# Patient Record
Sex: FEMALE | Race: WHITE | Employment: OTHER | URBAN - METROPOLITAN AREA
[De-identification: names, ages, dates, MRNs, and addresses within clinical notes are randomized per-mention and may not be internally consistent; named-entity substitution may affect disease eponyms.]

---

## 2017-01-09 DIAGNOSIS — E78.00 PURE HYPERCHOLESTEROLEMIA: ICD-10-CM

## 2017-01-09 DIAGNOSIS — I10 BENIGN ESSENTIAL HTN: ICD-10-CM

## 2017-01-09 RX ORDER — LISINOPRIL 20 MG/1
20 TABLET ORAL DAILY
Qty: 90 TABLET | Refills: 1 | Status: SHIPPED | OUTPATIENT
Start: 2017-01-09 | End: 2017-07-24 | Stop reason: SDUPTHER

## 2017-01-09 RX ORDER — TRIAMTERENE AND HYDROCHLOROTHIAZIDE 37.5; 25 MG/1; MG/1
1 TABLET ORAL DAILY
Qty: 90 TABLET | Refills: 1 | Status: SHIPPED | OUTPATIENT
Start: 2017-01-09 | End: 2017-07-24 | Stop reason: SDUPTHER

## 2017-01-09 RX ORDER — OMEPRAZOLE 20 MG/1
20 CAPSULE, DELAYED RELEASE ORAL DAILY
Qty: 60 CAPSULE | Refills: 2 | Status: SHIPPED | OUTPATIENT
Start: 2017-01-09 | End: 2017-07-24 | Stop reason: SDUPTHER

## 2017-01-09 RX ORDER — DULOXETIN HYDROCHLORIDE 60 MG/1
CAPSULE, DELAYED RELEASE ORAL
Qty: 90 CAPSULE | Refills: 1 | Status: SHIPPED | OUTPATIENT
Start: 2017-01-09 | End: 2017-07-24 | Stop reason: SDUPTHER

## 2017-01-09 RX ORDER — ATORVASTATIN CALCIUM 40 MG/1
40 TABLET, FILM COATED ORAL DAILY
Qty: 90 TABLET | Refills: 1 | Status: SHIPPED | OUTPATIENT
Start: 2017-01-09 | End: 2017-07-24 | Stop reason: ALTCHOICE

## 2017-01-09 RX ORDER — GABAPENTIN 600 MG/1
600 TABLET ORAL 4 TIMES DAILY
Qty: 360 TABLET | Refills: 1 | Status: SHIPPED | OUTPATIENT
Start: 2017-01-09 | End: 2017-07-24 | Stop reason: SDUPTHER

## 2017-06-08 ENCOUNTER — OFFICE VISIT (OUTPATIENT)
Dept: FAMILY MEDICINE CLINIC | Age: 62
End: 2017-06-08

## 2017-06-08 VITALS
HEIGHT: 59 IN | DIASTOLIC BLOOD PRESSURE: 70 MMHG | SYSTOLIC BLOOD PRESSURE: 138 MMHG | HEART RATE: 80 BPM | BODY MASS INDEX: 35.36 KG/M2 | WEIGHT: 175.38 LBS | RESPIRATION RATE: 16 BRPM | TEMPERATURE: 97.4 F

## 2017-06-08 DIAGNOSIS — I10 BENIGN ESSENTIAL HTN: ICD-10-CM

## 2017-06-08 DIAGNOSIS — E78.00 PURE HYPERCHOLESTEROLEMIA: ICD-10-CM

## 2017-06-08 LAB
ALBUMIN SERPL-MCNC: 4.3 G/DL (ref 3.9–4.9)
ALP BLD-CCNC: 156 U/L (ref 40–130)
ALT SERPL-CCNC: 46 U/L (ref 0–33)
ANION GAP SERPL CALCULATED.3IONS-SCNC: 12 MEQ/L (ref 7–13)
AST SERPL-CCNC: 38 U/L (ref 0–35)
BASOPHILS ABSOLUTE: 0.1 K/UL (ref 0–0.2)
BASOPHILS RELATIVE PERCENT: 0.5 %
BILIRUB SERPL-MCNC: 0.4 MG/DL (ref 0–1.2)
BUN BLDV-MCNC: 9 MG/DL (ref 8–23)
CALCIUM SERPL-MCNC: 9.5 MG/DL (ref 8.6–10.2)
CHLORIDE BLD-SCNC: 101 MEQ/L (ref 98–107)
CHOLESTEROL, TOTAL: 207 MG/DL (ref 0–199)
CO2: 25 MEQ/L (ref 22–29)
CREAT SERPL-MCNC: 0.61 MG/DL (ref 0.5–0.9)
CREATININE URINE: 52.6 MG/DL
EOSINOPHILS ABSOLUTE: 0.5 K/UL (ref 0–0.7)
EOSINOPHILS RELATIVE PERCENT: 4.6 %
GFR AFRICAN AMERICAN: >60
GFR NON-AFRICAN AMERICAN: >60
GLOBULIN: 2.5 G/DL (ref 2.3–3.5)
GLUCOSE BLD-MCNC: 204 MG/DL (ref 74–109)
HBA1C MFR BLD: 8.5 % (ref 4.8–5.9)
HCT VFR BLD CALC: 40.9 % (ref 37–47)
HDLC SERPL-MCNC: 31 MG/DL (ref 40–59)
HEMOGLOBIN: 13.4 G/DL (ref 12–16)
LDL CHOLESTEROL CALCULATED: ABNORMAL MG/DL (ref 0–129)
LYMPHOCYTES ABSOLUTE: 3.5 K/UL (ref 1–4.8)
LYMPHOCYTES RELATIVE PERCENT: 30.1 %
MCH RBC QN AUTO: 25.9 PG (ref 27–31.3)
MCHC RBC AUTO-ENTMCNC: 32.8 % (ref 33–37)
MCV RBC AUTO: 78.8 FL (ref 82–100)
MICROALBUMIN UR-MCNC: <1.2 MG/DL
MICROALBUMIN/CREAT UR-RTO: NORMAL MG/G (ref 0–30)
MONOCYTES ABSOLUTE: 0.9 K/UL (ref 0.2–0.8)
MONOCYTES RELATIVE PERCENT: 8 %
NEUTROPHILS ABSOLUTE: 6.6 K/UL (ref 1.4–6.5)
NEUTROPHILS RELATIVE PERCENT: 56.8 %
PDW BLD-RTO: 15 % (ref 11.5–14.5)
PLATELET # BLD: 248 K/UL (ref 130–400)
POTASSIUM SERPL-SCNC: 4 MEQ/L (ref 3.5–5.1)
RBC # BLD: 5.19 M/UL (ref 4.2–5.4)
SODIUM BLD-SCNC: 138 MEQ/L (ref 132–144)
TOTAL PROTEIN: 6.8 G/DL (ref 6.4–8.1)
TRIGL SERPL-MCNC: 789 MG/DL (ref 0–200)
WBC # BLD: 11.6 K/UL (ref 4.8–10.8)

## 2017-06-08 PROCEDURE — 99213 OFFICE O/P EST LOW 20 MIN: CPT | Performed by: FAMILY MEDICINE

## 2017-06-08 RX ORDER — ALBUTEROL SULFATE 90 UG/1
2 AEROSOL, METERED RESPIRATORY (INHALATION) EVERY 4 HOURS PRN
Qty: 1 INHALER | Refills: 3 | Status: SHIPPED | OUTPATIENT
Start: 2017-06-08 | End: 2019-01-16 | Stop reason: SDUPTHER

## 2017-06-28 ENCOUNTER — TELEPHONE (OUTPATIENT)
Dept: FAMILY MEDICINE CLINIC | Age: 62
End: 2017-06-28

## 2017-06-28 RX ORDER — CIPROFLOXACIN 250 MG/1
250 TABLET, FILM COATED ORAL 2 TIMES DAILY
Qty: 10 TABLET | Refills: 0 | Status: SHIPPED | OUTPATIENT
Start: 2017-06-28 | End: 2017-07-03

## 2017-07-24 ENCOUNTER — OFFICE VISIT (OUTPATIENT)
Dept: FAMILY MEDICINE CLINIC | Age: 62
End: 2017-07-24

## 2017-07-24 VITALS
WEIGHT: 171.56 LBS | OXYGEN SATURATION: 95 % | TEMPERATURE: 95.7 F | HEART RATE: 87 BPM | HEIGHT: 59 IN | BODY MASS INDEX: 34.59 KG/M2 | DIASTOLIC BLOOD PRESSURE: 82 MMHG | SYSTOLIC BLOOD PRESSURE: 128 MMHG

## 2017-07-24 DIAGNOSIS — R79.89 ELEVATED LIVER FUNCTION TESTS: ICD-10-CM

## 2017-07-24 DIAGNOSIS — R79.89 ELEVATED LIVER FUNCTION TESTS: Primary | ICD-10-CM

## 2017-07-24 DIAGNOSIS — I10 BENIGN ESSENTIAL HTN: ICD-10-CM

## 2017-07-24 DIAGNOSIS — E78.00 PURE HYPERCHOLESTEROLEMIA: ICD-10-CM

## 2017-07-24 LAB
ALBUMIN SERPL-MCNC: 4.3 G/DL (ref 3.9–4.9)
ALP BLD-CCNC: 179 U/L (ref 40–130)
ALT SERPL-CCNC: 102 U/L (ref 0–33)
ANION GAP SERPL CALCULATED.3IONS-SCNC: 15 MEQ/L (ref 7–13)
AST SERPL-CCNC: 74 U/L (ref 0–35)
BILIRUB SERPL-MCNC: 0.3 MG/DL (ref 0–1.2)
BUN BLDV-MCNC: 12 MG/DL (ref 8–23)
CALCIUM SERPL-MCNC: 9.4 MG/DL (ref 8.6–10.2)
CHLORIDE BLD-SCNC: 101 MEQ/L (ref 98–107)
CO2: 21 MEQ/L (ref 22–29)
CREAT SERPL-MCNC: 0.67 MG/DL (ref 0.5–0.9)
GFR AFRICAN AMERICAN: >60
GFR NON-AFRICAN AMERICAN: >60
GLOBULIN: 2.8 G/DL (ref 2.3–3.5)
GLUCOSE BLD-MCNC: 126 MG/DL (ref 74–109)
HEPATITIS C ANTIBODY INTERPRETATION: NORMAL
POTASSIUM SERPL-SCNC: 3.9 MEQ/L (ref 3.5–5.1)
SODIUM BLD-SCNC: 137 MEQ/L (ref 132–144)
TOTAL PROTEIN: 7.1 G/DL (ref 6.4–8.1)

## 2017-07-24 PROCEDURE — 99214 OFFICE O/P EST MOD 30 MIN: CPT | Performed by: FAMILY MEDICINE

## 2017-07-24 RX ORDER — OMEPRAZOLE 20 MG/1
20 CAPSULE, DELAYED RELEASE ORAL DAILY
Qty: 180 CAPSULE | Refills: 1 | Status: SHIPPED | OUTPATIENT
Start: 2017-07-24 | End: 2018-08-23 | Stop reason: SDUPTHER

## 2017-07-24 RX ORDER — DULOXETIN HYDROCHLORIDE 60 MG/1
CAPSULE, DELAYED RELEASE ORAL
Qty: 90 CAPSULE | Refills: 1 | Status: SHIPPED | OUTPATIENT
Start: 2017-07-24 | End: 2017-12-01

## 2017-07-24 RX ORDER — TRIAMTERENE AND HYDROCHLOROTHIAZIDE 37.5; 25 MG/1; MG/1
1 TABLET ORAL DAILY
Qty: 90 TABLET | Refills: 1 | Status: SHIPPED | OUTPATIENT
Start: 2017-07-24 | End: 2017-12-01 | Stop reason: ALTCHOICE

## 2017-07-24 RX ORDER — GABAPENTIN 600 MG/1
600 TABLET ORAL 4 TIMES DAILY
Qty: 360 TABLET | Refills: 1 | Status: SHIPPED | OUTPATIENT
Start: 2017-07-24 | End: 2017-12-01 | Stop reason: ALTCHOICE

## 2017-07-24 RX ORDER — LISINOPRIL 20 MG/1
20 TABLET ORAL DAILY
Qty: 90 TABLET | Refills: 1 | Status: SHIPPED | OUTPATIENT
Start: 2017-07-24 | End: 2017-12-01 | Stop reason: DRUGHIGH

## 2017-07-24 ASSESSMENT — PATIENT HEALTH QUESTIONNAIRE - PHQ9
SUM OF ALL RESPONSES TO PHQ QUESTIONS 1-9: 0
SUM OF ALL RESPONSES TO PHQ9 QUESTIONS 1 & 2: 0
2. FEELING DOWN, DEPRESSED OR HOPELESS: 0
1. LITTLE INTEREST OR PLEASURE IN DOING THINGS: 0

## 2017-07-28 ENCOUNTER — TELEPHONE (OUTPATIENT)
Dept: FAMILY MEDICINE CLINIC | Age: 62
End: 2017-07-28

## 2017-08-02 DIAGNOSIS — R10.11 RIGHT UPPER QUADRANT ABDOMINAL PAIN: ICD-10-CM

## 2017-08-02 DIAGNOSIS — R79.89 ELEVATED LIVER FUNCTION TESTS: Primary | ICD-10-CM

## 2017-08-09 DIAGNOSIS — R10.11 RIGHT UPPER QUADRANT ABDOMINAL PAIN: ICD-10-CM

## 2017-08-09 DIAGNOSIS — R79.89 ELEVATED LIVER FUNCTION TESTS: ICD-10-CM

## 2017-08-17 ENCOUNTER — OFFICE VISIT (OUTPATIENT)
Dept: FAMILY MEDICINE CLINIC | Age: 62
End: 2017-08-17

## 2017-08-17 VITALS
TEMPERATURE: 96.5 F | HEIGHT: 59 IN | BODY MASS INDEX: 34.79 KG/M2 | DIASTOLIC BLOOD PRESSURE: 78 MMHG | HEART RATE: 83 BPM | WEIGHT: 172.56 LBS | SYSTOLIC BLOOD PRESSURE: 138 MMHG | RESPIRATION RATE: 12 BRPM

## 2017-08-17 DIAGNOSIS — M79.7 FIBROMYALGIA: ICD-10-CM

## 2017-08-17 DIAGNOSIS — M54.50 CHRONIC BILATERAL LOW BACK PAIN WITHOUT SCIATICA: Primary | ICD-10-CM

## 2017-08-17 DIAGNOSIS — M79.604 BILATERAL LEG PAIN: ICD-10-CM

## 2017-08-17 DIAGNOSIS — G89.29 CHRONIC BILATERAL LOW BACK PAIN WITHOUT SCIATICA: Primary | ICD-10-CM

## 2017-08-17 DIAGNOSIS — M79.605 BILATERAL LEG PAIN: ICD-10-CM

## 2017-08-17 PROCEDURE — 99213 OFFICE O/P EST LOW 20 MIN: CPT | Performed by: FAMILY MEDICINE

## 2017-08-17 RX ORDER — PREGABALIN 75 MG/1
75 CAPSULE ORAL 2 TIMES DAILY
Qty: 60 CAPSULE | Refills: 3 | Status: SHIPPED | OUTPATIENT
Start: 2017-08-17 | End: 2017-12-01 | Stop reason: SDUPTHER

## 2017-08-17 RX ORDER — PREGABALIN 75 MG/1
75 CAPSULE ORAL 2 TIMES DAILY
Qty: 28 CAPSULE | Refills: 0 | Status: SHIPPED | OUTPATIENT
Start: 2017-08-17 | End: 2018-02-07 | Stop reason: SDUPTHER

## 2017-08-17 RX ORDER — TRAZODONE HYDROCHLORIDE 50 MG/1
50 TABLET ORAL NIGHTLY
Qty: 30 TABLET | Refills: 1 | Status: SHIPPED | OUTPATIENT
Start: 2017-08-17 | End: 2017-10-30 | Stop reason: SDUPTHER

## 2017-08-20 PROBLEM — G89.29 CHRONIC BILATERAL LOW BACK PAIN WITHOUT SCIATICA: Status: ACTIVE | Noted: 2017-08-20

## 2017-08-20 PROBLEM — M54.50 CHRONIC BILATERAL LOW BACK PAIN WITHOUT SCIATICA: Status: ACTIVE | Noted: 2017-08-20

## 2017-08-28 ENCOUNTER — OFFICE VISIT (OUTPATIENT)
Dept: FAMILY MEDICINE CLINIC | Age: 62
End: 2017-08-28

## 2017-08-28 VITALS
RESPIRATION RATE: 24 BRPM | HEART RATE: 70 BPM | OXYGEN SATURATION: 93 % | TEMPERATURE: 94.9 F | DIASTOLIC BLOOD PRESSURE: 82 MMHG | SYSTOLIC BLOOD PRESSURE: 134 MMHG | HEIGHT: 59 IN | BODY MASS INDEX: 35 KG/M2 | WEIGHT: 173.6 LBS

## 2017-08-28 DIAGNOSIS — R06.02 SOB (SHORTNESS OF BREATH): ICD-10-CM

## 2017-08-28 DIAGNOSIS — J41.1 MUCOPURULENT CHRONIC BRONCHITIS (HCC): ICD-10-CM

## 2017-08-28 DIAGNOSIS — J18.9 ATYPICAL PNEUMONIA: Primary | ICD-10-CM

## 2017-08-28 DIAGNOSIS — R05.9 COUGH: ICD-10-CM

## 2017-08-28 PROCEDURE — 94640 AIRWAY INHALATION TREATMENT: CPT | Performed by: FAMILY MEDICINE

## 2017-08-28 PROCEDURE — 99214 OFFICE O/P EST MOD 30 MIN: CPT | Performed by: FAMILY MEDICINE

## 2017-08-28 PROCEDURE — 96372 THER/PROPH/DIAG INJ SC/IM: CPT | Performed by: FAMILY MEDICINE

## 2017-08-28 RX ORDER — METHYLPREDNISOLONE 4 MG/1
TABLET ORAL
Qty: 1 KIT | Refills: 0 | Status: SHIPPED | OUTPATIENT
Start: 2017-08-28 | End: 2017-09-03

## 2017-08-28 RX ORDER — LEVOFLOXACIN 500 MG/1
500 TABLET, FILM COATED ORAL DAILY
Qty: 10 TABLET | Refills: 0 | Status: SHIPPED | OUTPATIENT
Start: 2017-08-28 | End: 2017-12-11 | Stop reason: SDUPTHER

## 2017-08-28 RX ORDER — ALBUTEROL SULFATE 2.5 MG/3ML
2.5 SOLUTION RESPIRATORY (INHALATION) ONCE
Status: COMPLETED | OUTPATIENT
Start: 2017-08-28 | End: 2017-08-28

## 2017-08-28 RX ORDER — TRIAMCINOLONE ACETONIDE 40 MG/ML
80 INJECTION, SUSPENSION INTRA-ARTICULAR; INTRAMUSCULAR ONCE
Status: COMPLETED | OUTPATIENT
Start: 2017-08-28 | End: 2017-08-28

## 2017-08-28 RX ORDER — ALBUTEROL SULFATE 2.5 MG/3ML
2.5 SOLUTION RESPIRATORY (INHALATION) 4 TIMES DAILY
Qty: 120 EACH | Refills: 4 | Status: SHIPPED | OUTPATIENT
Start: 2017-08-28 | End: 2021-10-19 | Stop reason: ALTCHOICE

## 2017-08-28 RX ADMIN — ALBUTEROL SULFATE 2.5 MG: 2.5 SOLUTION RESPIRATORY (INHALATION) at 16:34

## 2017-08-28 RX ADMIN — TRIAMCINOLONE ACETONIDE 80 MG: 40 INJECTION, SUSPENSION INTRA-ARTICULAR; INTRAMUSCULAR at 16:33

## 2017-10-30 RX ORDER — TRAZODONE HYDROCHLORIDE 50 MG/1
50 TABLET ORAL NIGHTLY
Qty: 30 TABLET | Refills: 2 | Status: SHIPPED | OUTPATIENT
Start: 2017-10-30 | End: 2017-12-01

## 2017-12-01 ENCOUNTER — TELEPHONE (OUTPATIENT)
Dept: FAMILY MEDICINE CLINIC | Age: 62
End: 2017-12-01

## 2017-12-01 ENCOUNTER — OFFICE VISIT (OUTPATIENT)
Dept: FAMILY MEDICINE CLINIC | Age: 62
End: 2017-12-01

## 2017-12-01 DIAGNOSIS — R79.89 ABNORMAL LFTS: ICD-10-CM

## 2017-12-01 DIAGNOSIS — R07.9 CHEST PAIN, UNSPECIFIED TYPE: ICD-10-CM

## 2017-12-01 DIAGNOSIS — R41.3 MEMORY LOSS: ICD-10-CM

## 2017-12-01 DIAGNOSIS — E55.9 VITAMIN D DEFICIENCY: ICD-10-CM

## 2017-12-01 DIAGNOSIS — I10 BENIGN ESSENTIAL HTN: ICD-10-CM

## 2017-12-01 DIAGNOSIS — E78.00 PURE HYPERCHOLESTEROLEMIA: ICD-10-CM

## 2017-12-01 DIAGNOSIS — E11.43 DIABETIC AUTONOMIC NEUROPATHY ASSOCIATED WITH TYPE 2 DIABETES MELLITUS (HCC): ICD-10-CM

## 2017-12-01 DIAGNOSIS — R06.02 SOB (SHORTNESS OF BREATH): Primary | ICD-10-CM

## 2017-12-01 LAB
ALBUMIN SERPL-MCNC: 4.2 G/DL (ref 3.9–4.9)
ALP BLD-CCNC: 159 U/L (ref 40–130)
ALT SERPL-CCNC: 66 U/L (ref 0–33)
ANION GAP SERPL CALCULATED.3IONS-SCNC: 14 MEQ/L (ref 7–13)
AST SERPL-CCNC: 48 U/L (ref 0–35)
BASOPHILS ABSOLUTE: 0.1 K/UL (ref 0–0.2)
BASOPHILS RELATIVE PERCENT: 0.7 %
BILIRUB SERPL-MCNC: 0.3 MG/DL (ref 0–1.2)
BUN BLDV-MCNC: 15 MG/DL (ref 8–23)
CALCIUM SERPL-MCNC: 9.5 MG/DL (ref 8.6–10.2)
CHLORIDE BLD-SCNC: 99 MEQ/L (ref 98–107)
CO2: 27 MEQ/L (ref 22–29)
CREAT SERPL-MCNC: 0.57 MG/DL (ref 0.5–0.9)
EOSINOPHILS ABSOLUTE: 0.3 K/UL (ref 0–0.7)
EOSINOPHILS RELATIVE PERCENT: 3.9 %
FOLATE: 12.1 NG/ML (ref 7.3–26.1)
GAMMA GLUTAMYL TRANSFERASE: 152 U/L (ref 0–40)
GFR AFRICAN AMERICAN: >60
GFR NON-AFRICAN AMERICAN: >60
GLOBULIN: 2.5 G/DL (ref 2.3–3.5)
GLUCOSE BLD-MCNC: 224 MG/DL (ref 74–109)
HBA1C MFR BLD: 8.4 % (ref 4.8–5.9)
HCT VFR BLD CALC: 41.4 % (ref 37–47)
HEMOGLOBIN: 13.6 G/DL (ref 12–16)
LYMPHOCYTES ABSOLUTE: 2.4 K/UL (ref 1–4.8)
LYMPHOCYTES RELATIVE PERCENT: 29.4 %
MCH RBC QN AUTO: 26.4 PG (ref 27–31.3)
MCHC RBC AUTO-ENTMCNC: 32.7 % (ref 33–37)
MCV RBC AUTO: 80.7 FL (ref 82–100)
MONOCYTES ABSOLUTE: 0.7 K/UL (ref 0.2–0.8)
MONOCYTES RELATIVE PERCENT: 8.2 %
NEUTROPHILS ABSOLUTE: 4.6 K/UL (ref 1.4–6.5)
NEUTROPHILS RELATIVE PERCENT: 57.8 %
PDW BLD-RTO: 14.9 % (ref 11.5–14.5)
PLATELET # BLD: 211 K/UL (ref 130–400)
POTASSIUM SERPL-SCNC: 4.6 MEQ/L (ref 3.5–5.1)
RBC # BLD: 5.14 M/UL (ref 4.2–5.4)
SODIUM BLD-SCNC: 140 MEQ/L (ref 132–144)
T4 FREE: 1.12 NG/DL (ref 0.93–1.7)
TOTAL PROTEIN: 6.7 G/DL (ref 6.4–8.1)
TSH SERPL DL<=0.05 MIU/L-ACNC: 0.79 UIU/ML (ref 0.27–4.2)
VITAMIN B-12: 340 PG/ML (ref 211–946)
VITAMIN D 25-HYDROXY: 30 NG/ML (ref 30–100)
WBC # BLD: 8 K/UL (ref 4.8–10.8)

## 2017-12-01 PROCEDURE — 99214 OFFICE O/P EST MOD 30 MIN: CPT | Performed by: FAMILY MEDICINE

## 2017-12-01 PROCEDURE — 93000 ELECTROCARDIOGRAM COMPLETE: CPT | Performed by: FAMILY MEDICINE

## 2017-12-01 PROCEDURE — 36415 COLL VENOUS BLD VENIPUNCTURE: CPT | Performed by: FAMILY MEDICINE

## 2017-12-01 PROCEDURE — 83036 HEMOGLOBIN GLYCOSYLATED A1C: CPT | Performed by: FAMILY MEDICINE

## 2017-12-01 RX ORDER — LISINOPRIL 20 MG/1
20 TABLET ORAL 2 TIMES DAILY
Qty: 180 TABLET | Refills: 1 | Status: SHIPPED
Start: 2017-12-01 | End: 2018-03-19 | Stop reason: SDUPTHER

## 2017-12-01 RX ORDER — ZOLPIDEM TARTRATE 10 MG/1
TABLET ORAL
Qty: 30 TABLET | Refills: 2 | Status: SHIPPED | OUTPATIENT
Start: 2017-12-01 | End: 2018-04-06 | Stop reason: SDUPTHER

## 2017-12-01 NOTE — TELEPHONE ENCOUNTER
Patient calling, she was just into see you and she can't remember if you said she can or can't take Tylenol. If she is not able to take tylenol what can she take in place of it. Please advise.

## 2017-12-01 NOTE — PROGRESS NOTES
Subjective:      Patient ID: Robbni Cruz is a 60 y.o. female. Chief Complaint   Patient presents with    Diabetes     Reports that she takes all medications as prescribed. Checks glucose at home 1-2 times per day. Tries to watch diet and avoid carbs and sugars.  Hypertension     Does not usually check BP at home. Has been checking for the past couple days with elevated readings. Does not follow a low sodium diet.  Hyperlipidemia     does try to avoid fried and fatty foods. Does not exercise.  Other     Overall not feeling well. Has been having shaking, memory loss, confusion, dizziness, and headaches. HPI    Here today follow-up on her diabetes doing okay with medications checks her sugar under 2 times per day tries to watch her diet and carbs    Also following up on hypertension and checking last couple days been more elevated but does Fluctuate does try to watch a low sodium diet and try to avoid fatty foods also    Overall she's not feeling well she times shaky sometimes memory loss has confused sometimes dizzy and shortness of breath occasionally gets chest pain but nothing rates into her neck       she is current with the present in current events is discussed with her here today      Shortness of breath even from walking outside into the office has been more prominent inhaler gave her helped somewhat but not as much as she         does have family history of heart disease in her family      When she does stop walking the shortness of breath improves    Allergies   Allergen Reactions    Penicillin V Hives     Outpatient Encounter Prescriptions as of 12/1/2017   Medication Sig Dispense Refill    lisinopril (PRINIVIL;ZESTRIL) 20 MG tablet Take 1 tablet by mouth 2 times daily Take 20 mg by mouth 180 tablet 1    zolpidem (AMBIEN) 10 MG tablet 1/2-1 tablet daily at bedtime.  30 tablet 2    albuterol (PROVENTIL) (2.5 MG/3ML) 0.083% nebulizer solution Take 3 mLs by nebulization 4 times daily

## 2017-12-03 VITALS
WEIGHT: 172.13 LBS | TEMPERATURE: 96.1 F | DIASTOLIC BLOOD PRESSURE: 74 MMHG | OXYGEN SATURATION: 95 % | HEART RATE: 90 BPM | HEIGHT: 59 IN | RESPIRATION RATE: 16 BRPM | SYSTOLIC BLOOD PRESSURE: 134 MMHG | BODY MASS INDEX: 34.7 KG/M2

## 2017-12-08 DIAGNOSIS — R79.89 ELEVATED LFTS: Primary | ICD-10-CM

## 2017-12-11 ENCOUNTER — TELEPHONE (OUTPATIENT)
Dept: FAMILY MEDICINE CLINIC | Age: 62
End: 2017-12-11

## 2017-12-11 ENCOUNTER — OFFICE VISIT (OUTPATIENT)
Dept: FAMILY MEDICINE CLINIC | Age: 62
End: 2017-12-11

## 2017-12-11 VITALS
OXYGEN SATURATION: 97 % | WEIGHT: 169.44 LBS | RESPIRATION RATE: 16 BRPM | HEIGHT: 59 IN | TEMPERATURE: 98 F | DIASTOLIC BLOOD PRESSURE: 78 MMHG | SYSTOLIC BLOOD PRESSURE: 138 MMHG | HEART RATE: 87 BPM | BODY MASS INDEX: 34.16 KG/M2

## 2017-12-11 DIAGNOSIS — I10 BENIGN ESSENTIAL HTN: Primary | ICD-10-CM

## 2017-12-11 DIAGNOSIS — E78.00 PURE HYPERCHOLESTEROLEMIA: ICD-10-CM

## 2017-12-11 DIAGNOSIS — K21.9 GASTROESOPHAGEAL REFLUX DISEASE WITHOUT ESOPHAGITIS: ICD-10-CM

## 2017-12-11 LAB — HBA1C MFR BLD: 7.4 %

## 2017-12-11 PROCEDURE — 83036 HEMOGLOBIN GLYCOSYLATED A1C: CPT | Performed by: FAMILY MEDICINE

## 2017-12-11 PROCEDURE — 99213 OFFICE O/P EST LOW 20 MIN: CPT | Performed by: FAMILY MEDICINE

## 2017-12-11 RX ORDER — CHLORTHALIDONE 25 MG/1
25 TABLET ORAL DAILY
Qty: 30 TABLET | Refills: 3 | Status: SHIPPED | OUTPATIENT
Start: 2017-12-11 | End: 2018-04-14 | Stop reason: SDUPTHER

## 2017-12-11 RX ORDER — AMLODIPINE BESYLATE 5 MG/1
5 TABLET ORAL DAILY
COMMUNITY
Start: 2017-12-05 | End: 2018-08-20 | Stop reason: SDDI

## 2017-12-11 RX ORDER — LEVOFLOXACIN 500 MG/1
500 TABLET, FILM COATED ORAL DAILY
Qty: 10 TABLET | Refills: 0 | Status: SHIPPED | OUTPATIENT
Start: 2017-12-11 | End: 2017-12-21

## 2017-12-11 NOTE — PROGRESS NOTES
Subjective:      Patient ID: Robbin Cruz is a 60 y.o. female. Chief Complaint   Patient presents with    Follow-Up from [address] [place] [address] ER on 12-3-17 for elevated BP, severe headache, and chest pain. She was admitted and inpatient overnight. Testing was done, including BW, EKG, and X-rays. Was prescribed Norvasc 5 mg. Since discharge, she is still experiencing severe headache. BP has been more under control. Feels like Norvasc is causing the headache    URI     x 2-3 days. Symptoms include cough, congestion, sinus pain, sore throat, and diarrhea. Has tried no treatments.         HEAVEN Ibarra is here today follow-up on her ER visit for elevated blood pressure checks and stayed overnight she had severe headache chest pain her blood testing was all normal EKG x-rays and her troponins    Since discharge she went ahead and started Norvasc he still feels that is causing her headaches although can be her blood pressure was elevated      Also had some cold congestion symptoms for last to 3 days definitely making things worse has tried no other treatments      Take her stress test [address] last week we did call for results did come 6 hours later and they were negative for any type of ischemic process      We discussed with her that mentioned very thoroughly with the that the stress test aren't always perfect as her  had a heart attack after having a normal      She denies any shortness breath or chest pain      Allergies   Allergen Reactions    Penicillin V Hives     Outpatient Encounter Prescriptions as of 12/11/2017   Medication Sig Dispense Refill    amLODIPine (NORVASC) 5 MG tablet Take 5 mg by mouth daily      insulin NPH (HUMULIN N;NOVOLIN N) 100 UNIT/ML injection vial TAKE 80UNITS JUST BEFORE BREAKFAST AND 80 UNITS IN THE PM 30 vial 1    chlorthalidone (HYGROTON) 25 MG tablet Take 1 tablet by mouth daily 30 tablet 3    levofloxacin (LEVAQUIN) 500 MG tablet Take 1 tablet by mouth daily for 10 within normal limits. Full strength noted    Skin- no lesions noted       Assessment:      1. Benign essential HTN     2. Pure hypercholesterolemia     3. Uncontrolled type 2 diabetes mellitus without complication, without long-term current use of insulin (HCC)  POCT glycosylated hemoglobin (Hb A1C)   4.  Gastroesophageal reflux disease without esophagitis               Plan:        Orders Placed This Encounter   Medications    insulin NPH (HUMULIN N;NOVOLIN N) 100 UNIT/ML injection vial     Sig: TAKE 80UNITS JUST BEFORE BREAKFAST AND 80 UNITS IN THE PM     Dispense:  30 vial     Refill:  1    chlorthalidone (HYGROTON) 25 MG tablet     Sig: Take 1 tablet by mouth daily     Dispense:  30 tablet     Refill:  3    levofloxacin (LEVAQUIN) 500 MG tablet     Sig: Take 1 tablet by mouth daily for 10 days     Dispense:  10 tablet     Refill:  0     Orders Placed This Encounter   Procedures    POCT glycosylated hemoglobin (Hb A1C)           Health Maintenance Due   Topic Date Due    Pneumococcal med risk (1 of 1 - PPSV23) 05/06/1973    Zostavax vaccine  05/06/2014    Cervical cancer screen  04/01/2016    Diabetic retinal exam  05/24/2016    DTaP/Tdap/Td vaccine (2 - Td) 03/23/2017    Diabetic foot exam  04/27/2017    Flu vaccine (1) 09/01/2017        she will continue to stay on the Norvasc for now we'll add chlorthalidone      Controlled Substances Monitoring:              If anything worsens or changes please call us at once , follow-up in the office as planned,

## 2018-01-17 ENCOUNTER — TELEPHONE (OUTPATIENT)
Dept: FAMILY MEDICINE CLINIC | Age: 63
End: 2018-01-17

## 2018-01-17 RX ORDER — AZITHROMYCIN 250 MG/1
TABLET, FILM COATED ORAL
Qty: 1 PACKET | Refills: 0 | Status: SHIPPED | OUTPATIENT
Start: 2018-01-17 | End: 2018-01-27

## 2018-01-17 NOTE — TELEPHONE ENCOUNTER
Patient calling, would like to know if you can call in something for her. She believes that she has the flu. She has the cough, chills, fever, congestion, sinus pressure. She also feels very weak. She has not been able to get out of bed. Please advise.

## 2018-01-24 ENCOUNTER — OFFICE VISIT (OUTPATIENT)
Dept: FAMILY MEDICINE CLINIC | Age: 63
End: 2018-01-24
Payer: COMMERCIAL

## 2018-01-24 VITALS
RESPIRATION RATE: 18 BRPM | WEIGHT: 165.6 LBS | DIASTOLIC BLOOD PRESSURE: 66 MMHG | HEIGHT: 60 IN | HEART RATE: 80 BPM | BODY MASS INDEX: 32.51 KG/M2 | SYSTOLIC BLOOD PRESSURE: 116 MMHG | TEMPERATURE: 96.9 F

## 2018-01-24 DIAGNOSIS — J20.9 ACUTE BRONCHITIS, UNSPECIFIED ORGANISM: Primary | ICD-10-CM

## 2018-01-24 DIAGNOSIS — J11.1 INFLUENZA: ICD-10-CM

## 2018-01-24 PROCEDURE — 99213 OFFICE O/P EST LOW 20 MIN: CPT | Performed by: FAMILY MEDICINE

## 2018-01-24 RX ORDER — METHYLPREDNISOLONE 4 MG/1
TABLET ORAL
Qty: 1 KIT | Refills: 0 | Status: SHIPPED | OUTPATIENT
Start: 2018-01-24 | End: 2018-04-03 | Stop reason: ALTCHOICE

## 2018-01-24 RX ORDER — BENZONATATE 100 MG/1
100 CAPSULE ORAL 3 TIMES DAILY PRN
Qty: 21 CAPSULE | Refills: 1 | Status: SHIPPED | OUTPATIENT
Start: 2018-01-24 | End: 2018-01-31

## 2018-01-24 RX ORDER — CODEINE PHOSPHATE AND GUAIFENESIN 10; 100 MG/5ML; MG/5ML
10 SOLUTION ORAL 4 TIMES DAILY PRN
Qty: 240 ML | Refills: 1 | Status: SHIPPED | OUTPATIENT
Start: 2018-01-24 | End: 2018-02-23

## 2018-01-24 NOTE — PROGRESS NOTES
Subjective:      Patient ID: Enedelia Faye is a 60 y.o. female. Chief Complaint   Patient presents with    Cough     Presents today with  no productive, dry cough that has that has been ongoing x8 days. Rx for Azithromycin was called in 1/17. Pt states she finished rx \"couple days ago\" but cough still present. Pt states she has a fever that waxes and wanes. Highest temp was on Sat of 101.4. Pt has tried Tylenol for the fever with effectiveness for a short duration and then fever returns. HPI  Positive cough congestion drainage from last week she finished all her antibiotics      Couple days though she's having more fevers or chills did have some body aches at that time was consistent with the flu      Did not see anybody at that time      Now the cough is keeping her up at night nohome as had the flu    Allergies   Allergen Reactions    Penicillin V Hives     Outpatient Encounter Prescriptions as of 1/24/2018   Medication Sig Dispense Refill    methylPREDNISolone (MEDROL DOSEPACK) 4 MG tablet Take by mouth. 1 kit 0    guaiFENesin-codeine (GUAIFENESIN AC) 100-10 MG/5ML liquid Take 10 mLs by mouth 4 times daily as needed for Cough for up to 30 days. 240 mL 1    benzonatate (TESSALON PERLES) 100 MG capsule Take 1 capsule by mouth 3 times daily as needed for Cough 21 capsule 1    azithromycin (ZITHROMAX Z-VIANNEY) 250 MG tablet As directed 1 packet 0    azithromycin (ZITHROMAX Z-VIANNEY) 250 MG tablet As directed 1 packet 0    insulin NPH (HUMULIN N;NOVOLIN N) 100 UNIT/ML injection vial TAKE 80UNITS JUST BEFORE BREAKFAST AND 80 UNITS IN THE PM 30 vial 1    chlorthalidone (HYGROTON) 25 MG tablet Take 1 tablet by mouth daily 30 tablet 3    lisinopril (PRINIVIL;ZESTRIL) 20 MG tablet Take 1 tablet by mouth 2 times daily Take 20 mg by mouth 180 tablet 1    zolpidem (AMBIEN) 10 MG tablet 1/2-1 tablet daily at bedtime.  30 tablet 2    albuterol (PROVENTIL) (2.5 MG/3ML) 0.083% nebulizer solution Take 3 mLs by

## 2018-02-05 ENCOUNTER — TELEPHONE (OUTPATIENT)
Dept: OTHER | Facility: CLINIC | Age: 63
End: 2018-02-05

## 2018-02-05 NOTE — TELEPHONE ENCOUNTER
[phone] (home)       Dania Jensen to set her up for a [employer] account. Patient requested [employer] activation. Activation code sent via email.      Shimon Leung      Patient Care Team:  Lucio Cabrera DO as PCP - General (Family Medicine)

## 2018-02-07 RX ORDER — PREGABALIN 75 MG/1
75 CAPSULE ORAL 2 TIMES DAILY
Qty: 60 CAPSULE | Refills: 3 | Status: SHIPPED | OUTPATIENT
Start: 2018-02-07 | End: 2018-05-09

## 2018-03-19 DIAGNOSIS — I10 BENIGN ESSENTIAL HTN: ICD-10-CM

## 2018-03-19 RX ORDER — LISINOPRIL 20 MG/1
20 TABLET ORAL 2 TIMES DAILY
Qty: 180 TABLET | Refills: 1 | Status: SHIPPED | OUTPATIENT
Start: 2018-03-19 | End: 2018-09-10 | Stop reason: SDUPTHER

## 2018-03-30 ENCOUNTER — TELEPHONE (OUTPATIENT)
Dept: FAMILY MEDICINE CLINIC | Age: 63
End: 2018-03-30

## 2018-03-30 RX ORDER — CIPROFLOXACIN 500 MG/1
500 TABLET, FILM COATED ORAL 2 TIMES DAILY
Qty: 20 TABLET | Refills: 0 | Status: SHIPPED | OUTPATIENT
Start: 2018-03-30 | End: 2018-04-09

## 2018-03-30 NOTE — TELEPHONE ENCOUNTER
Pt of Vinnie called stating she has a UTI and would like medication sent to [facility] here in [place]. Pt has had burning, urgency but only able to go a few drops at a time for a day.      Please advise

## 2018-04-03 ENCOUNTER — OFFICE VISIT (OUTPATIENT)
Dept: FAMILY MEDICINE CLINIC | Age: 63
End: 2018-04-03
Payer: COMMERCIAL

## 2018-04-03 VITALS
HEIGHT: 59 IN | RESPIRATION RATE: 20 BRPM | SYSTOLIC BLOOD PRESSURE: 136 MMHG | WEIGHT: 163.2 LBS | TEMPERATURE: 97.8 F | BODY MASS INDEX: 32.9 KG/M2 | HEART RATE: 78 BPM | DIASTOLIC BLOOD PRESSURE: 68 MMHG

## 2018-04-03 DIAGNOSIS — N12 PYELONEPHRITIS: Primary | ICD-10-CM

## 2018-04-03 DIAGNOSIS — R31.9 HEMATURIA, UNSPECIFIED TYPE: ICD-10-CM

## 2018-04-03 LAB
BILIRUBIN, POC: NORMAL
BLOOD URINE, POC: NORMAL
CLARITY, POC: NORMAL
COLOR, POC: NORMAL
GLUCOSE URINE, POC: NORMAL
KETONES, POC: NORMAL
LEUKOCYTE EST, POC: NORMAL
NITRITE, POC: NORMAL
PH, POC: 6
PROTEIN, POC: NORMAL
SPECIFIC GRAVITY, POC: 1.02
UROBILINOGEN, POC: NORMAL

## 2018-04-03 PROCEDURE — 81003 URINALYSIS AUTO W/O SCOPE: CPT | Performed by: FAMILY MEDICINE

## 2018-04-03 PROCEDURE — 99213 OFFICE O/P EST LOW 20 MIN: CPT | Performed by: FAMILY MEDICINE

## 2018-04-03 RX ORDER — FLUCONAZOLE 150 MG/1
150 TABLET ORAL ONCE
Qty: 1 TABLET | Refills: 1 | Status: SHIPPED | OUTPATIENT
Start: 2018-04-03 | End: 2018-04-03

## 2018-04-03 RX ORDER — ACETAMINOPHEN 325 MG/1
650 TABLET ORAL EVERY 6 HOURS PRN
COMMUNITY
Start: 2018-03-31

## 2018-04-06 DIAGNOSIS — M54.50 CHRONIC BILATERAL LOW BACK PAIN WITHOUT SCIATICA: Primary | ICD-10-CM

## 2018-04-06 DIAGNOSIS — F32.A DEPRESSIVE DISORDER: ICD-10-CM

## 2018-04-06 DIAGNOSIS — G89.29 CHRONIC BILATERAL LOW BACK PAIN WITHOUT SCIATICA: Primary | ICD-10-CM

## 2018-04-06 RX ORDER — ZOLPIDEM TARTRATE 10 MG/1
TABLET ORAL
Qty: 30 TABLET | Refills: 2 | Status: SHIPPED | OUTPATIENT
Start: 2018-04-06 | End: 2018-05-06

## 2018-04-16 RX ORDER — CHLORTHALIDONE 25 MG/1
TABLET ORAL
Qty: 30 TABLET | Refills: 5 | Status: SHIPPED | OUTPATIENT
Start: 2018-04-16 | End: 2018-10-27 | Stop reason: SDUPTHER

## 2018-05-08 ENCOUNTER — TELEPHONE (OUTPATIENT)
Dept: FAMILY MEDICINE CLINIC | Age: 63
End: 2018-05-08

## 2018-05-09 RX ORDER — GABAPENTIN 600 MG/1
600 TABLET ORAL 3 TIMES DAILY
Qty: 270 TABLET | Refills: 1 | Status: SHIPPED | OUTPATIENT
Start: 2018-05-09 | End: 2018-12-01 | Stop reason: SDUPTHER

## 2018-05-16 PROBLEM — M47.812 SPONDYLOSIS OF CERVICAL REGION WITHOUT MYELOPATHY OR RADICULOPATHY: Status: ACTIVE | Noted: 2018-05-16

## 2018-05-16 PROBLEM — M47.816 LUMBAR SPONDYLOSIS: Status: ACTIVE | Noted: 2018-05-16

## 2018-05-25 DIAGNOSIS — E11.8 UNCONTROLLED TYPE 2 DIABETES MELLITUS WITH COMPLICATION, UNSPECIFIED LONG TERM INSULIN USE STATUS: ICD-10-CM

## 2018-05-25 DIAGNOSIS — E11.65 UNCONTROLLED TYPE 2 DIABETES MELLITUS WITH COMPLICATION, UNSPECIFIED LONG TERM INSULIN USE STATUS: ICD-10-CM

## 2018-05-26 RX ORDER — LANCETS
1 EACH MISCELLANEOUS DAILY
Qty: 100 EACH | Refills: 3 | Status: SHIPPED | OUTPATIENT
Start: 2018-05-26 | End: 2018-08-20

## 2018-05-26 RX ORDER — BLOOD SUGAR DIAGNOSTIC
STRIP MISCELLANEOUS
Qty: 100 EACH | Refills: 5 | Status: SHIPPED | OUTPATIENT
Start: 2018-05-26 | End: 2020-05-06 | Stop reason: SDUPTHER

## 2018-07-05 ENCOUNTER — OFFICE VISIT (OUTPATIENT)
Dept: ENDOCRINOLOGY | Age: 63
End: 2018-07-05
Payer: COMMERCIAL

## 2018-07-05 VITALS
WEIGHT: 168 LBS | BODY MASS INDEX: 32.98 KG/M2 | HEART RATE: 77 BPM | SYSTOLIC BLOOD PRESSURE: 114 MMHG | DIASTOLIC BLOOD PRESSURE: 74 MMHG | HEIGHT: 60 IN

## 2018-07-05 DIAGNOSIS — E11.43 DIABETIC AUTONOMIC NEUROPATHY ASSOCIATED WITH TYPE 2 DIABETES MELLITUS (HCC): Primary | ICD-10-CM

## 2018-07-05 LAB — GLUCOSE BLD-MCNC: 237 MG/DL

## 2018-07-05 PROCEDURE — 99203 OFFICE O/P NEW LOW 30 MIN: CPT | Performed by: INTERNAL MEDICINE

## 2018-07-05 PROCEDURE — 95250 CONT GLUC MNTR PHYS/QHP EQP: CPT | Performed by: INTERNAL MEDICINE

## 2018-07-05 PROCEDURE — 82962 GLUCOSE BLOOD TEST: CPT | Performed by: INTERNAL MEDICINE

## 2018-07-05 NOTE — PROGRESS NOTES
Subjective:      Patient ID: Gerda Hernández is a 58 y.o. female. Diabetes   She presents for her initial diabetic visit. She has type 2 diabetes mellitus. Onset time: 8-9 yrs  Her disease course has been worsening. Hypoglycemia symptoms include nervousness/anxiousness. Associated symptoms include fatigue. Diabetic complications include peripheral neuropathy. Risk factors for coronary artery disease include diabetes mellitus, post-menopausal and obesity. Current diabetic treatment includes insulin injections (novolin n plus metformin ). She is currently taking insulin pre-breakfast and pre-dinner. She is following a generally healthy diet. She monitors blood glucose at home 1-2 x per day. (Lab Results       Component                Value               Date                       LABA1C                   10.0 (H)            04/29/2018              ) An ACE inhibitor/angiotensin II receptor blocker is being taken. Eye exam is current (ramirez Velasco [address] [place] [address] 6/2018). Results for Bo Castro (MRN [id number]) as of 7/5/2018 10:27   Ref. Range 4/27/2016 12:12 6/8/2017 15:28 12/1/2017 20:24 12/11/2017 11:18 4/29/2018 12:07   Hemoglobin A1C Latest Ref Range: 4.0 - 6.0 % 8.6 (H) 8.5 (H) 8.4 (H) 7.4 10.0 (H)       Obesity Body mass index is 32.81 kg/m².       Patient Active Problem List   Diagnosis    Hyperlipidemia    Reflux esophagitis    Benign essential HTN    DM (diabetes mellitus), type 2, uncontrolled ([address]    Chronic back pain    Depressive disorder    Fatty liver disease, nonalcoholic    GERD (gastroesophageal reflux disease)    Diabetic autonomic neuropathy associated with type 2 diabetes mellitus (HCC)    Fibromyalgia    Chronic bilateral low back pain without sciatica    Spondylosis of cervical region without myelopathy or radiculopathy    Lumbar spondylosis     Social History     Social History    Marital status:      Spouse name: N/A    Number of children: N/A    Years of place, and time. She appears well-developed and well-nourished. HENT:   Head: Normocephalic and atraumatic. Right Ear: External ear normal.   Left Ear: External ear normal.   Eyes: Conjunctivae are normal. Right eye exhibits no discharge. Left eye exhibits no discharge. No scleral icterus. Neck: Neck supple. No thyromegaly present. Cardiovascular: Normal rate, normal heart sounds and intact distal pulses. Pulmonary/Chest: Effort normal and breath sounds normal.   Abdominal: Soft. Obese    Musculoskeletal: Normal range of motion. Feet:    Lymphadenopathy:     She has no cervical adenopathy. Neurological: She is alert and oriented to person, place, and time. Skin: Skin is warm and dry. Psychiatric: She has a normal mood and affect. Lab Results   Component Value Date     2018    K 3.4 (L) 2018    CL 94 (L) 2018    CO2 27 2017    BUN 15 2017    CREATININE 1.02 2018    GLUCOSE 237 2018    CALCIUM 10.2 2018    PROT 7.7 2018    LABALBU 4.5 2018    BILITOT 0.4 2018    ALKPHOS 176 (H) 2018    AST 98 (H) 2018     (H) 2018    LABGLOM 55 2018    GFRAA >60.0 2017    AGRATIO 1.4 2018    GLOB 2.5 2017           Assessment:       Diagnosis Orders   1.  Uncontrolled type 2 diabetes mellitus without complication, without long-term current use of insulin ([facility])  POCT Glucose    Basic Metabolic Panel    Hemoglobin A1C    Lipid Panel    Microalbumin / Creatinine Urine Ratio     DIABETES FOOT EXAM           Plan:      Orders Placed This Encounter   Procedures    POCT Glucose     DIABETES FOOT EXAM    NV CONT GLUC MNTR PHYSICIAN/QHP PROVIDED EQUIPTMENT     Orders Placed This Encounter   Medications    insulin 70-30 (NOVOLIN 70/30 RELION) (70-30) 100 UNIT per ML injection vial     Si units am 20 units lunch and 80 units dinner     Dispense:  5 vial     Refill:  3     continue

## 2018-07-06 ASSESSMENT — ENCOUNTER SYMPTOMS: BACK PAIN: 1

## 2018-07-16 DIAGNOSIS — E11.65 UNCONTROLLED TYPE 2 DIABETES MELLITUS WITH COMPLICATION, UNSPECIFIED LONG TERM INSULIN USE STATUS: ICD-10-CM

## 2018-07-16 DIAGNOSIS — E11.8 UNCONTROLLED TYPE 2 DIABETES MELLITUS WITH COMPLICATION, UNSPECIFIED LONG TERM INSULIN USE STATUS: ICD-10-CM

## 2018-07-18 RX ORDER — BLOOD-GLUCOSE METER
1 EACH MISCELLANEOUS 2 TIMES DAILY
Qty: 1 KIT | Refills: 0 | Status: SHIPPED | OUTPATIENT
Start: 2018-07-18 | End: 2018-08-20

## 2018-07-19 ENCOUNTER — NURSE ONLY (OUTPATIENT)
Dept: ENDOCRINOLOGY | Age: 63
End: 2018-07-19
Payer: COMMERCIAL

## 2018-07-19 PROCEDURE — 95251 CONT GLUC MNTR ANALYSIS I&R: CPT | Performed by: INTERNAL MEDICINE

## 2018-08-07 ENCOUNTER — TELEPHONE (OUTPATIENT)
Dept: FAMILY MEDICINE CLINIC | Age: 63
End: 2018-08-07

## 2018-08-07 RX ORDER — CIPROFLOXACIN 500 MG/1
500 TABLET, FILM COATED ORAL 2 TIMES DAILY
Qty: 20 TABLET | Refills: 0 | Status: SHIPPED | OUTPATIENT
Start: 2018-08-07 | End: 2018-08-17

## 2018-08-07 NOTE — TELEPHONE ENCOUNTER
Pt is calling because she usually sees Dr Roxana Gardiner but she is out of town visiting her sister in [place]. She is getting another UTI (she saw Dr Olga Giraldo last on 4/3/18 as a follow up from the hospital for UTI). She is having frequency and burning while urinating.  Wanted to know since Dr Argentina Sarabia is out of the office and she is out of town, if you could send in a rx to the pharmacy, [facility] in [place]  Please advise  Thanks        Giselle Ni [address] [facility]

## 2018-08-11 ENCOUNTER — OFFICE VISIT (OUTPATIENT)
Dept: PRIMARY CARE CLINIC | Age: 63
End: 2018-08-11
Payer: COMMERCIAL

## 2018-08-11 VITALS
SYSTOLIC BLOOD PRESSURE: 104 MMHG | DIASTOLIC BLOOD PRESSURE: 64 MMHG | HEART RATE: 86 BPM | WEIGHT: 167 LBS | HEIGHT: 60 IN | BODY MASS INDEX: 32.79 KG/M2

## 2018-08-11 LAB
GLUCOSE BLD-MCNC: 199 MG/DL
HBA1C MFR BLD: 7.2 %

## 2018-08-11 PROCEDURE — 83036 HEMOGLOBIN GLYCOSYLATED A1C: CPT | Performed by: INTERNAL MEDICINE

## 2018-08-11 PROCEDURE — 82962 GLUCOSE BLOOD TEST: CPT | Performed by: INTERNAL MEDICINE

## 2018-08-11 PROCEDURE — 99213 OFFICE O/P EST LOW 20 MIN: CPT | Performed by: INTERNAL MEDICINE

## 2018-08-12 NOTE — PROGRESS NOTES
Subjective:      Patient ID: Ivan Salcido is a 58 y.o. female. Diabetes   She presents for her follow-up diabetic visit. She has type 2 diabetes mellitus. Onset time: 8-9 yrs  Her disease course has been improving. Hypoglycemia symptoms include nervousness/anxiousness. Associated symptoms include fatigue. Diabetic complications include peripheral neuropathy. Risk factors for coronary artery disease include diabetes mellitus, post-menopausal and obesity. Current diabetic treatment includes insulin injections (novolin 70/30  plus metformin ). She is currently taking insulin pre-breakfast, pre-dinner and pre-lunch. She is following a generally healthy diet. She monitors blood glucose at home 1-2 x per day. (Lab Results       Component                Value               Date                       LABA1C                   7.2                 08/11/2018              Reviewed 2 week cgms  data average glucose 193 higher after meals   ) An ACE inhibitor/angiotensin II receptor blocker is being taken. Eye exam is current ( 6/2018).        4 week f/u     Patient Active Problem List   Diagnosis    Hyperlipidemia    Reflux esophagitis    Benign essential HTN    DM (diabetes mellitus), type 2, uncontrolled [facility]    Chronic back pain    Depressive disorder    Fatty liver disease, nonalcoholic    GERD (gastroesophageal reflux disease)    Diabetic autonomic neuropathy associated with type 2 diabetes mellitus (HCC)    Fibromyalgia    Chronic bilateral low back pain without sciatica    Spondylosis of cervical region without myelopathy or radiculopathy    Lumbar spondylosis       Allergies   Allergen Reactions    Penicillin V Hives       Current Outpatient Prescriptions:     insulin 70-30 (NOVOLIN 70/30 RELION) (70-30) 100 UNIT per ML injection vial, 90 units am 30 units lunch and 90 units dinner, Disp: 5 vial, Rfl: 3    ciprofloxacin (CIPRO) 500 MG tablet, Take 1 tablet by mouth 2 times daily for 10 days, Disp: 20 90 units dinner     Dispense:  5 vial     Refill:  3     continue metformin 1000 mg bid   hbaic goal of 7

## 2018-08-17 DIAGNOSIS — F51.01 PRIMARY INSOMNIA: Primary | ICD-10-CM

## 2018-08-17 RX ORDER — ZOLPIDEM TARTRATE 10 MG/1
TABLET ORAL
Qty: 30 TABLET | Refills: 0 | OUTPATIENT
Start: 2018-08-17 | End: 2018-10-05 | Stop reason: SDUPTHER

## 2018-08-20 ENCOUNTER — OFFICE VISIT (OUTPATIENT)
Dept: FAMILY MEDICINE CLINIC | Age: 63
End: 2018-08-20
Payer: COMMERCIAL

## 2018-08-20 VITALS
HEART RATE: 78 BPM | SYSTOLIC BLOOD PRESSURE: 118 MMHG | WEIGHT: 167.44 LBS | BODY MASS INDEX: 32.87 KG/M2 | TEMPERATURE: 96.6 F | RESPIRATION RATE: 12 BRPM | DIASTOLIC BLOOD PRESSURE: 72 MMHG | OXYGEN SATURATION: 98 % | HEIGHT: 60 IN

## 2018-08-20 DIAGNOSIS — Z87.440 RECENT URINARY TRACT INFECTION: Primary | ICD-10-CM

## 2018-08-20 LAB
BILIRUBIN, POC: ABNORMAL
BLOOD URINE, POC: ABNORMAL
CLARITY, POC: ABNORMAL
COLOR, POC: ABNORMAL
GLUCOSE URINE, POC: 100
KETONES, POC: ABNORMAL
LEUKOCYTE EST, POC: ABNORMAL
NITRITE, POC: ABNORMAL
PH, POC: 5
PROTEIN, POC: ABNORMAL
SPECIFIC GRAVITY, POC: 1.02
UROBILINOGEN, POC: ABNORMAL

## 2018-08-20 PROCEDURE — 81003 URINALYSIS AUTO W/O SCOPE: CPT | Performed by: FAMILY MEDICINE

## 2018-08-20 PROCEDURE — 99213 OFFICE O/P EST LOW 20 MIN: CPT | Performed by: FAMILY MEDICINE

## 2018-08-20 ASSESSMENT — PATIENT HEALTH QUESTIONNAIRE - PHQ9
2. FEELING DOWN, DEPRESSED OR HOPELESS: 0
SUM OF ALL RESPONSES TO PHQ QUESTIONS 1-9: 0
SUM OF ALL RESPONSES TO PHQ QUESTIONS 1-9: 0
SUM OF ALL RESPONSES TO PHQ9 QUESTIONS 1 & 2: 0
1. LITTLE INTEREST OR PLEASURE IN DOING THINGS: 0

## 2018-08-20 NOTE — PROGRESS NOTES
63's    Heart Disease Mother 36        stents    Colon Cancer Father     Cancer Father         dad colon cancer    Other Brother         heart transplantation    Heart Disease Brother         heart transplant    Heart Disease Sister     Cancer Sister         esophageal cancer     Past Medical History:   Diagnosis Date    Diverticulosis of colon     HTN (hypertension)     Hyperlipidemia      Past Surgical History:   Procedure Laterality Date    BRAIN SURGERY      meningioma    TUBAL LIGATION           REVIEW OF SYSTEMS:   REVIEW OF SYSTEMS:   Patient seen today for exam.  Denies any problems with hearing, headaches or vision. Denies any shortness of breath, chest pain, nausea or vomiting. No black stool, no blood in the stool. No heartburn. Denies any problems with constipation or diarrhea either. No dysuria type symptoms. Objective:     /72 (Site: Left Arm, Position: Sitting, Cuff Size: Medium Adult)   Pulse 78   Temp 96.6 °F (35.9 °C) (Temporal)   Resp 12   Ht 5' (1.524 m)   Wt 167 lb 7 oz (75.9 kg)   SpO2 98%   BMI 32.70 kg/m²     Physical Exam      O:  Alert and active female in no acute distress  HEENT:  TMs clear. Pharynx neg. Nares clear, no drainage noted  Neck supple/ no adenopathy   HEART:  RRR without murmur/ no carotid bruits  LUNGS:  Clear to auscultation bilaterally, no wheeze or rhonchi noted  THYROID: neg masses or nodularity  ABDOMEN:  Soft x4. Bowel sounds positive. No masses or organomegaly,  Negative tenderness, guarding or rebound. EXTR:  Without edema./ good pulses bilat    Neurologic exam unremarkable. DTRs in upper and lower extremities within normal limits. Full strength noted    Skin- no lesions noted       Assessment:       Diagnosis Orders   1. Recent urinary tract infection  POCT Urinalysis No Micro (Auto)   2.  Uncontrolled type 2 diabetes mellitus without complication, without long-term current use of insulin (HCC)  Microalbumin / Creatinine Urine Ratio             Plan:        No orders of the defined types were placed in this encounter. Orders Placed This Encounter   Procedures    POCT Urinalysis No Micro (Auto)           Health Maintenance Due   Topic Date Due    Pneumococcal med risk (1 of 1 - PPSV23) 05/06/1973    Shingles Vaccine (1 of 2 - 2 Dose Series) 05/06/2004    Low dose CT lung screening  05/06/2009    Cervical cancer screen  04/01/2016    DTaP/Tdap/Td vaccine (2 - Td) 03/23/2017    Diabetic microalbuminuria test  06/08/2018    Lipid screen  06/08/2018             Controlled Substances Monitoring:     RX Monitoring 2/3/2016   Attestation The Prescription Monitoring Report for this patient was reviewed today. Documentation No signs of potential drug abuse or diversion identified.      Her urine is clear today she sounds fine she'll try some Zyrtec for her allergies less no things worsen but this time will often antibiotics        If anything worsens or changes please call us at once , follow-up in the office as planned,

## 2018-08-23 RX ORDER — OMEPRAZOLE 20 MG/1
20 CAPSULE, DELAYED RELEASE ORAL DAILY
Qty: 90 CAPSULE | Refills: 1 | Status: SHIPPED | OUTPATIENT
Start: 2018-08-23 | End: 2018-10-08 | Stop reason: ALTCHOICE

## 2018-08-31 ENCOUNTER — TELEPHONE (OUTPATIENT)
Dept: FAMILY MEDICINE CLINIC | Age: 63
End: 2018-08-31

## 2018-08-31 NOTE — TELEPHONE ENCOUNTER
Patient called and stated that her right arm and hand is going numb. She has been cleaning house for the last 3-4 days and has been having her arms and hands about head with the ceiling fans. The numbness has also been waking her up in the middle of the night. Could this be the cause of the numbness.      Please advise

## 2018-09-10 DIAGNOSIS — I10 BENIGN ESSENTIAL HTN: ICD-10-CM

## 2018-09-10 RX ORDER — LISINOPRIL 20 MG/1
TABLET ORAL
Qty: 180 TABLET | Refills: 1 | Status: SHIPPED | OUTPATIENT
Start: 2018-09-10 | End: 2018-11-16 | Stop reason: SDUPTHER

## 2018-09-25 ENCOUNTER — TELEPHONE (OUTPATIENT)
Dept: FAMILY MEDICINE CLINIC | Age: 63
End: 2018-09-25

## 2018-09-25 RX ORDER — CIPROFLOXACIN 250 MG/1
250 TABLET, FILM COATED ORAL 2 TIMES DAILY
Qty: 10 TABLET | Refills: 0 | Status: SHIPPED | OUTPATIENT
Start: 2018-09-25 | End: 2018-09-30

## 2018-10-05 DIAGNOSIS — F51.01 PRIMARY INSOMNIA: ICD-10-CM

## 2018-10-05 RX ORDER — ZOLPIDEM TARTRATE 10 MG/1
5 TABLET ORAL NIGHTLY PRN
Qty: 30 TABLET | Refills: 1 | Status: SHIPPED | OUTPATIENT
Start: 2018-10-05 | End: 2018-11-16 | Stop reason: SDUPTHER

## 2018-10-05 NOTE — TELEPHONE ENCOUNTER
Medication: Ambien    Last Office Visit: 8/20/18    Last filled:     Last Signed Contract:     Last Utox:     Last OARRS:

## 2018-10-08 ENCOUNTER — OFFICE VISIT (OUTPATIENT)
Dept: FAMILY MEDICINE CLINIC | Age: 63
End: 2018-10-08
Payer: MEDICARE

## 2018-10-08 VITALS
RESPIRATION RATE: 18 BRPM | SYSTOLIC BLOOD PRESSURE: 116 MMHG | BODY MASS INDEX: 33.34 KG/M2 | HEIGHT: 60 IN | TEMPERATURE: 97.4 F | WEIGHT: 169.8 LBS | DIASTOLIC BLOOD PRESSURE: 66 MMHG | HEART RATE: 80 BPM

## 2018-10-08 DIAGNOSIS — M54.9 CHRONIC BACK PAIN, UNSPECIFIED BACK LOCATION, UNSPECIFIED BACK PAIN LATERALITY: ICD-10-CM

## 2018-10-08 DIAGNOSIS — G89.29 CHRONIC BACK PAIN, UNSPECIFIED BACK LOCATION, UNSPECIFIED BACK PAIN LATERALITY: ICD-10-CM

## 2018-10-08 DIAGNOSIS — E11.43 DIABETIC AUTONOMIC NEUROPATHY ASSOCIATED WITH TYPE 2 DIABETES MELLITUS (HCC): Primary | ICD-10-CM

## 2018-10-08 DIAGNOSIS — E78.00 PURE HYPERCHOLESTEROLEMIA: ICD-10-CM

## 2018-10-08 DIAGNOSIS — E11.43 DIABETIC AUTONOMIC NEUROPATHY ASSOCIATED WITH TYPE 2 DIABETES MELLITUS (HCC): ICD-10-CM

## 2018-10-08 DIAGNOSIS — K21.00 REFLUX ESOPHAGITIS: ICD-10-CM

## 2018-10-08 DIAGNOSIS — R35.0 FREQUENCY OF URINATION: ICD-10-CM

## 2018-10-08 DIAGNOSIS — Z23 NEED FOR PNEUMOCOCCAL VACCINATION: ICD-10-CM

## 2018-10-08 DIAGNOSIS — G89.29 CHRONIC BILATERAL LOW BACK PAIN WITHOUT SCIATICA: ICD-10-CM

## 2018-10-08 DIAGNOSIS — Z23 NEED FOR INFLUENZA VACCINATION: ICD-10-CM

## 2018-10-08 DIAGNOSIS — M54.50 CHRONIC BILATERAL LOW BACK PAIN WITHOUT SCIATICA: ICD-10-CM

## 2018-10-08 LAB
BILIRUBIN, POC: NORMAL
BLOOD URINE, POC: NORMAL
CLARITY, POC: NORMAL
COLOR, POC: YELLOW
CREATININE URINE: 101.6 MG/DL
GLUCOSE URINE, POC: NORMAL
KETONES, POC: NORMAL
LEUKOCYTE EST, POC: NORMAL
MICROALBUMIN UR-MCNC: 1.3 MG/DL
MICROALBUMIN/CREAT UR-RTO: 12.8 MG/G (ref 0–30)
NITRITE, POC: NORMAL
PH, POC: 6
PROTEIN, POC: NORMAL
SPECIFIC GRAVITY, POC: 1.03
UROBILINOGEN, POC: NORMAL

## 2018-10-08 PROCEDURE — 90688 IIV4 VACCINE SPLT 0.5 ML IM: CPT | Performed by: FAMILY MEDICINE

## 2018-10-08 PROCEDURE — G0008 ADMIN INFLUENZA VIRUS VAC: HCPCS | Performed by: FAMILY MEDICINE

## 2018-10-08 PROCEDURE — 99213 OFFICE O/P EST LOW 20 MIN: CPT | Performed by: FAMILY MEDICINE

## 2018-10-08 PROCEDURE — G0009 ADMIN PNEUMOCOCCAL VACCINE: HCPCS | Performed by: FAMILY MEDICINE

## 2018-10-08 PROCEDURE — 90732 PPSV23 VACC 2 YRS+ SUBQ/IM: CPT | Performed by: FAMILY MEDICINE

## 2018-10-08 PROCEDURE — 81003 URINALYSIS AUTO W/O SCOPE: CPT | Performed by: FAMILY MEDICINE

## 2018-10-08 RX ORDER — PANTOPRAZOLE SODIUM 40 MG/1
40 TABLET, DELAYED RELEASE ORAL DAILY
Qty: 30 TABLET | Refills: 5 | Status: SHIPPED | OUTPATIENT
Start: 2018-10-08 | End: 2019-03-07 | Stop reason: SDUPTHER

## 2018-10-08 RX ORDER — NITROFURANTOIN 25; 75 MG/1; MG/1
100 CAPSULE ORAL 2 TIMES DAILY
Qty: 20 CAPSULE | Refills: 0 | Status: SHIPPED | OUTPATIENT
Start: 2018-10-08 | End: 2018-10-18

## 2018-10-29 RX ORDER — CHLORTHALIDONE 25 MG/1
TABLET ORAL
Qty: 30 TABLET | Refills: 5 | Status: SHIPPED | OUTPATIENT
Start: 2018-10-29 | End: 2019-03-29 | Stop reason: SDUPTHER

## 2018-11-12 DIAGNOSIS — I10 BENIGN ESSENTIAL HTN: ICD-10-CM

## 2018-11-12 DIAGNOSIS — F51.01 PRIMARY INSOMNIA: ICD-10-CM

## 2018-11-12 RX ORDER — ZOLPIDEM TARTRATE 10 MG/1
10 TABLET ORAL NIGHTLY PRN
Qty: 30 TABLET | Refills: 1 | OUTPATIENT
Start: 2018-11-12 | End: 2018-12-12

## 2018-11-12 RX ORDER — LISINOPRIL 20 MG/1
20 TABLET ORAL 2 TIMES DAILY
Qty: 180 TABLET | Refills: 1 | OUTPATIENT
Start: 2018-11-12

## 2018-11-16 ENCOUNTER — OFFICE VISIT (OUTPATIENT)
Dept: FAMILY MEDICINE CLINIC | Age: 63
End: 2018-11-16
Payer: COMMERCIAL

## 2018-11-16 VITALS
TEMPERATURE: 98 F | WEIGHT: 165.7 LBS | HEART RATE: 77 BPM | BODY MASS INDEX: 32.53 KG/M2 | RESPIRATION RATE: 20 BRPM | DIASTOLIC BLOOD PRESSURE: 70 MMHG | SYSTOLIC BLOOD PRESSURE: 110 MMHG | HEIGHT: 60 IN

## 2018-11-16 DIAGNOSIS — E78.00 PURE HYPERCHOLESTEROLEMIA: Primary | ICD-10-CM

## 2018-11-16 DIAGNOSIS — E11.65 UNCONTROLLED TYPE 2 DIABETES MELLITUS WITH HYPERGLYCEMIA (HCC): ICD-10-CM

## 2018-11-16 DIAGNOSIS — I10 BENIGN ESSENTIAL HTN: ICD-10-CM

## 2018-11-16 DIAGNOSIS — E78.00 PURE HYPERCHOLESTEROLEMIA: ICD-10-CM

## 2018-11-16 DIAGNOSIS — F51.01 PRIMARY INSOMNIA: ICD-10-CM

## 2018-11-16 LAB
ALBUMIN SERPL-MCNC: 4.3 G/DL (ref 3.9–4.9)
ALP BLD-CCNC: 132 U/L (ref 40–130)
ALT SERPL-CCNC: 112 U/L (ref 0–33)
ANION GAP SERPL CALCULATED.3IONS-SCNC: 17 MEQ/L (ref 7–13)
AST SERPL-CCNC: 53 U/L (ref 0–35)
BASOPHILS ABSOLUTE: 0.1 K/UL (ref 0–0.2)
BASOPHILS RELATIVE PERCENT: 1 %
BILIRUB SERPL-MCNC: 0.4 MG/DL (ref 0–1.2)
BUN BLDV-MCNC: 19 MG/DL (ref 8–23)
CALCIUM SERPL-MCNC: 10.7 MG/DL (ref 8.6–10.2)
CHLORIDE BLD-SCNC: 98 MEQ/L (ref 98–107)
CHOLESTEROL, TOTAL: 255 MG/DL (ref 0–199)
CO2: 26 MEQ/L (ref 22–29)
CREAT SERPL-MCNC: 0.7 MG/DL (ref 0.5–0.9)
EOSINOPHILS ABSOLUTE: 0.5 K/UL (ref 0–0.7)
EOSINOPHILS RELATIVE PERCENT: 6.2 %
GFR AFRICAN AMERICAN: >60
GFR NON-AFRICAN AMERICAN: >60
GLOBULIN: 2.9 G/DL (ref 2.3–3.5)
GLUCOSE BLD-MCNC: 199 MG/DL (ref 74–109)
HBA1C MFR BLD: 8.1 % (ref 4.8–5.9)
HCT VFR BLD CALC: 43.1 % (ref 37–47)
HDLC SERPL-MCNC: 37 MG/DL (ref 40–59)
HEMOGLOBIN: 14.5 G/DL (ref 12–16)
LDL CHOLESTEROL CALCULATED: ABNORMAL MG/DL (ref 0–129)
LYMPHOCYTES ABSOLUTE: 3 K/UL (ref 1–4.8)
LYMPHOCYTES RELATIVE PERCENT: 35 %
MCH RBC QN AUTO: 27.7 PG (ref 27–31.3)
MCHC RBC AUTO-ENTMCNC: 33.8 % (ref 33–37)
MCV RBC AUTO: 82.1 FL (ref 82–100)
MONOCYTES ABSOLUTE: 0.6 K/UL (ref 0.2–0.8)
MONOCYTES RELATIVE PERCENT: 6.5 %
NEUTROPHILS ABSOLUTE: 4.4 K/UL (ref 1.4–6.5)
NEUTROPHILS RELATIVE PERCENT: 51.3 %
PDW BLD-RTO: 14.4 % (ref 11.5–14.5)
PLATELET # BLD: 250 K/UL (ref 130–400)
POTASSIUM SERPL-SCNC: 3.9 MEQ/L (ref 3.5–5.1)
RBC # BLD: 5.25 M/UL (ref 4.2–5.4)
SODIUM BLD-SCNC: 141 MEQ/L (ref 132–144)
TOTAL PROTEIN: 7.2 G/DL (ref 6.4–8.1)
TRIGL SERPL-MCNC: 574 MG/DL (ref 0–200)
WBC # BLD: 8.5 K/UL (ref 4.8–10.8)

## 2018-11-16 PROCEDURE — 99213 OFFICE O/P EST LOW 20 MIN: CPT | Performed by: FAMILY MEDICINE

## 2018-11-16 RX ORDER — ZOLPIDEM TARTRATE 10 MG/1
TABLET ORAL
Qty: 30 TABLET | Refills: 2 | Status: SHIPPED | OUTPATIENT
Start: 2018-11-16 | End: 2018-12-16

## 2018-11-16 RX ORDER — LISINOPRIL 20 MG/1
TABLET ORAL
Qty: 180 TABLET | Refills: 1 | Status: SHIPPED | OUTPATIENT
Start: 2018-11-16

## 2018-12-01 DIAGNOSIS — E11.43 DIABETIC AUTONOMIC NEUROPATHY ASSOCIATED WITH TYPE 2 DIABETES MELLITUS (HCC): Primary | ICD-10-CM

## 2018-12-03 RX ORDER — GABAPENTIN 600 MG/1
TABLET ORAL
Qty: 270 TABLET | Refills: 1 | Status: SHIPPED | OUTPATIENT
Start: 2018-12-03 | End: 2020-06-09

## 2018-12-17 ENCOUNTER — TELEPHONE (OUTPATIENT)
Dept: FAMILY MEDICINE CLINIC | Age: 63
End: 2018-12-17

## 2018-12-17 DIAGNOSIS — R30.0 DYSURIA: Primary | ICD-10-CM

## 2018-12-17 RX ORDER — NITROFURANTOIN 25; 75 MG/1; MG/1
100 CAPSULE ORAL 2 TIMES DAILY
Qty: 10 CAPSULE | Refills: 0 | Status: SHIPPED | OUTPATIENT
Start: 2018-12-17 | End: 2018-12-22

## 2019-01-02 ENCOUNTER — OFFICE VISIT (OUTPATIENT)
Dept: FAMILY MEDICINE CLINIC | Age: 64
End: 2019-01-02
Payer: MEDICARE

## 2019-01-02 VITALS
RESPIRATION RATE: 16 BRPM | HEART RATE: 84 BPM | WEIGHT: 167 LBS | HEIGHT: 60 IN | DIASTOLIC BLOOD PRESSURE: 76 MMHG | BODY MASS INDEX: 32.79 KG/M2 | TEMPERATURE: 97.9 F | SYSTOLIC BLOOD PRESSURE: 124 MMHG

## 2019-01-02 DIAGNOSIS — R31.9 HEMATURIA, UNSPECIFIED TYPE: ICD-10-CM

## 2019-01-02 DIAGNOSIS — R35.0 URINE FREQUENCY: Primary | ICD-10-CM

## 2019-01-02 LAB
BILIRUBIN, POC: NORMAL
BLOOD URINE, POC: 200
CLARITY, POC: NORMAL
COLOR, POC: NORMAL
GLUCOSE URINE, POC: NORMAL
KETONES, POC: NORMAL
LEUKOCYTE EST, POC: NORMAL
NITRITE, POC: NORMAL
PH, POC: 5
PROTEIN, POC: 100
SPECIFIC GRAVITY, POC: 0.25
UROBILINOGEN, POC: NORMAL

## 2019-01-02 PROCEDURE — 99213 OFFICE O/P EST LOW 20 MIN: CPT | Performed by: FAMILY MEDICINE

## 2019-01-02 PROCEDURE — 81002 URINALYSIS NONAUTO W/O SCOPE: CPT | Performed by: FAMILY MEDICINE

## 2019-01-02 RX ORDER — SULFAMETHOXAZOLE AND TRIMETHOPRIM 800; 160 MG/1; MG/1
1 TABLET ORAL 2 TIMES DAILY
Qty: 20 TABLET | Refills: 0 | Status: SHIPPED | OUTPATIENT
Start: 2019-01-02 | End: 2019-01-12

## 2019-01-02 ASSESSMENT — PATIENT HEALTH QUESTIONNAIRE - PHQ9
1. LITTLE INTEREST OR PLEASURE IN DOING THINGS: 1
SUM OF ALL RESPONSES TO PHQ QUESTIONS 1-9: 2
2. FEELING DOWN, DEPRESSED OR HOPELESS: 1
SUM OF ALL RESPONSES TO PHQ QUESTIONS 1-9: 2
SUM OF ALL RESPONSES TO PHQ9 QUESTIONS 1 & 2: 2

## 2019-01-05 LAB
ORGANISM: ABNORMAL
URINE CULTURE, ROUTINE: ABNORMAL
URINE CULTURE, ROUTINE: ABNORMAL

## 2019-01-16 ENCOUNTER — TELEPHONE (OUTPATIENT)
Dept: FAMILY MEDICINE CLINIC | Age: 64
End: 2019-01-16

## 2019-01-16 ENCOUNTER — OFFICE VISIT (OUTPATIENT)
Dept: FAMILY MEDICINE CLINIC | Age: 64
End: 2019-01-16
Payer: MEDICARE

## 2019-01-16 VITALS
HEART RATE: 76 BPM | WEIGHT: 170.13 LBS | SYSTOLIC BLOOD PRESSURE: 136 MMHG | HEIGHT: 60 IN | TEMPERATURE: 97.8 F | RESPIRATION RATE: 12 BRPM | BODY MASS INDEX: 33.4 KG/M2 | DIASTOLIC BLOOD PRESSURE: 72 MMHG

## 2019-01-16 DIAGNOSIS — N30.90 CYSTITIS: ICD-10-CM

## 2019-01-16 DIAGNOSIS — F32.89 OTHER DEPRESSION: ICD-10-CM

## 2019-01-16 DIAGNOSIS — N39.0 URINARY TRACT INFECTION WITHOUT HEMATURIA, SITE UNSPECIFIED: Primary | ICD-10-CM

## 2019-01-16 LAB
BILIRUBIN, POC: NORMAL
BLOOD URINE, POC: NORMAL
CLARITY, POC: CLEAR
COLOR, POC: NORMAL
GLUCOSE URINE, POC: NORMAL
KETONES, POC: NORMAL
LEUKOCYTE EST, POC: NORMAL
NITRITE, POC: NORMAL
PH, POC: 5
PROTEIN, POC: NORMAL
SPECIFIC GRAVITY, POC: 1.01
UROBILINOGEN, POC: NORMAL

## 2019-01-16 PROCEDURE — 99213 OFFICE O/P EST LOW 20 MIN: CPT | Performed by: FAMILY MEDICINE

## 2019-01-16 PROCEDURE — 81003 URINALYSIS AUTO W/O SCOPE: CPT | Performed by: FAMILY MEDICINE

## 2019-01-16 RX ORDER — ESCITALOPRAM OXALATE 10 MG/1
10 TABLET ORAL DAILY
Qty: 30 TABLET | Refills: 3 | Status: SHIPPED | OUTPATIENT
Start: 2019-01-16 | End: 2019-03-20 | Stop reason: SDUPTHER

## 2019-01-16 RX ORDER — DOXYCYCLINE 100 MG/1
100 CAPSULE ORAL 2 TIMES DAILY
Qty: 60 CAPSULE | Refills: 0 | Status: SHIPPED | OUTPATIENT
Start: 2019-01-16 | End: 2019-02-15

## 2019-01-16 RX ORDER — ALBUTEROL SULFATE 90 UG/1
2 AEROSOL, METERED RESPIRATORY (INHALATION) EVERY 4 HOURS PRN
Qty: 1 INHALER | Refills: 3 | Status: SHIPPED | OUTPATIENT
Start: 2019-01-16 | End: 2021-10-19 | Stop reason: ALTCHOICE

## 2019-03-07 DIAGNOSIS — K21.00 REFLUX ESOPHAGITIS: ICD-10-CM

## 2019-03-07 RX ORDER — PANTOPRAZOLE SODIUM 40 MG/1
40 TABLET, DELAYED RELEASE ORAL DAILY
Qty: 30 TABLET | Refills: 5 | Status: SHIPPED | OUTPATIENT
Start: 2019-03-07

## 2019-03-20 RX ORDER — ESCITALOPRAM OXALATE 10 MG/1
10 TABLET ORAL DAILY
Qty: 30 TABLET | Refills: 3 | Status: SHIPPED | OUTPATIENT
Start: 2019-03-20 | End: 2022-05-19

## 2019-03-23 ENCOUNTER — OFFICE VISIT (OUTPATIENT)
Dept: PRIMARY CARE CLINIC | Age: 64
End: 2019-03-23
Payer: MEDICARE

## 2019-03-23 VITALS
DIASTOLIC BLOOD PRESSURE: 78 MMHG | BODY MASS INDEX: 32.39 KG/M2 | HEART RATE: 69 BPM | WEIGHT: 165 LBS | SYSTOLIC BLOOD PRESSURE: 130 MMHG | HEIGHT: 60 IN

## 2019-03-23 DIAGNOSIS — E78.00 PURE HYPERCHOLESTEROLEMIA: ICD-10-CM

## 2019-03-23 LAB
GLUCOSE BLD-MCNC: 285 MG/DL
HBA1C MFR BLD: 8.4 %

## 2019-03-23 PROCEDURE — 82962 GLUCOSE BLOOD TEST: CPT | Performed by: INTERNAL MEDICINE

## 2019-03-23 PROCEDURE — 83036 HEMOGLOBIN GLYCOSYLATED A1C: CPT | Performed by: INTERNAL MEDICINE

## 2019-03-23 PROCEDURE — 99213 OFFICE O/P EST LOW 20 MIN: CPT | Performed by: INTERNAL MEDICINE

## 2019-03-23 RX ORDER — ICOSAPENT ETHYL 1000 MG/1
2 CAPSULE ORAL 2 TIMES DAILY
Qty: 120 CAPSULE | Refills: 3 | Status: SHIPPED | OUTPATIENT
Start: 2019-03-23 | End: 2020-06-09 | Stop reason: SDUPTHER

## 2019-03-26 ENCOUNTER — TELEPHONE (OUTPATIENT)
Dept: ADMINISTRATIVE | Age: 64
End: 2019-03-26

## 2019-03-29 ENCOUNTER — TELEPHONE (OUTPATIENT)
Dept: ENDOCRINOLOGY | Age: 64
End: 2019-03-29

## 2019-03-29 RX ORDER — CHLORTHALIDONE 25 MG/1
TABLET ORAL
Qty: 90 TABLET | Refills: 0 | Status: SHIPPED | OUTPATIENT
Start: 2019-03-29

## 2019-04-02 RX ORDER — OMEGA-3-ACID ETHYL ESTERS 1 G/1
CAPSULE, LIQUID FILLED ORAL
Qty: 120 CAPSULE | Refills: 3 | Status: SHIPPED | OUTPATIENT
Start: 2019-04-02 | End: 2022-05-19

## 2019-10-26 ENCOUNTER — OFFICE VISIT (OUTPATIENT)
Age: 64
End: 2019-10-26
Payer: MEDICARE

## 2019-10-26 VITALS
DIASTOLIC BLOOD PRESSURE: 77 MMHG | HEIGHT: 60 IN | SYSTOLIC BLOOD PRESSURE: 125 MMHG | WEIGHT: 166 LBS | HEART RATE: 74 BPM | BODY MASS INDEX: 32.59 KG/M2

## 2019-10-26 DIAGNOSIS — E11.65 UNCONTROLLED TYPE 2 DIABETES MELLITUS WITH HYPERGLYCEMIA (HCC): Primary | ICD-10-CM

## 2019-10-26 LAB
CHP ED QC CHECK: NORMAL
GLUCOSE BLD-MCNC: 169 MG/DL

## 2019-10-26 PROCEDURE — 2022F DILAT RTA XM EVC RTNOPTHY: CPT | Performed by: INTERNAL MEDICINE

## 2019-10-26 PROCEDURE — G8400 PT W/DXA NO RESULTS DOC: HCPCS | Performed by: INTERNAL MEDICINE

## 2019-10-26 PROCEDURE — 1036F TOBACCO NON-USER: CPT | Performed by: INTERNAL MEDICINE

## 2019-10-26 PROCEDURE — G9899 SCRN MAM PERF RSLTS DOC: HCPCS | Performed by: INTERNAL MEDICINE

## 2019-10-26 PROCEDURE — 1123F ACP DISCUSS/DSCN MKR DOCD: CPT | Performed by: INTERNAL MEDICINE

## 2019-10-26 PROCEDURE — 1090F PRES/ABSN URINE INCON ASSESS: CPT | Performed by: INTERNAL MEDICINE

## 2019-10-26 PROCEDURE — 82962 GLUCOSE BLOOD TEST: CPT | Performed by: INTERNAL MEDICINE

## 2019-10-26 PROCEDURE — 4040F PNEUMOC VAC/ADMIN/RCVD: CPT | Performed by: INTERNAL MEDICINE

## 2019-10-26 PROCEDURE — G8427 DOCREV CUR MEDS BY ELIG CLIN: HCPCS | Performed by: INTERNAL MEDICINE

## 2019-10-26 PROCEDURE — G8417 CALC BMI ABV UP PARAM F/U: HCPCS | Performed by: INTERNAL MEDICINE

## 2019-10-26 PROCEDURE — 99213 OFFICE O/P EST LOW 20 MIN: CPT | Performed by: INTERNAL MEDICINE

## 2019-10-26 PROCEDURE — 3017F COLORECTAL CA SCREEN DOC REV: CPT | Performed by: INTERNAL MEDICINE

## 2019-10-26 PROCEDURE — G8484 FLU IMMUNIZE NO ADMIN: HCPCS | Performed by: INTERNAL MEDICINE

## 2019-10-26 ASSESSMENT — ENCOUNTER SYMPTOMS: SHORTNESS OF BREATH: 1

## 2020-05-06 ENCOUNTER — VIRTUAL VISIT (OUTPATIENT)
Dept: ENDOCRINOLOGY | Age: 65
End: 2020-05-06
Payer: MEDICARE

## 2020-05-06 PROCEDURE — 4040F PNEUMOC VAC/ADMIN/RCVD: CPT | Performed by: INTERNAL MEDICINE

## 2020-05-06 PROCEDURE — 3017F COLORECTAL CA SCREEN DOC REV: CPT | Performed by: INTERNAL MEDICINE

## 2020-05-06 PROCEDURE — G8428 CUR MEDS NOT DOCUMENT: HCPCS | Performed by: INTERNAL MEDICINE

## 2020-05-06 PROCEDURE — G8400 PT W/DXA NO RESULTS DOC: HCPCS | Performed by: INTERNAL MEDICINE

## 2020-05-06 PROCEDURE — 2022F DILAT RTA XM EVC RTNOPTHY: CPT | Performed by: INTERNAL MEDICINE

## 2020-05-06 PROCEDURE — 3046F HEMOGLOBIN A1C LEVEL >9.0%: CPT | Performed by: INTERNAL MEDICINE

## 2020-05-06 PROCEDURE — 1123F ACP DISCUSS/DSCN MKR DOCD: CPT | Performed by: INTERNAL MEDICINE

## 2020-05-06 PROCEDURE — 99213 OFFICE O/P EST LOW 20 MIN: CPT | Performed by: INTERNAL MEDICINE

## 2020-05-06 PROCEDURE — 1090F PRES/ABSN URINE INCON ASSESS: CPT | Performed by: INTERNAL MEDICINE

## 2020-05-06 RX ORDER — BLOOD SUGAR DIAGNOSTIC
STRIP MISCELLANEOUS
Qty: 100 EACH | Refills: 5 | Status: SHIPPED | OUTPATIENT
Start: 2020-05-06 | End: 2021-09-13

## 2020-05-06 NOTE — PROGRESS NOTES
UNIT per ML injection vial 95 units am 35 units lunch and 95 units dinner  Zoraida Hopkins MD   omega-3 acid ethyl esters (LOVAZA) 1 g capsule Take 2 caps bid  Alessio Child MD   chlorthalidone (HYGROTON) 25 MG tablet TAKE 1 TABLET BY MOUTH EVERY DAY  Aubrey Birmingham DO   metFORMIN (GLUCOPHAGE) 1000 MG tablet TAKE 1 TABLET BY MOUTH 2 TIMES DAILY (WITH MEALS)  Rosi Trejo DO   Icosapent Ethyl (VASCEPA) 1 g CAPS capsule Take 2 capsules by mouth 2 times daily  Zoraida Hopkins MD   escitalopram (LEXAPRO) 10 MG tablet Take 1 tablet by mouth daily  Autumn Cervantes, APRN - CNP   pantoprazole (PROTONIX) 40 MG tablet Take 1 tablet by mouth daily  Rosi Trejo DO   B Complex Vitamins (B COMPLEX PO) Take by mouth  Historical Provider, MD   albuterol sulfate HFA (PROAIR HFA) 108 (90 Base) MCG/ACT inhaler Inhale 2 puffs into the lungs every 4 hours as needed for Wheezing  Aubrey Birmingham DO   gabapentin (NEURONTIN) 600 MG tablet TAKE 1 TABLET BY MOUTH 3 TIMES A DAY  Autumn Cervantes, APRN - CNP   lisinopril (PRINIVIL;ZESTRIL) 20 MG tablet TAKE 1 TABLET BY MOUTH 2 TIMES DAILY  Aubrey Birmingham DO   Insulin Syringe-Needle U-100 31G X 5/16\" 1 ML MISC Use as directed for diabetes  Rosi Trejo DO   acetaminophen (TYLENOL) 325 MG tablet Take 650 mg by mouth  Historical Provider, MD   albuterol (PROVENTIL) (2.5 MG/3ML) 0.083% nebulizer solution Take 3 mLs by nebulization 4 times daily  Aubrey Birmingham DO   aspirin EC 81 MG EC tablet Take 1 tablet by mouth daily to prevent heart attack and stroke  Rosi Trejo DO   Multiple Vitamins-Minerals (MULTIVITAL PO) Take by mouth  Historical Provider, MD   Fexofenadine HCl (ALLEGRA PO) Take by mouth  Historical Provider, MD   Insulin Pen Needle 31G X 5 MM MISC 1 each by Does not apply route daily  Aldair Bledsoe MD       Social History     Tobacco Use    Smoking status: Former Smoker     Packs/day: 1.00     Years: 30.00     Pack years: 30.00     Types: Refill:  3    metFORMIN (GLUCOPHAGE) 1000 MG tablet     Sig: Take 1 tablet by mouth 2 times daily (with meals)     Dispense:  180 tablet     Refill:  1    Insulin Syringe-Needle U-100 31G X 5/16\" 1 ML MISC     Sig: Use as directed for diabetes     Dispense:  100 each     Refill:  5     Patient to have labs done in the next few weeks    Follow-up in 4 weeks to review the results of lipid panel chemistries A1c  A1c goal of less than 7    Time spent with patient was 17 minutes      An  electronic signature was used to authenticate this note. --Lonnie Monson MD on 5/6/2020 at 11:21 AM        Pursuant to the emergency declaration under the [facility] waiver authority and the [employer] Act, this Virtual  Visit was conducted, with patient's consent, to reduce the patient's risk of exposure to COVID-19 and provide continuity of care for an established patient. Services were provided through a video synchronous discussion virtually to substitute for in-person clinic visit.

## 2020-05-26 DIAGNOSIS — E11.65 UNCONTROLLED TYPE 2 DIABETES MELLITUS WITH HYPERGLYCEMIA (HCC): ICD-10-CM

## 2020-05-26 LAB
ANION GAP SERPL CALCULATED.3IONS-SCNC: 13 MEQ/L (ref 9–15)
BUN BLDV-MCNC: 8 MG/DL (ref 8–23)
CALCIUM SERPL-MCNC: 10 MG/DL (ref 8.5–9.9)
CHLORIDE BLD-SCNC: 102 MEQ/L (ref 95–107)
CHOLESTEROL, TOTAL: 302 MG/DL (ref 0–199)
CO2: 28 MEQ/L (ref 20–31)
CREAT SERPL-MCNC: 0.69 MG/DL (ref 0.5–0.9)
CREATININE URINE: 155.5 MG/DL
GFR AFRICAN AMERICAN: >60
GFR NON-AFRICAN AMERICAN: >60
GLUCOSE BLD-MCNC: 124 MG/DL (ref 70–99)
HBA1C MFR BLD: 8.9 % (ref 4.8–5.9)
HDLC SERPL-MCNC: 39 MG/DL (ref 40–59)
LDL CHOLESTEROL CALCULATED: ABNORMAL MG/DL (ref 0–129)
MICROALBUMIN UR-MCNC: 5.3 MG/DL
MICROALBUMIN/CREAT UR-RTO: 34.1 MG/G (ref 0–30)
POTASSIUM SERPL-SCNC: 4.2 MEQ/L (ref 3.4–4.9)
SODIUM BLD-SCNC: 143 MEQ/L (ref 135–144)
TRIGL SERPL-MCNC: 486 MG/DL (ref 0–150)

## 2020-06-09 ENCOUNTER — OFFICE VISIT (OUTPATIENT)
Dept: ENDOCRINOLOGY | Age: 65
End: 2020-06-09
Payer: MEDICARE

## 2020-06-09 LAB
CHP ED QC CHECK: NORMAL
GLUCOSE BLD-MCNC: 98 MG/DL

## 2020-06-09 PROCEDURE — 2022F DILAT RTA XM EVC RTNOPTHY: CPT | Performed by: INTERNAL MEDICINE

## 2020-06-09 PROCEDURE — 3017F COLORECTAL CA SCREEN DOC REV: CPT | Performed by: INTERNAL MEDICINE

## 2020-06-09 PROCEDURE — 1036F TOBACCO NON-USER: CPT | Performed by: INTERNAL MEDICINE

## 2020-06-09 PROCEDURE — 99213 OFFICE O/P EST LOW 20 MIN: CPT | Performed by: INTERNAL MEDICINE

## 2020-06-09 PROCEDURE — 1090F PRES/ABSN URINE INCON ASSESS: CPT | Performed by: INTERNAL MEDICINE

## 2020-06-09 PROCEDURE — 1123F ACP DISCUSS/DSCN MKR DOCD: CPT | Performed by: INTERNAL MEDICINE

## 2020-06-09 PROCEDURE — 4040F PNEUMOC VAC/ADMIN/RCVD: CPT | Performed by: INTERNAL MEDICINE

## 2020-06-09 PROCEDURE — 3052F HG A1C>EQUAL 8.0%<EQUAL 9.0%: CPT | Performed by: INTERNAL MEDICINE

## 2020-06-09 PROCEDURE — 82962 GLUCOSE BLOOD TEST: CPT | Performed by: INTERNAL MEDICINE

## 2020-06-09 PROCEDURE — G8417 CALC BMI ABV UP PARAM F/U: HCPCS | Performed by: INTERNAL MEDICINE

## 2020-06-09 PROCEDURE — G8400 PT W/DXA NO RESULTS DOC: HCPCS | Performed by: INTERNAL MEDICINE

## 2020-06-09 PROCEDURE — G8428 CUR MEDS NOT DOCUMENT: HCPCS | Performed by: INTERNAL MEDICINE

## 2020-06-09 RX ORDER — ATORVASTATIN CALCIUM 20 MG/1
20 TABLET, FILM COATED ORAL DAILY
COMMUNITY

## 2020-06-09 RX ORDER — DULOXETIN HYDROCHLORIDE 60 MG/1
60 CAPSULE, DELAYED RELEASE ORAL DAILY
COMMUNITY
Start: 2020-03-31

## 2020-06-09 RX ORDER — ICOSAPENT ETHYL 1000 MG/1
2 CAPSULE ORAL 2 TIMES DAILY
Qty: 120 CAPSULE | Refills: 3 | Status: SHIPPED | OUTPATIENT
Start: 2020-06-09 | End: 2022-05-19

## 2020-06-09 NOTE — PROGRESS NOTES
per ML injection vial     Si units am 35 units lunch and 95 units dinner     Dispense:  5 vial     Refill:  3   Continue Lipitor 20 mg daily add Ami Monroy MD

## 2020-09-08 DIAGNOSIS — E11.65 UNCONTROLLED TYPE 2 DIABETES MELLITUS WITH HYPERGLYCEMIA (HCC): ICD-10-CM

## 2020-09-08 LAB
ANION GAP SERPL CALCULATED.3IONS-SCNC: 8 MEQ/L (ref 9–15)
BUN BLDV-MCNC: 12 MG/DL (ref 8–23)
CALCIUM SERPL-MCNC: 9.5 MG/DL (ref 8.5–9.9)
CHLORIDE BLD-SCNC: 92 MEQ/L (ref 95–107)
CHOLESTEROL, TOTAL: 304 MG/DL (ref 0–199)
CO2: 31 MEQ/L (ref 20–31)
CREAT SERPL-MCNC: 0.71 MG/DL (ref 0.5–0.9)
GFR AFRICAN AMERICAN: >60
GFR NON-AFRICAN AMERICAN: >60
GLUCOSE BLD-MCNC: 137 MG/DL (ref 70–99)
HBA1C MFR BLD: 7.8 % (ref 4.8–5.9)
HDLC SERPL-MCNC: 44 MG/DL (ref 40–59)
LDL CHOLESTEROL CALCULATED: 199 MG/DL (ref 0–129)
POTASSIUM SERPL-SCNC: 3.7 MEQ/L (ref 3.4–4.9)
SODIUM BLD-SCNC: 131 MEQ/L (ref 135–144)
TRIGL SERPL-MCNC: 304 MG/DL (ref 0–150)

## 2020-09-11 ENCOUNTER — OFFICE VISIT (OUTPATIENT)
Dept: ENDOCRINOLOGY | Age: 65
End: 2020-09-11
Payer: MEDICARE

## 2020-09-11 VITALS
OXYGEN SATURATION: 94 % | HEART RATE: 73 BPM | DIASTOLIC BLOOD PRESSURE: 81 MMHG | BODY MASS INDEX: 29.49 KG/M2 | SYSTOLIC BLOOD PRESSURE: 118 MMHG | WEIGHT: 151 LBS

## 2020-09-11 PROCEDURE — 3017F COLORECTAL CA SCREEN DOC REV: CPT | Performed by: INTERNAL MEDICINE

## 2020-09-11 PROCEDURE — 4040F PNEUMOC VAC/ADMIN/RCVD: CPT | Performed by: INTERNAL MEDICINE

## 2020-09-11 PROCEDURE — 2022F DILAT RTA XM EVC RTNOPTHY: CPT | Performed by: INTERNAL MEDICINE

## 2020-09-11 PROCEDURE — 1090F PRES/ABSN URINE INCON ASSESS: CPT | Performed by: INTERNAL MEDICINE

## 2020-09-11 PROCEDURE — 3051F HG A1C>EQUAL 7.0%<8.0%: CPT | Performed by: INTERNAL MEDICINE

## 2020-09-11 PROCEDURE — 99213 OFFICE O/P EST LOW 20 MIN: CPT | Performed by: INTERNAL MEDICINE

## 2020-09-11 PROCEDURE — 1123F ACP DISCUSS/DSCN MKR DOCD: CPT | Performed by: INTERNAL MEDICINE

## 2020-09-11 PROCEDURE — 1036F TOBACCO NON-USER: CPT | Performed by: INTERNAL MEDICINE

## 2020-09-11 PROCEDURE — G8417 CALC BMI ABV UP PARAM F/U: HCPCS | Performed by: INTERNAL MEDICINE

## 2020-09-11 PROCEDURE — G8427 DOCREV CUR MEDS BY ELIG CLIN: HCPCS | Performed by: INTERNAL MEDICINE

## 2020-09-11 PROCEDURE — G8400 PT W/DXA NO RESULTS DOC: HCPCS | Performed by: INTERNAL MEDICINE

## 2020-09-11 RX ORDER — ZOLPIDEM TARTRATE 10 MG/1
10 TABLET ORAL DAILY
COMMUNITY
Start: 2020-08-24

## 2020-09-11 RX ORDER — PHENTERMINE HYDROCHLORIDE 37.5 MG/1
37.5 TABLET ORAL
Qty: 30 TABLET | Refills: 0 | Status: SHIPPED | OUTPATIENT
Start: 2020-09-11 | End: 2020-10-09 | Stop reason: SDUPTHER

## 2020-09-11 NOTE — PROGRESS NOTES
Subjective:      Patient ID: Rodo Thakur is a 76 y.o. female. 3-month follow-up on type 2 diabetes  Patient also wants to start on phentermine for weight loss currently on Novolin 70/30 plus metformin  Diabetes   She presents for her follow-up diabetic visit. She has type 2 diabetes mellitus. Symptoms are improving. Current diabetic treatment includes insulin injections. She is currently taking insulin pre-dinner. Her overall blood glucose range is 180-200 mg/dl. (Lab Results       Component                Value               Date                       LABA1C                   7.8 (H)             09/08/2020              )       Results for Magdaleno Cadena (MRN [id number]) as of 9/11/2020 13:46   Ref.  Range 6/9/2020 14:16 9/8/2020 09:47   Sodium Latest Ref Range: 135 - 144 mEq/L  131 (L)   Potassium Latest Ref Range: 3.4 - 4.9 mEq/L  3.7   Chloride Latest Ref Range: 95 - 107 mEq/L  92 (L)   CO2 Latest Ref Range: 20 - 31 mEq/L  31   BUN Latest Ref Range: 8 - 23 mg/dL  12   Creatinine Latest Ref Range: 0.50 - 0.90 mg/dL  0.71   Anion Gap Latest Ref Range: 9 - 15 mEq/L  8 (L)   GFR Non- Latest Ref Range: >60   >60.0   GFR African American Latest Ref Range: >60   >60.0   Glucose Latest Ref Range: 70 - 99 mg/dL 98 137 (H)   Calcium Latest Ref Range: 8.5 - 9.9 mg/dL  9.5   Cholesterol, Total Latest Ref Range: 0 - 199 mg/dL  304 (H)   HDL Cholesterol Latest Ref Range: 40 - 59 mg/dL  44   LDL Calculated Latest Ref Range: 0 - 129 mg/dL  199 (H)   Triglycerides Latest Ref Range: 0 - 150 mg/dL  304 (H)   Hemoglobin A1C Latest Ref Range: 4.8 - 5.9 %  7.8 (H)       Patient Active Problem List   Diagnosis    Hyperlipidemia    Reflux esophagitis    Benign essential HTN    DM (diabetes mellitus), type 2, uncontrolled (HCC)    Chronic back pain    Depressive disorder    Fatty liver disease, nonalcoholic    GERD (gastroesophageal reflux disease)    Diabetic autonomic neuropathy associated with type 2 diabetes mellitus (HCC)    Fibromyalgia    Chronic bilateral low back pain without sciatica    Spondylosis of cervical region without myelopathy or radiculopathy    Lumbar spondylosis       Current Outpatient Medications:     insulin 70-30 (NOVOLIN 70/30 RELION) (70-30) 100 UNIT per ML injection vial, 50-60 units dinner, Disp: 5 vial, Rfl: 3    phentermine (ADIPEX-P) 37.5 MG tablet, Take 1 tablet by mouth every morning (before breakfast) for 30 days. , Disp: 30 tablet, Rfl: 0    atorvastatin (LIPITOR) 20 MG tablet, Take 20 mg by mouth daily, Disp: , Rfl:     DULoxetine (CYMBALTA) 60 MG extended release capsule, Take 60 mg by mouth daily, Disp: , Rfl:     Icosapent Ethyl (VASCEPA) 1 g CAPS capsule, Take 2 capsules by mouth 2 times daily, Disp: 120 capsule, Rfl: 3    metFORMIN (GLUCOPHAGE) 1000 MG tablet, Take 1 tablet by mouth 2 times daily (with meals), Disp: 180 tablet, Rfl: 1    Insulin Syringe-Needle U-100 31G X 5/16\" 1 ML MISC, Use as directed for diabetes, Disp: 100 each, Rfl: 5    omega-3 acid ethyl esters (LOVAZA) 1 g capsule, Take 2 caps bid, Disp: 120 capsule, Rfl: 3    chlorthalidone (HYGROTON) 25 MG tablet, TAKE 1 TABLET BY MOUTH EVERY DAY, Disp: 90 tablet, Rfl: 0    escitalopram (LEXAPRO) 10 MG tablet, Take 1 tablet by mouth daily, Disp: 30 tablet, Rfl: 3    pantoprazole (PROTONIX) 40 MG tablet, Take 1 tablet by mouth daily, Disp: 30 tablet, Rfl: 5    B Complex Vitamins (B COMPLEX PO), Take by mouth, Disp: , Rfl:     albuterol sulfate HFA (PROAIR HFA) 108 (90 Base) MCG/ACT inhaler, Inhale 2 puffs into the lungs every 4 hours as needed for Wheezing, Disp: 1 Inhaler, Rfl: 3    lisinopril (PRINIVIL;ZESTRIL) 20 MG tablet, TAKE 1 TABLET BY MOUTH 2 TIMES DAILY, Disp: 180 tablet, Rfl: 1    acetaminophen (TYLENOL) 325 MG tablet, Take 650 mg by mouth, Disp: , Rfl:     albuterol (PROVENTIL) (2.5 MG/3ML) 0.083% nebulizer solution, Take 3 mLs by nebulization 4 times daily, Disp: 120 each, Rfl: 4    aspirin EC 81 MG EC tablet, Take 1 tablet by mouth daily to prevent heart attack and stroke, Disp: 90 tablet, Rfl: 0    Multiple Vitamins-Minerals (MULTIVITAL PO), Take by mouth, Disp: , Rfl:     Fexofenadine HCl (ALLEGRA PO), Take by mouth, Disp: , Rfl:     Insulin Pen Needle 31G X 5 MM MISC, 1 each by Does not apply route daily, Disp: 100 each, Rfl: 3    zolpidem (AMBIEN) 10 MG tablet, Take 10 mg by mouth daily. , Disp: , Rfl:       Review of Systems    Vitals:    20 1338   BP: 118/81   Pulse: 73   SpO2: 94%   Weight: 151 lb (68.5 kg)       Objective:   Physical Exam  Constitutional:       Appearance: Normal appearance. She is obese. HENT:      Head: Normocephalic and atraumatic. Neck:      Musculoskeletal: Normal range of motion and neck supple. Cardiovascular:      Rate and Rhythm: Normal rate. Musculoskeletal: Normal range of motion. Neurological:      Mental Status: She is alert. Psychiatric:         Mood and Affect: Mood normal.         Assessment:       Diagnosis Orders   1. Obesity (BMI 30-39.9)  phentermine (ADIPEX-P) 37.5 MG tablet   2. Uncontrolled type 2 diabetes mellitus with hyperglycemia (HCC)  insulin 70-30 (NOVOLIN 70/30 RELION) (70-30) 100 UNIT per ML injection vial    Basic Metabolic Panel    Hemoglobin A1C           Plan:      Orders Placed This Encounter   Procedures    Basic Metabolic Panel     Standing Status:   Future     Standing Expiration Date:   2021    Hemoglobin A1C     Standing Status:   Future     Standing Expiration Date:   2021     Orders Placed This Encounter   Medications    insulin 70-30 (NOVOLIN 70/30 RELION) (70-30) 100 UNIT per ML injection vial     Si-60 units dinner     Dispense:  5 vial     Refill:  3    phentermine (ADIPEX-P) 37.5 MG tablet     Sig: Take 1 tablet by mouth every morning (before breakfast) for 30 days.      Dispense:  30 tablet     Refill:  0             Milton Echols MD

## 2020-10-09 ENCOUNTER — OFFICE VISIT (OUTPATIENT)
Dept: ENDOCRINOLOGY | Age: 65
End: 2020-10-09
Payer: MEDICARE

## 2020-10-09 VITALS
HEART RATE: 82 BPM | WEIGHT: 145 LBS | BODY MASS INDEX: 28.32 KG/M2 | OXYGEN SATURATION: 98 % | SYSTOLIC BLOOD PRESSURE: 129 MMHG | DIASTOLIC BLOOD PRESSURE: 73 MMHG

## 2020-10-09 PROCEDURE — 3017F COLORECTAL CA SCREEN DOC REV: CPT | Performed by: INTERNAL MEDICINE

## 2020-10-09 PROCEDURE — G8484 FLU IMMUNIZE NO ADMIN: HCPCS | Performed by: INTERNAL MEDICINE

## 2020-10-09 PROCEDURE — 1090F PRES/ABSN URINE INCON ASSESS: CPT | Performed by: INTERNAL MEDICINE

## 2020-10-09 PROCEDURE — 4040F PNEUMOC VAC/ADMIN/RCVD: CPT | Performed by: INTERNAL MEDICINE

## 2020-10-09 PROCEDURE — 1123F ACP DISCUSS/DSCN MKR DOCD: CPT | Performed by: INTERNAL MEDICINE

## 2020-10-09 PROCEDURE — G8427 DOCREV CUR MEDS BY ELIG CLIN: HCPCS | Performed by: INTERNAL MEDICINE

## 2020-10-09 PROCEDURE — 1036F TOBACCO NON-USER: CPT | Performed by: INTERNAL MEDICINE

## 2020-10-09 PROCEDURE — 99213 OFFICE O/P EST LOW 20 MIN: CPT | Performed by: INTERNAL MEDICINE

## 2020-10-09 PROCEDURE — G8417 CALC BMI ABV UP PARAM F/U: HCPCS | Performed by: INTERNAL MEDICINE

## 2020-10-09 PROCEDURE — G8400 PT W/DXA NO RESULTS DOC: HCPCS | Performed by: INTERNAL MEDICINE

## 2020-10-09 RX ORDER — PHENTERMINE HYDROCHLORIDE 37.5 MG/1
37.5 TABLET ORAL
Qty: 30 TABLET | Refills: 0 | Status: SHIPPED | OUTPATIENT
Start: 2020-10-09 | End: 2020-11-08

## 2020-10-09 NOTE — PROGRESS NOTES
Subjective:      Patient ID: Jimena Calix is a 76 y.o. female. 4-week follow-up on obesity patient lost 6 pounds over 4 weeks also has type 2 diabetes on insulin  Other   This is a chronic (Obesity) problem. The current episode started more than 1 year ago. The problem occurs intermittently. The problem has been gradually improving. Treatments tried: Phentermine. The treatment provided mild relief. Body mass index is 28.32 kg/m². Vitals 10/9/2020 9/11/2020 16/76/6465   SYSTOLIC 340 [id number] 406   DIASTOLIC 73 81 77   Site   Left Upper Arm   Position   Sitting   Cuff Size   Large Adult   Pulse 82 73 74   Temp      Resp      SpO2 98 94    Weight 145 lb 151 lb 166 lb   Height   5' 0\"   Body mass index   32.42 kg/m2       Patient Active Problem List   Diagnosis    Hyperlipidemia    Reflux esophagitis    Benign essential HTN    DM (diabetes mellitus), type 2, uncontrolled (HCC)    Chronic back pain    Depressive disorder    Fatty liver disease, nonalcoholic    GERD (gastroesophageal reflux disease)    Diabetic autonomic neuropathy associated with type 2 diabetes mellitus (HCC)    Fibromyalgia    Chronic bilateral low back pain without sciatica    Spondylosis of cervical region without myelopathy or radiculopathy    Lumbar spondylosis       Allergies   Allergen Reactions    Penicillin V Hives       Review of Systems    Vitals:    10/09/20 1059   BP: 129/73   Pulse: 82   SpO2: 98%   Weight: 145 lb (65.8 kg)         Objective:   Physical Exam  Constitutional:       Appearance: Normal appearance. HENT:      Head: Normocephalic and atraumatic. Neck:      Musculoskeletal: Normal range of motion and neck supple. Cardiovascular:      Rate and Rhythm: Normal rate. Musculoskeletal: Normal range of motion. Neurological:      General: No focal deficit present. Mental Status: She is alert. Psychiatric:         Mood and Affect: Mood normal.         Assessment:       Diagnosis Orders   1.  Weight gain     2. Obesity (BMI 30-39.9)  phentermine (ADIPEX-P) 37.5 MG tablet           Plan:        Orders Placed This Encounter   Medications    phentermine (ADIPEX-P) 37.5 MG tablet     Sig: Take 1 tablet by mouth every morning (before breakfast) for 30 days.      Dispense:  30 tablet     Refill:  0     Continue Adipex for another 8 weeks BMI target less than 27        Semaj Escamilla MD

## 2020-11-02 DIAGNOSIS — E11.65 UNCONTROLLED TYPE 2 DIABETES MELLITUS WITH HYPERGLYCEMIA (HCC): ICD-10-CM

## 2020-11-02 LAB
ANION GAP SERPL CALCULATED.3IONS-SCNC: 9 MEQ/L (ref 9–15)
BUN BLDV-MCNC: 10 MG/DL (ref 8–23)
CALCIUM SERPL-MCNC: 9.4 MG/DL (ref 8.5–9.9)
CHLORIDE BLD-SCNC: 92 MEQ/L (ref 95–107)
CO2: 31 MEQ/L (ref 20–31)
CREAT SERPL-MCNC: 0.59 MG/DL (ref 0.5–0.9)
GFR AFRICAN AMERICAN: >60
GFR NON-AFRICAN AMERICAN: >60
GLUCOSE BLD-MCNC: 151 MG/DL (ref 70–99)
POTASSIUM SERPL-SCNC: 4.2 MEQ/L (ref 3.4–4.9)
SODIUM BLD-SCNC: 132 MEQ/L (ref 135–144)

## 2020-11-03 LAB — HBA1C MFR BLD: 7.1 % (ref 4.8–5.9)

## 2020-11-06 ENCOUNTER — OFFICE VISIT (OUTPATIENT)
Dept: ENDOCRINOLOGY | Age: 65
End: 2020-11-06
Payer: MEDICARE

## 2020-11-06 VITALS
WEIGHT: 143 LBS | HEART RATE: 89 BPM | DIASTOLIC BLOOD PRESSURE: 88 MMHG | BODY MASS INDEX: 28.07 KG/M2 | OXYGEN SATURATION: 98 % | SYSTOLIC BLOOD PRESSURE: 136 MMHG | HEIGHT: 60 IN

## 2020-11-06 PROCEDURE — 99213 OFFICE O/P EST LOW 20 MIN: CPT | Performed by: INTERNAL MEDICINE

## 2020-11-06 PROCEDURE — 3017F COLORECTAL CA SCREEN DOC REV: CPT | Performed by: INTERNAL MEDICINE

## 2020-11-06 PROCEDURE — 1036F TOBACCO NON-USER: CPT | Performed by: INTERNAL MEDICINE

## 2020-11-06 PROCEDURE — G8427 DOCREV CUR MEDS BY ELIG CLIN: HCPCS | Performed by: INTERNAL MEDICINE

## 2020-11-06 PROCEDURE — 1090F PRES/ABSN URINE INCON ASSESS: CPT | Performed by: INTERNAL MEDICINE

## 2020-11-06 PROCEDURE — 1123F ACP DISCUSS/DSCN MKR DOCD: CPT | Performed by: INTERNAL MEDICINE

## 2020-11-06 PROCEDURE — 4040F PNEUMOC VAC/ADMIN/RCVD: CPT | Performed by: INTERNAL MEDICINE

## 2020-11-06 PROCEDURE — 3051F HG A1C>EQUAL 7.0%<8.0%: CPT | Performed by: INTERNAL MEDICINE

## 2020-11-06 PROCEDURE — 2022F DILAT RTA XM EVC RTNOPTHY: CPT | Performed by: INTERNAL MEDICINE

## 2020-11-06 PROCEDURE — G8417 CALC BMI ABV UP PARAM F/U: HCPCS | Performed by: INTERNAL MEDICINE

## 2020-11-06 PROCEDURE — G8400 PT W/DXA NO RESULTS DOC: HCPCS | Performed by: INTERNAL MEDICINE

## 2020-11-06 PROCEDURE — G8484 FLU IMMUNIZE NO ADMIN: HCPCS | Performed by: INTERNAL MEDICINE

## 2020-11-06 RX ORDER — PHENTERMINE HYDROCHLORIDE 37.5 MG/1
37.5 TABLET ORAL
Qty: 30 TABLET | Refills: 0 | Status: SHIPPED | OUTPATIENT
Start: 2020-11-06 | End: 2020-12-06

## 2020-11-06 NOTE — PROGRESS NOTES
Subjective:      Patient ID: Jarivs Lou is a 76 y.o. female. Follow-up on type 2 diabetes obesity patient has been on Adipex also for weight loss A1c is down from 7.8-7.1  Patient on Novolin 70/30 50-60 twice daily plus Metformin  Patient lost close to 8 pounds over 8 weeks  Diabetes   She presents for her follow-up diabetic visit. She has type 2 diabetes mellitus. Current diabetic treatment includes insulin injections (Novolin 70/30 plus metformin). She is currently taking insulin pre-breakfast and pre-dinner. Her overall blood glucose range is 140-180 mg/dl. (Lab Results       Component                Value               Date                       LABA1C                   7.1 (H)             11/02/2020              )       Results for Imelda Duque (MRN [id number]) as of 11/6/2020 11:08   Ref.  Range 11/2/2020 08:45   Sodium Latest Ref Range: 135 - 144 mEq/L 132 (L)   Potassium Latest Ref Range: 3.4 - 4.9 mEq/L 4.2   Chloride Latest Ref Range: 95 - 107 mEq/L 92 (L)   CO2 Latest Ref Range: 20 - 31 mEq/L 31   BUN Latest Ref Range: 8 - 23 mg/dL 10   Creatinine Latest Ref Range: 0.50 - 0.90 mg/dL 0.59   Anion Gap Latest Ref Range: 9 - 15 mEq/L 9   GFR Non- Latest Ref Range: >60  >60.0   GFR African American Latest Ref Range: >60  >60.0   Glucose Latest Ref Range: 70 - 99 mg/dL 151 (H)   Calcium Latest Ref Range: 8.5 - 9.9 mg/dL 9.4   Hemoglobin A1C Latest Ref Range: 4.8 - 5.9 % 7.1 (H)     Patient Active Problem List   Diagnosis    Hyperlipidemia    Reflux esophagitis    Benign essential HTN    DM (diabetes mellitus), type 2, uncontrolled (HCC)    Chronic back pain    Depressive disorder    Fatty liver disease, nonalcoholic    GERD (gastroesophageal reflux disease)    Diabetic autonomic neuropathy associated with type 2 diabetes mellitus (HCC)    Fibromyalgia    Chronic bilateral low back pain without sciatica    Spondylosis of cervical region without myelopathy or radiculopathy    Lumbar spondylosis     Allergies   Allergen Reactions    Penicillin V Hives       Current Outpatient Medications:     metFORMIN (GLUCOPHAGE) 1000 MG tablet, Take 1 tablet by mouth 2 times daily (with meals), Disp: 180 tablet, Rfl: 1    phentermine (ADIPEX-P) 37.5 MG tablet, Take 1 tablet by mouth every morning (before breakfast) for 30 days. , Disp: 30 tablet, Rfl: 0    zolpidem (AMBIEN) 10 MG tablet, Take 10 mg by mouth daily. , Disp: , Rfl:     insulin 70-30 (NOVOLIN 70/30 RELION) (70-30) 100 UNIT per ML injection vial, 50-60 units dinner, Disp: 5 vial, Rfl: 3    atorvastatin (LIPITOR) 20 MG tablet, Take 20 mg by mouth daily, Disp: , Rfl:     DULoxetine (CYMBALTA) 60 MG extended release capsule, Take 60 mg by mouth daily, Disp: , Rfl:     Icosapent Ethyl (VASCEPA) 1 g CAPS capsule, Take 2 capsules by mouth 2 times daily, Disp: 120 capsule, Rfl: 3    Insulin Syringe-Needle U-100 31G X 5/16\" 1 ML MISC, Use as directed for diabetes, Disp: 100 each, Rfl: 5    omega-3 acid ethyl esters (LOVAZA) 1 g capsule, Take 2 caps bid, Disp: 120 capsule, Rfl: 3    chlorthalidone (HYGROTON) 25 MG tablet, TAKE 1 TABLET BY MOUTH EVERY DAY, Disp: 90 tablet, Rfl: 0    escitalopram (LEXAPRO) 10 MG tablet, Take 1 tablet by mouth daily, Disp: 30 tablet, Rfl: 3    pantoprazole (PROTONIX) 40 MG tablet, Take 1 tablet by mouth daily, Disp: 30 tablet, Rfl: 5    B Complex Vitamins (B COMPLEX PO), Take by mouth, Disp: , Rfl:     albuterol sulfate HFA (PROAIR HFA) 108 (90 Base) MCG/ACT inhaler, Inhale 2 puffs into the lungs every 4 hours as needed for Wheezing, Disp: 1 Inhaler, Rfl: 3    lisinopril (PRINIVIL;ZESTRIL) 20 MG tablet, TAKE 1 TABLET BY MOUTH 2 TIMES DAILY, Disp: 180 tablet, Rfl: 1    acetaminophen (TYLENOL) 325 MG tablet, Take 650 mg by mouth, Disp: , Rfl:     albuterol (PROVENTIL) (2.5 MG/3ML) 0.083% nebulizer solution, Take 3 mLs by nebulization 4 times daily, Disp: 120 each, Rfl: 4    aspirin EC 81 MG EC tablet, 11/6/2021             Chana Hull MD

## 2021-02-05 ENCOUNTER — OFFICE VISIT (OUTPATIENT)
Dept: ENDOCRINOLOGY | Age: 66
End: 2021-02-05
Payer: MEDICARE

## 2021-02-05 VITALS
WEIGHT: 151 LBS | SYSTOLIC BLOOD PRESSURE: 135 MMHG | HEART RATE: 70 BPM | HEIGHT: 59 IN | BODY MASS INDEX: 30.44 KG/M2 | OXYGEN SATURATION: 97 % | DIASTOLIC BLOOD PRESSURE: 84 MMHG

## 2021-02-05 DIAGNOSIS — E11.65 UNCONTROLLED TYPE 2 DIABETES MELLITUS WITH HYPERGLYCEMIA (HCC): Primary | ICD-10-CM

## 2021-02-05 PROCEDURE — 1036F TOBACCO NON-USER: CPT | Performed by: INTERNAL MEDICINE

## 2021-02-05 PROCEDURE — G8400 PT W/DXA NO RESULTS DOC: HCPCS | Performed by: INTERNAL MEDICINE

## 2021-02-05 PROCEDURE — 1123F ACP DISCUSS/DSCN MKR DOCD: CPT | Performed by: INTERNAL MEDICINE

## 2021-02-05 PROCEDURE — G8417 CALC BMI ABV UP PARAM F/U: HCPCS | Performed by: INTERNAL MEDICINE

## 2021-02-05 PROCEDURE — 3017F COLORECTAL CA SCREEN DOC REV: CPT | Performed by: INTERNAL MEDICINE

## 2021-02-05 PROCEDURE — 2022F DILAT RTA XM EVC RTNOPTHY: CPT | Performed by: INTERNAL MEDICINE

## 2021-02-05 PROCEDURE — 4040F PNEUMOC VAC/ADMIN/RCVD: CPT | Performed by: INTERNAL MEDICINE

## 2021-02-05 PROCEDURE — G8484 FLU IMMUNIZE NO ADMIN: HCPCS | Performed by: INTERNAL MEDICINE

## 2021-02-05 PROCEDURE — 99213 OFFICE O/P EST LOW 20 MIN: CPT | Performed by: INTERNAL MEDICINE

## 2021-02-05 PROCEDURE — 1090F PRES/ABSN URINE INCON ASSESS: CPT | Performed by: INTERNAL MEDICINE

## 2021-02-05 PROCEDURE — G8427 DOCREV CUR MEDS BY ELIG CLIN: HCPCS | Performed by: INTERNAL MEDICINE

## 2021-02-05 PROCEDURE — 3046F HEMOGLOBIN A1C LEVEL >9.0%: CPT | Performed by: INTERNAL MEDICINE

## 2021-02-05 NOTE — PROGRESS NOTES
heart attack and stroke, Disp: 90 tablet, Rfl: 0    Multiple Vitamins-Minerals (MULTIVITAL PO), Take by mouth, Disp: , Rfl:     Fexofenadine HCl (ALLEGRA PO), Take by mouth, Disp: , Rfl:     Insulin Pen Needle 31G X 5 MM MISC, 1 each by Does not apply route daily, Disp: 100 each, Rfl: 3      Review of Systems   Psychiatric/Behavioral: Positive for dysphoric mood and sleep disturbance. Vitals:    02/05/21 1142   BP: 135/84   Pulse: 70   SpO2: 97%   Weight: 151 lb (68.5 kg)   Height: 4' 11\" (1.499 m)         Objective:   Physical Exam  Vitals signs reviewed. Constitutional:       Appearance: Normal appearance. She is obese. HENT:      Head: Normocephalic and atraumatic. Neck:      Musculoskeletal: Normal range of motion and neck supple. Cardiovascular:      Rate and Rhythm: Normal rate. Musculoskeletal: Normal range of motion. Neurological:      General: No focal deficit present. Mental Status: She is alert. Assessment:       Diagnosis Orders   1.  Uncontrolled type 2 diabetes mellitus with hyperglycemia ([facility] [address]             Plan:      Orders Placed This Encounter   Procedures    Basic Metabolic Panel     Standing Status:   Future     Standing Expiration Date:   2/5/2022    Hemoglobin A1C     Standing Status:   Future     Standing Expiration Date:   2/5/2022    Microalbumin / Creatinine Urine Ratio     Standing Status:   Future     Standing Expiration Date:   2/5/2022     Continue Novolin 70/30 50 to 60 units at dinner plus Metformin 1000 g twice daily follow-up in 3 to 6 months time          Daniela Jean MD

## 2021-07-19 ENCOUNTER — OFFICE VISIT (OUTPATIENT)
Dept: ENDOCRINOLOGY | Age: 66
End: 2021-07-19
Payer: MEDICARE

## 2021-07-19 VITALS
DIASTOLIC BLOOD PRESSURE: 75 MMHG | HEART RATE: 72 BPM | HEIGHT: 59 IN | BODY MASS INDEX: 31.65 KG/M2 | WEIGHT: 157 LBS | SYSTOLIC BLOOD PRESSURE: 121 MMHG | OXYGEN SATURATION: 97 %

## 2021-07-19 DIAGNOSIS — E11.65 UNCONTROLLED TYPE 2 DIABETES MELLITUS WITH HYPERGLYCEMIA (HCC): Primary | ICD-10-CM

## 2021-07-19 LAB
CHP ED QC CHECK: NORMAL
GLUCOSE BLD-MCNC: 171 MG/DL

## 2021-07-19 PROCEDURE — 1090F PRES/ABSN URINE INCON ASSESS: CPT | Performed by: INTERNAL MEDICINE

## 2021-07-19 PROCEDURE — 4040F PNEUMOC VAC/ADMIN/RCVD: CPT | Performed by: INTERNAL MEDICINE

## 2021-07-19 PROCEDURE — G8417 CALC BMI ABV UP PARAM F/U: HCPCS | Performed by: INTERNAL MEDICINE

## 2021-07-19 PROCEDURE — 3046F HEMOGLOBIN A1C LEVEL >9.0%: CPT | Performed by: INTERNAL MEDICINE

## 2021-07-19 PROCEDURE — 2022F DILAT RTA XM EVC RTNOPTHY: CPT | Performed by: INTERNAL MEDICINE

## 2021-07-19 PROCEDURE — G8400 PT W/DXA NO RESULTS DOC: HCPCS | Performed by: INTERNAL MEDICINE

## 2021-07-19 PROCEDURE — G8427 DOCREV CUR MEDS BY ELIG CLIN: HCPCS | Performed by: INTERNAL MEDICINE

## 2021-07-19 PROCEDURE — 82962 GLUCOSE BLOOD TEST: CPT | Performed by: INTERNAL MEDICINE

## 2021-07-19 PROCEDURE — 1123F ACP DISCUSS/DSCN MKR DOCD: CPT | Performed by: INTERNAL MEDICINE

## 2021-07-19 PROCEDURE — 99213 OFFICE O/P EST LOW 20 MIN: CPT | Performed by: INTERNAL MEDICINE

## 2021-07-19 PROCEDURE — 3017F COLORECTAL CA SCREEN DOC REV: CPT | Performed by: INTERNAL MEDICINE

## 2021-07-19 PROCEDURE — 1036F TOBACCO NON-USER: CPT | Performed by: INTERNAL MEDICINE

## 2021-08-19 ENCOUNTER — OFFICE VISIT (OUTPATIENT)
Dept: ENDOCRINOLOGY | Age: 66
End: 2021-08-19
Payer: MEDICARE

## 2021-08-19 VITALS
SYSTOLIC BLOOD PRESSURE: 133 MMHG | BODY MASS INDEX: 32.46 KG/M2 | DIASTOLIC BLOOD PRESSURE: 83 MMHG | WEIGHT: 161 LBS | HEIGHT: 59 IN | OXYGEN SATURATION: 98 % | HEART RATE: 85 BPM

## 2021-08-19 DIAGNOSIS — E11.65 UNCONTROLLED TYPE 2 DIABETES MELLITUS WITH HYPERGLYCEMIA (HCC): Primary | ICD-10-CM

## 2021-08-19 LAB
CHP ED QC CHECK: NORMAL
GLUCOSE BLD-MCNC: 176 MG/DL

## 2021-08-19 PROCEDURE — G8400 PT W/DXA NO RESULTS DOC: HCPCS | Performed by: INTERNAL MEDICINE

## 2021-08-19 PROCEDURE — 1123F ACP DISCUSS/DSCN MKR DOCD: CPT | Performed by: INTERNAL MEDICINE

## 2021-08-19 PROCEDURE — G8427 DOCREV CUR MEDS BY ELIG CLIN: HCPCS | Performed by: INTERNAL MEDICINE

## 2021-08-19 PROCEDURE — 3017F COLORECTAL CA SCREEN DOC REV: CPT | Performed by: INTERNAL MEDICINE

## 2021-08-19 PROCEDURE — 1036F TOBACCO NON-USER: CPT | Performed by: INTERNAL MEDICINE

## 2021-08-19 PROCEDURE — G8417 CALC BMI ABV UP PARAM F/U: HCPCS | Performed by: INTERNAL MEDICINE

## 2021-08-19 PROCEDURE — 2022F DILAT RTA XM EVC RTNOPTHY: CPT | Performed by: INTERNAL MEDICINE

## 2021-08-19 PROCEDURE — 3046F HEMOGLOBIN A1C LEVEL >9.0%: CPT | Performed by: INTERNAL MEDICINE

## 2021-08-19 PROCEDURE — 4040F PNEUMOC VAC/ADMIN/RCVD: CPT | Performed by: INTERNAL MEDICINE

## 2021-08-19 PROCEDURE — 99214 OFFICE O/P EST MOD 30 MIN: CPT | Performed by: INTERNAL MEDICINE

## 2021-08-19 PROCEDURE — 1090F PRES/ABSN URINE INCON ASSESS: CPT | Performed by: INTERNAL MEDICINE

## 2021-08-19 PROCEDURE — 82962 GLUCOSE BLOOD TEST: CPT | Performed by: INTERNAL MEDICINE

## 2021-08-19 ASSESSMENT — ENCOUNTER SYMPTOMS: EYES NEGATIVE: 1

## 2021-08-19 NOTE — PROGRESS NOTES
8/19/2021    Assessment:       Diagnosis Orders   1. Uncontrolled type 2 diabetes mellitus with hyperglycemia (HCC)  POCT Glucose    Hemoglobin S4B    Basic Metabolic Panel    POCT glycosylated hemoglobin (Hb A1C)         PLAN:     Continue current dose of Novolin 70/30 twice a day patient educated extensively regarding insulin pump therapy for better control A1c goal of 7 or lower  Continue to use freestyle bridgett continuous glucose monitoring patient will talk to Eat today more than 50% of 30 minutes spent patient education counseling  Time spent 30 minutes      Orders Placed This Encounter   Procedures    POCT Glucose    POCT glycosylated hemoglobin (Hb A1C)     No orders of the defined types were placed in this encounter. No follow-ups on file. Subjective:     Chief Complaint   Patient presents with    Diabetes     Vitals:    08/19/21 1145 08/19/21 1154   BP: (!) 140/78 133/83   Pulse: 85    SpO2: 98%    Weight: 161 lb (73 kg)    Height: 4' 11\" (1.499 m)      Wt Readings from Last 3 Encounters:   08/19/21 161 lb (73 kg)   07/19/21 157 lb (71.2 kg)   02/05/21 151 lb (68.5 kg)     BP Readings from Last 3 Encounters:   08/19/21 133/83   07/19/21 121/75   02/05/21 135/84     Follow-up on type 2 diabetes patient is status post surgery over her left foot/ankle blood sugars are still labile having low sugars early in the morning using freestyle bridgett continuous glucose monitoring which was reviewed currently on Novolin 70/30 60 to 70 units at night morning is 15 to 25 units  Plus Metformin    Diabetes  She presents for her follow-up diabetic visit. She has type 2 diabetes mellitus. Her disease course has been fluctuating. Associated symptoms include fatigue. There are no hypoglycemic complications. Symptoms are worsening. There are no diabetic complications. Risk factors for coronary artery disease include obesity. Current diabetic treatment includes insulin injections.  She is currently taking insulin pre-breakfast and pre-dinner. Her overall blood glucose range is 180-200 mg/dl. (Reviewed 2-week continuous glucose monitoring average blood sugar was 180 blood sugars low between 3 AM to 6 AM higher after breakfast  Target range blood sugar was 55% high was 26% very high was 19%) An ACE inhibitor/angiotensin II receptor blocker is being taken. Past Medical History:   Diagnosis Date    Diverticulosis of colon     HTN (hypertension)     Hyperlipidemia      Past Surgical History:   Procedure Laterality Date    BRAIN SURGERY      meningioma    TUBAL LIGATION       Social History     Socioeconomic History    Marital status:      Spouse name: Not on file    Number of children: Not on file    Years of education: Not on file    Highest education level: Not on file   Occupational History    Not on file   Tobacco Use    Smoking status: Former Smoker     Packs/day: 1.00     Years: 30.00     Pack years: 30.00     Types: Cigarettes     Quit date: 10/1/2005     Years since quitting: 15.8    Smokeless tobacco: Never Used   Substance and Sexual Activity    Alcohol use: No     Alcohol/week: 0.0 standard drinks    Drug use: No    Sexual activity: Yes     Partners: Male   Other Topics Concern    Not on file   Social History Narrative    Not on file     Social Determinants of Health     Financial Resource Strain:     Difficulty of Paying Living Expenses:    Food Insecurity:     Worried About Running Out of Food in the Last Year:     Ran Out of Food in the Last Year:    Transportation Needs:     Lack of Transportation (Medical):      Lack of Transportation (Non-Medical):    Physical Activity:     Days of Exercise per Week:     Minutes of Exercise per Session:    Stress:     Feeling of Stress :    Social Connections:     Frequency of Communication with Friends and Family:     Frequency of Social Gatherings with Friends and Family:     Attends Yazdanism Services:     Active Member of Clubs or Organizations:     Attends Club or Organization Meetings:     Marital Status:    Intimate Partner Violence:     Fear of Current or Ex-Partner:     Emotionally Abused:     Physically Abused:     Sexually Abused:      Family History   Problem Relation Age of Onset    Parkinsonism Mother     Coronary Art Dis Mother         sister; has had CABG is age 61    Stroke Mother          age late 57's    Heart Disease Mother 36        stents    Colon Cancer Father     Cancer Father         dad colon cancer    Other Brother         heart transplantation    Heart Disease Brother         heart transplant    Heart Disease Sister     Cancer Sister         esophageal cancer     Allergies   Allergen Reactions    Penicillin V Hives       Current Outpatient Medications:     insulin 70-30 (NOVOLIN 70/30 RELION) (70-30) 100 UNIT per ML injection vial, 15 units am and 60-75  units dinner, Disp: 5 vial, Rfl: 3    metFORMIN (GLUCOPHAGE) 1000 MG tablet, TAKE 1 TABLET BY MOUTH TWICE A DAY WITH MEALS, Disp: 180 tablet, Rfl: 1    zolpidem (AMBIEN) 10 MG tablet, Take 10 mg by mouth daily. , Disp: , Rfl:     atorvastatin (LIPITOR) 20 MG tablet, Take 20 mg by mouth daily, Disp: , Rfl:     DULoxetine (CYMBALTA) 60 MG extended release capsule, Take 60 mg by mouth daily, Disp: , Rfl:     Insulin Syringe-Needle U-100 31G X 5/16\" 1 ML MISC, Use as directed for diabetes, Disp: 100 each, Rfl: 5    omega-3 acid ethyl esters (LOVAZA) 1 g capsule, Take 2 caps bid, Disp: 120 capsule, Rfl: 3    chlorthalidone (HYGROTON) 25 MG tablet, TAKE 1 TABLET BY MOUTH EVERY DAY, Disp: 90 tablet, Rfl: 0    pantoprazole (PROTONIX) 40 MG tablet, Take 1 tablet by mouth daily, Disp: 30 tablet, Rfl: 5    albuterol sulfate HFA (PROAIR HFA) 108 (90 Base) MCG/ACT inhaler, Inhale 2 puffs into the lungs every 4 hours as needed for Wheezing, Disp: 1 Inhaler, Rfl: 3    lisinopril (PRINIVIL;ZESTRIL) 20 MG tablet, TAKE 1 TABLET BY MOUTH 2 TIMES DAILY, Disp: 180 tablet, Rfl: 1    acetaminophen (TYLENOL) 325 MG tablet, Take 650 mg by mouth, Disp: , Rfl:     albuterol (PROVENTIL) (2.5 MG/3ML) 0.083% nebulizer solution, Take 3 mLs by nebulization 4 times daily, Disp: 120 each, Rfl: 4    aspirin EC 81 MG EC tablet, Take 1 tablet by mouth daily to prevent heart attack and stroke, Disp: 90 tablet, Rfl: 0    Multiple Vitamins-Minerals (MULTIVITAL PO), Take by mouth, Disp: , Rfl:     Insulin Pen Needle 31G X 5 MM MISC, 1 each by Does not apply route daily, Disp: 100 each, Rfl: 3    Icosapent Ethyl (VASCEPA) 1 g CAPS capsule, Take 2 capsules by mouth 2 times daily (Patient not taking: Reported on 7/19/2021), Disp: 120 capsule, Rfl: 3    escitalopram (LEXAPRO) 10 MG tablet, Take 1 tablet by mouth daily (Patient not taking: Reported on 7/19/2021), Disp: 30 tablet, Rfl: 3    B Complex Vitamins (B COMPLEX PO), Take by mouth (Patient not taking: Reported on 7/19/2021), Disp: , Rfl:     Fexofenadine HCl (ALLEGRA PO), Take by mouth (Patient not taking: Reported on 7/19/2021), Disp: , Rfl:   Lab Results   Component Value Date     (L) 11/02/2020    K 4.2 11/02/2020    CL 92 (L) 11/02/2020    CO2 31 11/02/2020    BUN 10 11/02/2020    CREATININE 0.59 11/02/2020    GLUCOSE 176 08/19/2021    CALCIUM 9.4 11/02/2020    PROT 7.2 11/16/2018    LABALBU 4.3 11/16/2018    BILITOT 0.4 11/16/2018    ALKPHOS 132 (H) 11/16/2018    AST 53 (H) 11/16/2018     (H) 11/16/2018    LABGLOM >60.0 11/02/2020    GFRAA >60.0 11/02/2020    AGRATIO 1.4 04/28/2018    GLOB 2.9 11/16/2018     Lab Results   Component Value Date    WBC 8.5 11/16/2018    HGB 14.5 11/16/2018    HCT 43.1 11/16/2018    MCV 82.1 11/16/2018     11/16/2018     Lab Results   Component Value Date    LABA1C 7.1 11/03/2020    LABA1C 7.1 (H) 11/02/2020    LABA1C 7.8 (H) 09/08/2020     Lab Results   Component Value Date    HDL 44 09/08/2020    HDL 39 (L) 05/26/2020    HDL 37 (L) 11/16/2018    LDLCALC 199 (H) 09/08/2020    [address] see below 05/26/2020    [address] see below 11/16/2018    CHOL 304 (H) 09/08/2020    CHOL 302 (H) 05/26/2020    CHOL 255 (H) 11/16/2018    TRIG 304 (H) 09/08/2020    TRIG 486 (H) 05/26/2020    TRIG 574 (H) 11/16/2018     No results found for: TESTM  Lab Results   Component Value Date    TSH 0.786 12/01/2017    T4FREE 1.12 12/01/2017     No results found for: TPOABS    Review of Systems   Constitutional: Positive for fatigue. Eyes: Negative. Cardiovascular: Negative. Endocrine: Negative. Musculoskeletal: Positive for arthralgias and gait problem. All other systems reviewed and are negative. Objective:   Physical Exam  Vitals reviewed. Constitutional:       Appearance: Normal appearance. She is obese. HENT:      Head: Normocephalic and atraumatic. Hair is normal.      Right Ear: External ear normal.      Left Ear: External ear normal.      Nose: Nose normal.   Eyes:      General: No scleral icterus. Right eye: No discharge. Left eye: No discharge. Extraocular Movements: Extraocular movements intact. Conjunctiva/sclera: Conjunctivae normal.   Neck:      Trachea: Trachea normal.   Cardiovascular:      Rate and Rhythm: Normal rate. Pulmonary:      Effort: Pulmonary effort is normal.   Musculoskeletal:         General: Normal range of motion. Cervical back: Normal range of motion and neck supple. Skin:         Neurological:      General: No focal deficit present. Mental Status: She is alert and oriented to person, place, and time.    Psychiatric:         Mood and Affect: Mood normal.         Behavior: Behavior normal.

## 2021-09-08 DIAGNOSIS — E11.65 UNCONTROLLED TYPE 2 DIABETES MELLITUS WITH HYPERGLYCEMIA (HCC): ICD-10-CM

## 2021-09-13 RX ORDER — BLOOD SUGAR DIAGNOSTIC
STRIP MISCELLANEOUS
Qty: 100 EACH | Refills: 5 | Status: SHIPPED | OUTPATIENT
Start: 2021-09-13 | End: 2021-10-19 | Stop reason: SDUPTHER

## 2021-10-19 ENCOUNTER — OFFICE VISIT (OUTPATIENT)
Dept: ENDOCRINOLOGY | Age: 66
End: 2021-10-19
Payer: MEDICARE

## 2021-10-19 VITALS
SYSTOLIC BLOOD PRESSURE: 112 MMHG | HEART RATE: 80 BPM | OXYGEN SATURATION: 97 % | WEIGHT: 158 LBS | DIASTOLIC BLOOD PRESSURE: 73 MMHG | BODY MASS INDEX: 31.85 KG/M2 | HEIGHT: 59 IN

## 2021-10-19 DIAGNOSIS — E11.65 UNCONTROLLED TYPE 2 DIABETES MELLITUS WITH HYPERGLYCEMIA (HCC): Primary | ICD-10-CM

## 2021-10-19 LAB
CHP ED QC CHECK: NORMAL
GLUCOSE BLD-MCNC: 160 MG/DL

## 2021-10-19 PROCEDURE — 1123F ACP DISCUSS/DSCN MKR DOCD: CPT | Performed by: INTERNAL MEDICINE

## 2021-10-19 PROCEDURE — 2022F DILAT RTA XM EVC RTNOPTHY: CPT | Performed by: INTERNAL MEDICINE

## 2021-10-19 PROCEDURE — G8427 DOCREV CUR MEDS BY ELIG CLIN: HCPCS | Performed by: INTERNAL MEDICINE

## 2021-10-19 PROCEDURE — G8417 CALC BMI ABV UP PARAM F/U: HCPCS | Performed by: INTERNAL MEDICINE

## 2021-10-19 PROCEDURE — G8484 FLU IMMUNIZE NO ADMIN: HCPCS | Performed by: INTERNAL MEDICINE

## 2021-10-19 PROCEDURE — G8400 PT W/DXA NO RESULTS DOC: HCPCS | Performed by: INTERNAL MEDICINE

## 2021-10-19 PROCEDURE — 4040F PNEUMOC VAC/ADMIN/RCVD: CPT | Performed by: INTERNAL MEDICINE

## 2021-10-19 PROCEDURE — 95251 CONT GLUC MNTR ANALYSIS I&R: CPT | Performed by: INTERNAL MEDICINE

## 2021-10-19 PROCEDURE — 99213 OFFICE O/P EST LOW 20 MIN: CPT | Performed by: INTERNAL MEDICINE

## 2021-10-19 PROCEDURE — 82962 GLUCOSE BLOOD TEST: CPT | Performed by: INTERNAL MEDICINE

## 2021-10-19 PROCEDURE — 1036F TOBACCO NON-USER: CPT | Performed by: INTERNAL MEDICINE

## 2021-10-19 PROCEDURE — 1090F PRES/ABSN URINE INCON ASSESS: CPT | Performed by: INTERNAL MEDICINE

## 2021-10-19 PROCEDURE — 3017F COLORECTAL CA SCREEN DOC REV: CPT | Performed by: INTERNAL MEDICINE

## 2021-10-19 PROCEDURE — 3052F HG A1C>EQUAL 8.0%<EQUAL 9.0%: CPT | Performed by: INTERNAL MEDICINE

## 2021-10-19 RX ORDER — BLOOD SUGAR DIAGNOSTIC
STRIP MISCELLANEOUS
Qty: 100 EACH | Refills: 5 | Status: SHIPPED | OUTPATIENT
Start: 2021-10-19 | End: 2022-05-19 | Stop reason: SDUPTHER

## 2021-10-19 NOTE — PROGRESS NOTES
10/19/2021    Assessment:       Diagnosis Orders   1. Uncontrolled type 2 diabetes mellitus with hyperglycemia (HCC)  POCT Glucose    Lipid, Fasting    Hemoglobin D1Z    Basic Metabolic Panel, Fasting    insulin 70-30 (NOVOLIN 70/30) (70-30) 100 UNIT per ML injection vial   2. Uncontrolled type 2 diabetes mellitus with complication (HCC)  Insulin Syringe-Needle U-100 (BD INSULIN SYRINGE U/F) 31G X 5/16\" 1 ML MISC         PLAN:     Lower dose of insulin  A1c goal of 7 or lower  Orders Placed This Encounter   Medications    metFORMIN (GLUCOPHAGE) 1000 MG tablet     Sig: TAKE 1 TABLET BY MOUTH TWICE A DAY WITH MEALS     Dispense:  180 tablet     Refill:  3    Insulin Syringe-Needle U-100 (BD INSULIN SYRINGE U/F) 31G X 5/16\" 1 ML MISC     Sig: USE AS DIRECTED FOR DIABETES     Dispense:  100 each     Refill:  5    insulin 70-30 (NOVOLIN 70/30) (70-30) 100 UNIT per ML injection vial     Sig: INJECT 70 UNITS AM 45 UNITS LUNCH AND 70 UNITS DINNER     Dispense:  50 mL     Refill:  3           Orders Placed This Encounter   Procedures    POCT Glucose       Subjective:     Chief Complaint   Patient presents with    Diabetes     Vitals:    10/19/21 1008   BP: 112/73   Pulse: 80   SpO2: 97%   Weight: 158 lb (71.7 kg)   Height: 4' 11\" (1.499 m)     Wt Readings from Last 3 Encounters:   10/19/21 158 lb (71.7 kg)   08/19/21 161 lb (73 kg)   07/19/21 157 lb (71.2 kg)     BP Readings from Last 3 Encounters:   10/19/21 112/73   08/19/21 133/83   07/19/21 121/75     Follow-up on type 2 diabetes labs were done recently A1c has gone up to 8.4 labs also showed normal C-peptide insulin antibody was negative ruling out type 1 diabetes downloaded freestyle bridgett continuous glucose monitoring  Patient on Novolin 70/30 3 times daily plus Metformin    Diabetes  She presents for her follow-up diabetic visit. She has type 2 diabetes mellitus. Her disease course has been fluctuating. Current diabetic treatment includes insulin injections.  She is currently taking insulin pre-lunch, pre-dinner and pre-breakfast. Her overall blood glucose range is 180-200 mg/dl. (Lab Results       Component                Value               Date                       LABA1C                   8.4 (H)             10/07/2021                Reviewed 2-week glucose monitoring average blood sugar 190 47% in range 36% high 17% very high higher postprandial in the second half of the day  ) An ACE inhibitor/angiotensin II receptor blocker is being taken. Results for Jessy Roldan (MRN [id number]) as of 10/30/2021 13:05   Ref.  Range 10/7/2021 10:33 10/19/2021 10:11   Sodium Latest Ref Range: 135 - 144 mEq/L 139    Potassium Latest Ref Range: 3.4 - 4.9 mEq/L 3.8    Chloride Latest Ref Range: 95 - 107 mEq/L 101    CO2 Latest Ref Range: 20 - 31 mEq/L 25    BUN Latest Ref Range: 8 - 23 mg/dL 13    Creatinine Latest Ref Range: 0.50 - 0.90 mg/dL 0.68    Anion Gap Latest Ref Range: 9 - 15 mEq/L 13    GFR Non- Latest Ref Range: >60  >60.0    GFR African American Latest Ref Range: >60  >60.0    Glucose Latest Units: mg/dL 219 (H) 160   Calcium Latest Ref Range: 8.5 - 9.9 mg/dL 9.2    Hemoglobin A1C Latest Ref Range: 4.8 - 5.9 % 8.4 (H)    C-Peptide Latest Ref Range: 1.1 - 4.4 ng/mL 5.2 (H)    Islet Cell Ab Latest Ref Range: <1:4  <1:4        Past Medical History:   Diagnosis Date    Diverticulosis of colon     HTN (hypertension)     Hyperlipidemia      Past Surgical History:   Procedure Laterality Date    BRAIN SURGERY      meningioma    TUBAL LIGATION       Social History     Socioeconomic History    Marital status:      Spouse name: Not on file    Number of children: Not on file    Years of education: Not on file    Highest education level: Not on file   Occupational History    Not on file   Tobacco Use    Smoking status: Former Smoker     Packs/day: 1.00     Years: 30.00     Pack years: 30.00     Types: Cigarettes     Quit date: 10/1/2005 vial, INJECT 95 UNITS AM 35 UNITS LUNCH AND 95 UNITS DINNER, Disp: 50 mL, Rfl: 3    metFORMIN (GLUCOPHAGE) 1000 MG tablet, TAKE 1 TABLET BY MOUTH TWICE A DAY WITH MEALS, Disp: 180 tablet, Rfl: 1    zolpidem (AMBIEN) 10 MG tablet, Take 10 mg by mouth daily. , Disp: , Rfl:     atorvastatin (LIPITOR) 20 MG tablet, Take 20 mg by mouth daily, Disp: , Rfl:     DULoxetine (CYMBALTA) 60 MG extended release capsule, Take 60 mg by mouth daily, Disp: , Rfl:     Icosapent Ethyl (VASCEPA) 1 g CAPS capsule, Take 2 capsules by mouth 2 times daily, Disp: 120 capsule, Rfl: 3    omega-3 acid ethyl esters (LOVAZA) 1 g capsule, Take 2 caps bid, Disp: 120 capsule, Rfl: 3    chlorthalidone (HYGROTON) 25 MG tablet, TAKE 1 TABLET BY MOUTH EVERY DAY, Disp: 90 tablet, Rfl: 0    escitalopram (LEXAPRO) 10 MG tablet, Take 1 tablet by mouth daily, Disp: 30 tablet, Rfl: 3    pantoprazole (PROTONIX) 40 MG tablet, Take 1 tablet by mouth daily, Disp: 30 tablet, Rfl: 5    B Complex Vitamins (B COMPLEX PO), Take by mouth , Disp: , Rfl:     lisinopril (PRINIVIL;ZESTRIL) 20 MG tablet, TAKE 1 TABLET BY MOUTH 2 TIMES DAILY, Disp: 180 tablet, Rfl: 1    acetaminophen (TYLENOL) 325 MG tablet, Take 650 mg by mouth, Disp: , Rfl:     aspirin EC 81 MG EC tablet, Take 1 tablet by mouth daily to prevent heart attack and stroke, Disp: 90 tablet, Rfl: 0    Multiple Vitamins-Minerals (MULTIVITAL PO), Take by mouth, Disp: , Rfl:     Fexofenadine HCl (ALLEGRA PO), Take by mouth , Disp: , Rfl:     Insulin Pen Needle 31G X 5 MM MISC, 1 each by Does not apply route daily, Disp: 100 each, Rfl: 3  Lab Results   Component Value Date     10/07/2021    K 3.8 10/07/2021     10/07/2021    CO2 25 10/07/2021    BUN 13 10/07/2021    CREATININE 0.68 10/07/2021    GLUCOSE 160 10/19/2021    CALCIUM 9.2 10/07/2021    PROT 7.2 11/16/2018    LABALBU 4.3 11/16/2018    BILITOT 0.4 11/16/2018    ALKPHOS 132 (H) 11/16/2018    AST 53 (H) 11/16/2018     (H) 11/16/2018    LABGLOM >60.0 10/07/2021    GFRAA >60.0 10/07/2021    AGRATIO 1.4 04/28/2018    GLOB 2.9 11/16/2018     Lab Results   Component Value Date    WBC 8.5 11/16/2018    HGB 14.5 11/16/2018    HCT 43.1 11/16/2018    MCV 82.1 11/16/2018     11/16/2018     Lab Results   Component Value Date    LABA1C 8.4 (H) 10/07/2021    LABA1C 7.1 11/03/2020    LABA1C 7.1 (H) 11/02/2020     Lab Results   Component Value Date    HDL 44 09/08/2020    HDL 39 (L) 05/26/2020    HDL 37 (L) 11/16/2018    LDLCALC 199 (H) 09/08/2020    LDLCALC see below 05/26/2020    LDLCALC see below 11/16/2018    CHOL 304 (H) 09/08/2020    CHOL 302 (H) 05/26/2020    CHOL 255 (H) 11/16/2018    TRIG 304 (H) 09/08/2020    TRIG 486 (H) 05/26/2020    TRIG 574 (H) 11/16/2018     No results found for: TESTM  Lab Results   Component Value Date    TSH 0.786 12/01/2017    T4FREE 1.12 12/01/2017     No results found for: TPOABS    Review of Systems   Eyes: Negative. Respiratory: Negative. Cardiovascular: Negative. Endocrine: Negative. Psychiatric/Behavioral: Positive for sleep disturbance. All other systems reviewed and are negative. Objective:   Physical Exam  Vitals reviewed. Constitutional:       Appearance: Normal appearance. She is obese. HENT:      Head: Normocephalic and atraumatic. Hair is normal.      Right Ear: External ear normal.      Left Ear: External ear normal.      Nose: Nose normal.   Eyes:      General: No scleral icterus. Right eye: No discharge. Left eye: No discharge. Extraocular Movements: Extraocular movements intact. Conjunctiva/sclera: Conjunctivae normal.   Neck:      Trachea: Trachea normal.   Cardiovascular:      Rate and Rhythm: Normal rate. Pulmonary:      Effort: Pulmonary effort is normal.   Musculoskeletal:         General: Normal range of motion. Cervical back: Normal range of motion and neck supple. Neurological:      General: No focal deficit present. Mental Status: She is alert and oriented to person, place, and time.    Psychiatric:         Mood and Affect: Mood normal.         Behavior: Behavior normal.

## 2021-10-30 ASSESSMENT — ENCOUNTER SYMPTOMS
EYES NEGATIVE: 1
RESPIRATORY NEGATIVE: 1

## 2021-12-07 DIAGNOSIS — E11.65 UNCONTROLLED TYPE 2 DIABETES MELLITUS WITH HYPERGLYCEMIA (HCC): Primary | ICD-10-CM

## 2022-02-01 DIAGNOSIS — E11.65 UNCONTROLLED TYPE 2 DIABETES MELLITUS WITH HYPERGLYCEMIA (HCC): ICD-10-CM

## 2022-02-01 LAB
ANION GAP SERPL CALCULATED.3IONS-SCNC: 15 MEQ/L (ref 9–15)
BUN BLDV-MCNC: 17 MG/DL (ref 8–23)
CALCIUM SERPL-MCNC: 9.6 MG/DL (ref 8.5–9.9)
CHLORIDE BLD-SCNC: 99 MEQ/L (ref 95–107)
CHOLESTEROL, FASTING: 226 MG/DL (ref 0–199)
CO2: 27 MEQ/L (ref 20–31)
CREAT SERPL-MCNC: 0.83 MG/DL (ref 0.5–0.9)
GFR AFRICAN AMERICAN: >60
GFR NON-AFRICAN AMERICAN: >60
GLUCOSE FASTING: 161 MG/DL (ref 70–99)
HBA1C MFR BLD: 8.2 % (ref 4.8–5.9)
HDLC SERPL-MCNC: 40 MG/DL (ref 40–59)
LDL CHOLESTEROL CALCULATED: ABNORMAL MG/DL (ref 0–129)
POTASSIUM SERPL-SCNC: 3.4 MEQ/L (ref 3.4–4.9)
SODIUM BLD-SCNC: 141 MEQ/L (ref 135–144)
TRIGLYCERIDE, FASTING: 448 MG/DL (ref 0–150)

## 2022-02-17 ENCOUNTER — OFFICE VISIT (OUTPATIENT)
Dept: ENDOCRINOLOGY | Age: 67
End: 2022-02-17
Payer: MEDICARE

## 2022-02-17 VITALS
DIASTOLIC BLOOD PRESSURE: 71 MMHG | SYSTOLIC BLOOD PRESSURE: 111 MMHG | HEART RATE: 93 BPM | BODY MASS INDEX: 32.05 KG/M2 | WEIGHT: 159 LBS | HEIGHT: 59 IN

## 2022-02-17 DIAGNOSIS — E11.65 UNCONTROLLED TYPE 2 DIABETES MELLITUS WITH HYPERGLYCEMIA (HCC): Primary | ICD-10-CM

## 2022-02-17 LAB
CHP ED QC CHECK: NORMAL
GLUCOSE BLD-MCNC: 224 MG/DL

## 2022-02-17 PROCEDURE — 1123F ACP DISCUSS/DSCN MKR DOCD: CPT | Performed by: INTERNAL MEDICINE

## 2022-02-17 PROCEDURE — 99213 OFFICE O/P EST LOW 20 MIN: CPT | Performed by: INTERNAL MEDICINE

## 2022-02-17 PROCEDURE — 82962 GLUCOSE BLOOD TEST: CPT | Performed by: INTERNAL MEDICINE

## 2022-02-17 PROCEDURE — G8484 FLU IMMUNIZE NO ADMIN: HCPCS | Performed by: INTERNAL MEDICINE

## 2022-02-17 PROCEDURE — G8417 CALC BMI ABV UP PARAM F/U: HCPCS | Performed by: INTERNAL MEDICINE

## 2022-02-17 PROCEDURE — 3052F HG A1C>EQUAL 8.0%<EQUAL 9.0%: CPT | Performed by: INTERNAL MEDICINE

## 2022-02-17 PROCEDURE — G8428 CUR MEDS NOT DOCUMENT: HCPCS | Performed by: INTERNAL MEDICINE

## 2022-02-17 PROCEDURE — 3017F COLORECTAL CA SCREEN DOC REV: CPT | Performed by: INTERNAL MEDICINE

## 2022-02-17 PROCEDURE — 4040F PNEUMOC VAC/ADMIN/RCVD: CPT | Performed by: INTERNAL MEDICINE

## 2022-02-17 PROCEDURE — G8400 PT W/DXA NO RESULTS DOC: HCPCS | Performed by: INTERNAL MEDICINE

## 2022-02-17 PROCEDURE — 2022F DILAT RTA XM EVC RTNOPTHY: CPT | Performed by: INTERNAL MEDICINE

## 2022-02-17 PROCEDURE — 1090F PRES/ABSN URINE INCON ASSESS: CPT | Performed by: INTERNAL MEDICINE

## 2022-02-17 PROCEDURE — 1036F TOBACCO NON-USER: CPT | Performed by: INTERNAL MEDICINE

## 2022-05-13 DIAGNOSIS — E11.65 UNCONTROLLED TYPE 2 DIABETES MELLITUS WITH HYPERGLYCEMIA (HCC): ICD-10-CM

## 2022-05-13 LAB
ANION GAP SERPL CALCULATED.3IONS-SCNC: 17 MEQ/L (ref 9–15)
BACTERIA: NEGATIVE /HPF
BILIRUBIN URINE: NEGATIVE
BLOOD, URINE: NEGATIVE
BUN BLDV-MCNC: 12 MG/DL (ref 8–23)
CALCIUM SERPL-MCNC: 9.8 MG/DL (ref 8.5–9.9)
CHLORIDE BLD-SCNC: 100 MEQ/L (ref 95–107)
CLARITY: CLEAR
CO2: 22 MEQ/L (ref 20–31)
COLOR: YELLOW
CREAT SERPL-MCNC: 0.99 MG/DL (ref 0.5–0.9)
EPITHELIAL CELLS, UA: ABNORMAL /HPF (ref 0–5)
GFR AFRICAN AMERICAN: >60
GFR NON-AFRICAN AMERICAN: 55.8
GLUCOSE BLD-MCNC: 88 MG/DL (ref 70–99)
GLUCOSE URINE: NEGATIVE MG/DL
HBA1C MFR BLD: 7.1 % (ref 4.8–5.9)
HYALINE CASTS: ABNORMAL /HPF (ref 0–5)
KETONES, URINE: ABNORMAL MG/DL
LEUKOCYTE ESTERASE, URINE: ABNORMAL
NITRITE, URINE: NEGATIVE
PH UA: 5 (ref 5–9)
POTASSIUM SERPL-SCNC: 3.5 MEQ/L (ref 3.4–4.9)
PROTEIN UA: ABNORMAL MG/DL
RBC UA: ABNORMAL /HPF (ref 0–5)
SODIUM BLD-SCNC: 139 MEQ/L (ref 135–144)
SPECIFIC GRAVITY UA: 1.02 (ref 1–1.03)
UROBILINOGEN, URINE: 0.2 E.U./DL
WBC UA: ABNORMAL /HPF (ref 0–5)

## 2022-05-19 ENCOUNTER — OFFICE VISIT (OUTPATIENT)
Dept: ENDOCRINOLOGY | Age: 67
End: 2022-05-19
Payer: MEDICARE

## 2022-05-19 VITALS
WEIGHT: 158.6 LBS | DIASTOLIC BLOOD PRESSURE: 79 MMHG | SYSTOLIC BLOOD PRESSURE: 131 MMHG | HEIGHT: 59 IN | BODY MASS INDEX: 31.97 KG/M2 | OXYGEN SATURATION: 96 % | HEART RATE: 89 BPM

## 2022-05-19 DIAGNOSIS — E11.65 UNCONTROLLED TYPE 2 DIABETES MELLITUS WITH HYPERGLYCEMIA (HCC): Primary | ICD-10-CM

## 2022-05-19 DIAGNOSIS — E11.65 UNCONTROLLED TYPE 2 DIABETES MELLITUS WITH HYPERGLYCEMIA (HCC): ICD-10-CM

## 2022-05-19 LAB
ANION GAP SERPL CALCULATED.3IONS-SCNC: 14 MEQ/L (ref 9–15)
BUN BLDV-MCNC: 9 MG/DL (ref 8–23)
C-PEPTIDE: 1 NG/ML (ref 1.1–4.4)
CALCIUM SERPL-MCNC: 10.3 MG/DL (ref 8.5–9.9)
CHLORIDE BLD-SCNC: 98 MEQ/L (ref 95–107)
CHP ED QC CHECK: NORMAL
CO2: 28 MEQ/L (ref 20–31)
CREAT SERPL-MCNC: 0.72 MG/DL (ref 0.5–0.9)
GFR AFRICAN AMERICAN: >60
GFR NON-AFRICAN AMERICAN: >60
GLUCOSE BLD-MCNC: 118 MG/DL
GLUCOSE BLD-MCNC: 75 MG/DL (ref 70–99)
POTASSIUM SERPL-SCNC: 3.7 MEQ/L (ref 3.4–4.9)
SODIUM BLD-SCNC: 140 MEQ/L (ref 135–144)

## 2022-05-19 PROCEDURE — 82962 GLUCOSE BLOOD TEST: CPT | Performed by: INTERNAL MEDICINE

## 2022-05-19 PROCEDURE — G8427 DOCREV CUR MEDS BY ELIG CLIN: HCPCS | Performed by: INTERNAL MEDICINE

## 2022-05-19 PROCEDURE — 1123F ACP DISCUSS/DSCN MKR DOCD: CPT | Performed by: INTERNAL MEDICINE

## 2022-05-19 PROCEDURE — 3017F COLORECTAL CA SCREEN DOC REV: CPT | Performed by: INTERNAL MEDICINE

## 2022-05-19 PROCEDURE — G8400 PT W/DXA NO RESULTS DOC: HCPCS | Performed by: INTERNAL MEDICINE

## 2022-05-19 PROCEDURE — G8417 CALC BMI ABV UP PARAM F/U: HCPCS | Performed by: INTERNAL MEDICINE

## 2022-05-19 PROCEDURE — 99213 OFFICE O/P EST LOW 20 MIN: CPT | Performed by: INTERNAL MEDICINE

## 2022-05-19 PROCEDURE — 1090F PRES/ABSN URINE INCON ASSESS: CPT | Performed by: INTERNAL MEDICINE

## 2022-05-19 PROCEDURE — 1036F TOBACCO NON-USER: CPT | Performed by: INTERNAL MEDICINE

## 2022-05-19 PROCEDURE — 4040F PNEUMOC VAC/ADMIN/RCVD: CPT | Performed by: INTERNAL MEDICINE

## 2022-05-19 PROCEDURE — 3051F HG A1C>EQUAL 7.0%<8.0%: CPT | Performed by: INTERNAL MEDICINE

## 2022-05-19 PROCEDURE — 2022F DILAT RTA XM EVC RTNOPTHY: CPT | Performed by: INTERNAL MEDICINE

## 2022-05-19 RX ORDER — LACTULOSE 10 G/15ML
SOLUTION ORAL
COMMUNITY
Start: 2022-03-10

## 2022-05-19 RX ORDER — METHYLPHENIDATE HYDROCHLORIDE 10 MG/1
TABLET ORAL
COMMUNITY
Start: 2022-05-04

## 2022-05-19 RX ORDER — NITROFURANTOIN 25; 75 MG/1; MG/1
CAPSULE ORAL
COMMUNITY
Start: 2022-04-14

## 2022-05-19 RX ORDER — BLOOD SUGAR DIAGNOSTIC
STRIP MISCELLANEOUS
Qty: 100 EACH | Refills: 5 | Status: SHIPPED | OUTPATIENT
Start: 2022-05-19 | End: 2022-07-26 | Stop reason: ALTCHOICE

## 2022-05-19 ASSESSMENT — ENCOUNTER SYMPTOMS: EYES NEGATIVE: 1

## 2022-05-19 NOTE — PROGRESS NOTES
2022    Assessment:       Diagnosis Orders   1. Uncontrolled type 2 diabetes mellitus with hyperglycemia (HCC)  POCT Glucose   2. Uncontrolled type 2 diabetes mellitus with complication ([facility] [address]     3.  DM (diabetes mellitus), type 2, uncontrolled ([facility] [place] [facility]           PLAN:     Orders Placed This Encounter   Procedures    Hemoglobin A1C     Standing Status:   Future     Standing Expiration Date:   2023    Basic Metabolic Panel     Standing Status:   Future     Standing Expiration Date:   2023    Lipid Panel     Standing Status:   Future     Standing Expiration Date:   2023     Order Specific Question:   Is Patient Fasting?/# of Hours     Answer:   y    POCT Glucose     Continue current dose of Novolin 70/30 continue metformin continue to use freestyle bridgett A1c goal of 7 or lower  Orders Placed This Encounter   Medications    metFORMIN (GLUCOPHAGE) 1000 MG tablet     Sig: TAKE 1 TABLET BY MOUTH TWICE A DAY WITH MEALS     Dispense:  180 tablet     Refill:  3    Insulin Syringe-Needle U-100 (BD INSULIN SYRINGE U/F) 31G X 5/16\" 1 ML MISC     Sig: USE AS DIRECTED FOR DIABETES     Dispense:  100 each     Refill:  5    Insulin Pen Needle 31G X 5 MM MISC     Si each by Does not apply route daily     Dispense:  100 each     Refill:  3    insulin 70-30 (NOVOLIN 70/30) (70-30) 100 UNIT per ML injection vial     Sig: INJECT 70 UNITS AM 30 UNITS LUNCH AND 60-70 UNITS DINNER     Dispense:  50 mL     Refill:  3       Subjective:     Chief Complaint   Patient presents with    Diabetes    Medication Refill     Vitals:    22 1047   BP: 131/79   Site: Left Upper Arm   Position: Sitting   Cuff Size: Medium Adult   Pulse: 89   SpO2: 96%   Weight: 158 lb 9.6 oz (71.9 kg)   Height: 4' 11\" (1.499 m)     Wt Readings from Last 3 Encounters:   22 158 lb 9.6 oz (71.9 kg)   22 159 lb (72.1 kg)   10/19/21 158 lb (71.7 kg)     BP Readings from Last 3 Encounters:   22 131/79   22 111/71 10/19/21 112/73     Follow-up on type 2 diabetes patient using Novolin 70/30 plus metformin also using freestyle bridgett A1c's have been overall stable to improving  A1c is improved to 7.1  Patient also looking at insulin pump therapy  Patient also adjusting the dose of 70/30 Novolin based on her blood sugars dose was updated denies any severe hypoglycemia    Diabetes  She presents for her follow-up diabetic visit. She has type 2 diabetes mellitus. Pertinent negatives for diabetes include no polydipsia and no polyuria. There are no hypoglycemic complications. Symptoms are improving. Risk factors for coronary artery disease include obesity. Current diabetic treatment includes insulin injections. She is currently taking insulin pre-breakfast, pre-lunch and pre-dinner. Her overall blood glucose range is 140-180 mg/dl. (Lab Results       Component                Value               Date                       LABA1C                   7.1 (H)             2022                Reviewed to 30 days  Average on her blood sugars close to 140 ) An ACE inhibitor/angiotensin II receptor blocker is being taken.      Past Medical History:   Diagnosis Date    Diverticulosis of colon     HTN (hypertension)     Hyperlipidemia      Past Surgical History:   Procedure Laterality Date    BRAIN SURGERY      meningioma    TUBAL LIGATION       Social History     Socioeconomic History    Marital status:      Spouse name: Not on file    Number of children: Not on file    Years of education: Not on file    Highest education level: Not on file   Occupational History    Not on file   Tobacco Use    Smoking status: Former Smoker     Packs/day: 1.00     Years: 30.00     Pack years: 30.00     Types: Cigarettes     Quit date: 10/1/2005     Years since quittin.6    Smokeless tobacco: Never Used   Substance and Sexual Activity    Alcohol use: No     Alcohol/week: 0.0 standard drinks    Drug use: No    Sexual activity: Yes Partners: Male   Other Topics Concern    Not on file   Social History Narrative    Not on file     Social Determinants of Health     Financial Resource Strain:     Difficulty of Paying Living Expenses: Not on file   Food Insecurity:     Worried About Running Out of Food in the Last Year: Not on file    Kelle of Food in the Last Year: Not on file   Transportation Needs:     Lack of Transportation (Medical): Not on file    Lack of Transportation (Non-Medical):  Not on file   Physical Activity:     Days of Exercise per Week: Not on file    Minutes of Exercise per Session: Not on file   Stress:     Feeling of Stress : Not on file   Social Connections:     Frequency of Communication with Friends and Family: Not on file    Frequency of Social Gatherings with Friends and Family: Not on file    Attends [employer] Services: Not on file    Active Member of Clubs or Organizations: Not on file    Attends Club or Organization Meetings: Not on file    Marital Status: Not on file   Intimate Partner Violence:     Fear of Current or Ex-Partner: Not on file    Emotionally Abused: Not on file    Physically Abused: Not on file    Sexually Abused: Not on file   Housing Stability:     Unable to Pay for Housing in the Last Year: Not on file    Number of [place] in the Last Year: Not on file    Unstable Housing in the Last Year: Not on file     Family History   Problem Relation Age of Onset    Parkinsonism Mother     Coronary Art Dis Mother         sister; has had CABG is age 61    Stroke Mother          age late 63's    Heart Disease Mother 36        stents    Colon Cancer Father     Cancer Father         dad colon cancer    Other Brother         heart transplantation    Heart Disease Brother         heart transplant    Heart Disease Sister     Cancer Sister         esophageal cancer     Allergies   Allergen Reactions    Penicillin V Hives       Current Outpatient Medications:    diclofenac sodium (VOLTAREN) 1 % GEL, AS DIRECTED EXTERNALLY 4 TIMES A DAY 30 DAYS, Disp: , Rfl:     lactulose (CHRONULAC) 10 GM/15ML solution, TAKE 15 ML ORALLY EVERY OTHER DAY 30 DAY(S), Disp: , Rfl:     methylphenidate (RITALIN) 10 MG tablet, TAKE 1 TABLET BY MOUTH THREE TIMES A DAY, Disp: , Rfl:     nitrofurantoin, macrocrystal-monohydrate, (MACROBID) 100 MG capsule, 1 CAPSULE AFTER INTERCOURSE ORALLY ONE DOSE 30 DAYS, Disp: , Rfl:     insulin 70-30 (NOVOLIN 70/30) (70-30) 100 UNIT per ML injection vial, INJECT 70 UNITS AM 55 UNITS LUNCH AND 60 UNITS DINNER, Disp: 50 mL, Rfl: 3    metFORMIN (GLUCOPHAGE) 1000 MG tablet, TAKE 1 TABLET BY MOUTH TWICE A DAY WITH MEALS, Disp: 180 tablet, Rfl: 3    Insulin Syringe-Needle U-100 (BD INSULIN SYRINGE U/F) 31G X 5/16\" 1 ML MISC, USE AS DIRECTED FOR DIABETES, Disp: 100 each, Rfl: 5    zolpidem (AMBIEN) 10 MG tablet, Take 10 mg by mouth daily. , Disp: , Rfl:     atorvastatin (LIPITOR) 20 MG tablet, Take 20 mg by mouth daily, Disp: , Rfl:     DULoxetine (CYMBALTA) 60 MG extended release capsule, Take 60 mg by mouth daily, Disp: , Rfl:     chlorthalidone (HYGROTON) 25 MG tablet, TAKE 1 TABLET BY MOUTH EVERY DAY, Disp: 90 tablet, Rfl: 0    pantoprazole (PROTONIX) 40 MG tablet, Take 1 tablet by mouth daily, Disp: 30 tablet, Rfl: 5    lisinopril (PRINIVIL;ZESTRIL) 20 MG tablet, TAKE 1 TABLET BY MOUTH 2 TIMES DAILY, Disp: 180 tablet, Rfl: 1    acetaminophen (TYLENOL) 325 MG tablet, Take 650 mg by mouth every 6 hours as needed for Pain or Fever , Disp: , Rfl:     aspirin EC 81 MG EC tablet, Take 1 tablet by mouth daily to prevent heart attack and stroke, Disp: 90 tablet, Rfl: 0    Insulin Pen Needle 31G X 5 MM MISC, 1 each by Does not apply route daily, Disp: 100 each, Rfl: 3  Lab Results   Component Value Date     05/13/2022    K 3.5 05/13/2022     05/13/2022    CO2 22 05/13/2022    BUN 12 05/13/2022    CREATININE 0.99 (H) 05/13/2022    GLUCOSE 118 05/19/2022    CALCIUM 9.8 05/13/2022    PROT 7.2 11/16/2018    LABALBU 4.3 11/16/2018    BILITOT 0.4 11/16/2018    ALKPHOS 132 (H) 11/16/2018    AST 53 (H) 11/16/2018     (H) 11/16/2018    LABGLOM 55.8 (L) 05/13/2022    GFRAA >60.0 05/13/2022    AGRATIO 1.4 04/28/2018    GLOB 2.9 11/16/2018     Lab Results   Component Value Date    WBC 8.5 11/16/2018    HGB 14.5 11/16/2018    HCT 43.1 11/16/2018    MCV 82.1 11/16/2018     11/16/2018     Lab Results   Component Value Date    LABA1C 7.1 (H) 05/13/2022    LABA1C 8.2 (H) 02/01/2022    LABA1C 8.4 (H) 10/07/2021     Lab Results   Component Value Date    CHOLFAST 226 (H) 02/01/2022    TRIGLYCFAST 448 (H) 02/01/2022    HDL 40 02/01/2022    HDL 44 09/08/2020    HDL 39 (L) 05/26/2020    LDLCALC see below 02/01/2022    LDLCALC 199 (H) 09/08/2020    LDLCALC see below 05/26/2020    CHOL 304 (H) 09/08/2020    CHOL 302 (H) 05/26/2020    CHOL 255 (H) 11/16/2018    TRIG 304 (H) 09/08/2020    TRIG 486 (H) 05/26/2020    TRIG 574 (H) 11/16/2018     No results found for: TESTM  Lab Results   Component Value Date    TSH 0.786 12/01/2017    T4FREE 1.12 12/01/2017     No results found for: TPOABS    Review of Systems   Eyes: Negative. Cardiovascular: Negative. Endocrine: Negative for polydipsia and polyuria. All other systems reviewed and are negative. Objective:   Physical Exam  Vitals reviewed. Constitutional:       General: She is not in acute distress. Appearance: Normal appearance. She is obese. HENT:      Head: Normocephalic and atraumatic. Right Ear: External ear normal.      Left Ear: External ear normal.      Nose: Nose normal.   Eyes:      General: No scleral icterus. Right eye: No discharge. Left eye: No discharge. Extraocular Movements: Extraocular movements intact. Conjunctiva/sclera: Conjunctivae normal.   Cardiovascular:      Rate and Rhythm: Normal rate.    Pulmonary:      Effort: Pulmonary effort is normal. Musculoskeletal:         General: Normal range of motion. Cervical back: Normal range of motion and neck supple. Neurological:      General: No focal deficit present. Mental Status: She is alert and oriented to person, place, and time.    Psychiatric:         Mood and Affect: Mood normal.         Behavior: Behavior normal.

## 2022-05-20 DIAGNOSIS — E11.65 UNCONTROLLED TYPE 2 DIABETES MELLITUS WITH HYPERGLYCEMIA (HCC): Primary | ICD-10-CM

## 2022-05-21 LAB — ISLET CELL ANTIBODY: NORMAL

## 2022-06-13 RX ORDER — LANCETS
EACH MISCELLANEOUS
Qty: 150 EACH | Refills: 3 | Status: SHIPPED | OUTPATIENT
Start: 2022-06-13 | End: 2022-06-14 | Stop reason: SDUPTHER

## 2022-06-13 RX ORDER — INSULIN ASPART 100 [IU]/ML
INJECTION, SOLUTION INTRAVENOUS; SUBCUTANEOUS
Qty: 30 ML | Refills: 3 | Status: SHIPPED | OUTPATIENT
Start: 2022-06-13 | End: 2022-06-16 | Stop reason: SDUPTHER

## 2022-06-13 RX ORDER — BLOOD SUGAR DIAGNOSTIC
STRIP MISCELLANEOUS
Qty: 150 EACH | Refills: 3 | Status: SHIPPED | OUTPATIENT
Start: 2022-06-13 | End: 2022-06-14 | Stop reason: SDUPTHER

## 2022-06-14 RX ORDER — BLOOD SUGAR DIAGNOSTIC
STRIP MISCELLANEOUS
Qty: 150 EACH | Refills: 3 | Status: SHIPPED | OUTPATIENT
Start: 2022-06-14 | End: 2022-06-16 | Stop reason: SDUPTHER

## 2022-06-14 RX ORDER — LANCETS
EACH MISCELLANEOUS
Qty: 150 EACH | Refills: 3 | Status: SHIPPED | OUTPATIENT
Start: 2022-06-14 | End: 2022-06-16 | Stop reason: SDUPTHER

## 2022-06-16 RX ORDER — INSULIN ASPART 100 [IU]/ML
INJECTION, SOLUTION INTRAVENOUS; SUBCUTANEOUS
Qty: 30 ML | Refills: 3 | Status: SHIPPED | OUTPATIENT
Start: 2022-06-16 | End: 2022-07-26 | Stop reason: SDUPTHER

## 2022-06-16 RX ORDER — BLOOD SUGAR DIAGNOSTIC
STRIP MISCELLANEOUS
Qty: 100 EACH | Refills: 3 | Status: SHIPPED | OUTPATIENT
Start: 2022-06-16 | End: 2022-09-23

## 2022-06-16 RX ORDER — LANCETS
EACH MISCELLANEOUS
Qty: 100 EACH | Refills: 3 | Status: SHIPPED | OUTPATIENT
Start: 2022-06-16

## 2022-07-19 DIAGNOSIS — E11.65 UNCONTROLLED TYPE 2 DIABETES MELLITUS WITH HYPERGLYCEMIA (HCC): ICD-10-CM

## 2022-07-19 LAB
ANION GAP SERPL CALCULATED.3IONS-SCNC: 10 MEQ/L (ref 9–15)
BUN BLDV-MCNC: 11 MG/DL (ref 8–23)
CALCIUM SERPL-MCNC: 10.2 MG/DL (ref 8.5–9.9)
CHLORIDE BLD-SCNC: 101 MEQ/L (ref 95–107)
CHOLESTEROL, TOTAL: 243 MG/DL (ref 0–199)
CO2: 28 MEQ/L (ref 20–31)
CREAT SERPL-MCNC: 0.73 MG/DL (ref 0.5–0.9)
GFR AFRICAN AMERICAN: >60
GFR NON-AFRICAN AMERICAN: >60
GLUCOSE FASTING: 117 MG/DL (ref 70–99)
HBA1C MFR BLD: 7.3 % (ref 4.8–5.9)
HDLC SERPL-MCNC: 47 MG/DL (ref 40–59)
LDL CHOLESTEROL CALCULATED: 131 MG/DL (ref 0–129)
POTASSIUM SERPL-SCNC: 3.8 MEQ/L (ref 3.4–4.9)
SODIUM BLD-SCNC: 139 MEQ/L (ref 135–144)
TRIGL SERPL-MCNC: 324 MG/DL (ref 0–150)

## 2022-07-20 LAB — C-PEPTIDE: 1.9 NG/ML (ref 1.1–4.4)

## 2022-07-22 LAB
GLUTAMIC ACID DECARB AB: <5 IU/ML (ref 0–5)
INSULIN A: <0.4 U/ML (ref 0–0.4)
ISLET CELL ANTIBODY: NORMAL

## 2022-07-26 ENCOUNTER — OFFICE VISIT (OUTPATIENT)
Dept: ENDOCRINOLOGY | Age: 67
End: 2022-07-26
Payer: MEDICARE

## 2022-07-26 VITALS
HEIGHT: 59 IN | SYSTOLIC BLOOD PRESSURE: 95 MMHG | HEART RATE: 85 BPM | DIASTOLIC BLOOD PRESSURE: 66 MMHG | WEIGHT: 160 LBS | OXYGEN SATURATION: 94 % | BODY MASS INDEX: 32.25 KG/M2

## 2022-07-26 DIAGNOSIS — Z46.81 INSULIN PUMP TITRATION: ICD-10-CM

## 2022-07-26 DIAGNOSIS — E11.65 UNCONTROLLED TYPE 2 DIABETES MELLITUS WITH HYPERGLYCEMIA (HCC): Primary | ICD-10-CM

## 2022-07-26 LAB
CHP ED QC CHECK: NORMAL
GLUCOSE BLD-MCNC: 94 MG/DL

## 2022-07-26 PROCEDURE — 95251 CONT GLUC MNTR ANALYSIS I&R: CPT | Performed by: INTERNAL MEDICINE

## 2022-07-26 PROCEDURE — 3051F HG A1C>EQUAL 7.0%<8.0%: CPT | Performed by: INTERNAL MEDICINE

## 2022-07-26 PROCEDURE — 2022F DILAT RTA XM EVC RTNOPTHY: CPT | Performed by: INTERNAL MEDICINE

## 2022-07-26 PROCEDURE — 99214 OFFICE O/P EST MOD 30 MIN: CPT | Performed by: INTERNAL MEDICINE

## 2022-07-26 PROCEDURE — 1036F TOBACCO NON-USER: CPT | Performed by: INTERNAL MEDICINE

## 2022-07-26 PROCEDURE — 3017F COLORECTAL CA SCREEN DOC REV: CPT | Performed by: INTERNAL MEDICINE

## 2022-07-26 PROCEDURE — G8427 DOCREV CUR MEDS BY ELIG CLIN: HCPCS | Performed by: INTERNAL MEDICINE

## 2022-07-26 PROCEDURE — G8400 PT W/DXA NO RESULTS DOC: HCPCS | Performed by: INTERNAL MEDICINE

## 2022-07-26 PROCEDURE — 1123F ACP DISCUSS/DSCN MKR DOCD: CPT | Performed by: INTERNAL MEDICINE

## 2022-07-26 PROCEDURE — 1090F PRES/ABSN URINE INCON ASSESS: CPT | Performed by: INTERNAL MEDICINE

## 2022-07-26 PROCEDURE — G8417 CALC BMI ABV UP PARAM F/U: HCPCS | Performed by: INTERNAL MEDICINE

## 2022-07-26 RX ORDER — INSULIN ASPART 100 [IU]/ML
INJECTION, SOLUTION INTRAVENOUS; SUBCUTANEOUS
Qty: 30 ML | Refills: 3 | Status: SHIPPED | OUTPATIENT
Start: 2022-07-26 | End: 2022-10-31

## 2022-07-26 NOTE — PROGRESS NOTES
7/26/2022    Assessment:       Diagnosis Orders   1. Uncontrolled type 2 diabetes mellitus with hyperglycemia (HCC)  POCT Glucose     DIABETES FOOT EXAM    Basic Metabolic Panel, Fasting    Hemoglobin A1C    insulin aspart (NOVOLOG) 100 UNIT/ML injection vial      2. Insulin pump titration              PLAN:     Diabetes education provided today:    Insulin pumps, how they work and how they affect blood sugar levels. Continuous Glucose monitor. How it works and checks blood sugars every 5 min. for 4 days during our tests. Increase basal rate to 2.5 units/h  Increase preset bolus to 18 units with each meals  A1c goal of 7 or lower    More than 50% of 30-minute spent patient education counseling  Orders Placed This Encounter   Medications    insulin aspart (NOVOLOG) 100 UNIT/ML injection vial     Sig: Use via insulin pump max daily dose 100 units Dx E11.65     Dispense:  30 mL     Refill:  3         Orders Placed This Encounter   Procedures    POCT Glucose     No orders of the defined types were placed in this encounter. No follow-ups on file. Subjective:     Chief Complaint   Patient presents with    Diabetes     Vitals:    07/26/22 1328   BP: 95/66   Pulse: 85   SpO2: 94%   Weight: 160 lb (72.6 kg)   Height: 4' 11\" (1.499 m)     Wt Readings from Last 3 Encounters:   07/26/22 160 lb (72.6 kg)   05/19/22 158 lb 9.6 oz (71.9 kg)   02/17/22 159 lb (72.1 kg)     BP Readings from Last 3 Encounters:   07/26/22 95/66   05/19/22 131/79   02/17/22 111/71     Follow-up on type 2 diabetes patient is on [employer] pump with sensor pump was downloaded reviewed A1c was 7.1 has had occasional hypoglycemia    Diabetes  She presents for her follow-up diabetic visit. She has type 2 diabetes mellitus. Her disease course has been fluctuating. There are no hypoglycemic associated symptoms. Current diabetic treatment includes insulin pump. Her overall blood glucose range is 140-180 mg/dl.  (Lab Results       Component Value               Date                       LABA1C                   7.3 (H)             2022              Review 2-week pump download average blood sugar was 167±59 higher blood sugars postprandially and later in the day  Patient currently on 2.3 units of basal rate per hour  50 units preset bolus  Occasional hypoglycemia when not eating  ) An ACE inhibitor/angiotensin II receptor blocker is being taken.    Past Medical History:   Diagnosis Date    Diverticulosis of colon     HTN (hypertension)     Hyperlipidemia      Past Surgical History:   Procedure Laterality Date    BRAIN SURGERY      meningioma    TUBAL LIGATION       Social History     Socioeconomic History    Marital status:      Spouse name: Not on file    Number of children: Not on file    Years of education: Not on file    Highest education level: Not on file   Occupational History    Not on file   Tobacco Use    Smoking status: Former     Packs/day: 1.00     Years: 30.00     Pack years: 30.00     Types: Cigarettes     Quit date: 10/1/2005     Years since quittin.8    Smokeless tobacco: Never   Substance and Sexual Activity    Alcohol use: No     Alcohol/week: 0.0 standard drinks    Drug use: No    Sexual activity: Yes     Partners: Male   Other Topics Concern    Not on file   Social History Narrative    Not on file     Social Determinants of Health     Financial Resource Strain: Not on file   Food Insecurity: Not on file   Transportation Needs: Not on file   Physical Activity: Not on file   Stress: Not on file   Social Connections: Not on file   Intimate Partner Violence: Not on file   Housing Stability: Not on file     Family History   Problem Relation Age of Onset    Parkinsonism Mother     Coronary Art Dis Mother         sister; has had CABG is age 61    Stroke Mother          age late 63's    Heart Disease Mother 36        stents    Colon Cancer Father     Cancer Father         dad colon cancer    Other Brother heart transplantation    Heart Disease Brother         heart transplant    Heart Disease Sister     Cancer Sister         esophageal cancer     Allergies   Allergen Reactions    Penicillin V Hives       Current Outpatient Medications:     blood glucose test strips (ACCU-CHEK GUIDE) strip, Test 3x daily Dx E11.65, Disp: 100 each, Rfl: 3    Accu-Chek FastClix Lancets MISC, Test 3x daily Dx E11.65, Disp: 100 each, Rfl: 3    insulin aspart (NOVOLOG) 100 UNIT/ML injection vial, Use via insulin pump max daily dose 100 units Dx E11.65, Disp: 30 mL, Rfl: 3    diclofenac sodium (VOLTAREN) 1 % GEL, AS DIRECTED EXTERNALLY 4 TIMES A DAY 30 DAYS, Disp: , Rfl:     lactulose (CHRONULAC) 10 GM/15ML solution, TAKE 15 ML ORALLY EVERY OTHER DAY 30 DAY(S), Disp: , Rfl:     methylphenidate (RITALIN) 10 MG tablet, TAKE 1 TABLET BY MOUTH THREE TIMES A DAY, Disp: , Rfl:     nitrofurantoin, macrocrystal-monohydrate, (MACROBID) 100 MG capsule, 1 CAPSULE AFTER INTERCOURSE ORALLY ONE DOSE 30 DAYS, Disp: , Rfl:     metFORMIN (GLUCOPHAGE) 1000 MG tablet, TAKE 1 TABLET BY MOUTH TWICE A DAY WITH MEALS, Disp: 180 tablet, Rfl: 3    zolpidem (AMBIEN) 10 MG tablet, Take 10 mg by mouth daily. , Disp: , Rfl:     atorvastatin (LIPITOR) 20 MG tablet, Take 20 mg by mouth daily, Disp: , Rfl:     DULoxetine (CYMBALTA) 60 MG extended release capsule, Take 60 mg by mouth daily, Disp: , Rfl:     chlorthalidone (HYGROTON) 25 MG tablet, TAKE 1 TABLET BY MOUTH EVERY DAY, Disp: 90 tablet, Rfl: 0    pantoprazole (PROTONIX) 40 MG tablet, Take 1 tablet by mouth daily, Disp: 30 tablet, Rfl: 5    lisinopril (PRINIVIL;ZESTRIL) 20 MG tablet, TAKE 1 TABLET BY MOUTH 2 TIMES DAILY, Disp: 180 tablet, Rfl: 1    acetaminophen (TYLENOL) 325 MG tablet, Take 650 mg by mouth every 6 hours as needed for Pain or Fever , Disp: , Rfl:     aspirin EC 81 MG EC tablet, Take 1 tablet by mouth daily to prevent heart attack and stroke, Disp: 90 tablet, Rfl: 0  Lab Results   Component Value Date     07/19/2022    K 3.8 07/19/2022     07/19/2022    CO2 28 07/19/2022    BUN 11 07/19/2022    CREATININE 0.73 07/19/2022    GLUCOSE 94 07/26/2022    CALCIUM 10.2 (H) 07/19/2022    PROT 7.2 11/16/2018    LABALBU 4.3 11/16/2018    BILITOT 0.4 11/16/2018    ALKPHOS 132 (H) 11/16/2018    AST 53 (H) 11/16/2018     (H) 11/16/2018    LABGLOM >60.0 07/19/2022    GFRAA >60.0 07/19/2022    AGRATIO 1.4 04/28/2018    GLOB 2.9 11/16/2018     Lab Results   Component Value Date    WBC 8.5 11/16/2018    HGB 14.5 11/16/2018    HCT 43.1 11/16/2018    MCV 82.1 11/16/2018     11/16/2018     Lab Results   Component Value Date    LABA1C 7.3 (H) 07/19/2022    LABA1C 7.1 (H) 05/13/2022    LABA1C 8.2 (H) 02/01/2022     Lab Results   Component Value Date    CHOLFAST 226 (H) 02/01/2022    TRIGLYCFAST 448 (H) 02/01/2022    HDL 47 07/19/2022    HDL 40 02/01/2022    HDL 44 09/08/2020    LDLCALC 131 (H) 07/19/2022    LDLCALC see below 02/01/2022    LDLCALC 199 (H) 09/08/2020    CHOL 243 (H) 07/19/2022    CHOL 304 (H) 09/08/2020    CHOL 302 (H) 05/26/2020    TRIG 324 (H) 07/19/2022    TRIG 304 (H) 09/08/2020    TRIG 486 (H) 05/26/2020     No results found for: TESTM  Lab Results   Component Value Date    TSH 0.786 12/01/2017    T4FREE 1.12 12/01/2017     No results found for: TPOABS    Review of Systems    Objective:   Physical Exam

## 2022-08-12 RX ORDER — PEN NEEDLE, DIABETIC 31 GX3/16"
NEEDLE, DISPOSABLE MISCELLANEOUS
Qty: 120 EACH | Refills: 3 | Status: SHIPPED | OUTPATIENT
Start: 2022-08-12

## 2022-09-23 RX ORDER — BLOOD SUGAR DIAGNOSTIC
STRIP MISCELLANEOUS
Qty: 100 STRIP | Refills: 3 | Status: SHIPPED | OUTPATIENT
Start: 2022-09-23

## 2022-10-18 DIAGNOSIS — E11.65 UNCONTROLLED TYPE 2 DIABETES MELLITUS WITH HYPERGLYCEMIA (HCC): ICD-10-CM

## 2022-10-18 LAB
ANION GAP SERPL CALCULATED.3IONS-SCNC: 12 MEQ/L (ref 9–15)
BUN BLDV-MCNC: 18 MG/DL (ref 8–23)
CALCIUM SERPL-MCNC: 9.5 MG/DL (ref 8.5–9.9)
CHLORIDE BLD-SCNC: 99 MEQ/L (ref 95–107)
CO2: 26 MEQ/L (ref 20–31)
CREAT SERPL-MCNC: 0.91 MG/DL (ref 0.5–0.9)
GFR SERPL CREATININE-BSD FRML MDRD: >60 ML/MIN/{1.73_M2}
GLUCOSE FASTING: 147 MG/DL (ref 70–99)
HBA1C MFR BLD: 7.8 % (ref 4.8–5.9)
POTASSIUM SERPL-SCNC: 4.6 MEQ/L (ref 3.4–4.9)
SODIUM BLD-SCNC: 137 MEQ/L (ref 135–144)

## 2022-10-22 ENCOUNTER — OFFICE VISIT (OUTPATIENT)
Dept: ENDOCRINOLOGY | Age: 67
End: 2022-10-22
Payer: MEDICARE

## 2022-10-22 VITALS
BODY MASS INDEX: 32.05 KG/M2 | DIASTOLIC BLOOD PRESSURE: 80 MMHG | HEART RATE: 78 BPM | OXYGEN SATURATION: 99 % | SYSTOLIC BLOOD PRESSURE: 139 MMHG | WEIGHT: 159 LBS | HEIGHT: 59 IN

## 2022-10-22 DIAGNOSIS — E11.65 UNCONTROLLED TYPE 2 DIABETES MELLITUS WITH HYPERGLYCEMIA (HCC): Primary | ICD-10-CM

## 2022-10-22 LAB
CHP ED QC CHECK: NORMAL
GLUCOSE BLD-MCNC: 153 MG/DL

## 2022-10-22 PROCEDURE — 99213 OFFICE O/P EST LOW 20 MIN: CPT | Performed by: INTERNAL MEDICINE

## 2022-10-22 PROCEDURE — 3051F HG A1C>EQUAL 7.0%<8.0%: CPT | Performed by: INTERNAL MEDICINE

## 2022-10-22 PROCEDURE — 1123F ACP DISCUSS/DSCN MKR DOCD: CPT | Performed by: INTERNAL MEDICINE

## 2022-10-22 PROCEDURE — 82962 GLUCOSE BLOOD TEST: CPT | Performed by: INTERNAL MEDICINE

## 2022-10-22 NOTE — PROGRESS NOTES
10/22/2022    Assessment:       Diagnosis Orders   1. Uncontrolled type 2 diabetes mellitus with hyperglycemia (HCC)  POCT Glucose            PLAN:     Increase dose of basal rate to 2.7 units/h  Increase preset bolus to 25 units with each meals  Follow-up in 2 to 3 months time A1c goal of 7 or lower    Orders Placed This Encounter   Procedures    Basic Metabolic Panel     Standing Status:   Future     Standing Expiration Date:   10/22/2023    Hemoglobin A1C     Standing Status:   Future     Standing Expiration Date:   10/22/2023    POCT Glucose         Orders Placed This Encounter   Procedures    POCT Glucose     No orders of the defined types were placed in this encounter. No follow-ups on file. Subjective:     Chief Complaint   Patient presents with    Diabetes     Vitals:    10/22/22 1106   BP: 139/80   Site: Right Upper Arm   Position: Sitting   Cuff Size: Large Adult   Pulse: 78   SpO2: 99%   Weight: 159 lb (72.1 kg)   Height: 4' 11\" (1.499 m)     Wt Readings from Last 3 Encounters:   10/22/22 159 lb (72.1 kg)   07/26/22 160 lb (72.6 kg)   05/19/22 158 lb 9.6 oz (71.9 kg)     BP Readings from Last 3 Encounters:   10/22/22 139/80   07/26/22 95/66   05/19/22 131/79     Follow-up on type 2 diabetes patient currently on insulin pump pump was not downloaded today overall blood sugars close to 180 currently on basal rate of 2.5 units/h  Patient also on metformin  Taking preset bolus of 20 units with each meals  A1c has gone up to 7.8 from 7.3 before      Diabetes  She presents for her follow-up diabetic visit. She has type 2 diabetes mellitus. Pertinent negatives for diabetes include no weight loss. Symptoms are worsening. Risk factors for coronary artery disease include obesity. Current diabetic treatment includes insulin injections. Her overall blood glucose range is 180-200 mg/dl.  (Hemoglobin A1C       Date                     Value               Ref Range           Status                10/18/2022 7.8 (H)             4.8 - 5.9 %         Final            ----------  ) An ACE inhibitor/angiotensin II receptor blocker is being taken.    Past Medical History:   Diagnosis Date    Diverticulosis of colon     HTN (hypertension)     Hyperlipidemia      Past Surgical History:   Procedure Laterality Date    BRAIN SURGERY      meningioma    TUBAL LIGATION       Social History     Socioeconomic History    Marital status:      Spouse name: Not on file    Number of children: Not on file    Years of education: Not on file    Highest education level: Not on file   Occupational History    Not on file   Tobacco Use    Smoking status: Former     Packs/day: 1.00     Years: 30.00     Pack years: 30.00     Types: Cigarettes     Quit date: 10/1/2005     Years since quittin.0    Smokeless tobacco: Never   Substance and Sexual Activity    Alcohol use: No     Alcohol/week: 0.0 standard drinks    Drug use: No    Sexual activity: Yes     Partners: Male   Other Topics Concern    Not on file   Social History Narrative    Not on file     Social Determinants of Health     Financial Resource Strain: Not on file   Food Insecurity: Not on file   Transportation Needs: Not on file   Physical Activity: Not on file   Stress: Not on file   Social Connections: Not on file   Intimate Partner Violence: Not on file   Housing Stability: Not on file     Family History   Problem Relation Age of Onset    Parkinsonism Mother     Coronary Art Dis Mother         sister; has had CABG is age 61    Stroke Mother          age late 63's    Heart Disease Mother 36        stents    Colon Cancer Father     Cancer Father         dad colon cancer    Other Brother         heart transplantation    Heart Disease Brother         heart transplant    Heart Disease Sister     Cancer Sister         esophageal cancer     Allergies   Allergen Reactions    Penicillin V Hives       Current Outpatient Medications:     insulin lispro (HUMALOG) 100 UNIT/ML injection vial, Use via insulin pump max daily dose 100 units Dx E11.65, Disp: 30 mL, Rfl: 3    ACCU-CHEK GUIDE strip, TEST 3X DAILY DX E11.65, Disp: 100 strip, Rfl: 3    SURE COMFORT PEN NEEDLES 31G X 5 MM MISC, USE with insulin TO inject UP TO FOUR times daily, Disp: 120 each, Rfl: 3    insulin aspart (NOVOLOG) 100 UNIT/ML injection vial, Use via insulin pump max daily dose 100 units Dx E11.65, Disp: 30 mL, Rfl: 3    Accu-Chek FastClix Lancets MISC, Test 3x daily Dx E11.65, Disp: 100 each, Rfl: 3    diclofenac sodium (VOLTAREN) 1 % GEL, AS DIRECTED EXTERNALLY 4 TIMES A DAY 30 DAYS, Disp: , Rfl:     lactulose (CHRONULAC) 10 GM/15ML solution, TAKE 15 ML ORALLY EVERY OTHER DAY 30 DAY(S), Disp: , Rfl:     methylphenidate (RITALIN) 10 MG tablet, TAKE 1 TABLET BY MOUTH THREE TIMES A DAY, Disp: , Rfl:     nitrofurantoin, macrocrystal-monohydrate, (MACROBID) 100 MG capsule, 1 CAPSULE AFTER INTERCOURSE ORALLY ONE DOSE 30 DAYS, Disp: , Rfl:     metFORMIN (GLUCOPHAGE) 1000 MG tablet, TAKE 1 TABLET BY MOUTH TWICE A DAY WITH MEALS, Disp: 180 tablet, Rfl: 3    zolpidem (AMBIEN) 10 MG tablet, Take 10 mg by mouth daily. , Disp: , Rfl:     atorvastatin (LIPITOR) 20 MG tablet, Take 20 mg by mouth daily, Disp: , Rfl:     DULoxetine (CYMBALTA) 60 MG extended release capsule, Take 60 mg by mouth daily, Disp: , Rfl:     chlorthalidone (HYGROTON) 25 MG tablet, TAKE 1 TABLET BY MOUTH EVERY DAY, Disp: 90 tablet, Rfl: 0    pantoprazole (PROTONIX) 40 MG tablet, Take 1 tablet by mouth daily, Disp: 30 tablet, Rfl: 5    lisinopril (PRINIVIL;ZESTRIL) 20 MG tablet, TAKE 1 TABLET BY MOUTH 2 TIMES DAILY, Disp: 180 tablet, Rfl: 1    acetaminophen (TYLENOL) 325 MG tablet, Take 650 mg by mouth every 6 hours as needed for Pain or Fever , Disp: , Rfl:     aspirin EC 81 MG EC tablet, Take 1 tablet by mouth daily to prevent heart attack and stroke, Disp: 90 tablet, Rfl: 0  Lab Results   Component Value Date     10/18/2022    K 4.6 10/18/2022    CL 99 10/18/2022    CO2 26 10/18/2022    BUN 18 10/18/2022    CREATININE 0.91 (H) 10/18/2022    GLUCOSE 153 10/22/2022    CALCIUM 9.5 10/18/2022    PROT 7.2 11/16/2018    LABALBU 4.3 11/16/2018    BILITOT 0.4 11/16/2018    ALKPHOS 132 (H) 11/16/2018    AST 53 (H) 11/16/2018     (H) 11/16/2018    LABGLOM >60.0 10/18/2022    GFRAA >60.0 07/19/2022    AGRATIO 1.4 04/28/2018    GLOB 2.9 11/16/2018     Lab Results   Component Value Date    WBC 8.5 11/16/2018    HGB 14.5 11/16/2018    HCT 43.1 11/16/2018    MCV 82.1 11/16/2018     11/16/2018     Lab Results   Component Value Date    LABA1C 7.8 (H) 10/18/2022    LABA1C 7.3 (H) 07/19/2022    LABA1C 7.1 (H) 05/13/2022     Lab Results   Component Value Date    CHOLFAST 226 (H) 02/01/2022    TRIGLYCFAST 448 (H) 02/01/2022    HDL 47 07/19/2022    HDL 40 02/01/2022    HDL 44 09/08/2020    LDLCALC 131 (H) 07/19/2022    LDLCALC see below 02/01/2022    LDLCALC 199 (H) 09/08/2020    CHOL 243 (H) 07/19/2022    CHOL 304 (H) 09/08/2020    CHOL 302 (H) 05/26/2020    TRIG 324 (H) 07/19/2022    TRIG 304 (H) 09/08/2020    TRIG 486 (H) 05/26/2020     No results found for: TESTM  Lab Results   Component Value Date    TSH 0.786 12/01/2017    T4FREE 1.12 12/01/2017     No results found for: TPOABS    Review of Systems   Constitutional:  Negative for weight loss. Cardiovascular: Negative. Endocrine: Negative. All other systems reviewed and are negative. Objective:   Physical Exam  Vitals reviewed. Constitutional:       General: She is not in acute distress. Appearance: Normal appearance. She is obese. HENT:      Head: Normocephalic and atraumatic. Right Ear: External ear normal.      Left Ear: External ear normal.      Nose: Nose normal.   Eyes:      General: No scleral icterus. Right eye: No discharge. Left eye: No discharge. Extraocular Movements: Extraocular movements intact.       Conjunctiva/sclera: Conjunctivae normal. Cardiovascular:      Rate and Rhythm: Normal rate. Pulmonary:      Effort: Pulmonary effort is normal.   Musculoskeletal:         General: Normal range of motion. Cervical back: Normal range of motion and neck supple. Neurological:      General: No focal deficit present. Mental Status: She is alert and oriented to person, place, and time.    Psychiatric:         Mood and Affect: Mood normal.         Behavior: Behavior normal.

## 2022-10-29 DIAGNOSIS — E11.65 UNCONTROLLED TYPE 2 DIABETES MELLITUS WITH HYPERGLYCEMIA (HCC): ICD-10-CM

## 2022-10-31 RX ORDER — INSULIN ASPART 100 [IU]/ML
INJECTION, SOLUTION INTRAVENOUS; SUBCUTANEOUS
Qty: 90 ML | Refills: 3 | Status: SHIPPED | OUTPATIENT
Start: 2022-10-31

## 2022-12-06 ENCOUNTER — TELEPHONE (OUTPATIENT)
Dept: ENDOCRINOLOGY | Age: 67
End: 2022-12-06

## 2022-12-06 DIAGNOSIS — E11.65 UNCONTROLLED TYPE 2 DIABETES MELLITUS WITH HYPERGLYCEMIA (HCC): ICD-10-CM

## 2022-12-06 RX ORDER — FLASH GLUCOSE SCANNING READER
EACH MISCELLANEOUS
Qty: 1 EACH | Refills: 0 | Status: CANCELLED | OUTPATIENT
Start: 2022-12-06

## 2022-12-06 RX ORDER — SYRINGE-NEEDLE,INSULIN,0.5 ML 27GX1/2"
1 SYRINGE, EMPTY DISPOSABLE MISCELLANEOUS DAILY
Qty: 100 EACH | Refills: 3 | Status: SHIPPED | OUTPATIENT
Start: 2022-12-06

## 2022-12-06 RX ORDER — FLASH GLUCOSE SENSOR
KIT MISCELLANEOUS
Qty: 2 EACH | Refills: 3 | Status: SHIPPED | OUTPATIENT
Start: 2022-12-06 | End: 2023-01-16

## 2022-12-06 RX ORDER — FLASH GLUCOSE SCANNING READER
EACH MISCELLANEOUS
Qty: 1 EACH | Refills: 0 | Status: SHIPPED | OUTPATIENT
Start: 2022-12-06 | End: 2023-01-16

## 2022-12-06 RX ORDER — FLASH GLUCOSE SENSOR
KIT MISCELLANEOUS
Qty: 2 EACH | Refills: 3 | Status: CANCELLED | OUTPATIENT
Start: 2022-12-06

## 2022-12-06 NOTE — TELEPHONE ENCOUNTER
Pt is going off the pump she no longer wants to use it. She was on 70/30 prior to pump and needs to know what she should be taking now.   Pt would also like a bridgett 2 sent into her pharmacy

## 2022-12-16 ENCOUNTER — TELEPHONE (OUTPATIENT)
Dept: ENDOCRINOLOGY | Age: 67
End: 2022-12-16

## 2022-12-16 NOTE — TELEPHONE ENCOUNTER
Patient was put on Prednisone yesterday for pneumonia. She checked her sugars today and they are on the 300's. She is afraid to continue taking the prednisone due to her numbers. Please advice.

## 2022-12-18 NOTE — TELEPHONE ENCOUNTER
Continue with prednisone for pneumonia but take extra 4 units for mealtime bolus via pump for glucose more than 300 till she completes her prednisone

## 2022-12-19 NOTE — TELEPHONE ENCOUNTER
Spoke to patient about medication changes, patient said she not taking as much prednisone so her glucose is not as high .

## 2022-12-21 DIAGNOSIS — E11.65 UNCONTROLLED TYPE 2 DIABETES MELLITUS WITH HYPERGLYCEMIA (HCC): ICD-10-CM

## 2022-12-21 RX ORDER — INSULIN ASPART 100 [IU]/ML
INJECTION, SOLUTION INTRAVENOUS; SUBCUTANEOUS
Qty: 100 ML | Refills: 3 | Status: SHIPPED | OUTPATIENT
Start: 2022-12-21

## 2023-01-11 DIAGNOSIS — E11.65 UNCONTROLLED TYPE 2 DIABETES MELLITUS WITH HYPERGLYCEMIA (HCC): ICD-10-CM

## 2023-01-11 LAB
ANION GAP SERPL CALCULATED.3IONS-SCNC: 10 MEQ/L (ref 9–15)
BUN BLDV-MCNC: 13 MG/DL (ref 8–23)
CALCIUM SERPL-MCNC: 9.6 MG/DL (ref 8.5–9.9)
CHLORIDE BLD-SCNC: 103 MEQ/L (ref 95–107)
CO2: 30 MEQ/L (ref 20–31)
CREAT SERPL-MCNC: 0.82 MG/DL (ref 0.5–0.9)
GFR SERPL CREATININE-BSD FRML MDRD: >60 ML/MIN/{1.73_M2}
GLUCOSE BLD-MCNC: 98 MG/DL (ref 70–99)
HBA1C MFR BLD: 7.9 % (ref 4.8–5.9)
POTASSIUM SERPL-SCNC: 4.1 MEQ/L (ref 3.4–4.9)
SODIUM BLD-SCNC: 143 MEQ/L (ref 135–144)

## 2023-01-16 ENCOUNTER — OFFICE VISIT (OUTPATIENT)
Dept: ENDOCRINOLOGY | Age: 68
End: 2023-01-16

## 2023-01-16 VITALS
OXYGEN SATURATION: 98 % | HEART RATE: 67 BPM | HEIGHT: 59 IN | BODY MASS INDEX: 32.66 KG/M2 | WEIGHT: 162 LBS | DIASTOLIC BLOOD PRESSURE: 79 MMHG | SYSTOLIC BLOOD PRESSURE: 135 MMHG

## 2023-01-16 DIAGNOSIS — E11.65 UNCONTROLLED TYPE 2 DIABETES MELLITUS WITH HYPERGLYCEMIA (HCC): Primary | ICD-10-CM

## 2023-01-16 LAB
CHP ED QC CHECK: NORMAL
GLUCOSE BLD-MCNC: 122 MG/DL

## 2023-01-16 RX ORDER — FLASH GLUCOSE SENSOR
1 KIT MISCELLANEOUS
Qty: 2 EACH | Refills: 2 | Status: SHIPPED | OUTPATIENT
Start: 2023-01-16

## 2023-01-16 NOTE — PROGRESS NOTES
2023    Assessment:       Diagnosis Orders   1. Uncontrolled type 2 diabetes mellitus with hyperglycemia (HCC)  POCT Glucose            PLAN:     Orders Placed This Encounter   Procedures    Hemoglobin A1C     Standing Status:   Future     Standing Expiration Date:       Basic Metabolic Panel     Standing Status:   Future     Standing Expiration Date:   2024    POCT Glucose     Orders Placed This Encounter   Medications    Continuous Blood Gluc Sensor (FREESTYLE JOANNE 2 SENSOR) MISC     Si each by Does not apply route every 14 days     Dispense:  2 each     Refill:  2     Continue patient on current pump setting follow-up in 3 to 6 months  A1c goal of 7 or lower    Orders Placed This Encounter   Procedures    POCT Glucose     No orders of the defined types were placed in this encounter. No follow-ups on file. Subjective:     Chief Complaint   Patient presents with    Diabetes     Vitals:    23 1319   BP: 135/79   Pulse: 67   SpO2: 98%   Weight: 162 lb (73.5 kg)   Height: 4' 11\" (1.499 m)     Wt Readings from Last 3 Encounters:   23 162 lb (73.5 kg)   10/22/22 159 lb (72.1 kg)   22 160 lb (72.6 kg)     BP Readings from Last 3 Encounters:   23 135/79   10/22/22 139/80   22 95/66     Follow-up on type 2 diabetes patient is currently on [employer] insulin pump pump was downloaded reviewed blood sugars averages 205±3 currently on 2.7 units/hr basal rate  preset bolus and 25 units with each meals  Hemoglobin A1c was 7.9  Patient requesting freestyle joanne continuous glucose monitoring    Diabetes  She presents for her follow-up diabetic visit. She has type 2 diabetes mellitus. Symptoms are stable. Risk factors for coronary artery disease include obesity. Current diabetic treatment includes insulin pump. Her overall blood glucose range is 180-200 mg/dl.  (Hemoglobin A1C       Date                     Value               Ref Range           Status 2023               7.9 (H)             4.8 - 5.9 %         Final            ----------  )   Past Medical History:   Diagnosis Date    Diverticulosis of colon     HTN (hypertension)     Hyperlipidemia      Past Surgical History:   Procedure Laterality Date    BRAIN SURGERY      meningioma    TUBAL LIGATION       Social History     Socioeconomic History    Marital status:      Spouse name: Not on file    Number of children: Not on file    Years of education: Not on file    Highest education level: Not on file   Occupational History    Not on file   Tobacco Use    Smoking status: Former     Packs/day: 1.00     Years: 30.00     Pack years: 30.00     Types: Cigarettes     Quit date: 10/1/2005     Years since quittin.3    Smokeless tobacco: Never   Substance and Sexual Activity    Alcohol use: No     Alcohol/week: 0.0 standard drinks    Drug use: No    Sexual activity: Yes     Partners: Male   Other Topics Concern    Not on file   Social History Narrative    Not on file     Social Determinants of Health     Financial Resource Strain: Not on file   Food Insecurity: Not on file   Transportation Needs: Not on file   Physical Activity: Not on file   Stress: Not on file   Social Connections: Not on file   Intimate Partner Violence: Not on file   Housing Stability: Not on file     Family History   Problem Relation Age of Onset    Parkinsonism Mother     Coronary Art Dis Mother         sister; has had CABG is age 61    Stroke Mother          age late 63's    Heart Disease Mother 36        stents    Colon Cancer Father     Cancer Father         dad colon cancer    Other Brother         heart transplantation    Heart Disease Brother         heart transplant    Heart Disease Sister     Cancer Sister         esophageal cancer     Allergies   Allergen Reactions    Penicillin V Hives       Current Outpatient Medications:     insulin aspart (NOVOLOG) 100 UNIT/ML injection vial, USE VIA INSULIN PUMP MAX DAILY DOSE 120 UNITS DX E11.65, Disp: 100 mL, Rfl: 3    insulin 70-30 (NOVOLIN 70/30) (70-30) 100 UNIT per ML injection vial, INJECT 70 UNITS AM 30 UNITS LUNCH AND 60-70 UNITS DINNER, Disp: 50 mL, Rfl: 3    Insulin Syringe-Needle U-100 ([employer] INSULIN SYRINGE) 31G X 5/16\" 1 ML MISC, 1 each by Does not apply route daily, Disp: 100 each, Rfl: 3    insulin lispro (HUMALOG) 100 UNIT/ML injection vial, Use via insulin pump max daily dose 100 units Dx E11.65, Disp: 30 mL, Rfl: 3    ACCU-CHEK GUIDE strip, TEST 3X DAILY DX E11.65, Disp: 100 strip, Rfl: 3    SURE COMFORT PEN NEEDLES 31G X 5 MM MISC, USE with insulin TO inject UP TO FOUR times daily, Disp: 120 each, Rfl: 3    Accu-Chek FastClix Lancets MISC, Test 3x daily Dx E11.65, Disp: 100 each, Rfl: 3    diclofenac sodium (VOLTAREN) 1 % GEL, AS DIRECTED EXTERNALLY 4 TIMES A DAY 30 DAYS, Disp: , Rfl:     lactulose (CHRONULAC) 10 GM/15ML solution, TAKE 15 ML ORALLY EVERY OTHER DAY 30 DAY(S), Disp: , Rfl:     methylphenidate (RITALIN) 10 MG tablet, TAKE 1 TABLET BY MOUTH THREE TIMES A DAY, Disp: , Rfl:     nitrofurantoin, macrocrystal-monohydrate, (MACROBID) 100 MG capsule, 1 CAPSULE AFTER INTERCOURSE ORALLY ONE DOSE 30 DAYS, Disp: , Rfl:     metFORMIN (GLUCOPHAGE) 1000 MG tablet, TAKE 1 TABLET BY MOUTH TWICE A DAY WITH MEALS, Disp: 180 tablet, Rfl: 3    zolpidem (AMBIEN) 10 MG tablet, Take 10 mg by mouth daily. , Disp: , Rfl:     atorvastatin (LIPITOR) 20 MG tablet, Take 20 mg by mouth daily, Disp: , Rfl:     DULoxetine (CYMBALTA) 60 MG extended release capsule, Take 60 mg by mouth daily, Disp: , Rfl:     chlorthalidone (HYGROTON) 25 MG tablet, TAKE 1 TABLET BY MOUTH EVERY DAY, Disp: 90 tablet, Rfl: 0    pantoprazole (PROTONIX) 40 MG tablet, Take 1 tablet by mouth daily, Disp: 30 tablet, Rfl: 5    lisinopril (PRINIVIL;ZESTRIL) 20 MG tablet, TAKE 1 TABLET BY MOUTH 2 TIMES DAILY, Disp: 180 tablet, Rfl: 1    acetaminophen (TYLENOL) 325 MG tablet, Take 650 mg by mouth every 6 hours as needed for Pain or Fever , Disp: , Rfl:     aspirin EC 81 MG EC tablet, Take 1 tablet by mouth daily to prevent heart attack and stroke, Disp: 90 tablet, Rfl: 0  Lab Results   Component Value Date     01/11/2023    K 4.1 01/11/2023     01/11/2023    CO2 30 01/11/2023    BUN 13 01/11/2023    CREATININE 0.82 01/11/2023    GLUCOSE 122 01/16/2023    CALCIUM 9.6 01/11/2023    PROT 7.2 11/16/2018    LABALBU 4.3 11/16/2018    BILITOT 0.4 11/16/2018    ALKPHOS 132 (H) 11/16/2018    AST 53 (H) 11/16/2018     (H) 11/16/2018    LABGLOM >60.0 01/11/2023    GFRAA >60.0 07/19/2022    AGRATIO 1.4 04/28/2018    GLOB 2.9 11/16/2018     Lab Results   Component Value Date    WBC 8.5 11/16/2018    HGB 14.5 11/16/2018    HCT 43.1 11/16/2018    MCV 82.1 11/16/2018     11/16/2018     Lab Results   Component Value Date    LABA1C 7.9 (H) 01/11/2023    LABA1C 7.8 (H) 10/18/2022    LABA1C 7.3 (H) 07/19/2022     Lab Results   Component Value Date    CHOLFAST 226 (H) 02/01/2022    TRIGLYCFAST 448 (H) 02/01/2022    HDL 47 07/19/2022    HDL 40 02/01/2022    HDL 44 09/08/2020    LDLCALC 131 (H) 07/19/2022    LDLCALC see below 02/01/2022    LDLCALC 199 (H) 09/08/2020    CHOL 243 (H) 07/19/2022    CHOL 304 (H) 09/08/2020    CHOL 302 (H) 05/26/2020    TRIG 324 (H) 07/19/2022    TRIG 304 (H) 09/08/2020    TRIG 486 (H) 05/26/2020     No results found for: TESTM  Lab Results   Component Value Date    TSH 0.786 12/01/2017    T4FREE 1.12 12/01/2017     No results found for: TPOABS    Review of Systems   Eyes: Negative. Cardiovascular: Negative. Endocrine: Negative. All other systems reviewed and are negative. Objective:   Physical Exam  Vitals reviewed. Constitutional:       General: She is not in acute distress. Appearance: Normal appearance. She is obese. HENT:      Head: Normocephalic and atraumatic.       Right Ear: External ear normal.      Left Ear: External ear normal.      Nose: Nose normal.   Eyes: General: No scleral icterus. Right eye: No discharge. Left eye: No discharge. Extraocular Movements: Extraocular movements intact. Conjunctiva/sclera: Conjunctivae normal.   Cardiovascular:      Rate and Rhythm: Normal rate. Pulmonary:      Effort: Pulmonary effort is normal.   Musculoskeletal:         General: Normal range of motion. Cervical back: Normal range of motion and neck supple. Neurological:      General: No focal deficit present. Mental Status: She is alert and oriented to person, place, and time.    Psychiatric:         Mood and Affect: Mood normal.         Behavior: Behavior normal.

## 2023-01-22 ASSESSMENT — ENCOUNTER SYMPTOMS: EYES NEGATIVE: 1

## 2023-02-14 ENCOUNTER — TELEPHONE (OUTPATIENT)
Dept: ENDOCRINOLOGY | Age: 68
End: 2023-02-14

## 2023-02-14 NOTE — TELEPHONE ENCOUNTER
Pt wants to go off of the pump, She wants to know what insulin you want her to take. She has mix insulin left over from before she went on the pump should she resume that?

## 2023-02-22 ENCOUNTER — TELEPHONE (OUTPATIENT)
Dept: ENDOCRINOLOGY | Age: 68
End: 2023-02-22

## 2023-02-22 NOTE — TELEPHONE ENCOUNTER
Patient is calling about her bridgett sensors. She is having issues receiving them and she is not sure why. Can we please help her with this? Thanks!

## 2023-02-24 NOTE — TELEPHONE ENCOUNTER
Left patient a message that paperwork was sent to her DME company.  Patient call her DME company to see what is needed

## 2023-04-03 DIAGNOSIS — E11.65 UNCONTROLLED TYPE 2 DIABETES MELLITUS WITH HYPERGLYCEMIA (HCC): ICD-10-CM

## 2023-05-01 DIAGNOSIS — E11.65 UNCONTROLLED TYPE 2 DIABETES MELLITUS WITH HYPERGLYCEMIA (HCC): ICD-10-CM

## 2023-05-01 LAB
ANION GAP SERPL CALCULATED.3IONS-SCNC: 11 MEQ/L (ref 9–15)
BUN SERPL-MCNC: 10 MG/DL (ref 8–23)
CALCIUM SERPL-MCNC: 9 MG/DL (ref 8.5–9.9)
CHLORIDE SERPL-SCNC: 105 MEQ/L (ref 95–107)
CO2 SERPL-SCNC: 24 MEQ/L (ref 20–31)
CREAT SERPL-MCNC: 0.73 MG/DL (ref 0.5–0.9)
GLUCOSE SERPL-MCNC: 163 MG/DL (ref 70–99)
HBA1C MFR BLD: 7.3 % (ref 4.8–5.9)
POTASSIUM SERPL-SCNC: 3.9 MEQ/L (ref 3.4–4.9)
SODIUM SERPL-SCNC: 140 MEQ/L (ref 135–144)

## 2023-05-04 ENCOUNTER — OFFICE VISIT (OUTPATIENT)
Dept: ENDOCRINOLOGY | Age: 68
End: 2023-05-04

## 2023-05-04 VITALS
BODY MASS INDEX: 31.85 KG/M2 | WEIGHT: 158 LBS | DIASTOLIC BLOOD PRESSURE: 76 MMHG | HEIGHT: 59 IN | OXYGEN SATURATION: 99 % | HEART RATE: 77 BPM | SYSTOLIC BLOOD PRESSURE: 154 MMHG

## 2023-05-04 DIAGNOSIS — E11.65 UNCONTROLLED TYPE 2 DIABETES MELLITUS WITH HYPERGLYCEMIA (HCC): Primary | ICD-10-CM

## 2023-05-04 LAB
CHP ED QC CHECK: NORMAL
GLUCOSE BLD-MCNC: 124 MG/DL

## 2023-05-04 RX ORDER — ATORVASTATIN CALCIUM 10 MG/1
TABLET, FILM COATED ORAL
COMMUNITY
Start: 2023-04-26 | End: 2023-05-04 | Stop reason: DRUGHIGH

## 2023-05-04 RX ORDER — AMLODIPINE BESYLATE 2.5 MG/1
2.5 TABLET ORAL DAILY
COMMUNITY
Start: 2023-04-08

## 2023-05-04 RX ORDER — GABAPENTIN 100 MG/1
CAPSULE ORAL
COMMUNITY
Start: 2023-05-03

## 2023-05-04 ASSESSMENT — ENCOUNTER SYMPTOMS: SHORTNESS OF BREATH: 1

## 2023-05-04 NOTE — PROGRESS NOTES
5/4/2023    Assessment:       Diagnosis Orders   1. Uncontrolled type 2 diabetes mellitus with hyperglycemia (HCC)  POCT Glucose            PLAN:     Patient to continue to adjust her insulin dose A1c goal of 7 or lower avoid hypoglycemia advised also to scan Rachel Patiño more often only scanning 3-5 times daily  Orders Placed This Encounter   Procedures    Hemoglobin A1C     Standing Status:   Future     Standing Expiration Date:   8/4/5330    Basic Metabolic Panel     Standing Status:   Future     Standing Expiration Date:   5/4/2024    POCT Glucose         Orders Placed This Encounter   Procedures    POCT Glucose     No orders of the defined types were placed in this encounter. No follow-ups on file. Subjective:     Chief Complaint   Patient presents with    Diabetes     A1C 7.0 4-4-2023     Vitals:    05/04/23 1458 05/04/23 1507   BP: (!) 152/83 (!) 154/76   Site: Right Upper Arm Left Upper Arm   Cuff Size: Large Adult Large Adult   Pulse: 77    SpO2: 99%    Weight: 158 lb (71.7 kg)    Height: 4' 11\" (1.499 m)      Wt Readings from Last 3 Encounters:   05/04/23 158 lb (71.7 kg)   01/16/23 162 lb (73.5 kg)   10/22/22 159 lb (72.1 kg)     BP Readings from Last 3 Encounters:   05/04/23 (!) 154/76   01/16/23 135/79   10/22/22 139/80     Follow-up on type 2 diabetes patient recently went off of insulin pump after she has surgery done for lung cancer on the left side using oxygen she is back on Novolin 70/30 and has been taking it mostly once a day  Doses  IS 3 times daily 70/30 Novolin 70 units in the morning 30 at lunch 60-70 at dinner using freestyle bridgett continuous glucose monitoring average blood sugars between 1 50-1 70 last hemoglobin A1c was 7.3 patient also under stress due to different reasons    Diabetes  She presents for her follow-up diabetic visit. She has type 2 diabetes mellitus. Her disease course has been fluctuating. Risk factors for coronary artery disease include obesity.  Current diabetic treatment

## 2023-07-13 RX ORDER — INSULIN PEN,REUSABLE,BT LISPRO
INSULIN PEN (EA) SUBCUTANEOUS
Qty: 1 EACH | Refills: 1 | Status: SHIPPED | OUTPATIENT
Start: 2023-07-13

## 2023-08-03 DIAGNOSIS — E11.65 UNCONTROLLED TYPE 2 DIABETES MELLITUS WITH HYPERGLYCEMIA (HCC): ICD-10-CM

## 2023-08-03 LAB
ANION GAP SERPL CALCULATED.3IONS-SCNC: 13 MEQ/L (ref 9–15)
BUN SERPL-MCNC: 10 MG/DL (ref 8–23)
CALCIUM SERPL-MCNC: 9.5 MG/DL (ref 8.5–9.9)
CHLORIDE SERPL-SCNC: 107 MEQ/L (ref 95–107)
CO2 SERPL-SCNC: 21 MEQ/L (ref 20–31)
CREAT SERPL-MCNC: 0.79 MG/DL (ref 0.5–0.9)
GLUCOSE SERPL-MCNC: 189 MG/DL (ref 70–99)
HBA1C MFR BLD: 7.5 % (ref 4.8–5.9)
POTASSIUM SERPL-SCNC: 4.3 MEQ/L (ref 3.4–4.9)
SODIUM SERPL-SCNC: 141 MEQ/L (ref 135–144)

## 2023-08-07 ENCOUNTER — OFFICE VISIT (OUTPATIENT)
Dept: ENDOCRINOLOGY | Age: 68
End: 2023-08-07

## 2023-08-07 VITALS
DIASTOLIC BLOOD PRESSURE: 76 MMHG | HEART RATE: 78 BPM | OXYGEN SATURATION: 96 % | SYSTOLIC BLOOD PRESSURE: 127 MMHG | HEIGHT: 59 IN | WEIGHT: 156 LBS | BODY MASS INDEX: 31.45 KG/M2

## 2023-08-07 DIAGNOSIS — E11.65 UNCONTROLLED TYPE 2 DIABETES MELLITUS WITH HYPERGLYCEMIA (HCC): Primary | ICD-10-CM

## 2023-08-07 LAB
CHP ED QC CHECK: NORMAL
GLUCOSE BLD-MCNC: 174 MG/DL

## 2023-08-07 RX ORDER — BLOOD SUGAR DIAGNOSTIC
STRIP MISCELLANEOUS
Qty: 100 STRIP | Refills: 3 | Status: SHIPPED | OUTPATIENT
Start: 2023-08-07

## 2023-08-07 RX ORDER — LANCETS
EACH MISCELLANEOUS
Qty: 100 EACH | Refills: 3 | Status: SHIPPED | OUTPATIENT
Start: 2023-08-07

## 2023-08-07 NOTE — PROGRESS NOTES
1.12 12/01/2017     No results found for: TPOABS    Review of Systems   Cardiovascular: Negative. Endocrine: Negative. All other systems reviewed and are negative. Objective:   Physical Exam  Vitals reviewed. Constitutional:       General: She is not in acute distress. Appearance: Normal appearance. She is obese. HENT:      Head: Normocephalic and atraumatic. Right Ear: External ear normal.      Left Ear: External ear normal.      Nose: Nose normal.   Eyes:      General: No scleral icterus. Right eye: No discharge. Left eye: No discharge. Extraocular Movements: Extraocular movements intact. Conjunctiva/sclera: Conjunctivae normal.   Cardiovascular:      Rate and Rhythm: Normal rate. Pulmonary:      Effort: Pulmonary effort is normal.   Musculoskeletal:         General: Normal range of motion. Cervical back: Normal range of motion and neck supple. Feet:    Skin:     Findings: No lesion or rash. Neurological:      General: No focal deficit present. Mental Status: She is alert and oriented to person, place, and time.    Psychiatric:         Mood and Affect: Mood normal.         Behavior: Behavior normal.

## 2023-08-29 DIAGNOSIS — E11.65 UNCONTROLLED TYPE 2 DIABETES MELLITUS WITH HYPERGLYCEMIA (HCC): ICD-10-CM

## 2023-09-09 NOTE — PROGRESS NOTES
7/19/2021    Assessment:       Diagnosis Orders   1. Uncontrolled type 2 diabetes mellitus with hyperglycemia (HCC)  POCT Glucose    [facility] DIABETES FOOT EXAM    Hemoglobin E3X    Basic Metabolic Panel         PLAN:     Orders Placed This Encounter   Procedures    Hemoglobin A1C     Standing Status:   Future     Standing Expiration Date:   7/19/2022    Basic Metabolic Panel     Standing Status:   Future     Standing Expiration Date:   7/19/2022    POCT Glucose    HM DIABETES FOOT EXAM     Adjust insulin today patient to follow-up in 4 weeks time patient cleared for her left foot ankle surgery with moderate risk  Orders Placed This Encounter   Medications    insulin 70-30 (NOVOLIN 70/30 RELION) (70-30) 100 UNIT per ML injection vial     Sig: 15 units am and 60-75  units dinner     Dispense:  5 vial     Refill:  3           Orders Placed This Encounter   Procedures    POCT Glucose       Subjective:     Chief Complaint   Patient presents with    Diabetes    Weight Gain     Vitals:    07/19/21 1325   BP: 121/75   Pulse: 72   SpO2: 97%   Weight: 157 lb (71.2 kg)   Height: 4' 11\" (1.499 m)     Wt Readings from Last 3 Encounters:   07/19/21 157 lb (71.2 kg)   02/05/21 151 lb (68.5 kg)   11/06/20 143 lb (64.9 kg)     BP Readings from Last 3 Encounters:   07/19/21 121/75   02/05/21 135/84   11/06/20 136/88     Follow-up on type 2 diabetes patient is on Novolin 70/30 taking between 50 to 75 units at dinnertime plus Metformin occasionally does take insulin in the morning A1c done recently was elevated at 8.4 done at outside facility not available for review patient recently also had left foot boot for plantar fasciitis and Achilles tendon injury for which she is scheduled for surgery surgery was postponed because of higher A1c    Diabetes  She presents for her follow-up diabetic visit. She has type 2 diabetes mellitus. Symptoms are worsening.  Current diabetic treatment includes insulin injections (Novolin 70/30 plus Subjective  Tanner Hassan is a 51 year old male.    Chief Complaint   Patient presents with   • Office Visit   • Foot     PATIENT HAS SWELLING IN BOTH FEET    • Shortness of Breath     Urgent add-on PCP not available patient is here with complaints of shortness of breath since this morning.  He initially made the appointment for leg swelling which has been going on for about 2 weeks states that he got out of the car and he was winded.  Also has noticed that his belly has been swelling more.  No known history of CHF he has history of hypertension but he is also a smoker no history of COPD.  States father had a heart attack in his 50s subsequently had to have surgery bypass and valve replacement.  Patient works in the construction business.  He is also noticed that he wheezes at times especially night.  No complaints of any orthopnea some PND.  No complaints of palpitations no complaints of cough no complaints of fever or chills.  He has been smoking over 20 years.  He does not have any inhalers.  Thinks he had asthma as a child.  No complaints of any dizziness or headaches.  States that the skin on the left leg now is opening up with some drainage has history of chronic left leg injury.      Past Medical History:   Diagnosis Date   • Essential (primary) hypertension    • RAD (reactive airway disease)        Past Surgical History:   Procedure Laterality Date   • No past surgeries         Current Outpatient Medications   Medication Sig Dispense Refill   • varenicline (CHANTIX) 0.5 MG tablet Take 1 tablet by mouth in the morning and 1 tablet in the evening. Finish 0.5 mg BID dose prior to starting 1 mg BID dose 60 tablet 0   • varenicline (CHANTIX) 1 MG tablet Take 1 tablet by mouth in the morning and 1 tablet in the evening. Finish 0.5 mg BID dose prior to starting 1 mg BID dose 60 tablet 11   • mupirocin (BACTROBAN) 2 % ointment Apply 1 application. topically in the morning and 1 application. at noon and 1  Metformin). She is currently taking insulin pre-breakfast and pre-dinner. Her overall blood glucose range is 180-200 mg/dl. An ACE inhibitor/angiotensin II receptor blocker is being taken. Past Medical History:   Diagnosis Date    Diverticulosis of colon     HTN (hypertension)     Hyperlipidemia      Past Surgical History:   Procedure Laterality Date    BRAIN SURGERY      meningioma    TUBAL LIGATION       Social History     Socioeconomic History    Marital status:      Spouse name: Not on file    Number of children: Not on file    Years of education: Not on file    Highest education level: Not on file   Occupational History    Not on file   Tobacco Use    Smoking status: Former Smoker     Packs/day: 1.00     Years: 30.00     Pack years: 30.00     Types: Cigarettes     Quit date: 10/1/2005     Years since quitting: 15.8    Smokeless tobacco: Never Used   Substance and Sexual Activity    Alcohol use: No     Alcohol/week: 0.0 standard drinks    Drug use: No    Sexual activity: Yes     Partners: Male   Other Topics Concern    Not on file   Social History Narrative    Not on file     Social Determinants of Health     Financial Resource Strain:     Difficulty of Paying Living Expenses:    Food Insecurity:     Worried About Running Out of Food in the Last Year:     Ran Out of Food in the Last Year:    Transportation Needs:     Lack of Transportation (Medical):      Lack of Transportation (Non-Medical):    Physical Activity:     Days of Exercise per Week:     Minutes of Exercise per Session:    Stress:     Feeling of Stress :    Social Connections:     Frequency of Communication with Friends and Family:     Frequency of Social Gatherings with Friends and Family:     Attends L[employer] day Services:     Active Member of Clubs or Organizations:     Attends Club or Organization Meetings:     Marital Status:    Intimate Partner Violence:     Fear of Current or Ex-Partner:     application. in the evening. 22 g 1   • lactulose 10 GM/15ML solution Take 30 mLs by mouth daily as needed (constipation). 1800 mL 11   • tamsulosin (FLOMAX) 0.4 MG Cap Take 1 capsule by mouth daily. 90 capsule 0   • finasteride (PROSCAR) 5 MG tablet Take 1 tablet by mouth daily. MAKE AN APPT TO SEE ME IN MARCH - COME FASTING SO WE CAN CHECK LABS 90 tablet 0   • buPROPion XL (WELLBUTRIN XL) 150 MG 24 hr tablet Take 1 tablet by mouth daily. 90 tablet 3   • amLODIPine (NORVASC) 10 MG tablet Take 1 tablet by mouth daily. 90 tablet 3   • atorvastatin (LIPITOR) 40 MG tablet Take 1 tablet by mouth daily. 90 tablet 3   • sildenafil (VIAGRA) 100 MG tablet Take 1 tablet by mouth as needed for Erectile Dysfunction. 30 tablet 11     No current facility-administered medications for this visit.       ALLERGIES:  No Known Allergies    Family History   Problem Relation Age of Onset   • Patient is unaware of any medical problems Mother    • Patient is unaware of any medical problems Father         Social History     Socioeconomic History   • Marital status: Not on file     Spouse name: Not on file   • Number of children: Not on file   • Years of education: Not on file   • Highest education level: Not on file   Occupational History   • Not on file   Tobacco Use   • Smoking status: Some Days     Current packs/day: 0.25     Average packs/day: 0.3 packs/day for 11.0 years (2.8 ttl pk-yrs)     Types: Cigarettes   • Smokeless tobacco: Never   Vaping Use   • Vaping Use: never used   Substance and Sexual Activity   • Alcohol use: Yes     Comment: social   • Drug use: Never   • Sexual activity: Yes     Partners: Female   Other Topics Concern   • Not on file   Social History Narrative   • Not on file     Social Determinants of Health     Financial Resource Strain: Not on file   Food Insecurity: Not on file   Transportation Needs: Not on file   Physical Activity: Insufficiently Active (7/12/2021)    Exercise Vital Sign    • Days of Exercise  Emotionally Abused:     Physically Abused:     Sexually Abused:      Family History   Problem Relation Age of Onset    Parkinsonism Mother     Coronary Art Dis Mother         sister; has had CABG is age 61    Stroke Mother          age late 63's    Heart Disease Mother 36        stents    Colon Cancer Father     Cancer Father         dad colon cancer    Other Brother         heart transplantation    Heart Disease Brother         heart transplant    Heart Disease Sister     Cancer Sister         esophageal cancer     Allergies   Allergen Reactions    Penicillin V Hives       Current Outpatient Medications:     metFORMIN (GLUCOPHAGE) 1000 MG tablet, TAKE 1 TABLET BY MOUTH TWICE A DAY WITH MEALS, Disp: 180 tablet, Rfl: 1    zolpidem (AMBIEN) 10 MG tablet, Take 10 mg by mouth daily. , Disp: , Rfl:     insulin 70-30 (NOVOLIN 70/30 RELION) (70-30) 100 UNIT per ML injection vial, 50-60 units dinner, Disp: 5 vial, Rfl: 3    atorvastatin (LIPITOR) 20 MG tablet, Take 20 mg by mouth daily, Disp: , Rfl:     DULoxetine (CYMBALTA) 60 MG extended release capsule, Take 60 mg by mouth daily, Disp: , Rfl:     Insulin Syringe-Needle U-100 31G X 5/16\" 1 ML MISC, Use as directed for diabetes, Disp: 100 each, Rfl: 5    omega-3 acid ethyl esters (LOVAZA) 1 g capsule, Take 2 caps bid, Disp: 120 capsule, Rfl: 3    chlorthalidone (HYGROTON) 25 MG tablet, TAKE 1 TABLET BY MOUTH EVERY DAY, Disp: 90 tablet, Rfl: 0    pantoprazole (PROTONIX) 40 MG tablet, Take 1 tablet by mouth daily, Disp: 30 tablet, Rfl: 5    albuterol sulfate HFA (PROAIR HFA) 108 (90 Base) MCG/ACT inhaler, Inhale 2 puffs into the lungs every 4 hours as needed for Wheezing, Disp: 1 Inhaler, Rfl: 3    lisinopril (PRINIVIL;ZESTRIL) 20 MG tablet, TAKE 1 TABLET BY MOUTH 2 TIMES DAILY, Disp: 180 tablet, Rfl: 1    acetaminophen (TYLENOL) 325 MG tablet, Take 650 mg by mouth, Disp: , Rfl:     albuterol (PROVENTIL) (2.5 MG/3ML) 0.083% nebulizer per Week: 3 days    • Minutes of Exercise per Session: 20 min   Stress: Low Risk  (2021)    Stress    • Social Determinants: Stress: Not at all   Social Connections: Not on file   Intimate Partner Violence: Not on file       Review of Systems   Constitutional: Negative.    HENT: Negative.    Eyes: Negative.    Respiratory: Positive for shortness of breath and wheezing.    Cardiovascular: Positive for leg swelling.   Gastrointestinal: Negative.    Endocrine: Negative.    Genitourinary: Negative.    Musculoskeletal: Negative.    Skin: Negative.    Allergic/Immunologic: Negative.    Neurological: Negative.    Hematological: Negative.    Psychiatric/Behavioral: Negative.        Objective  Visit Vitals  /86 (BP Location: LUE - Left upper extremity, Patient Position: Sitting, Cuff Size: Large Adult)   Pulse 100   Temp 97.9 °F (36.6 °C)   Resp 16   Ht 5' 7\" (1.702 m)   Wt 115.8 kg (255 lb 2.9 oz)   SpO2 98%   BMI 39.97 kg/m²       Physical Exam  Vitals reviewed.   Constitutional:       General: He is not in acute distress.     Appearance: He is well-developed. He is obese. He is not ill-appearing.   HENT:      Neck: Normal range of motion and neck supple.   Eyes:      Conjunctiva/sclera: Conjunctivae normal.   Cardiovascular:      Rate and Rhythm: Regular rhythm. Tachycardia present.      Heart sounds: No murmur heard.  Pulmonary:      Effort: Pulmonary effort is normal.      Breath sounds: Wheezing and rales present.   Abdominal:      General: Bowel sounds are normal. There is no distension.      Palpations: Abdomen is soft.      Tenderness: There is no abdominal tenderness. There is no guarding.   Musculoskeletal:      Right lower le+ Pitting Edema present.      Left lower le+ Pitting Edema present.   Neurological:      Mental Status: He is oriented to person, place, and time.   Psychiatric:         Mood and Affect: Mood normal.         Labs  Lab Results Reviewed,   Lab Results   Component Value Date     SODIUM 138 05/24/2023    POTASSIUM 4.2 05/24/2023    CHLORIDE 106 05/24/2023    CO2 27 05/24/2023    BUN 9 05/24/2023    CREATININE 0.77 05/24/2023    GLUCOSE 108 (H) 05/24/2023   ,   Lab Results   Component Value Date    WBC 8.4 05/24/2023    HCT 45.8 05/24/2023    HGB 14.7 05/24/2023     05/24/2023   ,   Hemoglobin A1C (%)   Date Value   05/24/2023 6.2 (H)   ,   TSH (mcUnits/mL)   Date Value   05/24/2023 2.631   ,   Lab Results   Component Value Date    CHOLESTEROL 134 05/24/2023    HDL 40 05/24/2023    CALCLDL 72 05/24/2023    TRIGLYCERIDE 112 05/24/2023   ,   Lab Results   Component Value Date    AST 27 05/24/2023    GPT 46 05/24/2023    ALKPT 58 05/24/2023    BILIRUBIN 0.5 05/24/2023   ,   Lab Results   Component Value Date    COL Yellow 09/22/2022    UAPP Clear 09/22/2022    USPG 1.012 09/22/2022    UPH 7.0 09/22/2022    UPROT Negative 09/22/2022    UGLU Negative 09/22/2022    UKET Negative 09/22/2022    UBILI Negative 09/22/2022    URBC Negative 09/22/2022    UNITR Negative 09/22/2022    UROB 0.2 09/22/2022    UWBC Negative 09/22/2022   ,   EKG INTERPRETATION:  No results found for this or any previous visit.   and   IMAGING STUDIES:  No results found for this or any previous visit.    No results found for this or any previous visit.    Imaging  No image results found.      Assessment and Plan  1. Shortness of breath    2. Leg swelling    3. Essential hypertension    4. Mixed hyperlipidemia    5. Prediabetes        Shortness of breath  (primary encounter diagnosis)  Plan: Electrocardiogram 12-Lead    Leg swelling    Essential hypertension    Mixed hyperlipidemia    Prediabetes    Acute shortness of breath in view of his other symptoms this is possibly new onset of congestive heart failure possible ischemia will need further evaluation in the emergency room for any other etiology such as PE or DVT due to his sudden onset of symptoms EKG done in the office shows normal sinus rhythm T wave abnormality  solution, Take 3 mLs by nebulization 4 times daily, Disp: 120 each, Rfl: 4    aspirin EC 81 MG EC tablet, Take 1 tablet by mouth daily to prevent heart attack and stroke, Disp: 90 tablet, Rfl: 0    Multiple Vitamins-Minerals (MULTIVITAL PO), Take by mouth, Disp: , Rfl:     Insulin Pen Needle 31G X 5 MM MISC, 1 each by Does not apply route daily, Disp: 100 each, Rfl: 3    Icosapent Ethyl (VASCEPA) 1 g CAPS capsule, Take 2 capsules by mouth 2 times daily (Patient not taking: Reported on 7/19/2021), Disp: 120 capsule, Rfl: 3    escitalopram (LEXAPRO) 10 MG tablet, Take 1 tablet by mouth daily (Patient not taking: Reported on 7/19/2021), Disp: 30 tablet, Rfl: 3    B Complex Vitamins (B COMPLEX PO), Take by mouth (Patient not taking: Reported on 7/19/2021), Disp: , Rfl:     Fexofenadine HCl (ALLEGRA PO), Take by mouth (Patient not taking: Reported on 7/19/2021), Disp: , Rfl:   Lab Results   Component Value Date     (L) 11/02/2020    K 4.2 11/02/2020    CL 92 (L) 11/02/2020    CO2 31 11/02/2020    BUN 10 11/02/2020    CREATININE 0.59 11/02/2020    GLUCOSE 171 07/19/2021    CALCIUM 9.4 11/02/2020    PROT 7.2 11/16/2018    LABALBU 4.3 11/16/2018    BILITOT 0.4 11/16/2018    ALKPHOS 132 (H) 11/16/2018    AST 53 (H) 11/16/2018     (H) 11/16/2018    LABGLOM >60.0 11/02/2020    GFRAA >60.0 11/02/2020    AGRATIO 1.4 04/28/2018    GLOB 2.9 11/16/2018     Lab Results   Component Value Date    WBC 8.5 11/16/2018    HGB 14.5 11/16/2018    HCT 43.1 11/16/2018    MCV 82.1 11/16/2018     11/16/2018     Lab Results   Component Value Date    LABA1C 7.1 11/03/2020    LABA1C 7.1 (H) 11/02/2020    LABA1C 7.8 (H) 09/08/2020     Lab Results   Component Value Date    HDL 44 09/08/2020    HDL 39 (L) 05/26/2020    HDL 37 (L) 11/16/2018    LDLCALC 199 (H) 09/08/2020    LDLCALC see below 05/26/2020    LDLCALC see below 11/16/2018    CHOL 304 (H) 09/08/2020    CHOL 302 (H) 05/26/2020    CHOL 255 (H) 11/16/2018    TRIG 304 (H) 09/08/2020    TRIG 486 (H) 05/26/2020    TRIG 574 (H) 11/16/2018       Review of Systems   Musculoskeletal: Positive for gait problem. Objective:   Physical Exam  Vitals reviewed. Constitutional:       Appearance: Normal appearance. She is obese. HENT:      Head: Normocephalic and atraumatic. Hair is normal.      Right Ear: External ear normal.      Left Ear: External ear normal.      Nose: Nose normal.   Eyes:      General: No scleral icterus. Right eye: No discharge. Left eye: No discharge. Extraocular Movements: Extraocular movements intact. Conjunctiva/sclera: Conjunctivae normal.   Neck:      Trachea: Trachea normal.   Cardiovascular:      Rate and Rhythm: Normal rate. Pulmonary:      Effort: Pulmonary effort is normal.   Musculoskeletal:         General: Normal range of motion. Cervical back: Normal range of motion and neck supple. Feet:    Neurological:      General: No focal deficit present. Mental Status: She is alert and oriented to person, place, and time.    Psychiatric:         Mood and Affect: Mood normal.         Behavior: Behavior normal. in the inferior leads in view of his risk factors smoking family history and hyperlipidemia needs to be assessed in the ER    Bilateral leg swelling could be decompensated congestive heart failure could also be side effects of amlodipine however in view of his sudden symptoms and also needs to be ruled out for DVT and PE      Essential hypertension continue with amlodipine 10 mg p.o. daily cut back on salt and caffeine monitor pressure at home follow-up with PCP      Nicotine addiction patient counseled on tobacco cessation advised to try NicoDerm patch or gum to aid in cessation      Mixed hyperlipidemia continue current management follow low-cholesterol diet        Prediabetes avoid all sweets juices and cut down on carbs      Orders Placed This Encounter   • Electrocardiogram 12-Lead     Order Specific Question:   How should test results be released to the patient's [facility] portal?     Answer:   Automatic Release     Order Specific Question:   Will you be reading the ECG?     Answer:   Yes     Order Specific Question:   ECG to be read in:     Answer:   Eliud Aguiar MD  9/9/2023    The 21st Century Cures Act makes medical notes like these available to patients in the interest of transparency.   Please be advised this is a medical document. Medical documents are intended to carry relevant information and the clinical opinion of the practitioner.   The medical note is used as communication between medical providers and may appear blunt or direct.   The medical note is written in medical language and may contain abbreviations or verbiage that are unfamiliar.

## 2023-11-06 RX ORDER — GABAPENTIN 300 MG/1
300 CAPSULE ORAL
COMMUNITY
Start: 2023-08-31 | End: 2023-12-13 | Stop reason: ALTCHOICE

## 2023-11-06 RX ORDER — DULOXETIN HYDROCHLORIDE 60 MG/1
60 CAPSULE, DELAYED RELEASE ORAL DAILY
Status: ON HOLD | COMMUNITY
Start: 2023-08-29 | End: 2024-01-30 | Stop reason: ALTCHOICE

## 2023-11-06 RX ORDER — FUROSEMIDE 40 MG/1
40 TABLET ORAL DAILY
COMMUNITY
Start: 2023-03-03 | End: 2023-12-13 | Stop reason: ALTCHOICE

## 2023-11-06 RX ORDER — METHYLPREDNISOLONE 4 MG/1
TABLET ORAL
COMMUNITY
Start: 2022-12-15 | End: 2023-12-13 | Stop reason: ALTCHOICE

## 2023-11-06 RX ORDER — AMIODARONE HYDROCHLORIDE 200 MG/1
200 TABLET ORAL DAILY
COMMUNITY
Start: 2023-03-08 | End: 2023-12-13 | Stop reason: ALTCHOICE

## 2023-11-06 RX ORDER — FLUTICASONE PROPIONATE AND SALMETEROL 250; 50 UG/1; UG/1
POWDER RESPIRATORY (INHALATION)
COMMUNITY
Start: 2023-07-02

## 2023-11-06 RX ORDER — METFORMIN HYDROCHLORIDE 1000 MG/1
1000 TABLET ORAL
COMMUNITY

## 2023-11-06 RX ORDER — POTASSIUM CHLORIDE 750 MG/1
TABLET, EXTENDED RELEASE ORAL
COMMUNITY
Start: 2023-02-27 | End: 2023-12-13 | Stop reason: ALTCHOICE

## 2023-11-06 RX ORDER — INSULIN ASPART 100 [IU]/ML
INJECTION, SOLUTION INTRAVENOUS; SUBCUTANEOUS
COMMUNITY
Start: 2023-02-15 | End: 2023-12-13 | Stop reason: ALTCHOICE

## 2023-11-06 RX ORDER — AMLODIPINE BESYLATE 5 MG/1
5 TABLET ORAL
COMMUNITY
Start: 2023-10-11

## 2023-11-06 RX ORDER — LISINOPRIL 20 MG/1
20 TABLET ORAL 2 TIMES DAILY
COMMUNITY
End: 2023-12-13 | Stop reason: ALTCHOICE

## 2023-11-06 RX ORDER — CEFUROXIME AXETIL 500 MG/1
500 TABLET ORAL
COMMUNITY
Start: 2023-02-27 | End: 2023-12-13 | Stop reason: ALTCHOICE

## 2023-11-06 RX ORDER — WARFARIN 4 MG/1
TABLET ORAL
COMMUNITY
Start: 2023-03-08 | End: 2023-12-13 | Stop reason: ALTCHOICE

## 2023-11-06 RX ORDER — TRAZODONE HYDROCHLORIDE 50 MG/1
50 TABLET ORAL NIGHTLY
COMMUNITY
Start: 2023-09-24

## 2023-11-06 RX ORDER — NAPROXEN SODIUM 220 MG/1
81 TABLET, FILM COATED ORAL DAILY
COMMUNITY

## 2023-11-06 RX ORDER — ALBUTEROL SULFATE 90 UG/1
1 AEROSOL, METERED RESPIRATORY (INHALATION) EVERY 4 HOURS PRN
COMMUNITY
Start: 2023-01-27

## 2023-11-06 RX ORDER — PEN NEEDLE, DIABETIC 29 G X1/2"
NEEDLE, DISPOSABLE MISCELLANEOUS
COMMUNITY
Start: 2023-08-29

## 2023-11-06 RX ORDER — BLOOD SUGAR DIAGNOSTIC
STRIP MISCELLANEOUS
COMMUNITY
Start: 2023-01-07

## 2023-11-06 RX ORDER — ZOLPIDEM TARTRATE 10 MG/1
10 TABLET ORAL NIGHTLY PRN
COMMUNITY
End: 2024-01-12 | Stop reason: ALTCHOICE

## 2023-11-06 RX ORDER — METOPROLOL TARTRATE 25 MG/1
25 TABLET, FILM COATED ORAL 2 TIMES DAILY
COMMUNITY
Start: 2023-02-09 | End: 2023-12-13 | Stop reason: ALTCHOICE

## 2023-11-06 RX ORDER — METHYLPHENIDATE HYDROCHLORIDE 10 MG/1
TABLET ORAL
COMMUNITY
Start: 2022-11-28 | End: 2023-12-13 | Stop reason: ALTCHOICE

## 2023-11-06 RX ORDER — TOPIRAMATE 25 MG/1
50 TABLET ORAL 2 TIMES DAILY
COMMUNITY
Start: 2023-09-27

## 2023-11-06 RX ORDER — CHLORTHALIDONE 25 MG/1
25 TABLET ORAL DAILY
COMMUNITY
End: 2023-12-13 | Stop reason: ALTCHOICE

## 2023-11-06 RX ORDER — ATORVASTATIN CALCIUM 10 MG/1
10 TABLET, FILM COATED ORAL
COMMUNITY
Start: 2023-07-27

## 2023-11-06 RX ORDER — OXYCODONE AND ACETAMINOPHEN 10; 325 MG/1; MG/1
1 TABLET ORAL EVERY 6 HOURS PRN
COMMUNITY
Start: 2023-03-01 | End: 2023-12-13 | Stop reason: ALTCHOICE

## 2023-11-06 RX ORDER — HUMAN INSULIN 100 [USP'U]/ML
70 INJECTION, SUSPENSION SUBCUTANEOUS 3 TIMES DAILY
COMMUNITY

## 2023-11-06 RX ORDER — TIZANIDINE 4 MG/1
TABLET ORAL
COMMUNITY
Start: 2023-04-04 | End: 2023-12-13 | Stop reason: ALTCHOICE

## 2023-11-06 RX ORDER — OXYCODONE HYDROCHLORIDE 5 MG/1
5 TABLET ORAL EVERY 6 HOURS PRN
COMMUNITY
Start: 2023-02-15 | End: 2023-12-13 | Stop reason: ALTCHOICE

## 2023-11-06 RX ORDER — PANTOPRAZOLE SODIUM 40 MG/1
TABLET, DELAYED RELEASE ORAL
COMMUNITY

## 2023-11-06 RX ORDER — METHOCARBAMOL 750 MG/1
750 TABLET, FILM COATED ORAL 3 TIMES DAILY
COMMUNITY
Start: 2023-02-17 | End: 2023-12-13 | Stop reason: ALTCHOICE

## 2023-11-16 ENCOUNTER — APPOINTMENT (OUTPATIENT)
Dept: CARDIOLOGY | Facility: CLINIC | Age: 68
End: 2023-11-16
Payer: MEDICARE

## 2023-11-28 ENCOUNTER — OFFICE VISIT (OUTPATIENT)
Dept: HEMATOLOGY/ONCOLOGY | Facility: CLINIC | Age: 68
End: 2023-11-28
Payer: MEDICARE

## 2023-11-28 DIAGNOSIS — E78.2 MIXED HYPERLIPIDEMIA: ICD-10-CM

## 2023-11-28 DIAGNOSIS — E11.9 TYPE 2 DIABETES MELLITUS WITHOUT COMPLICATION, WITH LONG-TERM CURRENT USE OF INSULIN (MULTI): ICD-10-CM

## 2023-11-28 DIAGNOSIS — Z79.4 TYPE 2 DIABETES MELLITUS WITHOUT COMPLICATION, WITH LONG-TERM CURRENT USE OF INSULIN (MULTI): ICD-10-CM

## 2023-11-28 DIAGNOSIS — F33.41 RECURRENT MAJOR DEPRESSIVE DISORDER, IN PARTIAL REMISSION (CMS-HCC): ICD-10-CM

## 2023-11-28 DIAGNOSIS — J43.1 PANLOBULAR EMPHYSEMA (MULTI): ICD-10-CM

## 2023-11-28 DIAGNOSIS — C34.12 MALIGNANT NEOPLASM OF UPPER LOBE OF LEFT LUNG (MULTI): Primary | ICD-10-CM

## 2023-11-28 DIAGNOSIS — I48.11 LONGSTANDING PERSISTENT ATRIAL FIBRILLATION (MULTI): ICD-10-CM

## 2023-11-28 DIAGNOSIS — I10 PRIMARY HYPERTENSION: ICD-10-CM

## 2023-11-28 PROCEDURE — 4010F ACE/ARB THERAPY RXD/TAKEN: CPT | Performed by: INTERNAL MEDICINE

## 2023-11-28 PROCEDURE — 99443 PR PHYS/QHP TELEPHONE EVALUATION 21-30 MIN: CPT | Performed by: INTERNAL MEDICINE

## 2023-11-28 NOTE — PATIENT INSTRUCTIONS
I will ask radiology to import your last 3 chest CT scans and the PET scan images so that I can review them and help determine suitability of biopsy

## 2023-11-28 NOTE — PROGRESS NOTES
Patient ID: Tuh Ortega is a 68 y.o. female.  Referring Physician: No referring provider defined for this encounter.  Primary Care Provider: Mini Lopez MD  Visit Type:  Initial Visit     Verbal consent was requested and obtained from patient on this date for a telehealth visit.    Subjective    HPI  I have lung cancer    PAST MEDICAL HISTORY   Diagnosis Date    Brain tumor (HCC)      COPD (chronic obstructive pulmonary disease) (HCC)      DJD (degenerative joint disease)      Ex-smoker       x 30 years; QUIT 2006    GERD (gastroesophageal reflux disease)      HTN (hypertension)      IBS (irritable bowel syndrome)      LBP (low back pain)      Spinal stenosis      Type II or unspecified type diabetes mellitus without mention of complication, uncontrolled      PAST SURGICAL HISTORY   Procedure Laterality Date    BRAIN SURGERY HX         plate  present    COLONOSCOPY SCREENING        KNEE RIGHT OP SURGERY         Childhood injury; Sewing Needle imbedded in Right Knee; Surgical Removal required    LIG/TRNSXJ FLP TUBE ABDL/VAG APPR UNI/BI        PAST SURGICAL HISTORY OF Left       foot surgery     Review of Systems   Constitutional: Negative.    HENT:  Negative.     Eyes: Negative.    Respiratory: Negative.     Cardiovascular: Negative.    Gastrointestinal: Negative.    Endocrine: Negative.    Genitourinary: Negative.     Musculoskeletal: Negative.    Skin: Negative.    Neurological: Negative.    Hematological: Negative.    Psychiatric/Behavioral: Negative.          Objective   BSA: There is no height or weight on file to calculate BSA.  There were no vitals taken for this visit.     has no past medical history on file.   has a past surgical history that includes US guided needle liver biopsy (2/7/2020).  No family history on file.  FAMILY HISTORY    Problem Relation Age of Onset    Diabetes Sister           Three sisters with DM    Diabetes Brother      Ischemic Heart Disease Mother      Stroke Mother      Heart  "Brother           underwent heart transplant for cardiomyopathy    Stroke Sister      Ischemic Heart Disease Sister       Oncology History    No history exists.       Thu Ortega  has no history on file for tobacco use.  She  has no history on file for alcohol use.  She  has no history on file for drug use.    Social History            Tobacco Use    Smoking status: Former       Packs/day: 1.00       Years: 30.00       Pack years: 30.00       Types: Cigarettes       Quit date: 2006       Years since quittin.2    Smokeless tobacco: Never   Vaping Use    Vaping Use: Never used   Substance Use Topics    Alcohol use: No    Drug use: Never       Physical Exam    WBC   Date/Time Value Ref Range Status   2020 09:26 AM 7.7 4.4 - 11.3 x10E9/L Final   2019 01:13 PM 10.7 4.4 - 11.3 x10E9/L Final     nRBC   Date Value Ref Range Status   2020 0.0 0.0 - 0.0 /100 WBC Final     RBC   Date Value Ref Range Status   2020 5.52 (H) 4.00 - 5.20 x10E12/L Final   2019 5.01 4.00 - 5.20 x10E12/L Final     Hemoglobin   Date Value Ref Range Status   2020 15.0 12.0 - 16.0 g/dL Final   2019 13.2 12.0 - 16.0 g/dL Final     Hematocrit   Date Value Ref Range Status   2020 45.7 36.0 - 46.0 % Final   2019 41.9 36.0 - 46.0 % Final     MCV   Date/Time Value Ref Range Status   2020 09:26 AM 83 80 - 100 fL Final   2019 01:13 PM 84 80 - 100 fL Final     No results found for: \"MCH\"  MCHC   Date/Time Value Ref Range Status   2020 09:26 AM 32.8 32.0 - 36.0 g/dL Final   2019 01:13 PM 31.5 (L) 32.0 - 36.0 g/dL Final     RDW   Date/Time Value Ref Range Status   2020 09:26 AM 13.7 11.5 - 14.5 % Final   2019 01:13 PM 13.8 11.5 - 14.5 % Final     Platelets   Date/Time Value Ref Range Status   2020 09:26  150 - 450 x10E9/L Final   2019 01:13  150 - 450 x10E9/L Final     No results found for: \"MPV\"  No results found for: \"NEUTOPHILPCT\"  No " "results found for: \"IGPCT\"  No results found for: \"LYMPHOPCT\"  No results found for: \"MONOPCT\"  No results found for: \"EOSPCT\"  No results found for: \"BASOPCT\"  No results found for: \"NEUTROABS\"  No results found for: \"IGABSOL\"  No results found for: \"LYMPHSABS\"  No results found for: \"MONOSABS\"  No results found for: \"EOSABS\"  No results found for: \"BASOSABS\"    No components found for: \"PT\"  No results found for: \"APTT\"  Medication Documentation Review Audit    **Prior to Admission medications have not yet been reviewed**        Assessment/Plan    1) lung cancer  -12/8/2022 chest CT: NICHOL anterior segment 1.6 x 2.2 cm nodule, partial pleural attachment, nonspecific low volume paratracheal mediastinal LN largest near AP window 1.0 x 1.4 cm, right subcarinal space 8 x 14 mm  -12/12/2022 PET: NICHOL 1.8 x 2.3 cm mass with SUV 5.8, lateral margin abuts pleura; no significant mediastinal or hilar hypermetabolic lymphadenopathy  -12/28/2022 chest CT: 2.5 cm cavitary nodule in NICHOL  -1/7/2023 PET scan  -had surgery for stage I NSCLC, s/p lobectomy (Dr Fagan, 1/31/2023)  -2/1/2023 CT chest: no PE, postop changes in left chest, small left PTX in left upper anterior chest  -2/26/2023 chest CT no PE, continued mildly enlarged mediastinal lymph nodes  -saw Dr Solis at [facility] on 3/13/2023 - she had a H4qO5N2 (stage Ib) lung adenocarcinoma (poor NCCN risk factors - G3, + visceral involvement), s/p left robotic assisted anatomic upper lobectomy, PD-L1 90%, +[id number]  -per his charting, he recommended either adjuvant cisplatin + pemetrexed x 4 cycles vs surveillance  -according to  Thu, Dr Solis never told her about her high risk features nor any adjuvant option, and that the only thing he recommended was observation  -7/11/2023 CT chest : stable postsurgical changes of prior left thoracotomy and left upper lobectomy with interval resolution of bilateral pleural effusions; interval progression of lymphadenopathy in the chest concerning for " progressing ghazala metastatic disease    7/25/2023 underwent EBUS: A - EBUS TRANSBRONCHIAL FINE NEEDLE ASPIRATE, LYMPH NODE  - 4L             Negative for malignant cells.             Benign lymphoid sample (see comment).   B - EBUS TRANSBRONCHIAL FINE NEEDLE ASPIRATE, LYMPH NODE  - 10L             Negative for malignant cells.                   Benign lymphoid sample.  C - EBUS TRANSBRONCHIAL FINE NEEDLE ASPIRATE, LYMPH NODE  - STATION 7             Negative for malignant cells.                 Benign lymphoid sample.   -8/9/2023 PET scan: 3.0 x 2.1 cm left prevascular node with SUV 7.9; few additional prominent mediastinal nodes with low level uptake; left lower paratracheal node 0.8 cm with SUV 2.2; mild FDG uptake in left hilum SUV 3.1  -11/14/2023 chest CT: enlarged 2.2 cm prevascular lymph node not significantly changed  -she was told by her pulmonologist Dr Kang that she has cancer  -discussed her original path with her--while it was a small tumor and stage Ib, she did have a couple high risk features that would have warranted adjuvant chemotherapy followed by atezolizumab; also if she does have a recurrence that isn't amenable to surgery, she would also be a candidate for immunotherapy then KRAS inhibitor (FDA approved only in 2nd line setting)  -will discuss with thoracic surgeon--will import all [facility] films to review; may need CT guided bx by IR of this prevascular node      2) COPD  -on albuterol  -on incruse ellipta    3) atrial fibrillation  -on amiodarone  -on warfarin    4) hypertension  -on norvasc  -on chlorthalidone  -on lasix  -on lisinopril  -on metoprolol    5) hyperlipidemia  -on atorvastatin    6) major depression  -on cymbalta    7) diabetes  -on novolog insulin  -on metformin       Problem List Items Addressed This Visit    None           Álvaro Galeano MD

## 2023-12-03 PROBLEM — J43.1 PANLOBULAR EMPHYSEMA (MULTI): Status: ACTIVE | Noted: 2023-12-03

## 2023-12-03 PROBLEM — C34.12 MALIGNANT NEOPLASM OF UPPER LOBE OF LEFT LUNG (MULTI): Status: ACTIVE | Noted: 2023-12-03

## 2023-12-03 PROBLEM — F33.41 RECURRENT MAJOR DEPRESSIVE DISORDER, IN PARTIAL REMISSION (CMS-HCC): Status: ACTIVE | Noted: 2023-12-03

## 2023-12-03 PROBLEM — I10 PRIMARY HYPERTENSION: Status: ACTIVE | Noted: 2023-12-03

## 2023-12-03 PROBLEM — I48.11 LONGSTANDING PERSISTENT ATRIAL FIBRILLATION (MULTI): Status: ACTIVE | Noted: 2023-12-03

## 2023-12-03 PROBLEM — E78.2 MIXED HYPERLIPIDEMIA: Status: ACTIVE | Noted: 2023-12-03

## 2023-12-03 PROBLEM — Z79.4 TYPE 2 DIABETES MELLITUS WITHOUT COMPLICATION, WITH LONG-TERM CURRENT USE OF INSULIN (MULTI): Status: ACTIVE | Noted: 2023-12-03

## 2023-12-03 PROBLEM — E11.9 TYPE 2 DIABETES MELLITUS WITHOUT COMPLICATION, WITH LONG-TERM CURRENT USE OF INSULIN (MULTI): Status: ACTIVE | Noted: 2023-12-03

## 2023-12-03 ASSESSMENT — ENCOUNTER SYMPTOMS
CONSTITUTIONAL NEGATIVE: 1
GASTROINTESTINAL NEGATIVE: 1
MUSCULOSKELETAL NEGATIVE: 1
ENDOCRINE NEGATIVE: 1
NEUROLOGICAL NEGATIVE: 1
HEMATOLOGIC/LYMPHATIC NEGATIVE: 1
EYES NEGATIVE: 1
CARDIOVASCULAR NEGATIVE: 1
RESPIRATORY NEGATIVE: 1
PSYCHIATRIC NEGATIVE: 1

## 2023-12-04 DIAGNOSIS — C34.12 MALIGNANT NEOPLASM OF UPPER LOBE OF LEFT LUNG (MULTI): Primary | ICD-10-CM

## 2023-12-13 ENCOUNTER — HOSPITAL ENCOUNTER (OUTPATIENT)
Dept: RADIOLOGY | Facility: HOSPITAL | Age: 68
Discharge: HOME | End: 2023-12-13
Payer: MEDICARE

## 2023-12-13 VITALS
HEART RATE: 75 BPM | SYSTOLIC BLOOD PRESSURE: 136 MMHG | OXYGEN SATURATION: 97 % | HEIGHT: 60 IN | BODY MASS INDEX: 28.86 KG/M2 | TEMPERATURE: 98.2 F | RESPIRATION RATE: 16 BRPM | WEIGHT: 147 LBS | DIASTOLIC BLOOD PRESSURE: 68 MMHG

## 2023-12-13 DIAGNOSIS — C34.12 MALIGNANT NEOPLASM OF UPPER LOBE OF LEFT LUNG (MULTI): ICD-10-CM

## 2023-12-13 LAB
ERYTHROCYTE [DISTWIDTH] IN BLOOD BY AUTOMATED COUNT: 15.2 % (ref 11.5–14.5)
HCT VFR BLD AUTO: 36.7 % (ref 36–46)
HGB BLD-MCNC: 11.2 G/DL (ref 12–16)
INR PPP: 1 (ref 0.9–1.1)
MCH RBC QN AUTO: 24.2 PG (ref 26–34)
MCHC RBC AUTO-ENTMCNC: 30.5 G/DL (ref 32–36)
MCV RBC AUTO: 79 FL (ref 80–100)
NRBC BLD-RTO: 0 /100 WBCS (ref 0–0)
PLATELET # BLD AUTO: 259 X10*3/UL (ref 150–450)
PROTHROMBIN TIME: 11.1 SECONDS (ref 9.8–12.8)
RBC # BLD AUTO: 4.62 X10*6/UL (ref 4–5.2)
WBC # BLD AUTO: 7.6 X10*3/UL (ref 4.4–11.3)

## 2023-12-13 PROCEDURE — 99152 MOD SED SAME PHYS/QHP 5/>YRS: CPT

## 2023-12-13 PROCEDURE — 2500000004 HC RX 250 GENERAL PHARMACY W/ HCPCS (ALT 636 FOR OP/ED): Performed by: RADIOLOGY

## 2023-12-13 PROCEDURE — 88305 TISSUE EXAM BY PATHOLOGIST: CPT | Performed by: STUDENT IN AN ORGANIZED HEALTH CARE EDUCATION/TRAINING PROGRAM

## 2023-12-13 PROCEDURE — 99152 MOD SED SAME PHYS/QHP 5/>YRS: CPT | Performed by: RADIOLOGY

## 2023-12-13 PROCEDURE — 88342 IMHCHEM/IMCYTCHM 1ST ANTB: CPT | Performed by: STUDENT IN AN ORGANIZED HEALTH CARE EDUCATION/TRAINING PROGRAM

## 2023-12-13 PROCEDURE — 99153 MOD SED SAME PHYS/QHP EA: CPT

## 2023-12-13 PROCEDURE — 85027 COMPLETE CBC AUTOMATED: CPT | Performed by: RADIOLOGY

## 2023-12-13 PROCEDURE — 7100000001 HC RECOVERY ROOM TIME - INITIAL BASE CHARGE

## 2023-12-13 PROCEDURE — 88360 TUMOR IMMUNOHISTOCHEM/MANUAL: CPT | Performed by: STUDENT IN AN ORGANIZED HEALTH CARE EDUCATION/TRAINING PROGRAM

## 2023-12-13 PROCEDURE — 88305 TISSUE EXAM BY PATHOLOGIST: CPT | Mod: TC,STJLAB | Performed by: INTERNAL MEDICINE

## 2023-12-13 PROCEDURE — 99153 MOD SED SAME PHYS/QHP EA: CPT | Performed by: RADIOLOGY

## 2023-12-13 PROCEDURE — 88341 IMHCHEM/IMCYTCHM EA ADD ANTB: CPT | Performed by: STUDENT IN AN ORGANIZED HEALTH CARE EDUCATION/TRAINING PROGRAM

## 2023-12-13 PROCEDURE — 7100000002 HC RECOVERY ROOM TIME - EACH INCREMENTAL 1 MINUTE

## 2023-12-13 PROCEDURE — 36415 COLL VENOUS BLD VENIPUNCTURE: CPT | Performed by: RADIOLOGY

## 2023-12-13 PROCEDURE — 32408 CORE NDL BX LNG/MED PERQ: CPT

## 2023-12-13 PROCEDURE — 2700028

## 2023-12-13 PROCEDURE — 88381 MICRODISSECTION MANUAL: CPT | Performed by: STUDENT IN AN ORGANIZED HEALTH CARE EDUCATION/TRAINING PROGRAM

## 2023-12-13 PROCEDURE — 32408 CORE NDL BX LNG/MED PERQ: CPT | Performed by: RADIOLOGY

## 2023-12-13 PROCEDURE — 81445 SO NEO GSAP 5-50DNA/DNA&RNA: CPT | Performed by: INTERNAL MEDICINE

## 2023-12-13 PROCEDURE — 85610 PROTHROMBIN TIME: CPT | Performed by: RADIOLOGY

## 2023-12-13 PROCEDURE — G0452 MOLECULAR PATHOLOGY INTERPR: HCPCS | Performed by: INTERNAL MEDICINE

## 2023-12-13 RX ORDER — FENTANYL CITRATE 50 UG/ML
INJECTION, SOLUTION INTRAMUSCULAR; INTRAVENOUS
Status: COMPLETED | OUTPATIENT
Start: 2023-12-13 | End: 2023-12-13

## 2023-12-13 RX ORDER — MIDAZOLAM HYDROCHLORIDE 1 MG/ML
INJECTION, SOLUTION INTRAMUSCULAR; INTRAVENOUS
Status: COMPLETED | OUTPATIENT
Start: 2023-12-13 | End: 2023-12-13

## 2023-12-13 RX ADMIN — FENTANYL CITRATE 50 MCG: 50 INJECTION, SOLUTION INTRAMUSCULAR; INTRAVENOUS at 08:30

## 2023-12-13 RX ADMIN — MIDAZOLAM 0.5 MG: 1 INJECTION INTRAMUSCULAR; INTRAVENOUS at 08:43

## 2023-12-13 RX ADMIN — FENTANYL CITRATE 50 MCG: 50 INJECTION, SOLUTION INTRAMUSCULAR; INTRAVENOUS at 08:43

## 2023-12-13 RX ADMIN — MIDAZOLAM 2 MG: 1 INJECTION INTRAMUSCULAR; INTRAVENOUS at 08:30

## 2023-12-13 ASSESSMENT — PAIN SCALES - GENERAL
PAINLEVEL_OUTOF10: 0 - NO PAIN

## 2023-12-13 ASSESSMENT — PAIN - FUNCTIONAL ASSESSMENT
PAIN_FUNCTIONAL_ASSESSMENT: 0-10
PAIN_FUNCTIONAL_ASSESSMENT: 0-10

## 2023-12-13 NOTE — POST-PROCEDURE NOTE
Interventional Radiology Brief Postprocedure Note    Attending: Layo Garcia MD      Assistant: Dr. Muhammad    Diagnosis: Anterior mediastinal mass    Description of procedure: CT guided 18G core biopsy of an anterior mediastinal  mass. Three 18G, 1.3 cm cores obtained.     Anesthesia:  Local    Complications: None    Estimated Blood Loss: minimal    Medications (Filter: Administrations occurring from 0808 to 0855 on 12/13/23) As of 12/13/23 0855      midazolam (Versed) injection (mg) Total dose:  2.5 mg      Date/Time Rate/Dose/Volume Action       12/13/23  0830 2 mg Given      0843 0.5 mg Given               fentaNYL PF (Sublimaze) injection (mcg) Total dose:  100 mcg      Date/Time Rate/Dose/Volume Action       12/13/23  0830 50 mcg Given      0843 50 mcg Given                   ID Type Source Tests Collected by Time   1 : Anterior Mediastinal Mass Tissue MEDIASTINUM MASS SURGICAL PATHOLOGY EXAM Layo Garcia MD 12/13/2023 0845         See detailed result report with images in PACS.    The patient tolerated the procedure well without incident or complication and is in stable condition.

## 2023-12-13 NOTE — Clinical Note
Anterior Left side mediastinal mass biopsy completed. Three core specimen obtained and sent to lab for surgical pathology. Left side of anterior upper chest dressed with 4x4 and tegaderm.

## 2023-12-13 NOTE — PRE-PROCEDURE NOTE
Interventional Radiology Preprocedure Note    Indication for procedure: The encounter diagnosis was Malignant neoplasm of upper lobe of left lung (CMS/HCC).    Relevant review of systems: NA    Relevant Labs:   Lab Results   Component Value Date    INR 1.0 12/13/2023    PROTIME 11.1 12/13/2023       Planned Sedation/Anesthesia: Moderate    Airway assessment: normal    Directed physical examination:    RRR, Lungs CTA-B    Mallampati: III (soft and hard palate and base of uvula visible)    ASA Score: ASA 2 - Patient with mild systemic disease with no functional limitations    Benefits, risks and alternatives of procedure and planned sedation have been discussed with the patient and/or their representative. All questions answered and they agree to proceed.

## 2023-12-13 NOTE — DISCHARGE INSTRUCTIONS
DO NOT drink alcohol for 24 hrs.  DO NOT drive for 24 hrs.  DO NOT use power equipment for 24 hrs.  DO NOT make legal decisions for 24 hrs.    CALL YOU PHYSICIAN IMMEDIATELY FOR:  Wound edges that are gaping.  Wound that is red, swollen, painful or with foul smelling drainage.  Excessive bright bleeding.  Increased pain not controlled by medication.  Chills or fever > 100 degrees F.

## 2023-12-21 ENCOUNTER — TELEPHONE (OUTPATIENT)
Dept: HEMATOLOGY/ONCOLOGY | Facility: CLINIC | Age: 68
End: 2023-12-21
Payer: MEDICARE

## 2023-12-22 LAB
LAB AP ASR DISCLAIMER: NORMAL
LABORATORY COMMENT REPORT: NORMAL
PATH REPORT.COMMENTS IMP SPEC: NORMAL
PATH REPORT.FINAL DX SPEC: NORMAL
PATH REPORT.GROSS SPEC: NORMAL
PATH REPORT.TOTAL CANCER: NORMAL

## 2023-12-26 DIAGNOSIS — C34.12 MALIGNANT NEOPLASM OF UPPER LOBE OF LEFT LUNG (MULTI): Primary | ICD-10-CM

## 2023-12-28 ENCOUNTER — OFFICE VISIT (OUTPATIENT)
Dept: SURGERY | Facility: CLINIC | Age: 68
End: 2023-12-28
Payer: MEDICARE

## 2023-12-28 VITALS
WEIGHT: 149 LBS | RESPIRATION RATE: 16 BRPM | BODY MASS INDEX: 29.25 KG/M2 | DIASTOLIC BLOOD PRESSURE: 71 MMHG | TEMPERATURE: 96.8 F | OXYGEN SATURATION: 95 % | HEIGHT: 60 IN | SYSTOLIC BLOOD PRESSURE: 153 MMHG | HEART RATE: 84 BPM

## 2023-12-28 DIAGNOSIS — C34.12 MALIGNANT NEOPLASM OF UPPER LOBE OF LEFT LUNG (MULTI): Primary | ICD-10-CM

## 2023-12-28 DIAGNOSIS — E11.65 UNCONTROLLED TYPE 2 DIABETES MELLITUS WITH HYPERGLYCEMIA (HCC): ICD-10-CM

## 2023-12-28 LAB
ELECTRONICALLY SIGNED BY: NORMAL
FOCUSED SOLID TUMOR DNA/RNA RESULTS: NORMAL

## 2023-12-28 PROCEDURE — 1160F RVW MEDS BY RX/DR IN RCRD: CPT | Performed by: STUDENT IN AN ORGANIZED HEALTH CARE EDUCATION/TRAINING PROGRAM

## 2023-12-28 PROCEDURE — 1159F MED LIST DOCD IN RCRD: CPT | Performed by: STUDENT IN AN ORGANIZED HEALTH CARE EDUCATION/TRAINING PROGRAM

## 2023-12-28 PROCEDURE — 99215 OFFICE O/P EST HI 40 MIN: CPT | Mod: 57 | Performed by: STUDENT IN AN ORGANIZED HEALTH CARE EDUCATION/TRAINING PROGRAM

## 2023-12-28 PROCEDURE — 99205 OFFICE O/P NEW HI 60 MIN: CPT | Performed by: STUDENT IN AN ORGANIZED HEALTH CARE EDUCATION/TRAINING PROGRAM

## 2023-12-28 PROCEDURE — 3077F SYST BP >= 140 MM HG: CPT | Performed by: STUDENT IN AN ORGANIZED HEALTH CARE EDUCATION/TRAINING PROGRAM

## 2023-12-28 PROCEDURE — 3078F DIAST BP <80 MM HG: CPT | Performed by: STUDENT IN AN ORGANIZED HEALTH CARE EDUCATION/TRAINING PROGRAM

## 2023-12-28 PROCEDURE — 1126F AMNT PAIN NOTED NONE PRSNT: CPT | Performed by: STUDENT IN AN ORGANIZED HEALTH CARE EDUCATION/TRAINING PROGRAM

## 2023-12-28 PROCEDURE — 1036F TOBACCO NON-USER: CPT | Performed by: STUDENT IN AN ORGANIZED HEALTH CARE EDUCATION/TRAINING PROGRAM

## 2023-12-28 NOTE — LETTER
December 28, 2023     Álvaro Galeano MD  [address]  [facility] [address]    Patient: Thu Ortega   YOB: 1955   Date of Visit: 12/28/2023       Dear Dr. Álvaro Galeano MD:    Thank you for referring Thu Ortega to me for evaluation. Below are my notes for this consultation.  If you have questions, please do not hesitate to call me. I look forward to following your patient along with you.       Sincerely,     Reinaldo Calixto MD      CC: No Recipients  ______________________________________________________________________________________    HPI:   Thu Ortega is a 68 y.o. female with a pmhx of Q4wQ5N6 adenocarcinoma of NICHOL s/p robotic assisted NICHOL lobectomy on 1/31/23, COPD, afib on warfarin, DM2, and meningioma of brain s/p resection who is referred to me by Dr Galeano for evaluation and treatment of recurrent poorly differentiated carcinoma. Of note, after her lung surgery at [facility], her hospital course was complicated by afib and aspiration PNA. She said she never fully recovered from the surgery. She still has some pain at surgical site and still feel short of breath sometimes with exercise. She has about 20lb weight loss since surgery. She denied MI or stroke.     PMHx: per HPI  PSHx: robotic NICHOL 1/31/2023, sherrell surgery in 2013  SHx: former smoker (30ppy quite 18 year ago), deny ETOH, or illicit drugs   FMHx: negative for history of cancer and cardiac disease    Current Outpatient Medications:   •  Accu-Chek Guide test strips strip, TEST 3X DAILY DX E11.65, Disp: , Rfl:   •  albuterol 90 mcg/actuation inhaler, Inhale 1 puff every 4 hours if needed., Disp: , Rfl:   •  amLODIPine (Norvasc) 5 mg tablet, Take 1 tablet (5 mg) by mouth once daily in the morning. Take before meals., Disp: , Rfl:   •  aspirin 81 mg chewable tablet, Chew 1 tablet (81 mg) 1 time., Disp: , Rfl:   •  atorvastatin (Lipitor) 10 mg tablet, Take 1 tablet (10 mg) by mouth once daily in the morning. Take before  "meals., Disp: , Rfl:   •  BD Insulin Syringe Ultra-Fine 1 mL 31 gauge x 5/16 syringe, USE AS DIRECTED, Disp: , Rfl:   •  DULoxetine (Cymbalta) 60 mg DR capsule, Take 1 capsule (60 mg) by mouth once daily., Disp: , Rfl:   •  metFORMIN (Glucophage) 1,000 mg tablet, Take 1 tablet (1,000 mg) by mouth 2 times a day with meals. Take with food., Disp: , Rfl:   •  NovoLIN 70/30 U-100 Insulin 100 unit/mL (70-30) injection, 70 Units 3 times a day. 70 units at breakfast, 55 units at lunch, and 60 units at dinner, Disp: , Rfl:   •  pantoprazole (ProtoNix) 40 mg EC tablet, TAKE 1 TABLET (40 MG) BY MOUTH IN THE MORNING. TAKE BEFORE MEALS. DO NOT CRUSH, CHEW, OR SPLIT.., Disp: , Rfl:   •  topiramate (Topamax) 25 mg tablet, Take 2 tablets (50 mg) by mouth 2 times a day., Disp: , Rfl:   •  traZODone (Desyrel) 50 mg tablet, Take 1 tablet (50 mg) by mouth once daily at bedtime., Disp: , Rfl:   •  Wixela Inhub 250-50 mcg/dose diskus inhaler, USE 1 PUFF TWICE A DAY, Disp: , Rfl:   •  zolpidem (Ambien) 10 mg tablet, Take 1 tablet (10 mg) by mouth as needed at bedtime., Disp: , Rfl:    Allergies   Allergen Reactions   • Penicillin Hives and Unknown      No lab exists for component: \"<CBC>\", \"<CMP>\", \"<INR>\"       ROS  General: negative for fever, chills, weight loss, night sweat  Head: negative for severe headache, vision change, blurred vision,   CV: negative for chest pain, dizziness, lightheadedness   Pulm: negative for shortness of breath, dyspnea on exertion, hemoptysis  GI: negative for diarrhea, constipation, abdominal pain, nausea or vomiting, BRBPR  : negative for dysuria, hematuria, incontinence  Skin: negative for rash  Heme: negative for blood thinner, bleeding disorder or clotting disorder  Endo: negative for heat or cold intolerance, weight gain or weight loss  MSK: negative for rash, edema, weakness    PHYSICAL EXAM  Constitution: well-developed well-nourished female sitting in chair in no acute distress  HEENT: HAMZAH, " moist mucosal membrane, neck supple, no crepitus, sclera anicteric  Lymph nodes: no cervical or supraclavicular lymphadenopathy  Cardiac: RRR, normal S1, S2, no mrg  Pulmonary: normal air movement, CTAP, no wcr  Abdomen: soft, non-distended, non-tender, no rigidity, guarding or rebound tenderness, no splenohepatomegaly  Neuro: AOx3, CNII-XII grossly normal  Ext: warm, dry, no edema noted  Skin: dry, clean and intact  Psych: mood and affect wnl    Assessment and Plan:  This is a 68 y.o. female former smoker with likely recurrent lung cancer.  I reviewed the CT scan from 11/15/23, 7/12/23 and 12/28/22 and the PET/CT from 8/9/23. It showed left upper lobectomy of known lung cancer.  There is a new periaortic mediastinal mass that is the metabolic highly active SUV 7.9.  She will has multiple small right and left nodules which are stable comparing to her preop CAT scan.    I reviewed CT bx of the mediastinal mass from Dr Garcia. It showed poorly differentiated carcinoma. PDL1 90%. Pending NGS.  I reviewed the ECHO from 1/17/23. It showed EF of 65%, RV function is moderately decreased.  I reviewed EBUS from 7/25/23. It showed 4L, 10L and 7 LN station are sampled which are negative for cancer  I reviewed Dr Fagan's operative report and pathology report from 1/31/23. It showed 2.9cm poorly differentiated adenocarcinoma with visceral pleural invasion. All margin negative. LN 5,7,9,11L and 12L were excised which were negative for tumor. Final path pT2aN0 PDL1 90% positive KRAS G12C  I reviewed the labs from 12/13/23. It showed anemia with hgb of 11.2 and INR of 1.    In my opinion, she mostly have recurrent lung cancer at left mediastinum.  Pending NGS to confirm.  I recommended resection of the recurrence if confirmed.  I would anticipate scarring in the left chest from prior surgery.  I will attempt to be doing this with minimally invasive approach.  There is a chance her phrenic nerve is involved.  If phrenic nerve need  to be sacrificed I will place diaphragm pacer at time of surgery.  I will obtain a baseline PFT.    I had a candid discussion with the patient and her . I discussed the risk of the surgery including bleeding, infection, airleak and postop pain. The alternative is SBRT. The patient consented to proceed with surgery.     Reinaldo Calixto MD  Thoracic Surgeon  [facility]   of Medicine  [facility]  Office phone: [phone]  Fax: [phone]  Pager: [phone]

## 2023-12-28 NOTE — PROGRESS NOTES
HPI:   Thu Ortega is a 68 y.o. female with a pmhx of [id number] adenocarcinoma of NICHOL s/p robotic assisted NICHOL lobectomy on 1/31/23, COPD, afib on warfarin, DM2, and meningioma of brain s/p resection who is referred to me by Dr Galeano for evaluation and treatment of recurrent poorly differentiated carcinoma. Of note, after her lung surgery at [facility], her hospital course was complicated by afib and aspiration PNA. She said she never fully recovered from the surgery. She still has some pain at surgical site and still feel short of breath sometimes with exercise. She has about 20lb weight loss since surgery. She denied MI or stroke.     PMHx: per HPI  PSHx: robotic NICHOL 1/31/2023, sherrell surgery in 2013  SHx: former smoker (30ppy quite 18 year ago), deny ETOH, or illicit drugs   FMHx: negative for history of cancer and cardiac disease    Current Outpatient Medications:     Accu-Chek Guide test strips strip, TEST 3X DAILY DX E11.65, Disp: , Rfl:     albuterol 90 mcg/actuation inhaler, Inhale 1 puff every 4 hours if needed., Disp: , Rfl:     amLODIPine (Norvasc) 5 mg tablet, Take 1 tablet (5 mg) by mouth once daily in the morning. Take before meals., Disp: , Rfl:     aspirin 81 mg chewable tablet, Chew 1 tablet (81 mg) 1 time., Disp: , Rfl:     atorvastatin (Lipitor) 10 mg tablet, Take 1 tablet (10 mg) by mouth once daily in the morning. Take before meals., Disp: , Rfl:     BD Insulin Syringe Ultra-Fine 1 mL 31 gauge x 5/16 syringe, USE AS DIRECTED, Disp: , Rfl:     DULoxetine (Cymbalta) 60 mg DR capsule, Take 1 capsule (60 mg) by mouth once daily., Disp: , Rfl:     metFORMIN (Glucophage) 1,000 mg tablet, Take 1 tablet (1,000 mg) by mouth 2 times a day with meals. Take with food., Disp: , Rfl:     NovoLIN 70/30 U-100 Insulin 100 unit/mL (70-30) injection, 70 Units 3 times a day. 70 units at breakfast, 55 units at lunch, and 60 units at dinner, Disp: , Rfl:     pantoprazole (ProtoNix) 40 mg EC tablet, TAKE 1 TABLET (40 MG) BY  "MOUTH IN THE MORNING. TAKE BEFORE MEALS. DO NOT CRUSH, CHEW, OR SPLIT.., Disp: , Rfl:     topiramate (Topamax) 25 mg tablet, Take 2 tablets (50 mg) by mouth 2 times a day., Disp: , Rfl:     traZODone (Desyrel) 50 mg tablet, Take 1 tablet (50 mg) by mouth once daily at bedtime., Disp: , Rfl:     Wixela Inhub 250-50 mcg/dose diskus inhaler, USE 1 PUFF TWICE A DAY, Disp: , Rfl:     zolpidem (Ambien) 10 mg tablet, Take 1 tablet (10 mg) by mouth as needed at bedtime., Disp: , Rfl:    Allergies   Allergen Reactions    Penicillin Hives and Unknown      No lab exists for component: \"<CBC>\", \"<CMP>\", \"<INR>\"       ROS  General: negative for fever, chills, weight loss, night sweat  Head: negative for severe headache, vision change, blurred vision,   CV: negative for chest pain, dizziness, lightheadedness   Pulm: negative for shortness of breath, dyspnea on exertion, hemoptysis  GI: negative for diarrhea, constipation, abdominal pain, nausea or vomiting, BRBPR  : negative for dysuria, hematuria, incontinence  Skin: negative for rash  Heme: negative for blood thinner, bleeding disorder or clotting disorder  Endo: negative for heat or cold intolerance, weight gain or weight loss  MSK: negative for rash, edema, weakness    PHYSICAL EXAM  Constitution: well-developed well-nourished female sitting in chair in no acute distress  HEENT: NCAT, moist mucosal membrane, neck supple, no crepitus, sclera anicteric  Lymph nodes: no cervical or supraclavicular lymphadenopathy  Cardiac: RRR, normal S1, S2, no mrg  Pulmonary: normal air movement, CTAP, no wcr  Abdomen: soft, non-distended, non-tender, no rigidity, guarding or rebound tenderness, no splenohepatomegaly  Neuro: AOx3, CNII-XII grossly normal  Ext: warm, dry, no edema noted  Skin: dry, clean and intact  Psych: mood and affect wnl    Assessment and Plan:  This is a 68 y.o. female former smoker with likely recurrent lung cancer.  I reviewed the CT scan from 11/15/23, 7/12/23 and " 12/28/22 and the PET/CT from 8/9/23. It showed left upper lobectomy of known lung cancer.  There is a new periaortic mediastinal mass that is the metabolic highly active SUV 7.9.  She will has multiple small right and left nodules which are stable comparing to her preop CAT scan.    I reviewed CT bx of the mediastinal mass from Dr Garcia. It showed poorly differentiated carcinoma. PDL1 90%. Pending NGS.  I reviewed the ECHO from 1/17/23. It showed EF of 65%, RV function is moderately decreased.  I reviewed EBUS from 7/25/23. It showed 4L, 10L and 7 LN station are sampled which are negative for cancer  I reviewed Dr Fagan's operative report and pathology report from 1/31/23. It showed 2.9cm poorly differentiated adenocarcinoma with visceral pleural invasion. All margin negative. LN 5,7,9,11L and 12L were excised which were negative for tumor. Final path pT2aN0 PDL1 90% positive KRAS G12C  I reviewed the labs from 12/13/23. It showed anemia with hgb of 11.2 and INR of 1.    In my opinion, she mostly have recurrent lung cancer at left mediastinum.  Pending NGS to confirm.  I recommended resection of the recurrence if confirmed.  I would anticipate scarring in the left chest from prior surgery.  I will attempt to be doing this with minimally invasive approach.  There is a chance her phrenic nerve is involved.  If phrenic nerve need to be sacrificed I will place diaphragm pacer at time of surgery.  I will obtain a baseline PFT.    I had a candid discussion with the patient and her . I discussed the risk of the surgery including bleeding, infection, airleak and postop pain. The alternative is SBRT. The patient consented to proceed with surgery.     Reinaldo Calixto MD  Thoracic Surgeon  [facility]   of Medicine  [facility]  Office phone: [phone]  Fax: [phone]  Pager: [phone]

## 2023-12-28 NOTE — TELEPHONE ENCOUNTER
Pharmacy requesting medication refill.  Please approve or deny this request.    Rx requested:  Requested Prescriptions     Pending Prescriptions Disp Refills    insulin 70-30 (NOVOLIN 70/30) (70-30) 100 UNIT per ML injection vial 150 mL 3     Sig: INJECT 70 UNITS AM 30 UNITS LUNCH AND 60-70 UNITS DINNER         Last Office Visit:   8/7/2023      Next Visit Date:  Future Appointments   Date Time Provider [address]   2/5/2024 10:00 AM Brianna Childers MD [facility]

## 2024-01-02 ENCOUNTER — OFFICE VISIT (OUTPATIENT)
Dept: HEMATOLOGY/ONCOLOGY | Facility: CLINIC | Age: 69
End: 2024-01-02
Payer: MEDICARE

## 2024-01-02 VITALS
TEMPERATURE: 97.3 F | BODY MASS INDEX: 29.55 KG/M2 | WEIGHT: 148.81 LBS | DIASTOLIC BLOOD PRESSURE: 67 MMHG | SYSTOLIC BLOOD PRESSURE: 132 MMHG | OXYGEN SATURATION: 94 % | RESPIRATION RATE: 18 BRPM | HEART RATE: 76 BPM

## 2024-01-02 DIAGNOSIS — E11.9 TYPE 2 DIABETES MELLITUS WITHOUT COMPLICATION, WITH LONG-TERM CURRENT USE OF INSULIN (MULTI): ICD-10-CM

## 2024-01-02 DIAGNOSIS — I48.0 PAF (PAROXYSMAL ATRIAL FIBRILLATION) (MULTI): ICD-10-CM

## 2024-01-02 DIAGNOSIS — Z79.4 TYPE 2 DIABETES MELLITUS WITHOUT COMPLICATION, WITH LONG-TERM CURRENT USE OF INSULIN (MULTI): ICD-10-CM

## 2024-01-02 DIAGNOSIS — C34.12 MALIGNANT NEOPLASM OF UPPER LOBE OF LEFT LUNG (MULTI): Primary | ICD-10-CM

## 2024-01-02 DIAGNOSIS — F33.41 RECURRENT MAJOR DEPRESSIVE DISORDER, IN PARTIAL REMISSION (CMS-HCC): ICD-10-CM

## 2024-01-02 DIAGNOSIS — E78.2 MIXED HYPERLIPIDEMIA: ICD-10-CM

## 2024-01-02 DIAGNOSIS — J43.1 PANLOBULAR EMPHYSEMA (MULTI): ICD-10-CM

## 2024-01-02 DIAGNOSIS — I10 PRIMARY HYPERTENSION: ICD-10-CM

## 2024-01-02 PROCEDURE — 3078F DIAST BP <80 MM HG: CPT | Performed by: INTERNAL MEDICINE

## 2024-01-02 PROCEDURE — 99214 OFFICE O/P EST MOD 30 MIN: CPT | Performed by: INTERNAL MEDICINE

## 2024-01-02 PROCEDURE — 1036F TOBACCO NON-USER: CPT | Performed by: INTERNAL MEDICINE

## 2024-01-02 PROCEDURE — 1159F MED LIST DOCD IN RCRD: CPT | Performed by: INTERNAL MEDICINE

## 2024-01-02 PROCEDURE — 1125F AMNT PAIN NOTED PAIN PRSNT: CPT | Performed by: INTERNAL MEDICINE

## 2024-01-02 PROCEDURE — 3075F SYST BP GE 130 - 139MM HG: CPT | Performed by: INTERNAL MEDICINE

## 2024-01-02 ASSESSMENT — PAIN SCALES - GENERAL: PAINLEVEL: 5

## 2024-01-02 NOTE — PROGRESS NOTES
Patient ID: Thu Ortega is a 68 y.o. female.  Referring Physician: No referring provider defined for this encounter.  Primary Care Provider: DEANNA Rodriguez  Visit Type: Follow Up      Subjective    HPI  I am waiting for appointment for pulmonary function tests    Review of Systems   Constitutional: Negative.    HENT:  Negative.     Eyes: Negative.    Respiratory: Negative.     Cardiovascular: Negative.    Gastrointestinal: Negative.    Endocrine: Negative.    Genitourinary: Negative.     Musculoskeletal: Negative.    Skin: Negative.    Neurological: Negative.    Hematological: Negative.    Psychiatric/Behavioral: Negative.          Objective   BSA: 1.68 meters squared  /67 (BP Location: Left arm)   Pulse 76   Temp 36.3 °C (97.3 °F) (Temporal)   Resp 18   Wt 67.5 kg (148 lb 13 oz)   SpO2 94%   BMI 29.55 kg/m²      has a past medical history of Atrial fibrillation (CMS/HCC), COPD (chronic obstructive pulmonary disease) (CMS/HCC), Depression, DM (diabetes mellitus) (CMS/HCC), HLD (hyperlipidemia), HTN (hypertension), and Lung cancer (CMS/HCC).   has a past surgical history that includes US guided needle liver biopsy (02/07/2020); Lung lobectomy; CT guided percutaneous biopsy lung (12/13/2023); and CT guided percutaneous biopsy lung (12/15/2023).  No family history on file.  Oncology History    No history exists.       Thu Ortega  reports that she has quit smoking. Her smoking use included cigarettes. She has never used smokeless tobacco.  She  reports that she does not currently use alcohol.  She  reports no history of drug use.    Physical Exam  Vitals reviewed.   HENT:      Head: Normocephalic.      Mouth/Throat:      Mouth: Mucous membranes are moist.   Eyes:      Extraocular Movements: Extraocular movements intact.      Pupils: Pupils are equal, round, and reactive to light.   Cardiovascular:      Rate and Rhythm: Normal rate and regular rhythm.      Heart sounds: Normal heart sounds.    Pulmonary:      Breath sounds: Normal breath sounds.   Abdominal:      General: Bowel sounds are normal.      Palpations: Abdomen is soft.   Musculoskeletal:         General: Normal range of motion.      Cervical back: Normal range of motion and neck supple.   Skin:     General: Skin is warm and dry.   Neurological:      General: No focal deficit present.      Mental Status: She is alert and oriented to person, place, and time.   Psychiatric:         Mood and Affect: Mood normal.         WBC   Date/Time Value Ref Range Status   12/13/2023 06:27 AM 7.6 4.4 - 11.3 x10*3/uL Final   02/07/2020 09:26 AM 7.7 4.4 - 11.3 x10E9/L Final   11/13/2019 01:13 PM 10.7 4.4 - 11.3 x10E9/L Final     nRBC   Date Value Ref Range Status   12/13/2023 0.0 0.0 - 0.0 /100 WBCs Final   02/07/2020 0.0 0.0 - 0.0 /100 WBC Final     RBC   Date Value Ref Range Status   12/13/2023 4.62 4.00 - 5.20 x10*6/uL Final   02/07/2020 5.52 (H) 4.00 - 5.20 x10E12/L Final   11/13/2019 5.01 4.00 - 5.20 x10E12/L Final     Hemoglobin   Date Value Ref Range Status   12/13/2023 11.2 (L) 12.0 - 16.0 g/dL Final   02/07/2020 15.0 12.0 - 16.0 g/dL Final   11/13/2019 13.2 12.0 - 16.0 g/dL Final     Hematocrit   Date Value Ref Range Status   12/13/2023 36.7 36.0 - 46.0 % Final   02/07/2020 45.7 36.0 - 46.0 % Final   11/13/2019 41.9 36.0 - 46.0 % Final     MCV   Date/Time Value Ref Range Status   12/13/2023 06:27 AM 79 (L) 80 - 100 fL Final   02/07/2020 09:26 AM 83 80 - 100 fL Final   11/13/2019 01:13 PM 84 80 - 100 fL Final     MCH   Date/Time Value Ref Range Status   12/13/2023 06:27 AM 24.2 (L) 26.0 - 34.0 pg Final     MCHC   Date/Time Value Ref Range Status   12/13/2023 06:27 AM 30.5 (L) 32.0 - 36.0 g/dL Final   02/07/2020 09:26 AM 32.8 32.0 - 36.0 g/dL Final   11/13/2019 01:13 PM 31.5 (L) 32.0 - 36.0 g/dL Final     RDW   Date/Time Value Ref Range Status   12/13/2023 06:27 AM 15.2 (H) 11.5 - 14.5 % Final   02/07/2020 09:26 AM 13.7 11.5 - 14.5 % Final   11/13/2019  "01:13 PM 13.8 11.5 - 14.5 % Final     Platelets   Date/Time Value Ref Range Status   12/13/2023 06:27  150 - 450 x10*3/uL Final   02/07/2020 09:26  150 - 450 x10E9/L Final   11/13/2019 01:13  150 - 450 x10E9/L Final     No results found for: \"MPV\"  No results found for: \"NEUTOPHILPCT\"  No results found for: \"IGPCT\"  No results found for: \"LYMPHOPCT\"  No results found for: \"MONOPCT\"  No results found for: \"EOSPCT\"  No results found for: \"BASOPCT\"  No results found for: \"NEUTROABS\"  No results found for: \"IGABSOL\"  No results found for: \"LYMPHSABS\"  No results found for: \"MONOSABS\"  No results found for: \"EOSABS\"  No results found for: \"BASOSABS\"    No components found for: \"PT\"  No results found for: \"APTT\"  Medication Documentation Review Audit       Reviewed by Breanne Jara MA (Medical Assistant) on 01/03/24 at 1131      Medication Order Taking? Sig Documenting Provider Last Dose Status   Accu-Chek Guide test strips strip [id number] Yes TEST 3X DAILY DX E11.65 Historical Provider, MD Taking Active   albuterol 90 mcg/actuation inhaler [id number] Yes Inhale 1 puff every 4 hours if needed. Historical Provider, MD Taking Active   amLODIPine (Norvasc) 5 mg tablet [id number] Yes Take 1 tablet (5 mg) by mouth once daily in the morning. Take before meals. Historical Provider, MD Taking Active   aspirin 81 mg chewable tablet [id number] Yes Chew 1 tablet (81 mg) 1 time. Historical Provider, MD Taking Active   atorvastatin (Lipitor) 10 mg tablet [id number] Yes Take 1 tablet (10 mg) by mouth once daily in the morning. Take before meals. Historical Provider, MD Taking Active   BD Insulin Syringe Ultra-Fine 1 mL 31 gauge x 5/16 syringe [id number] Yes USE AS DIRECTED Historical Provider, MD Taking Active   DULoxetine (Cymbalta) 60 mg DR capsule [id number] Yes Take 1 capsule (60 mg) by mouth once daily. Historical Provider, MD Taking Active   metFORMIN (Glucophage) 1,000 mg tablet [id number] Yes Take 1 tablet (1,000 mg) " by mouth 2 times a day with meals. Take with food. Historical Provider, MD Taking Active   naltrexone (Depade) 50 mg tablet [id number] Yes Take 3 mg by mouth once daily. Historical Provider, MD Taking Differently Active   NovoLIN 70/30 U-100 Insulin 100 unit/mL (70-30) injection [id number] Yes 70 Units 3 times a day. 70 units at breakfast, 55 units at lunch, and 60 units at dinner Historical Provider, MD Taking Active   pantoprazole (ProtoNix) 40 mg EC tablet [id number] Yes TAKE 1 TABLET (40 MG) BY MOUTH IN THE MORNING. TAKE BEFORE MEALS. DO NOT CRUSH, CHEW, OR SPLIT.. Historical Provider, MD Taking Active   topiramate (Topamax) 25 mg tablet [id number] Yes Take 2 tablets (50 mg) by mouth 2 times a day. Historical Provider, MD Taking Active   traZODone (Desyrel) 50 mg tablet [id number] Yes Take 1 tablet (50 mg) by mouth once daily at bedtime. Historical Provider, MD Taking Active   Wixela Inhub 250-50 mcg/dose diskus inhaler [id number] Yes USE 1 PUFF TWICE A DAY Historical Provider, MD Taking Active   zolpidem (Ambien) 10 mg tablet [id number] Yes Take 1 tablet (10 mg) by mouth as needed at bedtime. Historical Provider, MD Taking Active                   Assessment/Plan    1) lung cancer  -12/8/2022 chest CT: NICHOL anterior segment 1.6 x 2.2 cm nodule, partial pleural attachment, nonspecific low volume paratracheal mediastinal LN largest near AP window 1.0 x 1.4 cm, right subcarinal space 8 x 14 mm  -12/12/2022 PET: NICHOL 1.8 x 2.3 cm mass with SUV 5.8, lateral margin abuts pleura; no significant mediastinal or hilar hypermetabolic lymphadenopathy  -12/28/2022 chest CT: 2.5 cm cavitary nodule in NICHOL  -1/7/2023 PET scan  -had surgery for stage I NSCLC, s/p lobectomy (Dr Fagan, 1/31/2023)  -2/1/2023 CT chest: no PE, postop changes in left chest, small left PTX in left upper anterior chest  -2/26/2023 chest CT no PE, continued mildly enlarged mediastinal lymph nodes  -saw Dr Solis at [facility] on 3/13/2023 - she had a [id number] (stage Ib)  lung adenocarcinoma (poor NCCN risk factors - G3, + visceral involvement), s/p left robotic assisted anatomic upper lobectomy, PD-L1 90%, +[id number]  -per his charting, he recommended either adjuvant cisplatin + pemetrexed x 4 cycles vs surveillance  -according to  Thu, Dr Solis never told her about her high risk features nor any adjuvant option, and that the only thing he recommended was observation  -7/11/2023 CT chest : stable postsurgical changes of prior left thoracotomy and left upper lobectomy with interval resolution of bilateral pleural effusions; interval progression of lymphadenopathy in the chest concerning for progressing ghazala metastatic disease     7/25/2023 underwent EBUS: A - EBUS TRANSBRONCHIAL FINE NEEDLE ASPIRATE, LYMPH NODE  - 4L             Negative for malignant cells.             Benign lymphoid sample (see comment).   B - EBUS TRANSBRONCHIAL FINE NEEDLE ASPIRATE, LYMPH NODE  - 10L             Negative for malignant cells.                   Benign lymphoid sample.  C - EBUS TRANSBRONCHIAL FINE NEEDLE ASPIRATE, LYMPH NODE  - STATION 7             Negative for malignant cells.                 Benign lymphoid sample.   -8/9/2023 PET scan: 3.0 x 2.1 cm left prevascular node with SUV 7.9; few additional prominent mediastinal nodes with low level uptake; left lower paratracheal node 0.8 cm with SUV 2.2; mild FDG uptake in left hilum SUV 3.1  -11/14/2023 chest CT: enlarged 2.2 cm prevascular lymph node not significantly changed  -she was told by her pulmonologist Dr Kang that she has cancer  -discussed her original path with her--while it was a small tumor and stage Ib, she did have a couple high risk features that would have warranted adjuvant chemotherapy followed by atezolizumab; also if she does have a recurrence that isn't amenable to surgery, she would also be a candidate for immunotherapy then KRAS inhibitor (FDA approved only in 2nd line setting)  -will discuss with thoracic surgeon--will  import all [facility] films to review; may need CT guided bx by IR of this prevascular node    1/2/2024  -she had biopsy done on 12/13/2023--path confirmed poorly differentiated lung carcinoma, PD-L1 90%, KRAS G12C mutation  -she saw Dr Calixto on 12/28/2023--he advised surgery, provided that she can pass her PFTs  -she was concerned about the risk of phrenic nerve damage  -she is awaiting the appointment for the PFTs  -if she is suboptimal surgical candidate, will plan on starting pembrolizumab as the KRAS G12C inhibitors are FDA approved only in the 2nd line setting        2) COPD  -on albuterol  -on incruse ellipta     3) atrial fibrillation  -on amiodarone  -on warfarin     4) hypertension  -on norvasc  -on chlorthalidone  -on lasix  -on lisinopril  -on metoprolol     5) hyperlipidemia  -on atorvastatin     6) major depression  -on cymbalta     7) diabetes  -on novolog insulin  -on metformin     Problem List Items Addressed This Visit    None           Álvaro Galeano MD

## 2024-01-02 NOTE — PATIENT INSTRUCTIONS
You are awaiting PFTs to see if you qualify for surgery    If you somehow do not qualify for surgery, then we will start Keytruda

## 2024-01-03 ENCOUNTER — OFFICE VISIT (OUTPATIENT)
Dept: CARDIOLOGY | Facility: CLINIC | Age: 69
End: 2024-01-03
Payer: MEDICARE

## 2024-01-03 ENCOUNTER — APPOINTMENT (OUTPATIENT)
Dept: RADIOLOGY | Facility: HOSPITAL | Age: 69
End: 2024-01-03
Payer: MEDICARE

## 2024-01-03 VITALS
BODY MASS INDEX: 29.64 KG/M2 | HEART RATE: 83 BPM | SYSTOLIC BLOOD PRESSURE: 118 MMHG | HEIGHT: 59 IN | DIASTOLIC BLOOD PRESSURE: 75 MMHG | OXYGEN SATURATION: 97 % | WEIGHT: 147 LBS

## 2024-01-03 DIAGNOSIS — I48.0 PAF (PAROXYSMAL ATRIAL FIBRILLATION) (MULTI): ICD-10-CM

## 2024-01-03 DIAGNOSIS — I10 PRIMARY HYPERTENSION: Primary | ICD-10-CM

## 2024-01-03 PROBLEM — I48.11 LONGSTANDING PERSISTENT ATRIAL FIBRILLATION (MULTI): Status: RESOLVED | Noted: 2023-12-03 | Resolved: 2024-01-03

## 2024-01-03 PROCEDURE — 1036F TOBACCO NON-USER: CPT | Performed by: INTERNAL MEDICINE

## 2024-01-03 PROCEDURE — 3078F DIAST BP <80 MM HG: CPT | Performed by: INTERNAL MEDICINE

## 2024-01-03 PROCEDURE — 3074F SYST BP LT 130 MM HG: CPT | Performed by: INTERNAL MEDICINE

## 2024-01-03 PROCEDURE — 99214 OFFICE O/P EST MOD 30 MIN: CPT | Performed by: INTERNAL MEDICINE

## 2024-01-03 PROCEDURE — 93010 ELECTROCARDIOGRAM REPORT: CPT | Performed by: INTERNAL MEDICINE

## 2024-01-03 PROCEDURE — 99204 OFFICE O/P NEW MOD 45 MIN: CPT | Performed by: INTERNAL MEDICINE

## 2024-01-03 PROCEDURE — 93005 ELECTROCARDIOGRAM TRACING: CPT | Performed by: INTERNAL MEDICINE

## 2024-01-03 PROCEDURE — 1125F AMNT PAIN NOTED PAIN PRSNT: CPT | Performed by: INTERNAL MEDICINE

## 2024-01-03 PROCEDURE — 1159F MED LIST DOCD IN RCRD: CPT | Performed by: INTERNAL MEDICINE

## 2024-01-03 RX ORDER — AMIODARONE HYDROCHLORIDE 200 MG/1
200 TABLET ORAL DAILY
Qty: 30 TABLET | Refills: 11 | Status: SHIPPED | OUTPATIENT
Start: 2024-01-03 | End: 2024-03-28 | Stop reason: ALTCHOICE

## 2024-01-03 NOTE — PROGRESS NOTES
Name : Thu Ortega    : 1955   MRN : [id number]   ENC Date : 24     Reason for visit: Atrial fibrillation    Assessment and Plan:  Paroxysmal atrial fibrillation: Postoperative atrial fibrillation.  Patient will be at increased risk for recurrence of this with the upcoming reexploration surgery.  I recommend we start amiodarone now as an outpatient to decrease that risk.  Patient was agreeable.  I spoke with her surgeon and he is agreeable as well.  No need for oral anticoagulation right now depending on her arrhythmia burden.  We might need to readdress this in the future.  I encouraged her to purchase the Kardia device so she can monitor how much atrial fibrillation she is having.  Repeat thoracic surgery: Patient tolerated the surgery from a cardiovascular standpoint with the exception of the atrial fibrillation.  I do not think any further risk stratification will decrease her risk.  No stress testing is needed.  Disp: RTO after repeat thoracic surgery      HPI:  Patient had atrial fibrillation after left upper lung resection surgery earlier this year.  She was treated with amiodarone and warfarin.  She was followed by electrophysiology and this was discontinued about 3 months after the surgery.  Since then she has had some minor palpitations that last for only a minute or 2.  She reports no other cardiac problems.  She has normal LV systolic function and no other prior cardiac history      Problem List:   Patient Active Problem List   Diagnosis    Malignant neoplasm of upper lobe of left lung (CMS/HCC)    Panlobular emphysema (CMS/HCC)    Primary hypertension    Mixed hyperlipidemia    Recurrent major depressive disorder, in partial remission (CMS/HCC)    Type 2 diabetes mellitus without complication, with long-term current use of insulin (CMS/HCC)    PAF (paroxysmal atrial fibrillation) (CMS/[facility])        Meds:   Current Outpatient Medications on File Prior to Visit   Medication Sig Dispense Refill     Accu-Chek Guide test strips strip TEST 3X DAILY DX E11.65      albuterol 90 mcg/actuation inhaler Inhale 1 puff every 4 hours if needed.      amLODIPine (Norvasc) 5 mg tablet Take 1 tablet (5 mg) by mouth once daily in the morning. Take before meals.      aspirin 81 mg chewable tablet Chew 1 tablet (81 mg) 1 time.      atorvastatin (Lipitor) 10 mg tablet Take 1 tablet (10 mg) by mouth once daily in the morning. Take before meals.      BD Insulin Syringe Ultra-Fine 1 mL 31 gauge x 5/16 syringe USE AS DIRECTED      DULoxetine (Cymbalta) 60 mg DR capsule Take 1 capsule (60 mg) by mouth once daily.      metFORMIN (Glucophage) 1,000 mg tablet Take 1 tablet (1,000 mg) by mouth 2 times a day with meals. Take with food.      naltrexone (Depade) 50 mg tablet Take 3 mg by mouth once daily.      NovoLIN 70/30 U-100 Insulin 100 unit/mL (70-30) injection 70 Units 3 times a day. 70 units at breakfast, 55 units at lunch, and 60 units at dinner      pantoprazole (ProtoNix) 40 mg EC tablet TAKE 1 TABLET (40 MG) BY MOUTH IN THE MORNING. TAKE BEFORE MEALS. DO NOT CRUSH, CHEW, OR SPLIT..      topiramate (Topamax) 25 mg tablet Take 2 tablets (50 mg) by mouth 2 times a day.      traZODone (Desyrel) 50 mg tablet Take 1 tablet (50 mg) by mouth once daily at bedtime.      Wixela Inhub 250-50 mcg/dose diskus inhaler USE 1 PUFF TWICE A DAY      zolpidem (Ambien) 10 mg tablet Take 1 tablet (10 mg) by mouth as needed at bedtime.       No current facility-administered medications on file prior to visit.       All:   Allergies   Allergen Reactions    Penicillin Hives and Unknown       Fam Hx:   Family History   Problem Relation Name Age of Onset    Other (aortic valve disease) Mother         Soc Hx:   Social History     Socioeconomic History    Marital status:      Spouse name: Not on file    Number of children: Not on file    Years of education: Not on file    Highest education level: Not on file   Occupational History    Not on file  "  Tobacco Use    Smoking status: Former     Types: Cigarettes    Smokeless tobacco: Never   Vaping Use    Vaping Use: Former   Substance and Sexual Activity    Alcohol use: Not Currently    Drug use: Never    Sexual activity: Defer   Other Topics Concern    Not on file   Social History Narrative    Not on file     Social Determinants of Health     Financial Resource Strain: Not on file   Food Insecurity: Not on file   Transportation Needs: Not on file   Physical Activity: Not on file   Stress: Not on file   Social Connections: Not on file   Intimate Partner Violence: Not on file   Housing Stability: Not on file       ROS    VS: /75 (BP Location: Left arm, Patient Position: Sitting)   Pulse 83   Ht 1.499 m (4' 11\")   Wt 66.7 kg (147 lb)   SpO2 97%   BMI 29.69 kg/m²      Physical Exam  Vitals reviewed.   Constitutional:       Appearance: Normal appearance.   Eyes:      Pupils: Pupils are equal, round, and reactive to light.   Neck:      Vascular: No JVD.   Cardiovascular:      Rate and Rhythm: Normal rate and regular rhythm.      Pulses: Normal pulses.      Heart sounds: No murmur heard.     No gallop.   Pulmonary:      Effort: No respiratory distress.      Breath sounds: No wheezing or rales.   Abdominal:      General: Abdomen is flat. There is no distension.      Palpations: Abdomen is soft.   Musculoskeletal:         General: No swelling.      Right lower leg: No edema.      Left lower leg: No edema.   Neurological:      General: No focal deficit present.      Mental Status: She is alert.   Psychiatric:         Mood and Affect: Mood normal.        ECG: Normal sinus rhythm normal ECG        Dino Fox MD   "

## 2024-01-04 ENCOUNTER — APPOINTMENT (OUTPATIENT)
Dept: SURGERY | Facility: CLINIC | Age: 69
End: 2024-01-04
Payer: MEDICARE

## 2024-01-04 DIAGNOSIS — C34.92 LOCAL RECURRENCE OF LEFT LUNG CANCER (MULTI): Primary | ICD-10-CM

## 2024-01-04 ASSESSMENT — ENCOUNTER SYMPTOMS
PSYCHIATRIC NEGATIVE: 1
CONSTITUTIONAL NEGATIVE: 1
ENDOCRINE NEGATIVE: 1
CARDIOVASCULAR NEGATIVE: 1
GASTROINTESTINAL NEGATIVE: 1
RESPIRATORY NEGATIVE: 1
EYES NEGATIVE: 1
NEUROLOGICAL NEGATIVE: 1
HEMATOLOGIC/LYMPHATIC NEGATIVE: 1
MUSCULOSKELETAL NEGATIVE: 1

## 2024-01-08 ENCOUNTER — TELEMEDICINE CLINICAL SUPPORT (OUTPATIENT)
Dept: PREADMISSION TESTING | Facility: HOSPITAL | Age: 69
End: 2024-01-08
Payer: MEDICARE

## 2024-01-08 LAB
ATRIAL RATE: 80 BPM
P AXIS: 79 DEGREES
P OFFSET: 184 MS
P ONSET: 160 MS
PR INTERVAL: 118 MS
Q ONSET: 219 MS
QRS COUNT: 13 BEATS
QRS DURATION: 86 MS
QT INTERVAL: 374 MS
QTC CALCULATION(BAZETT): 431 MS
QTC FREDERICIA: 412 MS
R AXIS: 0 DEGREES
T AXIS: 87 DEGREES
T OFFSET: 406 MS
VENTRICULAR RATE: 80 BPM

## 2024-01-08 RX ORDER — ACETAMINOPHEN 500 MG
1000 TABLET ORAL AS NEEDED
COMMUNITY
Start: 2023-02-09 | End: 2024-02-01 | Stop reason: HOSPADM

## 2024-01-08 RX ORDER — BIOTIN 10 MG
100 TABLET ORAL
COMMUNITY

## 2024-01-08 RX ORDER — CALCITRIOL 0.5 UG/1
0.5 CAPSULE ORAL DAILY
COMMUNITY
End: 2024-01-12 | Stop reason: ALTCHOICE

## 2024-01-12 ENCOUNTER — HOSPITAL ENCOUNTER (OUTPATIENT)
Dept: RESPIRATORY THERAPY | Facility: HOSPITAL | Age: 69
Discharge: HOME | End: 2024-01-12
Payer: MEDICARE

## 2024-01-12 ENCOUNTER — PRE-ADMISSION TESTING (OUTPATIENT)
Dept: PREADMISSION TESTING | Facility: HOSPITAL | Age: 69
End: 2024-01-12
Payer: MEDICARE

## 2024-01-12 VITALS
DIASTOLIC BLOOD PRESSURE: 74 MMHG | WEIGHT: 151.2 LBS | HEART RATE: 83 BPM | SYSTOLIC BLOOD PRESSURE: 124 MMHG | TEMPERATURE: 97.3 F | BODY MASS INDEX: 29.68 KG/M2 | OXYGEN SATURATION: 96 % | HEIGHT: 60 IN

## 2024-01-12 DIAGNOSIS — Z79.4 TYPE 2 DIABETES MELLITUS WITHOUT COMPLICATION, WITH LONG-TERM CURRENT USE OF INSULIN (MULTI): ICD-10-CM

## 2024-01-12 DIAGNOSIS — C34.12 MALIGNANT NEOPLASM OF UPPER LOBE OF LEFT LUNG (MULTI): ICD-10-CM

## 2024-01-12 DIAGNOSIS — Z01.818 PREOP EXAMINATION: Primary | ICD-10-CM

## 2024-01-12 DIAGNOSIS — D49.9 NEOPLASM OF UNSPECIFIED BEHAVIOR OF UNSPECIFIED SITE: ICD-10-CM

## 2024-01-12 DIAGNOSIS — E11.9 TYPE 2 DIABETES MELLITUS WITHOUT COMPLICATION, WITH LONG-TERM CURRENT USE OF INSULIN (MULTI): ICD-10-CM

## 2024-01-12 DIAGNOSIS — C34.92 LOCAL RECURRENCE OF LEFT LUNG CANCER (MULTI): ICD-10-CM

## 2024-01-12 LAB
ABO GROUP (TYPE) IN BLOOD: NORMAL
ANION GAP SERPL CALC-SCNC: 15 MMOL/L (ref 10–20)
ANTIBODY SCREEN: NORMAL
APPEARANCE UR: ABNORMAL
APTT PPP: 31 SECONDS (ref 27–38)
BACTERIA #/AREA URNS AUTO: ABNORMAL /HPF
BILIRUB UR STRIP.AUTO-MCNC: NEGATIVE MG/DL
BUN SERPL-MCNC: 12 MG/DL (ref 6–23)
CALCIUM SERPL-MCNC: 10.1 MG/DL (ref 8.6–10.6)
CAOX CRY #/AREA UR COMP ASSIST: ABNORMAL /HPF
CHLORIDE SERPL-SCNC: 105 MMOL/L (ref 98–107)
CO2 SERPL-SCNC: 25 MMOL/L (ref 21–32)
COLOR UR: YELLOW
CREAT SERPL-MCNC: 0.85 MG/DL (ref 0.5–1.05)
EGFRCR SERPLBLD CKD-EPI 2021: 74 ML/MIN/1.73M*2
ERYTHROCYTE [DISTWIDTH] IN BLOOD BY AUTOMATED COUNT: 14.7 % (ref 11.5–14.5)
EST. AVERAGE GLUCOSE BLD GHB EST-MCNC: 157 MG/DL
GLUCOSE SERPL-MCNC: 176 MG/DL (ref 74–99)
GLUCOSE UR STRIP.AUTO-MCNC: ABNORMAL MG/DL
HBA1C MFR BLD: 7.1 %
HCT VFR BLD AUTO: 41.4 % (ref 36–46)
HGB BLD-MCNC: 12.5 G/DL (ref 12–16)
HYALINE CASTS #/AREA URNS AUTO: ABNORMAL /LPF
INR PPP: 0.9 (ref 0.9–1.1)
KETONES UR STRIP.AUTO-MCNC: ABNORMAL MG/DL
LEUKOCYTE ESTERASE UR QL STRIP.AUTO: ABNORMAL
MCH RBC QN AUTO: 24.3 PG (ref 26–34)
MCHC RBC AUTO-ENTMCNC: 30.2 G/DL (ref 32–36)
MCV RBC AUTO: 80 FL (ref 80–100)
MUCOUS THREADS #/AREA URNS AUTO: ABNORMAL /LPF
NITRITE UR QL STRIP.AUTO: NEGATIVE
NRBC BLD-RTO: 0 /100 WBCS (ref 0–0)
PH UR STRIP.AUTO: 5.5 [PH]
PLATELET # BLD AUTO: 320 X10*3/UL (ref 150–450)
POTASSIUM SERPL-SCNC: 4 MMOL/L (ref 3.5–5.3)
PROT UR STRIP.AUTO-MCNC: ABNORMAL MG/DL
PROTHROMBIN TIME: 10.6 SECONDS (ref 9.8–12.8)
RBC # BLD AUTO: 5.15 X10*6/UL (ref 4–5.2)
RBC # UR STRIP.AUTO: NEGATIVE /UL
RBC #/AREA URNS AUTO: ABNORMAL /HPF
RH FACTOR (ANTIGEN D): NORMAL
SODIUM SERPL-SCNC: 141 MMOL/L (ref 136–145)
SP GR UR STRIP.AUTO: >=1.03
SQUAMOUS #/AREA URNS AUTO: ABNORMAL /HPF
UROBILINOGEN UR STRIP.AUTO-MCNC: 0.2 MG/DL
WBC # BLD AUTO: 9.1 X10*3/UL (ref 4.4–11.3)
WBC #/AREA URNS AUTO: ABNORMAL /HPF

## 2024-01-12 PROCEDURE — 85730 THROMBOPLASTIN TIME PARTIAL: CPT | Performed by: NURSE PRACTITIONER

## 2024-01-12 PROCEDURE — 85027 COMPLETE CBC AUTOMATED: CPT | Performed by: NURSE PRACTITIONER

## 2024-01-12 PROCEDURE — 80048 BASIC METABOLIC PNL TOTAL CA: CPT | Performed by: NURSE PRACTITIONER

## 2024-01-12 PROCEDURE — 94729 DIFFUSING CAPACITY: CPT | Performed by: INTERNAL MEDICINE

## 2024-01-12 PROCEDURE — 94726 PLETHYSMOGRAPHY LUNG VOLUMES: CPT | Performed by: INTERNAL MEDICINE

## 2024-01-12 PROCEDURE — 87081 CULTURE SCREEN ONLY: CPT | Performed by: NURSE PRACTITIONER

## 2024-01-12 PROCEDURE — 36415 COLL VENOUS BLD VENIPUNCTURE: CPT

## 2024-01-12 PROCEDURE — 83036 HEMOGLOBIN GLYCOSYLATED A1C: CPT | Performed by: NURSE PRACTITIONER

## 2024-01-12 PROCEDURE — 81001 URINALYSIS AUTO W/SCOPE: CPT | Performed by: NURSE PRACTITIONER

## 2024-01-12 PROCEDURE — 94010 BREATHING CAPACITY TEST: CPT | Performed by: INTERNAL MEDICINE

## 2024-01-12 PROCEDURE — 86901 BLOOD TYPING SEROLOGIC RH(D): CPT | Performed by: NURSE PRACTITIONER

## 2024-01-12 PROCEDURE — 87086 URINE CULTURE/COLONY COUNT: CPT | Performed by: NURSE PRACTITIONER

## 2024-01-12 PROCEDURE — 99204 OFFICE O/P NEW MOD 45 MIN: CPT | Performed by: NURSE PRACTITIONER

## 2024-01-12 RX ORDER — CHLORHEXIDINE GLUCONATE ORAL RINSE 1.2 MG/ML
15 SOLUTION DENTAL SEE ADMIN INSTRUCTIONS
Qty: 473 ML | Refills: 0 | Status: SHIPPED | OUTPATIENT
Start: 2024-01-12 | End: 2024-02-01 | Stop reason: HOSPADM

## 2024-01-12 RX ORDER — CHLORHEXIDINE GLUCONATE 40 MG/ML
SOLUTION TOPICAL 2 TIMES DAILY
Qty: 473 ML | Refills: 0 | Status: SHIPPED | OUTPATIENT
Start: 2024-01-12 | End: 2024-01-17

## 2024-01-12 ASSESSMENT — LIFESTYLE VARIABLES
SMOKING_STATUS: NONSMOKER
SMOKING_STATUS: NONSMOKER

## 2024-01-12 ASSESSMENT — DUKE ACTIVITY SCORE INDEX (DASI)
CAN YOU TAKE CARE OF YOURSELF (EAT, DRESS, BATHE, OR USE TOILET): YES
CAN YOU WALK A BLOCK OR TWO ON LEVEL GROUND: YES
CAN YOU DO MODERATE WORK AROUND THE HOUSE LIKE VACUUMING, SWEEPING FLOORS OR CARRYING GROCERIES: YES
TOTAL_SCORE: 24.2
CAN YOU DO YARD WORK LIKE RAKING LEAVES, WEEDING OR PUSHING A MOWER: NO
CAN YOU WALK INDOORS, SUCH AS AROUND YOUR HOUSE: YES
CAN YOU HAVE SEXUAL RELATIONS: YES
CAN YOU PARTICIPATE IN MODERATE RECREATIONAL ACTIVITIES LIKE GOLF, BOWLING, DANCING, DOUBLES TENNIS OR THROWING A BASEBALL OR FOOTBALL: NO
CAN YOU DO LIGHT WORK AROUND THE HOUSE LIKE DUSTING OR WASHING DISHES: YES
CAN YOU CLIMB A FLIGHT OF STAIRS OR WALK UP A HILL: YES
CAN YOU RUN A SHORT DISTANCE: NO
DASI METS SCORE: 5.7
CAN YOU DO HEAVY WORK AROUND THE HOUSE LIKE SCRUBBING FLOORS OR LIFTING AND MOVING HEAVY FURNITURE: NO
CAN YOU PARTICIPATE IN STRENOUS SPORTS LIKE SWIMMING, SINGLES TENNIS, FOOTBALL, BASKETBALL, OR SKIING: NO

## 2024-01-12 ASSESSMENT — CHADS2 SCORE
DIABETES: YES
HYPERTENSION: YES
CHF: NO
CHADS2 SCORE: 2
PRIOR STROKE OR TIA OR THROMBOEMBOLISM: NO
AGE GREATER THAN OR EQUAL TO 75: NO

## 2024-01-12 ASSESSMENT — ENCOUNTER SYMPTOMS
NECK NEGATIVE: 1
GASTROINTESTINAL NEGATIVE: 1
FEVER: 0
CONSTITUTIONAL NEGATIVE: 1
EYES NEGATIVE: 1
RESPIRATORY NEGATIVE: 1
CARDIOVASCULAR NEGATIVE: 1
ENDOCRINE NEGATIVE: 1
NUMBNESS: 1
ARTHRALGIAS: 1
CHILLS: 0

## 2024-01-12 NOTE — CPM/PAT H&P
CPM/PAT Evaluation       Name: Thu Ortega (Thu Ortega)  /Age: 1955/68 y.o.     Visit Type:   In-Person       Chief Complaint: Patient is a 68 year old female scheduled for Robotic assisted redo left mediastinal exploration, possible diaphragm pacer - Left with Dr. Reinaldo Calixto on 24    HPI Patient is a 68 year old female scheduled for Robotic assisted redo left mediastinal exploration, possible diaphragm pacer - Left with Dr. Calixto related to Local recurrence of left lung cancer. Patient referred by Dr. Calixto for preoperative evaluation of history of meningioma of brain depression, atrial fib, hypertension, hyperlipidemia, COPD, diabetes, GERD, spinal stenosis.     Past Medical History:   Diagnosis Date    Adenocarcinoma of lung, left (CMS/HCC)     s/p robotic assisted NICHOL lobectomy on 23, c/b afib and aspiration PNA    Atrial fibrillation (CMS/HCC)     COPD (chronic obstructive pulmonary disease) (CMS/[facility])     F/W Dr. Kang, Wixela inhaler, PFT appointment scheduled for 2024    Depression     Taking Cymbalta    DJD (degenerative joint disease)     DM (diabetes mellitus) (CMS/[facility])     F/w Dr. Wong, Taking Metformin, Last A1c is 7.5 on 2023    Fibromyalgia, primary     F/W Pain    GERD (gastroesophageal reflux disease)     F/w PCP, on Protonix    Hearing aid worn     Bilateral hearing aides    History of blood transfusion     patient think she recieved a transfusion with her Lobectomy surgery    History of meningioma of the brain     s/p resection    HLD (hyperlipidemia)     F/W PCP: Taking Atorvastatin    HTN (hypertension)     F/W PCP    Irregular heart beat     Paroxysmal atrial fibrillation. Last seen by Dr. Fox on 24, Started on Amiodarone. Not on anticoagulation.    Irritable bowel syndrome     Malignant neoplasm of upper lobe of left lung (CMS/HCC)     Nephrolithiasis     monitoring, no interventions    Panlobular emphysema (CMS/HCC)     Shortness of breath      Cardiology PALENCIA previously on O2    Spinal stenosis     Urinary tract infection        Past Surgical History:   Procedure Laterality Date    BRAIN SURGERY      meningioma resection    CATARACT EXTRACTION      CT GUIDED PERCUTANEOUS BIOPSY LUNG  12/13/2023    CT GUIDED PERCUTANEOUS BIOPSY LUNG 12/13/2023 [facility]    CT GUIDED PERCUTANEOUS BIOPSY LUNG  12/15/2023    CT GUIDED PERCUTANEOUS BIOPSY LUNG    FOOT SURGERY      KNEE SURGERY      LUNG LOBECTOMY      TUBAL LIGATION      US GUIDED NEEDLE LIVER BIOPSY  02/07/2020    US GUIDED NEEDLE LIVER BIOPSY 2/7/2020 [facility]       Family History   Problem Relation Name Age of Onset    Stroke Mother      Other (aortic valve disease) Mother      Heart disease Mother      Cancer Sister      Stroke Sister      Diabetes Sister      Diabetes Brother      Other (heart transplant) Brother         Allergies   Allergen Reactions    Penicillin Hives and Unknown       Prior to Admission medications    Medication Sig Start Date End Date Taking? Authorizing Provider   Accu-Chek Guide test strips strip TEST 3X DAILY DX E11.65 1/7/23   Historical Provider, MD   acetaminophen (Tylenol) 500 mg tablet Take 2 tablets (1,000 mg) by mouth if needed. 2/9/23   Historical Provider, MD   albuterol 90 mcg/actuation inhaler Inhale 1 puff every 4 hours if needed. 1/27/23   Historical Provider, MD   amiodarone (Pacerone) 200 mg tablet Take 1 tablet (200 mg) by mouth once daily. 1/3/24 1/2/25  Dino Fox MD   amLODIPine (Norvasc) 5 mg tablet Take 1 tablet (5 mg) by mouth once daily in the morning. Take before meals. 10/11/23   Historical Provider, MD   aspirin 81 mg chewable tablet Chew 1 tablet (81 mg) 1 time.    Historical Provider, MD   atorvastatin (Lipitor) 10 mg tablet Take 1 tablet (10 mg) by mouth once daily in the morning. Take before meals. 7/27/23   Historical Provider, MD   BD Insulin Syringe Ultra-Fine 1 mL 31 gauge x 5/16 syringe USE AS DIRECTED 8/29/23   Historical Provider, MD   biotin 10 mg  tablet Take 10 tablets (100 mg) by mouth once daily.    Historical Provider, MD   calcitriol (Rocaltrol) 0.5 mcg capsule Take 1 capsule (0.5 mcg) by mouth once daily.    Historical Provider, MD   DULoxetine (Cymbalta) 60 mg DR capsule Take 1 capsule (60 mg) by mouth once daily. 8/29/23   Historical Provider, MD   metFORMIN (Glucophage) 1,000 mg tablet Take 1 tablet (1,000 mg) by mouth 2 times a day with meals. Take with food.    Historical Provider, MD   naltrexone (Depade) 50 mg tablet Take 3 mg by mouth once daily.    Historical Provider, MD   NovoLIN 70/30 U-100 Insulin 100 unit/mL (70-30) injection 70 Units 3 times a day. 70 units at breakfast, 55 units at lunch, and 60 units at dinner    Historical Provider, MD   pantoprazole (ProtoNix) 40 mg EC tablet TAKE 1 TABLET (40 MG) BY MOUTH IN THE MORNING. TAKE BEFORE MEALS. DO NOT CRUSH, CHEW, OR SPLIT..    Historical Provider, MD   topiramate (Topamax) 25 mg tablet Take 2 tablets (50 mg) by mouth 2 times a day. 9/27/23   Historical Provider, MD   traZODone (Desyrel) 50 mg tablet Take 1 tablet (50 mg) by mouth once daily at bedtime. 9/24/23   Historical Provider, MD Manuel Inhub 250-50 mcg/dose diskus inhaler USE 1 PUFF TWICE A DAY 7/2/23   Historical Provider, MD   zolpidem (Ambien) 10 mg tablet Take 1 tablet (10 mg) by mouth as needed at bedtime.    Historical Provider, MD        PAT ROS:   Constitutional:    Fatigued  neg     no fever   no chills  Neuro/Psych:    Numbness and tingling at times in toes   numbness  Eyes:   neg     use of corrective lenses  Ears:    hearing aides  Nose:    States deviated septum  neg    Mouth:    Dentures   Throat:   neg    Neck:   neg    Cardio:   neg    Respiratory:   neg    Endocrine:   neg    GI:   neg    :   neg    Musculoskeletal:    Hands have arthritis    arthralgias  Hematologic:    Transfusion with previous surgery in January 2023.  States no issues    history of blood transfusion   no blood clots  Skin:  neg         Physical Exam  Vitals reviewed.   Constitutional:       Appearance: Normal appearance.   HENT:      Head: Normocephalic and atraumatic.      Nose: Nose normal.      Mouth/Throat:      Mouth: Mucous membranes are moist.      Pharynx: Oropharynx is clear.   Eyes:      Extraocular Movements: Extraocular movements intact.      Pupils: Pupils are equal, round, and reactive to light.   Cardiovascular:      Rate and Rhythm: Normal rate and regular rhythm.      Heart sounds: Normal heart sounds.   Pulmonary:      Effort: Pulmonary effort is normal.      Breath sounds: Normal breath sounds.   Abdominal:      General: Abdomen is flat. Bowel sounds are normal.      Palpations: Abdomen is soft.   Musculoskeletal:         General: Normal range of motion.      Cervical back: Normal range of motion and neck supple.   Skin:     General: Skin is warm and dry.   Neurological:      Mental Status: She is alert and oriented to person, place, and time.   Psychiatric:         Mood and Affect: Mood normal.         Behavior: Behavior normal.         Thought Content: Thought content normal.         Judgment: Judgment normal.          PAT AIRWAY:   Airway:     Mallampati::  III    Neck ROM::  Full   upper dentures and lower dentures      Visit Vitals  /74   Pulse 83   Temp 36.3 °C (97.3 °F) (Temporal)       DASI Risk Score      Flowsheet Row Most Recent Value   DASI SCORE 24.2   METS Score (Will be calculated only when all the questions are answered) 5.7          Caprini DVT Assessment      Flowsheet Row Most Recent Value   DVT Score 10   Current Status COPD, Major surgery planned, lasting over 3 hours   History Prior major surgery   Age 60-75 years   BMI 30 or less          Modified Frailty Index      Flowsheet Row Most Recent Value   Modified Frailty Index Calculator .2723          CHADS2 Stroke Risk  Current as of today        4% 3 - 100%: High Risk   2 - 3%: Medium Risk   0 - 2%: Low Risk     No Change          This score  determines the patient's risk of having a stroke if the patient has atrial fibrillation.          Points Metrics   0 Has Congestive Heart Failure:  No     Patients with congestive heart failure get 1 point.    Current as of today   1 Has Hypertension:  Yes     Patients with hypertension get 1 point.    Current as of today   0 Age:  68     Patients who are 75 years of age or older get 1 point.    Current as of today   1 Has Diabetes:  Yes     Patients with diabetes get 1 point.    Current as of today   0 Had Stroke:  No  Had TIA:  No  Had Thromboembolism:  No     Patients who have had a stroke, TIA, or thromboembolism get 2 points.    Current as of today             Revised Cardiac Risk Index      Flowsheet Row Most Recent Value   Revised Cardiac Risk Calculator 2          Apfel Simplified Score      Flowsheet Row Most Recent Value   Apfel Simplified Score Calculator 3          Risk Analysis Index Results This Encounter    No data found in the last 1 encounters.       Stop Bang Score      Flowsheet Row Most Recent Value   Do you snore loudly? 0   Do you often feel tired or fatigued after your sleep? 1   Has anyone ever observed you stop breathing in your sleep? 0   Do you have or are you being treated for high blood pressure? 1   Recent BMI (Calculated) 29.7   Is BMI greater than 35 kg/m2? 0=No   Age older than 50 years old? 1=Yes   Is your neck circumference greater than 17 inches (Male) or 16 inches (Female)? 0   Gender - Male 0=No   STOP-BANG Total Score 3          Assessment and Plan:     Neuro:   The patient is at an increased risk for post operative delirium secondary to age >/= 65, cognitive impairment, depression, type and duration of surgery    The patient is at an increased risk for perioperative stroke secondary to increased age, HTN, HLD, DM, female, general anesthesia, and op time >2.5 hours    Patient given information on brain exercises and delirium prevention.    Depression  Currently controlling with  duloxetine.     History of meningioma of brain  Has had a resection in the past and follows with neurology    Demi Rahman, NP   [address] [place] [address] [place] [address]   [phone] (Work)    [phone] (Fax)     HEENT/Airway  No diagnosis or significant findings on chart review or clinical presentation and evaluation.    Cardiovascular  CARDS EVAL  The patient follows with cardiology, Dr. Dino Fox. Patient was last seen 1-3-24. Per note,   Assessment and Plan:  Paroxysmal atrial fibrillation: Postoperative atrial fibrillation.  Patient will be at increased risk for recurrence of this with the upcoming reexploration surgery.  I recommend we start amiodarone now as an outpatient to decrease that risk.  Patient was agreeable.  I spoke with her surgeon and he is agreeable as well.  No need for oral anticoagulation right now depending on her arrhythmia burden.  We might need to readdress this in the future.  I encouraged her to purchase the Kardia device so she can monitor how much atrial fibrillation she is having.  Repeat thoracic surgery: Patient tolerated the surgery from a cardiovascular standpoint with the exception of the atrial fibrillation.  I do not think any further risk stratification will decrease her risk.  No stress testing is needed.  Disp: RTO after repeat thoracic surgery     Atrial fib  Currently has restarted amiodarone and is on aspirin due to history of paroxysmal atrial fib    Hypertension  Currently controlled with amlodipine, /75    Hyperlipidemia  Currently taking atorvastatin. No recent lipid panel noted    RCRI  The patient meets 2 RCRI criteria and therefore has a 6.6% risk (elevated) of major adverse cardiac complications.  METS  The patient's functional capacity capacity is greater than 4 METS.  MACE  30-day risk for MACE of 2 predictors, 10.1% risk for cardiac death, nonfatal myocardial infarction, and nonfatal cardiac arrest  JOANA  0.1% risk for 26th  to 50th percentile     EKG  1-3-24 EKG showed sinus rhythm     1-17-23 Echo  CONCLUSIONS:   - Technically difficult exam due to body habitus.   - Exam indication: Preop evaluation for noncardiac surgery with low/intermediate clinical risk   - The left ventricle is normal in size. Left ventricular systolic function is normal. EF = 63 ? 5% (2D biplane) Normal left ventricular diastolic function.   - The right ventricle is normal in size. Right ventricular systolic function is moderately decreased.   - The right atrial cavity is dilated.   - The patient has not had a prior CC echocardiographic exam for comparison.     Pulmonary     COPD  Currently controlling with Wixela Inhub as well as albuterol PRN. Does not she has been needing to use her albuterol more frequently due to shortness of breath.     STOP BANG Score of 3, which places patient at intermediate risk for having CHEY.  ARISCAT 50, High, 42.1% risk of in-hospital postoperative pulmonary complications  PRODIGY 11, intermediate of respiratory depression episode.    Patient given information on deep breathing exercises.     Renal  No diagnosis or significant findings on chart review or clinical presentation and evaluation.    Endocrine    Diabetes Evaluation  Currently managed with metformin and pre prandial Novolin 70/30.   1-12-24: A1c 7.1    Hematology  No diagnosis or significant findings on chart review or clinical presentation and evaluation.    Caprini score 10, high risk of VTE  Transfusion Evaluation  A type and screen was obtained given the likelihood for perioperative transfusion of blood or blood products.    Patient given information on DVT prevention.     GI    GERD  Currently controlled with pantoprazole (Protonix)    Eat 10- 0  Apfel: 3 points 61%  risk for post operative nausea/vomiting.     Genitourinary  No diagnosis or significant findings on chart review or clinical presentation and evaluation.    ID  No diagnosis or significant findings on  chart review or clinical presentation and evaluation.    MRSA swab ordered  UA with reflex culture ordered  Chlorhexidine mouth wash and body wash ordered    Musculoskeletal    Spinal Stenosis  Currently follows with neurology and is taking naltrexone (Depade) and biotin for symptom management.     Demi Rahman, NP   [address] [place] [address] [place], [place] [id number] [phone] (Work)    [phone] (Fax)     -Preoperative medication instructions were provided and reviewed with the patient.  Any additional testing or evaluation was explained to the patient.  NPO Instructions were discussed, and the patient's questions were answered prior to conclusion of this encounter    Labs ordered:    1-15-24: MRSA swab-MSSA    Recent Results (from the past 168 hour(s))   Complete Pulmonary Function Test Pre/Post Bronchodialator (Spirometry Pre/Post/DLCO/Lung Volumes)    Collection Time: 01/12/24 11:54 AM   Result Value Ref Range    FVC - Predicted 2.33     FEV1 - Predicted 1.84     FVC - PRE 2.09     FEV1 - Pre 1.51    CBC    Collection Time: 01/12/24  1:46 PM   Result Value Ref Range    WBC 9.1 4.4 - 11.3 x10*3/uL    nRBC 0.0 0.0 - 0.0 /100 WBCs    RBC 5.15 4.00 - 5.20 x10*6/uL    Hemoglobin 12.5 12.0 - 16.0 g/dL    Hematocrit 41.4 36.0 - 46.0 %    MCV 80 80 - 100 fL    MCH 24.3 (L) 26.0 - 34.0 pg    MCHC 30.2 (L) 32.0 - 36.0 g/dL    RDW 14.7 (H) 11.5 - 14.5 %    Platelets 320 150 - 450 x10*3/uL   Basic Metabolic Panel    Collection Time: 01/12/24  1:46 PM   Result Value Ref Range    Glucose 176 (H) 74 - 99 mg/dL    Sodium 141 136 - 145 mmol/L    Potassium 4.0 3.5 - 5.3 mmol/L    Chloride 105 98 - 107 mmol/L    Bicarbonate 25 21 - 32 mmol/L    Anion Gap 15 10 - 20 mmol/L    Urea Nitrogen 12 6 - 23 mg/dL    Creatinine 0.85 0.50 - 1.05 mg/dL    eGFR 74 >60 mL/min/1.73m*2    Calcium 10.1 8.6 - 10.6 mg/dL   Type And Screen    Collection Time: 01/12/24  1:46 PM   Result Value Ref Range    ABO TYPE A     Rh TYPE POS      ANTIBODY SCREEN NEG    Staphylococcus aureus/MRSA colonization, Culture    Collection Time: 01/12/24  1:46 PM    Specimen: Nares/Axilla/Groin; Swab   Result Value Ref Range    Staph/MRSA Screen Culture (A)      Methicillin Susceptible Staphylococcus aureus (MSSA)   Hemoglobin A1C    Collection Time: 01/12/24  1:46 PM   Result Value Ref Range    Hemoglobin A1C 7.1 (H) see below %    Estimated Average Glucose 157 Not Established mg/dL   Coagulation Screen    Collection Time: 01/12/24  1:46 PM   Result Value Ref Range    Protime 10.6 9.8 - 12.8 seconds    INR 0.9 0.9 - 1.1    aPTT 31 27 - 38 seconds   Urinalysis with Reflex Culture and Microscopic    Collection Time: 01/12/24  1:46 PM   Result Value Ref Range    Color, Urine Yellow Straw, Yellow    Appearance, Urine Hazy (N) Clear    Specific Gravity, Urine >=1.030 1.005 - 1.035    pH, Urine 5.5 5.0, 5.5, 6.0, 6.5, 7.0, 7.5, 8.0    Protein, Urine 30 (1+) (A) NEGATIVE, TRACE mg/dL    Glucose, Urine 100 (1+) (A) NEGATIVE mg/dL    Blood, Urine NEGATIVE NEGATIVE    Ketones, Urine TRACE (A) NEGATIVE mg/dL    Bilirubin, Urine NEGATIVE NEGATIVE    Urobilinogen, Urine 0.2 0.2, 1.0 mg/dL    Nitrite, Urine NEGATIVE NEGATIVE    Leukocyte Esterase, Urine SMALL (1+) (A) NEGATIVE   Extra Urine Gray Tube    Collection Time: 01/12/24  1:46 PM   Result Value Ref Range    Extra Tube Hold for add-ons.    Microscopic Only, Urine    Collection Time: 01/12/24  1:46 PM   Result Value Ref Range    WBC, Urine 21-50 (A) 1-5, NONE /HPF    RBC, Urine 6-10 (A) NONE, 1-2, 3-5 /HPF    Squamous Epithelial Cells, Urine 1-9 (SPARSE) Reference range not established. /HPF    Bacteria, Urine 1+ (A) NONE SEEN /HPF    Mucus, Urine 3+ Reference range not established. /LPF    Hyaline Casts, Urine 2+ (A) NONE /LPF    Calcium Oxalate Crystals, Urine 4+ (A) NONE, 1+ /HPF   Urine Culture    Collection Time: 01/12/24  1:46 PM    Specimen: Clean Catch/Voided; Urine   Result Value Ref Range    Urine Culture No  significant growth        Results for orders placed or performed during the hospital encounter of 01/12/24   Complete Pulmonary Function Test Pre/Post Bronchodialator (Spirometry Pre/Post/DLCO/Lung Volumes)   Result Value Ref Range    FVC - Predicted 2.33     FEV1 - Predicted 1.84     FVC - PRE 2.09     FEV1 - Pre 1.51

## 2024-01-12 NOTE — PREPROCEDURE INSTRUCTIONS
NPO Instructions:    Do not eat any food after midnight the night before your surgery/procedure.  You may have 10 ounces clear liquids until TWO hours before surgery/procedure. This includes water, black tea/coffee, (no milk or cream) apple juice and electrolyte drinks (Gatorade).  You may chew gum up to TWO hours before your surgery/procedure.    Additional Instructions:     Seven/Six Days before Surgery:  We have sent a prescription for Hibiclens soap to your preferred pharmacy.  If you have not already, Please  your prescription and start using five days before surgery.  Follow the instruction sheet provided to you at your CPM/PAT appointment.  Review your medication instructions, stop indicated medications    Five Days before Surgery:  Review your medication instructions, stop indicated medications  Begin using your Hibiclens    Three Days before Surgery:  Review your medication instructions, stop indicated medications    The Day before Surgery:  Review your medication instructions, stop indicated medications  You will be contacted regarding the time of your arrival to facility and surgery time  Do not eat any food after Midnight    Day of Surgery:  Review your medication instructions, take indicated medications  If you have diabetes, please check your fasting blood sugar upon awakening.  If fasting blood sugar is <80 mg/dl, drink 100 ml of apple juice, time limit of 2 hours before  You may have clear liquids until TWO hours before surgery/procedure.  This includes water, black tea/coffee, (no milk or cream) apple juice and electrolyte drinks (Gatorade)  You may chew gum up to TWO hours before your surgery/procedure  Wear  comfortable loose fitting clothing  Do not use moisturizers, creams, lotions or perfume  All jewelry and valuables should be left at home    Avoid herbal supplements, multivitamins and NSAIDS (non-steroidal anti-inflammatory drugs) such as Advil, Aleve, Ibuprofen, Naproxen, Excedrin,  Meloxicam or Celebrex for at least 7 days prior to surgery. May take Tylenol as needed.    Will reach out to your neurologist for recommendation regarding naltrexone (Depade)    Renetta Curtis, MSN, NP-C, CNP  Family Nurse Practitioner  Department of Anesthesiology and Perioperative Medicine  Main phone: [phone]  Fax :[phone]  Direct: [phone]

## 2024-01-13 LAB — HOLD SPECIMEN: NORMAL

## 2024-01-14 LAB
BACTERIA UR CULT: NORMAL
STAPHYLOCOCCUS SPEC CULT: ABNORMAL

## 2024-01-16 RX ORDER — PEN NEEDLE, DIABETIC 29 G X1/2"
NEEDLE, DISPOSABLE MISCELLANEOUS
Qty: 100 EACH | Refills: 3 | Status: SHIPPED | OUTPATIENT
Start: 2024-01-16

## 2024-01-22 LAB
MGC ASCENT PFT - FEV1 - PRE: 1.51
MGC ASCENT PFT - FEV1 - PREDICTED: 1.84
MGC ASCENT PFT - FVC - PRE: 2.09
MGC ASCENT PFT - FVC - PREDICTED: 2.33

## 2024-01-26 PROBLEM — C34.90 NON-SMALL CELL LUNG CANCER WITHOUT METASTASIS (MULTI): Status: ACTIVE | Noted: 2024-01-26

## 2024-01-29 ENCOUNTER — ANESTHESIA EVENT (OUTPATIENT)
Dept: OPERATING ROOM | Facility: HOSPITAL | Age: 69
DRG: 165 | End: 2024-01-29
Payer: MEDICARE

## 2024-01-29 DIAGNOSIS — E11.65 UNCONTROLLED TYPE 2 DIABETES MELLITUS WITH HYPERGLYCEMIA (HCC): ICD-10-CM

## 2024-01-29 LAB
ANION GAP SERPL CALCULATED.3IONS-SCNC: 13 MEQ/L (ref 9–15)
BUN SERPL-MCNC: 12 MG/DL (ref 8–23)
CALCIUM SERPL-MCNC: 9.6 MG/DL (ref 8.5–9.9)
CHLORIDE SERPL-SCNC: 108 MEQ/L (ref 95–107)
CHOLEST SERPL-MCNC: 216 MG/DL (ref 0–199)
CO2 SERPL-SCNC: 22 MEQ/L (ref 20–31)
CREAT SERPL-MCNC: 0.72 MG/DL (ref 0.5–0.9)
GLUCOSE SERPL-MCNC: 164 MG/DL (ref 70–99)
HBA1C MFR BLD: 7.5 % (ref 4.8–5.9)
HDLC SERPL-MCNC: 41 MG/DL (ref 40–59)
LDL CHOLESTEROL CALCULATED: 109 MG/DL (ref 0–129)
POTASSIUM SERPL-SCNC: 4.2 MEQ/L (ref 3.4–4.9)
SODIUM SERPL-SCNC: 143 MEQ/L (ref 135–144)
TRIGLYCERIDE, FASTING: 328 MG/DL (ref 0–150)

## 2024-01-30 ENCOUNTER — ANESTHESIA (OUTPATIENT)
Dept: OPERATING ROOM | Facility: HOSPITAL | Age: 69
DRG: 165 | End: 2024-01-30
Payer: MEDICARE

## 2024-01-30 ENCOUNTER — APPOINTMENT (OUTPATIENT)
Dept: RADIOLOGY | Facility: HOSPITAL | Age: 69
DRG: 165 | End: 2024-01-30
Payer: MEDICARE

## 2024-01-30 ENCOUNTER — HOSPITAL ENCOUNTER (INPATIENT)
Facility: HOSPITAL | Age: 69
LOS: 2 days | Discharge: HOME | DRG: 165 | End: 2024-02-01
Attending: STUDENT IN AN ORGANIZED HEALTH CARE EDUCATION/TRAINING PROGRAM | Admitting: STUDENT IN AN ORGANIZED HEALTH CARE EDUCATION/TRAINING PROGRAM
Payer: MEDICARE

## 2024-01-30 DIAGNOSIS — C34.90 NON-SMALL CELL LUNG CANCER WITHOUT METASTASIS (MULTI): ICD-10-CM

## 2024-01-30 DIAGNOSIS — C34.92 LOCAL RECURRENCE OF LEFT LUNG CANCER (MULTI): Primary | ICD-10-CM

## 2024-01-30 PROBLEM — G47.33 OSA (OBSTRUCTIVE SLEEP APNEA): Status: ACTIVE | Noted: 2024-01-30

## 2024-01-30 LAB
ABO GROUP (TYPE) IN BLOOD: NORMAL
ANTIBODY SCREEN: NORMAL
GLUCOSE BLD MANUAL STRIP-MCNC: 159 MG/DL (ref 74–99)
GLUCOSE BLD MANUAL STRIP-MCNC: 212 MG/DL (ref 74–99)
GLUCOSE BLD MANUAL STRIP-MCNC: 258 MG/DL (ref 74–99)
RH FACTOR (ANTIGEN D): NORMAL

## 2024-01-30 PROCEDURE — 71045 X-RAY EXAM CHEST 1 VIEW: CPT | Mod: FOREIGN READ | Performed by: RADIOLOGY

## 2024-01-30 PROCEDURE — 2500000004 HC RX 250 GENERAL PHARMACY W/ HCPCS (ALT 636 FOR OP/ED): Performed by: STUDENT IN AN ORGANIZED HEALTH CARE EDUCATION/TRAINING PROGRAM

## 2024-01-30 PROCEDURE — 88341 IMHCHEM/IMCYTCHM EA ADD ANTB: CPT | Performed by: PATHOLOGY

## 2024-01-30 PROCEDURE — 2500000002 HC RX 250 W HCPCS SELF ADMINISTERED DRUGS (ALT 637 FOR MEDICARE OP, ALT 636 FOR OP/ED): Performed by: STUDENT IN AN ORGANIZED HEALTH CARE EDUCATION/TRAINING PROGRAM

## 2024-01-30 PROCEDURE — 82947 ASSAY GLUCOSE BLOOD QUANT: CPT

## 2024-01-30 PROCEDURE — 88307 TISSUE EXAM BY PATHOLOGIST: CPT | Mod: TC,SUR | Performed by: STUDENT IN AN ORGANIZED HEALTH CARE EDUCATION/TRAINING PROGRAM

## 2024-01-30 PROCEDURE — 32662 THORACOSCOPY W/MEDIAST EXC: CPT | Performed by: PHYSICIAN ASSISTANT

## 2024-01-30 PROCEDURE — 03LY4CZ OCCLUSION OF UPPER ARTERY WITH EXTRALUMINAL DEVICE, PERCUTANEOUS ENDOSCOPIC APPROACH: ICD-10-PCS | Performed by: STUDENT IN AN ORGANIZED HEALTH CARE EDUCATION/TRAINING PROGRAM

## 2024-01-30 PROCEDURE — 0BHT4MZ INSERTION OF DIAPHRAGMATIC PACEMAKER LEAD INTO DIAPHRAGM, PERCUTANEOUS ENDOSCOPIC APPROACH: ICD-10-PCS | Performed by: STUDENT IN AN ORGANIZED HEALTH CARE EDUCATION/TRAINING PROGRAM

## 2024-01-30 PROCEDURE — 3700000002 HC GENERAL ANESTHESIA TIME - EACH INCREMENTAL 1 MINUTE: Performed by: STUDENT IN AN ORGANIZED HEALTH CARE EDUCATION/TRAINING PROGRAM

## 2024-01-30 PROCEDURE — 7100000002 HC RECOVERY ROOM TIME - EACH INCREMENTAL 1 MINUTE: Performed by: STUDENT IN AN ORGANIZED HEALTH CARE EDUCATION/TRAINING PROGRAM

## 2024-01-30 PROCEDURE — 3600000017 HC OR TIME - EACH INCREMENTAL 1 MINUTE - PROCEDURE LEVEL SIX: Performed by: STUDENT IN AN ORGANIZED HEALTH CARE EDUCATION/TRAINING PROGRAM

## 2024-01-30 PROCEDURE — A31622 PR BRONCHOSCOPY,DIAGNOSTIC: Performed by: ANESTHESIOLOGY

## 2024-01-30 PROCEDURE — 0BNT4ZZ RELEASE DIAPHRAGM, PERCUTANEOUS ENDOSCOPIC APPROACH: ICD-10-PCS | Performed by: STUDENT IN AN ORGANIZED HEALTH CARE EDUCATION/TRAINING PROGRAM

## 2024-01-30 PROCEDURE — 36620 INSERTION CATHETER ARTERY: CPT | Performed by: STUDENT IN AN ORGANIZED HEALTH CARE EDUCATION/TRAINING PROGRAM

## 2024-01-30 PROCEDURE — 8E0W4CZ ROBOTIC ASSISTED PROCEDURE OF TRUNK REGION, PERCUTANEOUS ENDOSCOPIC APPROACH: ICD-10-PCS | Performed by: STUDENT IN AN ORGANIZED HEALTH CARE EDUCATION/TRAINING PROGRAM

## 2024-01-30 PROCEDURE — 2720000007 HC OR 272 NO HCPCS: Performed by: STUDENT IN AN ORGANIZED HEALTH CARE EDUCATION/TRAINING PROGRAM

## 2024-01-30 PROCEDURE — 05LY4CZ OCCLUSION OF UPPER VEIN WITH EXTRALUMINAL DEVICE, PERCUTANEOUS ENDOSCOPIC APPROACH: ICD-10-PCS | Performed by: STUDENT IN AN ORGANIZED HEALTH CARE EDUCATION/TRAINING PROGRAM

## 2024-01-30 PROCEDURE — 31622 DX BRONCHOSCOPE/WASH: CPT | Performed by: STUDENT IN AN ORGANIZED HEALTH CARE EDUCATION/TRAINING PROGRAM

## 2024-01-30 PROCEDURE — 1200000002 HC GENERAL ROOM WITH TELEMETRY DAILY

## 2024-01-30 PROCEDURE — 36415 COLL VENOUS BLD VENIPUNCTURE: CPT | Performed by: STUDENT IN AN ORGANIZED HEALTH CARE EDUCATION/TRAINING PROGRAM

## 2024-01-30 PROCEDURE — 2500000005 HC RX 250 GENERAL PHARMACY W/O HCPCS: Performed by: STUDENT IN AN ORGANIZED HEALTH CARE EDUCATION/TRAINING PROGRAM

## 2024-01-30 PROCEDURE — 3E0T3BZ INTRODUCTION OF ANESTHETIC AGENT INTO PERIPHERAL NERVES AND PLEXI, PERCUTANEOUS APPROACH: ICD-10-PCS | Performed by: STUDENT IN AN ORGANIZED HEALTH CARE EDUCATION/TRAINING PROGRAM

## 2024-01-30 PROCEDURE — 86901 BLOOD TYPING SEROLOGIC RH(D): CPT | Performed by: STUDENT IN AN ORGANIZED HEALTH CARE EDUCATION/TRAINING PROGRAM

## 2024-01-30 PROCEDURE — 2500000001 HC RX 250 WO HCPCS SELF ADMINISTERED DRUGS (ALT 637 FOR MEDICARE OP): Performed by: STUDENT IN AN ORGANIZED HEALTH CARE EDUCATION/TRAINING PROGRAM

## 2024-01-30 PROCEDURE — 32662 THORACOSCOPY W/MEDIAST EXC: CPT | Performed by: STUDENT IN AN ORGANIZED HEALTH CARE EDUCATION/TRAINING PROGRAM

## 2024-01-30 PROCEDURE — 39599 UNLISTED PX DIAPHRAGM: CPT | Performed by: STUDENT IN AN ORGANIZED HEALTH CARE EDUCATION/TRAINING PROGRAM

## 2024-01-30 PROCEDURE — 3700000001 HC GENERAL ANESTHESIA TIME - INITIAL BASE CHARGE: Performed by: STUDENT IN AN ORGANIZED HEALTH CARE EDUCATION/TRAINING PROGRAM

## 2024-01-30 PROCEDURE — 88307 TISSUE EXAM BY PATHOLOGIST: CPT | Performed by: PATHOLOGY

## 2024-01-30 PROCEDURE — 7100000001 HC RECOVERY ROOM TIME - INITIAL BASE CHARGE: Performed by: STUDENT IN AN ORGANIZED HEALTH CARE EDUCATION/TRAINING PROGRAM

## 2024-01-30 PROCEDURE — 2500000004 HC RX 250 GENERAL PHARMACY W/ HCPCS (ALT 636 FOR OP/ED): Performed by: PHYSICIAN ASSISTANT

## 2024-01-30 PROCEDURE — 71045 X-RAY EXAM CHEST 1 VIEW: CPT

## 2024-01-30 PROCEDURE — 2500000001 HC RX 250 WO HCPCS SELF ADMINISTERED DRUGS (ALT 637 FOR MEDICARE OP): Performed by: PHYSICIAN ASSISTANT

## 2024-01-30 PROCEDURE — 0WBC4ZZ EXCISION OF MEDIASTINUM, PERCUTANEOUS ENDOSCOPIC APPROACH: ICD-10-PCS | Performed by: STUDENT IN AN ORGANIZED HEALTH CARE EDUCATION/TRAINING PROGRAM

## 2024-01-30 PROCEDURE — 0BJ08ZZ INSPECTION OF TRACHEOBRONCHIAL TREE, VIA NATURAL OR ARTIFICIAL OPENING ENDOSCOPIC: ICD-10-PCS | Performed by: STUDENT IN AN ORGANIZED HEALTH CARE EDUCATION/TRAINING PROGRAM

## 2024-01-30 PROCEDURE — 3600000018 HC OR TIME - INITIAL BASE CHARGE - PROCEDURE LEVEL SIX: Performed by: STUDENT IN AN ORGANIZED HEALTH CARE EDUCATION/TRAINING PROGRAM

## 2024-01-30 PROCEDURE — 88342 IMHCHEM/IMCYTCHM 1ST ANTB: CPT | Performed by: PATHOLOGY

## 2024-01-30 PROCEDURE — 2500000002 HC RX 250 W HCPCS SELF ADMINISTERED DRUGS (ALT 637 FOR MEDICARE OP, ALT 636 FOR OP/ED): Performed by: PHYSICIAN ASSISTANT

## 2024-01-30 RX ORDER — ACETAMINOPHEN 500 MG
5000 TABLET ORAL DAILY
COMMUNITY

## 2024-01-30 RX ORDER — PROPOFOL 10 MG/ML
INJECTION, EMULSION INTRAVENOUS AS NEEDED
Status: DISCONTINUED | OUTPATIENT
Start: 2024-01-30 | End: 2024-01-30

## 2024-01-30 RX ORDER — CALCIUM CARBONATE 200(500)MG
500 TABLET,CHEWABLE ORAL DAILY
Status: CANCELLED | OUTPATIENT
Start: 2024-01-31

## 2024-01-30 RX ORDER — ACETAMINOPHEN 325 MG/1
650 TABLET ORAL EVERY 4 HOURS PRN
Status: DISCONTINUED | OUTPATIENT
Start: 2024-01-30 | End: 2024-02-01 | Stop reason: HOSPADM

## 2024-01-30 RX ORDER — ACETAMINOPHEN 325 MG/1
650 TABLET ORAL EVERY 4 HOURS PRN
Status: DISCONTINUED | OUTPATIENT
Start: 2024-01-30 | End: 2024-01-30 | Stop reason: HOSPADM

## 2024-01-30 RX ORDER — CALCIUM CARBONATE 200(500)MG
500 TABLET,CHEWABLE ORAL DAILY
Status: DISCONTINUED | OUTPATIENT
Start: 2024-01-30 | End: 2024-02-01 | Stop reason: HOSPADM

## 2024-01-30 RX ORDER — ONDANSETRON HYDROCHLORIDE 2 MG/ML
INJECTION, SOLUTION INTRAVENOUS AS NEEDED
Status: DISCONTINUED | OUTPATIENT
Start: 2024-01-30 | End: 2024-01-30

## 2024-01-30 RX ORDER — NAPROXEN SODIUM 220 MG/1
81 TABLET, FILM COATED ORAL DAILY
Status: DISCONTINUED | OUTPATIENT
Start: 2024-01-31 | End: 2024-02-01 | Stop reason: HOSPADM

## 2024-01-30 RX ORDER — LIDOCAINE HYDROCHLORIDE 10 MG/ML
0.1 INJECTION INFILTRATION; PERINEURAL ONCE
Status: DISCONTINUED | OUTPATIENT
Start: 2024-01-30 | End: 2024-01-30 | Stop reason: HOSPADM

## 2024-01-30 RX ORDER — SODIUM CHLORIDE, SODIUM LACTATE, POTASSIUM CHLORIDE, CALCIUM CHLORIDE 600; 310; 30; 20 MG/100ML; MG/100ML; MG/100ML; MG/100ML
100 INJECTION, SOLUTION INTRAVENOUS CONTINUOUS
Status: DISCONTINUED | OUTPATIENT
Start: 2024-01-30 | End: 2024-01-30

## 2024-01-30 RX ORDER — CEFAZOLIN 1 G/1
INJECTION, POWDER, FOR SOLUTION INTRAVENOUS AS NEEDED
Status: DISCONTINUED | OUTPATIENT
Start: 2024-01-30 | End: 2024-01-30

## 2024-01-30 RX ORDER — TOPIRAMATE 25 MG/1
50 TABLET ORAL 2 TIMES DAILY
Status: DISCONTINUED | OUTPATIENT
Start: 2024-01-30 | End: 2024-02-01 | Stop reason: HOSPADM

## 2024-01-30 RX ORDER — DEXTROSE 50 % IN WATER (D50W) INTRAVENOUS SYRINGE
25
Status: DISCONTINUED | OUTPATIENT
Start: 2024-01-30 | End: 2024-02-01 | Stop reason: HOSPADM

## 2024-01-30 RX ORDER — CEFAZOLIN SODIUM 2 G/100ML
2 INJECTION, SOLUTION INTRAVENOUS EVERY 8 HOURS
Status: COMPLETED | OUTPATIENT
Start: 2024-01-30 | End: 2024-01-31

## 2024-01-30 RX ORDER — LABETALOL HYDROCHLORIDE 5 MG/ML
INJECTION, SOLUTION INTRAVENOUS AS NEEDED
Status: DISCONTINUED | OUTPATIENT
Start: 2024-01-30 | End: 2024-01-30

## 2024-01-30 RX ORDER — ACETAMINOPHEN, PSEUDOEPHEDRINE HYDROCHLORIDE, DEXTROMETHORPHAN HYDROBROMIDE, AND CHLORPHENIRAMINE MALEATE 15-30-500
KIT ORAL
COMMUNITY
End: 2024-02-01 | Stop reason: HOSPADM

## 2024-01-30 RX ORDER — FENTANYL CITRATE 50 UG/ML
INJECTION, SOLUTION INTRAMUSCULAR; INTRAVENOUS
Status: COMPLETED
Start: 2024-01-30 | End: 2024-01-30

## 2024-01-30 RX ORDER — AMIODARONE HYDROCHLORIDE 200 MG/1
200 TABLET ORAL DAILY
Status: DISCONTINUED | OUTPATIENT
Start: 2024-01-31 | End: 2024-02-01 | Stop reason: HOSPADM

## 2024-01-30 RX ORDER — IPRATROPIUM BROMIDE AND ALBUTEROL SULFATE 2.5; .5 MG/3ML; MG/3ML
3 SOLUTION RESPIRATORY (INHALATION) EVERY 6 HOURS PRN
Status: DISCONTINUED | OUTPATIENT
Start: 2024-01-30 | End: 2024-02-01 | Stop reason: HOSPADM

## 2024-01-30 RX ORDER — DEXAMETHASONE SODIUM PHOSPHATE 4 MG/ML
INJECTION, SOLUTION INTRA-ARTICULAR; INTRALESIONAL; INTRAMUSCULAR; INTRAVENOUS; SOFT TISSUE AS NEEDED
Status: DISCONTINUED | OUTPATIENT
Start: 2024-01-30 | End: 2024-01-30

## 2024-01-30 RX ORDER — HYDROMORPHONE HYDROCHLORIDE 1 MG/ML
INJECTION, SOLUTION INTRAMUSCULAR; INTRAVENOUS; SUBCUTANEOUS AS NEEDED
Status: DISCONTINUED | OUTPATIENT
Start: 2024-01-30 | End: 2024-01-30

## 2024-01-30 RX ORDER — BUPIVACAINE HYDROCHLORIDE 2.5 MG/ML
INJECTION, SOLUTION INFILTRATION; PERINEURAL AS NEEDED
Status: DISCONTINUED | OUTPATIENT
Start: 2024-01-30 | End: 2024-01-30 | Stop reason: HOSPADM

## 2024-01-30 RX ORDER — SODIUM CHLORIDE, SODIUM LACTATE, POTASSIUM CHLORIDE, CALCIUM CHLORIDE 600; 310; 30; 20 MG/100ML; MG/100ML; MG/100ML; MG/100ML
100 INJECTION, SOLUTION INTRAVENOUS CONTINUOUS
Status: DISCONTINUED | OUTPATIENT
Start: 2024-01-30 | End: 2024-01-30 | Stop reason: HOSPADM

## 2024-01-30 RX ORDER — HEPARIN SODIUM 5000 [USP'U]/ML
5000 INJECTION, SOLUTION INTRAVENOUS; SUBCUTANEOUS EVERY 8 HOURS
Status: DISCONTINUED | OUTPATIENT
Start: 2024-01-30 | End: 2024-02-01 | Stop reason: HOSPADM

## 2024-01-30 RX ORDER — ROCURONIUM BROMIDE 10 MG/ML
INJECTION, SOLUTION INTRAVENOUS AS NEEDED
Status: DISCONTINUED | OUTPATIENT
Start: 2024-01-30 | End: 2024-01-30

## 2024-01-30 RX ORDER — PHENYLEPHRINE HCL IN 0.9% NACL 0.4MG/10ML
SYRINGE (ML) INTRAVENOUS AS NEEDED
Status: DISCONTINUED | OUTPATIENT
Start: 2024-01-30 | End: 2024-01-30

## 2024-01-30 RX ORDER — DEXTROSE MONOHYDRATE 100 MG/ML
0.3 INJECTION, SOLUTION INTRAVENOUS ONCE AS NEEDED
Status: DISCONTINUED | OUTPATIENT
Start: 2024-01-30 | End: 2024-02-01 | Stop reason: HOSPADM

## 2024-01-30 RX ORDER — ATORVASTATIN CALCIUM 10 MG/1
10 TABLET, FILM COATED ORAL
Status: DISCONTINUED | OUTPATIENT
Start: 2024-01-31 | End: 2024-02-01 | Stop reason: HOSPADM

## 2024-01-30 RX ORDER — HYDROMORPHONE HYDROCHLORIDE 1 MG/ML
0.2 INJECTION, SOLUTION INTRAMUSCULAR; INTRAVENOUS; SUBCUTANEOUS EVERY 5 MIN PRN
Status: DISCONTINUED | OUTPATIENT
Start: 2024-01-30 | End: 2024-01-30 | Stop reason: HOSPADM

## 2024-01-30 RX ORDER — DEXTROSE 50 % IN WATER (D50W) INTRAVENOUS SYRINGE
25
Status: DISCONTINUED | OUTPATIENT
Start: 2024-01-30 | End: 2024-01-31

## 2024-01-30 RX ORDER — ONDANSETRON HYDROCHLORIDE 2 MG/ML
4 INJECTION, SOLUTION INTRAVENOUS ONCE AS NEEDED
Status: DISCONTINUED | OUTPATIENT
Start: 2024-01-30 | End: 2024-01-30 | Stop reason: HOSPADM

## 2024-01-30 RX ORDER — DEXTROSE MONOHYDRATE 100 MG/ML
0.3 INJECTION, SOLUTION INTRAVENOUS ONCE AS NEEDED
Status: DISCONTINUED | OUTPATIENT
Start: 2024-01-30 | End: 2024-01-31

## 2024-01-30 RX ORDER — PROPOFOL 10 MG/ML
INJECTION, EMULSION INTRAVENOUS
Status: COMPLETED
Start: 2024-01-30 | End: 2024-01-30

## 2024-01-30 RX ORDER — ONDANSETRON HYDROCHLORIDE 2 MG/ML
4 INJECTION, SOLUTION INTRAVENOUS EVERY 8 HOURS PRN
Status: DISCONTINUED | OUTPATIENT
Start: 2024-01-30 | End: 2024-02-01 | Stop reason: HOSPADM

## 2024-01-30 RX ORDER — HYDROMORPHONE HYDROCHLORIDE 1 MG/ML
INJECTION, SOLUTION INTRAMUSCULAR; INTRAVENOUS; SUBCUTANEOUS
Status: COMPLETED
Start: 2024-01-30 | End: 2024-01-30

## 2024-01-30 RX ORDER — HYDROMORPHONE HCL/0.9% NACL/PF 15 MG/30ML
PATIENT CONTROLLED ANALGESIA SYRINGE INTRAVENOUS CONTINUOUS
Status: DISCONTINUED | OUTPATIENT
Start: 2024-01-30 | End: 2024-01-31

## 2024-01-30 RX ORDER — INSULIN LISPRO 100 [IU]/ML
0-5 INJECTION, SOLUTION INTRAVENOUS; SUBCUTANEOUS
Status: DISCONTINUED | OUTPATIENT
Start: 2024-01-30 | End: 2024-01-31

## 2024-01-30 RX ORDER — HEPARIN SODIUM 5000 [USP'U]/ML
5000 INJECTION, SOLUTION INTRAVENOUS; SUBCUTANEOUS ONCE
Status: COMPLETED | OUTPATIENT
Start: 2024-01-30 | End: 2024-01-30

## 2024-01-30 RX ORDER — PANTOPRAZOLE SODIUM 40 MG/1
40 TABLET, DELAYED RELEASE ORAL
Status: DISCONTINUED | OUTPATIENT
Start: 2024-01-31 | End: 2024-02-01 | Stop reason: HOSPADM

## 2024-01-30 RX ORDER — INSULIN LISPRO 100 [IU]/ML
3 INJECTION, SOLUTION INTRAVENOUS; SUBCUTANEOUS ONCE
Status: COMPLETED | OUTPATIENT
Start: 2024-01-30 | End: 2024-01-30

## 2024-01-30 RX ORDER — LIDOCAINE HYDROCHLORIDE 20 MG/ML
INJECTION, SOLUTION INFILTRATION; PERINEURAL AS NEEDED
Status: DISCONTINUED | OUTPATIENT
Start: 2024-01-30 | End: 2024-01-30

## 2024-01-30 RX ORDER — CEFAZOLIN SODIUM 2 G/100ML
2 INJECTION, SOLUTION INTRAVENOUS ONCE
Status: DISCONTINUED | OUTPATIENT
Start: 2024-01-30 | End: 2024-01-30 | Stop reason: HOSPADM

## 2024-01-30 RX ORDER — FENTANYL CITRATE 50 UG/ML
INJECTION, SOLUTION INTRAMUSCULAR; INTRAVENOUS AS NEEDED
Status: DISCONTINUED | OUTPATIENT
Start: 2024-01-30 | End: 2024-01-30

## 2024-01-30 RX ORDER — HYDROMORPHONE HYDROCHLORIDE 1 MG/ML
0.5 INJECTION, SOLUTION INTRAMUSCULAR; INTRAVENOUS; SUBCUTANEOUS EVERY 5 MIN PRN
Status: DISCONTINUED | OUTPATIENT
Start: 2024-01-30 | End: 2024-01-30 | Stop reason: HOSPADM

## 2024-01-30 RX ORDER — FLUTICASONE FUROATE AND VILANTEROL 200; 25 UG/1; UG/1
1 POWDER RESPIRATORY (INHALATION)
Status: DISCONTINUED | OUTPATIENT
Start: 2024-01-31 | End: 2024-02-01 | Stop reason: HOSPADM

## 2024-01-30 RX ORDER — NALOXONE HYDROCHLORIDE 0.4 MG/ML
0.2 INJECTION, SOLUTION INTRAMUSCULAR; INTRAVENOUS; SUBCUTANEOUS AS NEEDED
Status: DISCONTINUED | OUTPATIENT
Start: 2024-01-30 | End: 2024-02-01 | Stop reason: HOSPADM

## 2024-01-30 RX ORDER — AMOXICILLIN 250 MG
2 CAPSULE ORAL 2 TIMES DAILY
Status: DISCONTINUED | OUTPATIENT
Start: 2024-01-30 | End: 2024-02-01 | Stop reason: HOSPADM

## 2024-01-30 RX ADMIN — Medication: at 16:16

## 2024-01-30 RX ADMIN — SENNOSIDES AND DOCUSATE SODIUM 2 TABLET: 8.6; 5 TABLET ORAL at 22:33

## 2024-01-30 RX ADMIN — HYDROMORPHONE HYDROCHLORIDE 0.6 MG: 1 INJECTION, SOLUTION INTRAMUSCULAR; INTRAVENOUS; SUBCUTANEOUS at 15:01

## 2024-01-30 RX ADMIN — FENTANYL CITRATE 50 MCG: 50 INJECTION, SOLUTION INTRAMUSCULAR; INTRAVENOUS at 13:16

## 2024-01-30 RX ADMIN — DEXAMETHASONE SODIUM PHOSPHATE 4 MG: 4 INJECTION, SOLUTION INTRAMUSCULAR; INTRAVENOUS at 12:03

## 2024-01-30 RX ADMIN — CEFAZOLIN 2 G: 330 INJECTION, POWDER, FOR SOLUTION INTRAMUSCULAR; INTRAVENOUS at 11:47

## 2024-01-30 RX ADMIN — HYDROMORPHONE HYDROCHLORIDE 0.2 MG: 1 INJECTION, SOLUTION INTRAMUSCULAR; INTRAVENOUS; SUBCUTANEOUS at 15:36

## 2024-01-30 RX ADMIN — Medication 80 MCG: at 12:34

## 2024-01-30 RX ADMIN — FENTANYL CITRATE 50 MCG: 50 INJECTION, SOLUTION INTRAMUSCULAR; INTRAVENOUS at 11:11

## 2024-01-30 RX ADMIN — HYDROMORPHONE HYDROCHLORIDE 0.4 MG: 1 INJECTION, SOLUTION INTRAMUSCULAR; INTRAVENOUS; SUBCUTANEOUS at 13:52

## 2024-01-30 RX ADMIN — CEFAZOLIN SODIUM 2 G: 2 INJECTION, SOLUTION INTRAVENOUS at 20:59

## 2024-01-30 RX ADMIN — LIDOCAINE HYDROCHLORIDE 90 MG: 20 INJECTION, SOLUTION INFILTRATION; PERINEURAL at 11:11

## 2024-01-30 RX ADMIN — CALCIUM CARBONATE (ANTACID) CHEW TAB 500 MG 500 MG: 500 CHEW TAB at 22:33

## 2024-01-30 RX ADMIN — ROCURONIUM BROMIDE 70 MG: 10 INJECTION, SOLUTION INTRAVENOUS at 11:11

## 2024-01-30 RX ADMIN — SODIUM CHLORIDE, POTASSIUM CHLORIDE, SODIUM LACTATE AND CALCIUM CHLORIDE: 600; 310; 30; 20 INJECTION, SOLUTION INTRAVENOUS at 11:01

## 2024-01-30 RX ADMIN — ROCURONIUM BROMIDE 20 MG: 10 INJECTION, SOLUTION INTRAVENOUS at 12:26

## 2024-01-30 RX ADMIN — INSULIN LISPRO 3 UNITS: 100 INJECTION, SOLUTION INTRAVENOUS; SUBCUTANEOUS at 22:38

## 2024-01-30 RX ADMIN — SODIUM CHLORIDE, POTASSIUM CHLORIDE, SODIUM LACTATE AND CALCIUM CHLORIDE: 600; 310; 30; 20 INJECTION, SOLUTION INTRAVENOUS at 11:06

## 2024-01-30 RX ADMIN — HEPARIN SODIUM 5000 UNITS: 5000 INJECTION INTRAVENOUS; SUBCUTANEOUS at 20:59

## 2024-01-30 RX ADMIN — Medication 80 MCG: at 13:29

## 2024-01-30 RX ADMIN — HEPARIN SODIUM 5000 UNITS: 5000 INJECTION INTRAVENOUS; SUBCUTANEOUS at 10:31

## 2024-01-30 RX ADMIN — ROCURONIUM BROMIDE 20 MG: 10 INJECTION, SOLUTION INTRAVENOUS at 13:15

## 2024-01-30 RX ADMIN — TOPIRAMATE 50 MG: 25 TABLET, FILM COATED ORAL at 20:59

## 2024-01-30 RX ADMIN — PROPOFOL 120 MG: 10 INJECTION, EMULSION INTRAVENOUS at 11:11

## 2024-01-30 RX ADMIN — HYDROMORPHONE HYDROCHLORIDE 0.5 MG: 1 INJECTION, SOLUTION INTRAMUSCULAR; INTRAVENOUS; SUBCUTANEOUS at 15:18

## 2024-01-30 RX ADMIN — ONDANSETRON 4 MG: 2 INJECTION, SOLUTION INTRAMUSCULAR; INTRAVENOUS at 13:59

## 2024-01-30 RX ADMIN — Medication 160 MCG: at 12:23

## 2024-01-30 RX ADMIN — INSULIN LISPRO 2 UNITS: 100 INJECTION, SOLUTION INTRAVENOUS; SUBCUTANEOUS at 18:21

## 2024-01-30 RX ADMIN — LABETALOL HYDROCHLORIDE 5 MG: 5 INJECTION, SOLUTION INTRAVENOUS at 14:35

## 2024-01-30 RX ADMIN — Medication 80 MCG: at 13:01

## 2024-01-30 SDOH — SOCIAL STABILITY: SOCIAL INSECURITY: HAVE YOU HAD THOUGHTS OF HARMING ANYONE ELSE?: NO

## 2024-01-30 SDOH — SOCIAL STABILITY: SOCIAL INSECURITY: ABUSE: ADULT

## 2024-01-30 SDOH — SOCIAL STABILITY: SOCIAL INSECURITY: WERE YOU ABLE TO COMPLETE ALL THE BEHAVIORAL HEALTH SCREENINGS?: YES

## 2024-01-30 SDOH — SOCIAL STABILITY: SOCIAL INSECURITY: HAS ANYONE EVER THREATENED TO HURT YOUR FAMILY OR YOUR PETS?: NO

## 2024-01-30 SDOH — SOCIAL STABILITY: SOCIAL INSECURITY: ARE YOU OR HAVE YOU BEEN THREATENED OR ABUSED PHYSICALLY, EMOTIONALLY, OR SEXUALLY BY ANYONE?: NO

## 2024-01-30 SDOH — SOCIAL STABILITY: SOCIAL INSECURITY: DOES ANYONE TRY TO KEEP YOU FROM HAVING/CONTACTING OTHER FRIENDS OR DOING THINGS OUTSIDE YOUR HOME?: NO

## 2024-01-30 SDOH — SOCIAL STABILITY: SOCIAL INSECURITY: DO YOU FEEL ANYONE HAS EXPLOITED OR TAKEN ADVANTAGE OF YOU FINANCIALLY OR OF YOUR PERSONAL PROPERTY?: NO

## 2024-01-30 SDOH — HEALTH STABILITY: MENTAL HEALTH: CURRENT SMOKER: 0

## 2024-01-30 SDOH — SOCIAL STABILITY: SOCIAL INSECURITY: ARE THERE ANY APPARENT SIGNS OF INJURIES/BEHAVIORS THAT COULD BE RELATED TO ABUSE/NEGLECT?: NO

## 2024-01-30 SDOH — SOCIAL STABILITY: SOCIAL INSECURITY: DO YOU FEEL UNSAFE GOING BACK TO THE PLACE WHERE YOU ARE LIVING?: NO

## 2024-01-30 ASSESSMENT — COGNITIVE AND FUNCTIONAL STATUS - GENERAL
TOILETING: A LITTLE
MOVING TO AND FROM BED TO CHAIR: A LITTLE
WALKING IN HOSPITAL ROOM: A LITTLE
TURNING FROM BACK TO SIDE WHILE IN FLAT BAD: A LITTLE
PATIENT BASELINE BEDBOUND: NO
MOBILITY SCORE: 18
HELP NEEDED FOR BATHING: A LITTLE
DAILY ACTIVITIY SCORE: 19
STANDING UP FROM CHAIR USING ARMS: A LITTLE
WALKING IN HOSPITAL ROOM: A LITTLE
MOBILITY SCORE: 18
STANDING UP FROM CHAIR USING ARMS: A LITTLE
DAILY ACTIVITIY SCORE: 19
DRESSING REGULAR LOWER BODY CLOTHING: A LITTLE
DRESSING REGULAR LOWER BODY CLOTHING: A LITTLE
DRESSING REGULAR UPPER BODY CLOTHING: A LITTLE
PERSONAL GROOMING: A LITTLE
MOVING TO AND FROM BED TO CHAIR: A LITTLE
CLIMB 3 TO 5 STEPS WITH RAILING: A LOT
PERSONAL GROOMING: A LITTLE
TOILETING: A LITTLE
DRESSING REGULAR UPPER BODY CLOTHING: A LITTLE
TURNING FROM BACK TO SIDE WHILE IN FLAT BAD: A LITTLE
HELP NEEDED FOR BATHING: A LITTLE
CLIMB 3 TO 5 STEPS WITH RAILING: A LOT

## 2024-01-30 ASSESSMENT — LIFESTYLE VARIABLES
AUDIT-C TOTAL SCORE: 0
HOW MANY STANDARD DRINKS CONTAINING ALCOHOL DO YOU HAVE ON A TYPICAL DAY: PATIENT DOES NOT DRINK
AUDIT-C TOTAL SCORE: 0
HOW OFTEN DO YOU HAVE A DRINK CONTAINING ALCOHOL: NEVER
SKIP TO QUESTIONS 9-10: 1
HOW OFTEN DO YOU HAVE 6 OR MORE DRINKS ON ONE OCCASION: NEVER

## 2024-01-30 ASSESSMENT — PAIN - FUNCTIONAL ASSESSMENT
PAIN_FUNCTIONAL_ASSESSMENT: 0-10

## 2024-01-30 ASSESSMENT — PAIN SCALES - GENERAL
PAINLEVEL_OUTOF10: 4
PAINLEVEL_OUTOF10: 5 - MODERATE PAIN
PAIN_LEVEL: 6
PAINLEVEL_OUTOF10: 4
PAINLEVEL_OUTOF10: 7
PAINLEVEL_OUTOF10: 0 - NO PAIN

## 2024-01-30 ASSESSMENT — ACTIVITIES OF DAILY LIVING (ADL)
HEARING - LEFT EAR: FUNCTIONAL
WALKS IN HOME: INDEPENDENT
PATIENT'S MEMORY ADEQUATE TO SAFELY COMPLETE DAILY ACTIVITIES?: YES
DRESSING YOURSELF: INDEPENDENT
GROOMING: INDEPENDENT
ADEQUATE_TO_COMPLETE_ADL: YES
LACK_OF_TRANSPORTATION: NO
HEARING - RIGHT EAR: FUNCTIONAL
BATHING: INDEPENDENT
FEEDING YOURSELF: INDEPENDENT
JUDGMENT_ADEQUATE_SAFELY_COMPLETE_DAILY_ACTIVITIES: YES
TOILETING: INDEPENDENT

## 2024-01-30 ASSESSMENT — COLUMBIA-SUICIDE SEVERITY RATING SCALE - C-SSRS
2. HAVE YOU ACTUALLY HAD ANY THOUGHTS OF KILLING YOURSELF?: NO
1. IN THE PAST MONTH, HAVE YOU WISHED YOU WERE DEAD OR WISHED YOU COULD GO TO SLEEP AND NOT WAKE UP?: NO
6. HAVE YOU EVER DONE ANYTHING, STARTED TO DO ANYTHING, OR PREPARED TO DO ANYTHING TO END YOUR LIFE?: NO

## 2024-01-30 ASSESSMENT — PATIENT HEALTH QUESTIONNAIRE - PHQ9
SUM OF ALL RESPONSES TO PHQ9 QUESTIONS 1 & 2: 0
1. LITTLE INTEREST OR PLEASURE IN DOING THINGS: NOT AT ALL
2. FEELING DOWN, DEPRESSED OR HOPELESS: NOT AT ALL

## 2024-01-30 NOTE — BRIEF OP NOTE
Date: 2024  OR Location: [facility] OR    Name: Thu Otrega, : 1955, Age: 68 y.o., MRN: [id number], Sex: female    Diagnosis  Pre-op Diagnosis     * Local recurrence of left lung cancer (CMS/HCC) [C34.92] Post-op Diagnosis     * Local recurrence of left lung cancer (CMS/HCC) [C34.92]     Procedures  Robotic assisted redo left anterior mediastinal mass resection, left diaphragm pacer placement, bronchoscopy, intercostal never block    Surgeons      * Reinaldo Calixto - Primary    Resident/Fellow/Other Assistant:  Surgeon(s) and Role:     * Sunshine Hankins PA-C - Assisting     * Kayli Burns MD - Resident - Assisting    Procedure Summary  Anesthesia: General  ASA: III  Anesthesia Staff: Anesthesiologist: Alfredito Neville MD  CRNA: ANAYA Oneal-CRNA  Anesthesia Resident: Leydi Montoya DO  Estimated Blood Loss: 20 mL  Intra-op Medications:   Administrations occurring from 1055 to 1440 on 24:   Medication Name Total Dose   lactated Ringer's infusion Cannot be calculated   fentaNYL PF (Sublimaze) injection  - Omnicell Override Pull Cannot be calculated   HYDROmorphone (Dilaudid) injection  - Omnicell Override Pull Cannot be calculated   propofol (Diprivan) injection  - Omnicell Override Pull Cannot be calculated              Anesthesia Record               Intraprocedure I/O Totals          Output    Urine 60 mL    Total Output 60 mL          Specimen:   ID Type Source Tests Collected by Time   1 : Left Anterior medistinal recurrent cancer, stitch marks pericardium Tissue MEDIASTINUM RESECTION SURGICAL PATHOLOGY EXAM Reinaldo Calixto MD 2024 1343        Staff:   Relief Circulator: Afia Howe RN  Relief Scrub: Farhana Higgins  Scrub Person: Erika Alejandre RN; Angy Rebolledo    Findings: Lysis of adhesions around prior surgical sites. Resection of left apical mass with gross invasion of lung or aortic arch. Phrenic nerve grossly involved in tumor, resected proximal to tumor. Diaphragmatic pacer  inserted at case conclusion.      Complications:  None; patient tolerated the procedure well.     Disposition: PACU - hemodynamically stable.  Condition: stable  Specimens Collected:   ID Type Source Tests Collected by Time   1 : Left Anterior medistinal recurrent cancer, stitch marks pericardium Tissue MEDIASTINUM RESECTION SURGICAL PATHOLOGY EXAM Reinaldo Calixto MD 1/30/2024 0855     Kayli Burns PGY2  General Surgery    Attending Attestation:     Reinaldo Calixto  Phone Number: [phone]

## 2024-01-30 NOTE — H&P
H&P below reviewed. The patient was examined and there are no changes to the H&P:    Kayli Burns, PGY2  General Surgery      HPI:   Thu Ortega is a 68 y.o. 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 Of note, after her lung surgery at [facility], her hospital course was complicated by afib and aspiration PNA. She said she never fully recovered from the surgery. She still has some pain at surgical site and still feel short of breath sometimes with exercise. She has about 20lb weight loss since surgery. She denied MI or stroke.      PMHx: per HPI  PSHx: robotic NICHOL 1/31/2023, sherrell surgery in 2013  SHx: former smoker (30ppy quite 18 year ago), deny ETOH, or illicit drugs   FMHx: negative for history of cancer and cardiac disease     Current Outpatient Medications:     Accu-Chek Guide test strips strip, TEST 3X DAILY DX E11.65, Disp: , Rfl:     albuterol 90 mcg/actuation inhaler, Inhale 1 puff every 4 hours if needed., Disp: , Rfl:     amLODIPine (Norvasc) 5 mg tablet, Take 1 tablet (5 mg) by mouth once daily in the morning. Take before meals., Disp: , Rfl:     aspirin 81 mg chewable tablet, Chew 1 tablet (81 mg) 1 time., Disp: , Rfl:     atorvastatin (Lipitor) 10 mg tablet, Take 1 tablet (10 mg) by mouth once daily in the morning. Take before meals., Disp: , Rfl:     BD Insulin Syringe Ultra-Fine 1 mL 31 gauge x 5/16 syringe, USE AS DIRECTED, Disp: , Rfl:     DULoxetine (Cymbalta) 60 mg DR capsule, Take 1 capsule (60 mg) by mouth once daily., Disp: , Rfl:     metFORMIN (Glucophage) 1,000 mg tablet, Take 1 tablet (1,000 mg) by mouth 2 times a day with meals. Take with food., Disp: , Rfl:     NovoLIN 70/30 U-100 Insulin 100 unit/mL (70-30) injection, 70 Units 3 times a day. 70 units at breakfast,  "55 units at lunch, and 60 units at dinner, Disp: , Rfl:     pantoprazole (ProtoNix) 40 mg EC tablet, TAKE 1 TABLET (40 MG) BY MOUTH IN THE MORNING. TAKE BEFORE MEALS. DO NOT CRUSH, CHEW, OR SPLIT.., Disp: , Rfl:     topiramate (Topamax) 25 mg tablet, Take 2 tablets (50 mg) by mouth 2 times a day., Disp: , Rfl:     traZODone (Desyrel) 50 mg tablet, Take 1 tablet (50 mg) by mouth once daily at bedtime., Disp: , Rfl:     Wixela Inhub 250-50 mcg/dose diskus inhaler, USE 1 PUFF TWICE A DAY, Disp: , Rfl:     zolpidem (Ambien) 10 mg tablet, Take 1 tablet (10 mg) by mouth as needed at bedtime., Disp: , Rfl:         Allergies   Allergen Reactions    Penicillin Hives and Unknown      No lab exists for component: \"<CBC>\", \"<CMP>\", \"<INR>\"         ROS  General: negative for fever, chills, weight loss, night sweat  Head: negative for severe headache, vision change, blurred vision,   CV: negative for chest pain, dizziness, lightheadedness   Pulm: negative for shortness of breath, dyspnea on exertion, hemoptysis  GI: negative for diarrhea, constipation, abdominal pain, nausea or vomiting, BRBPR  : negative for dysuria, hematuria, incontinence  Skin: negative for rash  Heme: negative for blood thinner, bleeding disorder or clotting disorder  Endo: negative for heat or cold intolerance, weight gain or weight loss  MSK: negative for rash, edema, weakness     PHYSICAL EXAM  Constitution: well-developed well-nourished female sitting in chair in no acute distress  HEENT: NCAT, moist mucosal membrane, neck supple, no crepitus, sclera anicteric  Lymph nodes: no cervical or supraclavicular lymphadenopathy  Cardiac: RRR, normal S1, S2, no mrg  Pulmonary: normal air movement, CTAP, no wcr  Abdomen: soft, non-distended, non-tender, no rigidity, guarding or rebound tenderness, no splenohepatomegaly  Neuro: AOx3, CNII-XII grossly normal  Ext: warm, dry, no edema noted  Skin: dry, clean and intact  Psych: mood and affect wnl     Assessment and " Plan:  This is a 68 y.o. female former smoker with likely recurrent lung cancer.  I reviewed the CT scan from 11/15/23, 7/12/23 and 12/28/22 and the PET/CT from 8/9/23. It showed left upper lobectomy of known lung cancer.  There is a new periaortic mediastinal mass that is the metabolic highly active SUV 7.9.  She will has multiple small right and left nodules which are stable comparing to her preop CAT scan.    I reviewed CT bx of the mediastinal mass from Dr Garcia. It showed poorly differentiated carcinoma. PDL1 90%. Pending NGS.  I reviewed the ECHO from 1/17/23. It showed EF of 65%, RV function is moderately decreased.  I reviewed EBUS from 7/25/23. It showed 4L, 10L and 7 LN station are sampled which are negative for cancer  I reviewed Dr Fagan's operative report and pathology report from 1/31/23. It showed 2.9cm poorly differentiated adenocarcinoma with visceral pleural invasion. All margin negative. LN 5,7,9,11L and 12L were excised which were negative for tumor. Final path pT2aN0 PDL1 90% positive KRAS G12C  I reviewed the labs from 12/13/23. It showed anemia with hgb of 11.2 and INR of 1.     In my opinion, she mostly have recurrent lung cancer at left mediastinum.  Pending NGS to confirm.  I recommended resection of the recurrence if confirmed.  I would anticipate scarring in the left chest from prior surgery.  I will attempt to be doing this with minimally invasive approach.  There is a chance her phrenic nerve is involved.  If phrenic nerve need to be sacrificed I will place diaphragm pacer at time of surgery.  I will obtain a baseline PFT.     I had a candid discussion with the patient and her . I discussed the risk of the surgery including bleeding, infection, airleak and postop pain. The alternative is SBRT. The patient consented to proceed with surgery.

## 2024-01-30 NOTE — ANESTHESIA PROCEDURE NOTES
Arterial Line:    Date/Time: 1/30/2024 11:37 AM    Staffing  Performed: attending and resident   Authorized by: Alfredito Neville MD    Performed by: Leydi Montoya DO    An arterial line was placed. Procedure performed using surface landmarks.in the OR for the following indication(s): continuous blood pressure monitoring.    A 20 gauge (size), 1 and 3/4 inch (length), Angiocath (type) catheter was placed into the Left radial artery, secured by tape,   Seldinger technique used.  Events:  greater than 3 attempts.      Additional notes:  3 attempts by resident to place in R radial artery, unable to advance catheter; no hematoma. Placed in L radial artery on first attempt by attending.

## 2024-01-30 NOTE — ANESTHESIA POSTPROCEDURE EVALUATION
Patient: Thu Ortega    Procedure Summary       Date: 01/30/24 Room / Location: [facility] OR 14 / Virtual [facility]    Anesthesia Start: 1107 Anesthesia Stop: 1452    Procedures:       Robotic assisted redo left mediastinal exploration, possible diaphragm pacer (Left: Chest)      Bronchoscopy Diagnosis:       Local recurrence of left lung cancer (CMS/HCC)      (Local recurrence of left lung cancer (CMS/HCC) [C34.92])    Surgeons: Reinaldo Calixto MD Responsible Provider: Alfredito Neville MD    Anesthesia Type: general ASA Status: 3            Anesthesia Type: general    Vitals Value Taken Time   /74 01/30/24 1456   Temp 36 01/30/24 1500   Pulse 68 01/30/24 1458   Resp 8 01/30/24 1458   SpO2 100 % 01/30/24 1458   Vitals shown include unvalidated device data.    Anesthesia Post Evaluation    Patient location during evaluation: PACU  Patient participation: complete - patient participated  Level of consciousness: awake and alert  Pain score: 6  Pain management: adequate  Airway patency: patent  Cardiovascular status: acceptable and blood pressure returned to baseline  Respiratory status: acceptable and face mask  Hydration status: acceptable  Postoperative Nausea and Vomiting: none        No notable events documented.

## 2024-01-30 NOTE — OP NOTE
Date: 2024  OR Location: [facility] OR    Name: Thu Ortega, : 1955, Age: 68 y.o., MRN: [id number], Sex: female    Preop Diagnosis: recurrent NSCLC  Postop Diagnosis: recurrent NSCLC    Procedures:  Robotic assisted left anterior mediastinal mass resection (modifer 22)  Robotic assisted diaphragmatic pacer placement  Bronchoscopy  Intercostal nerve block    Surgeons:  Reinaldo Calixto MD    Resident/Fellow/Other Assistant:  Surgeon(s) and Role:     * WANG Valencia-C - Assisting     * Kayli Burns MD - Resident - Assisting  WANG Vieira is required at bedside as first assist for this case.    Anesthesia: General  ASA: III  Anesthesia Staff: Anesthesiologist: Alfredito Neville MD  CRNA: ANAYA Oneal-CRNA  Anesthesia Resident: Leydi Montoya DO  Estimated Blood Loss: 5mL  Intra-op Medications:   Administrations occurring from 1055 to 1440 on 24:   Medication Name Total Dose   lactated Ringer's infusion Cannot be calculated   fentaNYL PF (Sublimaze) injection  - Omnicell Override Pull Cannot be calculated   HYDROmorphone (Dilaudid) injection  - Omnicell Override Pull Cannot be calculated   propofol (Diprivan) injection  - Omnicell Override Pull Cannot be calculated            Anesthesia Record               Intraprocedure I/O Totals          Output    Urine 60 mL    Total Output 60 mL          Specimen:   ID Type Source Tests Collected by Time   1 : Left Anterior medistinal recurrent cancer, stitch marks pericardium Tissue MEDIASTINUM RESECTION SURGICAL PATHOLOGY EXAM Reinaldo Calixto MD 2024 1343        Staff:   Relief Circulator: Afia Howe RN  Relief Scrub: Farhana Higgins  Scrub Person: Erika Alejandre RN; Angy Rebolledo    Findings: Anterior mediastinum recurrent non-small cell lung cancer involving the left phrenic nerve which is sacrificed.  No distant metastasis noted.  Significant scarring from prior surgery taking more than 90 minutes for lysis of adhesions, which modifier  22 was added to the case.    Indication:  This is a 68-year-old female with history of the left upper lobe early stage non-small cell lung cancer status post left upper lobectomy about the 1 year ago who developed recurrent anterior mediastinal mass.  This mass was biopsy-proven recurrent non-small cell lung cancer.  Full workup was performed showing no distant metastasize.  I offered the patient minimally invasive resection of locally recurrent non-small cell lung cancer.  Given the location of the tumor, left phrenic nerve is likely to be involved. In case the left phrenic nerve needs to be sacrificed, I will place diaphragm pacer and Dr Portillo' team will perform diaphragm pacing in the perioperative period.  The risk of surgery including bleeding, infection, arrhythmia, DVT, air leak and postop pain.  The alternative is SBRT and chemotherapy.  Patient consent to proceed with surgery.    Operation Details:  Patient was brought to operation room. A time-out was performed. Patient name, MRN and procedure were confirmed and all staff were in agreement. Patient received subcutaneous heparin. SCDs were placed and working. Ancef was given prior to incision. Patient was induced and intubated with a double lumen tube without issue. We then performed bronchoscopy through the double lumen tube. The bronchoscopy examination was normal, no endobronchial pathology seen.  The left upper lobe bronchial orifice was surgically closed and stump was intact.  A Horne bladder catheter was placed with clear urine. Then we turned the patient to right decubitus position. All pressure points are padded. Double lumen was confirmed again in correct position. The left chest was prepped and draped in sterile fashion. A pre-incision time out was performed. All staff are in agreement to proceed.     The patient's prior surgical scars were located at seventh intercostal space.  I decided to enter the chest through the 8th intercostal space  anteriorly with direct cutdown.  The chest was entered without issue.  I placed my 5-30 camera into the chest.  There was significant scar from the left lower lobe to the chest wall at the prior incision sites.  I took those down meticulously using a combination of cautery and a sharp dissection from posteriorly first.  When the posterior scar was cleared out, I was able to place a second 8 mm port using one of the old scar.  I continued to work to taking down the scar anteriorly until I was able to place one more robotic port anteriorly at the seventh intercostal space.  Then I placed my assistant port at 10th intercostal space. The air seal was used to insufflate the pleural space and the patient tolerated well. I then docked the robot without issue. I used a robotic 8mm camera and then I introduced a cardia grasper and long-tip bipolar grasper into the chest under visual guidance. I then scrubbed out to the console.  I then continued to take down the adhesions robotically.  There was no injury to the lung during this process.  It took me more than 90 minutes to finish this and modifier 22 was added for this reason.    Then I easily identified the recurrent tumor at the aortopulmonary window.  There was no distant pleura spread or invasion noted.  I then started taking down the pleura surrounding this mass.  I dissected anteriorly down to the pericardium.  There was no direct invasion into the pericardium.  I then dissected the tumor off the pericardium. I continued dissect to reach the aortic arch.  The tumor was easily coming off the arch without the invading to the arch.  Now I realized the left phrenic nerve was involved by tumor and there was no way to spare this nerve.  Then the nerve was sacrificed. I put two 5mm metal clips proximally.  I also clipped the left phrenic artery with 2 metal clips proximally.  The nerve was sacrificed.  There were lymph nodes near the mass which was taken en bloc.  I then  continued to dissect the mass circumferentially.  The distal end of the left ring nerve was reached. It was clipped distally with 2 metal clips and sacrificed.  One single large feeding vessel to the mass was encountered superiorly and medially and this was taken with Hemolock clip proximally and cautery distally.  Now the entire tumor was dissected off the anterior mediastinum.  It was passed off the field with Endo Catch bag.  I then copiously irrigated the chest with sterile water.  I ensured hemostasis of the dissection bed.      Given that the left phrenic nerve has been sacrificed, I decided to place diaphragm pacing wires.  2 diaphragm pacing wires were introduced into the chest.  They were sewed to the muscular portion of the left diaphragm.  The distal ends of the wires were tunneled out of the chest through a separate stab incision.  They were secured to the skin with silk stitch.    At this point, I rescrubbed back to the bedside.  I undocked the robot.  I ensured hemostasis of the port site and the dissection site.  I left a 28 Kinyarwanda straight chest tube posteriorly.  I then reinsufflated the lung under visual inspection.  The lung come up nicely.  The chest tube was secured to the chest wall with 0 Ethibond.  The port sites were then closed with 2-0 Vicryl and 3-0 Vicryl sutures.  The final count was correct. Then the patient was allowed to wake up from anesthesia without difficulty and brought to the recovery room in stable condition.    I was present for the entire portion of the surgery.    Reinadlo Calixto MD  Thoracic Surgeon  [facility]   of Medicine  [facility]  Office phone: [phone]  Fax: [phone]  Pager: [phone]

## 2024-01-30 NOTE — ANESTHESIA PREPROCEDURE EVALUATION
Patient: Thu Ortega    Procedure Information       Date/Time: 01/30/24 1055    Procedure: Robotic assisted redo left mediastinal exploration, possible diaphragm pacer (Left: Chest)    Location: [facility] OR 14 / Virtual [facility] OR    Surgeons: Reinaldo Calixto MD            Relevant Problems   Cardiovascular   (+) Mixed hyperlipidemia   (+) PAF (paroxysmal atrial fibrillation) (CMS/HCC)   (+) Primary hypertension      Endocrine   (+) Type 2 diabetes mellitus without complication, with long-term current use of insulin (CMS/HCC)      GI (within normal limits)      /Renal (within normal limits)      Neuro/Psych   (+) Recurrent major depressive disorder, in partial remission (CMS/HCC)      Pulmonary   (+) Local recurrence of left lung cancer (CMS/HCC)   (+) Malignant neoplasm of upper lobe of left lung (CMS/HCC)   (+) Non-small cell lung cancer without metastasis (CMS/HCC)   (+) CHEY (obstructive sleep apnea)   (+) Panlobular emphysema (CMS/HCC)      Hematology (within normal limits)       Clinical information reviewed:   Tobacco  Allergies  Meds   Med Hx  Surg Hx  OB Status  Fam Hx  Soc   Hx        NPO Detail:  NPO/Void Status  Carbohydrate Drink Given Prior to Surgery? : N  Date of Last Liquid: 01/30/24  Time of Last Liquid: 0000  Date of Last Solid: 01/30/24  Time of Last Solid: 0000  Last Intake Type: Clear fluids  Time of Last Void: 1010         Physical Exam    Airway  Mallampati: I  TM distance: >3 FB  Neck ROM: full     Cardiovascular - normal exam     Dental   (+) upper dentures, lower dentures     Pulmonary - normal exam     Abdominal - normal exam             Anesthesia Plan    History of general anesthesia?: yes  History of complications of general anesthesia?: no    ASA 3     general     The patient is not a current smoker.  Patient was not previously instructed to abstain from smoking on day of procedure.  Patient did not smoke on day of procedure.    intravenous induction   Postoperative  administration of opioids is intended.  Trial extubation is planned.  Anesthetic plan and risks discussed with patient and spouse.  Use of blood products discussed with patient and spouse who.    Plan discussed with resident and attending.

## 2024-01-30 NOTE — PROGRESS NOTES
Pharmacy Medication History Review     Thu Ortega is a 68 y.o. female admitted for Local recurrence of left lung cancer (CMS/[facility]). Pharmacy reviewed the patient's coerq-hy-ogjvabwax medications and allergies for accuracy.     The list below reflects the updated PTA list. Comments regarding how patient may be taking medications differently can be found in the Admit Orders Activity  Prior to Admission Medications   Prescriptions Last Dose Informant Patient Reported?   Accu-Chek Guide test strips strip   Self Yes   Sig: TEST 3X DAILY DX E11.65   BD Insulin Syringe Ultra-Fine 1 mL 31 gauge x 5/16 syringe   Self Yes   Sig: USE AS DIRECTED   NovoLIN 70/30 U-100 Insulin 100 unit/mL (70-30) injection 2024 at pm Self Yes   Si Units 3 times a day. 70 units at breakfast, 55 units at lunch, and 60 units at dinner   Wixela Inhub 250-50 mcg/dose diskus inhaler 2024 Self Yes   Sig: USE 1 PUFF TWICE A DAY   acetaminophen (Tylenol) 500 mg tablet Past Week Self Yes   Sig: Take 2 tablets (1,000 mg) by mouth if needed.   albuterol 90 mcg/actuation inhaler Past Week Self Yes   Sig: Inhale 1 puff every 4 hours if needed.   amLODIPine (Norvasc) 5 mg tablet 2024 Self Yes   Sig: Take 1 tablet (5 mg) by mouth once daily in the morning. Take before meals.   amiodarone (Pacerone) 200 mg tablet 2024 Self No   Sig: Take 1 tablet (200 mg) by mouth once daily.   aspirin 81 mg chewable tablet 2024 Self Yes   Sig: Chew 1 tablet (81 mg) once daily.   atorvastatin (Lipitor) 10 mg tablet 2024 Self Yes   Sig: Take 1 tablet (10 mg) by mouth once daily in the morning. Take before meals.   biotin 10 mg tablet 2024 Self Yes   Sig: Take 10 tablets (100 mg) by mouth once daily.   chlorhexidine (Peridex) 0.12 % solution   Self No   Sig: Use 15 mL in the mouth or throat see administration instructions for 2 doses. Use the night before and morning of surgery.   cholecalciferol (Vitamin D3) 5,000 Units tablet  1/30/2024 Self Yes   Sig: Take 1 tablet (5,000 Units) by mouth once daily.   aij-thynbjhyw-TO-acetaminophen (Tylenol Cold-Flu Multi-Act D-N) 30-15-500mg(d)/ 2-30- mg tablets, sequential 1/29/2024 Self Yes   Sig: Take by mouth.   metFORMIN (Glucophage) 1,000 mg tablet 1/29/2024 at am Self Yes   Sig: Take 1 tablet (1,000 mg) by mouth 2 times a day with meals. Take with food.   naltrexone HCl (NALTREXONE ORAL) 1/20/2024 Self Yes   Sig: Take 3 mg by mouth 2 times a day.   pantoprazole (ProtoNix) 40 mg EC tablet 1/30/2024 Self Yes   Sig: TAKE 1 TABLET (40 MG) BY MOUTH IN THE MORNING. TAKE BEFORE MEALS. DO NOT CRUSH, CHEW, OR SPLIT..   topiramate (Topamax) 25 mg tablet 1/30/2024 Self Yes   Sig: Take 2 tablets (50 mg) by mouth 2 times a day.   traZODone (Desyrel) 50 mg tablet 1/29/2024 Self Yes   Sig: Take 1 tablet (50 mg) by mouth once daily at bedtime.      Facility-Administered Medications: None         The list below reflects the updated allergy list. Please review each documented allergy for additional clarification and justification.  Allergies  Reviewed by Radha Montano [facility] on 1/30/2024          Severity Reactions Comments     Penicillin Not Specified Hives, Unknown                  M2B service not offered prior to surgery, please reassess prior to patient discharge if Meds to Beds is desired.     Sources used to complete the med history include: Patient interview, OARRS, Care Everywhere, medication fill history.     Below are additional concerns with the patient's PTA list.  None to note.     Radha Montano [facility]   Transitions of Care Pharmacist   Meds Ambulatory and Retail Services  Please reach out via Secure Chat for questions, or if no response call [facility] or [employer]

## 2024-01-30 NOTE — SIGNIFICANT EVENT
CODE Discussion:    Patient reports she has a standing DNR order. She spoke with hospital staff, including myself, before her surgery and expressed she wishes to rescind her DNR status during the LENGTH OF HER HOSPITAL STAY.     Patient will be FULL CODE for the duration of the time she is at the hospital.    Kayli Burns, PGY2  General Surgery

## 2024-01-30 NOTE — ANESTHESIA PROCEDURE NOTES
Airway  Date/Time: 1/30/2024 11:18 AM  Urgency: elective      Staffing  Performed: resident   Authorized by: Alfredito Neville MD    Performed by: Leydi Montoya DO  Patient location during procedure: OR    Indications and Patient Condition  Indications for airway management: anesthesia  Spontaneous Ventilation: absent  Sedation level: deep  Preoxygenated: yes  Patient position: sniffing  MILS maintained throughout  Mask difficulty assessment: 1 - vent by mask  Planned trial extubation    Final Airway Details  Final airway type: endotracheal airway      Successful airway: ETT - double lumen left     Successful intubation technique: direct laryngoscopy  Facilitating devices/methods: intubating stylet and cricoid pressure  Endotracheal tube insertion site: oral  Blade: Vivienne  Blade size: #3  ETT DL size (fr): 35  Cormack-Lehane Classification: grade I - full view of glottis  Placement verified by: chest auscultation, bronchoscopy and capnometry   Measured from: gums  ETT to gums (cm): 30  Number of attempts at approach: 1

## 2024-01-31 ENCOUNTER — APPOINTMENT (OUTPATIENT)
Dept: RADIOLOGY | Facility: HOSPITAL | Age: 69
DRG: 165 | End: 2024-01-31
Payer: MEDICARE

## 2024-01-31 PROBLEM — J44.9 CHRONIC OBSTRUCTIVE PULMONARY DISEASE (MULTI): Status: ACTIVE | Noted: 2024-01-31

## 2024-01-31 PROBLEM — C34.92 NON-SMALL CELL CANCER OF LEFT LUNG (MULTI): Status: ACTIVE | Noted: 2024-01-31

## 2024-01-31 LAB
ANION GAP SERPL CALC-SCNC: 16 MMOL/L (ref 10–20)
BUN SERPL-MCNC: 9 MG/DL (ref 6–23)
CALCIUM SERPL-MCNC: 9.2 MG/DL (ref 8.6–10.6)
CHLORIDE SERPL-SCNC: 107 MMOL/L (ref 98–107)
CO2 SERPL-SCNC: 24 MMOL/L (ref 21–32)
CREAT SERPL-MCNC: 0.7 MG/DL (ref 0.5–1.05)
EGFRCR SERPLBLD CKD-EPI 2021: >90 ML/MIN/1.73M*2
ERYTHROCYTE [DISTWIDTH] IN BLOOD BY AUTOMATED COUNT: 14.9 % (ref 11.5–14.5)
GLUCOSE BLD MANUAL STRIP-MCNC: 124 MG/DL (ref 74–99)
GLUCOSE BLD MANUAL STRIP-MCNC: 158 MG/DL (ref 74–99)
GLUCOSE BLD MANUAL STRIP-MCNC: 164 MG/DL (ref 74–99)
GLUCOSE BLD MANUAL STRIP-MCNC: 210 MG/DL (ref 74–99)
GLUCOSE SERPL-MCNC: 122 MG/DL (ref 74–99)
HCT VFR BLD AUTO: 36.3 % (ref 36–46)
HGB BLD-MCNC: 11 G/DL (ref 12–16)
MAGNESIUM SERPL-MCNC: 1.63 MG/DL (ref 1.6–2.4)
MCH RBC QN AUTO: 24.6 PG (ref 26–34)
MCHC RBC AUTO-ENTMCNC: 30.3 G/DL (ref 32–36)
MCV RBC AUTO: 81 FL (ref 80–100)
NRBC BLD-RTO: 0 /100 WBCS (ref 0–0)
PLATELET # BLD AUTO: 323 X10*3/UL (ref 150–450)
POTASSIUM SERPL-SCNC: 3.5 MMOL/L (ref 3.5–5.3)
RBC # BLD AUTO: 4.47 X10*6/UL (ref 4–5.2)
SODIUM SERPL-SCNC: 143 MMOL/L (ref 136–145)
WBC # BLD AUTO: 12.5 X10*3/UL (ref 4.4–11.3)

## 2024-01-31 PROCEDURE — 97165 OT EVAL LOW COMPLEX 30 MIN: CPT | Mod: GO

## 2024-01-31 PROCEDURE — 71045 X-RAY EXAM CHEST 1 VIEW: CPT

## 2024-01-31 PROCEDURE — 99232 SBSQ HOSP IP/OBS MODERATE 35: CPT | Performed by: PHYSICIAN ASSISTANT

## 2024-01-31 PROCEDURE — 83735 ASSAY OF MAGNESIUM: CPT | Performed by: PHYSICIAN ASSISTANT

## 2024-01-31 PROCEDURE — 99232 SBSQ HOSP IP/OBS MODERATE 35: CPT | Performed by: SURGERY

## 2024-01-31 PROCEDURE — 80048 BASIC METABOLIC PNL TOTAL CA: CPT | Performed by: PHYSICIAN ASSISTANT

## 2024-01-31 PROCEDURE — 97161 PT EVAL LOW COMPLEX 20 MIN: CPT | Mod: GP | Performed by: STUDENT IN AN ORGANIZED HEALTH CARE EDUCATION/TRAINING PROGRAM

## 2024-01-31 PROCEDURE — 36415 COLL VENOUS BLD VENIPUNCTURE: CPT | Performed by: PHYSICIAN ASSISTANT

## 2024-01-31 PROCEDURE — 1200000002 HC GENERAL ROOM WITH TELEMETRY DAILY

## 2024-01-31 PROCEDURE — 71045 X-RAY EXAM CHEST 1 VIEW: CPT | Performed by: RADIOLOGY

## 2024-01-31 PROCEDURE — 2500000001 HC RX 250 WO HCPCS SELF ADMINISTERED DRUGS (ALT 637 FOR MEDICARE OP): Performed by: PHYSICIAN ASSISTANT

## 2024-01-31 PROCEDURE — 82947 ASSAY GLUCOSE BLOOD QUANT: CPT

## 2024-01-31 PROCEDURE — 97535 SELF CARE MNGMENT TRAINING: CPT | Mod: GO

## 2024-01-31 PROCEDURE — 2500000004 HC RX 250 GENERAL PHARMACY W/ HCPCS (ALT 636 FOR OP/ED): Performed by: PHYSICIAN ASSISTANT

## 2024-01-31 PROCEDURE — 2500000002 HC RX 250 W HCPCS SELF ADMINISTERED DRUGS (ALT 637 FOR MEDICARE OP, ALT 636 FOR OP/ED): Performed by: PHYSICIAN ASSISTANT

## 2024-01-31 PROCEDURE — 85027 COMPLETE CBC AUTOMATED: CPT | Performed by: PHYSICIAN ASSISTANT

## 2024-01-31 RX ORDER — LANOLIN ALCOHOL/MO/W.PET/CERES
400 CREAM (GRAM) TOPICAL ONCE
Status: COMPLETED | OUTPATIENT
Start: 2024-01-31 | End: 2024-01-31

## 2024-01-31 RX ORDER — INSULIN LISPRO 100 [IU]/ML
0-10 INJECTION, SOLUTION INTRAVENOUS; SUBCUTANEOUS
Status: DISCONTINUED | OUTPATIENT
Start: 2024-01-31 | End: 2024-02-01 | Stop reason: HOSPADM

## 2024-01-31 RX ORDER — ACETAMINOPHEN 325 MG/1
650 TABLET ORAL EVERY 4 HOURS PRN
Qty: 30 TABLET | Refills: 0
Start: 2024-01-31

## 2024-01-31 RX ORDER — OXYCODONE HYDROCHLORIDE 5 MG/1
5 TABLET ORAL EVERY 6 HOURS PRN
Qty: 15 TABLET | Refills: 0 | Status: SHIPPED
Start: 2024-01-31 | End: 2024-02-01 | Stop reason: SDUPTHER

## 2024-01-31 RX ORDER — OXYCODONE HYDROCHLORIDE 5 MG/1
5 TABLET ORAL EVERY 4 HOURS PRN
Status: DISCONTINUED | OUTPATIENT
Start: 2024-01-31 | End: 2024-02-01 | Stop reason: HOSPADM

## 2024-01-31 RX ORDER — OXYCODONE HYDROCHLORIDE 5 MG/1
10 TABLET ORAL EVERY 4 HOURS PRN
Status: DISCONTINUED | OUTPATIENT
Start: 2024-01-31 | End: 2024-02-01 | Stop reason: HOSPADM

## 2024-01-31 RX ORDER — AMLODIPINE BESYLATE 5 MG/1
5 TABLET ORAL DAILY
Status: DISCONTINUED | OUTPATIENT
Start: 2024-01-31 | End: 2024-02-01 | Stop reason: HOSPADM

## 2024-01-31 RX ORDER — OXYCODONE HCL 5 MG/5 ML
5 SOLUTION, ORAL ORAL EVERY 6 HOURS PRN
Status: DISCONTINUED | OUTPATIENT
Start: 2024-01-31 | End: 2024-01-31

## 2024-01-31 RX ORDER — POTASSIUM CHLORIDE 20 MEQ/1
20 TABLET, EXTENDED RELEASE ORAL ONCE
Status: COMPLETED | OUTPATIENT
Start: 2024-01-31 | End: 2024-01-31

## 2024-01-31 RX ORDER — OXYCODONE HCL 5 MG/5 ML
10 SOLUTION, ORAL ORAL EVERY 6 HOURS PRN
Status: DISCONTINUED | OUTPATIENT
Start: 2024-01-31 | End: 2024-01-31

## 2024-01-31 RX ADMIN — OXYCODONE HYDROCHLORIDE 10 MG: 5 TABLET ORAL at 08:32

## 2024-01-31 RX ADMIN — Medication 400 MG: at 14:19

## 2024-01-31 RX ADMIN — POTASSIUM CHLORIDE 20 MEQ: 1500 TABLET, EXTENDED RELEASE ORAL at 14:20

## 2024-01-31 RX ADMIN — PANTOPRAZOLE SODIUM 40 MG: 40 TABLET, DELAYED RELEASE ORAL at 06:08

## 2024-01-31 RX ADMIN — HEPARIN SODIUM 5000 UNITS: 5000 INJECTION INTRAVENOUS; SUBCUTANEOUS at 12:10

## 2024-01-31 RX ADMIN — OXYCODONE HYDROCHLORIDE 10 MG: 5 TABLET ORAL at 12:10

## 2024-01-31 RX ADMIN — ASPIRIN 81 MG 81 MG: 81 TABLET ORAL at 08:32

## 2024-01-31 RX ADMIN — ATORVASTATIN CALCIUM 10 MG: 10 TABLET, FILM COATED ORAL at 06:08

## 2024-01-31 RX ADMIN — SENNOSIDES AND DOCUSATE SODIUM 2 TABLET: 8.6; 5 TABLET ORAL at 08:32

## 2024-01-31 RX ADMIN — TOPIRAMATE 50 MG: 25 TABLET, FILM COATED ORAL at 08:32

## 2024-01-31 RX ADMIN — OXYCODONE HYDROCHLORIDE 10 MG: 5 TABLET ORAL at 21:08

## 2024-01-31 RX ADMIN — AMLODIPINE BESYLATE 5 MG: 5 TABLET ORAL at 14:20

## 2024-01-31 RX ADMIN — OXYCODONE HYDROCHLORIDE 10 MG: 5 TABLET ORAL at 16:37

## 2024-01-31 RX ADMIN — TOPIRAMATE 50 MG: 25 TABLET, FILM COATED ORAL at 21:06

## 2024-01-31 RX ADMIN — HEPARIN SODIUM 5000 UNITS: 5000 INJECTION INTRAVENOUS; SUBCUTANEOUS at 21:08

## 2024-01-31 RX ADMIN — CEFAZOLIN SODIUM 2 G: 2 INJECTION, SOLUTION INTRAVENOUS at 03:30

## 2024-01-31 RX ADMIN — ACETAMINOPHEN 650 MG: 325 TABLET ORAL at 15:47

## 2024-01-31 RX ADMIN — AMIODARONE HYDROCHLORIDE 200 MG: 200 TABLET ORAL at 08:33

## 2024-01-31 RX ADMIN — HEPARIN SODIUM 5000 UNITS: 5000 INJECTION INTRAVENOUS; SUBCUTANEOUS at 03:30

## 2024-01-31 ASSESSMENT — PAIN - FUNCTIONAL ASSESSMENT
PAIN_FUNCTIONAL_ASSESSMENT: 0-10

## 2024-01-31 ASSESSMENT — ACTIVITIES OF DAILY LIVING (ADL)
BATHING_ASSISTANCE: STAND BY
HOME_MANAGEMENT_TIME_ENTRY: 18
ADL_ASSISTANCE: INDEPENDENT
BATHING_LEVEL_OF_ASSISTANCE: CLOSE SUPERVISION
BATHING_WHERE_ASSESSED: STANDING SINKSIDE

## 2024-01-31 ASSESSMENT — COGNITIVE AND FUNCTIONAL STATUS - GENERAL
MOVING TO AND FROM BED TO CHAIR: A LITTLE
TURNING FROM BACK TO SIDE WHILE IN FLAT BAD: A LITTLE
CLIMB 3 TO 5 STEPS WITH RAILING: TOTAL
TURNING FROM BACK TO SIDE WHILE IN FLAT BAD: A LITTLE
MOBILITY SCORE: 17
MOVING FROM LYING ON BACK TO SITTING ON SIDE OF FLAT BED WITH BEDRAILS: A LITTLE
DAILY ACTIVITIY SCORE: 24
WALKING IN HOSPITAL ROOM: A LITTLE
DAILY ACTIVITIY SCORE: 24
STANDING UP FROM CHAIR USING ARMS: A LITTLE
CLIMB 3 TO 5 STEPS WITH RAILING: TOTAL
DAILY ACTIVITIY SCORE: 21
STANDING UP FROM CHAIR USING ARMS: A LITTLE
MOBILITY SCORE: 16
MOVING TO AND FROM BED TO CHAIR: A LITTLE
DRESSING REGULAR UPPER BODY CLOTHING: A LITTLE
MOBILITY SCORE: 24
WALKING IN HOSPITAL ROOM: A LITTLE
HELP NEEDED FOR BATHING: A LITTLE
DRESSING REGULAR LOWER BODY CLOTHING: A LITTLE

## 2024-01-31 ASSESSMENT — PAIN SCALES - GENERAL
PAINLEVEL_OUTOF10: 8
PAINLEVEL_OUTOF10: 3
PAINLEVEL_OUTOF10: 8
PAINLEVEL_OUTOF10: 7
PAINLEVEL_OUTOF10: 7
PAINLEVEL_OUTOF10: 5 - MODERATE PAIN
PAINLEVEL_OUTOF10: 8
PAINLEVEL_OUTOF10: 8
PAINLEVEL_OUTOF10: 7

## 2024-01-31 ASSESSMENT — PAIN SCALES - WONG BAKER: WONGBAKER_NUMERICALRESPONSE: HURTS LITTLE BIT

## 2024-01-31 NOTE — PROGRESS NOTES
Occupational Therapy    Evaluation/Treatment    Patient Name: Thu Ortega  MRN: [id number]  : 1955  Today's Date: 24  Time Calculation  Start Time: 1014  Stop Time: 1042  Time Calculation (min): 28 min       Assessment:  OT Assessment: Thu is a 69yo female presenting with [place] with ADLs and transfers. pt does not present with needs for further OT intervention at this level of care.  Prognosis: Excellent  Barriers to Discharge: None  Evaluation/Treatment Tolerance: Patient tolerated treatment well  Medical Staff Made Aware: Yes  End of Session Communication: Bedside nurse  End of Session Patient Position: Bed, 3 rail up, Alarm off, not on at start of session  Prognosis: Excellent  Barriers to Discharge: None  Evaluation/Treatment Tolerance: Patient tolerated treatment well  Medical Staff Made Aware: Yes  Strengths: Ability to acquire knowledge, Attitude of self, Housing layout, Insight into problems, Premorbid level of function, Support of Caregivers, Living arrangement secure, Physical health, Leisure activity  Plan:  No Skilled OT: Independent with ADLs  OT Discharge Recommendations: No further acute OT  OT Recommended Transfer Status: Independent  OT - OK to Discharge: Yes       Subjective   Current Problem:  1. Non-small cell lung cancer without metastasis (CMS/HCC)        2. 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 Pt pleasant and willing to work with OT.  Precautions:  Medical Precautions: Cardiac precautions, Fall precautions  Vital Signs:     Pain:  Pain Assessment  Pain Assessment: 0-10  Pain Score: 7    Objective   Cognition:  Overall Cognitive Status: Within Functional Limits  Orientation Level: Oriented X4           Home Living:  Type of Home: House  Lives With: Spouse  Home Adaptive Equipment: Cane, Walker rolling or standard, Crutches  Home Layout: One level (has basement, does not use frequently)  Home Access: Stairs to enter with rails  Entrance Stairs-Number of Steps: 3  Bathroom Shower/Tub: Tub/shower unit, Walk-in shower  Bathroom Toilet: Standard  Bathroom Equipment: Grab bars in shower, Shower chair with back  Home Living Comments: Uses walk in shower primarily  Prior Function:  Level of [place]: Independent with ADLs and functional transfers, Independent with homemaking with ambulation  ADL Assistance: Independent  Homemaking Assistance: Independent  Ambulatory Assistance: Independent  Vocational: Retired (retired machinest)  Leisure: enjoys reading and puzzles  Prior Function Comments: + drives, - falls, 2 dogs  IADL History:     ADL:  Grooming Assistance: Independent  Grooming Deficit: Supervision/safety  Bathing Assistance: Stand by  Bathing Deficit: Supervision/safety (sponge bathing at sink)  UE Dressing Assistance: Independent  LE Dressing Assistance: Stand by  Activities of Daily Living: Grooming  Grooming Level of Assistance: Independent  Grooming Where Assessed: Standing sinkside  Grooming Comments: denture care, deodorant and face washing in standing at sink    UE Bathing  UE Bathing Level of Assistance: Independent  UE Bathing Where Assessed: Standing sinkside  UE Bathing Comments: washing UB with wash cloth standing sink side    LE Bathing  LE Bathing Level of Assistance: Close supervision  LE Bathing Where Assessed: Standing  sinkside  LE Bathing Comments: washing LB with wash cloth standing sink side, close supervision for safety with shifting and challenging balance    UE Dressing  UE Dressing Level of Assistance: Independent  UE Dressing Where Assessed:  (standing at sink)    LE Dressing  LE Dressing: Yes  Pants Level of Assistance: Close supervision  LE Dressing Where Assessed:  (standing sinkside)  LE Dressing Comments: doffing underwear, donning new and pants       Activity Tolerance:  Endurance: Tolerates 30 min exercise with multiple rests  Functional Standing Tolerance:     Bed Mobility/Transfers: Bed Mobility  Bed Mobility: Yes  Bed Mobility 1  Bed Mobility 1: Sitting to supine  Level of Assistance 1: Distant supervision    Transfers  Transfer: Yes  Transfer 1  Transfer From 1: Sit to  Transfer to 1: Stand  Technique 1: Sit to stand  Transfer Device 1:  (no device)  Transfer Level of Assistance 1: Close supervision  Transfers 2  Transfer From 2: Stand to  Transfer to 2: Sit  Technique 2: Stand to sit  Transfer Level of Assistance 2: Close supervision       Therapy/Activity: Therapeutic Activity  Therapeutic Activity Performed: Yes  Therapeutic Activity 1: standing sink side x25 minutes for ADL with supervision for safety       Vision:Vision - Basic Assessment  Current Vision: No visual deficits  Sensation:  Light Touch: No apparent deficits       Coordination:  Movements are Fluid and Coordinated: Yes   Hand Function:     Extremities: RUE   RUE : Within Functional Limits and LUE   LUE: Within Functional Limits      Outcome Measures: [facility] Daily Activity  Putting on and taking off regular lower body clothing: None  Bathing (including washing, rinsing, drying): None  Putting on and taking off regular upper body clothing: None  Toileting, which includes using toilet, bedpan or urinal: None  Taking care of personal grooming such as brushing teeth: None  Eating Meals: None  Daily Activity - Total Score: 24         and OT Adult Other  Outcome Measures  4AT: 0, negative    Education Documentation  Body Mechanics, taught by Cynthia Holliday OT at 1/31/2024 11:28 AM.  Learner: Patient  Readiness: Acceptance  Method: Explanation  Response: Verbalizes Understanding    ADL Training, taught by Cynthia Holliday OT at 1/31/2024 11:28 AM.  Learner: Patient  Readiness: Acceptance  Method: Explanation  Response: Verbalizes Understanding    Education Comments  No comments found.        01/31/24 at 11:29 AM - Cynthia Holliday OT

## 2024-01-31 NOTE — PROGRESS NOTES
Thu Ortega is a 68 y.o. female on day 1 of admission presenting with Local recurrence of left lung cancer (CMS/HCC).    Subjective   Sitting in bed breathing well without oxygen.  She underwent resection of a tumor that was involving the left phrenic nerve.  She had temporary diaphragm pacing electrodes placed for perioperative management.       Objective chest x-ray reviewed.  Slight elevation of the left diaphragm.  No evidence of any diaphragm EMG activity and reading the electrodes.    Assessment/Plan left phrenic sacrifice because of the tumor.  Patient is doing well in the immediate postoperative time.  We had begun pacing yesterday to help in any perioperative problems.  Given the tumor situation and her overall previous surgery in the left chest cavity.  I discussed the case with the thoracic surgery team.  Given her age 1 previous operations in her chest and the tumor I do not think a intercostal of the phrenic nerve reconstruction would be a possibility presently.  Today I stopped diaphragm pacing on her.  As long as she has no other problems the left diaphragm pacing electrodes can removed.  I discussed with her in the future she could have a diaphragm plication but we would wait to see how she is doing initially.  She agrees with this plan.    Randall Portillo MD

## 2024-01-31 NOTE — PROGRESS NOTES
Thu Ortega is a 68 y.o. 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 Calixto.     Subjective   Pt with acid reflux overnight and requesting Tums. Also with some shortness of breath with movement and moderate postop pain. Otherwise, she denies fevers, chills, nausea, or vomiting. Voiding spontaneously s/p loyola removal.     Objective     Physical Exam  Constitutional:       General: She is not in acute distress.     Comments: Oriented x3, resting comfortably in bed   HENT:      Head: Normocephalic and atraumatic.   Neck:      Comments: No JVD or tracheal deviation  Cardiovascular:      Comments: Regular rate and rhythm on telemetry   Pulmonary:      Comments: No accessory muscle use to breathe or crepitus appreciated. On a couple liters of oxygen via nasal cannula. One left chest tube to atrium and maintained to -20cm suction. ~160ml of serosanguinous output in the past 24 hours. No air leak appreciated. Left diaphragmatic pacer wires in place.   Abdominal:      General: There is no distension.      Palpations: Abdomen is soft.      Tenderness: There is no abdominal tenderness. There is no guarding or rebound.   Musculoskeletal:         General: No tenderness.      Right lower leg: No edema.      Left lower leg: No edema.   Skin:     Comments: Warm and dry. Surgical incisions without surrounding erythema, edema, or exudates. Occlusive dressing intact around chest tube site without strikethrough.    Neurological:      Mental Status: She is alert.   Psychiatric:      Comments: Appropriate mood and behavior  "      Last Recorded Vitals  Blood pressure 147/70, pulse 77, temperature 36.3 °C (97.3 °F), temperature source Temporal, resp. rate 18, height 1.499 m (4' 11\"), weight 66.9 kg (147 lb 7.8 oz), SpO2 97 %.  Intake/Output last 3 Shifts:  I/O last 3 completed shifts:  In: 102.9 (1.5 mL/kg) [I.V.:102.9 (1.5 mL/kg)]  Out: 1428 (21.3 mL/kg) [Urine:1270 (0.5 mL/kg/hr); Chest Tube:158]  Weight: 66.9 kg     Relevant Results  Scheduled medications  amiodarone, 200 mg, oral, Daily  aspirin, 81 mg, oral, Daily  atorvastatin, 10 mg, oral, Daily before breakfast  calcium carbonate, 500 mg, oral, Daily  fluticasone furoate-vilanteroL, 1 puff, inhalation, Daily  heparin (porcine), 5,000 Units, subcutaneous, q8h  insulin lispro, 0-10 Units, subcutaneous, TID with meals  pantoprazole, 40 mg, oral, Daily before breakfast  sennosides-docusate sodium, 2 tablet, oral, BID  sugammadex, , ,   topiramate, 50 mg, oral, BID      Continuous medications     PRN medications  PRN medications: acetaminophen, dextrose 10 % in water (D10W), dextrose, glucagon, ipratropium-albuteroL, naloxone, ondansetron, oxyCODONE, oxyCODONE, oxygen, sugammadex     Results for orders placed or performed during the hospital encounter of 01/30/24 (from the past 24 hour(s))   POCT GLUCOSE   Result Value Ref Range    POCT Glucose 159 (H) 74 - 99 mg/dL   POCT GLUCOSE   Result Value Ref Range    POCT Glucose 212 (H) 74 - 99 mg/dL   POCT GLUCOSE   Result Value Ref Range    POCT Glucose 258 (H) 74 - 99 mg/dL   POCT GLUCOSE   Result Value Ref Range    POCT Glucose 124 (H) 74 - 99 mg/dL   CBC   Result Value Ref Range    WBC 12.5 (H) 4.4 - 11.3 x10*3/uL    nRBC 0.0 0.0 - 0.0 /100 WBCs    RBC 4.47 4.00 - 5.20 x10*6/uL    Hemoglobin 11.0 (L) 12.0 - 16.0 g/dL    Hematocrit 36.3 36.0 - 46.0 %    MCV 81 80 - 100 fL    MCH 24.6 (L) 26.0 - 34.0 pg    MCHC 30.3 (L) 32.0 - 36.0 g/dL    RDW 14.9 (H) 11.5 - 14.5 %    Platelets 323 150 - 450 x10*3/uL   Basic metabolic panel   Result Value " Ref Range    Glucose 122 (H) 74 - 99 mg/dL    Sodium 143 136 - 145 mmol/L    Potassium 3.5 3.5 - 5.3 mmol/L    Chloride 107 98 - 107 mmol/L    Bicarbonate 24 21 - 32 mmol/L    Anion Gap 16 10 - 20 mmol/L    Urea Nitrogen 9 6 - 23 mg/dL    Creatinine 0.70 0.50 - 1.05 mg/dL    eGFR >90 >60 mL/min/1.73m*2    Calcium 9.2 8.6 - 10.6 mg/dL   Magnesium   Result Value Ref Range    Magnesium 1.63 1.60 - 2.40 mg/dL   POCT GLUCOSE   Result Value Ref Range    POCT Glucose 164 (H) 74 - 99 mg/dL      BMP and magnesium pending.   XR chest 1 view    Result Date: 1/31/2024  Interpreted By:  Jamie Guerra, STUDY: XR CHEST 1 VIEW;  1/31/2024 9:24 am   INDICATION: Signs/Symptoms:s/p chest tube removal, upright please.   COMPARISON: 01/31/2024 at 3:57 a.m.   ACCESSION NUMBER(S): [id number]   ORDERING CLINICIAN: RUBEN CHUN   FINDINGS: AP radiograph of the chest was provided.   The left-sided chest tube has been removed. There is no visible pneumothorax.   CARDIOMEDIASTINAL SILHOUETTE: Cardiomediastinal silhouette is stable in size and configuration.   LUNGS: Mild parenchymal opacities are noted on the left from recent surgery. Volume loss from prior upper lobectomy on the left is also noted. No new parenchymal abnormalities have appeared.   ABDOMEN: No remarkable upper abdominal findings.   BONES: No acute osseous changes.       1.  Satisfactory appearance following removal of chest tube from the left. There is no visible pneumothorax.       MACRO: None   Signed by: Jamie Guerra 1/31/2024 10:35 AM Dictation workstation:   [id number]    XR chest 1 view    Result Date: 1/31/2024  Interpreted By:  Jamie Guerra and Ritchie Brandon STUDY: XR CHEST 1 VIEW;  1/31/2024 4:05 am   INDICATION: Signs/Symptoms:Daily CXR on morning rounds, upright please.   COMPARISON: Chest x-ray 01/30/2024   ACCESSION NUMBER(S): [id number]   ORDERING CLINICIAN: RUBEN CHUN   FINDINGS: AP radiograph of the chest was provided.   Left-sided chest  tube in similar position compared to prior examination projecting over the left lung apex.   CARDIOMEDIASTINAL SILHOUETTE: Cardiomediastinal silhouette is enlarged but stable in size and configuration.   LUNGS: Resolution of previously seen small left sided pneumothorax. There is improvement in previously seen left lung atelectasis/consolidation with residual/mild left basilar atelectasis. The previously observed pulmonary vascular congestion is mildly improved with decreased accentuation of the interstitial lung markings on current exam. Trace left-sided pleural effusion.   ABDOMEN: No remarkable upper abdominal findings.   BONES: No acute osseous changes.       1. Resolution of previously seen small left-sided pneumothorax with stable position of the left-sided chest tube. 2. Improving pulmonary vascular congestion with decreased accentuation of the interstitial lung markings. Trace left-sided pleural effusion with adjacent left basilar atelectasis.   I personally reviewed the images/study and I agree with the findings as stated by resident Ed Coronel. This study was interpreted at [place], [place].   MACRO: None   Signed by: Jamie Guerra 1/31/2024 10:06 AM Dictation workstation:   [id number]    XR chest 1 view    Result Date: 1/30/2024  STUDY: Chest Radiograph;  01/20/2024 2:28 PM INDICATION: Status post surgery. COMPARISON: 02/26/2023 CT Chest, 12/13/2023. ACCESSION NUMBER(S): [id number] ORDERING CLINICIAN: RUBEN CHUN TECHNIQUE:  Frontal chest was obtained at 15:23 hours. FINDINGS: CARDIOMEDIASTINAL SILHOUETTE: The heart is enlarged with prominence of the pulmonary vessels.  LUNGS: Small left pneumothorax is present.  Distance from the chest wall to pleural surface measures 0.8 cm.  Left pneumothorax estimated at 10-15%.  A left chest tube is present.  EKG leads overlie the chest.  ABDOMEN: No remarkable upper abdominal findings.  BONES: No acute  osseous changes.    Left sided chest tube. Small left pneumothorax estimated at 10-15%. Cardiomegaly and pulmonary vascular congestion. Signed by Jeff Kim MD    Assessment/Plan   Principal Problem:    Local recurrence of left lung cancer (CMS/HCC)  Active Problems:    Non-small cell lung cancer without metastasis (CMS/HCC)    CHEY (obstructive sleep apnea)  Thu Ortega is a 68 y.o. 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 Calixto. Postoperatively, the patient maintained a single left chest tube and a loyola catheter.     Plan:     Neuro: Acute postop pain  -Out of bed to chair throughout the day  -Encourage ambulation as tolerated  -Discontinue PCA  -Begin oral regimen of pain control with oxycodone and Tylenol, monitor effectiveness, adjust dose as needed   -PT/OT consulted, appreciate recs, low intensity PT on discharge    Cardiac: History of atrial fibrillation, HTN, HLD. On amiodarone, aspirin, amlodipine, and atorvastatin at home.   -Continue telemetry  -Vital signs every four hours  -Replace electrolytes as needed, goal Mg>2 and K>4, hypokalemia and hypomagnesia today, replaced  -Continue home amiodarone, aspirin, and atorvastatin   -Resume home amlodipine    Pulm: History of NICHOL lobectomy in 2023 at the [facility] for adenocarcinoma complicated by aspiration PNA and atrial fibrillation. History of COPD. Now s/p a bronchoscopy, left robotic assisted anterior mediastinal mass resection, diaphragmatic pacer placement, and intercostal nerve block on 1/30/24 with Dr. Calixto.  -Encourage incentive  spirometer use every hour  -Continue pulmonary hygiene  -Wean oxygen as tolerated (currently on a couple liters of oxygen via nasal cannula)  -Left chest tube with minimal output and no air leak on POD#1. Chest tube removed without complications, and an occlusive dressing was applied.  -Daily CXR while hospitalized   -Continue Breo Ellipta - formulary equivalent for home Wixela   -Nebulizers as needed for shortness of breath   -Diaphragm pacer management discussed with Dr. Green's by Dr. Calixto, appreciate recs    GI: History of GERD, on Protonix   -Begin diabetic diet  -Zofran available as needed for nausea  -Bowel regimen available for constipation secondary to pain medication  -Continue home Protonix    Endocrine: History of T2DM, on metformin and Novolin at home. Pt reports not routinely taking morning Novolin at home as she often doesn't eat breakfast. She often alters (1/2 doses) her lunch and dinner doses depending on her blood sugar.   -Continue routine accuchecks  -Hold home metformin in the acute postop period  -Continue sliding scale insulin in the acute postop period  -Hypoglycemia protocol available for activation as needed    Renal:   -Horne removed overnight, spontaneously voiding   -Monitor I&Os  -Monitor electrolytes with routine BMPs    ID: Afebrile, leukocytosis to 12.5, likely reactive secondary to surgery yesterday  -Continue to trend daily temperatures and CBC to monitor WBC count for signs of infection   -Monitor surgical sites for signs of infection   -Preoperative Ancef completed    Heme:   -Continue to monitor for signs/symptoms of postop bleeding   -Daily CBC  -Continue subcutaneous heparin and SCDs for DVT prophylaxis     Dispo:   -Plan for discharge home once medically stable, ?tomorrow   -Continue to assess for discharge needs    Pt seen and evaluated. Pt discussed with attending physician Dr. Calixto.     I spent 25 minutes in the professional and overall care of this patient.    Sunshine  ADAMS LeungC

## 2024-01-31 NOTE — PROGRESS NOTES
Physical Therapy    Physical Therapy Evaluation    Patient Name: Thu Ortega  MRN: [id number]  Today's Date: 1/31/2024   Time Calculation  Start Time: 0930  Stop Time: 0945  Time Calculation (min): 15 min    Assessment/Plan   PT Assessment  PT Assessment Results: Decreased endurance, Impaired balance, Decreased mobility, Pain  Rehab Prognosis: Good  End of Session Communication: Bedside nurse  End of Session Patient Position: Up in chair  IP OR SWING BED PT PLAN  Inpatient or Swing Bed: Inpatient  PT Plan  Treatment/Interventions: Bed mobility, Transfer training, Balance training, Gait training, Therapeutic exercise, Endurance training, Range of motion, Therapeutic activity, Home exercise program, Strengthening  PT Plan: Skilled PT  PT Frequency: 3 times per week  PT Discharge Recommendations: Low intensity level of continued care  Equipment Recommended upon Discharge:  (Owns necessary equipment)  PT Recommended Transfer Status: Assist x1  PT - OK to Discharge: Yes    Subjective     General Visit Information:  Subjective: Patient is alert, agreeable to PT.  In good spirits and finished lunch.    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/23, COPD, afib on warfarin, DM2, and meningioma of brain s/p resection  Prior to Session Communication: Bedside nurse  Patient Position Received: Bed, 3 rail up   Home Living:  Home Living  Type of Home: House  Lives With: Spouse  Home Adaptive Equipment: Cane, Walker rolling or standard, Crutches  Home Layout: One level (basement)  Home Access: Stairs to enter with rails  Entrance Stairs-Number of Steps: 3  Prior Level of Function:  Prior Function Per Pt/Caregiver Report  Level of [place]: Independent with ADLs and functional  transfers  Precautions:  Precautions  Medical Precautions: Fall precautions, Cardiac precautions, Oxygen therapy device and L/min  Vital Signs:  Vital Signs  Heart Rate: 83 (post 88)  SpO2: 97 % (post 94)  Medical Gas Therapy: None (Room air)    Lines/Tubes/Drains:  Chest Tube 1 Left Pleural 28 Fr (Active)   Number of days: 0     Continuous Medications/Drips:       Objective   Pain:  Pain Assessment  Pain Score: 7 (post 7)  Pain Type:  (chest tube residual site)    Cognition:  Cognition  Overall Cognitive Status: Within Functional Limits    General Assessments:  Extremity/Trunk Assessments:  Tone: No abnormalities noted  Sensation  Light Touch: No apparent deficits  Coordination  Movements are Fluid and Coordinated: Yes  Upper Extremity  ROM: WFL  Strength:WFL  Lower Extremity  ROM: WFL  Strength: WFL   Sitting Static Balance Normal  Sitting Dynamic Balance Normal  Standing Static Balance Not NormalUE Support No UE support and Assist Level Contact Guard  Standing Dynamic Balance Not NormalUE Support No UE support and Assist Level Contact Guard    Functional Assessments:  Bed Mobility  Bed Mobility: Yes  Bed Mobility 1  Bed Mobility 1: Supine to sitting  Level of Assistance 1: Close supervision  Bed Mobility Comments 1: HOB 30    Transfers  Transfer: Yes  Transfer 1  Transfer From 1: Sit to  Transfer to 1: Stand  Transfer Device 1:  (no device)  Transfer Level of Assistance 1: Close supervision  Transfers 2  Transfer From 2: Stand to  Transfer to 2: Sit  Transfer Level of Assistance 2: Close supervision  Transfers 3  Transfer From 3: Bed to  Transfer to 3: Chair with arms  Transfer Level of Assistance 3: Close supervision  Transfers 4  Transfer From 4: Chair with arms to  Transfer to 4: Chair with arms  Transfer Level of Assistance 4: Close supervision    Ambulation/Gait Training  Ambulation/Gait Training Performed: Yes  Ambulation/Gait Training 1  Surface 1: Level tile  Device 1: No device  Assistance 1: Contact  guard  Quality of Gait 1:  (WFL gait pattern, increased WOB RPD 7/10)  Comments/Distance (ft) 1: 20              Outcome Measures:  [facility] Basic Mobility  Turning from your back to your side while in a flat bed without using bedrails: A little  Moving from lying on your back to sitting on the side of a flat bed without using bedrails: A little  Moving to and from bed to chair (including a wheelchair): A little  Standing up from a chair using your arms (e.g. wheelchair or bedside chair): A little  To walk in hospital room: A little  Climbing 3-5 steps with railing: Total  Basic Mobility - Total Score: 16                            Encounter Problems       Encounter Problems (Active)       PT Problem       Patient will complete supine to sit and sit to supine Independent  (Progressing)       Start:  01/31/24    Expected End:  02/14/24            Patient will perform sit<>stand transfer with no device, and Independent  (Progressing)       Start:  01/31/24    Expected End:  02/14/24            Patient will ambulate >150' with No Device and Independent  (Progressing)       Start:  01/31/24    Expected End:  02/14/24            Patient will ascend/descend 3 steps with Right Rail Ascending and independence  (Progressing)       Start:  01/31/24    Expected End:  02/14/24               Pain - Adult            Assessment: Patient presents s/p Robotic assisted redo left anterior mediastinal mass resection, left diaphragm pacer placement, bronchoscopy, intercostal never block.  Currently CGA for mobility with endurance deficits noted.  Patient would benefit from further therapy to increase functional independence and safety.  Will continue to follow during acute stay.      Education Documentation  Precautions, taught by Shubham Schaeffer PT at 1/31/2024 11:06 AM.  Learner: Patient  Readiness: Acceptance  Method: Explanation  Response: Needs Reinforcement    Body Mechanics, taught by Shubham Schaeffer PT at 1/31/2024 11:06  AM.  Learner: Patient  Readiness: Acceptance  Method: Explanation  Response: Needs Reinforcement    Mobility Training, taught by Shubham Schaeffer PT at 1/31/2024 11:06 AM.  Learner: Patient  Readiness: Acceptance  Method: Explanation  Response: Needs Reinforcement    Education Comments  No comments found.          01/31/24 at 11:10 AM   Shubham Schaeffer PT   Rehab Office: [phone]

## 2024-01-31 NOTE — HOSPITAL COURSE
Thu Ortega is a 68 y.o. 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 Calixto. Postoperatively, the patient maintained a single left chest tube and a loyola catheter.  Loyola removed on POD#1 with a successful spontaneous void. Chest tube with minimal output and no air leak on POD#1. Chest tube removed without complications, and an occlusive dressing was applied. A post pull CXR is stable.

## 2024-02-01 ENCOUNTER — APPOINTMENT (OUTPATIENT)
Dept: RADIOLOGY | Facility: HOSPITAL | Age: 69
DRG: 165 | End: 2024-02-01
Payer: MEDICARE

## 2024-02-01 VITALS
RESPIRATION RATE: 16 BRPM | HEART RATE: 83 BPM | BODY MASS INDEX: 29.35 KG/M2 | OXYGEN SATURATION: 94 % | SYSTOLIC BLOOD PRESSURE: 131 MMHG | WEIGHT: 145.6 LBS | HEIGHT: 59 IN | TEMPERATURE: 96.8 F | DIASTOLIC BLOOD PRESSURE: 66 MMHG

## 2024-02-01 LAB
ANION GAP SERPL CALC-SCNC: 15 MMOL/L (ref 10–20)
BUN SERPL-MCNC: 11 MG/DL (ref 6–23)
CALCIUM SERPL-MCNC: 9.2 MG/DL (ref 8.6–10.6)
CHLORIDE SERPL-SCNC: 108 MMOL/L (ref 98–107)
CO2 SERPL-SCNC: 23 MMOL/L (ref 21–32)
CREAT SERPL-MCNC: 0.72 MG/DL (ref 0.5–1.05)
EGFRCR SERPLBLD CKD-EPI 2021: >90 ML/MIN/1.73M*2
ERYTHROCYTE [DISTWIDTH] IN BLOOD BY AUTOMATED COUNT: 14.8 % (ref 11.5–14.5)
GLUCOSE BLD MANUAL STRIP-MCNC: 149 MG/DL (ref 74–99)
GLUCOSE SERPL-MCNC: 165 MG/DL (ref 74–99)
HCT VFR BLD AUTO: 36.8 % (ref 36–46)
HGB BLD-MCNC: 11 G/DL (ref 12–16)
MAGNESIUM SERPL-MCNC: 2.02 MG/DL (ref 1.6–2.4)
MCH RBC QN AUTO: 24.1 PG (ref 26–34)
MCHC RBC AUTO-ENTMCNC: 29.9 G/DL (ref 32–36)
MCV RBC AUTO: 81 FL (ref 80–100)
NRBC BLD-RTO: 0 /100 WBCS (ref 0–0)
PLATELET # BLD AUTO: 243 X10*3/UL (ref 150–450)
POTASSIUM SERPL-SCNC: 3.7 MMOL/L (ref 3.5–5.3)
RBC # BLD AUTO: 4.56 X10*6/UL (ref 4–5.2)
SODIUM SERPL-SCNC: 142 MMOL/L (ref 136–145)
WBC # BLD AUTO: 9.1 X10*3/UL (ref 4.4–11.3)

## 2024-02-01 PROCEDURE — 80048 BASIC METABOLIC PNL TOTAL CA: CPT | Performed by: PHYSICIAN ASSISTANT

## 2024-02-01 PROCEDURE — 99232 SBSQ HOSP IP/OBS MODERATE 35: CPT | Performed by: PHYSICIAN ASSISTANT

## 2024-02-01 PROCEDURE — 71045 X-RAY EXAM CHEST 1 VIEW: CPT

## 2024-02-01 PROCEDURE — 2500000001 HC RX 250 WO HCPCS SELF ADMINISTERED DRUGS (ALT 637 FOR MEDICARE OP): Performed by: STUDENT IN AN ORGANIZED HEALTH CARE EDUCATION/TRAINING PROGRAM

## 2024-02-01 PROCEDURE — 2500000002 HC RX 250 W HCPCS SELF ADMINISTERED DRUGS (ALT 637 FOR MEDICARE OP, ALT 636 FOR OP/ED): Performed by: PHYSICIAN ASSISTANT

## 2024-02-01 PROCEDURE — 2500000001 HC RX 250 WO HCPCS SELF ADMINISTERED DRUGS (ALT 637 FOR MEDICARE OP): Performed by: PHYSICIAN ASSISTANT

## 2024-02-01 PROCEDURE — 85027 COMPLETE CBC AUTOMATED: CPT | Performed by: PHYSICIAN ASSISTANT

## 2024-02-01 PROCEDURE — 36415 COLL VENOUS BLD VENIPUNCTURE: CPT | Performed by: PHYSICIAN ASSISTANT

## 2024-02-01 PROCEDURE — 71045 X-RAY EXAM CHEST 1 VIEW: CPT | Performed by: RADIOLOGY

## 2024-02-01 PROCEDURE — 83735 ASSAY OF MAGNESIUM: CPT | Performed by: PHYSICIAN ASSISTANT

## 2024-02-01 PROCEDURE — 82947 ASSAY GLUCOSE BLOOD QUANT: CPT

## 2024-02-01 PROCEDURE — 97530 THERAPEUTIC ACTIVITIES: CPT | Mod: GP | Performed by: STUDENT IN AN ORGANIZED HEALTH CARE EDUCATION/TRAINING PROGRAM

## 2024-02-01 PROCEDURE — 2500000004 HC RX 250 GENERAL PHARMACY W/ HCPCS (ALT 636 FOR OP/ED): Performed by: PHYSICIAN ASSISTANT

## 2024-02-01 RX ORDER — OXYCODONE HYDROCHLORIDE 5 MG/1
5 TABLET ORAL EVERY 6 HOURS PRN
Qty: 15 TABLET | Refills: 0 | Status: SHIPPED | OUTPATIENT
Start: 2024-02-01 | End: 2024-03-28 | Stop reason: ALTCHOICE

## 2024-02-01 RX ORDER — POTASSIUM CHLORIDE 20 MEQ/1
20 TABLET, EXTENDED RELEASE ORAL ONCE
Status: COMPLETED | OUTPATIENT
Start: 2024-02-01 | End: 2024-02-01

## 2024-02-01 RX ADMIN — TOPIRAMATE 50 MG: 25 TABLET, FILM COATED ORAL at 08:30

## 2024-02-01 RX ADMIN — ACETAMINOPHEN 650 MG: 325 TABLET ORAL at 04:19

## 2024-02-01 RX ADMIN — AMLODIPINE BESYLATE 5 MG: 5 TABLET ORAL at 08:30

## 2024-02-01 RX ADMIN — OXYCODONE HYDROCHLORIDE 10 MG: 5 TABLET ORAL at 04:22

## 2024-02-01 RX ADMIN — PANTOPRAZOLE SODIUM 40 MG: 40 TABLET, DELAYED RELEASE ORAL at 08:30

## 2024-02-01 RX ADMIN — ACETAMINOPHEN 650 MG: 325 TABLET ORAL at 08:30

## 2024-02-01 RX ADMIN — CALCIUM CARBONATE (ANTACID) CHEW TAB 500 MG 500 MG: 500 CHEW TAB at 08:30

## 2024-02-01 RX ADMIN — ACETAMINOPHEN 650 MG: 325 TABLET ORAL at 12:00

## 2024-02-01 RX ADMIN — POTASSIUM CHLORIDE 20 MEQ: 1500 TABLET, EXTENDED RELEASE ORAL at 11:55

## 2024-02-01 RX ADMIN — AMIODARONE HYDROCHLORIDE 200 MG: 200 TABLET ORAL at 08:30

## 2024-02-01 RX ADMIN — HEPARIN SODIUM 5000 UNITS: 5000 INJECTION INTRAVENOUS; SUBCUTANEOUS at 04:20

## 2024-02-01 RX ADMIN — ATORVASTATIN CALCIUM 10 MG: 10 TABLET, FILM COATED ORAL at 08:30

## 2024-02-01 RX ADMIN — OXYCODONE HYDROCHLORIDE 5 MG: 5 TABLET ORAL at 08:29

## 2024-02-01 RX ADMIN — ASPIRIN 81 MG 81 MG: 81 TABLET ORAL at 08:30

## 2024-02-01 ASSESSMENT — COGNITIVE AND FUNCTIONAL STATUS - GENERAL
MOBILITY SCORE: 24
DAILY ACTIVITIY SCORE: 24
MOBILITY SCORE: 24

## 2024-02-01 ASSESSMENT — PAIN SCALES - WONG BAKER: WONGBAKER_NUMERICALRESPONSE: HURTS LITTLE BIT

## 2024-02-01 ASSESSMENT — PAIN SCALES - GENERAL
PAINLEVEL_OUTOF10: 8
PAINLEVEL_OUTOF10: 6
PAINLEVEL_OUTOF10: 0 - NO PAIN
PAINLEVEL_OUTOF10: 0 - NO PAIN

## 2024-02-01 ASSESSMENT — PAIN - FUNCTIONAL ASSESSMENT
PAIN_FUNCTIONAL_ASSESSMENT: 0-10
PAIN_FUNCTIONAL_ASSESSMENT: 0-10

## 2024-02-01 NOTE — DISCHARGE SUMMARY
Discharge Diagnosis  Local recurrence of left lung cancer (CMS/HCC)    Issues Requiring Follow-Up  -Surgical pathology    Test Results Pending At Discharge  Pending Labs       Order Current Status    Surgical Pathology Exam In process          Hospital Course  Thu Ortega is a 68 y.o. 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 Calixto. Postoperatively, the patient maintained a single left chest tube and a loyola catheter.  Loyola removed on POD#1 with a successful spontaneous void. Chest tube with minimal output and no air leak on POD#1. Chest tube removed without complications, and an occlusive dressing was applied. A post pull CXR is stable. Her diaphragmatic pacer wires were also removed on POD#2 per Dr. Portillo and Dr. Calixto. Home oxygen assessment also completed by the nurses - 93% at rest and 91% ambulating on room air.       On POD#2, the patient was deemed fit for discharge. At the time of discharge, the patient was ambulating ad windy, tolerating a diabetic diet, and pain was controlled on an oral regimen. The patient was educated on postoperative activity restrictions, wound care, and pain management. The patient was also educated on holding her home naltrexone and Trazodone while taking her postop oxycodone at home; she and her  expressed understanding. All additional patient and family questions answered to their satisfaction until there were none.     Pt will be discharged home with her family in stable condition. She will follow up with Dr. Calixto in the outpatient setting in two weeks.      Pertinent Physical Exam At Time of Discharge  Physical Exam  Constitutional:       General: She is not in acute distress.     Comments: Oriented x3, sitting upright in bed   HENT:      Head: Normocephalic and atraumatic.   Neck:      Comments: No JVD or tracheal deviation  Cardiovascular:      Comments: Regular rate and rhythm on telemetry   Pulmonary:      Comments: No accessory muscle use to breathe or crepitus appreciated. Lungs clear to auscultation.   Abdominal:      General: There is no distension.      Palpations: Abdomen is soft.      Tenderness: There is no guarding or rebound.   Musculoskeletal:         General: No tenderness.      Right lower leg: No edema.      Left lower leg: No edema.   Skin:     Comments: Warm and dry. Surgical incisions without surrounding erythema, edema, or exudates. Occlusive dressing intact around prior chest tube site without strikethrough.    Neurological:      Mental Status: She is alert.   Psychiatric:      Comments: Appropriate mood and behavior   Home Medications     Medication List      START taking these medications     oxyCODONE 5 mg immediate release tablet; Commonly known as: Roxicodone;   Take 1 tablet (5 mg) by mouth every 6 hours if needed for moderate pain (4   - 6).     CHANGE how you take these medications     acetaminophen 325 mg tablet; Commonly known as: Tylenol; Take 2 tablets   (650 mg) by mouth every 4 hours if needed for mild pain (1 - 3) or fever   (temp greater than 38.0 C).; What changed: medication strength, how much   to take, when to take this, reasons to take this     CONTINUE taking these medications     Accu-Chek Guide test strips strip; Generic drug: blood sugar diagnostic   albuterol 90 mcg/actuation inhaler   amiodarone 200 mg tablet; Commonly known as: Pacerone; Take 1 tablet   (200 mg) by mouth once daily.   amLODIPine 5 mg tablet; Commonly known as: Norvasc   aspirin 81 mg chewable tablet   atorvastatin 10 mg tablet; Commonly known as:  "Lipitor   BD Insulin Syringe Ultra-Fine 31G X 5/16\" 1 mL syringe; Generic drug:   insulin syringe-needle U-100   biotin 10 mg tablet   metFORMIN 1,000 mg tablet; Commonly known as: Glucophage   NovoLIN 70/30 U-100 Insulin 100 unit/mL (70-30) injection; Generic drug:   insulin NPH and regular human   pantoprazole 40 mg EC tablet; Commonly known as: ProtoNix   topiramate 25 mg tablet; Commonly known as: Topamax   traZODone 50 mg tablet; Commonly known as: Desyrel   Vitamin D3 5,000 Units tablet; Generic drug: cholecalciferol   Wixela Inhub 250-50 mcg/dose diskus inhaler; Generic drug: fluticasone   propion-salmeteroL     STOP taking these medications     chlorhexidine 0.12 % solution; Commonly known as: Peridex   NALTREXONE ORAL   Tylenol Cold-Flu Multi-Act D-N 30-15-500mg(d)/ 2-30- mg tablets,   sequential; Generic drug: fea-juuoogcka-TA-acetaminophen       Outpatient Follow-Up  Future Appointments   Date Time Provider Department Center   2/8/2024  9:00 AM Reinaldo Calixto MD [id number] [place]       Sunshine Hankins PA-C  " [place] Pregnancy And Lactation Text: This medication is Pregnancy Category C and it isn't known if it is safe during pregnancy. It is unknown if it is excreted in breast milk.

## 2024-02-01 NOTE — CARE PLAN
Problem: Pain - Adult  Goal: Verbalizes/displays adequate comfort level or baseline comfort level  Outcome: Progressing     Problem: Safety - Adult  Goal: Free from fall injury  Outcome: Progressing     Problem: Discharge Planning  Goal: Discharge to home or other facility with appropriate resources  Outcome: Progressing     Problem: Chronic Conditions and Co-morbidities  Goal: Patient's chronic conditions and co-morbidity symptoms are monitored and maintained or improved  Outcome: Progressing     Problem: Diabetes  Goal: Achieve decreasing blood glucose levels by end of shift  Outcome: Progressing  Goal: Increase stability of blood glucose readings by end of shift  Outcome: Progressing  Goal: Decrease in ketones present in urine by end of shift  Outcome: Progressing  Goal: Maintain electrolyte levels within acceptable range throughout shift  Outcome: Progressing  Goal: Maintain glucose levels >70mg/dl to <250mg/dl throughout shift  Outcome: Progressing  Goal: No changes in neurological exam by end of shift  Outcome: Progressing  Goal: Learn about and adhere to nutrition recommendations by end of shift  Outcome: Progressing  Goal: Vital signs within normal range for age by end of shift  Outcome: Progressing  Goal: Increase self care and/or family involovement by end of shift  Outcome: Progressing  Goal: Receive DSME education by end of shift  Outcome: Progressing     Problem: Fall/Injury  Goal: Not fall by end of shift  Outcome: Progressing  Goal: Be free from injury by end of the shift  Outcome: Progressing  Goal: Verbalize understanding of personal risk factors for fall in the hospital  Outcome: Progressing  Goal: Verbalize understanding of risk factor reduction measures to prevent injury from fall in the home  Outcome: Progressing  Goal: Use assistive devices by end of the shift  Outcome: Progressing  Goal: Pace activities to prevent fatigue by end of the shift  Outcome: Progressing     Problem: Pain  Goal:  Takes deep breaths with improved pain control throughout the shift  Outcome: Progressing  Goal: Turns in bed with improved pain control throughout the shift  Outcome: Progressing  Goal: Walks with improved pain control throughout the shift  Outcome: Progressing  Goal: Performs ADL's with improved pain control throughout shift  Outcome: Progressing  Goal: Participates in PT with improved pain control throughout the shift  Outcome: Progressing  Goal: Free from opioid side effects throughout the shift  Outcome: Progressing  Goal: Free from acute confusion related to pain meds throughout the shift  Outcome: Progressing

## 2024-02-01 NOTE — PROGRESS NOTES
Physical Therapy    Physical Therapy Treatment    Patient Name: Thu Ortega  MRN: [id number]  Today's Date: 2/1/2024  Time Calculation  Start Time: 1055  Stop Time: 1108  Time Calculation (min): 13 min       Assessment/Plan   PT Assessment  End of Session Communication: Bedside nurse  End of Session Patient Position: Bed, 3 rail up     PT Plan  Treatment/Interventions: Bed mobility, Transfer training, Balance training, Gait training, Therapeutic exercise, Endurance training, Range of motion, Therapeutic activity, Home exercise program, Strengthening  PT Plan: Skilled PT  PT Frequency: 3 times per week  PT Discharge Recommendations: Low intensity level of continued care  Equipment Recommended upon Discharge:  (Owns necessary equipment)  PT Recommended Transfer Status: Assist x1  PT - OK to Discharge: Yes      General Visit Information:   PT  Visit  PT Received On: 02/01/24  Prior to Session Communication: Bedside nurse  Patient Position Received: Bed, 3 rail up     Subjective   Subjective: Patient is alert, agreeable to PT.  Spouse in rooma nd supportive.  Feels at baseline except for some chest discomfort and comfortable with home DC later today.    Precautions:  Precautions  Medical Precautions: Fall precautions  Vital Signs:  Vital Signs  SpO2: 94 % (post 95)    Objective   Pain:  Pain Assessment  Pain Score: 0 - No pain (post 1)  Pain Type:  (surgical)  Cognition:  Cognition  Overall Cognitive Status: Within Functional Limits  Orientation Level: Oriented X4  Lines/Tubes/Drains:       PT Treatments:           Bed Mobility 1  Bed Mobility 1: Supine to sitting  Level of Assistance 1: Independent  Ambulation/Gait Training 1  Surface 1: Level tile  Device 1: No device  Assistance 1: Independent  Comments/Distance (ft) 1: 35, 20  Transfer 1  Transfer From 1: Sit to  Transfer to 1: Stand  Transfer Level of Assistance 1: Independent  Trials/Comments 1: 4  Transfers 2  Transfer From 2: Stand to  Transfer to 2:  Sit  Transfer Level of Assistance 2: Independent  Trials/Comments 2: 4  Transfers 3  Transfer From 3: Bed to  Transfer to 3: Chair with arms  Transfer Level of Assistance 3: Independent  Trials/Comments 3: 1  Transfers 4  Transfer From 4: Chair with arms to  Transfer to 4: Chair with arms  Transfer Level of Assistance 4: Independent  Trials/Comments 4: 2             Outcome Measures:  [facility] Basic Mobility  Turning from your back to your side while in a flat bed without using bedrails: None  Moving from lying on your back to sitting on the side of a flat bed without using bedrails: None  Moving to and from bed to chair (including a wheelchair): None  Standing up from a chair using your arms (e.g. wheelchair or bedside chair): None  To walk in hospital room: None  Climbing 3-5 steps with railing: None  Basic Mobility - Total Score: 24                 [place]  Sitting Balance: Steady, safe  Arises: Able without using arms  Attempts to Arise: Able to arise, one attempt  Immediate Standing Balance (First 5 Seconds): Steady without walker or other support  Standing Balance: Narrow stance without support  Nudged: Steady without walker or other support  Eyes Closed: Steady  Turned 360 Degrees: Steadiness: Steady  Turned 360 Degrees: Continuity of Steps: Continuous  Sitting Down: Safe, smooth motion  Balance Score: 16  Initiation of Gait: No hesitancy  Step Height: R Swing Foot: Right foot complete clears floor  Step Length: R Swing Foot: Passes left stance foot  Step Height: L Swing Foot: Left foot complete clears floor  Step Length: L Swing Foot: Passes right stance foot  Step Symmetry: Right and left step appear equal  Step Continuity: Steps appear continuous  Path: Straight without walking aid  Trunk: No sway, no flexion, no use of arms, no walking aid  Walking Time: Heels almost touching while walking  Gait Score: 12  Total Score: 28  Timed Up and Go Test  How many seconds did it take to complete the 5 tasks?: 7  seconds       Education Documentation  Precautions, taught by Shubham Schaeffer PT at 2/1/2024 11:13 AM.  Learner: Patient  Readiness: Acceptance  Method: Explanation  Response: Verbalizes Understanding, Demonstrated Understanding    Body Mechanics, taught by Shubham Schaeffer PT at 2/1/2024 11:13 AM.  Learner: Patient  Readiness: Acceptance  Method: Explanation  Response: Verbalizes Understanding, Demonstrated Understanding    Mobility Training, taught by Shubham Schaeffer PT at 2/1/2024 11:13 AM.  Learner: Patient  Readiness: Acceptance  Method: Explanation  Response: Verbalizes Understanding, Demonstrated Understanding    Education Comments  No comments found.          OP EDUCATION:       Encounter Problems       Encounter Problems (Active)       Pain - Adult             Encounter Problems (Resolved)       PT Problem       Patient will complete supine to sit and sit to supine Independent  (Met)       Start:  01/31/24    Expected End:  02/14/24    Resolved:  02/01/24         Patient will perform sit<>stand transfer with no device, and Independent  (Met)       Start:  01/31/24    Expected End:  02/14/24    Resolved:  02/01/24         Patient will ambulate >150' with No Device and Independent  (Adequate for Discharge)       Start:  01/31/24    Expected End:  02/14/24    Resolved:  02/01/24         Patient will ascend/descend 3 steps with Right Rail Ascending and independence  (Adequate for Discharge)       Start:  01/31/24    Expected End:  02/14/24    Resolved:  02/01/24                Assessment: Patient is progressing Well with therapy this date.  Patient low falls risk based on TUG and Tinetti this date.  Independent for all other mobility.  No further acute PT warranted at this time.  Please continue mobility during acute stay with nursing staff.  PT to sign off with no further needs.      02/01/24 at 11:14 AM   Shubham Schaeffer PT   Rehab Office: [phone]

## 2024-02-01 NOTE — PROGRESS NOTES
Thu Ortega is a 68 y.o. 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 Calixto.     Subjective   Pt had one coughing spell and episode of shortness of breath overnight requiring 1L of oxygen. Still endorsing moderate postop pain, but improved when the Tylenol and oxycodone are used together. Otherwise, she denies nausea, vomiting, fevers, or chills. Pt hopeful to go home today as she feels she will rest better at home. Reports supportive family at home.    Objective     Physical Exam  Constitutional:       General: She is not in acute distress.     Comments: Oriented x3, sitting upright in bed   HENT:      Head: Normocephalic and atraumatic.   Neck:      Comments: No JVD or tracheal deviation  Cardiovascular:      Comments: Regular rate and rhythm on telemetry   Pulmonary:      Comments: No accessory muscle use to breathe or crepitus appreciated. Lungs clear to auscultation. On 1L/min of oxygen via nasal cannula. Left diaphragmatic pacer wires in place but not in use.   Abdominal:      General: There is no distension.      Palpations: Abdomen is soft.      Tenderness: There is no guarding or rebound.   Musculoskeletal:         General: No tenderness.      Right lower leg: No edema.      Left lower leg: No edema.   Skin:     Comments: Warm and dry. Surgical incisions without surrounding erythema, edema, or exudates. Occlusive dressing intact around prior chest tube site without strikethrough.    Neurological:      Mental Status: She is alert.   Psychiatric:      Comments: Appropriate mood and behavior       Last  "Recorded Vitals  Blood pressure 169/73, pulse 83, temperature 36.2 °C (97.2 °F), temperature source Temporal, resp. rate 18, height 1.499 m (4' 11\"), weight 66 kg (145 lb 9.6 oz), SpO2 98 %.  Intake/Output last 3 Shifts:  I/O last 3 completed shifts:  In: 602.9 (9.1 mL/kg) [P.O.:600; I.V.:2.9 (0 mL/kg)]  Out: 2035 (30.8 mL/kg) [Urine:1920 (0.8 mL/kg/hr); Chest Tube:115]  Weight: 66 kg     Relevant Results  Scheduled medications  amiodarone, 200 mg, oral, Daily  amLODIPine, 5 mg, oral, Daily  aspirin, 81 mg, oral, Daily  atorvastatin, 10 mg, oral, Daily before breakfast  calcium carbonate, 500 mg, oral, Daily  fluticasone furoate-vilanteroL, 1 puff, inhalation, Daily  heparin (porcine), 5,000 Units, subcutaneous, q8h  insulin lispro, 0-10 Units, subcutaneous, TID with meals  pantoprazole, 40 mg, oral, Daily before breakfast  potassium chloride CR, 20 mEq, oral, Once  sennosides-docusate sodium, 2 tablet, oral, BID  sugammadex, , ,   topiramate, 50 mg, oral, BID      Continuous medications     PRN medications  PRN medications: acetaminophen, dextrose 10 % in water (D10W), dextrose, glucagon, ipratropium-albuteroL, naloxone, ondansetron, oxyCODONE, oxyCODONE, oxygen, sugammadex     Results for orders placed or performed during the hospital encounter of 01/30/24 (from the past 24 hour(s))   POCT GLUCOSE   Result Value Ref Range    POCT Glucose 164 (H) 74 - 99 mg/dL   POCT GLUCOSE   Result Value Ref Range    POCT Glucose 158 (H) 74 - 99 mg/dL   POCT GLUCOSE   Result Value Ref Range    POCT Glucose 210 (H) 74 - 99 mg/dL   CBC   Result Value Ref Range    WBC 9.1 4.4 - 11.3 x10*3/uL    nRBC 0.0 0.0 - 0.0 /100 WBCs    RBC 4.56 4.00 - 5.20 x10*6/uL    Hemoglobin 11.0 (L) 12.0 - 16.0 g/dL    Hematocrit 36.8 36.0 - 46.0 %    MCV 81 80 - 100 fL    MCH 24.1 (L) 26.0 - 34.0 pg    MCHC 29.9 (L) 32.0 - 36.0 g/dL    RDW 14.8 (H) 11.5 - 14.5 %    Platelets 243 150 - 450 x10*3/uL   Basic metabolic panel   Result Value Ref Range    " Glucose 165 (H) 74 - 99 mg/dL    Sodium 142 136 - 145 mmol/L    Potassium 3.7 3.5 - 5.3 mmol/L    Chloride 108 (H) 98 - 107 mmol/L    Bicarbonate 23 21 - 32 mmol/L    Anion Gap 15 10 - 20 mmol/L    Urea Nitrogen 11 6 - 23 mg/dL    Creatinine 0.72 0.50 - 1.05 mg/dL    eGFR >90 >60 mL/min/1.73m*2    Calcium 9.2 8.6 - 10.6 mg/dL   Magnesium   Result Value Ref Range    Magnesium 2.02 1.60 - 2.40 mg/dL   POCT GLUCOSE   Result Value Ref Range    POCT Glucose 149 (H) 74 - 99 mg/dL      XR chest 1 view    Result Date: 1/31/2024  Interpreted By:  Jamie Guerra, STUDY: XR CHEST 1 VIEW;  1/31/2024 9:24 am   INDICATION: Signs/Symptoms:s/p chest tube removal, upright please.   COMPARISON: 01/31/2024 at 3:57 a.m.   ACCESSION NUMBER(S): [id number]   ORDERING CLINICIAN: RUBEN CHUN   FINDINGS: AP radiograph of the chest was provided.   The left-sided chest tube has been removed. There is no visible pneumothorax.   CARDIOMEDIASTINAL SILHOUETTE: Cardiomediastinal silhouette is stable in size and configuration.   LUNGS: Mild parenchymal opacities are noted on the left from recent surgery. Volume loss from prior upper lobectomy on the left is also noted. No new parenchymal abnormalities have appeared.   ABDOMEN: No remarkable upper abdominal findings.   BONES: No acute osseous changes.       1.  Satisfactory appearance following removal of chest tube from the left. There is no visible pneumothorax.       MACRO: None   Signed by: Jamie Guerra 1/31/2024 10:35 AM Dictation workstation:   [id number]    XR chest 1 view    Result Date: 1/31/2024  Interpreted By:  Jamie Guerra,  Emanuel Ward STUDY: XR CHEST 1 VIEW;  1/31/2024 4:05 am   INDICATION: Signs/Symptoms:Daily CXR on morning rounds, upright please.   COMPARISON: Chest x-ray 01/30/2024   ACCESSION NUMBER(S): [id number]   ORDERING CLINICIAN: RUBEN CHUN   FINDINGS: AP radiograph of the chest was provided.   Left-sided chest tube in similar position compared to  prior examination projecting over the left lung apex.   CARDIOMEDIASTINAL SILHOUETTE: Cardiomediastinal silhouette is enlarged but stable in size and configuration.   LUNGS: Resolution of previously seen small left sided pneumothorax. There is improvement in previously seen left lung atelectasis/consolidation with residual/mild left basilar atelectasis. The previously observed pulmonary vascular congestion is mildly improved with decreased accentuation of the interstitial lung markings on current exam. Trace left-sided pleural effusion.   ABDOMEN: No remarkable upper abdominal findings.   BONES: No acute osseous changes.       1. Resolution of previously seen small left-sided pneumothorax with stable position of the left-sided chest tube. 2. Improving pulmonary vascular congestion with decreased accentuation of the interstitial lung markings. Trace left-sided pleural effusion with adjacent left basilar atelectasis.   I personally reviewed the images/study and I agree with the findings as stated by resident Ed Coronel. This study was interpreted at [place], [place].   MACRO: None   Signed by: Jamie Guerra 1/31/2024 10:06 AM Dictation workstation:   [id number]    XR chest 1 view    Result Date: 1/30/2024  STUDY: Chest Radiograph;  01/20/2024 2:28 PM INDICATION: Status post surgery. COMPARISON: 02/26/2023 CT Chest, 12/13/2023. ACCESSION NUMBER(S): [id number] ORDERING CLINICIAN: RUBEN CHUN TECHNIQUE:  Frontal chest was obtained at 15:23 hours. FINDINGS: CARDIOMEDIASTINAL SILHOUETTE: The heart is enlarged with prominence of the pulmonary vessels.  LUNGS: Small left pneumothorax is present.  Distance from the chest wall to pleural surface measures 0.8 cm.  Left pneumothorax estimated at 10-15%.  A left chest tube is present.  EKG leads overlie the chest.  ABDOMEN: No remarkable upper abdominal findings.  BONES: No acute osseous changes.    Left sided chest  tube. Small left pneumothorax estimated at 10-15%. Cardiomegaly and pulmonary vascular congestion. Signed by Jeff Kim MD      Assessment/Plan   Principal Problem:    Local recurrence of left lung cancer (CMS/HCC)  Active Problems:    Non-small cell lung cancer without metastasis (CMS/HCC)    CHEY (obstructive sleep apnea)  Thu Ortega is a 68 y.o. 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 Calixto. Postoperatively, the patient maintained a single left chest tube and a loyola catheter. Loyola removed on POD#1 with a successful spontaneous void. Chest tube with minimal output, and no air leak on POD#1. Chest tube removed without complications, and an occlusive dressing was applied. A post pull CXR was stable. Diaphragmatic pacer wires were removed POD#2. Home oxygen assessment also completed by the nurses - 93% at rest and 91% ambulating on room air.      Plan:     Neuro: History of fibromyalgia, on naltrexone at home per patient, but she has stopped this medication for several weeks in preparation for surgery. Acute postop pain.  -Out of bed to chair throughout the day  -Encourage ambulation as tolerated  -Continue oral regimen of pain control with oxycodone and Tylenol, monitor effectiveness, adjust dose as needed. OARRs checked for discharge planning. Overdose risk score of 30. Pt education provided.   -Continue home topiramate for a history of tremors  -PT/OT consulted, appreciate recs, low intensity PT on discharge. Pt declines PT on discharge. She has a supportive  at home and a grand son who is  a physical therapist. She also has the exercises at home from her prior surgery in 2023.     Cardiac: History of atrial fibrillation, HTN, HLD. On amiodarone, aspirin, amlodipine, and atorvastatin at home.   -Continue telemetry  -Vital signs every four hours  -Replace electrolytes as needed, goal Mg>2 and K>4, hypokalemia today, replaced  -Continue home amiodarone, aspirin, and atorvastatin   -Continue home amlodipine    Pulm: History of NICHOL lobectomy in 2023 at the [facility] for adenocarcinoma complicated by aspiration PNA and atrial fibrillation. History of COPD. She has home oxygen at home which she uses as needed over the last year since her lobectomy in 2023. Now s/p a bronchoscopy, left robotic assisted anterior mediastinal mass resection, diaphragmatic pacer placement, and intercostal nerve block on 1/30/24 with Dr. Calixto.  -Encourage incentive spirometer use every hour  -Continue pulmonary hygiene  -Walking pulse ox completed by nursing today: 93% at rest and 91% ambulating on room air.  -Left chest tube with minimal output and no air leak on POD#1. Chest tube removed without complications, and an occlusive dressing was applied on POD#1.  -Daily CXR while hospitalized   -Continue Breo Ellipta - formulary equivalent for home Wixela   -Nebulizers as needed for shortness of breath   -Diaphragm pacer management discussed with Dr. Green's by Dr. Calixto, appreciate recs, pacer wires discontinued this morning.     GI: History of GERD, on Protonix   -Continue diabetic diet  -Zofran available as needed for nausea  -Bowel regimen available for constipation secondary to pain medication  -Continue home Protonix    Endocrine: History of T2DM, on metformin and Novolin at home. Pt reports not routinely taking morning Novolin at home as she often doesn't eat breakfast. She often alters (1/2 doses) her lunch and dinner doses depending on her blood sugar.   -Continue routine accuchecks  -Hold home metformin in the acute  postop period  -Continue sliding scale insulin in the acute postop period  -Hypoglycemia protocol available for activation as needed    Renal:   -Horne removed POD#1, spontaneously voiding   -Monitor I&Os  -Monitor electrolytes with routine BMPs    ID: Afebrile, leukocytosis to 12.5 resolved to 9.1 today, likely reactive secondary to surgery   -Continue to trend daily temperatures and CBC to monitor WBC count for signs of infection   -Monitor surgical sites for signs of infection   -Preoperative Ancef completed    Heme:   -Continue to monitor for signs/symptoms of postop bleeding   -Daily CBC  -Continue subcutaneous heparin and SCDs for DVT prophylaxis     Dispo:   -Plan for discharge home, likely today    -Continue to assess for discharge needs    Pt seen and evaluated. Pt discussed with attending physician Dr. Calixto.     Sunshine Hankins PA-C

## 2024-02-05 ENCOUNTER — OFFICE VISIT (OUTPATIENT)
Dept: ENDOCRINOLOGY | Age: 69
End: 2024-02-05
Payer: MEDICARE

## 2024-02-05 VITALS
WEIGHT: 148 LBS | HEART RATE: 80 BPM | OXYGEN SATURATION: 98 % | BODY MASS INDEX: 29.84 KG/M2 | HEIGHT: 59 IN | DIASTOLIC BLOOD PRESSURE: 79 MMHG | SYSTOLIC BLOOD PRESSURE: 130 MMHG

## 2024-02-05 DIAGNOSIS — E11.65 UNCONTROLLED TYPE 2 DIABETES MELLITUS WITH HYPERGLYCEMIA (HCC): Primary | ICD-10-CM

## 2024-02-05 LAB
CHP ED QC CHECK: NORMAL
GLUCOSE BLD-MCNC: 233 MG/DL

## 2024-02-05 PROCEDURE — 2022F DILAT RTA XM EVC RTNOPTHY: CPT | Performed by: INTERNAL MEDICINE

## 2024-02-05 PROCEDURE — 1036F TOBACCO NON-USER: CPT | Performed by: INTERNAL MEDICINE

## 2024-02-05 PROCEDURE — 82962 GLUCOSE BLOOD TEST: CPT | Performed by: INTERNAL MEDICINE

## 2024-02-05 PROCEDURE — 3075F SYST BP GE 130 - 139MM HG: CPT | Performed by: INTERNAL MEDICINE

## 2024-02-05 PROCEDURE — G8484 FLU IMMUNIZE NO ADMIN: HCPCS | Performed by: INTERNAL MEDICINE

## 2024-02-05 PROCEDURE — 99213 OFFICE O/P EST LOW 20 MIN: CPT | Performed by: INTERNAL MEDICINE

## 2024-02-05 PROCEDURE — 3051F HG A1C>EQUAL 7.0%<8.0%: CPT | Performed by: INTERNAL MEDICINE

## 2024-02-05 PROCEDURE — 3078F DIAST BP <80 MM HG: CPT | Performed by: INTERNAL MEDICINE

## 2024-02-05 PROCEDURE — G8427 DOCREV CUR MEDS BY ELIG CLIN: HCPCS | Performed by: INTERNAL MEDICINE

## 2024-02-05 PROCEDURE — 3017F COLORECTAL CA SCREEN DOC REV: CPT | Performed by: INTERNAL MEDICINE

## 2024-02-05 PROCEDURE — 1090F PRES/ABSN URINE INCON ASSESS: CPT | Performed by: INTERNAL MEDICINE

## 2024-02-05 PROCEDURE — G8400 PT W/DXA NO RESULTS DOC: HCPCS | Performed by: INTERNAL MEDICINE

## 2024-02-05 PROCEDURE — G8417 CALC BMI ABV UP PARAM F/U: HCPCS | Performed by: INTERNAL MEDICINE

## 2024-02-05 PROCEDURE — 1123F ACP DISCUSS/DSCN MKR DOCD: CPT | Performed by: INTERNAL MEDICINE

## 2024-02-05 ASSESSMENT — ENCOUNTER SYMPTOMS: SHORTNESS OF BREATH: 1

## 2024-02-05 NOTE — PROGRESS NOTES
2/5/2024    Assessment:       Diagnosis Orders   1. Uncontrolled type 2 diabetes mellitus with hyperglycemia (HCC)  POCT Glucose            PLAN:     Orders Placed This Encounter   Procedures    Hemoglobin A1C     Standing Status:   Future     Standing Expiration Date:   2/5/2025    Basic Metabolic Panel     Standing Status:   Future     Standing Expiration Date:   2/5/2025    POCT Glucose     Orders Placed This Encounter   Medications    metFORMIN (GLUCOPHAGE) 1000 MG tablet     Sig: Take 1 tablet by mouth 2 times daily (with meals)     Dispense:  180 tablet     Refill:  3     Continue current regimen plus metformin  Follow-up in 3 to 6 months      Orders Placed This Encounter   Procedures    POCT Glucose     No orders of the defined types were placed in this encounter.    No follow-ups on file.  Subjective:     Chief Complaint   Patient presents with    Diabetes     Vitals:    02/05/24 1000   BP: 130/79   Pulse: 80   SpO2: 98%   Weight: 67.1 kg (148 lb)   Height: 1.499 m (4' 11\")     Wt Readings from Last 3 Encounters:   02/05/24 67.1 kg (148 lb)   08/07/23 70.8 kg (156 lb)   05/04/23 71.7 kg (158 lb)     BP Readings from Last 3 Encounters:   02/05/24 130/79   08/07/23 127/76   05/04/23 (!) 154/76     Follow-up with type 2 diabetes patient on metformin plus Novolin 70/30 3 times daily   Novolin 70/30 3 times daily 70 units in the morning 20 units at lunch 60-70 in the evening  Hemoglobin A1c has been stable  Hemoglobin A1C       Date                     Value               Ref Range           Status                01/29/2024               7.5 (H)             4.8 - 5.9 %         Final            ----------  Average blood sugars close to 160 requesting refills for metformin    Diabetes  She presents for her follow-up diabetic visit. She has type 2 diabetes mellitus. Pertinent negatives for diabetes include no polydipsia, no polyuria and no weight loss. Symptoms are stable. Current diabetic treatment includes insulin

## 2024-02-07 NOTE — PROGRESS NOTES
Subjective   Thu Ortega  is a 68 y.o. female with a pmhx of M1lI3H0 adenocarcinoma of NICHOL s/p robotic assisted NICHOL lobectomy on 1/31/23 which complicated by bx proven local recurrence s/p robotic assisted left anterior mediastinal mass resection on 1/30/2024, COPD, afib on warfarin, DM2, and meningioma of brain s/p resection  who presents today for postop follow up. Intra-op, left phrenic nerve was involved by the tumor and was sacrificed. She required temporary left diaphragm pacing periop and was discharged home on O2. She is using 1L NC today. She endorses some SOB with exercise. She has some soreness at incision.      There is no significant change in patient's past medical history, past surgical history, social history, family history, or review of systems since last visit.     Objective   There were no vitals taken for this visit.  Physical Exam  Vitals reviewed.   Constitutional:       Appearance: Normal appearance.   HENT:      Head: Normocephalic and atraumatic.   Eyes:      General: No scleral icterus.     Extraocular Movements: Extraocular movements intact.      Pupils: Pupils are equal, round, and reactive to light.   Cardiovascular:      Rate and Rhythm: Normal rate and regular rhythm.      Heart sounds: No murmur heard.  Pulmonary:      Effort: Pulmonary effort is normal.      Breath sounds: Normal breath sounds. No wheezing.      Comments: Incisions c/d/I.   Abdominal:      General: Abdomen is flat. There is no distension.      Palpations: Abdomen is soft.      Tenderness: There is no abdominal tenderness. There is no guarding.   Musculoskeletal:         General: No swelling or tenderness. Normal range of motion.      Cervical back: Normal range of motion and neck supple.   Skin:     General: Skin is warm and dry.      Coloration: Skin is not jaundiced.   Neurological:      General: No focal deficit present.      Mental Status: She is alert and oriented to person, place, and time. Mental status is  at baseline.   Psychiatric:         Mood and Affect: Mood normal.         Behavior: Behavior normal.         Diagnostic Review  I reviewed her CXR from today and compared it to the CXR from 2/1/24. It showed clear lung field. No PTX or effusion. Mildly elevated left diaphragm.  I reviewed the CT scan from 11/15/23, 7/12/23 and 12/28/22 and the PET/CT from 8/9/23. It showed left upper lobectomy of known lung cancer.  There is a new periaortic mediastinal mass that is the metabolic highly active SUV 7.9.  She will has multiple small right and left nodules which are stable comparing to her preop CAT scan.    I reviewed CT bx of the mediastinal mass from Dr Garcia. It showed poorly differentiated carcinoma. PDL1 90%. Pending NGS.  I reviewed the ECHO from 1/17/23. It showed EF of 65%, RV function is moderately decreased.  I reviewed EBUS from 7/25/23. It showed 4L, 10L and 7 LN station are sampled which are negative for cancer  I reviewed Dr Fagan's operative report and pathology report from 1/31/23. It showed 2.9cm poorly differentiated adenocarcinoma with visceral pleural invasion. All margin negative. LN 5,7,9,11L and 12L were excised which were negative for tumor. Final path [id number] PDL1 90% positive KRAS G12C      Assessment/Plan   Thu Ortega  is doing well. She should be able to wean off her O2 in the next few weeks. The pathology is still pending and I will call her with result when it is back. I will also touch base with Dr Galeano regarding further treatment. I recommend follow up with me in 6 month with CT scan.     Reinaldo Calixto MD  Thoracic Surgeon  [facility]   of Medicine  [facility]  Office phone: [phone]  Fax: [phone]  Pager: [phone]

## 2024-02-08 ENCOUNTER — HOSPITAL ENCOUNTER (OUTPATIENT)
Dept: RADIOLOGY | Facility: HOSPITAL | Age: 69
Discharge: HOME | End: 2024-02-08
Payer: MEDICARE

## 2024-02-08 ENCOUNTER — OFFICE VISIT (OUTPATIENT)
Dept: SURGERY | Facility: CLINIC | Age: 69
End: 2024-02-08
Payer: MEDICARE

## 2024-02-08 VITALS
BODY MASS INDEX: 28.86 KG/M2 | HEIGHT: 60 IN | SYSTOLIC BLOOD PRESSURE: 142 MMHG | DIASTOLIC BLOOD PRESSURE: 74 MMHG | WEIGHT: 147 LBS | OXYGEN SATURATION: 98 % | RESPIRATION RATE: 16 BRPM | TEMPERATURE: 97.4 F | HEART RATE: 71 BPM

## 2024-02-08 DIAGNOSIS — J98.59 MEDIASTINAL MASS: ICD-10-CM

## 2024-02-08 DIAGNOSIS — C34.92 LOCAL RECURRENCE OF LEFT LUNG CANCER (MULTI): ICD-10-CM

## 2024-02-08 DIAGNOSIS — J98.59 MEDIASTINAL MASS: Primary | ICD-10-CM

## 2024-02-08 DIAGNOSIS — C34.92 NON-SMALL CELL CANCER OF LEFT LUNG (MULTI): ICD-10-CM

## 2024-02-08 LAB
LAB AP ASR DISCLAIMER: NORMAL
LABORATORY COMMENT REPORT: NORMAL
PATH REPORT.COMMENTS IMP SPEC: NORMAL
PATH REPORT.FINAL DX SPEC: NORMAL
PATH REPORT.GROSS SPEC: NORMAL
PATH REPORT.RELEVANT HX SPEC: NORMAL
PATH REPORT.TOTAL CANCER: NORMAL

## 2024-02-08 PROCEDURE — 1125F AMNT PAIN NOTED PAIN PRSNT: CPT | Performed by: STUDENT IN AN ORGANIZED HEALTH CARE EDUCATION/TRAINING PROGRAM

## 2024-02-08 PROCEDURE — 1036F TOBACCO NON-USER: CPT | Performed by: STUDENT IN AN ORGANIZED HEALTH CARE EDUCATION/TRAINING PROGRAM

## 2024-02-08 PROCEDURE — 3078F DIAST BP <80 MM HG: CPT | Performed by: STUDENT IN AN ORGANIZED HEALTH CARE EDUCATION/TRAINING PROGRAM

## 2024-02-08 PROCEDURE — 3077F SYST BP >= 140 MM HG: CPT | Performed by: STUDENT IN AN ORGANIZED HEALTH CARE EDUCATION/TRAINING PROGRAM

## 2024-02-08 PROCEDURE — 3051F HG A1C>EQUAL 7.0%<8.0%: CPT | Performed by: STUDENT IN AN ORGANIZED HEALTH CARE EDUCATION/TRAINING PROGRAM

## 2024-02-08 PROCEDURE — 71046 X-RAY EXAM CHEST 2 VIEWS: CPT | Performed by: RADIOLOGY

## 2024-02-08 PROCEDURE — 99024 POSTOP FOLLOW-UP VISIT: CPT | Performed by: STUDENT IN AN ORGANIZED HEALTH CARE EDUCATION/TRAINING PROGRAM

## 2024-02-08 PROCEDURE — 1111F DSCHRG MED/CURRENT MED MERGE: CPT | Performed by: STUDENT IN AN ORGANIZED HEALTH CARE EDUCATION/TRAINING PROGRAM

## 2024-02-08 PROCEDURE — 1159F MED LIST DOCD IN RCRD: CPT | Performed by: STUDENT IN AN ORGANIZED HEALTH CARE EDUCATION/TRAINING PROGRAM

## 2024-02-08 PROCEDURE — 71046 X-RAY EXAM CHEST 2 VIEWS: CPT

## 2024-02-09 ENCOUNTER — TELEPHONE (OUTPATIENT)
Dept: CARDIOTHORACIC SURGERY | Facility: HOSPITAL | Age: 69
End: 2024-02-09
Payer: MEDICARE

## 2024-02-09 NOTE — TELEPHONE ENCOUNTER
I called patient today to discuss about pathology report.  As expected the path showed poorly differentiated carcinoma with all margins negative.  Given that this is recurrent cancer, she should benefit from adjuvant chemoimmunotherapy.  She is seeing her oncologist along with Dr. Galeano next week.  I will email him as well regarding the next step of treatment.  All questions answered.  She agreed to proceed.    Reinaldo Calixto MD  Thoracic Surgeon  [facility]   of Medicine  [facility]  Office phone: [phone]  Fax: [phone]  Pager: [phone]

## 2024-02-14 ENCOUNTER — OFFICE VISIT (OUTPATIENT)
Dept: HEMATOLOGY/ONCOLOGY | Facility: CLINIC | Age: 69
End: 2024-02-14
Payer: MEDICARE

## 2024-02-14 ENCOUNTER — EDUCATION (OUTPATIENT)
Dept: HEMATOLOGY/ONCOLOGY | Facility: CLINIC | Age: 69
End: 2024-02-14
Payer: MEDICARE

## 2024-02-14 VITALS
WEIGHT: 146.69 LBS | OXYGEN SATURATION: 98 % | TEMPERATURE: 98.1 F | DIASTOLIC BLOOD PRESSURE: 82 MMHG | BODY MASS INDEX: 29.13 KG/M2 | HEART RATE: 84 BPM | RESPIRATION RATE: 14 BRPM | SYSTOLIC BLOOD PRESSURE: 163 MMHG

## 2024-02-14 DIAGNOSIS — E78.2 MIXED HYPERLIPIDEMIA: ICD-10-CM

## 2024-02-14 DIAGNOSIS — E11.9 TYPE 2 DIABETES MELLITUS WITHOUT COMPLICATION, WITH LONG-TERM CURRENT USE OF INSULIN (MULTI): ICD-10-CM

## 2024-02-14 DIAGNOSIS — J44.9 CHRONIC OBSTRUCTIVE PULMONARY DISEASE, UNSPECIFIED COPD TYPE (MULTI): ICD-10-CM

## 2024-02-14 DIAGNOSIS — I48.0 PAF (PAROXYSMAL ATRIAL FIBRILLATION) (MULTI): ICD-10-CM

## 2024-02-14 DIAGNOSIS — I10 PRIMARY HYPERTENSION: ICD-10-CM

## 2024-02-14 DIAGNOSIS — F33.41 RECURRENT MAJOR DEPRESSIVE DISORDER, IN PARTIAL REMISSION (CMS-HCC): ICD-10-CM

## 2024-02-14 DIAGNOSIS — C34.92 LOCAL RECURRENCE OF LEFT LUNG CANCER (MULTI): Primary | ICD-10-CM

## 2024-02-14 DIAGNOSIS — Z79.4 TYPE 2 DIABETES MELLITUS WITHOUT COMPLICATION, WITH LONG-TERM CURRENT USE OF INSULIN (MULTI): ICD-10-CM

## 2024-02-14 DIAGNOSIS — C34.12 MALIGNANT NEOPLASM OF UPPER LOBE OF LEFT LUNG (MULTI): ICD-10-CM

## 2024-02-14 PROCEDURE — 99215 OFFICE O/P EST HI 40 MIN: CPT | Performed by: INTERNAL MEDICINE

## 2024-02-14 PROCEDURE — 1125F AMNT PAIN NOTED PAIN PRSNT: CPT | Performed by: INTERNAL MEDICINE

## 2024-02-14 PROCEDURE — 3077F SYST BP >= 140 MM HG: CPT | Performed by: INTERNAL MEDICINE

## 2024-02-14 PROCEDURE — 3051F HG A1C>EQUAL 7.0%<8.0%: CPT | Performed by: INTERNAL MEDICINE

## 2024-02-14 PROCEDURE — 3079F DIAST BP 80-89 MM HG: CPT | Performed by: INTERNAL MEDICINE

## 2024-02-14 PROCEDURE — 1036F TOBACCO NON-USER: CPT | Performed by: INTERNAL MEDICINE

## 2024-02-14 PROCEDURE — 1159F MED LIST DOCD IN RCRD: CPT | Performed by: INTERNAL MEDICINE

## 2024-02-14 PROCEDURE — 1111F DSCHRG MED/CURRENT MED MERGE: CPT | Performed by: INTERNAL MEDICINE

## 2024-02-14 RX ORDER — PROCHLORPERAZINE EDISYLATE 5 MG/ML
10 INJECTION INTRAMUSCULAR; INTRAVENOUS EVERY 6 HOURS PRN
Status: CANCELLED | OUTPATIENT
Start: 2024-02-28

## 2024-02-14 RX ORDER — EPINEPHRINE 0.3 MG/.3ML
0.3 INJECTION SUBCUTANEOUS EVERY 5 MIN PRN
Status: CANCELLED | OUTPATIENT
Start: 2024-03-20

## 2024-02-14 RX ORDER — PROCHLORPERAZINE MALEATE 10 MG
10 TABLET ORAL EVERY 6 HOURS PRN
Status: CANCELLED | OUTPATIENT
Start: 2024-02-28

## 2024-02-14 RX ORDER — PROCHLORPERAZINE MALEATE 10 MG
10 TABLET ORAL EVERY 6 HOURS PRN
Status: CANCELLED | OUTPATIENT
Start: 2024-03-20

## 2024-02-14 RX ORDER — PROCHLORPERAZINE EDISYLATE 5 MG/ML
10 INJECTION INTRAMUSCULAR; INTRAVENOUS EVERY 6 HOURS PRN
Status: CANCELLED | OUTPATIENT
Start: 2024-03-20

## 2024-02-14 RX ORDER — HEPARIN 100 UNIT/ML
500 SYRINGE INTRAVENOUS AS NEEDED
Status: CANCELLED | OUTPATIENT
Start: 2024-02-14

## 2024-02-14 RX ORDER — ALBUTEROL SULFATE 0.83 MG/ML
3 SOLUTION RESPIRATORY (INHALATION) AS NEEDED
Status: CANCELLED | OUTPATIENT
Start: 2024-02-28

## 2024-02-14 RX ORDER — HEPARIN SODIUM,PORCINE/PF 10 UNIT/ML
50 SYRINGE (ML) INTRAVENOUS AS NEEDED
Status: CANCELLED | OUTPATIENT
Start: 2024-02-14

## 2024-02-14 RX ORDER — EPINEPHRINE 0.3 MG/.3ML
0.3 INJECTION SUBCUTANEOUS EVERY 5 MIN PRN
Status: CANCELLED | OUTPATIENT
Start: 2024-02-28

## 2024-02-14 RX ORDER — FAMOTIDINE 10 MG/ML
20 INJECTION INTRAVENOUS ONCE AS NEEDED
Status: CANCELLED | OUTPATIENT
Start: 2024-03-20

## 2024-02-14 RX ORDER — FAMOTIDINE 10 MG/ML
20 INJECTION INTRAVENOUS ONCE AS NEEDED
Status: CANCELLED | OUTPATIENT
Start: 2024-02-28

## 2024-02-14 RX ORDER — DIPHENHYDRAMINE HYDROCHLORIDE 50 MG/ML
50 INJECTION INTRAMUSCULAR; INTRAVENOUS AS NEEDED
Status: CANCELLED | OUTPATIENT
Start: 2024-03-20

## 2024-02-14 RX ORDER — DIPHENHYDRAMINE HYDROCHLORIDE 50 MG/ML
50 INJECTION INTRAMUSCULAR; INTRAVENOUS AS NEEDED
Status: CANCELLED | OUTPATIENT
Start: 2024-02-28

## 2024-02-14 RX ORDER — ALBUTEROL SULFATE 0.83 MG/ML
3 SOLUTION RESPIRATORY (INHALATION) AS NEEDED
Status: CANCELLED | OUTPATIENT
Start: 2024-03-20

## 2024-02-14 ASSESSMENT — PAIN SCALES - GENERAL: PAINLEVEL: 6

## 2024-02-14 NOTE — PROGRESS NOTES
Patient ID: Thu Ortega is a 68 y.o. female.  Referring Physician: No referring provider defined for this encounter.  Primary Care Provider: DEANNA Rodrigeuz  Visit Type: Follow Up      Subjective    HPI I had surgery a couple weeks ago    Review of Systems   Constitutional: Negative.    HENT:  Negative.     Eyes: Negative.    Respiratory: Negative.     Cardiovascular: Negative.    Gastrointestinal: Negative.    Endocrine: Negative.    Genitourinary: Negative.     Musculoskeletal: Negative.    Skin: Negative.    Neurological: Negative.    Hematological: Negative.    Psychiatric/Behavioral: Negative.          Objective   BSA: 1.67 meters squared  /82   Pulse 84   Temp 36.7 °C (98.1 °F)   Resp 14   Wt 66.5 kg (146 lb 11.1 oz)   SpO2 98%   BMI 29.13 kg/m²      has a past medical history of Adenocarcinoma of lung, left (CMS/HCC), Atrial fibrillation (CMS/HCC), COPD (chronic obstructive pulmonary disease) (CMS/HCC), Depression, DJD (degenerative joint disease), DM (diabetes mellitus) (CMS/HCC), Fibromyalgia, primary, GERD (gastroesophageal reflux disease), Hearing aid worn, History of blood transfusion, History of meningioma of the brain, HLD (hyperlipidemia), HTN (hypertension), Irregular heart beat, Irritable bowel syndrome, Malignant neoplasm of upper lobe of left lung (CMS/HCC), Nephrolithiasis, Panlobular emphysema (CMS/HCC), Shortness of breath, Spinal stenosis, and Urinary tract infection.   has a past surgical history that includes US guided needle liver biopsy (02/07/2020); Lung lobectomy; CT guided percutaneous biopsy lung (12/13/2023); CT guided percutaneous biopsy lung (12/15/2023); Brain surgery; Foot surgery; Knee surgery; Cataract extraction; and Tubal ligation.  Family History   Problem Relation Name Age of Onset    Stroke Mother      Other (aortic valve disease) Mother      Heart disease Mother      Cancer Sister      Stroke Sister      Diabetes Sister      Diabetes Brother       Other (heart transplant) Brother       Oncology History    No history exists.       Thu Ortega  reports that she quit smoking about 18 years ago. Her smoking use included cigarettes. She has never used smokeless tobacco.  She  reports that she does not currently use alcohol.  She  reports no history of drug use.    Physical Exam  Vitals reviewed.   Constitutional:       Appearance: Normal appearance.   HENT:      Head: Normocephalic.      Mouth/Throat:      Mouth: Mucous membranes are moist.   Eyes:      Extraocular Movements: Extraocular movements intact.      Pupils: Pupils are equal, round, and reactive to light.   Cardiovascular:      Rate and Rhythm: Normal rate and regular rhythm.      Heart sounds: Normal heart sounds.   Pulmonary:      Breath sounds: Normal breath sounds.   Abdominal:      General: Bowel sounds are normal.      Palpations: Abdomen is soft.   Musculoskeletal:         General: Normal range of motion.      Cervical back: Normal range of motion and neck supple.   Skin:     General: Skin is warm.   Neurological:      General: No focal deficit present.      Mental Status: She is alert and oriented to person, place, and time.   Psychiatric:         Mood and Affect: Mood normal.         Behavior: Behavior normal.         WBC   Date/Time Value Ref Range Status   02/01/2024 07:30 AM 9.1 4.4 - 11.3 x10*3/uL Final   01/31/2024 07:46 AM 12.5 (H) 4.4 - 11.3 x10*3/uL Final   01/12/2024 01:46 PM 9.1 4.4 - 11.3 x10*3/uL Final     nRBC   Date Value Ref Range Status   02/01/2024 0.0 0.0 - 0.0 /100 WBCs Final   01/31/2024 0.0 0.0 - 0.0 /100 WBCs Final   01/12/2024 0.0 0.0 - 0.0 /100 WBCs Final     RBC   Date Value Ref Range Status   02/01/2024 4.56 4.00 - 5.20 x10*6/uL Final   01/31/2024 4.47 4.00 - 5.20 x10*6/uL Final   01/12/2024 5.15 4.00 - 5.20 x10*6/uL Final     Hemoglobin   Date Value Ref Range Status   02/01/2024 11.0 (L) 12.0 - 16.0 g/dL Final   01/31/2024 11.0 (L) 12.0 - 16.0 g/dL Final  "  01/12/2024 12.5 12.0 - 16.0 g/dL Final     Hematocrit   Date Value Ref Range Status   02/01/2024 36.8 36.0 - 46.0 % Final   01/31/2024 36.3 36.0 - 46.0 % Final   01/12/2024 41.4 36.0 - 46.0 % Final     MCV   Date/Time Value Ref Range Status   02/01/2024 07:30 AM 81 80 - 100 fL Final   01/31/2024 07:46 AM 81 80 - 100 fL Final   01/12/2024 01:46 PM 80 80 - 100 fL Final     MCH   Date/Time Value Ref Range Status   02/01/2024 07:30 AM 24.1 (L) 26.0 - 34.0 pg Final   01/31/2024 07:46 AM 24.6 (L) 26.0 - 34.0 pg Final   01/12/2024 01:46 PM 24.3 (L) 26.0 - 34.0 pg Final     MCHC   Date/Time Value Ref Range Status   02/01/2024 07:30 AM 29.9 (L) 32.0 - 36.0 g/dL Final   01/31/2024 07:46 AM 30.3 (L) 32.0 - 36.0 g/dL Final   01/12/2024 01:46 PM 30.2 (L) 32.0 - 36.0 g/dL Final     RDW   Date/Time Value Ref Range Status   02/01/2024 07:30 AM 14.8 (H) 11.5 - 14.5 % Final   01/31/2024 07:46 AM 14.9 (H) 11.5 - 14.5 % Final   01/12/2024 01:46 PM 14.7 (H) 11.5 - 14.5 % Final     Platelets   Date/Time Value Ref Range Status   02/01/2024 07:30  150 - 450 x10*3/uL Final   01/31/2024 07:46  150 - 450 x10*3/uL Final   01/12/2024 01:46  150 - 450 x10*3/uL Final     No results found for: \"MPV\"  No results found for: \"NEUTOPHILPCT\"  No results found for: \"IGPCT\"  No results found for: \"LYMPHOPCT\"  No results found for: \"MONOPCT\"  No results found for: \"EOSPCT\"  No results found for: \"BASOPCT\"  No results found for: \"NEUTROABS\"  No results found for: \"IGABSOL\"  No results found for: \"LYMPHSABS\"  No results found for: \"MONOSABS\"  No results found for: \"EOSABS\"  No results found for: \"BASOSABS\"    No components found for: \"PT\"  aPTT   Date/Time Value Ref Range Status   01/12/2024 01:46 PM 31 27 - 38 seconds Final     Medication Documentation Review Audit       Reviewed by Devi Lee MA (Medical Assistant) on 02/14/24 at 1010      Medication Order Taking? Sig Documenting Provider Last Dose Status   Accu-Chek Guide test " strips strip [id number] Yes TEST 3X DAILY DX E11.65 Historical MD Eulalia Taking Active   acetaminophen (Tylenol) 325 mg tablet [id number] Yes Take 2 tablets (650 mg) by mouth every 4 hours if needed for mild pain (1 - 3) or fever (temp greater than 38.0 C). Sunshine Hankins PA-C Taking Active   albuterol 90 mcg/actuation inhaler [id number] Yes Inhale 1 puff every 4 hours if needed. Corinna Esteban MD Taking Active   amiodarone (Pacerone) 200 mg tablet [id number] Yes Take 1 tablet (200 mg) by mouth once daily. Dino Fox MD Taking Active   amLODIPine (Norvasc) 5 mg tablet [id number] Yes Take 1 tablet (5 mg) by mouth once daily in the morning. Take before meals. Historical MD Eulalia Taking Active   aspirin 81 mg chewable tablet [id number] Yes Chew 1 tablet (81 mg) once daily. Historical Provider, MD Taking Active   atorvastatin (Lipitor) 10 mg tablet [id number] Yes Take 1 tablet (10 mg) by mouth once daily in the morning. Take before meals. Historical Provider, MD Taking Active   BD Insulin Syringe Ultra-Fine 1 mL 31 gauge x 5/16 syringe [id number] Yes USE AS DIRECTED Historical MD Eulalia Taking Active   biotin 10 mg tablet [id number] Yes Take 10 tablets (100 mg) by mouth once daily. Historical Provider, MD Taking Active   cholecalciferol (Vitamin D3) 5,000 Units tablet [id number] Yes Take 1 tablet (5,000 Units) by mouth once daily. Historical Provider, MD Taking Active   metFORMIN (Glucophage) 1,000 mg tablet [id number] Yes Take 1 tablet (1,000 mg) by mouth 2 times a day with meals. Take with food. Historical Provider, MD Taking Active   NovoLIN 70/30 U-100 Insulin 100 unit/mL (70-30) injection [id number] Yes 70 Units 3 times a day. 70 units at breakfast, 55 units at lunch, and 60 units at dinner Historical MD Eulalia Taking Active   oxyCODONE (Roxicodone) 5 mg immediate release tablet [id number] Yes Take 1 tablet (5 mg) by mouth every 6 hours if needed for moderate pain (4 - 6). Sunshine Hankins PA-C  Taking Active   pantoprazole (ProtoNix) 40 mg EC tablet [id number] Yes TAKE 1 TABLET (40 MG) BY MOUTH IN THE MORNING. TAKE BEFORE MEALS. DO NOT CRUSH, CHEW, OR SPLIT.. Historical Provider, MD Taking Active   topiramate (Topamax) 25 mg tablet [id number] Yes Take 2 tablets (50 mg) by mouth 2 times a day. Historical Provider, MD Taking Active   traZODone (Desyrel) 50 mg tablet [id number] Yes Take 1 tablet (50 mg) by mouth once daily at bedtime. Historical Provider, MD Taking Active   Wixela Inhub 250-50 mcg/dose diskus inhaler [id number] Yes USE 1 PUFF TWICE A DAY Historical Provider, MD Taking Active                   Assessment/Plan       1) lung cancer  -12/8/2022 chest CT: NICHOL anterior segment 1.6 x 2.2 cm nodule, partial pleural attachment, nonspecific low volume paratracheal mediastinal LN largest near AP window 1.0 x 1.4 cm, right subcarinal space 8 x 14 mm  -12/12/2022 PET: NICHOL 1.8 x 2.3 cm mass with SUV 5.8, lateral margin abuts pleura; no significant mediastinal or hilar hypermetabolic lymphadenopathy  -12/28/2022 chest CT: 2.5 cm cavitary nodule in NICHOL  -1/7/2023 PET scan  -had surgery for stage I NSCLC, s/p lobectomy (Dr Fagan, 1/31/2023)  -2/1/2023 CT chest: no PE, postop changes in left chest, small left PTX in left upper anterior chest  -2/26/2023 chest CT no PE, continued mildly enlarged mediastinal lymph nodes  -saw Dr Solis at [facility] on 3/13/2023 - she had a [id number] (stage Ib) lung adenocarcinoma (poor NCCN risk factors - G3, + visceral involvement), s/p left robotic assisted anatomic upper lobectomy, PD-L1 90%, +[id number]  -per his charting, he recommended either adjuvant cisplatin + pemetrexed x 4 cycles vs surveillance  -according to  Thu, Dr Solis never told her about her high risk features nor any adjuvant option, and that the only thing he recommended was observation  -7/11/2023 CT chest : stable postsurgical changes of prior left thoracotomy and left upper lobectomy with interval resolution of bilateral  pleural effusions; interval progression of lymphadenopathy in the chest concerning for progressing ghazala metastatic disease     7/25/2023 underwent EBUS: A - EBUS TRANSBRONCHIAL FINE NEEDLE ASPIRATE, LYMPH NODE  - 4L             Negative for malignant cells.             Benign lymphoid sample (see comment).   B - EBUS TRANSBRONCHIAL FINE NEEDLE ASPIRATE, LYMPH NODE  - 10L             Negative for malignant cells.                   Benign lymphoid sample.  C - EBUS TRANSBRONCHIAL FINE NEEDLE ASPIRATE, LYMPH NODE  - STATION 7             Negative for malignant cells.                 Benign lymphoid sample.   -8/9/2023 PET scan: 3.0 x 2.1 cm left prevascular node with SUV 7.9; few additional prominent mediastinal nodes with low level uptake; left lower paratracheal node 0.8 cm with SUV 2.2; mild FDG uptake in left hilum SUV 3.1  -11/14/2023 chest CT: enlarged 2.2 cm prevascular lymph node not significantly changed  -she was told by her pulmonologist Dr Kang that she has cancer  -discussed her original path with her--while it was a small tumor and stage Ib, she did have a couple high risk features that would have warranted adjuvant chemotherapy followed by atezolizumab; also if she does have a recurrence that isn't amenable to surgery, she would also be a candidate for immunotherapy then KRAS inhibitor (FDA approved only in 2nd line setting)  -will discuss with thoracic surgeon--will import all [facility] films to review; may need CT guided bx by IR of this prevascular node   -she had biopsy done on 12/13/2023--path confirmed poorly differentiated lung carcinoma, PD-L1 90%, KRAS G12C mutation  -she saw Dr Calixto on 12/28/2023--he advised surgery, provided that she can pass her PFTs  - on 1/30/2024 Dr Calixto took her to the OR for robotic assisted redo left mediastinal exploration, left anterior mediastinal mass resection, diaphragmatic pacer placement  -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  -pt is most interested in doing whatever is necessary to reduce her risk for recurrence  -she and  state again that the [facility] oncologist told them that adjuvant therapy was not necessary after her first surgery, even though his note documented that he recommended it  -as her tumor has high PD-L1 expression, she is a candidate for pembrolizumab  -benefits, risks, potential morbidity related to pembrolizumab were reviewed with Thu and she signed informed consent to proceed  -she will receive pembrolizumab 200 mg IV flat dose Q21 days for up to 17 doses  -she will start towards the end of this month  -closer to 1st dose, she will have CBC, COMP, ACTH, TSH, cortisol, hep B serologies checked     2) COPD  -on albuterol  -on incruse ellipta     3) atrial fibrillation  -on amiodarone  -on warfarin     4) hypertension  -on norvasc  -on chlorthalidone  -on lasix  -on lisinopril  -on metoprolol     5) hyperlipidemia  -on atorvastatin     6) major depression  -on cymbalta     7) diabetes  -on novolog insulin  -on metformin     Problem List Items Addressed This Visit             ICD-10-CM    Malignant neoplasm of upper lobe of left lung (CMS/HCC) C34.12    Relevant Orders    Infusion Appointment Request [facility] INFUSION    CBC and Auto Differential (Completed)    Comprehensive metabolic panel (Completed)    Hepatitis B surface antigen (Completed)    Hepatitis B Core Antibody, Total (Completed)    Hepatitis B surface antibody (Completed)    Acth    Cortisol Am (Completed)    Tsh With Reflex To Free T4 If Abnormal (Completed)    Clinic Appointment Request Chemo Follow Up; LUÍS FINCH;  [facility] MEDONC[facility]    Infusion Appointment Request [facility] INFUSION    CBC and Auto Differential    Comprehensive metabolic panel    Local recurrence of left lung cancer (CMS/HCC) - Primary C34.92    Relevant Orders    Infusion Appointment Request [facility] INFUSION    CBC and Auto Differential (Completed)    Comprehensive metabolic panel (Completed)    Hepatitis B surface antigen (Completed)    Hepatitis B Core Antibody, Total (Completed)    Hepatitis B surface antibody (Completed)    Acth    Cortisol Am (Completed)    Tsh With Reflex To Free T4 If Abnormal (Completed)    Clinic Appointment Request Chemo Follow Up; ÁLVARO GALEANO; [facility] MEDONC[facility]    Infusion Appointment Request [facility] INFUSION    CBC and Auto Differential    Comprehensive metabolic panel            Álvaro Gaelano MD

## 2024-02-14 NOTE — PROGRESS NOTES
I met with patient and spouse Horace.  Pt will begin single agent pembrolizumab in a couple of weeks.  We reviewed treatment plan (every 21 days), labs to be done the day prior.  We discussed possible side effects focusing on possible immune mediated side effects, when to call.  She was provided written information on the medication, immune checkpoint hand out, when to call/fever threshold, patient guide.  She is encouarged to call with any questions.  She/spouse will need ongoing support.

## 2024-02-15 DIAGNOSIS — E11.65 UNCONTROLLED TYPE 2 DIABETES MELLITUS WITH HYPERGLYCEMIA (HCC): ICD-10-CM

## 2024-02-20 ENCOUNTER — TELEPHONE (OUTPATIENT)
Dept: HEMATOLOGY/ONCOLOGY | Facility: CLINIC | Age: 69
End: 2024-02-20
Payer: MEDICARE

## 2024-02-20 NOTE — TELEPHONE ENCOUNTER
Patient calling for guidance with medications after surgery on 1/30/24.  Was taken off some medications and not sure if she should re-start before infusion next week.  Please call her on cell phone:  [phone]

## 2024-02-26 ENCOUNTER — LAB (OUTPATIENT)
Dept: LAB | Facility: CLINIC | Age: 69
End: 2024-02-26
Payer: MEDICARE

## 2024-02-26 DIAGNOSIS — C34.92 LOCAL RECURRENCE OF LEFT LUNG CANCER (MULTI): ICD-10-CM

## 2024-02-26 DIAGNOSIS — C34.12 MALIGNANT NEOPLASM OF UPPER LOBE OF LEFT LUNG (MULTI): ICD-10-CM

## 2024-02-26 LAB
ALBUMIN SERPL BCP-MCNC: 4.2 G/DL (ref 3.4–5)
ALP SERPL-CCNC: 141 U/L (ref 33–136)
ALT SERPL W P-5'-P-CCNC: 7 U/L (ref 7–45)
ANION GAP SERPL CALC-SCNC: 12 MMOL/L (ref 10–20)
AST SERPL W P-5'-P-CCNC: 8 U/L (ref 9–39)
BASOPHILS # BLD AUTO: 0.03 X10*3/UL (ref 0–0.1)
BASOPHILS NFR BLD AUTO: 0.4 %
BILIRUB SERPL-MCNC: 0.3 MG/DL (ref 0–1.2)
BUN SERPL-MCNC: 8 MG/DL (ref 6–23)
CALCIUM SERPL-MCNC: 9.6 MG/DL (ref 8.6–10.3)
CHLORIDE SERPL-SCNC: 107 MMOL/L (ref 98–107)
CO2 SERPL-SCNC: 26 MMOL/L (ref 21–32)
CORTIS AM PEAK SERPL-MSCNC: 18.4 UG/DL (ref 5–20)
CREAT SERPL-MCNC: 0.82 MG/DL (ref 0.5–1.05)
EGFRCR SERPLBLD CKD-EPI 2021: 78 ML/MIN/1.73M*2
EOSINOPHIL # BLD AUTO: 0.52 X10*3/UL (ref 0–0.7)
EOSINOPHIL NFR BLD AUTO: 6.2 %
ERYTHROCYTE [DISTWIDTH] IN BLOOD BY AUTOMATED COUNT: 15 % (ref 11.5–14.5)
GLUCOSE SERPL-MCNC: 160 MG/DL (ref 74–99)
HBV CORE AB SER QL: NONREACTIVE
HBV SURFACE AB SER-ACNC: <3.1 MIU/ML
HBV SURFACE AG SERPL QL IA: NONREACTIVE
HCT VFR BLD AUTO: 36.8 % (ref 36–46)
HGB BLD-MCNC: 11.4 G/DL (ref 12–16)
IMM GRANULOCYTES # BLD AUTO: 0.03 X10*3/UL (ref 0–0.7)
IMM GRANULOCYTES NFR BLD AUTO: 0.4 % (ref 0–0.9)
LYMPHOCYTES # BLD AUTO: 1.98 X10*3/UL (ref 1.2–4.8)
LYMPHOCYTES NFR BLD AUTO: 23.5 %
MCH RBC QN AUTO: 24.9 PG (ref 26–34)
MCHC RBC AUTO-ENTMCNC: 31 G/DL (ref 32–36)
MCV RBC AUTO: 81 FL (ref 80–100)
MONOCYTES # BLD AUTO: 0.73 X10*3/UL (ref 0.1–1)
MONOCYTES NFR BLD AUTO: 8.7 %
NEUTROPHILS # BLD AUTO: 5.13 X10*3/UL (ref 1.2–7.7)
NEUTROPHILS NFR BLD AUTO: 60.8 %
PLATELET # BLD AUTO: 285 X10*3/UL (ref 150–450)
POTASSIUM SERPL-SCNC: 3.8 MMOL/L (ref 3.5–5.3)
PROT SERPL-MCNC: 7 G/DL (ref 6.4–8.2)
RBC # BLD AUTO: 4.57 X10*6/UL (ref 4–5.2)
SODIUM SERPL-SCNC: 141 MMOL/L (ref 136–145)
T4 FREE SERPL-MCNC: 3.82 NG/DL (ref 0.61–1.12)
TSH SERPL-ACNC: 5.72 MIU/L (ref 0.44–3.98)
WBC # BLD AUTO: 8.4 X10*3/UL (ref 4.4–11.3)

## 2024-02-26 PROCEDURE — 86704 HEP B CORE ANTIBODY TOTAL: CPT | Performed by: INTERNAL MEDICINE

## 2024-02-26 PROCEDURE — 82024 ASSAY OF ACTH: CPT

## 2024-02-26 PROCEDURE — 87340 HEPATITIS B SURFACE AG IA: CPT | Performed by: INTERNAL MEDICINE

## 2024-02-26 PROCEDURE — 85025 COMPLETE CBC W/AUTO DIFF WBC: CPT

## 2024-02-26 PROCEDURE — 84439 ASSAY OF FREE THYROXINE: CPT | Performed by: INTERNAL MEDICINE

## 2024-02-26 PROCEDURE — 82533 TOTAL CORTISOL: CPT | Performed by: INTERNAL MEDICINE

## 2024-02-26 PROCEDURE — 84443 ASSAY THYROID STIM HORMONE: CPT | Performed by: INTERNAL MEDICINE

## 2024-02-26 PROCEDURE — 36415 COLL VENOUS BLD VENIPUNCTURE: CPT

## 2024-02-26 PROCEDURE — 86706 HEP B SURFACE ANTIBODY: CPT | Performed by: INTERNAL MEDICINE

## 2024-02-26 PROCEDURE — 80053 COMPREHEN METABOLIC PANEL: CPT

## 2024-02-27 ASSESSMENT — ENCOUNTER SYMPTOMS
CONSTITUTIONAL NEGATIVE: 1
HEMATOLOGIC/LYMPHATIC NEGATIVE: 1
RESPIRATORY NEGATIVE: 1
EYES NEGATIVE: 1
MUSCULOSKELETAL NEGATIVE: 1
PSYCHIATRIC NEGATIVE: 1
ENDOCRINE NEGATIVE: 1
GASTROINTESTINAL NEGATIVE: 1
CARDIOVASCULAR NEGATIVE: 1
NEUROLOGICAL NEGATIVE: 1

## 2024-02-28 ENCOUNTER — INFUSION (OUTPATIENT)
Dept: HEMATOLOGY/ONCOLOGY | Facility: CLINIC | Age: 69
End: 2024-02-28
Payer: MEDICARE

## 2024-02-28 VITALS
OXYGEN SATURATION: 94 % | WEIGHT: 146.6 LBS | DIASTOLIC BLOOD PRESSURE: 73 MMHG | HEART RATE: 80 BPM | SYSTOLIC BLOOD PRESSURE: 117 MMHG | HEIGHT: 59 IN | BODY MASS INDEX: 29.56 KG/M2 | RESPIRATION RATE: 17 BRPM | TEMPERATURE: 98.4 F

## 2024-02-28 DIAGNOSIS — C34.12 MALIGNANT NEOPLASM OF UPPER LOBE OF LEFT LUNG (MULTI): ICD-10-CM

## 2024-02-28 DIAGNOSIS — R51.9 DAILY HEADACHE: ICD-10-CM

## 2024-02-28 DIAGNOSIS — C34.12 MALIGNANT NEOPLASM OF UPPER LOBE OF LEFT LUNG (MULTI): Primary | ICD-10-CM

## 2024-02-28 DIAGNOSIS — C34.92 LOCAL RECURRENCE OF LEFT LUNG CANCER (MULTI): ICD-10-CM

## 2024-02-28 LAB — ACTH PLAS-MCNC: 5 PG/ML (ref 7.2–63.3)

## 2024-02-28 PROCEDURE — 96413 CHEMO IV INFUSION 1 HR: CPT

## 2024-02-28 PROCEDURE — 2500000004 HC RX 250 GENERAL PHARMACY W/ HCPCS (ALT 636 FOR OP/ED): Mod: JZ | Performed by: INTERNAL MEDICINE

## 2024-02-28 RX ORDER — FAMOTIDINE 10 MG/ML
20 INJECTION INTRAVENOUS ONCE AS NEEDED
Status: DISCONTINUED | OUTPATIENT
Start: 2024-02-28 | End: 2024-02-29 | Stop reason: HOSPADM

## 2024-02-28 RX ORDER — HEPARIN 100 UNIT/ML
500 SYRINGE INTRAVENOUS AS NEEDED
Status: CANCELLED | OUTPATIENT
Start: 2024-02-28

## 2024-02-28 RX ORDER — ALBUTEROL SULFATE 0.83 MG/ML
3 SOLUTION RESPIRATORY (INHALATION) AS NEEDED
Status: DISCONTINUED | OUTPATIENT
Start: 2024-02-28 | End: 2024-02-29 | Stop reason: HOSPADM

## 2024-02-28 RX ORDER — EPINEPHRINE 0.3 MG/.3ML
0.3 INJECTION SUBCUTANEOUS EVERY 5 MIN PRN
Status: DISCONTINUED | OUTPATIENT
Start: 2024-02-28 | End: 2024-02-29 | Stop reason: HOSPADM

## 2024-02-28 RX ORDER — DIPHENHYDRAMINE HYDROCHLORIDE 50 MG/ML
50 INJECTION INTRAMUSCULAR; INTRAVENOUS AS NEEDED
Status: DISCONTINUED | OUTPATIENT
Start: 2024-02-28 | End: 2024-02-29 | Stop reason: HOSPADM

## 2024-02-28 RX ORDER — DULOXETIN HYDROCHLORIDE 60 MG/1
60 CAPSULE, DELAYED RELEASE ORAL DAILY
COMMUNITY

## 2024-02-28 RX ORDER — HEPARIN SODIUM,PORCINE/PF 10 UNIT/ML
50 SYRINGE (ML) INTRAVENOUS AS NEEDED
Status: CANCELLED | OUTPATIENT
Start: 2024-02-28

## 2024-02-28 RX ORDER — ONDANSETRON 4 MG/1
4 TABLET, FILM COATED ORAL EVERY 8 HOURS PRN
COMMUNITY
End: 2024-03-18 | Stop reason: SDUPTHER

## 2024-02-28 RX ADMIN — SODIUM CHLORIDE 200 MG: 9 INJECTION, SOLUTION INTRAVENOUS at 14:57

## 2024-02-28 ASSESSMENT — PAIN SCALES - GENERAL: PAINLEVEL: 4

## 2024-02-29 NOTE — PROGRESS NOTES
Called pt to inform her why we are doing a ct scan of head d/t headaches and nausea. Pt states she also will be seeing np at dr. Moreno's office on Monday.  I told pt if her headaches get worse to call dr gooden or go to er.

## 2024-03-07 ENCOUNTER — HOSPITAL ENCOUNTER (OUTPATIENT)
Dept: RADIOLOGY | Facility: HOSPITAL | Age: 69
Discharge: HOME | End: 2024-03-07
Payer: MEDICARE

## 2024-03-07 DIAGNOSIS — R51.9 DAILY HEADACHE: ICD-10-CM

## 2024-03-07 DIAGNOSIS — C34.12 MALIGNANT NEOPLASM OF UPPER LOBE OF LEFT LUNG (MULTI): ICD-10-CM

## 2024-03-07 PROCEDURE — 2550000001 HC RX 255 CONTRASTS: Performed by: INTERNAL MEDICINE

## 2024-03-07 PROCEDURE — 70470 CT HEAD/BRAIN W/O & W/DYE: CPT

## 2024-03-07 PROCEDURE — 70470 CT HEAD/BRAIN W/O & W/DYE: CPT | Performed by: RADIOLOGY

## 2024-03-07 RX ADMIN — IOHEXOL 50 ML: 350 INJECTION, SOLUTION INTRAVENOUS at 11:49

## 2024-03-08 ENCOUNTER — NURSE TRIAGE (OUTPATIENT)
Dept: HEMATOLOGY/ONCOLOGY | Facility: CLINIC | Age: 69
End: 2024-03-08
Payer: MEDICARE

## 2024-03-08 NOTE — TELEPHONE ENCOUNTER
Spoke with the patient and provided update from Dr. Galeano. Pt wants to try the imodium, zofran, hydration and diet recommendations. Pt encouraged to call the office back with any changes, worsening of s/s, etc and we can try the prednisone. Pt verbalized understanding and had no further questions or concerns at this time.

## 2024-03-08 NOTE — TELEPHONE ENCOUNTER
"Spoke with the patient. States she is nauseated and the Zofran (has over 16-20 left) her PCP. Has been taking Zofran without relief. States she has been non-stop nauseated since her surgery on Jan 31st. Also states she is having 4-5 diarrhea stools per day for the past \"few days.\" Discussed how to take imodium & patient will start this. Discussed how to take the Zofran, small meals, [place] diet, BRAT diet, how to keep hydrated.     Seen by PCP Monday for shortness of breath and was given ATBs. Was told she had \"a cold.\" States she was COVID negative. States SOB better from Monday & only gets SOB \"sometimes\" with moving around house.     Pt verbalized back to me the plan and had no further questions or concerns. Aware I will call her back if dr. Galeano has any further questions or suggestions.     Additional Information   What have you eaten in the last 2 days?     Little amounts. Because she feels nauseated all the time   Commented on: If yes to belly pain or tenderness, on a scale of 0 to 10 (0 being no pain and 10 being the worst possible) how would  you rate your pain?     Intermittent just before having diarrhea   Commented on: If yes to belly pain or tenderness, on a scale of 0 to 10 (0 being no pain and 10 being the worst possible) how would  you rate your pain?     Intermittent just before having diarrhea   Commented on: What have you been drinking in the last 24 hours?     Warm tea. Suggested patient try Powerade or gatorade    Protocols used: Diarrhea    "

## 2024-03-18 ENCOUNTER — LAB (OUTPATIENT)
Dept: LAB | Facility: CLINIC | Age: 69
End: 2024-03-18
Payer: MEDICARE

## 2024-03-18 ENCOUNTER — TELEPHONE (OUTPATIENT)
Dept: CARDIOLOGY | Facility: CLINIC | Age: 69
End: 2024-03-18

## 2024-03-18 ENCOUNTER — TELEPHONE (OUTPATIENT)
Dept: HEMATOLOGY/ONCOLOGY | Facility: CLINIC | Age: 69
End: 2024-03-18
Payer: MEDICARE

## 2024-03-18 DIAGNOSIS — C34.12 MALIGNANT NEOPLASM OF UPPER LOBE OF LEFT LUNG (MULTI): ICD-10-CM

## 2024-03-18 DIAGNOSIS — T45.1X5A CHEMOTHERAPY INDUCED NAUSEA AND VOMITING: ICD-10-CM

## 2024-03-18 DIAGNOSIS — R11.2 CHEMOTHERAPY INDUCED NAUSEA AND VOMITING: ICD-10-CM

## 2024-03-18 DIAGNOSIS — C34.92 LOCAL RECURRENCE OF LEFT LUNG CANCER (MULTI): ICD-10-CM

## 2024-03-18 LAB
ALBUMIN SERPL BCP-MCNC: 4.4 G/DL (ref 3.4–5)
ALP SERPL-CCNC: 124 U/L (ref 33–136)
ALT SERPL W P-5'-P-CCNC: 12 U/L (ref 7–45)
ANION GAP SERPL CALC-SCNC: 17 MMOL/L (ref 10–20)
AST SERPL W P-5'-P-CCNC: 11 U/L (ref 9–39)
BASOPHILS # BLD AUTO: 0.06 X10*3/UL (ref 0–0.1)
BASOPHILS NFR BLD AUTO: 0.7 %
BILIRUB SERPL-MCNC: 0.5 MG/DL (ref 0–1.2)
BUN SERPL-MCNC: 13 MG/DL (ref 6–23)
CALCIUM SERPL-MCNC: 10 MG/DL (ref 8.6–10.3)
CHLORIDE SERPL-SCNC: 103 MMOL/L (ref 98–107)
CO2 SERPL-SCNC: 25 MMOL/L (ref 21–32)
CREAT SERPL-MCNC: 0.72 MG/DL (ref 0.5–1.05)
EGFRCR SERPLBLD CKD-EPI 2021: >90 ML/MIN/1.73M*2
EOSINOPHIL # BLD AUTO: 0.34 X10*3/UL (ref 0–0.7)
EOSINOPHIL NFR BLD AUTO: 3.9 %
ERYTHROCYTE [DISTWIDTH] IN BLOOD BY AUTOMATED COUNT: 15.1 % (ref 11.5–14.5)
GLUCOSE SERPL-MCNC: 122 MG/DL (ref 74–99)
HCT VFR BLD AUTO: 36.3 % (ref 36–46)
HGB BLD-MCNC: 11 G/DL (ref 12–16)
IMM GRANULOCYTES # BLD AUTO: 0.02 X10*3/UL (ref 0–0.7)
IMM GRANULOCYTES NFR BLD AUTO: 0.2 % (ref 0–0.9)
LYMPHOCYTES # BLD AUTO: 1.72 X10*3/UL (ref 1.2–4.8)
LYMPHOCYTES NFR BLD AUTO: 19.9 %
MCH RBC QN AUTO: 23.9 PG (ref 26–34)
MCHC RBC AUTO-ENTMCNC: 30.3 G/DL (ref 32–36)
MCV RBC AUTO: 79 FL (ref 80–100)
MONOCYTES # BLD AUTO: 0.85 X10*3/UL (ref 0.1–1)
MONOCYTES NFR BLD AUTO: 9.8 %
NEUTROPHILS # BLD AUTO: 5.66 X10*3/UL (ref 1.2–7.7)
NEUTROPHILS NFR BLD AUTO: 65.5 %
PLATELET # BLD AUTO: 325 X10*3/UL (ref 150–450)
POTASSIUM SERPL-SCNC: 3.5 MMOL/L (ref 3.5–5.3)
PROT SERPL-MCNC: 7.1 G/DL (ref 6.4–8.2)
RBC # BLD AUTO: 4.61 X10*6/UL (ref 4–5.2)
SODIUM SERPL-SCNC: 141 MMOL/L (ref 136–145)
WBC # BLD AUTO: 8.7 X10*3/UL (ref 4.4–11.3)

## 2024-03-18 PROCEDURE — 85025 COMPLETE CBC W/AUTO DIFF WBC: CPT

## 2024-03-18 PROCEDURE — 36415 COLL VENOUS BLD VENIPUNCTURE: CPT

## 2024-03-18 PROCEDURE — 80053 COMPREHEN METABOLIC PANEL: CPT

## 2024-03-18 RX ORDER — ONDANSETRON 4 MG/1
4 TABLET, FILM COATED ORAL EVERY 8 HOURS PRN
Qty: 60 TABLET | Refills: 2 | Status: SHIPPED | OUTPATIENT
Start: 2024-03-18

## 2024-03-20 ENCOUNTER — INFUSION (OUTPATIENT)
Dept: HEMATOLOGY/ONCOLOGY | Facility: CLINIC | Age: 69
End: 2024-03-20
Payer: MEDICARE

## 2024-03-20 ENCOUNTER — OFFICE VISIT (OUTPATIENT)
Dept: HEMATOLOGY/ONCOLOGY | Facility: CLINIC | Age: 69
End: 2024-03-20
Payer: MEDICARE

## 2024-03-20 VITALS
DIASTOLIC BLOOD PRESSURE: 71 MMHG | OXYGEN SATURATION: 91 % | WEIGHT: 141.98 LBS | RESPIRATION RATE: 16 BRPM | SYSTOLIC BLOOD PRESSURE: 148 MMHG | HEART RATE: 103 BPM | TEMPERATURE: 98.1 F | BODY MASS INDEX: 29.05 KG/M2

## 2024-03-20 DIAGNOSIS — F33.41 RECURRENT MAJOR DEPRESSIVE DISORDER, IN PARTIAL REMISSION (CMS-HCC): ICD-10-CM

## 2024-03-20 DIAGNOSIS — I10 PRIMARY HYPERTENSION: ICD-10-CM

## 2024-03-20 DIAGNOSIS — J44.9 CHRONIC OBSTRUCTIVE PULMONARY DISEASE, UNSPECIFIED COPD TYPE (MULTI): ICD-10-CM

## 2024-03-20 DIAGNOSIS — E78.2 MIXED HYPERLIPIDEMIA: ICD-10-CM

## 2024-03-20 DIAGNOSIS — C34.92 LOCAL RECURRENCE OF LEFT LUNG CANCER (MULTI): Primary | ICD-10-CM

## 2024-03-20 DIAGNOSIS — Z86.011 HISTORY OF MENINGIOMA OF THE BRAIN: ICD-10-CM

## 2024-03-20 DIAGNOSIS — B35.9 TINEA: ICD-10-CM

## 2024-03-20 DIAGNOSIS — C34.12 MALIGNANT NEOPLASM OF UPPER LOBE OF LEFT LUNG (MULTI): ICD-10-CM

## 2024-03-20 DIAGNOSIS — E11.9 TYPE 2 DIABETES MELLITUS WITHOUT COMPLICATION, WITH LONG-TERM CURRENT USE OF INSULIN (MULTI): ICD-10-CM

## 2024-03-20 DIAGNOSIS — C34.92 LOCAL RECURRENCE OF LEFT LUNG CANCER (MULTI): ICD-10-CM

## 2024-03-20 DIAGNOSIS — Z79.4 TYPE 2 DIABETES MELLITUS WITHOUT COMPLICATION, WITH LONG-TERM CURRENT USE OF INSULIN (MULTI): ICD-10-CM

## 2024-03-20 DIAGNOSIS — I48.0 PAF (PAROXYSMAL ATRIAL FIBRILLATION) (MULTI): ICD-10-CM

## 2024-03-20 PROCEDURE — 3051F HG A1C>EQUAL 7.0%<8.0%: CPT | Performed by: INTERNAL MEDICINE

## 2024-03-20 PROCEDURE — 1125F AMNT PAIN NOTED PAIN PRSNT: CPT | Performed by: INTERNAL MEDICINE

## 2024-03-20 PROCEDURE — 1036F TOBACCO NON-USER: CPT | Performed by: INTERNAL MEDICINE

## 2024-03-20 PROCEDURE — 3077F SYST BP >= 140 MM HG: CPT | Performed by: INTERNAL MEDICINE

## 2024-03-20 PROCEDURE — 2500000004 HC RX 250 GENERAL PHARMACY W/ HCPCS (ALT 636 FOR OP/ED): Performed by: INTERNAL MEDICINE

## 2024-03-20 PROCEDURE — 3078F DIAST BP <80 MM HG: CPT | Performed by: INTERNAL MEDICINE

## 2024-03-20 PROCEDURE — 99214 OFFICE O/P EST MOD 30 MIN: CPT | Performed by: INTERNAL MEDICINE

## 2024-03-20 PROCEDURE — 2500000001 HC RX 250 WO HCPCS SELF ADMINISTERED DRUGS (ALT 637 FOR MEDICARE OP): Performed by: INTERNAL MEDICINE

## 2024-03-20 PROCEDURE — 1159F MED LIST DOCD IN RCRD: CPT | Performed by: INTERNAL MEDICINE

## 2024-03-20 PROCEDURE — 96413 CHEMO IV INFUSION 1 HR: CPT

## 2024-03-20 RX ORDER — FAMOTIDINE 10 MG/ML
20 INJECTION INTRAVENOUS ONCE AS NEEDED
Status: CANCELLED | OUTPATIENT
Start: 2024-04-10

## 2024-03-20 RX ORDER — DIPHENHYDRAMINE HYDROCHLORIDE 50 MG/ML
50 INJECTION INTRAMUSCULAR; INTRAVENOUS AS NEEDED
Status: DISCONTINUED | OUTPATIENT
Start: 2024-03-20 | End: 2024-03-20 | Stop reason: HOSPADM

## 2024-03-20 RX ORDER — ALBUTEROL SULFATE 0.83 MG/ML
3 SOLUTION RESPIRATORY (INHALATION) AS NEEDED
Status: DISCONTINUED | OUTPATIENT
Start: 2024-03-20 | End: 2024-03-20 | Stop reason: HOSPADM

## 2024-03-20 RX ORDER — FAMOTIDINE 10 MG/ML
20 INJECTION INTRAVENOUS ONCE AS NEEDED
Status: DISCONTINUED | OUTPATIENT
Start: 2024-03-20 | End: 2024-03-20 | Stop reason: HOSPADM

## 2024-03-20 RX ORDER — PROCHLORPERAZINE MALEATE 10 MG
10 TABLET ORAL EVERY 6 HOURS PRN
Status: DISCONTINUED | OUTPATIENT
Start: 2024-03-20 | End: 2024-03-20 | Stop reason: HOSPADM

## 2024-03-20 RX ORDER — EPINEPHRINE 0.3 MG/.3ML
0.3 INJECTION SUBCUTANEOUS EVERY 5 MIN PRN
Status: CANCELLED | OUTPATIENT
Start: 2024-04-10

## 2024-03-20 RX ORDER — PROCHLORPERAZINE EDISYLATE 5 MG/ML
10 INJECTION INTRAMUSCULAR; INTRAVENOUS EVERY 6 HOURS PRN
Status: CANCELLED | OUTPATIENT
Start: 2024-05-01

## 2024-03-20 RX ORDER — EPINEPHRINE 0.3 MG/.3ML
0.3 INJECTION SUBCUTANEOUS EVERY 5 MIN PRN
Status: CANCELLED | OUTPATIENT
Start: 2024-05-01

## 2024-03-20 RX ORDER — HEPARIN 100 UNIT/ML
500 SYRINGE INTRAVENOUS AS NEEDED
Status: CANCELLED | OUTPATIENT
Start: 2024-03-20

## 2024-03-20 RX ORDER — ALBUTEROL SULFATE 0.83 MG/ML
3 SOLUTION RESPIRATORY (INHALATION) AS NEEDED
Status: CANCELLED | OUTPATIENT
Start: 2024-04-10

## 2024-03-20 RX ORDER — PROCHLORPERAZINE EDISYLATE 5 MG/ML
10 INJECTION INTRAMUSCULAR; INTRAVENOUS EVERY 6 HOURS PRN
Status: DISCONTINUED | OUTPATIENT
Start: 2024-03-20 | End: 2024-03-20 | Stop reason: HOSPADM

## 2024-03-20 RX ORDER — NYSTATIN 100000 [USP'U]/G
1 POWDER TOPICAL 2 TIMES DAILY
Qty: 15 G | Refills: 2 | Status: SHIPPED | OUTPATIENT
Start: 2024-03-20 | End: 2025-03-20

## 2024-03-20 RX ORDER — FAMOTIDINE 10 MG/ML
20 INJECTION INTRAVENOUS ONCE AS NEEDED
Status: CANCELLED | OUTPATIENT
Start: 2024-05-01

## 2024-03-20 RX ORDER — PROCHLORPERAZINE MALEATE 10 MG
10 TABLET ORAL EVERY 6 HOURS PRN
Status: CANCELLED | OUTPATIENT
Start: 2024-05-01

## 2024-03-20 RX ORDER — LEVOTHYROXINE SODIUM 25 UG/1
25 TABLET ORAL
COMMUNITY
Start: 2024-03-04 | End: 2024-06-02

## 2024-03-20 RX ORDER — HEPARIN SODIUM,PORCINE/PF 10 UNIT/ML
50 SYRINGE (ML) INTRAVENOUS AS NEEDED
Status: CANCELLED | OUTPATIENT
Start: 2024-03-20

## 2024-03-20 RX ORDER — EPINEPHRINE 0.3 MG/.3ML
0.3 INJECTION SUBCUTANEOUS EVERY 5 MIN PRN
Status: DISCONTINUED | OUTPATIENT
Start: 2024-03-20 | End: 2024-03-20 | Stop reason: HOSPADM

## 2024-03-20 RX ORDER — DIPHENHYDRAMINE HYDROCHLORIDE 50 MG/ML
50 INJECTION INTRAMUSCULAR; INTRAVENOUS AS NEEDED
Status: CANCELLED | OUTPATIENT
Start: 2024-04-10

## 2024-03-20 RX ORDER — PROCHLORPERAZINE EDISYLATE 5 MG/ML
10 INJECTION INTRAMUSCULAR; INTRAVENOUS EVERY 6 HOURS PRN
Status: CANCELLED | OUTPATIENT
Start: 2024-04-10

## 2024-03-20 RX ORDER — DIPHENHYDRAMINE HYDROCHLORIDE 50 MG/ML
50 INJECTION INTRAMUSCULAR; INTRAVENOUS AS NEEDED
Status: CANCELLED | OUTPATIENT
Start: 2024-05-01

## 2024-03-20 RX ORDER — PREDNISONE 10 MG/1
TABLET ORAL
Qty: 15 TABLET | Refills: 0 | Status: SHIPPED | OUTPATIENT
Start: 2024-03-20 | End: 2024-03-25

## 2024-03-20 RX ORDER — ALBUTEROL SULFATE 0.83 MG/ML
3 SOLUTION RESPIRATORY (INHALATION) AS NEEDED
Status: CANCELLED | OUTPATIENT
Start: 2024-05-01

## 2024-03-20 RX ORDER — PROCHLORPERAZINE MALEATE 10 MG
10 TABLET ORAL EVERY 6 HOURS PRN
Status: CANCELLED | OUTPATIENT
Start: 2024-04-10

## 2024-03-20 RX ADMIN — PROCHLORPERAZINE MALEATE 10 MG: 10 TABLET ORAL at 14:15

## 2024-03-20 RX ADMIN — SODIUM CHLORIDE 200 MG: 9 INJECTION, SOLUTION INTRAVENOUS at 14:26

## 2024-03-20 ASSESSMENT — PAIN SCALES - GENERAL: PAINLEVEL: 3

## 2024-03-20 NOTE — PATIENT INSTRUCTIONS
You will take prednisone taper (5 days) starting tomorrow to try to help with the diarrhea     You will return in 3 weeks and in 6 weeks for keytruda #3 and #4

## 2024-03-20 NOTE — TELEPHONE ENCOUNTER
I called and left patient a message to call office to schedule appt.   
Patient is wondering if she should still be taking amiodarone 200mg?  Thanks  
Yes, pt did have her second surgery  
Continue Regimen: Soolantra-QD, Doxycycline 50mg- QD
Initiate Treatment: Nicadan - QD
Detail Level: Zone

## 2024-03-25 PROBLEM — Z86.011 HISTORY OF MENINGIOMA OF THE BRAIN: Status: ACTIVE | Noted: 2024-03-25

## 2024-03-25 RX ORDER — FAMOTIDINE 10 MG/ML
20 INJECTION INTRAVENOUS ONCE
Status: CANCELLED
Start: 2024-04-10 | End: 2024-04-10

## 2024-03-25 RX ORDER — PALONOSETRON 0.05 MG/ML
250 INJECTION, SOLUTION INTRAVENOUS ONCE
Status: CANCELLED
Start: 2024-04-10

## 2024-03-25 ASSESSMENT — ENCOUNTER SYMPTOMS
HEADACHES: 1
PSYCHIATRIC NEGATIVE: 1
MUSCULOSKELETAL NEGATIVE: 1
NAUSEA: 1
ENDOCRINE NEGATIVE: 1
DIARRHEA: 1
CARDIOVASCULAR NEGATIVE: 1
CONSTITUTIONAL NEGATIVE: 1
EYES NEGATIVE: 1
RESPIRATORY NEGATIVE: 1
HEMATOLOGIC/LYMPHATIC NEGATIVE: 1

## 2024-03-25 NOTE — PROGRESS NOTES
Patient ID: Thu Ortega is a 68 y.o. female.  Referring Physician: Álvaro Galeano MD  [id number]  [place] [address]  Primary Care Provider: DEANNA Rodriguez  Visit Type: Follow Up      Subjective    HPI A couple days after I started the keytruda, I got put on an antibiotic--I'm not sure what is giving me the nausea and diarrhea    Review of Systems   Constitutional: Negative.    HENT:  Negative.     Eyes: Negative.    Respiratory: Negative.     Cardiovascular: Negative.    Gastrointestinal:  Positive for diarrhea and nausea.   Endocrine: Negative.    Genitourinary: Negative.     Musculoskeletal: Negative.    Skin: Negative.    Neurological:  Positive for headaches.   Hematological: Negative.    Psychiatric/Behavioral: Negative.          Objective   BSA: 1.63 meters squared  /71 (BP Location: Right arm)   Pulse 103   Temp 36.7 °C (98.1 °F) (Temporal)   Resp 16   Wt 64.4 kg (141 lb 15.6 oz)   SpO2 91% Comment: Spo2 reported to Jayshree Hilliard RN ()  BMI 29.05 kg/m²      has a past medical history of Adenocarcinoma of lung, left (CMS/HCC), Atrial fibrillation (CMS/HCC), COPD (chronic obstructive pulmonary disease) (CMS/HCC), Depression, DJD (degenerative joint disease), DM (diabetes mellitus) (CMS/HCC), Fibromyalgia, primary, GERD (gastroesophageal reflux disease), Hearing aid worn, History of blood transfusion, History of meningioma of the brain, HLD (hyperlipidemia), HTN (hypertension), Irregular heart beat, Irritable bowel syndrome, Malignant neoplasm of upper lobe of left lung (CMS/HCC), Nephrolithiasis, Panlobular emphysema (CMS/HCC), Shortness of breath, Spinal stenosis, and Urinary tract infection.   has a past surgical history that includes US guided needle liver biopsy (02/07/2020); Lung lobectomy; CT guided percutaneous biopsy lung (12/13/2023); CT guided percutaneous biopsy lung (12/15/2023); Brain surgery; Foot surgery; Knee surgery; Cataract extraction; and  Tubal ligation.  Family History   Problem Relation Name Age of Onset    Stroke Mother      Other (aortic valve disease) Mother      Heart disease Mother      Cancer Sister      Stroke Sister      Diabetes Sister      Diabetes Brother      Other (heart transplant) Brother       Oncology History   Malignant neoplasm of upper lobe of left lung (CMS/HCC)   12/3/2023 Initial Diagnosis    Malignant neoplasm of upper lobe of left lung (CMS/HCC)     2/28/2024 -  Chemotherapy    Pembrolizumab, 21 Day Cycles     Local recurrence of left lung cancer (CMS/HCC)   1/4/2024 Initial Diagnosis    Local recurrence of left lung cancer (CMS/HCC)     2/28/2024 -  Chemotherapy    Pembrolizumab, 21 Day Cycles         Thu Ortega  reports that she quit smoking about 18 years ago. Her smoking use included cigarettes. She has never used smokeless tobacco.  She  reports that she does not currently use alcohol.  She  reports no history of drug use.    Physical Exam  Vitals reviewed.   Constitutional:       Appearance: Normal appearance.   HENT:      Head: Normocephalic.      Mouth/Throat:      Mouth: Mucous membranes are moist.   Eyes:      Extraocular Movements: Extraocular movements intact.      Pupils: Pupils are equal, round, and reactive to light.   Cardiovascular:      Rate and Rhythm: Normal rate and regular rhythm.      Heart sounds: Normal heart sounds.   Pulmonary:      Breath sounds: Normal breath sounds.   Abdominal:      General: Bowel sounds are normal.      Palpations: Abdomen is soft.   Musculoskeletal:         General: Normal range of motion.      Cervical back: Normal range of motion and neck supple.   Skin:     General: Skin is warm.   Neurological:      General: No focal deficit present.      Mental Status: She is alert and oriented to person, place, and time.   Psychiatric:         Mood and Affect: Mood normal.         Behavior: Behavior normal.         WBC   Date/Time Value Ref Range Status   03/18/2024 03:25 PM 8.7  "4.4 - 11.3 x10*3/uL Final   02/26/2024 11:18 AM 8.4 4.4 - 11.3 x10*3/uL Final   02/01/2024 07:30 AM 9.1 4.4 - 11.3 x10*3/uL Final     nRBC   Date Value Ref Range Status   02/01/2024 0.0 0.0 - 0.0 /100 WBCs Final   01/31/2024 0.0 0.0 - 0.0 /100 WBCs Final   01/12/2024 0.0 0.0 - 0.0 /100 WBCs Final     RBC   Date Value Ref Range Status   03/18/2024 4.61 4.00 - 5.20 x10*6/uL Final   02/26/2024 4.57 4.00 - 5.20 x10*6/uL Final   02/01/2024 4.56 4.00 - 5.20 x10*6/uL Final     Hemoglobin   Date Value Ref Range Status   03/18/2024 11.0 (L) 12.0 - 16.0 g/dL Final   02/26/2024 11.4 (L) 12.0 - 16.0 g/dL Final   02/01/2024 11.0 (L) 12.0 - 16.0 g/dL Final     Hematocrit   Date Value Ref Range Status   03/18/2024 36.3 36.0 - 46.0 % Final   02/26/2024 36.8 36.0 - 46.0 % Final   02/01/2024 36.8 36.0 - 46.0 % Final     MCV   Date/Time Value Ref Range Status   03/18/2024 03:25 PM 79 (L) 80 - 100 fL Final   02/26/2024 11:18 AM 81 80 - 100 fL Final   02/01/2024 07:30 AM 81 80 - 100 fL Final     MCH   Date/Time Value Ref Range Status   03/18/2024 03:25 PM 23.9 (L) 26.0 - 34.0 pg Final   02/26/2024 11:18 AM 24.9 (L) 26.0 - 34.0 pg Final   02/01/2024 07:30 AM 24.1 (L) 26.0 - 34.0 pg Final     MCHC   Date/Time Value Ref Range Status   03/18/2024 03:25 PM 30.3 (L) 32.0 - 36.0 g/dL Final   02/26/2024 11:18 AM 31.0 (L) 32.0 - 36.0 g/dL Final   02/01/2024 07:30 AM 29.9 (L) 32.0 - 36.0 g/dL Final     RDW   Date/Time Value Ref Range Status   03/18/2024 03:25 PM 15.1 (H) 11.5 - 14.5 % Final   02/26/2024 11:18 AM 15.0 (H) 11.5 - 14.5 % Final   02/01/2024 07:30 AM 14.8 (H) 11.5 - 14.5 % Final     Platelets   Date/Time Value Ref Range Status   03/18/2024 03:25  150 - 450 x10*3/uL Final   02/26/2024 11:18  150 - 450 x10*3/uL Final   02/01/2024 07:30  150 - 450 x10*3/uL Final     No results found for: \"MPV\"  Neutrophils %   Date/Time Value Ref Range Status   03/18/2024 03:25 PM 65.5 40.0 - 80.0 % Final   02/26/2024 11:18 AM 60.8 40.0 " - 80.0 % Final     Immature Granulocytes %, Automated   Date/Time Value Ref Range Status   03/18/2024 03:25 PM 0.2 0.0 - 0.9 % Final     Comment:     Immature Granulocyte Count (IG) includes promyelocytes, myelocytes and metamyelocytes but does not include bands. Percent differential counts (%) should be interpreted in the context of the absolute cell counts (cells/UL).   02/26/2024 11:18 AM 0.4 0.0 - 0.9 % Final     Comment:     Immature Granulocyte Count (IG) includes promyelocytes, myelocytes and metamyelocytes but does not include bands. Percent differential counts (%) should be interpreted in the context of the absolute cell counts (cells/UL).     Lymphocytes %   Date/Time Value Ref Range Status   03/18/2024 03:25 PM 19.9 13.0 - 44.0 % Final   02/26/2024 11:18 AM 23.5 13.0 - 44.0 % Final     Monocytes %   Date/Time Value Ref Range Status   03/18/2024 03:25 PM 9.8 2.0 - 10.0 % Final   02/26/2024 11:18 AM 8.7 2.0 - 10.0 % Final     Eosinophils %   Date/Time Value Ref Range Status   03/18/2024 03:25 PM 3.9 0.0 - 6.0 % Final   02/26/2024 11:18 AM 6.2 0.0 - 6.0 % Final     Basophils %   Date/Time Value Ref Range Status   03/18/2024 03:25 PM 0.7 0.0 - 2.0 % Final   02/26/2024 11:18 AM 0.4 0.0 - 2.0 % Final     Neutrophils Absolute   Date/Time Value Ref Range Status   03/18/2024 03:25 PM 5.66 1.20 - 7.70 x10*3/uL Final     Comment:     Percent differential counts (%) should be interpreted in the context of the absolute cell counts (cells/uL).   02/26/2024 11:18 AM 5.13 1.20 - 7.70 x10*3/uL Final     Comment:     Percent differential counts (%) should be interpreted in the context of the absolute cell counts (cells/uL).     Immature Granulocytes Absolute, Automated   Date/Time Value Ref Range Status   03/18/2024 03:25 PM 0.02 0.00 - 0.70 x10*3/uL Final   02/26/2024 11:18 AM 0.03 0.00 - 0.70 x10*3/uL Final     Lymphocytes Absolute   Date/Time Value Ref Range Status   03/18/2024 03:25 PM 1.72 1.20 - 4.80 x10*3/uL Final  "  02/26/2024 11:18 AM 1.98 1.20 - 4.80 x10*3/uL Final     Monocytes Absolute   Date/Time Value Ref Range Status   03/18/2024 03:25 PM 0.85 0.10 - 1.00 x10*3/uL Final   02/26/2024 11:18 AM 0.73 0.10 - 1.00 x10*3/uL Final     Eosinophils Absolute   Date/Time Value Ref Range Status   03/18/2024 03:25 PM 0.34 0.00 - 0.70 x10*3/uL Final   02/26/2024 11:18 AM 0.52 0.00 - 0.70 x10*3/uL Final     Basophils Absolute   Date/Time Value Ref Range Status   03/18/2024 03:25 PM 0.06 0.00 - 0.10 x10*3/uL Final   02/26/2024 11:18 AM 0.03 0.00 - 0.10 x10*3/uL Final       No components found for: \"PT\"  aPTT   Date/Time Value Ref Range Status   01/12/2024 01:46 PM 31 27 - 38 seconds Final     Medication Documentation Review Audit       Reviewed by Marisa Khalil MA (Medical Assistant) on 03/20/24 at 1252      Medication Order Taking? Sig Documenting Provider Last Dose Status   Accu-Chek Guide test strips strip [id number] No TEST 3X DAILY DX E11.65 Historical Provider, MD Not Taking Active   acetaminophen (Tylenol) 325 mg tablet [id number] Yes Take 2 tablets (650 mg) by mouth every 4 hours if needed for mild pain (1 - 3) or fever (temp greater than 38.0 C). Sunshine Hankins PA-C Taking Active   albuterol 90 mcg/actuation inhaler [id number] Yes Inhale 1 puff every 4 hours if needed. Historical Provider, MD Taking Active   amiodarone (Pacerone) 200 mg tablet [id number] Yes Take 1 tablet (200 mg) by mouth once daily. Dino Fox MD Taking Active   amLODIPine (Norvasc) 5 mg tablet [id number] Yes Take 1 tablet (5 mg) by mouth once daily in the morning. Take before meals. Historical Provider, MD Taking Active   aspirin 81 mg chewable tablet [id number] Yes Chew 1 tablet (81 mg) once daily. Historical Provider, MD Taking Active   atorvastatin (Lipitor) 10 mg tablet [id number] Yes Take 1 tablet (10 mg) by mouth once daily in the morning. Take before meals. Historical Provider, MD Taking Active   BD Insulin Syringe Ultra-Fine 1 mL 31 gauge x 5/16 " syringe [id number] Yes USE AS DIRECTED Historical Provider, MD Taking Active   biotin 10 mg tablet [id number] No Take 10 tablets (100 mg) by mouth once daily. Historical Provider, MD Not Taking Active   cholecalciferol (Vitamin D3) 5,000 Units tablet [id number] No Take 1 tablet (5,000 Units) by mouth once daily. Historical Provider, MD Not Taking Active   DULoxetine (Cymbalta) 60 mg DR capsule [id number] Yes Take 1 capsule (60 mg) by mouth once daily. Do not crush or chew. Historical Provider, MD Taking Active   metFORMIN (Glucophage) 1,000 mg tablet [id number] Yes Take 1 tablet (1,000 mg) by mouth 2 times a day with meals. Take with food. Historical Provider, MD Taking Active   NovoLIN 70/30 U-100 Insulin 100 unit/mL (70-30) injection [id number] Yes 70 Units 3 times a day. 70 units at breakfast, 55 units at lunch, and 60 units at dinner Historical Provider, MD Taking Active     Discontinued 03/18/24 1233   ondansetron (Zofran) 4 mg tablet [id number] Yes Take 1 tablet (4 mg) by mouth every 8 hours if needed for nausea or vomiting. Álvaro Galeano MD Taking Active   oxyCODONE (Roxicodone) 5 mg immediate release tablet [id number] No Take 1 tablet (5 mg) by mouth every 6 hours if needed for moderate pain (4 - 6).   Patient not taking: Reported on 2/28/2024    Sunshine Hankins PA-C Not Taking Active   pantoprazole (ProtoNix) 40 mg EC tablet [id number] Yes TAKE 1 TABLET (40 MG) BY MOUTH IN THE MORNING. TAKE BEFORE MEALS. DO NOT CRUSH, CHEW, OR SPLIT.. Historical Provider, MD Taking Active   topiramate (Topamax) 25 mg tablet [id number] No Take 2 tablets (50 mg) by mouth 2 times a day. Historical Provider, MD Not Taking Active   traZODone (Desyrel) 50 mg tablet [id number] Yes Take 1 tablet (50 mg) by mouth once daily at bedtime. Historical Provider, MD Taking Active   Wixela Inhub 250-50 mcg/dose diskus inhaler [id number] Yes USE 1 PUFF TWICE A DAY Historical Provider, MD Taking Active                   Assessment/Plan     1) lung  cancer  -12/8/2022 chest CT: NICHOL anterior segment 1.6 x 2.2 cm nodule, partial pleural attachment, nonspecific low volume paratracheal mediastinal LN largest near AP window 1.0 x 1.4 cm, right subcarinal space 8 x 14 mm  -12/12/2022 PET: NICHOL 1.8 x 2.3 cm mass with SUV 5.8, lateral margin abuts pleura; no significant mediastinal or hilar hypermetabolic lymphadenopathy  -12/28/2022 chest CT: 2.5 cm cavitary nodule in NICHOL  -1/7/2023 PET scan  -had surgery for stage I NSCLC, s/p lobectomy (Dr Fagan, 1/31/2023)  -2/1/2023 CT chest: no PE, postop changes in left chest, small left PTX in left upper anterior chest  -2/26/2023 chest CT no PE, continued mildly enlarged mediastinal lymph nodes  -saw Dr Solis at [facility] on 3/13/2023 - she had a [id number] (stage Ib) lung adenocarcinoma (poor NCCN risk factors - G3, + visceral involvement), s/p left robotic assisted anatomic upper lobectomy, PD-L1 90%, +[id number]  -per his charting, he recommended either adjuvant cisplatin + pemetrexed x 4 cycles vs surveillance  -according to  Thu, Dr Solis never told her about her high risk features nor any adjuvant option, and that the only thing he recommended was observation  -7/11/2023 CT chest : stable postsurgical changes of prior left thoracotomy and left upper lobectomy with interval resolution of bilateral pleural effusions; interval progression of lymphadenopathy in the chest concerning for progressing ghazala metastatic disease     7/25/2023 underwent EBUS: A - EBUS TRANSBRONCHIAL FINE NEEDLE ASPIRATE, LYMPH NODE  - 4L             Negative for malignant cells.             Benign lymphoid sample (see comment).   B - EBUS TRANSBRONCHIAL FINE NEEDLE ASPIRATE, LYMPH NODE  - 10L             Negative for malignant cells.                   Benign lymphoid sample.  C - EBUS TRANSBRONCHIAL FINE NEEDLE ASPIRATE, LYMPH NODE  - STATION 7             Negative for malignant cells.                 Benign lymphoid sample.   -8/9/2023 PET scan: 3.0 x 2.1 cm  left prevascular node with SUV 7.9; few additional prominent mediastinal nodes with low level uptake; left lower paratracheal node 0.8 cm with SUV 2.2; mild FDG uptake in left hilum SUV 3.1  -11/14/2023 chest CT: enlarged 2.2 cm prevascular lymph node not significantly changed  -she was told by her pulmonologist Dr Kang that she has cancer  -discussed her original path with her--while it was a small tumor and stage Ib, she did have a couple high risk features that would have warranted adjuvant chemotherapy followed by atezolizumab; also if she does have a recurrence that isn't amenable to surgery, she would also be a candidate for immunotherapy then KRAS inhibitor (FDA approved only in 2nd line setting)  -will discuss with thoracic surgeon--will import all [facility] films to review; may need CT guided bx by IR of this prevascular node   -she had biopsy done on 12/13/2023--path confirmed poorly differentiated lung carcinoma, PD-L1 90%, KRAS G12C mutation  -she saw Dr Calixto on 12/28/2023--he advised surgery, provided that she can pass her PFTs  - on 1/30/2024 Dr Calixto took her to the OR for robotic assisted redo left mediastinal exploration, left anterior mediastinal mass resection, diaphragmatic pacer placement  -path showed poorly differentiated carcinoma with pleomorphic/spindle cell and clear cell features; 4 lymph nodes with no evidence of malignancy; sections show a poorly differentiated carcinoma with pleomorphic/spindle cell and clear cell features involving fibroadipose tissue with a large area of central necrosis; tumor cells are positive for AE1/AE3, CAM 5.2, CK7 and negative for TTF-1, p40.  -pt is most interested in doing whatever is necessary to reduce her risk for recurrence  -she and  state again that the [facility] oncologist told them that adjuvant therapy was not necessary after her first surgery, even though his note documented that he recommended it  -as her tumor has high PD-L1  expression, she is a candidate for pembrolizumab  -on her last visit, she reported to the RN that she has been having chronic headaches and nausea; she has a history of craniotomy many years ago for meningioma;  head CT done on 3/7/2024 showed no abnormalities other than postop changes consistent with previous right parieto-occipital craniectomy with surgical mesh overlying the craniectomy site  -a couple days after she started keytruda, she was placed by her PCP on a course of antibiotics, since then she has been having frequent diarrhea and nausea  -unclear if she has developed immune-mediated colitis already--she will try prednisone PO taper starting tomorrow  -labs done on 3/18/2024 included CBC, COMP  -results reviewed--wbc 8.7, hgb 11, plt 325,000, creatinine 0.72, calcium 10, AST 11, ALT 12  -benefits, risks, potential morbidity related to pembrolizumab were reviewed with Thu and she provided informed consent to proceed  -she will receive pembrolizumab 200 mg IV today  -starting with next cycle, we will add in aloxi IV + pepcid IV as they will need to be precerted    2) COPD  -on albuterol  -on incruse ellipta     3) atrial fibrillation  -on amiodarone  -on warfarin     4) hypertension  -on norvasc  -on chlorthalidone  -on lasix  -on lisinopril  -on metoprolol     5) hyperlipidemia  -on atorvastatin     6) major depression  -on cymbalta     7) diabetes  -on novolog insulin  -on metformin        Problem List Items Addressed This Visit             ICD-10-CM    Malignant neoplasm of upper lobe of left lung (CMS/HCC) C34.12    Relevant Orders    Infusion Appointment Request [facility] INFUSION    CBC and Auto Differential    Comprehensive metabolic panel    Acth    Cortisol Am    Tsh With Reflex To Free T4 If Abnormal    Clinic Appointment Request Chemo Follow Up; LUÍS FINCH; [facility] MEDONC1    Infusion Appointment Request [facility] INFUSION    CBC and Auto Differential    Comprehensive metabolic  panel    Local recurrence of left lung cancer (CMS/HCC) - Primary C34.92    Relevant Orders    Infusion Appointment Request [facility] INFUSION    CBC and Auto Differential    Comprehensive metabolic panel    Acth    Cortisol Am    Tsh With Reflex To Free T4 If Abnormal    Clinic Appointment Request Chemo Follow Up; ÁLVARO GALEANO; [facility] MEDONC1    Infusion Appointment Request [facility] INFUSION    CBC and Auto Differential    Comprehensive metabolic panel            Álvaro Galeano MD

## 2024-03-28 ENCOUNTER — OFFICE VISIT (OUTPATIENT)
Dept: CARDIOLOGY | Facility: CLINIC | Age: 69
End: 2024-03-28
Payer: MEDICARE

## 2024-03-28 VITALS
DIASTOLIC BLOOD PRESSURE: 80 MMHG | WEIGHT: 141 LBS | BODY MASS INDEX: 29.6 KG/M2 | SYSTOLIC BLOOD PRESSURE: 100 MMHG | HEART RATE: 87 BPM | HEIGHT: 58 IN | OXYGEN SATURATION: 95 %

## 2024-03-28 DIAGNOSIS — I48.0 PAF (PAROXYSMAL ATRIAL FIBRILLATION) (MULTI): Primary | ICD-10-CM

## 2024-03-28 PROCEDURE — 93000 ELECTROCARDIOGRAM COMPLETE: CPT | Performed by: INTERNAL MEDICINE

## 2024-03-28 PROCEDURE — 3079F DIAST BP 80-89 MM HG: CPT | Performed by: INTERNAL MEDICINE

## 2024-03-28 PROCEDURE — 99213 OFFICE O/P EST LOW 20 MIN: CPT | Performed by: INTERNAL MEDICINE

## 2024-03-28 PROCEDURE — 1159F MED LIST DOCD IN RCRD: CPT | Performed by: INTERNAL MEDICINE

## 2024-03-28 PROCEDURE — 3051F HG A1C>EQUAL 7.0%<8.0%: CPT | Performed by: INTERNAL MEDICINE

## 2024-03-28 PROCEDURE — 1036F TOBACCO NON-USER: CPT | Performed by: INTERNAL MEDICINE

## 2024-03-28 PROCEDURE — 3074F SYST BP LT 130 MM HG: CPT | Performed by: INTERNAL MEDICINE

## 2024-03-28 NOTE — PROGRESS NOTES
Name : Thu Ortega   : 1955   MRN : [id number]   ENC Date : 2024      Assessment and Plan:  Paroxysmal atrial fibrillation: Perioperative use of amiodarone worked perfectly.  She had no clinical recurrence of atrial fibrillation.  At this point I think we can safely discontinue the amiodarone and follow her.  I gave her handout for the [employer] mobile device to consider using for outpatient monitoring.  No need for oral anticoagulation at this point.  Disp: RTO in 3 months or sooner if needed    HPI:  Patient seen status post repeat thoracotomy.  She is recovering nicely.  She has quite a bit of fatigue with the immunotherapy that she is on.  She did develop some mild hypothyroidism while on amiodarone.  Unclear whether it is related to this or a sick euthyroid state from the surgery.  Regardless she has been started on low-dose thyroid replacement.    Problem list overview:   Patient Active Problem List   Diagnosis    Malignant neoplasm of upper lobe of left lung (CMS/HCC)    Panlobular emphysema (CMS/HCC)    Primary hypertension    Mixed hyperlipidemia    Recurrent major depressive disorder, in partial remission (CMS/HCC)    Type 2 diabetes mellitus without complication, with long-term current use of insulin (CMS/HCC)    PAF (paroxysmal atrial fibrillation) (CMS/HCC)    Local recurrence of left lung cancer (CMS/HCC)    Non-small cell lung cancer without metastasis (CMS/HCC)    CHEY (obstructive sleep apnea)    Chronic obstructive pulmonary disease (CMS/HCC)    Non-small cell cancer of left lung (CMS/HCC)    History of meningioma of the brain       Meds:   Current Outpatient Medications on File Prior to Visit   Medication Sig Dispense Refill    Accu-Chek Guide test strips strip TEST 3X DAILY DX E11.65      acetaminophen (Tylenol) 325 mg tablet Take 2 tablets (650 mg) by mouth every 4 hours if needed for mild pain (1 - 3) or fever (temp greater than 38.0 C). 30 tablet 0    albuterol 90 mcg/actuation  inhaler Inhale 1 puff every 4 hours if needed.      amLODIPine (Norvasc) 5 mg tablet Take 1 tablet (5 mg) by mouth once daily in the morning. Take before meals.      aspirin 81 mg chewable tablet Chew 1 tablet (81 mg) once daily.      atorvastatin (Lipitor) 10 mg tablet Take 1 tablet (10 mg) by mouth once daily in the morning. Take before meals.      BD Insulin Syringe Ultra-Fine 1 mL 31 gauge x 5/16 syringe USE AS DIRECTED      biotin 10 mg tablet Take 10 tablets (100 mg) by mouth once daily.      cholecalciferol (Vitamin D3) 5,000 Units tablet Take 1 tablet (5,000 Units) by mouth once daily.      DULoxetine (Cymbalta) 60 mg DR capsule Take 1 capsule (60 mg) by mouth once daily. Do not crush or chew.      levothyroxine (Synthroid, Levoxyl) 25 mcg tablet Take 1 tablet (25 mcg) by mouth.      metFORMIN (Glucophage) 1,000 mg tablet Take 1 tablet (1,000 mg) by mouth 2 times a day with meals. Take with food.      NovoLIN 70/30 U-100 Insulin 100 unit/mL (70-30) injection 70 Units 3 times a day. 70 units at breakfast, 55 units at lunch, and 60 units at dinner      nystatin (Mycostatin) 100,000 unit/gram powder Apply 1 Application topically 2 times a day. 15 g 2    ondansetron (Zofran) 4 mg tablet Take 1 tablet (4 mg) by mouth every 8 hours if needed for nausea or vomiting. 60 tablet 2    pantoprazole (ProtoNix) 40 mg EC tablet TAKE 1 TABLET (40 MG) BY MOUTH IN THE MORNING. TAKE BEFORE MEALS. DO NOT CRUSH, CHEW, OR SPLIT..      topiramate (Topamax) 25 mg tablet Take 2 tablets (50 mg) by mouth 2 times a day.      traZODone (Desyrel) 50 mg tablet Take 1 tablet (50 mg) by mouth once daily at bedtime.      Wixela Inhub 250-50 mcg/dose diskus inhaler USE 1 PUFF TWICE A DAY      [DISCONTINUED] amiodarone (Pacerone) 200 mg tablet Take 1 tablet (200 mg) by mouth once daily. 30 tablet 11    [] predniSONE (Deltasone) 10 mg tablet Take 5 tablets (50 mg) by mouth once daily for 1 day, THEN 4 tablets (40 mg) once daily for 1  "day, THEN 3 tablets (30 mg) once daily for 1 day, THEN 2 tablets (20 mg) once daily for 1 day, THEN 1 tablet (10 mg) once daily for 1 day. 15 tablet 0    [DISCONTINUED] oxyCODONE (Roxicodone) 5 mg immediate release tablet Take 1 tablet (5 mg) by mouth every 6 hours if needed for moderate pain (4 - 6). (Patient not taking: Reported on 2/28/2024) 15 tablet 0     No current facility-administered medications on file prior to visit.        VS:  /80 (BP Location: Left arm, Patient Position: Sitting)   Pulse 87   Ht 1.473 m (4' 10\")   Wt 64 kg (141 lb)   SpO2 95%   BMI 29.47 kg/m²     Vitals reviewed.   Neck:      Vascular: No JVD.   Pulmonary:      Effort: Pulmonary effort is normal.      Breath sounds: Examination of the left-lower field reveals decreased breath sounds. Decreased breath sounds present.   Cardiovascular:      Normal rate. Regular rhythm.      Murmurs: There is no murmur.      No gallop.    Pulses:     Intact distal pulses.   Edema:     Peripheral edema absent.   Abdominal:      General: Abdomen is flat.      Palpations: Abdomen is soft.   Neurological:      General: No focal deficit present.      Mental Status: Alert.   Psychiatric:         Mood and Affect: Mood normal.         ECG: Normal sinus rhythm.  Left ventricular hypertrophy.  Nonspecific lateral T wave abnormalities          Dino Fox MD  "

## 2024-04-09 ENCOUNTER — LAB (OUTPATIENT)
Dept: LAB | Facility: CLINIC | Age: 69
End: 2024-04-09
Payer: MEDICARE

## 2024-04-09 DIAGNOSIS — C34.12 MALIGNANT NEOPLASM OF UPPER LOBE OF LEFT LUNG (MULTI): ICD-10-CM

## 2024-04-09 DIAGNOSIS — C34.92 LOCAL RECURRENCE OF LEFT LUNG CANCER (MULTI): ICD-10-CM

## 2024-04-09 LAB
ALBUMIN SERPL BCP-MCNC: 4 G/DL (ref 3.4–5)
ALP SERPL-CCNC: 115 U/L (ref 33–136)
ALT SERPL W P-5'-P-CCNC: 16 U/L (ref 7–45)
ANION GAP SERPL CALC-SCNC: 15 MMOL/L (ref 10–20)
AST SERPL W P-5'-P-CCNC: 12 U/L (ref 9–39)
BASOPHILS # BLD AUTO: 0.04 X10*3/UL (ref 0–0.1)
BASOPHILS NFR BLD AUTO: 0.4 %
BILIRUB SERPL-MCNC: 0.4 MG/DL (ref 0–1.2)
BUN SERPL-MCNC: 11 MG/DL (ref 6–23)
CALCIUM SERPL-MCNC: 9.5 MG/DL (ref 8.6–10.3)
CHLORIDE SERPL-SCNC: 102 MMOL/L (ref 98–107)
CO2 SERPL-SCNC: 27 MMOL/L (ref 21–32)
CORTIS AM PEAK SERPL-MSCNC: 22.7 UG/DL (ref 5–20)
CREAT SERPL-MCNC: 0.66 MG/DL (ref 0.5–1.05)
EGFRCR SERPLBLD CKD-EPI 2021: >90 ML/MIN/1.73M*2
EOSINOPHIL # BLD AUTO: 0.76 X10*3/UL (ref 0–0.7)
EOSINOPHIL NFR BLD AUTO: 8.1 %
ERYTHROCYTE [DISTWIDTH] IN BLOOD BY AUTOMATED COUNT: 14.8 % (ref 11.5–14.5)
GLUCOSE SERPL-MCNC: 287 MG/DL (ref 74–99)
HCT VFR BLD AUTO: 37.4 % (ref 36–46)
HGB BLD-MCNC: 11.3 G/DL (ref 12–16)
IMM GRANULOCYTES # BLD AUTO: 0.1 X10*3/UL (ref 0–0.7)
IMM GRANULOCYTES NFR BLD AUTO: 1.1 % (ref 0–0.9)
LYMPHOCYTES # BLD AUTO: 2.15 X10*3/UL (ref 1.2–4.8)
LYMPHOCYTES NFR BLD AUTO: 22.8 %
MCH RBC QN AUTO: 23.1 PG (ref 26–34)
MCHC RBC AUTO-ENTMCNC: 30.2 G/DL (ref 32–36)
MCV RBC AUTO: 77 FL (ref 80–100)
MONOCYTES # BLD AUTO: 0.85 X10*3/UL (ref 0.1–1)
MONOCYTES NFR BLD AUTO: 9 %
NEUTROPHILS # BLD AUTO: 5.52 X10*3/UL (ref 1.2–7.7)
NEUTROPHILS NFR BLD AUTO: 58.6 %
PLATELET # BLD AUTO: 285 X10*3/UL (ref 150–450)
POTASSIUM SERPL-SCNC: 3.7 MMOL/L (ref 3.5–5.3)
PROT SERPL-MCNC: 6.7 G/DL (ref 6.4–8.2)
RBC # BLD AUTO: 4.89 X10*6/UL (ref 4–5.2)
SODIUM SERPL-SCNC: 140 MMOL/L (ref 136–145)
T4 FREE SERPL-MCNC: 4.93 NG/DL (ref 0.61–1.12)
TSH SERPL-ACNC: 0.01 MIU/L (ref 0.44–3.98)
WBC # BLD AUTO: 9.4 X10*3/UL (ref 4.4–11.3)

## 2024-04-09 PROCEDURE — 85025 COMPLETE CBC W/AUTO DIFF WBC: CPT

## 2024-04-09 PROCEDURE — 82533 TOTAL CORTISOL: CPT | Performed by: INTERNAL MEDICINE

## 2024-04-09 PROCEDURE — 84439 ASSAY OF FREE THYROXINE: CPT | Performed by: INTERNAL MEDICINE

## 2024-04-09 PROCEDURE — 82024 ASSAY OF ACTH: CPT

## 2024-04-09 PROCEDURE — 84075 ASSAY ALKALINE PHOSPHATASE: CPT

## 2024-04-09 PROCEDURE — 84443 ASSAY THYROID STIM HORMONE: CPT | Performed by: INTERNAL MEDICINE

## 2024-04-09 PROCEDURE — 36415 COLL VENOUS BLD VENIPUNCTURE: CPT

## 2024-04-10 ENCOUNTER — INFUSION (OUTPATIENT)
Dept: HEMATOLOGY/ONCOLOGY | Facility: CLINIC | Age: 69
End: 2024-04-10
Payer: MEDICARE

## 2024-04-10 VITALS
DIASTOLIC BLOOD PRESSURE: 84 MMHG | WEIGHT: 138.89 LBS | BODY MASS INDEX: 29.03 KG/M2 | TEMPERATURE: 97.5 F | HEART RATE: 93 BPM | OXYGEN SATURATION: 94 % | SYSTOLIC BLOOD PRESSURE: 158 MMHG | RESPIRATION RATE: 18 BRPM

## 2024-04-10 DIAGNOSIS — C34.92 LOCAL RECURRENCE OF LEFT LUNG CANCER (MULTI): ICD-10-CM

## 2024-04-10 DIAGNOSIS — C34.12 MALIGNANT NEOPLASM OF UPPER LOBE OF LEFT LUNG (MULTI): ICD-10-CM

## 2024-04-10 PROCEDURE — 2500000004 HC RX 250 GENERAL PHARMACY W/ HCPCS (ALT 636 FOR OP/ED): Performed by: INTERNAL MEDICINE

## 2024-04-10 PROCEDURE — 96413 CHEMO IV INFUSION 1 HR: CPT

## 2024-04-10 PROCEDURE — 96375 TX/PRO/DX INJ NEW DRUG ADDON: CPT | Mod: INF

## 2024-04-10 RX ORDER — FAMOTIDINE 10 MG/ML
20 INJECTION INTRAVENOUS ONCE
Status: COMPLETED | OUTPATIENT
Start: 2024-04-10 | End: 2024-04-10

## 2024-04-10 RX ORDER — ALBUTEROL SULFATE 0.83 MG/ML
3 SOLUTION RESPIRATORY (INHALATION) AS NEEDED
Status: DISCONTINUED | OUTPATIENT
Start: 2024-04-10 | End: 2024-04-10 | Stop reason: HOSPADM

## 2024-04-10 RX ORDER — PROCHLORPERAZINE EDISYLATE 5 MG/ML
10 INJECTION INTRAMUSCULAR; INTRAVENOUS EVERY 6 HOURS PRN
Status: DISCONTINUED | OUTPATIENT
Start: 2024-04-10 | End: 2024-04-10 | Stop reason: HOSPADM

## 2024-04-10 RX ORDER — PALONOSETRON 0.05 MG/ML
250 INJECTION, SOLUTION INTRAVENOUS ONCE
Status: COMPLETED | OUTPATIENT
Start: 2024-04-10 | End: 2024-04-10

## 2024-04-10 RX ORDER — EPINEPHRINE 0.3 MG/.3ML
0.3 INJECTION SUBCUTANEOUS EVERY 5 MIN PRN
Status: DISCONTINUED | OUTPATIENT
Start: 2024-04-10 | End: 2024-04-10 | Stop reason: HOSPADM

## 2024-04-10 RX ORDER — PROCHLORPERAZINE MALEATE 10 MG
10 TABLET ORAL EVERY 6 HOURS PRN
Status: DISCONTINUED | OUTPATIENT
Start: 2024-04-10 | End: 2024-04-10 | Stop reason: HOSPADM

## 2024-04-10 RX ORDER — DIPHENHYDRAMINE HYDROCHLORIDE 50 MG/ML
50 INJECTION INTRAMUSCULAR; INTRAVENOUS AS NEEDED
Status: DISCONTINUED | OUTPATIENT
Start: 2024-04-10 | End: 2024-04-10 | Stop reason: HOSPADM

## 2024-04-10 RX ORDER — FAMOTIDINE 10 MG/ML
20 INJECTION INTRAVENOUS ONCE AS NEEDED
Status: DISCONTINUED | OUTPATIENT
Start: 2024-04-10 | End: 2024-04-10 | Stop reason: HOSPADM

## 2024-04-10 RX ADMIN — FAMOTIDINE 20 MG: 10 INJECTION INTRAVENOUS at 15:26

## 2024-04-10 RX ADMIN — SODIUM CHLORIDE 200 MG: 9 INJECTION, SOLUTION INTRAVENOUS at 15:33

## 2024-04-10 RX ADMIN — PALONOSETRON HYDROCHLORIDE 250 MCG: 0.25 INJECTION INTRAVENOUS at 15:24

## 2024-04-10 ASSESSMENT — PAIN SCALES - GENERAL: PAINLEVEL: 0-NO PAIN

## 2024-04-11 LAB — ACTH PLAS-MCNC: 19.9 PG/ML (ref 7.2–63.3)

## 2024-04-30 ENCOUNTER — LAB (OUTPATIENT)
Dept: LAB | Facility: CLINIC | Age: 69
End: 2024-04-30
Payer: MEDICARE

## 2024-04-30 DIAGNOSIS — C34.92 LOCAL RECURRENCE OF LEFT LUNG CANCER (MULTI): ICD-10-CM

## 2024-04-30 DIAGNOSIS — C34.12 MALIGNANT NEOPLASM OF UPPER LOBE OF LEFT LUNG (MULTI): ICD-10-CM

## 2024-04-30 LAB
ALBUMIN SERPL BCP-MCNC: 4.5 G/DL (ref 3.4–5)
ALP SERPL-CCNC: 133 U/L (ref 33–136)
ALT SERPL W P-5'-P-CCNC: 14 U/L (ref 7–45)
ANION GAP SERPL CALC-SCNC: 12 MMOL/L (ref 10–20)
AST SERPL W P-5'-P-CCNC: 15 U/L (ref 9–39)
BASOPHILS # BLD AUTO: 0.06 X10*3/UL (ref 0–0.1)
BASOPHILS NFR BLD AUTO: 0.6 %
BILIRUB SERPL-MCNC: 0.5 MG/DL (ref 0–1.2)
BUN SERPL-MCNC: 12 MG/DL (ref 6–23)
CALCIUM SERPL-MCNC: 10.1 MG/DL (ref 8.6–10.3)
CHLORIDE SERPL-SCNC: 102 MMOL/L (ref 98–107)
CO2 SERPL-SCNC: 30 MMOL/L (ref 21–32)
CREAT SERPL-MCNC: 0.84 MG/DL (ref 0.5–1.05)
EGFRCR SERPLBLD CKD-EPI 2021: 75 ML/MIN/1.73M*2
EOSINOPHIL # BLD AUTO: 0.76 X10*3/UL (ref 0–0.7)
EOSINOPHIL NFR BLD AUTO: 7.1 %
ERYTHROCYTE [DISTWIDTH] IN BLOOD BY AUTOMATED COUNT: 16.3 % (ref 11.5–14.5)
GLUCOSE SERPL-MCNC: 132 MG/DL (ref 74–99)
HCT VFR BLD AUTO: 40.4 % (ref 36–46)
HGB BLD-MCNC: 12.1 G/DL (ref 12–16)
IMM GRANULOCYTES # BLD AUTO: 0.05 X10*3/UL (ref 0–0.7)
IMM GRANULOCYTES NFR BLD AUTO: 0.5 % (ref 0–0.9)
LYMPHOCYTES # BLD AUTO: 2.9 X10*3/UL (ref 1.2–4.8)
LYMPHOCYTES NFR BLD AUTO: 27.2 %
MCH RBC QN AUTO: 22.8 PG (ref 26–34)
MCHC RBC AUTO-ENTMCNC: 30 G/DL (ref 32–36)
MCV RBC AUTO: 76 FL (ref 80–100)
MONOCYTES # BLD AUTO: 0.77 X10*3/UL (ref 0.1–1)
MONOCYTES NFR BLD AUTO: 7.2 %
NEUTROPHILS # BLD AUTO: 6.11 X10*3/UL (ref 1.2–7.7)
NEUTROPHILS NFR BLD AUTO: 57.4 %
PLATELET # BLD AUTO: 282 X10*3/UL (ref 150–450)
POTASSIUM SERPL-SCNC: 3.9 MMOL/L (ref 3.5–5.3)
PROT SERPL-MCNC: 7.3 G/DL (ref 6.4–8.2)
RBC # BLD AUTO: 5.31 X10*6/UL (ref 4–5.2)
SODIUM SERPL-SCNC: 140 MMOL/L (ref 136–145)
WBC # BLD AUTO: 10.7 X10*3/UL (ref 4.4–11.3)

## 2024-04-30 PROCEDURE — 85025 COMPLETE CBC W/AUTO DIFF WBC: CPT

## 2024-04-30 PROCEDURE — 84075 ASSAY ALKALINE PHOSPHATASE: CPT

## 2024-04-30 PROCEDURE — 36415 COLL VENOUS BLD VENIPUNCTURE: CPT

## 2024-05-01 ENCOUNTER — APPOINTMENT (OUTPATIENT)
Dept: HEMATOLOGY/ONCOLOGY | Facility: CLINIC | Age: 69
End: 2024-05-01
Payer: MEDICARE

## 2024-05-01 ENCOUNTER — OFFICE VISIT (OUTPATIENT)
Dept: HEMATOLOGY/ONCOLOGY | Facility: CLINIC | Age: 69
End: 2024-05-01
Payer: MEDICARE

## 2024-05-01 ENCOUNTER — INFUSION (OUTPATIENT)
Dept: HEMATOLOGY/ONCOLOGY | Facility: CLINIC | Age: 69
End: 2024-05-01
Payer: MEDICARE

## 2024-05-01 VITALS
OXYGEN SATURATION: 95 % | WEIGHT: 142.2 LBS | RESPIRATION RATE: 18 BRPM | DIASTOLIC BLOOD PRESSURE: 85 MMHG | HEART RATE: 92 BPM | SYSTOLIC BLOOD PRESSURE: 154 MMHG | TEMPERATURE: 97.9 F | BODY MASS INDEX: 29.72 KG/M2

## 2024-05-01 DIAGNOSIS — J44.9 CHRONIC OBSTRUCTIVE PULMONARY DISEASE, UNSPECIFIED COPD TYPE (MULTI): ICD-10-CM

## 2024-05-01 DIAGNOSIS — F33.41 RECURRENT MAJOR DEPRESSIVE DISORDER, IN PARTIAL REMISSION (CMS-HCC): ICD-10-CM

## 2024-05-01 DIAGNOSIS — E78.2 MIXED HYPERLIPIDEMIA: ICD-10-CM

## 2024-05-01 DIAGNOSIS — I10 PRIMARY HYPERTENSION: ICD-10-CM

## 2024-05-01 DIAGNOSIS — C34.92 LOCAL RECURRENCE OF LEFT LUNG CANCER (MULTI): Primary | ICD-10-CM

## 2024-05-01 DIAGNOSIS — C34.12 MALIGNANT NEOPLASM OF UPPER LOBE OF LEFT LUNG (MULTI): ICD-10-CM

## 2024-05-01 DIAGNOSIS — Z79.4 TYPE 2 DIABETES MELLITUS WITHOUT COMPLICATION, WITH LONG-TERM CURRENT USE OF INSULIN (MULTI): ICD-10-CM

## 2024-05-01 DIAGNOSIS — C34.92 LOCAL RECURRENCE OF LEFT LUNG CANCER (MULTI): ICD-10-CM

## 2024-05-01 DIAGNOSIS — E11.9 TYPE 2 DIABETES MELLITUS WITHOUT COMPLICATION, WITH LONG-TERM CURRENT USE OF INSULIN (MULTI): ICD-10-CM

## 2024-05-01 DIAGNOSIS — I48.0 PAF (PAROXYSMAL ATRIAL FIBRILLATION) (MULTI): ICD-10-CM

## 2024-05-01 PROCEDURE — 3051F HG A1C>EQUAL 7.0%<8.0%: CPT | Performed by: INTERNAL MEDICINE

## 2024-05-01 PROCEDURE — 96375 TX/PRO/DX INJ NEW DRUG ADDON: CPT | Mod: INF

## 2024-05-01 PROCEDURE — 96413 CHEMO IV INFUSION 1 HR: CPT

## 2024-05-01 PROCEDURE — 2500000004 HC RX 250 GENERAL PHARMACY W/ HCPCS (ALT 636 FOR OP/ED): Performed by: INTERNAL MEDICINE

## 2024-05-01 PROCEDURE — 99214 OFFICE O/P EST MOD 30 MIN: CPT | Performed by: INTERNAL MEDICINE

## 2024-05-01 PROCEDURE — 2500000004 HC RX 250 GENERAL PHARMACY W/ HCPCS (ALT 636 FOR OP/ED): Mod: JZ | Performed by: INTERNAL MEDICINE

## 2024-05-01 PROCEDURE — 1159F MED LIST DOCD IN RCRD: CPT | Performed by: INTERNAL MEDICINE

## 2024-05-01 PROCEDURE — 3079F DIAST BP 80-89 MM HG: CPT | Performed by: INTERNAL MEDICINE

## 2024-05-01 PROCEDURE — 3077F SYST BP >= 140 MM HG: CPT | Performed by: INTERNAL MEDICINE

## 2024-05-01 PROCEDURE — 1126F AMNT PAIN NOTED NONE PRSNT: CPT | Performed by: INTERNAL MEDICINE

## 2024-05-01 RX ORDER — DIPHENHYDRAMINE HYDROCHLORIDE 50 MG/ML
50 INJECTION INTRAMUSCULAR; INTRAVENOUS AS NEEDED
Status: DISCONTINUED | OUTPATIENT
Start: 2024-05-01 | End: 2024-05-01 | Stop reason: HOSPADM

## 2024-05-01 RX ORDER — ALBUTEROL SULFATE 0.83 MG/ML
3 SOLUTION RESPIRATORY (INHALATION) AS NEEDED
OUTPATIENT
Start: 2024-06-12

## 2024-05-01 RX ORDER — FAMOTIDINE 10 MG/ML
20 INJECTION INTRAVENOUS ONCE AS NEEDED
Status: DISCONTINUED | OUTPATIENT
Start: 2024-05-01 | End: 2024-05-01 | Stop reason: HOSPADM

## 2024-05-01 RX ORDER — ALBUTEROL SULFATE 0.83 MG/ML
3 SOLUTION RESPIRATORY (INHALATION) AS NEEDED
Status: CANCELLED | OUTPATIENT
Start: 2024-05-22

## 2024-05-01 RX ORDER — PROCHLORPERAZINE MALEATE 10 MG
10 TABLET ORAL EVERY 6 HOURS PRN
OUTPATIENT
Start: 2024-06-12

## 2024-05-01 RX ORDER — FAMOTIDINE 10 MG/ML
20 INJECTION INTRAVENOUS ONCE AS NEEDED
OUTPATIENT
Start: 2024-06-12

## 2024-05-01 RX ORDER — FAMOTIDINE 10 MG/ML
20 INJECTION INTRAVENOUS ONCE
Status: CANCELLED
Start: 2024-05-22 | End: 2024-05-22

## 2024-05-01 RX ORDER — DIPHENHYDRAMINE HYDROCHLORIDE 50 MG/ML
50 INJECTION INTRAMUSCULAR; INTRAVENOUS AS NEEDED
Status: CANCELLED | OUTPATIENT
Start: 2024-05-22

## 2024-05-01 RX ORDER — PALONOSETRON 0.05 MG/ML
250 INJECTION, SOLUTION INTRAVENOUS ONCE
Start: 2024-06-12 | End: 2024-06-12

## 2024-05-01 RX ORDER — HEPARIN 100 UNIT/ML
500 SYRINGE INTRAVENOUS AS NEEDED
Status: CANCELLED | OUTPATIENT
Start: 2024-05-01

## 2024-05-01 RX ORDER — PROCHLORPERAZINE EDISYLATE 5 MG/ML
10 INJECTION INTRAMUSCULAR; INTRAVENOUS EVERY 6 HOURS PRN
OUTPATIENT
Start: 2024-06-12

## 2024-05-01 RX ORDER — PROCHLORPERAZINE EDISYLATE 5 MG/ML
10 INJECTION INTRAMUSCULAR; INTRAVENOUS EVERY 6 HOURS PRN
Status: DISCONTINUED | OUTPATIENT
Start: 2024-05-01 | End: 2024-05-01 | Stop reason: HOSPADM

## 2024-05-01 RX ORDER — EPINEPHRINE 0.3 MG/.3ML
0.3 INJECTION SUBCUTANEOUS EVERY 5 MIN PRN
OUTPATIENT
Start: 2024-06-12

## 2024-05-01 RX ORDER — HEPARIN SODIUM,PORCINE/PF 10 UNIT/ML
50 SYRINGE (ML) INTRAVENOUS AS NEEDED
Status: CANCELLED | OUTPATIENT
Start: 2024-05-01

## 2024-05-01 RX ORDER — PALONOSETRON 0.05 MG/ML
250 INJECTION, SOLUTION INTRAVENOUS ONCE
Status: COMPLETED | OUTPATIENT
Start: 2024-05-01 | End: 2024-05-01

## 2024-05-01 RX ORDER — FAMOTIDINE 10 MG/ML
20 INJECTION INTRAVENOUS ONCE
Start: 2024-06-12 | End: 2024-06-12

## 2024-05-01 RX ORDER — ALBUTEROL SULFATE 0.83 MG/ML
3 SOLUTION RESPIRATORY (INHALATION) AS NEEDED
Status: DISCONTINUED | OUTPATIENT
Start: 2024-05-01 | End: 2024-05-01 | Stop reason: HOSPADM

## 2024-05-01 RX ORDER — FAMOTIDINE 10 MG/ML
20 INJECTION INTRAVENOUS ONCE
Status: COMPLETED | OUTPATIENT
Start: 2024-05-01 | End: 2024-05-01

## 2024-05-01 RX ORDER — FAMOTIDINE 10 MG/ML
20 INJECTION INTRAVENOUS ONCE AS NEEDED
Status: CANCELLED | OUTPATIENT
Start: 2024-05-22

## 2024-05-01 RX ORDER — PALONOSETRON 0.05 MG/ML
250 INJECTION, SOLUTION INTRAVENOUS ONCE
Status: CANCELLED
Start: 2024-05-01 | End: 2024-05-01

## 2024-05-01 RX ORDER — PROCHLORPERAZINE MALEATE 10 MG
10 TABLET ORAL EVERY 6 HOURS PRN
Status: DISCONTINUED | OUTPATIENT
Start: 2024-05-01 | End: 2024-05-01 | Stop reason: HOSPADM

## 2024-05-01 RX ORDER — DIPHENHYDRAMINE HYDROCHLORIDE 50 MG/ML
50 INJECTION INTRAMUSCULAR; INTRAVENOUS AS NEEDED
OUTPATIENT
Start: 2024-06-12

## 2024-05-01 RX ORDER — PROCHLORPERAZINE MALEATE 10 MG
10 TABLET ORAL EVERY 6 HOURS PRN
Status: CANCELLED | OUTPATIENT
Start: 2024-05-22

## 2024-05-01 RX ORDER — PALONOSETRON 0.05 MG/ML
250 INJECTION, SOLUTION INTRAVENOUS ONCE
Status: CANCELLED
Start: 2024-05-22 | End: 2024-05-22

## 2024-05-01 RX ORDER — FAMOTIDINE 10 MG/ML
20 INJECTION INTRAVENOUS ONCE
Status: CANCELLED
Start: 2024-05-01 | End: 2024-05-01

## 2024-05-01 RX ORDER — EPINEPHRINE 0.3 MG/.3ML
0.3 INJECTION SUBCUTANEOUS EVERY 5 MIN PRN
Status: CANCELLED | OUTPATIENT
Start: 2024-05-22

## 2024-05-01 RX ORDER — EPINEPHRINE 0.3 MG/.3ML
0.3 INJECTION SUBCUTANEOUS EVERY 5 MIN PRN
Status: DISCONTINUED | OUTPATIENT
Start: 2024-05-01 | End: 2024-05-01 | Stop reason: HOSPADM

## 2024-05-01 RX ORDER — PROCHLORPERAZINE EDISYLATE 5 MG/ML
10 INJECTION INTRAMUSCULAR; INTRAVENOUS EVERY 6 HOURS PRN
Status: CANCELLED | OUTPATIENT
Start: 2024-05-22

## 2024-05-01 RX ADMIN — PALONOSETRON HYDROCHLORIDE 250 MCG: 0.25 INJECTION INTRAVENOUS at 10:54

## 2024-05-01 RX ADMIN — SODIUM CHLORIDE 200 MG: 9 INJECTION, SOLUTION INTRAVENOUS at 11:07

## 2024-05-01 RX ADMIN — FAMOTIDINE 20 MG: 10 INJECTION INTRAVENOUS at 10:58

## 2024-05-01 ASSESSMENT — PAIN SCALES - GENERAL: PAINLEVEL: 0-NO PAIN

## 2024-05-01 NOTE — PATIENT INSTRUCTIONS
You will return in 3 weeks and in 6 weeks for keytruda #5 and #6    You next chest CT is booked in August

## 2024-05-04 ASSESSMENT — ENCOUNTER SYMPTOMS
NEUROLOGICAL NEGATIVE: 1
HEMATOLOGIC/LYMPHATIC NEGATIVE: 1
CONSTITUTIONAL NEGATIVE: 1
RESPIRATORY NEGATIVE: 1
EYES NEGATIVE: 1
GASTROINTESTINAL NEGATIVE: 1
ENDOCRINE NEGATIVE: 1
PSYCHIATRIC NEGATIVE: 1
MUSCULOSKELETAL NEGATIVE: 1
CARDIOVASCULAR NEGATIVE: 1

## 2024-05-04 NOTE — PROGRESS NOTES
Patient ID: Thu Ortega is a 68 y.o. female.  Referring Physician: Álvaro Galeano MD  [id number] [facility] [address]  [place] [address]  Primary Care Provider: DEANNA Rodriguez  Visit Type: Follow Up      Subjective    HPI  The delayed nausea is better; Dr Calixto scheduled a chest CT scan for August    Review of Systems   Constitutional: Negative.    HENT:  Negative.     Eyes: Negative.    Respiratory: Negative.     Cardiovascular: Negative.    Gastrointestinal: Negative.    Endocrine: Negative.    Genitourinary: Negative.     Musculoskeletal: Negative.    Skin: Negative.    Neurological: Negative.    Hematological: Negative.    Psychiatric/Behavioral: Negative.          Objective   BSA: 1.62 meters squared  /85 (BP Location: Left arm)   Pulse 92   Temp 36.6 °C (97.9 °F) (Temporal)   Resp 18   Wt 64.5 kg (142 lb 3.2 oz)   SpO2 95%   BMI 29.72 kg/m²      has a past medical history of Adenocarcinoma of lung, left (Multi), Atrial fibrillation (Multi), COPD (chronic obstructive pulmonary disease) (Multi), Depression, DJD (degenerative joint disease), DM (diabetes mellitus) (Multi), Fibromyalgia, primary, GERD (gastroesophageal reflux disease), Hearing aid worn, History of blood transfusion, History of meningioma of the brain, HLD (hyperlipidemia), HTN (hypertension), Irregular heart beat, Irritable bowel syndrome, Malignant neoplasm of upper lobe of left lung (Multi), Nephrolithiasis, Panlobular emphysema (Multi), Shortness of breath, Spinal stenosis, and Urinary tract infection.   has a past surgical history that includes US guided needle liver biopsy (02/07/2020); Lung lobectomy; CT guided percutaneous biopsy lung (12/13/2023); CT guided percutaneous biopsy lung (12/15/2023); Brain surgery; Foot surgery; Knee surgery; Cataract extraction; and Tubal ligation.  Family History   Problem Relation Name Age of Onset    Stroke Mother      Other (aortic valve disease) Mother      Heart disease Mother       Cancer Sister      Stroke Sister      Diabetes Sister      Diabetes Brother      Other (heart transplant) Brother       Oncology History   Malignant neoplasm of upper lobe of left lung (Multi)   12/3/2023 Initial Diagnosis    Malignant neoplasm of upper lobe of left lung (CMS/HCC)     2/28/2024 -  Chemotherapy    Pembrolizumab, 21 Day Cycles     Local recurrence of left lung cancer (Multi)   1/4/2024 Initial Diagnosis    Local recurrence of left lung cancer (CMS/HCC)     2/28/2024 -  Chemotherapy    Pembrolizumab, 21 Day Cycles         Thu Ortega  reports that she quit smoking about 18 years ago. Her smoking use included cigarettes. She has never used smokeless tobacco.  She  reports that she does not currently use alcohol.  She  reports no history of drug use.    Physical Exam  Vitals reviewed.   Constitutional:       Appearance: Normal appearance.   HENT:      Head: Normocephalic.      Mouth/Throat:      Mouth: Mucous membranes are moist.   Eyes:      Extraocular Movements: Extraocular movements intact.      Pupils: Pupils are equal, round, and reactive to light.   Cardiovascular:      Rate and Rhythm: Normal rate and regular rhythm.      Pulses: Normal pulses.      Heart sounds: Normal heart sounds.   Pulmonary:      Breath sounds: Normal breath sounds.   Abdominal:      General: Bowel sounds are normal.      Palpations: Abdomen is soft.   Musculoskeletal:         General: Normal range of motion.      Cervical back: Normal range of motion and neck supple.   Skin:     General: Skin is warm.   Neurological:      General: No focal deficit present.      Mental Status: She is alert and oriented to person, place, and time.   Psychiatric:         Mood and Affect: Mood normal.         Behavior: Behavior normal.         WBC   Date/Time Value Ref Range Status   04/30/2024 09:57 AM 10.7 4.4 - 11.3 x10*3/uL Final   04/09/2024 10:57 AM 9.4 4.4 - 11.3 x10*3/uL Final   03/18/2024 03:25 PM 8.7 4.4 - 11.3 x10*3/uL Final  "    nRBC   Date Value Ref Range Status   02/01/2024 0.0 0.0 - 0.0 /100 WBCs Final   01/31/2024 0.0 0.0 - 0.0 /100 WBCs Final   01/12/2024 0.0 0.0 - 0.0 /100 WBCs Final     RBC   Date Value Ref Range Status   04/30/2024 5.31 (H) 4.00 - 5.20 x10*6/uL Final   04/09/2024 4.89 4.00 - 5.20 x10*6/uL Final   03/18/2024 4.61 4.00 - 5.20 x10*6/uL Final     Hemoglobin   Date Value Ref Range Status   04/30/2024 12.1 12.0 - 16.0 g/dL Final   04/09/2024 11.3 (L) 12.0 - 16.0 g/dL Final   03/18/2024 11.0 (L) 12.0 - 16.0 g/dL Final     Hematocrit   Date Value Ref Range Status   04/30/2024 40.4 36.0 - 46.0 % Final   04/09/2024 37.4 36.0 - 46.0 % Final   03/18/2024 36.3 36.0 - 46.0 % Final     MCV   Date/Time Value Ref Range Status   04/30/2024 09:57 AM 76 (L) 80 - 100 fL Final   04/09/2024 10:57 AM 77 (L) 80 - 100 fL Final   03/18/2024 03:25 PM 79 (L) 80 - 100 fL Final     MCH   Date/Time Value Ref Range Status   04/30/2024 09:57 AM 22.8 (L) 26.0 - 34.0 pg Final   04/09/2024 10:57 AM 23.1 (L) 26.0 - 34.0 pg Final   03/18/2024 03:25 PM 23.9 (L) 26.0 - 34.0 pg Final     MCHC   Date/Time Value Ref Range Status   04/30/2024 09:57 AM 30.0 (L) 32.0 - 36.0 g/dL Final   04/09/2024 10:57 AM 30.2 (L) 32.0 - 36.0 g/dL Final   03/18/2024 03:25 PM 30.3 (L) 32.0 - 36.0 g/dL Final     RDW   Date/Time Value Ref Range Status   04/30/2024 09:57 AM 16.3 (H) 11.5 - 14.5 % Final   04/09/2024 10:57 AM 14.8 (H) 11.5 - 14.5 % Final   03/18/2024 03:25 PM 15.1 (H) 11.5 - 14.5 % Final     Platelets   Date/Time Value Ref Range Status   04/30/2024 09:57  150 - 450 x10*3/uL Final   04/09/2024 10:57  150 - 450 x10*3/uL Final   03/18/2024 03:25  150 - 450 x10*3/uL Final     No results found for: \"MPV\"  Neutrophils %   Date/Time Value Ref Range Status   04/30/2024 09:57 AM 57.4 40.0 - 80.0 % Final   04/09/2024 10:57 AM 58.6 40.0 - 80.0 % Final   03/18/2024 03:25 PM 65.5 40.0 - 80.0 % Final     Immature Granulocytes %, Automated   Date/Time Value " Ref Range Status   04/30/2024 09:57 AM 0.5 0.0 - 0.9 % Final     Comment:     Immature Granulocyte Count (IG) includes promyelocytes, myelocytes and metamyelocytes but does not include bands. Percent differential counts (%) should be interpreted in the context of the absolute cell counts (cells/UL).   04/09/2024 10:57 AM 1.1 (H) 0.0 - 0.9 % Final     Comment:     Immature Granulocyte Count (IG) includes promyelocytes, myelocytes and metamyelocytes but does not include bands. Percent differential counts (%) should be interpreted in the context of the absolute cell counts (cells/UL).   03/18/2024 03:25 PM 0.2 0.0 - 0.9 % Final     Comment:     Immature Granulocyte Count (IG) includes promyelocytes, myelocytes and metamyelocytes but does not include bands. Percent differential counts (%) should be interpreted in the context of the absolute cell counts (cells/UL).     Lymphocytes %   Date/Time Value Ref Range Status   04/30/2024 09:57 AM 27.2 13.0 - 44.0 % Final   04/09/2024 10:57 AM 22.8 13.0 - 44.0 % Final   03/18/2024 03:25 PM 19.9 13.0 - 44.0 % Final     Monocytes %   Date/Time Value Ref Range Status   04/30/2024 09:57 AM 7.2 2.0 - 10.0 % Final   04/09/2024 10:57 AM 9.0 2.0 - 10.0 % Final   03/18/2024 03:25 PM 9.8 2.0 - 10.0 % Final     Eosinophils %   Date/Time Value Ref Range Status   04/30/2024 09:57 AM 7.1 0.0 - 6.0 % Final   04/09/2024 10:57 AM 8.1 0.0 - 6.0 % Final   03/18/2024 03:25 PM 3.9 0.0 - 6.0 % Final     Basophils %   Date/Time Value Ref Range Status   04/30/2024 09:57 AM 0.6 0.0 - 2.0 % Final   04/09/2024 10:57 AM 0.4 0.0 - 2.0 % Final   03/18/2024 03:25 PM 0.7 0.0 - 2.0 % Final     Neutrophils Absolute   Date/Time Value Ref Range Status   04/30/2024 09:57 AM 6.11 1.20 - 7.70 x10*3/uL Final     Comment:     Percent differential counts (%) should be interpreted in the context of the absolute cell counts (cells/uL).   04/09/2024 10:57 AM 5.52 1.20 - 7.70 x10*3/uL Final     Comment:     Percent  "differential counts (%) should be interpreted in the context of the absolute cell counts (cells/uL).   03/18/2024 03:25 PM 5.66 1.20 - 7.70 x10*3/uL Final     Comment:     Percent differential counts (%) should be interpreted in the context of the absolute cell counts (cells/uL).     Immature Granulocytes Absolute, Automated   Date/Time Value Ref Range Status   04/30/2024 09:57 AM 0.05 0.00 - 0.70 x10*3/uL Final   04/09/2024 10:57 AM 0.10 0.00 - 0.70 x10*3/uL Final   03/18/2024 03:25 PM 0.02 0.00 - 0.70 x10*3/uL Final     Lymphocytes Absolute   Date/Time Value Ref Range Status   04/30/2024 09:57 AM 2.90 1.20 - 4.80 x10*3/uL Final   04/09/2024 10:57 AM 2.15 1.20 - 4.80 x10*3/uL Final   03/18/2024 03:25 PM 1.72 1.20 - 4.80 x10*3/uL Final     Monocytes Absolute   Date/Time Value Ref Range Status   04/30/2024 09:57 AM 0.77 0.10 - 1.00 x10*3/uL Final   04/09/2024 10:57 AM 0.85 0.10 - 1.00 x10*3/uL Final   03/18/2024 03:25 PM 0.85 0.10 - 1.00 x10*3/uL Final     Eosinophils Absolute   Date/Time Value Ref Range Status   04/30/2024 09:57 AM 0.76 (H) 0.00 - 0.70 x10*3/uL Final   04/09/2024 10:57 AM 0.76 (H) 0.00 - 0.70 x10*3/uL Final   03/18/2024 03:25 PM 0.34 0.00 - 0.70 x10*3/uL Final     Basophils Absolute   Date/Time Value Ref Range Status   04/30/2024 09:57 AM 0.06 0.00 - 0.10 x10*3/uL Final   04/09/2024 10:57 AM 0.04 0.00 - 0.10 x10*3/uL Final   03/18/2024 03:25 PM 0.06 0.00 - 0.10 x10*3/uL Final       No components found for: \"PT\"  aPTT   Date/Time Value Ref Range Status   01/12/2024 01:46 PM 31 27 - 38 seconds Final     Medication Documentation Review Audit       Reviewed by Marisa Khalil MA (Medical Assistant) on 05/01/24 at 0944      Medication Order Taking? Sig Documenting Provider Last Dose Status   Accu-Chek Guide test strips strip [id number] Yes TEST 3X DAILY DX E11.65 Historical Provider, MD Taking Active   acetaminophen (Tylenol) 325 mg tablet [id number] Yes Take 2 tablets (650 mg) by mouth every 4 hours if " needed for mild pain (1 - 3) or fever (temp greater than 38.0 C). Sunshine Hankins PA-C Taking Active   albuterol 90 mcg/actuation inhaler [id number] Yes Inhale 1 puff every 4 hours if needed. Historical MD Eulalia Taking Active   amLODIPine (Norvasc) 5 mg tablet [id number] Yes Take 1 tablet (5 mg) by mouth once daily in the morning. Take before meals. Historical Provider, MD Taking Active   aspirin 81 mg chewable tablet [id number] Yes Chew 1 tablet (81 mg) once daily. Historical Provider, MD Taking Active   atorvastatin (Lipitor) 10 mg tablet [id number] Yes Take 1 tablet (10 mg) by mouth once daily in the morning. Take before meals. Historical Provider, MD Taking Active   BD Insulin Syringe Ultra-Fine 1 mL 31 gauge x 5/16 syringe [id number] Yes USE AS DIRECTED Historical MD Eulalia Taking Active   biotin 10 mg tablet [id number] Yes Take 10 tablets (100 mg) by mouth once daily. Historical Provider, MD Taking Active   cholecalciferol (Vitamin D3) 5,000 Units tablet [id number] Yes Take 1 tablet (5,000 Units) by mouth once daily. Historical Provider, MD Taking Active   DULoxetine (Cymbalta) 60 mg DR capsule [id number] Yes Take 1 capsule (60 mg) by mouth once daily. Do not crush or chew. Historical MD Eulalia Taking Active   levothyroxine (Synthroid, Levoxyl) 25 mcg tablet [id number] Yes Take 1 tablet (25 mcg) by mouth. Historical MD Eulalia Taking Active   metFORMIN (Glucophage) 1,000 mg tablet [id number] Yes Take 1 tablet (1,000 mg) by mouth 2 times a day with meals. Take with food. Historical MD Eulalia Taking Active   NovoLIN 70/30 U-100 Insulin 100 unit/mL (70-30) injection [id number] Yes 70 Units 3 times a day. 70 units at breakfast, 55 units at lunch, and 60 units at dinner Historical MD Eulalia Taking Active   nystatin (Mycostatin) 100,000 unit/gram powder [id number] Yes Apply 1 Application topically 2 times a day. Álvaro Galeano MD Taking Active   ondansetron (Zofran) 4 mg tablet [id number] Yes Take 1 tablet  (4 mg) by mouth every 8 hours if needed for nausea or vomiting. Álvaro Galeano MD Taking Active   pantoprazole (ProtoNix) 40 mg EC tablet [id number] Yes TAKE 1 TABLET (40 MG) BY MOUTH IN THE MORNING. TAKE BEFORE MEALS. DO NOT CRUSH, CHEW, OR SPLIT.. Historical Provider, MD Taking Active   topiramate (Topamax) 25 mg tablet [id number] No Take 2 tablets (50 mg) by mouth 2 times a day. Historical Provider, MD Not Taking Active   traZODone (Desyrel) 50 mg tablet [id number] Yes Take 1 tablet (50 mg) by mouth once daily at bedtime. Historical Provider, MD Taking Active   Wixela Inhub 250-50 mcg/dose diskus inhaler [id number] Yes USE 1 PUFF TWICE A DAY Historical Provider, MD Taking Active                   Assessment/Plan    1) lung cancer  -12/8/2022 chest CT: NICHOL anterior segment 1.6 x 2.2 cm nodule, partial pleural attachment, nonspecific low volume paratracheal mediastinal LN largest near AP window 1.0 x 1.4 cm, right subcarinal space 8 x 14 mm  -12/12/2022 PET: NICHOL 1.8 x 2.3 cm mass with SUV 5.8, lateral margin abuts pleura; no significant mediastinal or hilar hypermetabolic lymphadenopathy  -12/28/2022 chest CT: 2.5 cm cavitary nodule in NICHOL  -1/7/2023 PET scan  -had surgery for stage I NSCLC, s/p lobectomy (Dr Fagan, 1/31/2023)  -2/1/2023 CT chest: no PE, postop changes in left chest, small left PTX in left upper anterior chest  -2/26/2023 chest CT no PE, continued mildly enlarged mediastinal lymph nodes  -saw Dr Solis at [facility] on 3/13/2023 - she had a [id number] (stage Ib) lung adenocarcinoma (poor NCCN risk factors - G3, + visceral involvement), s/p left robotic assisted anatomic upper lobectomy, PD-L1 90%, +[id number]  -per his charting, he recommended either adjuvant cisplatin + pemetrexed x 4 cycles vs surveillance  -according to  Thu, Dr Solis never told her about her high risk features nor any adjuvant option, and that the only thing he recommended was observation  -7/11/2023 CT chest : stable postsurgical changes of  prior left thoracotomy and left upper lobectomy with interval resolution of bilateral pleural effusions; interval progression of lymphadenopathy in the chest concerning for progressing ghazala metastatic disease     7/25/2023 underwent EBUS: A - EBUS TRANSBRONCHIAL FINE NEEDLE ASPIRATE, LYMPH NODE  - 4L             Negative for malignant cells.             Benign lymphoid sample (see comment).   B - EBUS TRANSBRONCHIAL FINE NEEDLE ASPIRATE, LYMPH NODE  - 10L             Negative for malignant cells.                   Benign lymphoid sample.  C - EBUS TRANSBRONCHIAL FINE NEEDLE ASPIRATE, LYMPH NODE  - STATION 7             Negative for malignant cells.                 Benign lymphoid sample.   -8/9/2023 PET scan: 3.0 x 2.1 cm left prevascular node with SUV 7.9; few additional prominent mediastinal nodes with low level uptake; left lower paratracheal node 0.8 cm with SUV 2.2; mild FDG uptake in left hilum SUV 3.1  -11/14/2023 chest CT: enlarged 2.2 cm prevascular lymph node not significantly changed  -she was told by her pulmonologist Dr Kang that she has cancer  -discussed her original path with her--while it was a small tumor and stage Ib, she did have a couple high risk features that would have warranted adjuvant chemotherapy followed by atezolizumab; also if she does have a recurrence that isn't amenable to surgery, she would also be a candidate for immunotherapy then KRAS inhibitor (FDA approved only in 2nd line setting)  -will discuss with thoracic surgeon--will import all [facility] films to review; may need CT guided bx by IR of this prevascular node   -she had biopsy done on 12/13/2023--path confirmed poorly differentiated lung carcinoma, PD-L1 90%, KRAS G12C mutation  -she saw Dr Calixto on 12/28/2023--he advised surgery, provided that she can pass her PFTs  - on 1/30/2024 Dr Calixto took her to the OR for robotic assisted redo left mediastinal exploration, left anterior mediastinal mass resection, diaphragmatic pacer  placement  -path showed poorly differentiated carcinoma with pleomorphic/spindle cell and clear cell features; 4 lymph nodes with no evidence of malignancy; sections show a poorly differentiated carcinoma with pleomorphic/spindle cell and clear cell features involving fibroadipose tissue with a large area of central necrosis; tumor cells are positive for AE1/AE3, CAM 5.2, CK7 and negative for TTF-1, p40.  -pt is most interested in doing whatever is necessary to reduce her risk for recurrence  -she and  state again that the [facility] oncologist told them that adjuvant therapy was not necessary after her first surgery, even though his note documented that he recommended it  -as her tumor has high PD-L1 expression, she is a candidate for pembrolizumab; if she relapses, she is also a candidate for KRAS G12C inhibitor  -here for dose #4 of adjuvant pembrolizumab (out of 17)  -labs done on 4/30/2024 included CBC, COMP  -results reviewed--wbc 10.7, hgb 12.1, plt 282,000, creatinine 0.84, calcium 10.1, AST 15, ALT 14  -benefits, risks, potential morbidity related to pembrolizumab were reviewed with Thu and she provided informed consent to proceed  -she will receive aloxi IV +  pembrolizumab 200 mg IV today  -Dr Calixto has scheduled her next chest CT for 8/2024  -she will return in 3 weeks and in 6 weeks for pembrolizumab doses # 5 and #6    2) COPD  -on albuterol  -on incruse ellipta     3) atrial fibrillation  -on amiodarone  -on warfarin     4) hypertension  -on norvasc  -on chlorthalidone  -on lasix  -on lisinopril  -on metoprolol     5) hyperlipidemia  -on atorvastatin     6) major depression  -on cymbalta     7) diabetes  -on novolog insulin  -on metformin        Problem List Items Addressed This Visit             ICD-10-CM    Malignant neoplasm of upper lobe of left lung (Multi) C34.12    Relevant Orders    Infusion Appointment Request [facility] INFUSION    CBC and Auto Differential    Comprehensive  metabolic panel    Acth    Cortisol Am    Tsh With Reflex To Free T4 If Abnormal    Clinic Appointment Request Chemo Follow Up; [facility]LVAR[facility]AN[facility]    Infusion Appointment Request [facility] INFUSION    CBC and Auto Differential    Comprehensive metabolic panel    Local recurrence of left lung cancer (Multi) - Primary C34.92    Relevant Orders    Infusion Appointment Request [facility] INFUSION    CBC and Auto Differential    Comprehensive metabolic panel    Acth    Cortisol Am    Tsh With Reflex To Free T4 If Abnormal    Clinic Appointment Request Chemo Follow Up; ÁLVARO GALEANO; [facility]    Infusion Appointment Request [facility] INFUSION    CBC and Auto Differential    Comprehensive metabolic panel            Álvaro Galeano MD

## 2024-05-20 ENCOUNTER — LAB (OUTPATIENT)
Dept: LAB | Facility: CLINIC | Age: 69
End: 2024-05-20
Payer: MEDICARE

## 2024-05-20 DIAGNOSIS — C34.12 MALIGNANT NEOPLASM OF UPPER LOBE OF LEFT LUNG (MULTI): ICD-10-CM

## 2024-05-20 DIAGNOSIS — C34.92 LOCAL RECURRENCE OF LEFT LUNG CANCER (MULTI): ICD-10-CM

## 2024-05-20 LAB
ALBUMIN SERPL BCP-MCNC: 4.2 G/DL (ref 3.4–5)
ALP SERPL-CCNC: 123 U/L (ref 33–136)
ALT SERPL W P-5'-P-CCNC: 13 U/L (ref 7–45)
ANION GAP SERPL CALC-SCNC: 15 MMOL/L (ref 10–20)
AST SERPL W P-5'-P-CCNC: 12 U/L (ref 9–39)
BASOPHILS # BLD AUTO: 0.05 X10*3/UL (ref 0–0.1)
BASOPHILS NFR BLD AUTO: 0.5 %
BILIRUB SERPL-MCNC: 0.5 MG/DL (ref 0–1.2)
BUN SERPL-MCNC: 11 MG/DL (ref 6–23)
CALCIUM SERPL-MCNC: 9.1 MG/DL (ref 8.6–10.3)
CHLORIDE SERPL-SCNC: 102 MMOL/L (ref 98–107)
CO2 SERPL-SCNC: 27 MMOL/L (ref 21–32)
CORTIS AM PEAK SERPL-MSCNC: 14.6 UG/DL (ref 5–20)
CREAT SERPL-MCNC: 0.75 MG/DL (ref 0.5–1.05)
EGFRCR SERPLBLD CKD-EPI 2021: 86 ML/MIN/1.73M*2
EOSINOPHIL # BLD AUTO: 0.54 X10*3/UL (ref 0–0.7)
EOSINOPHIL NFR BLD AUTO: 5.3 %
ERYTHROCYTE [DISTWIDTH] IN BLOOD BY AUTOMATED COUNT: 17.3 % (ref 11.5–14.5)
GLUCOSE SERPL-MCNC: 169 MG/DL (ref 74–99)
HCT VFR BLD AUTO: 36.2 % (ref 36–46)
HGB BLD-MCNC: 11.1 G/DL (ref 12–16)
IMM GRANULOCYTES # BLD AUTO: 0.04 X10*3/UL (ref 0–0.7)
IMM GRANULOCYTES NFR BLD AUTO: 0.4 % (ref 0–0.9)
LYMPHOCYTES # BLD AUTO: 2.58 X10*3/UL (ref 1.2–4.8)
LYMPHOCYTES NFR BLD AUTO: 25.3 %
MCH RBC QN AUTO: 23.4 PG (ref 26–34)
MCHC RBC AUTO-ENTMCNC: 30.7 G/DL (ref 32–36)
MCV RBC AUTO: 76 FL (ref 80–100)
MONOCYTES # BLD AUTO: 0.59 X10*3/UL (ref 0.1–1)
MONOCYTES NFR BLD AUTO: 5.8 %
NEUTROPHILS # BLD AUTO: 6.39 X10*3/UL (ref 1.2–7.7)
NEUTROPHILS NFR BLD AUTO: 62.7 %
PLATELET # BLD AUTO: 270 X10*3/UL (ref 150–450)
POTASSIUM SERPL-SCNC: 3.4 MMOL/L (ref 3.5–5.3)
PROT SERPL-MCNC: 7 G/DL (ref 6.4–8.2)
RBC # BLD AUTO: 4.75 X10*6/UL (ref 4–5.2)
SODIUM SERPL-SCNC: 141 MMOL/L (ref 136–145)
T4 FREE SERPL-MCNC: 1.89 NG/DL (ref 0.61–1.12)
TSH SERPL-ACNC: 66 MIU/L (ref 0.44–3.98)
WBC # BLD AUTO: 10.2 X10*3/UL (ref 4.4–11.3)

## 2024-05-20 PROCEDURE — 84443 ASSAY THYROID STIM HORMONE: CPT | Performed by: INTERNAL MEDICINE

## 2024-05-20 PROCEDURE — 84439 ASSAY OF FREE THYROXINE: CPT | Performed by: INTERNAL MEDICINE

## 2024-05-20 PROCEDURE — 82533 TOTAL CORTISOL: CPT | Performed by: INTERNAL MEDICINE

## 2024-05-20 PROCEDURE — 85025 COMPLETE CBC W/AUTO DIFF WBC: CPT

## 2024-05-20 PROCEDURE — 36415 COLL VENOUS BLD VENIPUNCTURE: CPT

## 2024-05-20 PROCEDURE — 82024 ASSAY OF ACTH: CPT

## 2024-05-20 PROCEDURE — 80053 COMPREHEN METABOLIC PANEL: CPT

## 2024-05-22 ENCOUNTER — INFUSION (OUTPATIENT)
Dept: HEMATOLOGY/ONCOLOGY | Facility: CLINIC | Age: 69
End: 2024-05-22
Payer: MEDICARE

## 2024-05-22 VITALS
BODY MASS INDEX: 31.15 KG/M2 | OXYGEN SATURATION: 93 % | HEART RATE: 100 BPM | SYSTOLIC BLOOD PRESSURE: 144 MMHG | TEMPERATURE: 97.7 F | DIASTOLIC BLOOD PRESSURE: 82 MMHG | WEIGHT: 149.03 LBS | RESPIRATION RATE: 18 BRPM

## 2024-05-22 DIAGNOSIS — C34.12 MALIGNANT NEOPLASM OF UPPER LOBE OF LEFT LUNG (MULTI): ICD-10-CM

## 2024-05-22 DIAGNOSIS — C34.92 LOCAL RECURRENCE OF LEFT LUNG CANCER (MULTI): ICD-10-CM

## 2024-05-22 LAB — ACTH PLAS-MCNC: 6.6 PG/ML (ref 7.2–63.3)

## 2024-05-22 PROCEDURE — 96375 TX/PRO/DX INJ NEW DRUG ADDON: CPT | Mod: INF

## 2024-05-22 PROCEDURE — 96413 CHEMO IV INFUSION 1 HR: CPT

## 2024-05-22 PROCEDURE — 2500000004 HC RX 250 GENERAL PHARMACY W/ HCPCS (ALT 636 FOR OP/ED): Mod: JZ | Performed by: INTERNAL MEDICINE

## 2024-05-22 PROCEDURE — 2500000004 HC RX 250 GENERAL PHARMACY W/ HCPCS (ALT 636 FOR OP/ED): Performed by: INTERNAL MEDICINE

## 2024-05-22 RX ORDER — HEPARIN SODIUM,PORCINE/PF 10 UNIT/ML
50 SYRINGE (ML) INTRAVENOUS AS NEEDED
OUTPATIENT
Start: 2024-05-22

## 2024-05-22 RX ORDER — ALBUTEROL SULFATE 0.83 MG/ML
3 SOLUTION RESPIRATORY (INHALATION) AS NEEDED
Status: DISCONTINUED | OUTPATIENT
Start: 2024-05-22 | End: 2024-05-22 | Stop reason: HOSPADM

## 2024-05-22 RX ORDER — PALONOSETRON 0.05 MG/ML
250 INJECTION, SOLUTION INTRAVENOUS ONCE
Status: COMPLETED | OUTPATIENT
Start: 2024-05-22 | End: 2024-05-22

## 2024-05-22 RX ORDER — EPINEPHRINE 0.3 MG/.3ML
0.3 INJECTION SUBCUTANEOUS EVERY 5 MIN PRN
Status: DISCONTINUED | OUTPATIENT
Start: 2024-05-22 | End: 2024-05-22 | Stop reason: HOSPADM

## 2024-05-22 RX ORDER — FAMOTIDINE 10 MG/ML
20 INJECTION INTRAVENOUS ONCE
Status: COMPLETED | OUTPATIENT
Start: 2024-05-22 | End: 2024-05-22

## 2024-05-22 RX ORDER — DIPHENHYDRAMINE HYDROCHLORIDE 50 MG/ML
50 INJECTION INTRAMUSCULAR; INTRAVENOUS AS NEEDED
Status: DISCONTINUED | OUTPATIENT
Start: 2024-05-22 | End: 2024-05-22 | Stop reason: HOSPADM

## 2024-05-22 RX ORDER — FAMOTIDINE 10 MG/ML
20 INJECTION INTRAVENOUS ONCE AS NEEDED
Status: DISCONTINUED | OUTPATIENT
Start: 2024-05-22 | End: 2024-05-22 | Stop reason: HOSPADM

## 2024-05-22 RX ORDER — HEPARIN 100 UNIT/ML
500 SYRINGE INTRAVENOUS AS NEEDED
OUTPATIENT
Start: 2024-05-22

## 2024-05-22 RX ADMIN — PALONOSETRON HYDROCHLORIDE 250 MCG: 0.25 INJECTION INTRAVENOUS at 16:02

## 2024-05-22 RX ADMIN — SODIUM CHLORIDE 200 MG: 9 INJECTION, SOLUTION INTRAVENOUS at 16:21

## 2024-05-22 RX ADMIN — FAMOTIDINE 20 MG: 10 INJECTION INTRAVENOUS at 15:57

## 2024-05-22 ASSESSMENT — PAIN SCALES - GENERAL: PAINLEVEL: 0-NO PAIN

## 2024-06-10 ENCOUNTER — OFFICE VISIT (OUTPATIENT)
Dept: ENDOCRINOLOGY | Age: 69
End: 2024-06-10
Payer: MEDICARE

## 2024-06-10 ENCOUNTER — TELEPHONE (OUTPATIENT)
Dept: ENDOCRINOLOGY | Age: 69
End: 2024-06-10

## 2024-06-10 VITALS
DIASTOLIC BLOOD PRESSURE: 67 MMHG | OXYGEN SATURATION: 97 % | BODY MASS INDEX: 30.04 KG/M2 | HEIGHT: 59 IN | WEIGHT: 149 LBS | HEART RATE: 85 BPM | SYSTOLIC BLOOD PRESSURE: 123 MMHG

## 2024-06-10 DIAGNOSIS — E03.9 ACQUIRED HYPOTHYROIDISM: ICD-10-CM

## 2024-06-10 DIAGNOSIS — E11.65 UNCONTROLLED TYPE 2 DIABETES MELLITUS WITH HYPERGLYCEMIA (HCC): Primary | ICD-10-CM

## 2024-06-10 DIAGNOSIS — E11.65 UNCONTROLLED TYPE 2 DIABETES MELLITUS WITH HYPERGLYCEMIA (HCC): ICD-10-CM

## 2024-06-10 LAB
ANION GAP SERPL CALCULATED.3IONS-SCNC: 9 MEQ/L (ref 9–15)
BUN SERPL-MCNC: 10 MG/DL (ref 8–23)
CALCIUM SERPL-MCNC: 9.9 MG/DL (ref 8.5–9.9)
CHLORIDE SERPL-SCNC: 102 MEQ/L (ref 95–107)
CHP ED QC CHECK: NORMAL
CO2 SERPL-SCNC: 30 MEQ/L (ref 20–31)
CREAT SERPL-MCNC: 0.75 MG/DL (ref 0.5–0.9)
ESTIMATED AVERAGE GLUCOSE: 160 MG/DL
GLUCOSE BLD-MCNC: 162 MG/DL
GLUCOSE SERPL-MCNC: 125 MG/DL (ref 70–99)
HBA1C MFR BLD: 7.2 % (ref 4–6)
POTASSIUM SERPL-SCNC: 3.9 MEQ/L (ref 3.4–4.9)
SODIUM SERPL-SCNC: 141 MEQ/L (ref 135–144)

## 2024-06-10 PROCEDURE — G8400 PT W/DXA NO RESULTS DOC: HCPCS | Performed by: INTERNAL MEDICINE

## 2024-06-10 PROCEDURE — 2022F DILAT RTA XM EVC RTNOPTHY: CPT | Performed by: INTERNAL MEDICINE

## 2024-06-10 PROCEDURE — 99214 OFFICE O/P EST MOD 30 MIN: CPT | Performed by: INTERNAL MEDICINE

## 2024-06-10 PROCEDURE — 3074F SYST BP LT 130 MM HG: CPT | Performed by: INTERNAL MEDICINE

## 2024-06-10 PROCEDURE — 82962 GLUCOSE BLOOD TEST: CPT | Performed by: INTERNAL MEDICINE

## 2024-06-10 PROCEDURE — 1123F ACP DISCUSS/DSCN MKR DOCD: CPT | Performed by: INTERNAL MEDICINE

## 2024-06-10 PROCEDURE — 3017F COLORECTAL CA SCREEN DOC REV: CPT | Performed by: INTERNAL MEDICINE

## 2024-06-10 PROCEDURE — G8427 DOCREV CUR MEDS BY ELIG CLIN: HCPCS | Performed by: INTERNAL MEDICINE

## 2024-06-10 PROCEDURE — 3078F DIAST BP <80 MM HG: CPT | Performed by: INTERNAL MEDICINE

## 2024-06-10 PROCEDURE — 95251 CONT GLUC MNTR ANALYSIS I&R: CPT | Performed by: INTERNAL MEDICINE

## 2024-06-10 PROCEDURE — 1036F TOBACCO NON-USER: CPT | Performed by: INTERNAL MEDICINE

## 2024-06-10 PROCEDURE — G8417 CALC BMI ABV UP PARAM F/U: HCPCS | Performed by: INTERNAL MEDICINE

## 2024-06-10 PROCEDURE — 3051F HG A1C>EQUAL 7.0%<8.0%: CPT | Performed by: INTERNAL MEDICINE

## 2024-06-10 PROCEDURE — 1090F PRES/ABSN URINE INCON ASSESS: CPT | Performed by: INTERNAL MEDICINE

## 2024-06-10 RX ORDER — LEVOTHYROXINE SODIUM 0.03 MG/1
25 TABLET ORAL
COMMUNITY
Start: 2024-03-04

## 2024-06-10 RX ORDER — TRAZODONE HYDROCHLORIDE 50 MG/1
50 TABLET ORAL NIGHTLY
COMMUNITY
Start: 2024-05-09

## 2024-06-10 RX ORDER — TIRZEPATIDE 2.5 MG/.5ML
2.5 INJECTION, SOLUTION SUBCUTANEOUS WEEKLY
Qty: 4 EACH | Refills: 3 | Status: SHIPPED | OUTPATIENT
Start: 2024-06-10

## 2024-06-10 RX ORDER — TOPIRAMATE 25 MG/1
50 TABLET ORAL 2 TIMES DAILY
COMMUNITY
Start: 2023-06-29

## 2024-06-10 RX ORDER — FLUTICASONE PROPIONATE AND SALMETEROL 250; 50 UG/1; UG/1
1 POWDER RESPIRATORY (INHALATION) 2 TIMES DAILY
COMMUNITY
Start: 2023-07-02

## 2024-06-10 RX ORDER — NYSTATIN 100000 [USP'U]/G
1 POWDER TOPICAL 2 TIMES DAILY
COMMUNITY
Start: 2024-03-20 | End: 2025-03-20

## 2024-06-10 RX ORDER — SYRINGE-NEEDLE,INSULIN,0.5 ML 27GX1/2"
SYRINGE, EMPTY DISPOSABLE MISCELLANEOUS
Qty: 100 EACH | Refills: 3 | Status: SHIPPED | OUTPATIENT
Start: 2024-06-10

## 2024-06-10 RX ORDER — ONDANSETRON 4 MG/1
4 TABLET, FILM COATED ORAL EVERY 8 HOURS PRN
COMMUNITY
Start: 2024-03-18

## 2024-06-10 NOTE — PROGRESS NOTES
6/10/2024    Assessment:       Diagnosis Orders   1. Uncontrolled type 2 diabetes mellitus with hyperglycemia (HCC)  POCT Glucose    Hemoglobin A1C    Basic Metabolic Panel    Microalbumin / Creatinine Urine Ratio    Continuous Glucose Sensor (FREESTYLE JOANNE 2 SENSOR) MISC    insulin 70-30 (NOVOLIN 70/30) (70-30) 100 UNIT per ML injection vial    metFORMIN (GLUCOPHAGE) 1000 MG tablet    Insulin Syringe-Needle U-100 (BD INSULIN SYRINGE U/F) 31G X 5/16\" 1 ML MISC    AR CONTINUOUS GLUCOSE MONITORING ANALYSIS I&R      2. Acquired hypothyroidism  T4, Free    TSH    Thyroid Peroxidase Antibody    Thyroid Stimulating Immunoglobulin            PLAN:     Orders Placed This Encounter   Procedures    Hemoglobin A1C     Standing Status:   Future     Standing Expiration Date:   6/10/2025    Basic Metabolic Panel     Standing Status:   Future     Standing Expiration Date:   6/10/2025    Microalbumin / Creatinine Urine Ratio     Standing Status:   Future     Standing Expiration Date:   6/10/2025    T4, Free     Standing Status:   Future     Standing Expiration Date:   6/10/2025    TSH     Standing Status:   Future     Standing Expiration Date:   6/10/2025    Thyroid Peroxidase Antibody     Standing Status:   Future     Standing Expiration Date:   6/10/2025    Thyroid Stimulating Immunoglobulin     Standing Status:   Future     Standing Expiration Date:   6/10/2025    POCT Glucose    AR CONTINUOUS GLUCOSE MONITORING ANALYSIS I&R       Orders Placed This Encounter   Medications    Continuous Glucose Sensor (FREESTYLE JOANNE 2 SENSOR) [facility]     Sig: USE as directed TO monitor blood glucose. replace every 14 DAYS.     Dispense:  2 each     Refill:  5     This prescription was filled on 1/19/2023. Any refills authorized will be placed on file.    insulin 70-30 (NOVOLIN 70/30) (70-30) 100 UNIT per ML injection vial     Sig: INJECT 70 UNITS AM 30 UNITS LUNCH AND 60-70 UNITS DINNER     Dispense:  50 mL     Refill:  3    metFORMIN

## 2024-06-10 NOTE — TELEPHONE ENCOUNTER
Pt stated when she attempted to  her prescription for the Mounjaro, the pharmacy told her it was going to cost over $300. Pt states she cannot afford that and was under the assumption her insurance would cover the entire cost. Please advise.

## 2024-06-11 ENCOUNTER — LAB (OUTPATIENT)
Dept: LAB | Facility: CLINIC | Age: 69
End: 2024-06-11
Payer: MEDICARE

## 2024-06-11 DIAGNOSIS — C34.92 LOCAL RECURRENCE OF LEFT LUNG CANCER (MULTI): ICD-10-CM

## 2024-06-11 DIAGNOSIS — C34.12 MALIGNANT NEOPLASM OF UPPER LOBE OF LEFT LUNG (MULTI): ICD-10-CM

## 2024-06-11 LAB
ALBUMIN SERPL BCP-MCNC: 4.5 G/DL (ref 3.4–5)
ALP SERPL-CCNC: 127 U/L (ref 33–136)
ALT SERPL W P-5'-P-CCNC: 12 U/L (ref 7–45)
ANION GAP SERPL CALC-SCNC: 13 MMOL/L (ref 10–20)
AST SERPL W P-5'-P-CCNC: 16 U/L (ref 9–39)
BASOPHILS # BLD AUTO: 0.07 X10*3/UL (ref 0–0.1)
BASOPHILS NFR BLD AUTO: 0.7 %
BILIRUB SERPL-MCNC: 0.4 MG/DL (ref 0–1.2)
BUN SERPL-MCNC: 10 MG/DL (ref 6–23)
CALCIUM SERPL-MCNC: 9.8 MG/DL (ref 8.6–10.3)
CHLORIDE SERPL-SCNC: 100 MMOL/L (ref 98–107)
CO2 SERPL-SCNC: 29 MMOL/L (ref 21–32)
CREAT SERPL-MCNC: 0.81 MG/DL (ref 0.5–1.05)
EGFRCR SERPLBLD CKD-EPI 2021: 78 ML/MIN/1.73M*2
EOSINOPHIL # BLD AUTO: 0.72 X10*3/UL (ref 0–0.7)
EOSINOPHIL NFR BLD AUTO: 7.7 %
ERYTHROCYTE [DISTWIDTH] IN BLOOD BY AUTOMATED COUNT: 18.7 % (ref 11.5–14.5)
GLUCOSE SERPL-MCNC: 168 MG/DL (ref 74–99)
HCT VFR BLD AUTO: 38.3 % (ref 36–46)
HGB BLD-MCNC: 11.9 G/DL (ref 12–16)
IMM GRANULOCYTES # BLD AUTO: 0.03 X10*3/UL (ref 0–0.7)
IMM GRANULOCYTES NFR BLD AUTO: 0.3 % (ref 0–0.9)
LYMPHOCYTES # BLD AUTO: 2.72 X10*3/UL (ref 1.2–4.8)
LYMPHOCYTES NFR BLD AUTO: 29.1 %
MCH RBC QN AUTO: 24 PG (ref 26–34)
MCHC RBC AUTO-ENTMCNC: 31.1 G/DL (ref 32–36)
MCV RBC AUTO: 77 FL (ref 80–100)
MONOCYTES # BLD AUTO: 0.72 X10*3/UL (ref 0.1–1)
MONOCYTES NFR BLD AUTO: 7.7 %
NEUTROPHILS # BLD AUTO: 5.09 X10*3/UL (ref 1.2–7.7)
NEUTROPHILS NFR BLD AUTO: 54.5 %
PLATELET # BLD AUTO: 289 X10*3/UL (ref 150–450)
POTASSIUM SERPL-SCNC: 4 MMOL/L (ref 3.5–5.3)
PROT SERPL-MCNC: 7.2 G/DL (ref 6.4–8.2)
RBC # BLD AUTO: 4.96 X10*6/UL (ref 4–5.2)
SODIUM SERPL-SCNC: 138 MMOL/L (ref 136–145)
WBC # BLD AUTO: 9.4 X10*3/UL (ref 4.4–11.3)

## 2024-06-11 PROCEDURE — 36415 COLL VENOUS BLD VENIPUNCTURE: CPT

## 2024-06-11 PROCEDURE — 85025 COMPLETE CBC W/AUTO DIFF WBC: CPT

## 2024-06-11 PROCEDURE — 80053 COMPREHEN METABOLIC PANEL: CPT

## 2024-06-11 NOTE — TELEPHONE ENCOUNTER
Left a message for patient , she can call her insurance to see what they do cover 100% and let the office know.

## 2024-06-12 ENCOUNTER — OFFICE VISIT (OUTPATIENT)
Dept: HEMATOLOGY/ONCOLOGY | Facility: CLINIC | Age: 69
End: 2024-06-12
Payer: MEDICARE

## 2024-06-12 ENCOUNTER — INFUSION (OUTPATIENT)
Dept: HEMATOLOGY/ONCOLOGY | Facility: CLINIC | Age: 69
End: 2024-06-12
Payer: MEDICARE

## 2024-06-12 VITALS
HEART RATE: 88 BPM | DIASTOLIC BLOOD PRESSURE: 74 MMHG | TEMPERATURE: 97.2 F | RESPIRATION RATE: 18 BRPM | SYSTOLIC BLOOD PRESSURE: 118 MMHG | BODY MASS INDEX: 31.42 KG/M2 | OXYGEN SATURATION: 92 % | WEIGHT: 150.35 LBS

## 2024-06-12 DIAGNOSIS — I10 PRIMARY HYPERTENSION: ICD-10-CM

## 2024-06-12 DIAGNOSIS — Z79.4 TYPE 2 DIABETES MELLITUS WITHOUT COMPLICATION, WITH LONG-TERM CURRENT USE OF INSULIN (MULTI): ICD-10-CM

## 2024-06-12 DIAGNOSIS — I48.0 PAF (PAROXYSMAL ATRIAL FIBRILLATION) (MULTI): ICD-10-CM

## 2024-06-12 DIAGNOSIS — C34.92 LOCAL RECURRENCE OF LEFT LUNG CANCER (MULTI): Primary | ICD-10-CM

## 2024-06-12 DIAGNOSIS — E11.9 TYPE 2 DIABETES MELLITUS WITHOUT COMPLICATION, WITH LONG-TERM CURRENT USE OF INSULIN (MULTI): ICD-10-CM

## 2024-06-12 DIAGNOSIS — J44.9 CHRONIC OBSTRUCTIVE PULMONARY DISEASE, UNSPECIFIED COPD TYPE (MULTI): ICD-10-CM

## 2024-06-12 DIAGNOSIS — C34.12 MALIGNANT NEOPLASM OF UPPER LOBE OF LEFT LUNG (MULTI): ICD-10-CM

## 2024-06-12 DIAGNOSIS — E78.2 MIXED HYPERLIPIDEMIA: ICD-10-CM

## 2024-06-12 DIAGNOSIS — F33.41 RECURRENT MAJOR DEPRESSIVE DISORDER, IN PARTIAL REMISSION (CMS-HCC): ICD-10-CM

## 2024-06-12 DIAGNOSIS — R10.12 LEFT UPPER QUADRANT ABDOMINAL PAIN: ICD-10-CM

## 2024-06-12 DIAGNOSIS — C34.92 LOCAL RECURRENCE OF LEFT LUNG CANCER (MULTI): ICD-10-CM

## 2024-06-12 PROCEDURE — 96413 CHEMO IV INFUSION 1 HR: CPT

## 2024-06-12 PROCEDURE — 96375 TX/PRO/DX INJ NEW DRUG ADDON: CPT | Mod: INF

## 2024-06-12 PROCEDURE — 2500000004 HC RX 250 GENERAL PHARMACY W/ HCPCS (ALT 636 FOR OP/ED): Performed by: INTERNAL MEDICINE

## 2024-06-12 PROCEDURE — 99214 OFFICE O/P EST MOD 30 MIN: CPT | Performed by: INTERNAL MEDICINE

## 2024-06-12 PROCEDURE — 3051F HG A1C>EQUAL 7.0%<8.0%: CPT | Performed by: INTERNAL MEDICINE

## 2024-06-12 PROCEDURE — 3074F SYST BP LT 130 MM HG: CPT | Performed by: INTERNAL MEDICINE

## 2024-06-12 PROCEDURE — 1159F MED LIST DOCD IN RCRD: CPT | Performed by: INTERNAL MEDICINE

## 2024-06-12 PROCEDURE — 3078F DIAST BP <80 MM HG: CPT | Performed by: INTERNAL MEDICINE

## 2024-06-12 PROCEDURE — 1125F AMNT PAIN NOTED PAIN PRSNT: CPT | Performed by: INTERNAL MEDICINE

## 2024-06-12 RX ORDER — DIPHENHYDRAMINE HYDROCHLORIDE 50 MG/ML
50 INJECTION INTRAMUSCULAR; INTRAVENOUS AS NEEDED
Status: DISCONTINUED | OUTPATIENT
Start: 2024-06-12 | End: 2024-06-12 | Stop reason: HOSPADM

## 2024-06-12 RX ORDER — PROCHLORPERAZINE EDISYLATE 5 MG/ML
10 INJECTION INTRAMUSCULAR; INTRAVENOUS EVERY 6 HOURS PRN
OUTPATIENT
Start: 2024-07-24

## 2024-06-12 RX ORDER — DIPHENHYDRAMINE HYDROCHLORIDE 50 MG/ML
50 INJECTION INTRAMUSCULAR; INTRAVENOUS AS NEEDED
OUTPATIENT
Start: 2024-07-24

## 2024-06-12 RX ORDER — PROCHLORPERAZINE MALEATE 10 MG
10 TABLET ORAL EVERY 6 HOURS PRN
OUTPATIENT
Start: 2024-07-03

## 2024-06-12 RX ORDER — HEPARIN 100 UNIT/ML
500 SYRINGE INTRAVENOUS AS NEEDED
OUTPATIENT
Start: 2024-06-12

## 2024-06-12 RX ORDER — EPINEPHRINE 0.3 MG/.3ML
0.3 INJECTION SUBCUTANEOUS EVERY 5 MIN PRN
Status: DISCONTINUED | OUTPATIENT
Start: 2024-06-12 | End: 2024-06-12 | Stop reason: HOSPADM

## 2024-06-12 RX ORDER — FAMOTIDINE 10 MG/ML
20 INJECTION INTRAVENOUS ONCE
Start: 2024-07-03 | End: 2024-07-03

## 2024-06-12 RX ORDER — ALBUTEROL SULFATE 0.83 MG/ML
3 SOLUTION RESPIRATORY (INHALATION) AS NEEDED
OUTPATIENT
Start: 2024-07-24

## 2024-06-12 RX ORDER — EPINEPHRINE 0.3 MG/.3ML
0.3 INJECTION SUBCUTANEOUS EVERY 5 MIN PRN
OUTPATIENT
Start: 2024-07-03

## 2024-06-12 RX ORDER — PROCHLORPERAZINE EDISYLATE 5 MG/ML
10 INJECTION INTRAMUSCULAR; INTRAVENOUS EVERY 6 HOURS PRN
OUTPATIENT
Start: 2024-07-03

## 2024-06-12 RX ORDER — PALONOSETRON 0.05 MG/ML
250 INJECTION, SOLUTION INTRAVENOUS ONCE
Status: COMPLETED | OUTPATIENT
Start: 2024-06-12 | End: 2024-06-12

## 2024-06-12 RX ORDER — ALBUTEROL SULFATE 0.83 MG/ML
3 SOLUTION RESPIRATORY (INHALATION) AS NEEDED
Status: DISCONTINUED | OUTPATIENT
Start: 2024-06-12 | End: 2024-06-12 | Stop reason: HOSPADM

## 2024-06-12 RX ORDER — PALONOSETRON 0.05 MG/ML
250 INJECTION, SOLUTION INTRAVENOUS ONCE
Start: 2024-07-03 | End: 2024-07-03

## 2024-06-12 RX ORDER — FAMOTIDINE 10 MG/ML
20 INJECTION INTRAVENOUS ONCE AS NEEDED
Status: DISCONTINUED | OUTPATIENT
Start: 2024-06-12 | End: 2024-06-12 | Stop reason: HOSPADM

## 2024-06-12 RX ORDER — DIPHENHYDRAMINE HYDROCHLORIDE 50 MG/ML
50 INJECTION INTRAMUSCULAR; INTRAVENOUS AS NEEDED
OUTPATIENT
Start: 2024-07-03

## 2024-06-12 RX ORDER — FAMOTIDINE 10 MG/ML
20 INJECTION INTRAVENOUS ONCE AS NEEDED
OUTPATIENT
Start: 2024-07-03

## 2024-06-12 RX ORDER — ALBUTEROL SULFATE 0.83 MG/ML
3 SOLUTION RESPIRATORY (INHALATION) AS NEEDED
OUTPATIENT
Start: 2024-07-03

## 2024-06-12 RX ORDER — PALONOSETRON 0.05 MG/ML
250 INJECTION, SOLUTION INTRAVENOUS ONCE
Start: 2024-07-24 | End: 2024-07-24

## 2024-06-12 RX ORDER — EPINEPHRINE 0.3 MG/.3ML
0.3 INJECTION SUBCUTANEOUS EVERY 5 MIN PRN
OUTPATIENT
Start: 2024-07-24

## 2024-06-12 RX ORDER — HEPARIN SODIUM,PORCINE/PF 10 UNIT/ML
50 SYRINGE (ML) INTRAVENOUS AS NEEDED
OUTPATIENT
Start: 2024-06-12

## 2024-06-12 RX ORDER — FAMOTIDINE 10 MG/ML
20 INJECTION INTRAVENOUS ONCE
Start: 2024-07-24 | End: 2024-07-24

## 2024-06-12 RX ORDER — FAMOTIDINE 10 MG/ML
20 INJECTION INTRAVENOUS ONCE AS NEEDED
OUTPATIENT
Start: 2024-07-24

## 2024-06-12 RX ORDER — PROCHLORPERAZINE MALEATE 10 MG
10 TABLET ORAL EVERY 6 HOURS PRN
OUTPATIENT
Start: 2024-07-24

## 2024-06-12 RX ORDER — FAMOTIDINE 10 MG/ML
20 INJECTION INTRAVENOUS ONCE
Status: COMPLETED | OUTPATIENT
Start: 2024-06-12 | End: 2024-06-12

## 2024-06-12 ASSESSMENT — PAIN SCALES - GENERAL: PAINLEVEL: 5

## 2024-06-12 NOTE — PATIENT INSTRUCTIONS
DISCUSSED WITH PT HER ISSUES WITH CONSTIPATION. SHE WILL TRY MAG CITRATE 1/2 BOTTLE AND IF NOT RESULTS IN 1 HR SHE CAN DRINK THE REST OF THE BOTTLE. SHE WILL TRY SENNA S 2 TABS BID TO USE AFTER THE MAG CITRATE WORKS. SHE WILL DECREASE THE DOSE IF DIARRHEA OCCURS.

## 2024-06-12 NOTE — PATIENT INSTRUCTIONS
You will return in 3 weeks and in 6 weeks for keytruda # 7 and #8    You will have a CT scan done in the next week or so

## 2024-06-12 NOTE — PROGRESS NOTES
Patient ID: Thu Ortega is a 69 y.o. female.  Referring Physician: Álvrao Galeano MD  [id number]  [place] [address]  Primary Care Provider: DEANNA Rodriguez  Visit Type: Follow Up      Subjective    HPI I am having pain in my left flank for the last couple weeks    Review of Systems   Constitutional: Negative.    HENT:  Negative.     Eyes: Negative.    Respiratory: Negative.     Cardiovascular: Negative.    Gastrointestinal:  Positive for abdominal pain.   Endocrine: Negative.    Genitourinary: Negative.     Musculoskeletal: Negative.    Skin: Negative.    Neurological: Negative.    Hematological: Negative.    Psychiatric/Behavioral: Negative.          Objective   BSA: 1.67 meters squared  /74 (BP Location: Right arm)   Pulse 88   Temp 36.2 °C (97.2 °F) (Temporal)   Resp 18   Wt 68.2 kg (150 lb 5.7 oz)   SpO2 92%   BMI 31.42 kg/m²      has a past medical history of Adenocarcinoma of lung, left (Multi), Atrial fibrillation (Multi), COPD (chronic obstructive pulmonary disease) (Multi), Depression, DJD (degenerative joint disease), DM (diabetes mellitus) (Multi), Fibromyalgia, primary, GERD (gastroesophageal reflux disease), Hearing aid worn, History of blood transfusion, History of meningioma of the brain, HLD (hyperlipidemia), HTN (hypertension), Irregular heart beat, Irritable bowel syndrome, Malignant neoplasm of upper lobe of left lung (Multi), Nephrolithiasis, Panlobular emphysema (Multi), Shortness of breath, Spinal stenosis, and Urinary tract infection.   has a past surgical history that includes US guided needle liver biopsy (02/07/2020); Lung lobectomy; CT guided percutaneous biopsy lung (12/13/2023); CT guided percutaneous biopsy lung (12/15/2023); Brain surgery; Foot surgery; Knee surgery; Cataract extraction; and Tubal ligation.  Family History   Problem Relation Name Age of Onset    Stroke Mother      Other (aortic valve disease) Mother      Heart disease  Mother      Cancer Sister      Stroke Sister      Diabetes Sister      Diabetes Brother      Other (heart transplant) Brother       Oncology History   Malignant neoplasm of upper lobe of left lung (Multi)   12/3/2023 Initial Diagnosis    Malignant neoplasm of upper lobe of left lung (CMS/HCC)     2/28/2024 -  Chemotherapy    Pembrolizumab, 21 Day Cycles     Local recurrence of left lung cancer (Multi)   1/4/2024 Initial Diagnosis    Local recurrence of left lung cancer (CMS/HCC)     2/28/2024 -  Chemotherapy    Pembrolizumab, 21 Day Cycles         Thu Ortega  reports that she quit smoking about 18 years ago. Her smoking use included cigarettes. She has never used smokeless tobacco.  She  reports that she does not currently use alcohol.  She  reports no history of drug use.    Physical Exam  Vitals reviewed.   Constitutional:       Appearance: Normal appearance.   HENT:      Head: Normocephalic.      Mouth/Throat:      Mouth: Mucous membranes are moist.   Eyes:      Extraocular Movements: Extraocular movements intact.      Pupils: Pupils are equal, round, and reactive to light.   Cardiovascular:      Rate and Rhythm: Normal rate and regular rhythm.      Pulses: Normal pulses.      Heart sounds: Normal heart sounds.   Pulmonary:      Breath sounds: Normal breath sounds.   Abdominal:      General: Bowel sounds are normal.      Palpations: Abdomen is soft.   Musculoskeletal:         General: Normal range of motion.      Cervical back: Normal range of motion and neck supple.   Skin:     General: Skin is warm.   Neurological:      General: No focal deficit present.      Mental Status: She is alert and oriented to person, place, and time.   Psychiatric:         Mood and Affect: Mood normal.         Behavior: Behavior normal.         WBC   Date/Time Value Ref Range Status   06/11/2024 12:03 PM 9.4 4.4 - 11.3 x10*3/uL Final   05/20/2024 01:45 PM 10.2 4.4 - 11.3 x10*3/uL Final   04/30/2024 09:57 AM 10.7 4.4 - 11.3  "x10*3/uL Final     nRBC   Date Value Ref Range Status   02/01/2024 0.0 0.0 - 0.0 /100 WBCs Final   01/31/2024 0.0 0.0 - 0.0 /100 WBCs Final   01/12/2024 0.0 0.0 - 0.0 /100 WBCs Final     RBC   Date Value Ref Range Status   06/11/2024 4.96 4.00 - 5.20 x10*6/uL Final   05/20/2024 4.75 4.00 - 5.20 x10*6/uL Final   04/30/2024 5.31 (H) 4.00 - 5.20 x10*6/uL Final     Hemoglobin   Date Value Ref Range Status   06/11/2024 11.9 (L) 12.0 - 16.0 g/dL Final   05/20/2024 11.1 (L) 12.0 - 16.0 g/dL Final   04/30/2024 12.1 12.0 - 16.0 g/dL Final     Hematocrit   Date Value Ref Range Status   06/11/2024 38.3 36.0 - 46.0 % Final   05/20/2024 36.2 36.0 - 46.0 % Final   04/30/2024 40.4 36.0 - 46.0 % Final     MCV   Date/Time Value Ref Range Status   06/11/2024 12:03 PM 77 (L) 80 - 100 fL Final   05/20/2024 01:45 PM 76 (L) 80 - 100 fL Final   04/30/2024 09:57 AM 76 (L) 80 - 100 fL Final     MCH   Date/Time Value Ref Range Status   06/11/2024 12:03 PM 24.0 (L) 26.0 - 34.0 pg Final   05/20/2024 01:45 PM 23.4 (L) 26.0 - 34.0 pg Final   04/30/2024 09:57 AM 22.8 (L) 26.0 - 34.0 pg Final     MCHC   Date/Time Value Ref Range Status   06/11/2024 12:03 PM 31.1 (L) 32.0 - 36.0 g/dL Final   05/20/2024 01:45 PM 30.7 (L) 32.0 - 36.0 g/dL Final   04/30/2024 09:57 AM 30.0 (L) 32.0 - 36.0 g/dL Final     RDW   Date/Time Value Ref Range Status   06/11/2024 12:03 PM 18.7 (H) 11.5 - 14.5 % Final   05/20/2024 01:45 PM 17.3 (H) 11.5 - 14.5 % Final   04/30/2024 09:57 AM 16.3 (H) 11.5 - 14.5 % Final     Platelets   Date/Time Value Ref Range Status   06/11/2024 12:03  150 - 450 x10*3/uL Final   05/20/2024 01:45  150 - 450 x10*3/uL Final   04/30/2024 09:57  150 - 450 x10*3/uL Final     No results found for: \"MPV\"  Neutrophils %   Date/Time Value Ref Range Status   06/11/2024 12:03 PM 54.5 40.0 - 80.0 % Final   05/20/2024 01:45 PM 62.7 40.0 - 80.0 % Final   04/30/2024 09:57 AM 57.4 40.0 - 80.0 % Final     Immature Granulocytes %, Automated "   Date/Time Value Ref Range Status   06/11/2024 12:03 PM 0.3 0.0 - 0.9 % Final     Comment:     Immature Granulocyte Count (IG) includes promyelocytes, myelocytes and metamyelocytes but does not include bands. Percent differential counts (%) should be interpreted in the context of the absolute cell counts (cells/UL).   05/20/2024 01:45 PM 0.4 0.0 - 0.9 % Final     Comment:     Immature Granulocyte Count (IG) includes promyelocytes, myelocytes and metamyelocytes but does not include bands. Percent differential counts (%) should be interpreted in the context of the absolute cell counts (cells/UL).   04/30/2024 09:57 AM 0.5 0.0 - 0.9 % Final     Comment:     Immature Granulocyte Count (IG) includes promyelocytes, myelocytes and metamyelocytes but does not include bands. Percent differential counts (%) should be interpreted in the context of the absolute cell counts (cells/UL).     Lymphocytes %   Date/Time Value Ref Range Status   06/11/2024 12:03 PM 29.1 13.0 - 44.0 % Final   05/20/2024 01:45 PM 25.3 13.0 - 44.0 % Final   04/30/2024 09:57 AM 27.2 13.0 - 44.0 % Final     Monocytes %   Date/Time Value Ref Range Status   06/11/2024 12:03 PM 7.7 2.0 - 10.0 % Final   05/20/2024 01:45 PM 5.8 2.0 - 10.0 % Final   04/30/2024 09:57 AM 7.2 2.0 - 10.0 % Final     Eosinophils %   Date/Time Value Ref Range Status   06/11/2024 12:03 PM 7.7 0.0 - 6.0 % Final   05/20/2024 01:45 PM 5.3 0.0 - 6.0 % Final   04/30/2024 09:57 AM 7.1 0.0 - 6.0 % Final     Basophils %   Date/Time Value Ref Range Status   06/11/2024 12:03 PM 0.7 0.0 - 2.0 % Final   05/20/2024 01:45 PM 0.5 0.0 - 2.0 % Final   04/30/2024 09:57 AM 0.6 0.0 - 2.0 % Final     Neutrophils Absolute   Date/Time Value Ref Range Status   06/11/2024 12:03 PM 5.09 1.20 - 7.70 x10*3/uL Final     Comment:     Percent differential counts (%) should be interpreted in the context of the absolute cell counts (cells/uL).   05/20/2024 01:45 PM 6.39 1.20 - 7.70 x10*3/uL Final     Comment:      "Percent differential counts (%) should be interpreted in the context of the absolute cell counts (cells/uL).   04/30/2024 09:57 AM 6.11 1.20 - 7.70 x10*3/uL Final     Comment:     Percent differential counts (%) should be interpreted in the context of the absolute cell counts (cells/uL).     Immature Granulocytes Absolute, Automated   Date/Time Value Ref Range Status   06/11/2024 12:03 PM 0.03 0.00 - 0.70 x10*3/uL Final   05/20/2024 01:45 PM 0.04 0.00 - 0.70 x10*3/uL Final   04/30/2024 09:57 AM 0.05 0.00 - 0.70 x10*3/uL Final     Lymphocytes Absolute   Date/Time Value Ref Range Status   06/11/2024 12:03 PM 2.72 1.20 - 4.80 x10*3/uL Final   05/20/2024 01:45 PM 2.58 1.20 - 4.80 x10*3/uL Final   04/30/2024 09:57 AM 2.90 1.20 - 4.80 x10*3/uL Final     Monocytes Absolute   Date/Time Value Ref Range Status   06/11/2024 12:03 PM 0.72 0.10 - 1.00 x10*3/uL Final   05/20/2024 01:45 PM 0.59 0.10 - 1.00 x10*3/uL Final   04/30/2024 09:57 AM 0.77 0.10 - 1.00 x10*3/uL Final     Eosinophils Absolute   Date/Time Value Ref Range Status   06/11/2024 12:03 PM 0.72 (H) 0.00 - 0.70 x10*3/uL Final   05/20/2024 01:45 PM 0.54 0.00 - 0.70 x10*3/uL Final   04/30/2024 09:57 AM 0.76 (H) 0.00 - 0.70 x10*3/uL Final     Basophils Absolute   Date/Time Value Ref Range Status   06/11/2024 12:03 PM 0.07 0.00 - 0.10 x10*3/uL Final   05/20/2024 01:45 PM 0.05 0.00 - 0.10 x10*3/uL Final   04/30/2024 09:57 AM 0.06 0.00 - 0.10 x10*3/uL Final       No components found for: \"PT\"  aPTT   Date/Time Value Ref Range Status   01/12/2024 01:46 PM 31 27 - 38 seconds Final     Medication Documentation Review Audit       Reviewed by Marisa Khalil MA (Medical Assistant) on 06/12/24 at 0948      Medication Order Taking? Sig Documenting Provider Last Dose Status   Accu-Chek Guide test strips strip [id number] Yes TEST 3X DAILY DX E11.65 Historical Provider, MD Taking Active   acetaminophen (Tylenol) 325 mg tablet [id number] Yes Take 2 tablets (650 mg) by mouth every 4 hours " if needed for mild pain (1 - 3) or fever (temp greater than 38.0 C). Sunshine Hankins PA-C Taking Active   albuterol 90 mcg/actuation inhaler [id number] Yes Inhale 1 puff every 4 hours if needed. Corinna Esteban MD Taking Active   amLODIPine (Norvasc) 5 mg tablet [id number] Yes Take 1 tablet (5 mg) by mouth once daily in the morning. Take before meals. Corinna Esteban MD Taking Differently Active   aspirin 81 mg chewable tablet [id number] Yes Chew 1 tablet (81 mg) once daily. Corinna Esteban MD Taking Active   atorvastatin (Lipitor) 10 mg tablet [id number] Yes Take 1 tablet (10 mg) by mouth once daily in the morning. Take before meals. Corinna Esteban MD Taking Active   BD Insulin Syringe Ultra-Fine 1 mL 31 gauge x 5/16 syringe [id number] Yes USE AS DIRECTED Corinna Esteban MD Taking Active   biotin 10 mg tablet [id number] Yes Take 10 tablets (100 mg) by mouth once daily. Corinna Esteban MD Taking Active   cholecalciferol (Vitamin D3) 5,000 Units tablet [id number] Yes Take 1 tablet (5,000 Units) by mouth once daily. Corinna Esteban MD Taking Active   DULoxetine (Cymbalta) 60 mg DR capsule [id number] Yes Take 1 capsule (60 mg) by mouth once daily. Do not crush or chew. Corinna Esteban MD Taking Active   levothyroxine (Synthroid, Levoxyl) 25 mcg tablet [id number]  Take 1 tablet (25 mcg) by mouth. Pt reports taking 1/2 tab every day per LUCAS Egan cnp at [facility] Corinna Esteban MD   24 3657   metFORMIN (Glucophage) 1,000 mg tablet [id number] Yes Take 1 tablet (1,000 mg) by mouth 2 times daily (morning and late afternoon). Take with food. Corinna Esteban MD Taking Active   NovoLIN 70/30 U-100 Insulin 100 unit/mL (70-30) injection [id number] Yes 70 Units 3 times a day. 70 units at breakfast, 55 units at lunch, and 60 units at dinner Corinna Esteban MD Taking Active   nystatin (Mycostatin) 100,000 unit/gram powder [id number] No Apply 1 Application topically 2 times a  day.   Patient not taking: Reported on 6/12/2024    Álvaro Galeano MD Not Taking Active   ondansetron (Zofran) 4 mg tablet [id number] Yes Take 1 tablet (4 mg) by mouth every 8 hours if needed for nausea or vomiting. Álvaro Galeano MD Taking Active   pantoprazole (ProtoNix) 40 mg EC tablet [id number] Yes TAKE 1 TABLET (40 MG) BY MOUTH IN THE MORNING. TAKE BEFORE MEALS. DO NOT CRUSH, CHEW, OR SPLIT.. Historical Provider, MD Taking Active   topiramate (Topamax) 25 mg tablet [id number] No Take 2 tablets (50 mg) by mouth 2 times a day. Historical Provider, MD Not Taking Active   traZODone (Desyrel) 50 mg tablet [id number] Yes Take 1 tablet (50 mg) by mouth once daily at bedtime. Historical Provider, MD Taking Active   Wixela Inhub 250-50 mcg/dose diskus inhaler [id number] Yes USE 1 PUFF TWICE A DAY Historical Provider, MD Taking Active                   Assessment/Plan    1) lung cancer  -12/8/2022 chest CT: NICHOL anterior segment 1.6 x 2.2 cm nodule, partial pleural attachment, nonspecific low volume paratracheal mediastinal LN largest near AP window 1.0 x 1.4 cm, right subcarinal space 8 x 14 mm  -12/12/2022 PET: NICHOL 1.8 x 2.3 cm mass with SUV 5.8, lateral margin abuts pleura; no significant mediastinal or hilar hypermetabolic lymphadenopathy  -12/28/2022 chest CT: 2.5 cm cavitary nodule in NICHOL  -1/7/2023 PET scan  -had surgery for stage I NSCLC, s/p lobectomy (Dr Fagan, 1/31/2023)  -2/1/2023 CT chest: no PE, postop changes in left chest, small left PTX in left upper anterior chest  -2/26/2023 chest CT no PE, continued mildly enlarged mediastinal lymph nodes  -saw Dr Solis at [facility] on 3/13/2023 - she had a [id number] (stage Ib) lung adenocarcinoma (poor NCCN risk factors - G3, + visceral involvement), s/p left robotic assisted anatomic upper lobectomy, PD-L1 90%, +[id number]  -per his charting, he recommended either adjuvant cisplatin + pemetrexed x 4 cycles vs surveillance  -according to  Thu, Dr Solis never told her about her  high risk features nor any adjuvant option, and that the only thing he recommended was observation  -7/11/2023 CT chest : stable postsurgical changes of prior left thoracotomy and left upper lobectomy with interval resolution of bilateral pleural effusions; interval progression of lymphadenopathy in the chest concerning for progressing ghazala metastatic disease     7/25/2023 underwent EBUS: A - EBUS TRANSBRONCHIAL FINE NEEDLE ASPIRATE, LYMPH NODE  - 4L             Negative for malignant cells.             Benign lymphoid sample (see comment).   B - EBUS TRANSBRONCHIAL FINE NEEDLE ASPIRATE, LYMPH NODE  - 10L             Negative for malignant cells.                   Benign lymphoid sample.  C - EBUS TRANSBRONCHIAL FINE NEEDLE ASPIRATE, LYMPH NODE  - STATION 7             Negative for malignant cells.                 Benign lymphoid sample.   -8/9/2023 PET scan: 3.0 x 2.1 cm left prevascular node with SUV 7.9; few additional prominent mediastinal nodes with low level uptake; left lower paratracheal node 0.8 cm with SUV 2.2; mild FDG uptake in left hilum SUV 3.1  -11/14/2023 chest CT: enlarged 2.2 cm prevascular lymph node not significantly changed  -she was told by her pulmonologist Dr Kang that she has cancer  -discussed her original path with her--while it was a small tumor and stage Ib, she did have a couple high risk features that would have warranted adjuvant chemotherapy followed by atezolizumab; also if she does have a recurrence that isn't amenable to surgery, she would also be a candidate for immunotherapy then KRAS inhibitor (FDA approved only in 2nd line setting)  -will discuss with thoracic surgeon--will import all [facility] films to review; may need CT guided bx by IR of this prevascular node   -she had biopsy done on 12/13/2023--path confirmed poorly differentiated lung carcinoma, PD-L1 90%, KRAS G12C mutation  -she saw Dr Calixto on 12/28/2023--he advised surgery, provided that she can pass her PFTs  - on  1/30/2024 Dr Calixto took her to the OR for robotic assisted redo left mediastinal exploration, left anterior mediastinal mass resection, diaphragmatic pacer placement  -path showed poorly differentiated carcinoma with pleomorphic/spindle cell and clear cell features; 4 lymph nodes with no evidence of malignancy; sections show a poorly differentiated carcinoma with pleomorphic/spindle cell and clear cell features involving fibroadipose tissue with a large area of central necrosis; tumor cells are positive for AE1/AE3, CAM 5.2, CK7 and negative for TTF-1, p40.  -pt is most interested in doing whatever is necessary to reduce her risk for recurrence  -she and  state again that the [facility] oncologist told them that adjuvant therapy was not necessary after her first surgery, even though his note documented that he recommended it  -as her tumor has high PD-L1 expression, she is a candidate for pembrolizumab; if she relapses, she is also a candidate for KRAS G12C inhibitor  -here for dose #6 of adjuvant pembrolizumab (out of 17)  -now reporting left upper flank pain since 2 weeks ago--does not affect eating or toileting, not made worse or better by either  -labs done on 6/11/2024 included CBC, COMP  -results reviewed--wbc 9.4, hgb 11.9, plt 289,000, creatinine 0.81, calcium 9.8, AST 16, ALT 12  -benefits, risks, potential morbidity related to pembrolizumab were reviewed with Thu and she provided informed consent to proceed  -she will receive aloxi IV +  pembrolizumab 200 mg IV today  -will proceed with full body CT scan in a couple weeks  -Dr Calixto has scheduled her next chest CT for 8/2024  -she will return in 3 weeks and in 6 weeks for pembrolizumab doses # 7 and #9  -she is planning on an upcoming weeklong vacation to [place] in July    2) COPD  -on albuterol  -on incruse ellipta     3) atrial fibrillation  -on amiodarone  -on warfarin     4) hypertension  -on norvasc  -on chlorthalidone  -on  lasix  -on lisinopril  -on metoprolol     5) hyperlipidemia  -on atorvastatin     6) major depression  -on cymbalta     7) diabetes  -on novolog insulin  -on metformin     Problem List Items Addressed This Visit             ICD-10-CM    Malignant neoplasm of upper lobe of left lung (Multi) C34.12    Relevant Orders    Infusion Appointment Request [facility] IN[facility]    CBC and Auto Differential    Comprehensive metabolic panel    Acth    Cortisol Am    Tsh With Reflex To Free T4 If Abnormal    Clinic Appointment Request Chemo Follow Up; ÁLVARO GALEANO; [facility]    Infusion Appointment Request [facility] INF[facility]    CBC and Auto Differential    Comprehensive metabolic panel    CT chest abdomen pelvis w IV contrast    Local recurrence of left lung cancer (Multi) - Primary C34.92    Relevant Orders    Infusion Appointment Request [facility]    CBC and Auto Differential    Comprehensive metabolic panel    Acth    Cortisol Am    Tsh With Reflex To Free T4 If Abnormal    Clinic Appointment Request Chemo Follow Up; ÁLVARO GALEANO; [facility]    Infusion Appointment Request [facility]    CBC and Auto Differential    Comprehensive metabolic panel    CT chest abdomen pelvis w IV contrast     Other Visit Diagnoses         Codes    Left upper quadrant abdominal pain     R10.12    Relevant Orders    CT chest abdomen pelvis w IV contrast                 Álvaro Galeano MD

## 2024-06-15 ASSESSMENT — ENCOUNTER SYMPTOMS
MUSCULOSKELETAL NEGATIVE: 1
EYES NEGATIVE: 1
HEMATOLOGIC/LYMPHATIC NEGATIVE: 1
CARDIOVASCULAR NEGATIVE: 1
CONSTITUTIONAL NEGATIVE: 1
ENDOCRINE NEGATIVE: 1
ABDOMINAL PAIN: 1
NEUROLOGICAL NEGATIVE: 1
RESPIRATORY NEGATIVE: 1
PSYCHIATRIC NEGATIVE: 1

## 2024-06-19 ENCOUNTER — HOSPITAL ENCOUNTER (OUTPATIENT)
Dept: RADIOLOGY | Facility: HOSPITAL | Age: 69
Discharge: HOME | End: 2024-06-19
Payer: MEDICARE

## 2024-06-19 DIAGNOSIS — C34.92 LOCAL RECURRENCE OF LEFT LUNG CANCER (MULTI): ICD-10-CM

## 2024-06-19 DIAGNOSIS — R10.12 LEFT UPPER QUADRANT ABDOMINAL PAIN: ICD-10-CM

## 2024-06-19 DIAGNOSIS — C34.12 MALIGNANT NEOPLASM OF UPPER LOBE OF LEFT LUNG (MULTI): ICD-10-CM

## 2024-06-19 PROCEDURE — 2550000001 HC RX 255 CONTRASTS: Performed by: INTERNAL MEDICINE

## 2024-06-19 PROCEDURE — 74177 CT ABD & PELVIS W/CONTRAST: CPT

## 2024-06-24 ENCOUNTER — TELEPHONE (OUTPATIENT)
Dept: HEMATOLOGY/ONCOLOGY | Facility: CLINIC | Age: 69
End: 2024-06-24
Payer: MEDICARE

## 2024-06-24 NOTE — TELEPHONE ENCOUNTER
Attempted to call patient. Voicemail not set up. Cannot leave a message. Then tried home phone & patient answered. States she had a CT done last week and patient states Dr. Galeano tried to call her with results but she did not answer. Explained to patient that Dr. Galeano is out of the office and returning next week. He will call her then. Pt verbalized understanding and had no further questions or concerns at this time.

## 2024-06-24 NOTE — TELEPHONE ENCOUNTER
Spoke with the patient. Explained that covering provider would like patient to wait for Dr. Galeano's return next week. Explained Dr. Galeano would call patient once he returns to the office. Pt verbalized understanding and had no further questions or concerns at this time.

## 2024-06-24 NOTE — TELEPHONE ENCOUNTER
Patient called back and requests another call from RN.  She states she does not want to wait until next week for results.

## 2024-07-01 ENCOUNTER — LAB (OUTPATIENT)
Dept: LAB | Facility: CLINIC | Age: 69
End: 2024-07-01
Payer: MEDICARE

## 2024-07-01 ENCOUNTER — TELEPHONE (OUTPATIENT)
Dept: HEMATOLOGY/ONCOLOGY | Facility: CLINIC | Age: 69
End: 2024-07-01

## 2024-07-01 ENCOUNTER — APPOINTMENT (OUTPATIENT)
Dept: CARDIOLOGY | Facility: CLINIC | Age: 69
End: 2024-07-01
Payer: MEDICARE

## 2024-07-01 VITALS
HEIGHT: 59 IN | WEIGHT: 148 LBS | DIASTOLIC BLOOD PRESSURE: 76 MMHG | HEART RATE: 86 BPM | BODY MASS INDEX: 29.84 KG/M2 | OXYGEN SATURATION: 99 % | SYSTOLIC BLOOD PRESSURE: 142 MMHG

## 2024-07-01 DIAGNOSIS — C34.92 LOCAL RECURRENCE OF LEFT LUNG CANCER (MULTI): ICD-10-CM

## 2024-07-01 DIAGNOSIS — I48.0 PAF (PAROXYSMAL ATRIAL FIBRILLATION) (MULTI): Primary | ICD-10-CM

## 2024-07-01 DIAGNOSIS — I10 PRIMARY HYPERTENSION: ICD-10-CM

## 2024-07-01 DIAGNOSIS — E78.2 MIXED HYPERLIPIDEMIA: ICD-10-CM

## 2024-07-01 DIAGNOSIS — C34.12 MALIGNANT NEOPLASM OF UPPER LOBE OF LEFT LUNG (MULTI): ICD-10-CM

## 2024-07-01 LAB
ALBUMIN SERPL BCP-MCNC: 4.7 G/DL (ref 3.4–5)
ALP SERPL-CCNC: 99 U/L (ref 33–136)
ALT SERPL W P-5'-P-CCNC: 10 U/L (ref 7–45)
ANION GAP SERPL CALC-SCNC: 14 MMOL/L (ref 10–20)
AST SERPL W P-5'-P-CCNC: 16 U/L (ref 9–39)
BASOPHILS # BLD AUTO: 0.05 X10*3/UL (ref 0–0.1)
BASOPHILS NFR BLD AUTO: 0.5 %
BILIRUB SERPL-MCNC: 0.5 MG/DL (ref 0–1.2)
BUN SERPL-MCNC: 8 MG/DL (ref 6–23)
CALCIUM SERPL-MCNC: 9.9 MG/DL (ref 8.6–10.3)
CHLORIDE SERPL-SCNC: 100 MMOL/L (ref 98–107)
CO2 SERPL-SCNC: 31 MMOL/L (ref 21–32)
CORTIS AM PEAK SERPL-MSCNC: 19.4 UG/DL (ref 5–20)
CREAT SERPL-MCNC: 0.78 MG/DL (ref 0.5–1.05)
EGFRCR SERPLBLD CKD-EPI 2021: 82 ML/MIN/1.73M*2
EOSINOPHIL # BLD AUTO: 0.53 X10*3/UL (ref 0–0.7)
EOSINOPHIL NFR BLD AUTO: 5.6 %
ERYTHROCYTE [DISTWIDTH] IN BLOOD BY AUTOMATED COUNT: 18.2 % (ref 11.5–14.5)
GLUCOSE SERPL-MCNC: 109 MG/DL (ref 74–99)
HCT VFR BLD AUTO: 38 % (ref 36–46)
HGB BLD-MCNC: 11.9 G/DL (ref 12–16)
IMM GRANULOCYTES # BLD AUTO: 0.02 X10*3/UL (ref 0–0.7)
IMM GRANULOCYTES NFR BLD AUTO: 0.2 % (ref 0–0.9)
LYMPHOCYTES # BLD AUTO: 2.13 X10*3/UL (ref 1.2–4.8)
LYMPHOCYTES NFR BLD AUTO: 22.4 %
MCH RBC QN AUTO: 24.2 PG (ref 26–34)
MCHC RBC AUTO-ENTMCNC: 31.3 G/DL (ref 32–36)
MCV RBC AUTO: 77 FL (ref 80–100)
MONOCYTES # BLD AUTO: 0.78 X10*3/UL (ref 0.1–1)
MONOCYTES NFR BLD AUTO: 8.2 %
NEUTROPHILS # BLD AUTO: 6 X10*3/UL (ref 1.2–7.7)
NEUTROPHILS NFR BLD AUTO: 63.1 %
PLATELET # BLD AUTO: 259 X10*3/UL (ref 150–450)
POTASSIUM SERPL-SCNC: 3.4 MMOL/L (ref 3.5–5.3)
PROT SERPL-MCNC: 7.6 G/DL (ref 6.4–8.2)
RBC # BLD AUTO: 4.92 X10*6/UL (ref 4–5.2)
SODIUM SERPL-SCNC: 142 MMOL/L (ref 136–145)
T4 FREE SERPL-MCNC: 2.7 NG/DL (ref 0.61–1.12)
TSH SERPL-ACNC: 75 MIU/L (ref 0.44–3.98)
WBC # BLD AUTO: 9.5 X10*3/UL (ref 4.4–11.3)

## 2024-07-01 PROCEDURE — 36415 COLL VENOUS BLD VENIPUNCTURE: CPT

## 2024-07-01 PROCEDURE — 3078F DIAST BP <80 MM HG: CPT | Performed by: INTERNAL MEDICINE

## 2024-07-01 PROCEDURE — 99213 OFFICE O/P EST LOW 20 MIN: CPT | Performed by: INTERNAL MEDICINE

## 2024-07-01 PROCEDURE — 1159F MED LIST DOCD IN RCRD: CPT | Performed by: INTERNAL MEDICINE

## 2024-07-01 PROCEDURE — 1036F TOBACCO NON-USER: CPT | Performed by: INTERNAL MEDICINE

## 2024-07-01 PROCEDURE — 84439 ASSAY OF FREE THYROXINE: CPT | Performed by: INTERNAL MEDICINE

## 2024-07-01 PROCEDURE — 3077F SYST BP >= 140 MM HG: CPT | Performed by: INTERNAL MEDICINE

## 2024-07-01 PROCEDURE — 82024 ASSAY OF ACTH: CPT

## 2024-07-01 PROCEDURE — 82533 TOTAL CORTISOL: CPT | Performed by: INTERNAL MEDICINE

## 2024-07-01 PROCEDURE — 84443 ASSAY THYROID STIM HORMONE: CPT | Performed by: INTERNAL MEDICINE

## 2024-07-01 PROCEDURE — 84075 ASSAY ALKALINE PHOSPHATASE: CPT

## 2024-07-01 PROCEDURE — 1125F AMNT PAIN NOTED PAIN PRSNT: CPT | Performed by: INTERNAL MEDICINE

## 2024-07-01 PROCEDURE — 85025 COMPLETE CBC W/AUTO DIFF WBC: CPT

## 2024-07-01 PROCEDURE — 3051F HG A1C>EQUAL 7.0%<8.0%: CPT | Performed by: INTERNAL MEDICINE

## 2024-07-01 ASSESSMENT — PAIN SCALES - GENERAL: PAINLEVEL: 6

## 2024-07-01 NOTE — PROGRESS NOTES
Name : Thu Ortega   : 1955   MRN : [id number]   ENC Date : 2024      Assessment and Plan:  Paroxysmal atrial fibrillation: Only seen postoperatively after her first thoracotomy surgery.  No clinical recurrence.  Now off amiodarone.  Hypertension: Blood pressure is reasonably well-controlled.  Recommend no changes.  Dyslipidemia: Tolerating statin therapy well.  Continue indefinitely.  Disp: RTO in 6 months    HPI:  Patient returns today doing well from a cardiac standpoint.  She reports no palpitations.  No obvious clinical recurrence of atrial fibrillation.  No chest pain.  No TIA or CVA-like symptoms.  She had a repeat chest CT done last week which does show some lymphadenopathy.  It is hard to know how much of this is new versus old.  There are some groundglass changes on that which appear to be old dating back to  at least.  They describe mild calcification of the thoracic aorta which is stable.    Problem list overview:   Patient Active Problem List   Diagnosis    Malignant neoplasm of upper lobe of left lung (Multi)    Panlobular emphysema (Multi)    Primary hypertension    Mixed hyperlipidemia    Recurrent major depressive disorder, in partial remission (CMS-HCC)    Type 2 diabetes mellitus without complication, with long-term current use of insulin (Multi)    PAF (paroxysmal atrial fibrillation) (Multi)    Local recurrence of left lung cancer (Multi)    Non-small cell lung cancer without metastasis (Multi)    CHEY (obstructive sleep apnea)    Chronic obstructive pulmonary disease (Multi)    Non-small cell cancer of left lung (Multi)    History of meningioma of the brain       Meds:   Current Outpatient Medications on File Prior to Visit   Medication Sig Dispense Refill    Accu-Chek Guide test strips strip TEST 3X DAILY DX E11.65      albuterol 90 mcg/actuation inhaler Inhale 1 puff every 4 hours if needed.      amLODIPine (Norvasc) 5 mg tablet Take 1 tablet (5 mg) by mouth once daily in the  morning. Take before meals.      aspirin 81 mg chewable tablet Chew 1 tablet (81 mg) once daily.      atorvastatin (Lipitor) 10 mg tablet Take 1 tablet (10 mg) by mouth once daily in the morning. Take before meals.      BD Insulin Syringe Ultra-Fine 1 mL 31 gauge x 5/16 syringe USE AS DIRECTED      biotin 10 mg tablet Take 10 tablets (100 mg) by mouth once daily.      cholecalciferol (Vitamin D3) 5,000 Units tablet Take 1 tablet (5,000 Units) by mouth once daily.      DULoxetine (Cymbalta) 60 mg DR capsule Take 1 capsule (60 mg) by mouth once daily. Do not crush or chew.      levothyroxine (Synthroid, Levoxyl) 25 mcg tablet Take 1 tablet (25 mcg) by mouth. Pt reports taking 1/2 tab every day per M[facility] Jignesh gongora at [facility]      metFORMIN (Glucophage) 1,000 mg tablet Take 1 tablet (1,000 mg) by mouth 2 times daily (morning and late afternoon). Take with food.      NovoLIN 70/30 U-100 Insulin 100 unit/mL (70-30) injection 70 Units 3 times a day. 70 units at breakfast, 55 units at lunch, and 60 units at dinner      ondansetron (Zofran) 4 mg tablet Take 1 tablet (4 mg) by mouth every 8 hours if needed for nausea or vomiting. 60 tablet 2    pantoprazole (ProtoNix) 40 mg EC tablet TAKE 1 TABLET (40 MG) BY MOUTH IN THE MORNING. TAKE BEFORE MEALS. DO NOT CRUSH, CHEW, OR SPLIT..      traZODone (Desyrel) 50 mg tablet Take 1 tablet (50 mg) by mouth once daily at bedtime.      Wixela Inhub 250-50 mcg/dose diskus inhaler USE 1 PUFF TWICE A DAY      acetaminophen (Tylenol) 325 mg tablet Take 2 tablets (650 mg) by mouth every 4 hours if needed for mild pain (1 - 3) or fever (temp greater than 38.0 C). (Patient not taking: Reported on 7/1/2024) 30 tablet 0    nystatin (Mycostatin) 100,000 unit/gram powder Apply 1 Application topically 2 times a day. (Patient not taking: Reported on 6/12/2024) 15 g 2    topiramate (Topamax) 25 mg tablet Take 2 tablets (50 mg) by mouth 2 times a day.       No current facility-administered medications on  "file prior to visit.        VS:  /76 (BP Location: Left arm, Patient Position: Sitting, BP Cuff Size: Adult)   Pulse 86   Ht 1.499 m (4' 11\")   Wt 67.1 kg (148 lb)   SpO2 99%   BMI 29.89 kg/m²     Vitals reviewed.   Neck:      Vascular: No JVD.   Pulmonary:      Effort: Pulmonary effort is normal.      Breath sounds: Normal breath sounds.   Cardiovascular:      Normal rate. Regular rhythm.      Murmurs: There is no murmur.      No gallop.    Pulses:     Intact distal pulses.   Edema:     Peripheral edema absent.   Abdominal:      General: Abdomen is flat.      Palpations: Abdomen is soft.   Neurological:      General: No focal deficit present.      Mental Status: Alert.   Psychiatric:         Mood and Affect: Mood normal.           Dino Fox MD  "

## 2024-07-03 ENCOUNTER — INFUSION (OUTPATIENT)
Dept: HEMATOLOGY/ONCOLOGY | Facility: CLINIC | Age: 69
End: 2024-07-03
Payer: MEDICARE

## 2024-07-03 VITALS
WEIGHT: 145.94 LBS | SYSTOLIC BLOOD PRESSURE: 130 MMHG | OXYGEN SATURATION: 95 % | RESPIRATION RATE: 16 BRPM | TEMPERATURE: 98.8 F | DIASTOLIC BLOOD PRESSURE: 79 MMHG | BODY MASS INDEX: 29.48 KG/M2 | HEART RATE: 93 BPM

## 2024-07-03 DIAGNOSIS — C34.12 MALIGNANT NEOPLASM OF UPPER LOBE OF LEFT LUNG (MULTI): ICD-10-CM

## 2024-07-03 DIAGNOSIS — C34.92 LOCAL RECURRENCE OF LEFT LUNG CANCER (MULTI): ICD-10-CM

## 2024-07-03 DIAGNOSIS — C34.12 MALIGNANT NEOPLASM OF UPPER LOBE OF LEFT LUNG (MULTI): Primary | ICD-10-CM

## 2024-07-03 PROCEDURE — 2500000004 HC RX 250 GENERAL PHARMACY W/ HCPCS (ALT 636 FOR OP/ED): Performed by: INTERNAL MEDICINE

## 2024-07-03 PROCEDURE — 96413 CHEMO IV INFUSION 1 HR: CPT

## 2024-07-03 PROCEDURE — 96375 TX/PRO/DX INJ NEW DRUG ADDON: CPT | Mod: INF

## 2024-07-03 RX ORDER — PALONOSETRON 0.05 MG/ML
250 INJECTION, SOLUTION INTRAVENOUS ONCE
Status: COMPLETED | OUTPATIENT
Start: 2024-07-03 | End: 2024-07-03

## 2024-07-03 RX ORDER — PROCHLORPERAZINE EDISYLATE 5 MG/ML
10 INJECTION INTRAMUSCULAR; INTRAVENOUS EVERY 6 HOURS PRN
Status: DISCONTINUED | OUTPATIENT
Start: 2024-07-03 | End: 2024-07-03 | Stop reason: HOSPADM

## 2024-07-03 RX ORDER — PROCHLORPERAZINE MALEATE 10 MG
10 TABLET ORAL EVERY 6 HOURS PRN
Status: DISCONTINUED | OUTPATIENT
Start: 2024-07-03 | End: 2024-07-03 | Stop reason: HOSPADM

## 2024-07-03 RX ORDER — EPINEPHRINE 0.3 MG/.3ML
0.3 INJECTION SUBCUTANEOUS EVERY 5 MIN PRN
Status: DISCONTINUED | OUTPATIENT
Start: 2024-07-03 | End: 2024-07-03 | Stop reason: HOSPADM

## 2024-07-03 RX ORDER — PREDNISONE 10 MG/1
TABLET ORAL DAILY
Qty: 15 TABLET | Refills: 0 | Status: SHIPPED | OUTPATIENT
Start: 2024-07-04 | End: 2024-07-09

## 2024-07-03 RX ORDER — FAMOTIDINE 10 MG/ML
20 INJECTION INTRAVENOUS ONCE
Status: COMPLETED | OUTPATIENT
Start: 2024-07-03 | End: 2024-07-03

## 2024-07-03 RX ORDER — DIPHENHYDRAMINE HYDROCHLORIDE 50 MG/ML
50 INJECTION INTRAMUSCULAR; INTRAVENOUS AS NEEDED
Status: DISCONTINUED | OUTPATIENT
Start: 2024-07-03 | End: 2024-07-03 | Stop reason: HOSPADM

## 2024-07-03 RX ORDER — ALBUTEROL SULFATE 0.83 MG/ML
3 SOLUTION RESPIRATORY (INHALATION) AS NEEDED
Status: DISCONTINUED | OUTPATIENT
Start: 2024-07-03 | End: 2024-07-03 | Stop reason: HOSPADM

## 2024-07-03 RX ORDER — HEPARIN 100 UNIT/ML
500 SYRINGE INTRAVENOUS AS NEEDED
OUTPATIENT
Start: 2024-07-03

## 2024-07-03 RX ORDER — HEPARIN SODIUM,PORCINE/PF 10 UNIT/ML
50 SYRINGE (ML) INTRAVENOUS AS NEEDED
OUTPATIENT
Start: 2024-07-03

## 2024-07-03 RX ORDER — FAMOTIDINE 10 MG/ML
20 INJECTION INTRAVENOUS ONCE AS NEEDED
Status: DISCONTINUED | OUTPATIENT
Start: 2024-07-03 | End: 2024-07-03 | Stop reason: HOSPADM

## 2024-07-03 ASSESSMENT — PAIN SCALES - GENERAL: PAINLEVEL: 6

## 2024-07-05 DIAGNOSIS — R59.0 MEDIASTINAL ADENOPATHY: Primary | ICD-10-CM

## 2024-07-05 LAB — ACTH PLAS-MCNC: 2.9 PG/ML (ref 7.2–63.3)

## 2024-07-05 NOTE — PROGRESS NOTES
Bronchoscopy Scheduling Request    Pre-bronchoscopy visit: New patient visit with Bronchoscopy group provider  Please schedule procedure: Next available    Cytology on-site:  Yes  Location:  Either location  Performing physician:  Advanced diagnostic bronchoscopist  Referring physician:  Álvaro Galeano MD, Miladis Egan APRN-CNP  Indication:  Enlarged 4L and 7; history of recurrent left lung cancer S/P LULobectomy in 01/2023 and left anterior mediastinal mass resection in 01/2024  Sedation / Anesthesia:  GA  Procedure:  Dx EBUS  Time:  Tier 1  Fluorscopy:  No  Imaging needed:  None  Labs:  None  Meds:  None  Special Considerations:  None  Reviewed by:  Thomas Leigh MD on 07/05/24

## 2024-07-08 ENCOUNTER — TELEMEDICINE (OUTPATIENT)
Dept: PULMONOLOGY | Facility: CLINIC | Age: 69
End: 2024-07-08
Payer: MEDICARE

## 2024-07-08 ENCOUNTER — TELEPHONE (OUTPATIENT)
Dept: HEMATOLOGY/ONCOLOGY | Facility: CLINIC | Age: 69
End: 2024-07-08
Payer: MEDICARE

## 2024-07-08 VITALS — WEIGHT: 147 LBS | HEIGHT: 59 IN | BODY MASS INDEX: 29.64 KG/M2

## 2024-07-08 DIAGNOSIS — G47.33 OSA (OBSTRUCTIVE SLEEP APNEA): Primary | ICD-10-CM

## 2024-07-08 DIAGNOSIS — J44.9 CHRONIC OBSTRUCTIVE PULMONARY DISEASE, UNSPECIFIED COPD TYPE (MULTI): ICD-10-CM

## 2024-07-08 DIAGNOSIS — Z01.811 PREOP PULMONARY/RESPIRATORY EXAM: ICD-10-CM

## 2024-07-08 DIAGNOSIS — C34.92 NON-SMALL CELL CANCER OF LEFT LUNG (MULTI): ICD-10-CM

## 2024-07-08 PROCEDURE — 99202 OFFICE O/P NEW SF 15 MIN: CPT | Performed by: NURSE PRACTITIONER

## 2024-07-08 PROCEDURE — 3051F HG A1C>EQUAL 7.0%<8.0%: CPT | Performed by: NURSE PRACTITIONER

## 2024-07-08 PROCEDURE — 1125F AMNT PAIN NOTED PAIN PRSNT: CPT | Performed by: NURSE PRACTITIONER

## 2024-07-08 PROCEDURE — 1036F TOBACCO NON-USER: CPT | Performed by: NURSE PRACTITIONER

## 2024-07-08 PROCEDURE — 1159F MED LIST DOCD IN RCRD: CPT | Performed by: NURSE PRACTITIONER

## 2024-07-08 ASSESSMENT — PATIENT HEALTH QUESTIONNAIRE - PHQ9
SUM OF ALL RESPONSES TO PHQ9 QUESTIONS 1 AND 2: 2
1. LITTLE INTEREST OR PLEASURE IN DOING THINGS: SEVERAL DAYS
10. IF YOU CHECKED OFF ANY PROBLEMS, HOW DIFFICULT HAVE THESE PROBLEMS MADE IT FOR YOU TO DO YOUR WORK, TAKE CARE OF THINGS AT HOME, OR GET ALONG WITH OTHER PEOPLE: SOMEWHAT DIFFICULT
2. FEELING DOWN, DEPRESSED OR HOPELESS: SEVERAL DAYS

## 2024-07-08 ASSESSMENT — ENCOUNTER SYMPTOMS
LOSS OF SENSATION IN FEET: 0
UNEXPECTED WEIGHT CHANGE: 1
COUGH: 1
SHORTNESS OF BREATH: 1
OCCASIONAL FEELINGS OF UNSTEADINESS: 1
DEPRESSION: 1

## 2024-07-08 ASSESSMENT — PAIN SCALES - GENERAL: PAINLEVEL: 4

## 2024-07-08 NOTE — H&P (VIEW-ONLY)
Patient: Thu Ortega    [id number]  : 1955 -- AGE 69 y.o.    Provider: DEANNA Garcia     Location [facility]   Service Date: 2024              [facility]  New Visit Note    Virtual or Telephone Consent  A telephone visit (audio only) between the patient (at the originating site) and the provider (at the distant site) was utilized to provide this telehealth service.   Verbal consent was requested and obtained from Thu Ortega on this date, 24 for a telehealth visit.       HISTORY OF PRESENT ILLNESS     The patient's referring provider is: Dr. Galeano    HISTORY OF PRESENT ILLNESS   Thu Ortega is a 69 y.o. female who presents to a [facility] for an pre-bronchoscopy evaluation with concerns of mediastinal lymphadenopathy in known NSCLC . I have independently interviewed and examined the patient in the office and reviewed available records.    Current History  This is a 70 y/o female with had surgery for stage I NSCLC, s/p lobectomy (Dr Fagan, 2023), then [id number] (stage Ib) lung adenocarcinoma (poor NCCN risk factors - G3, + visceral involvement), s/p left robotic assisted anatomic upper lobectomy, PD-L1 90%, +[id number]. 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 Now having mediastinal LAD requiring a bronchoscopy      At baseline,  has dyspnea on exertion, but none at rest. Symptoms started many years ago, but has mostly been stable. Currently sits for most of the day, works inside the house, but does not carry loads and do strenuous exercise.  Has to stop for breath after walking about 100 meters or a few minutes (mMRC 3).  Relates  chronic cough yellow phlegm, c/o wheezing, and denies blood streaks, C/o No night cough. No hemoptysis. No fever or shivering chills. Has a runny nose, and a tingling sensation in the back of his throat. Has no runny nose, or a tingling sensation in the back of his throat. Also complains of heartburn occasionally. Denies chest pain     Previous pulmonary history:  COPD - on steriods finish tomorrow     Inhalers/nebulized medications: wixela and albuterol once a week    Hospitalization History: Not been hospitalized over the last year for breathing related problem.    Sleep history:  Denies snoring, apnea, feeling tired during the day or taking naps during the day.       ALLERGIES AND MEDICATIONS     ALLERGIES  Allergies   Allergen Reactions    Penicillin Hives and Unknown       MEDICATIONS  Current Outpatient Medications   Medication Sig Dispense Refill    Accu-Chek Guide test strips strip TEST 3X DAILY DX E11.65      acetaminophen (Tylenol) 325 mg tablet Take 2 tablets (650 mg) by mouth every 4 hours if needed for mild pain (1 - 3) or fever (temp greater than 38.0 C). (Patient not taking: Reported on 7/1/2024) 30 tablet 0    albuterol 90 mcg/actuation inhaler Inhale 1 puff every 4 hours if needed.      amLODIPine (Norvasc) 5 mg tablet Take 1 tablet (5 mg) by mouth once daily in the morning. Take before meals.      aspirin 81 mg chewable tablet Chew 1 tablet (81 mg) once daily.      atorvastatin (Lipitor) 10 mg tablet Take 1 tablet (10 mg) by mouth  once daily in the morning. Take before meals.      BD Insulin Syringe Ultra-Fine 1 mL 31 gauge x 5/16 syringe USE AS DIRECTED      biotin 10 mg tablet Take 10 tablets (100 mg) by mouth once daily.      cholecalciferol (Vitamin D3) 5,000 Units tablet Take 1 tablet (5,000 Units) by mouth once daily.      DULoxetine (Cymbalta) 60 mg DR capsule Take 1 capsule (60 mg) by mouth once daily. Do not crush or chew.      levothyroxine (Synthroid, Levoxyl) 25 mcg tablet Take 1 tablet (25 mcg) by mouth. Pt reports taking 1/2 tab every day per M. Jignesh gongora at [facility]      metFORMIN (Glucophage) 1,000 mg tablet Take 1 tablet (1,000 mg) by mouth 2 times daily (morning and late afternoon). Take with food.      NovoLIN 70/30 U-100 Insulin 100 unit/mL (70-30) injection 70 Units 3 times a day. 70 units at breakfast, 55 units at lunch, and 60 units at dinner      nystatin (Mycostatin) 100,000 unit/gram powder Apply 1 Application topically 2 times a day. (Patient not taking: Reported on 6/12/2024) 15 g 2    ondansetron (Zofran) 4 mg tablet Take 1 tablet (4 mg) by mouth every 8 hours if needed for nausea or vomiting. 60 tablet 2    pantoprazole (ProtoNix) 40 mg EC tablet TAKE 1 TABLET (40 MG) BY MOUTH IN THE MORNING. TAKE BEFORE MEALS. DO NOT CRUSH, CHEW, OR SPLIT..      predniSONE (Deltasone) 10 mg tablet Take 5 tablets (50 mg) by mouth once daily for 1 day, THEN 4 tablets (40 mg) once daily for 1 day, THEN 3 tablets (30 mg) once daily for 1 day, THEN 2 tablets (20 mg) once daily for 1 day, THEN 1 tablet (10 mg) once daily for 1 day. Stop after day 5. Do not fill before July 4, 2024. 15 tablet 0    topiramate (Topamax) 25 mg tablet Take 2 tablets (50 mg) by mouth 2 times a day.      traZODone (Desyrel) 50 mg tablet Take 1 tablet (50 mg) by mouth once daily at bedtime.      Wixela Inhub 250-50 mcg/dose diskus inhaler USE 1 PUFF TWICE A DAY       No current facility-administered medications for this visit.         PAST HISTORY     PAST  MEDICAL HISTORY  She  has a past medical history of Adenocarcinoma of lung, left (Multi), Atrial fibrillation (Multi), COPD (chronic obstructive pulmonary disease) (Multi), Depression, DJD (degenerative joint disease), DM (diabetes mellitus) (Multi), Fibromyalgia, primary, GERD (gastroesophageal reflux disease), Hearing aid worn, History of blood transfusion, History of meningioma of the brain, HLD (hyperlipidemia), HTN (hypertension), Irregular heart beat, Irritable bowel syndrome, Malignant neoplasm of upper lobe of left lung (Multi), Nephrolithiasis, Panlobular emphysema (Multi), Shortness of breath, Spinal stenosis, and Urinary tract infection.    PAST SURGICAL HISTORY  Past Surgical History:   Procedure Laterality Date    BRAIN SURGERY      meningioma resection    CATARACT EXTRACTION      CT GUIDED PERCUTANEOUS BIOPSY LUNG  12/13/2023    CT GUIDED PERCUTANEOUS BIOPSY LUNG 12/13/2023 [facility] CT    CT GUIDED PERCUTANEOUS BIOPSY LUNG  12/15/2023    CT GUIDED PERCUTANEOUS BIOPSY LUNG    FOOT SURGERY      KNEE SURGERY      LUNG LOBECTOMY      TUBAL LIGATION      US GUIDED NEEDLE LIVER BIOPSY  02/07/2020    US GUIDED NEEDLE LIVER BIOPSY 2/7/2020 [facility]       IMMUNIZATION HISTORY  Immunization History   Administered Date(s) Administered    Moderna COVID-19 vaccine, Fall 2023, 12 yeasrs and older (50mcg/0.5mL) 11/08/2023    Pfizer Purple Cap SARS-CoV-2 04/06/2021, 04/27/2021, 11/02/2021       SOCIAL HISTORY  She  reports that she quit smoking about 18 years ago. Her smoking use included cigarettes. She has never used smokeless tobacco. She reports that she does not currently use alcohol. She reports that she does not use drugs. She Patient smoked 1 ppd x 25-30 years    OCCUPATIONAL/ENVIRONMENTAL HISTORY  Previously worked as:    DOES/DOES NOT EC: does not have known exposure to asbestos, silica, beryllium or inhaled metals.  DOES/DOES NOT EC: does not have exposure to birds or exotic  animals.    FAMILY HISTORY  Family History   Problem Relation Name Age of Onset    Stroke Mother      Other (aortic valve disease) Mother      Heart disease Mother      Cancer Sister      Stroke Sister      Diabetes Sister      Diabetes Brother      Other (heart transplant) Brother         RESULTS/DATA     Pulmonary Function Test Results        Pulmonary Function Test - Scan on 1/12/2024  2:03 PM                      Pulmonary Function Test - Scan on 1/22/2024 12:33 PM                      Chest Radiograph     XR chest 2 views 02/08/2024        ORDERING CLINICIAN:  ANDERSON MONTOYA    FINDINGS:  Left upper lobectomy changes with left lung volume loss, unchanged  from prior study.    No consolidation, effusion, edema, or pneumothorax.    Left chest wall deformity likely from thoracotomy.    Impression  Postsurgical changes left lung with volume loss. No evidence of acute  intrathoracic abnormality.    Chest CT Scan       CT CHEST ABDOMEN PELVIS W IV CONTRAST; ;  6/19/2024         INDICATION:  Signs/Symptoms:history of recurrent lung cancer followup, left upper  quadrant pain x 2 weeks.     FINDINGS:  CT chest:      Mediastinum demonstrates subcarinal lymphadenopathy identified  measuring 10 mm in the short axis. Also, there is component of AP  window lymphadenopathy identified measuring 17 x 15 mm esophagus is  unremarkable      Heart and great vessels demonstrate ascending thoracic aorta to  measure 3.4 cm. There is mild vascular calcification of the thoracic  aorta.      Lung parenchyma demonstrates postsurgical changes status post left  upper lobectomy. The left lung demonstrates linear platelike  atelectasis/scar in the mid lung laterally. There are subtle cystic  foci demonstrated within the region of the left upper lung zone  unclear if this represents bulla versus cavitary cystic focus.  However, there is a thin peripheral rim with focus measuring 1.3 cm.  There is subtle ground-glass airspace infiltrate at the  left lung  apex. Right lung demonstrates no airspace consolidation. No pleural  effusions. There is subtle ground-glass airspace infiltrate in the  right lower lobe. Also, focal pleural-based nodularity demonstrated  within the right lower lobe posteriorly measuring 8 mm. Also,  subpleural pulmonary nodule at the right lung base laterally  measuring 4 mm.      Visualized osseous structures demonstrate remote healed left  posterolateral rib fracture deformity. Otherwise, multilevel  degenerative discogenic changes within the lower thoracic spine with  endplate sclerosis demonstrated. No compression deformity.      CT abdomen:      Liver is unremarkable      Gallbladder is unremarkable      Spleen is unremarkable      Adrenal glands are unremarkable      Pancreas is unremarkable      Right kidney is unremarkable      Left kidney demonstrates nonobstructing nephrolithiasis inferior pole  measuring 4 mm.      Retroperitoneum demonstrates no lymphadenopathy. Scattered  subcentimeter lymph nodes are demonstrated. Mild vascular  calcification noted. Mesentery demonstrates soft tissue lesion within  the midline lower abdominal mesentery measuring 1.6 x 1.2 cm.      Loops of large bowel demonstrates scattered uncomplicated sigmoid  diverticulosis. Also, scattered uncomplicated descending colon  diverticula demonstrated. Small bowel loops are fluid and gas-filled  with some loops distended without significant dilatation. Stomach is  unremarkable.      CT pelvis:      Unopacified bladder is unremarkable. There is no pelvic  lymphadenopathy. No free fluid demonstrated. Uterus and adnexa are  unremarkable.      Visualized osseous structures demonstrate moderate facet arthropathy  lower lumbar spine. Multilevel degenerative discogenic changes are  demonstrated                      IMPRESSION:  1. Pleural-based nodular lesion in the right lower lobe posteriorly  measuring 8 mm with surrounding ground-glass airspace  infiltrate.  Also, there is a 4 mm subpleural pulmonary nodule at the right lung  base laterally      2. Mediastinal lymphadenopathy in particular of the AP window.  Findings are worrisome      3. Interval development of the soft tissue lesion within the midline  lower abdominal mesentery measuring 1.6 cm.      4. Scattered bulla versus thin-walled cyst in the left upper lung  zone. No discrete nodularity or thick-walled cavitary lesion in this  area.      5. Scattered fluid and gas-filled distended loops of small bowel  nonspecific. Correlate with enteritis and mild component of ileus.           Echocardiogram        Echocardiogram 2023      Echocardiography Report: Transthoracic Echo  [facility]  Date of service: 2023 8:14:55 AM  Accession #: [id number]    Ordering physician: REMY BOSTON  Indication: Preop evaluation for noncardiac surgery with low/intermediate clinical risk    Technologist: Tessa Mayorga [facility]  Interpreting physician: Surya Phoenix MD    PATIENT:  Name: JERMAIN PONCE  MRN: [id number]  : 1955  Age: 67 years  Gender: F    History of hypertension, diabetes mellitus, dyslipidemia and COPD.  Primary rhythm: sinus.  Height: 151.10 cm BSA: 1.75 m?  Weight: 72.62 kg  BMI: 31.8 kg/m?      Heart rate     65 bpm  Blood pressure 123/77 mmHg    Technically difficult exam due to body habitus.  Color Doppler was utilized to interrogate the cardiac valves assessed and spectral Doppler was utilized to determine the flow velocities and pressure gradients reported in this exam. Myocardial strain analysis was performed in this exam to aid in the assessment of cardiac function.    MEASUREMENTS:                           Value               Indexed    Normal  Max aortic dimension     3.5 cm                         Ao < 3.8  Left atrial volume       47 ml (biplane A-L) 27 ml/m?   Koki <= 34  LV ID (diastole)         4.1 cm (2D)         2.34 cm/m?  LV ID (systole)          2.5 cm (2D)          1.44 cm/m?  IVS, leaflet tips        1.0 cm (2D)  Posterior wall thickness 1.0 cm (2D)  Left ventricular mass    132 g (2D)          75 g/m?  Global peak long strain  -16.4 %  LV stroke volume         48 ml (2D biplane)  LV end diastolic volume  75 ml (2D biplane)  43.2 ml/m? 29<=EDVi<62  LV end systolic volume   28 ml (2D biplane)  15.8 ml/m?  Ejection Fraction        63 % (2D biplane)              EF > 54      FINDINGS:    LEFT VENTRICLE  The left ventricle is normal in size.  Left ventricular systolic function is normal. Global LV myocardial strain is normal.  Normal left ventricular diastolic function.  Mitral annular lateral E/e': 7.5. Mitral annular septal E/e': 12.1.  Wall Motion:  All scored segments are normal.        RIGHT VENTRICLE  The right ventricle is normal in size.  Right ventricular systolic function is moderately decreased. RV global longitudinal strain is 17.9 %.  Estimated right ventricular systolic pressure is likely underestimated due to a weak or incomplete tricuspid regurgitation signal and is, at least, 22 mmHg consistent with normal pulmonary artery pressures. Estimated right atrial pressure is 3 mmHg based on IVC assessment.    LEFT ATRIUM  The left atrial cavity is normal in size.  Pulmonary Veins:  The pulmonary venous pattern showed normal systolic flow.    RIGHT ATRIUM  The right atrial cavity is dilated.  Inferior Vena Cava:  The inferior vena cava appears normal measuring 1.0 cm. The vessel decreases greater than 50 percent with inspiration.    MITRAL VALVE  There is trace mitral valve regurgitation. There is mild thickening of the anterior mitral leaflet. The pressure half time is 51 msec. The peak mitral E/A ratio is 0.62. The average mitral E/e' ratio is 9.8. The mitral flow deceleration time is 175 msec.    TRICUSPID VALVE  The tricuspid valve leaflets are structurally normal. There is trace tricuspid valve regurgitation.     AORTIC VALVE  There is trace aortic valve  regurgitation. Tricuspid aortic valve. There is mild thickening. The peak gradient is 7 mmHg (peak velocity = 128.8 cm/s).    PULMONIC VALVE  The pulmonic valve was not seen or not interrogated. There is trace pulmonic valve regurgitation. The peak gradient is 3 mmHg.    AORTA  The visualized aorta is normal in size.  Measurements - Sinus: 2.7 cm. Sinotubular junction 2.6 cm. Mid ascending aorta 3.5 cm.    PULMONARY ARTERIES  The pulmonary arteries are unseen or not interrogated.    INTERATRIAL SEPTUM  There is no evidence of intracardiac shunting as detected by Doppler.    INTERVENTRICULAR SEPTUM  There is normal motion of the interventricular septum. There is no flow through the interventricular septum as detected by Doppler.    PERICARDIUM  There is no pericardial effusion.    CONCLUSIONS:  - Technically difficult exam due to body habitus.  - Exam indication: Preop evaluation for noncardiac surgery with low/intermediate clinical risk  - The left ventricle is normal in size. Left ventricular systolic function is normal. EF = 63 ? 5% (2D biplane) Normal left ventricular diastolic function.  - The right ventricle is normal in size. Right ventricular systolic function is moderately decreased.  - The right atrial cavity is dilated.  - The patient has not had a prior CC echocardiographic exam for comparison.      Electronically signed by Surya Phoenix MD on 1/17/2023 at 1:21:34 PM       REVIEW OF SYSTEMS     REVIEW OF SYSTEMS  Review of Systems   Constitutional:  Positive for unexpected weight change.   Respiratory:  Positive for cough and shortness of breath.    All other systems reviewed and are negative.        PHYSICAL EXAM     VITAL SIGNS: There were no vitals taken for this visit.     CURRENT WEIGHT: [unfilled]  BMI: [unfilled]  PREVIOUS WEIGHTS:  Wt Readings from Last 3 Encounters:   07/03/24 66.2 kg (145 lb 15.1 oz)   07/01/24 67.1 kg (148 lb)   06/12/24 68.2 kg (150 lb 5.7 oz)       Physical  Exam  Constitutional:       Comments: Clear voice    Pulmonary:      Effort: Pulmonary effort is normal.   Neurological:      General: No focal deficit present.      Mental Status: She is alert and oriented to person, place, and time. Mental status is at baseline.   Psychiatric:         Mood and Affect: Mood normal.         Behavior: Behavior normal.         Thought Content: Thought content normal.         Judgment: Judgment normal.         ASSESSMENT/PLAN     Ms. Ortega is a 69 y.o. female and  has a past medical history of Adenocarcinoma of lung, left (Multi), Atrial fibrillation (Multi), COPD (chronic obstructive pulmonary disease) (Multi), Depression, DJD (degenerative joint disease), DM (diabetes mellitus) (Multi), Fibromyalgia, primary, GERD (gastroesophageal reflux disease), Hearing aid worn, History of blood transfusion, History of meningioma of the brain, HLD (hyperlipidemia), HTN (hypertension), Irregular heart beat, Irritable bowel syndrome, Malignant neoplasm of upper lobe of left lung (Multi), Nephrolithiasis, Panlobular emphysema (Multi), Shortness of breath, Spinal stenosis, and Urinary tract infection. She was referred to the [facility] for evaluation of pulmonary nodules    Problem List and Orders      Assessment and Plan / Recommendations:  Problem List Items Addressed This Visit    None       Patient's visit was converted to a virtual visit.    1. Pleural-based nodular lesion in the right lower lobe posteriorly  measuring 8 mm with surrounding ground-glass airspace infiltrate and media stinal LAD  - Plan for bronchoscopy with biopsy   - Lab work prior to procedure 7/1 hgb 11.9, plt 259, bicarb 31 mmol, creatinine 0.78  - Hold aspirin 24 hrs before     I explained the procedure to the patient. We discussed that the bronchoscopy will be performed by a member of the Interventional Pulmonary Team; depending on scheduling and provider availability. We also discussed  that the IP providers function as a team and not infrequently may have to fill in for one another if there are emergent issues that need attention at the same time. The patient / family expressed understanding and agreed to proceed. All questions were answered.     Patient's visit was converted to a virtual visit. Spoke with the patient via phone for 14 minutes.    Prep: 10 minutes  Phone: 14 minutes  Total: 24 minutes      Thank you for visiting the Pulmonary clinic today!     Fatoumata Damico CNP  My office number is [phone] -     Best way to get a hold of me is to call my office --> Please do not send me follow my health messages  Any test results will be discussed at next visit -- please make sure to make a follow up appt after testing.

## 2024-07-08 NOTE — TELEPHONE ENCOUNTER
Spoke with the patient. Discussed plan for endobronchial US and pt states the dept called her this am to schedule. She is waiting their return call. Questions answered and patient had no further questions or concerns at this time.

## 2024-07-08 NOTE — PROGRESS NOTES
Patient: Thu Ortega    [id number]  : 1955 -- AGE 69 y.o.    Provider: DEANNA Garcia     Location [facility]   Service Date: 2024              [facility]  New Visit Note    Virtual or Telephone Consent  A telephone visit (audio only) between the patient (at the originating site) and the provider (at the distant site) was utilized to provide this telehealth service.   Verbal consent was requested and obtained from Thu Ortega on this date, 24 for a telehealth visit.       HISTORY OF PRESENT ILLNESS     The patient's referring provider is: Dr. Galeano    HISTORY OF PRESENT ILLNESS   Thu Ortega is a 69 y.o. female who presents to a [facility] for an pre-bronchoscopy evaluation with concerns of mediastinal lymphadenopathy in known NSCLC . I have independently interviewed and examined the patient in the office and reviewed available records.    Current History  This is a 68 y/o female with had surgery for stage I NSCLC, s/p lobectomy (Dr Fagan, 2023), then [id number] (stage Ib) lung adenocarcinoma (poor NCCN risk factors - G3, + visceral involvement), s/p left robotic assisted anatomic upper lobectomy, PD-L1 90%, +[id number]. 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 Now having mediastinal LAD requiring a bronchoscopy      At baseline,  has dyspnea on exertion, but none at rest. Symptoms started many years ago, but has mostly been stable. Currently sits for most of the day, works inside the house, but does not carry loads and do strenuous exercise.  Has to stop for breath after walking about 100 meters or a few minutes (mMRC 3).  Relates  chronic cough yellow phlegm, c/o wheezing, and denies blood streaks, C/o No night cough. No hemoptysis. No fever or shivering chills. Has a runny nose, and a tingling sensation in the back of his throat. Has no runny nose, or a tingling sensation in the back of his throat. Also complains of heartburn occasionally. Denies chest pain     Previous pulmonary history:  COPD - on steriods finish tomorrow     Inhalers/nebulized medications: wixela and albuterol once a week    Hospitalization History: Not been hospitalized over the last year for breathing related problem.    Sleep history:  Denies snoring, apnea, feeling tired during the day or taking naps during the day.       ALLERGIES AND MEDICATIONS     ALLERGIES  Allergies   Allergen Reactions    Penicillin Hives and Unknown       MEDICATIONS  Current Outpatient Medications   Medication Sig Dispense Refill    Accu-Chek Guide test strips strip TEST 3X DAILY DX E11.65      acetaminophen (Tylenol) 325 mg tablet Take 2 tablets (650 mg) by mouth every 4 hours if needed for mild pain (1 - 3) or fever (temp greater than 38.0 C). (Patient not taking: Reported on 7/1/2024) 30 tablet 0    albuterol 90 mcg/actuation inhaler Inhale 1 puff every 4 hours if needed.      amLODIPine (Norvasc) 5 mg tablet Take 1 tablet (5 mg) by mouth once daily in the morning. Take before meals.      aspirin 81 mg chewable tablet Chew 1 tablet (81 mg) once daily.      atorvastatin (Lipitor) 10 mg tablet Take 1 tablet (10 mg) by mouth  once daily in the morning. Take before meals.      BD Insulin Syringe Ultra-Fine 1 mL 31 gauge x 5/16 syringe USE AS DIRECTED      biotin 10 mg tablet Take 10 tablets (100 mg) by mouth once daily.      cholecalciferol (Vitamin D3) 5,000 Units tablet Take 1 tablet (5,000 Units) by mouth once daily.      DULoxetine (Cymbalta) 60 mg DR capsule Take 1 capsule (60 mg) by mouth once daily. Do not crush or chew.      levothyroxine (Synthroid, Levoxyl) 25 mcg tablet Take 1 tablet (25 mcg) by mouth. Pt reports taking 1/2 tab every day per M. Jignesh gongora at [facility]      metFORMIN (Glucophage) 1,000 mg tablet Take 1 tablet (1,000 mg) by mouth 2 times daily (morning and late afternoon). Take with food.      NovoLIN 70/30 U-100 Insulin 100 unit/mL (70-30) injection 70 Units 3 times a day. 70 units at breakfast, 55 units at lunch, and 60 units at dinner      nystatin (Mycostatin) 100,000 unit/gram powder Apply 1 Application topically 2 times a day. (Patient not taking: Reported on 6/12/2024) 15 g 2    ondansetron (Zofran) 4 mg tablet Take 1 tablet (4 mg) by mouth every 8 hours if needed for nausea or vomiting. 60 tablet 2    pantoprazole (ProtoNix) 40 mg EC tablet TAKE 1 TABLET (40 MG) BY MOUTH IN THE MORNING. TAKE BEFORE MEALS. DO NOT CRUSH, CHEW, OR SPLIT..      predniSONE (Deltasone) 10 mg tablet Take 5 tablets (50 mg) by mouth once daily for 1 day, THEN 4 tablets (40 mg) once daily for 1 day, THEN 3 tablets (30 mg) once daily for 1 day, THEN 2 tablets (20 mg) once daily for 1 day, THEN 1 tablet (10 mg) once daily for 1 day. Stop after day 5. Do not fill before July 4, 2024. 15 tablet 0    topiramate (Topamax) 25 mg tablet Take 2 tablets (50 mg) by mouth 2 times a day.      traZODone (Desyrel) 50 mg tablet Take 1 tablet (50 mg) by mouth once daily at bedtime.      Wixela Inhub 250-50 mcg/dose diskus inhaler USE 1 PUFF TWICE A DAY       No current facility-administered medications for this visit.         PAST HISTORY     PAST  MEDICAL HISTORY  She  has a past medical history of Adenocarcinoma of lung, left (Multi), Atrial fibrillation (Multi), COPD (chronic obstructive pulmonary disease) (Multi), Depression, DJD (degenerative joint disease), DM (diabetes mellitus) (Multi), Fibromyalgia, primary, GERD (gastroesophageal reflux disease), Hearing aid worn, History of blood transfusion, History of meningioma of the brain, HLD (hyperlipidemia), HTN (hypertension), Irregular heart beat, Irritable bowel syndrome, Malignant neoplasm of upper lobe of left lung (Multi), Nephrolithiasis, Panlobular emphysema (Multi), Shortness of breath, Spinal stenosis, and Urinary tract infection.    PAST SURGICAL HISTORY  Past Surgical History:   Procedure Laterality Date    BRAIN SURGERY      meningioma resection    CATARACT EXTRACTION      CT GUIDED PERCUTANEOUS BIOPSY LUNG  12/13/2023    CT GUIDED PERCUTANEOUS BIOPSY LUNG 12/13/2023 [facility] CT    CT GUIDED PERCUTANEOUS BIOPSY LUNG  12/15/2023    CT GUIDED PERCUTANEOUS BIOPSY LUNG    FOOT SURGERY      KNEE SURGERY      LUNG LOBECTOMY      TUBAL LIGATION      US GUIDED NEEDLE LIVER BIOPSY  02/07/2020    US GUIDED NEEDLE LIVER BIOPSY 2/7/2020 [facility] A[facility]       IMMUNIZATION HISTORY  Immunization History   Administered Date(s) Administered    Moderna COVID-19 vaccine, Fall 2023, 12 yeasrs and older (50mcg/0.5mL) 11/08/2023    Pfizer Purple Cap SARS-CoV-2 04/06/2021, 04/27/2021, 11/02/2021       SOCIAL HISTORY  She  reports that she quit smoking about 18 years ago. Her smoking use included cigarettes. She has never used smokeless tobacco. She reports that she does not currently use alcohol. She reports that she does not use drugs. She Patient smoked 1 ppd x 25-30 years    OCCUPATIONAL/ENVIRONMENTAL HISTORY  Previously worked as:    DOES/DOES NOT EC: does not have known exposure to asbestos, silica, beryllium or inhaled metals.  DOES/DOES NOT EC: does not have exposure to birds or exotic  animals.    FAMILY HISTORY  Family History   Problem Relation Name Age of Onset    Stroke Mother      Other (aortic valve disease) Mother      Heart disease Mother      Cancer Sister      Stroke Sister      Diabetes Sister      Diabetes Brother      Other (heart transplant) Brother         RESULTS/DATA     Pulmonary Function Test Results        Pulmonary Function Test - Scan on 1/12/2024  2:03 PM                      Pulmonary Function Test - Scan on 1/22/2024 12:33 PM                      Chest Radiograph     XR chest 2 views 02/08/2024        ORDERING CLINICIAN:  ANDERSON MONTOYA    FINDINGS:  Left upper lobectomy changes with left lung volume loss, unchanged  from prior study.    No consolidation, effusion, edema, or pneumothorax.    Left chest wall deformity likely from thoracotomy.    Impression  Postsurgical changes left lung with volume loss. No evidence of acute  intrathoracic abnormality.    Chest CT Scan       CT CHEST ABDOMEN PELVIS W IV CONTRAST; ;  6/19/2024         INDICATION:  Signs/Symptoms:history of recurrent lung cancer followup, left upper  quadrant pain x 2 weeks.     FINDINGS:  CT chest:      Mediastinum demonstrates subcarinal lymphadenopathy identified  measuring 10 mm in the short axis. Also, there is component of AP  window lymphadenopathy identified measuring 17 x 15 mm esophagus is  unremarkable      Heart and great vessels demonstrate ascending thoracic aorta to  measure 3.4 cm. There is mild vascular calcification of the thoracic  aorta.      Lung parenchyma demonstrates postsurgical changes status post left  upper lobectomy. The left lung demonstrates linear platelike  atelectasis/scar in the mid lung laterally. There are subtle cystic  foci demonstrated within the region of the left upper lung zone  unclear if this represents bulla versus cavitary cystic focus.  However, there is a thin peripheral rim with focus measuring 1.3 cm.  There is subtle ground-glass airspace infiltrate at the  left lung  apex. Right lung demonstrates no airspace consolidation. No pleural  effusions. There is subtle ground-glass airspace infiltrate in the  right lower lobe. Also, focal pleural-based nodularity demonstrated  within the right lower lobe posteriorly measuring 8 mm. Also,  subpleural pulmonary nodule at the right lung base laterally  measuring 4 mm.      Visualized osseous structures demonstrate remote healed left  posterolateral rib fracture deformity. Otherwise, multilevel  degenerative discogenic changes within the lower thoracic spine with  endplate sclerosis demonstrated. No compression deformity.      CT abdomen:      Liver is unremarkable      Gallbladder is unremarkable      Spleen is unremarkable      Adrenal glands are unremarkable      Pancreas is unremarkable      Right kidney is unremarkable      Left kidney demonstrates nonobstructing nephrolithiasis inferior pole  measuring 4 mm.      Retroperitoneum demonstrates no lymphadenopathy. Scattered  subcentimeter lymph nodes are demonstrated. Mild vascular  calcification noted. Mesentery demonstrates soft tissue lesion within  the midline lower abdominal mesentery measuring 1.6 x 1.2 cm.      Loops of large bowel demonstrates scattered uncomplicated sigmoid  diverticulosis. Also, scattered uncomplicated descending colon  diverticula demonstrated. Small bowel loops are fluid and gas-filled  with some loops distended without significant dilatation. Stomach is  unremarkable.      CT pelvis:      Unopacified bladder is unremarkable. There is no pelvic  lymphadenopathy. No free fluid demonstrated. Uterus and adnexa are  unremarkable.      Visualized osseous structures demonstrate moderate facet arthropathy  lower lumbar spine. Multilevel degenerative discogenic changes are  demonstrated                      IMPRESSION:  1. Pleural-based nodular lesion in the right lower lobe posteriorly  measuring 8 mm with surrounding ground-glass airspace  infiltrate.  Also, there is a 4 mm subpleural pulmonary nodule at the right lung  base laterally      2. Mediastinal lymphadenopathy in particular of the AP window.  Findings are worrisome      3. Interval development of the soft tissue lesion within the midline  lower abdominal mesentery measuring 1.6 cm.      4. Scattered bulla versus thin-walled cyst in the left upper lung  zone. No discrete nodularity or thick-walled cavitary lesion in this  area.      5. Scattered fluid and gas-filled distended loops of small bowel  nonspecific. Correlate with enteritis and mild component of ileus.           Echocardiogram        Echocardiogram 2023      Echocardiography Report: Transthoracic Echo  [facility]  Date of service: 2023 8:14:55 AM  Accession #: [id number]    Ordering physician: REMY BOSTON  Indication: Preop evaluation for noncardiac surgery with low/intermediate clinical risk    Technologist: Tessa Mayorga [facility]  Interpreting physician: Surya Phoenix MD    PATIENT:  Name: JERMAIN PONCE  MRN: [id number]  : 1955  Age: 67 years  Gender: F    History of hypertension, diabetes mellitus, dyslipidemia and COPD.  Primary rhythm: sinus.  Height: 151.10 cm BSA: 1.75 m?  Weight: 72.62 kg  BMI: 31.8 kg/m?      Heart rate     65 bpm  Blood pressure 123/77 mmHg    Technically difficult exam due to body habitus.  Color Doppler was utilized to interrogate the cardiac valves assessed and spectral Doppler was utilized to determine the flow velocities and pressure gradients reported in this exam. Myocardial strain analysis was performed in this exam to aid in the assessment of cardiac function.    MEASUREMENTS:                           Value               Indexed    Normal  Max aortic dimension     3.5 cm                         Ao < 3.8  Left atrial volume       47 ml (biplane A-L) 27 ml/m?   Koki <= 34  LV ID (diastole)         4.1 cm (2D)         2.34 cm/m?  LV ID (systole)          2.5 cm (2D)          1.44 cm/m?  IVS, leaflet tips        1.0 cm (2D)  Posterior wall thickness 1.0 cm (2D)  Left ventricular mass    132 g (2D)          75 g/m?  Global peak long strain  -16.4 %  LV stroke volume         48 ml (2D biplane)  LV end diastolic volume  75 ml (2D biplane)  43.2 ml/m? 29<=EDVi<62  LV end systolic volume   28 ml (2D biplane)  15.8 ml/m?  Ejection Fraction        63 % (2D biplane)              EF > 54      FINDINGS:    LEFT VENTRICLE  The left ventricle is normal in size.  Left ventricular systolic function is normal. Global LV myocardial strain is normal.  Normal left ventricular diastolic function.  Mitral annular lateral E/e': 7.5. Mitral annular septal E/e': 12.1.  Wall Motion:  All scored segments are normal.        RIGHT VENTRICLE  The right ventricle is normal in size.  Right ventricular systolic function is moderately decreased. RV global longitudinal strain is 17.9 %.  Estimated right ventricular systolic pressure is likely underestimated due to a weak or incomplete tricuspid regurgitation signal and is, at least, 22 mmHg consistent with normal pulmonary artery pressures. Estimated right atrial pressure is 3 mmHg based on IVC assessment.    LEFT ATRIUM  The left atrial cavity is normal in size.  Pulmonary Veins:  The pulmonary venous pattern showed normal systolic flow.    RIGHT ATRIUM  The right atrial cavity is dilated.  Inferior Vena Cava:  The inferior vena cava appears normal measuring 1.0 cm. The vessel decreases greater than 50 percent with inspiration.    MITRAL VALVE  There is trace mitral valve regurgitation. There is mild thickening of the anterior mitral leaflet. The pressure half time is 51 msec. The peak mitral E/A ratio is 0.62. The average mitral E/e' ratio is 9.8. The mitral flow deceleration time is 175 msec.    TRICUSPID VALVE  The tricuspid valve leaflets are structurally normal. There is trace tricuspid valve regurgitation.     AORTIC VALVE  There is trace aortic valve  regurgitation. Tricuspid aortic valve. There is mild thickening. The peak gradient is 7 mmHg (peak velocity = 128.8 cm/s).    PULMONIC VALVE  The pulmonic valve was not seen or not interrogated. There is trace pulmonic valve regurgitation. The peak gradient is 3 mmHg.    AORTA  The visualized aorta is normal in size.  Measurements - Sinus: 2.7 cm. Sinotubular junction 2.6 cm. Mid ascending aorta 3.5 cm.    PULMONARY ARTERIES  The pulmonary arteries are unseen or not interrogated.    INTERATRIAL SEPTUM  There is no evidence of intracardiac shunting as detected by Doppler.    INTERVENTRICULAR SEPTUM  There is normal motion of the interventricular septum. There is no flow through the interventricular septum as detected by Doppler.    PERICARDIUM  There is no pericardial effusion.    CONCLUSIONS:  - Technically difficult exam due to body habitus.  - Exam indication: Preop evaluation for noncardiac surgery with low/intermediate clinical risk  - The left ventricle is normal in size. Left ventricular systolic function is normal. EF = 63 ? 5% (2D biplane) Normal left ventricular diastolic function.  - The right ventricle is normal in size. Right ventricular systolic function is moderately decreased.  - The right atrial cavity is dilated.  - The patient has not had a prior CC echocardiographic exam for comparison.      Electronically signed by Surya Phoenix MD on 1/17/2023 at 1:21:34 PM       REVIEW OF SYSTEMS     REVIEW OF SYSTEMS  Review of Systems   Constitutional:  Positive for unexpected weight change.   Respiratory:  Positive for cough and shortness of breath.    All other systems reviewed and are negative.        PHYSICAL EXAM     VITAL SIGNS: There were no vitals taken for this visit.     CURRENT WEIGHT: [unfilled]  BMI: [unfilled]  PREVIOUS WEIGHTS:  Wt Readings from Last 3 Encounters:   07/03/24 66.2 kg (145 lb 15.1 oz)   07/01/24 67.1 kg (148 lb)   06/12/24 68.2 kg (150 lb 5.7 oz)       Physical  Exam  Constitutional:       Comments: Clear voice    Pulmonary:      Effort: Pulmonary effort is normal.   Neurological:      General: No focal deficit present.      Mental Status: She is alert and oriented to person, place, and time. Mental status is at baseline.   Psychiatric:         Mood and Affect: Mood normal.         Behavior: Behavior normal.         Thought Content: Thought content normal.         Judgment: Judgment normal.         ASSESSMENT/PLAN     Ms. Ortega is a 69 y.o. female and  has a past medical history of Adenocarcinoma of lung, left (Multi), Atrial fibrillation (Multi), COPD (chronic obstructive pulmonary disease) (Multi), Depression, DJD (degenerative joint disease), DM (diabetes mellitus) (Multi), Fibromyalgia, primary, GERD (gastroesophageal reflux disease), Hearing aid worn, History of blood transfusion, History of meningioma of the brain, HLD (hyperlipidemia), HTN (hypertension), Irregular heart beat, Irritable bowel syndrome, Malignant neoplasm of upper lobe of left lung (Multi), Nephrolithiasis, Panlobular emphysema (Multi), Shortness of breath, Spinal stenosis, and Urinary tract infection. She was referred to the [facility] for evaluation of pulmonary nodules    Problem List and Orders      Assessment and Plan / Recommendations:  Problem List Items Addressed This Visit    None       Patient's visit was converted to a virtual visit.    1. Pleural-based nodular lesion in the right lower lobe posteriorly  measuring 8 mm with surrounding ground-glass airspace infiltrate and media stinal LAD  - Plan for bronchoscopy with biopsy   - Lab work prior to procedure 7/1 hgb 11.9, plt 259, bicarb 31 mmol, creatinine 0.78  - Hold aspirin 24 hrs before     I explained the procedure to the patient. We discussed that the bronchoscopy will be performed by a member of the Interventional Pulmonary Team; depending on scheduling and provider availability. We also discussed  that the IP providers function as a team and not infrequently may have to fill in for one another if there are emergent issues that need attention at the same time. The patient / family expressed understanding and agreed to proceed. All questions were answered.     Patient's visit was converted to a virtual visit. Spoke with the patient via phone for 14 minutes.    Prep: 10 minutes  Phone: 14 minutes  Total: 24 minutes      Thank you for visiting the Pulmonary clinic today!     Fatoumata Damico CNP  My office number is [phone] -     Best way to get a hold of me is to call my office --> Please do not send me follow my health messages  Any test results will be discussed at next visit -- please make sure to make a follow up appt after testing.

## 2024-07-14 ENCOUNTER — ANESTHESIA EVENT (OUTPATIENT)
Dept: GASTROENTEROLOGY | Facility: HOSPITAL | Age: 69
End: 2024-07-14
Payer: MEDICARE

## 2024-07-14 NOTE — ANESTHESIA PREPROCEDURE EVALUATION
Patient: Thu Ortega    Procedure Information       Date/Time: 07/15/24 0787    Scheduled providers: Andrzej Tovar MD    Procedure: BRONCHOSCOPY    Location: [facility]            Relevant Problems   Anesthesia (within normal limits)      Cardiac   (+) Mixed hyperlipidemia   (+) PAF (paroxysmal atrial fibrillation) (Multi)   (+) Primary hypertension      Pulmonary   (+) Chronic obstructive pulmonary disease (Multi)   (+) Dyspnea on exertion   (+) History of home oxygen therapy (During activity)   (+) Local recurrence of left lung cancer (Multi)   (+) Malignant neoplasm of upper lobe of left lung (Multi)   (+) Non-small cell cancer of left lung (Multi)   (+) Non-small cell lung cancer without metastasis (Multi)   (+) CHEY (obstructive sleep apnea)   (+) Panlobular emphysema (Multi)      Neuro  Meningioma rescted no resiual   (+) Recurrent major depressive disorder, in partial remission (CMS-HCC)      GI (within normal limits)      /Renal (within normal limits)      Liver (within normal limits)      Endocrine  No family hx family MH   (+) Type 2 diabetes mellitus without complication, with long-term current use of insulin (Multi) (184)      Hematology (within normal limits)      Musculoskeletal (within normal limits)      HEENT (within normal limits)      ID (within normal limits)      Skin (within normal limits)      GYN (within normal limits)      Hematology and Neoplasia   (+) History of meningioma of the brain   70 y/o female with had surgery for stage I NSCLC, s/p lobectomy 1/31/23.    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 Now having mediastinal LAD requiring a bronchoscopy     Clinical information reviewed:                 ECHO  CONCLUSIONS:  - Technically  difficult exam due to body habitus.  - Exam indication: Preop evaluation for noncardiac surgery with low/intermediate clinical risk  - The left ventricle is normal in size. Left ventricular systolic function is normal. EF = 63 ? 5% (2D biplane) Normal left ventricular diastolic function.  - The right ventricle is normal in size. Right ventricular systolic function is moderately decreased.  - The right atrial cavity is dilated.  - The patient has not had a prior CC echocardiographic exam for comparison.    NPO Detail:  No data recorded     Physical Exam    Airway  Mallampati: IV  TM distance: >3 FB  Neck ROM: full     Cardiovascular - normal exam     Dental   (+) upper dentures  Comments: Lower edentulous   Pulmonary - normal exam     Abdominal          Vitals:    07/15/24 0700   BP: 145/76   Resp: 18   Temp: 36.3 °C (97.3 °F)   SpO2: 96%       Past Surgical History:   Procedure Laterality Date    BRAIN SURGERY      meningioma resection    CATARACT EXTRACTION      CT GUIDED PERCUTANEOUS BIOPSY LUNG  12/13/2023    CT GUIDED PERCUTANEOUS BIOPSY LUNG 12/13/2023 [facility]    CT GUIDED PERCUTANEOUS BIOPSY LUNG  12/15/2023    CT GUIDED PERCUTANEOUS BIOPSY LUNG    FOOT SURGERY      KNEE SURGERY      LUNG LOBECTOMY      TUBAL LIGATION      US GUIDED NEEDLE LIVER BIOPSY  02/07/2020    US GUIDED NEEDLE LIVER BIOPSY 2/7/2020 [facility]     Past Medical History:   Diagnosis Date    Adenocarcinoma of lung, left (Multi)     s/p robotic assisted NICHOL lobectomy on 1/31/23, c/b afib and aspiration PNA    Atrial fibrillation (Multi)     COPD (chronic obstructive pulmonary disease) (Multi)     F/W Dr. Kang, Wixela inhaler, PFT appointment scheduled for 01/12/2024    Depression     Taking Cymbalta    DJD (degenerative joint disease)     DM (diabetes mellitus) (Multi)     F/w Dr. Wong, Taking Metformin, Last A1c is 7.5 on 08/03/2023    Fibromyalgia, primary     F/W Pain    GERD (gastroesophageal reflux disease)     F/w PCP, on Protonix     Hearing aid worn     Bilateral hearing aides    History of blood transfusion     patient think she recieved a transfusion with her Lobectomy surgery    History of meningioma of the brain     s/p resection    HLD (hyperlipidemia)     F/W PCP: Taking Atorvastatin    HTN (hypertension)     F/W PCP    Irregular heart beat     Paroxysmal atrial fibrillation. Last seen by Dr. Fox on 01/03/24, Started on Amiodarone. Not on anticoagulation.    Irritable bowel syndrome     Malignant neoplasm of upper lobe of left lung (Multi)     Nephrolithiasis     monitoring, no interventions    Panlobular emphysema (Multi)     Shortness of breath     PALENCIA previously on O2    Spinal stenosis     Urinary tract infection        Current Outpatient Medications:     acetaminophen (Tylenol) 325 mg tablet, Take 2 tablets (650 mg) by mouth every 4 hours if needed for mild pain (1 - 3) or fever (temp greater than 38.0 C)., Disp: 30 tablet, Rfl: 0    albuterol 90 mcg/actuation inhaler, Inhale 1 puff every 4 hours if needed., Disp: , Rfl:     amLODIPine (Norvasc) 5 mg tablet, Take 1 tablet (5 mg) by mouth once daily in the morning. Take before meals., Disp: , Rfl:     aspirin 81 mg chewable tablet, Chew 1 tablet (81 mg) once daily., Disp: , Rfl:     atorvastatin (Lipitor) 10 mg tablet, Take 1 tablet (10 mg) by mouth once daily in the morning. Take before meals., Disp: , Rfl:     biotin 10 mg tablet, Take 10 tablets (100 mg) by mouth once daily., Disp: , Rfl:     cholecalciferol (Vitamin D3) 5,000 Units tablet, Take 1 tablet (5,000 Units) by mouth once daily., Disp: , Rfl:     DULoxetine (Cymbalta) 60 mg DR capsule, Take 1 capsule (60 mg) by mouth once daily. Do not crush or chew., Disp: , Rfl:     levothyroxine (Synthroid, Levoxyl) 25 mcg tablet, Take 1 tablet (25 mcg) by mouth. Pt reports taking 1/2 tab every day per LUCAS Egan cnp at [facility], Disp: , Rfl:     metFORMIN (Glucophage) 1,000 mg tablet, Take 1 tablet (1,000 mg) by mouth 2 times daily  (morning and late afternoon). Take with food., Disp: , Rfl:     NovoLIN 70/30 U-100 Insulin 100 unit/mL (70-30) injection, 70 Units 3 times a day. 70 units at breakfast, 55 units at lunch, and 60 units at dinner, Disp: , Rfl:     nystatin (Mycostatin) 100,000 unit/gram powder, Apply 1 Application topically 2 times a day., Disp: 15 g, Rfl: 2    ondansetron (Zofran) 4 mg tablet, Take 1 tablet (4 mg) by mouth every 8 hours if needed for nausea or vomiting., Disp: 60 tablet, Rfl: 2    pantoprazole (ProtoNix) 40 mg EC tablet, TAKE 1 TABLET (40 MG) BY MOUTH IN THE MORNING. TAKE BEFORE MEALS. DO NOT CRUSH, CHEW, OR SPLIT.., Disp: , Rfl:     traZODone (Desyrel) 50 mg tablet, Take 1 tablet (50 mg) by mouth once daily at bedtime., Disp: , Rfl:     Wixela Inhub 250-50 mcg/dose diskus inhaler, USE 1 PUFF TWICE A DAY, Disp: , Rfl:     Accu-Chek Guide test strips strip, TEST 3X DAILY DX E11.65, Disp: , Rfl:     BD Insulin Syringe Ultra-Fine 1 mL 31 gauge x 5/16 syringe, USE AS DIRECTED, Disp: , Rfl:     topiramate (Topamax) 25 mg tablet, Take 2 tablets (50 mg) by mouth 2 times a day., Disp: , Rfl:   Prior to Admission medications    Medication Sig Start Date End Date Taking? Authorizing Provider   acetaminophen (Tylenol) 325 mg tablet Take 2 tablets (650 mg) by mouth every 4 hours if needed for mild pain (1 - 3) or fever (temp greater than 38.0 C). 1/31/24  Yes Sunshine Hankins PA-C   albuterol 90 mcg/actuation inhaler Inhale 1 puff every 4 hours if needed. 1/27/23  Yes Historical Provider, MD   amLODIPine (Norvasc) 5 mg tablet Take 1 tablet (5 mg) by mouth once daily in the morning. Take before meals. 10/11/23  Yes Historical Provider, MD   aspirin 81 mg chewable tablet Chew 1 tablet (81 mg) once daily.   Yes Historical Provider, MD   atorvastatin (Lipitor) 10 mg tablet Take 1 tablet (10 mg) by mouth once daily in the morning. Take before meals. 7/27/23  Yes Historical Provider, MD   biotin 10 mg tablet Take 10 tablets (100  mg) by mouth once daily.   Yes Historical Provider, MD   cholecalciferol (Vitamin D3) 5,000 Units tablet Take 1 tablet (5,000 Units) by mouth once daily.   Yes Historical Provider, MD   DULoxetine (Cymbalta) 60 mg DR capsule Take 1 capsule (60 mg) by mouth once daily. Do not crush or chew.   Yes Historical Provider, MD   levothyroxine (Synthroid, Levoxyl) 25 mcg tablet Take 1 tablet (25 mcg) by mouth. Pt reports taking 1/2 tab every day per LUCAS Egan cnp at [facility] 3/4/24 7/15/24 Yes Historical Provider, MD   metFORMIN (Glucophage) 1,000 mg tablet Take 1 tablet (1,000 mg) by mouth 2 times daily (morning and late afternoon). Take with food.   Yes Historical Provider, MD   NovoLIN 70/30 U-100 Insulin 100 unit/mL (70-30) injection 70 Units 3 times a day. 70 units at breakfast, 55 units at lunch, and 60 units at dinner   Yes Historical Provider, MD   nystatin (Mycostatin) 100,000 unit/gram powder Apply 1 Application topically 2 times a day. 3/20/24 3/20/25 Yes Álvaro Galeano MD   ondansetron (Zofran) 4 mg tablet Take 1 tablet (4 mg) by mouth every 8 hours if needed for nausea or vomiting. 3/18/24  Yes Álvaro Galeano MD   pantoprazole (ProtoNix) 40 mg EC tablet TAKE 1 TABLET (40 MG) BY MOUTH IN THE MORNING. TAKE BEFORE MEALS. DO NOT CRUSH, CHEW, OR SPLIT..   Yes Historical Provider, MD   traZODone (Desyrel) 50 mg tablet Take 1 tablet (50 mg) by mouth once daily at bedtime. 9/24/23  Yes Historical Provider, MD Manuel Inhub 250-50 mcg/dose diskus inhaler USE 1 PUFF TWICE A DAY 7/2/23  Yes Historical Provider, MD   Accu-Chek Guide test strips strip TEST 3X DAILY DX E11.65 1/7/23   Historical Provider, MD   BD Insulin Syringe Ultra-Fine 1 mL 31 gauge x 5/16 syringe USE AS DIRECTED 8/29/23   Historical Provider, MD   predniSONE (Deltasone) 10 mg tablet Take 5 tablets (50 mg) by mouth once daily for 1 day, THEN 4 tablets (40 mg) once daily for 1 day, THEN 3 tablets (30 mg) once daily for 1 day, THEN 2 tablets (20 mg) once  daily for 1 day, THEN 1 tablet (10 mg) once daily for 1 day. Stop after day 5. Do not fill before 2024. 24  Álvaro Galeano MD   topiramate (Topamax) 25 mg tablet Take 2 tablets (50 mg) by mouth 2 times a day. 23   Historical Provider, MD     Allergies   Allergen Reactions    Penicillin Hives and Unknown     Social History     Tobacco Use    Smoking status: Former     Current packs/day: 0.00     Types: Cigarettes     Quit date:      Years since quittin.5    Smokeless tobacco: Never   Substance Use Topics    Alcohol use: Not Currently         Chemistry    Lab Results   Component Value Date/Time     2024 0932    K 3.4 (L) 2024 0932     2024 0932    CO2 31 2024 0932    BUN 8 2024 0932    CREATININE 0.78 2024 0932    Lab Results   Component Value Date/Time    CALCIUM 9.9 2024 0932    ALKPHOS 99 2024 0932    AST 16 2024 0932    ALT 10 2024 0932    BILITOT 0.5 2024 0932          Lab Results   Component Value Date/Time    WBC 9.5 2024 0932    HGB 11.9 (L) 2024 0932    HCT 38.0 2024 0932     2024 0932     Lab Results   Component Value Date/Time    PROTIME 10.6 2024 1346    INR 0.9 2024 1346     Encounter Date: 24   ECG 12 lead (Clinic Performed)    Narrative    Normal sinus rhythm, left ventricular hypertrophy, nonspecific lateral T   wave abnormalities     No results found for this or any previous visit from the past 1095 days.      Anesthesia Plan    History of general anesthesia?: yes  History of complications of general anesthesia?: no    ASA 3     general     intravenous induction   Trial extubation is planned.  Anesthetic plan and risks discussed with patient.  Use of blood products discussed with patient who consented to blood products.    Plan discussed with CAA.

## 2024-07-15 ENCOUNTER — HOSPITAL ENCOUNTER (OUTPATIENT)
Dept: GASTROENTEROLOGY | Facility: HOSPITAL | Age: 69
Setting detail: OUTPATIENT SURGERY
Discharge: HOME | End: 2024-07-15
Payer: MEDICARE

## 2024-07-15 ENCOUNTER — ANESTHESIA (OUTPATIENT)
Dept: GASTROENTEROLOGY | Facility: HOSPITAL | Age: 69
End: 2024-07-15
Payer: MEDICARE

## 2024-07-15 VITALS
RESPIRATION RATE: 13 BRPM | OXYGEN SATURATION: 99 % | BODY MASS INDEX: 29.64 KG/M2 | HEIGHT: 59 IN | HEART RATE: 77 BPM | WEIGHT: 147 LBS | TEMPERATURE: 97.8 F | SYSTOLIC BLOOD PRESSURE: 128 MMHG | DIASTOLIC BLOOD PRESSURE: 82 MMHG

## 2024-07-15 DIAGNOSIS — R59.0 MEDIASTINAL ADENOPATHY: ICD-10-CM

## 2024-07-15 PROBLEM — Z99.81 HISTORY OF HOME OXYGEN THERAPY: Status: ACTIVE | Noted: 2024-07-15

## 2024-07-15 PROBLEM — R06.09 DYSPNEA ON EXERTION: Status: ACTIVE | Noted: 2024-07-15

## 2024-07-15 LAB
GLUCOSE BLD MANUAL STRIP-MCNC: 177 MG/DL (ref 74–99)
GLUCOSE BLD MANUAL STRIP-MCNC: 184 MG/DL (ref 74–99)

## 2024-07-15 PROCEDURE — 7100000001 HC RECOVERY ROOM TIME - INITIAL BASE CHARGE

## 2024-07-15 PROCEDURE — 7100000002 HC RECOVERY ROOM TIME - EACH INCREMENTAL 1 MINUTE

## 2024-07-15 PROCEDURE — 7100000010 HC PHASE TWO TIME - EACH INCREMENTAL 1 MINUTE

## 2024-07-15 PROCEDURE — 3700000002 HC GENERAL ANESTHESIA TIME - EACH INCREMENTAL 1 MINUTE

## 2024-07-15 PROCEDURE — 3700000001 HC GENERAL ANESTHESIA TIME - INITIAL BASE CHARGE

## 2024-07-15 PROCEDURE — 2720000007 HC OR 272 NO HCPCS

## 2024-07-15 PROCEDURE — 88305 TISSUE EXAM BY PATHOLOGIST: CPT | Mod: TC,SUR | Performed by: STUDENT IN AN ORGANIZED HEALTH CARE EDUCATION/TRAINING PROGRAM

## 2024-07-15 PROCEDURE — 31652 BRONCH EBUS SAMPLNG 1/2 NODE: CPT | Performed by: STUDENT IN AN ORGANIZED HEALTH CARE EDUCATION/TRAINING PROGRAM

## 2024-07-15 PROCEDURE — 82947 ASSAY GLUCOSE BLOOD QUANT: CPT

## 2024-07-15 PROCEDURE — 2500000005 HC RX 250 GENERAL PHARMACY W/O HCPCS

## 2024-07-15 PROCEDURE — 88173 CYTOPATH EVAL FNA REPORT: CPT | Mod: TC,MCY | Performed by: STUDENT IN AN ORGANIZED HEALTH CARE EDUCATION/TRAINING PROGRAM

## 2024-07-15 PROCEDURE — 2500000004 HC RX 250 GENERAL PHARMACY W/ HCPCS (ALT 636 FOR OP/ED)

## 2024-07-15 PROCEDURE — 7100000009 HC PHASE TWO TIME - INITIAL BASE CHARGE

## 2024-07-15 RX ORDER — SODIUM CHLORIDE, SODIUM LACTATE, POTASSIUM CHLORIDE, CALCIUM CHLORIDE 600; 310; 30; 20 MG/100ML; MG/100ML; MG/100ML; MG/100ML
100 INJECTION, SOLUTION INTRAVENOUS CONTINUOUS
Status: DISCONTINUED | OUTPATIENT
Start: 2024-07-15 | End: 2024-07-16 | Stop reason: HOSPADM

## 2024-07-15 RX ORDER — FENTANYL CITRATE 50 UG/ML
INJECTION, SOLUTION INTRAMUSCULAR; INTRAVENOUS AS NEEDED
Status: DISCONTINUED | OUTPATIENT
Start: 2024-07-15 | End: 2024-07-15

## 2024-07-15 RX ORDER — ALBUTEROL SULFATE 0.83 MG/ML
2.5 SOLUTION RESPIRATORY (INHALATION) ONCE AS NEEDED
Status: DISCONTINUED | OUTPATIENT
Start: 2024-07-15 | End: 2024-07-16 | Stop reason: HOSPADM

## 2024-07-15 RX ORDER — ACETAMINOPHEN 325 MG/1
650 TABLET ORAL EVERY 4 HOURS PRN
Status: DISCONTINUED | OUTPATIENT
Start: 2024-07-15 | End: 2024-07-16 | Stop reason: HOSPADM

## 2024-07-15 RX ORDER — SODIUM CHLORIDE, SODIUM LACTATE, POTASSIUM CHLORIDE, CALCIUM CHLORIDE 600; 310; 30; 20 MG/100ML; MG/100ML; MG/100ML; MG/100ML
INJECTION, SOLUTION INTRAVENOUS CONTINUOUS PRN
Status: DISCONTINUED | OUTPATIENT
Start: 2024-07-15 | End: 2024-07-15

## 2024-07-15 RX ORDER — ROCURONIUM BROMIDE 10 MG/ML
INJECTION, SOLUTION INTRAVENOUS AS NEEDED
Status: DISCONTINUED | OUTPATIENT
Start: 2024-07-15 | End: 2024-07-15

## 2024-07-15 RX ORDER — LIDOCAINE HYDROCHLORIDE 20 MG/ML
INJECTION, SOLUTION INFILTRATION; PERINEURAL AS NEEDED
Status: DISCONTINUED | OUTPATIENT
Start: 2024-07-15 | End: 2024-07-15

## 2024-07-15 RX ORDER — ONDANSETRON HYDROCHLORIDE 2 MG/ML
4 INJECTION, SOLUTION INTRAVENOUS ONCE AS NEEDED
Status: DISCONTINUED | OUTPATIENT
Start: 2024-07-15 | End: 2024-07-16 | Stop reason: HOSPADM

## 2024-07-15 RX ORDER — PROPOFOL 10 MG/ML
INJECTION, EMULSION INTRAVENOUS AS NEEDED
Status: DISCONTINUED | OUTPATIENT
Start: 2024-07-15 | End: 2024-07-15

## 2024-07-15 RX ORDER — LIDOCAINE HYDROCHLORIDE 10 MG/ML
0.1 INJECTION INFILTRATION; PERINEURAL ONCE
Status: DISCONTINUED | OUTPATIENT
Start: 2024-07-15 | End: 2024-07-16 | Stop reason: HOSPADM

## 2024-07-15 RX ORDER — ONDANSETRON HYDROCHLORIDE 2 MG/ML
INJECTION, SOLUTION INTRAVENOUS AS NEEDED
Status: DISCONTINUED | OUTPATIENT
Start: 2024-07-15 | End: 2024-07-15

## 2024-07-15 RX ORDER — MIDAZOLAM HYDROCHLORIDE 1 MG/ML
INJECTION, SOLUTION INTRAMUSCULAR; INTRAVENOUS CONTINUOUS PRN
Status: DISCONTINUED | OUTPATIENT
Start: 2024-07-15 | End: 2024-07-15

## 2024-07-15 ASSESSMENT — PAIN SCALES - GENERAL
PAINLEVEL_OUTOF10: 0 - NO PAIN
PAINLEVEL_OUTOF10: 4
PAINLEVEL_OUTOF10: 0 - NO PAIN

## 2024-07-15 ASSESSMENT — COLUMBIA-SUICIDE SEVERITY RATING SCALE - C-SSRS
1. IN THE PAST MONTH, HAVE YOU WISHED YOU WERE DEAD OR WISHED YOU COULD GO TO SLEEP AND NOT WAKE UP?: NO
2. HAVE YOU ACTUALLY HAD ANY THOUGHTS OF KILLING YOURSELF?: NO

## 2024-07-15 ASSESSMENT — PAIN - FUNCTIONAL ASSESSMENT
PAIN_FUNCTIONAL_ASSESSMENT: 0-10

## 2024-07-15 NOTE — DISCHARGE INSTRUCTIONS
The anesthetics, sedatives or narcotics which were given to you today will be acting in your body for the next 24 hours, so you might feel a little sleepy or groggy. This feeling should slowly wear off.   Carefully read and follow the instructions below:   You received sedation today.   Do not drive or operate machinery or power tools of any kind.   No alcoholic beverages today, not even beer or wine.   No over the counter medications that contain alcohol or may cause drowsiness.   Do not make important decisions or sign legal documents.     Do not use Aspirin containing products or non-steroidal medications for the next 24 hours.  (Examples of these types of medications include: Advil, Aleve, Ecotrin, Ibuprofen, Motrin or Naprosyn.  This list is not all-inclusive.  Check with your physician or pharmacist before resuming these medications.)  Tylenol, cough medicine, cough drops or throat lozenges may be used when you are allowed to resume eating and drinking.     Call your physician if any of these symptoms occur:   High fever over 101 degrees or chills (a low grade fever is common for 24 hours)   Rash or hives   Persistent nausea or vomiting   Inability to urinate within 8 hours after the procedure  Go directly to the emergency room if you notice any of the following:   Shortness of breath   Chest pain  Coughing up large amounts of bright red blood greater than a teaspoonful of blood clots (about a teaspoonful for the next 24-48 hours is normal, especially if you had a biopsy)  Resume all normal medications unless directed otherwise by your doctor.     Your doctor recommends these additional instructions:    Follow up with your referring physician as previously scheduled.    If you experience any problems or have any questions following discharge, please call:   Before 5 pm: [phone]   After 5pm and on weekends: [phone] / [phone] and ask for the Pulmonary Fellow on-call (Pager Number: [phone])

## 2024-07-15 NOTE — ANESTHESIA PROCEDURE NOTES
Peripheral IV  Date/Time: 7/15/2024 7:15 AM  Inserted by: Lobito Washburn    Placement  Needle size: 20 G  Laterality: left  Location: hand  Local anesthetic: none  Site prep: alcohol  Technique: anatomical landmarks  Attempts: 1

## 2024-07-15 NOTE — ANESTHESIA POSTPROCEDURE EVALUATION
Patient: Thu Ortega    Procedure Summary       Date: 07/15/24 Room / Location: [facility]    Anesthesia Start: 0747 Anesthesia Stop: 0900    Procedure: BRONCHOSCOPY Diagnosis: Mediastinal adenopathy    Scheduled Providers: Andrzej Tovar MD Responsible Provider: Cass Wesley MD    Anesthesia Type: general ASA Status: 3            Anesthesia Type: general    Vitals Value Taken Time   /86 07/15/24 0934   Temp 36.6 °C (97.8 °F) 07/15/24 0904   Pulse 71 07/15/24 0934   Resp 13 07/15/24 0934   SpO2 100 % 07/15/24 0934       Anesthesia Post Evaluation    Patient location during evaluation: PACU  Patient participation: complete - patient participated  Level of consciousness: awake  Pain management: adequate  Airway patency: patent  Cardiovascular status: acceptable  Respiratory status: acceptable  Hydration status: acceptable  Postoperative Nausea and Vomiting: none        There were no known notable events for this encounter.

## 2024-07-15 NOTE — INTERVAL H&P NOTE
69F [facility] stage 1 NSCLC s/p L lobectomy (Dr. Fagan [facility] 1/31/23), then [id number] lung adenocarcinoma s/p redo L robotic assisted anatomic upper lobectomy with left anterior mediastinal mass resection and diaphragmatic pacer placement with Dr. Calixto at  1/2024, presenting for bronchoscopy today for evaluation of new mediastinal lymphadenopathy.     H&P reviewed. The patient was examined and there are no changes to the H&P.

## 2024-07-15 NOTE — LETTER
Dear, Thu Ortega      7/8/2024                                              INFORMATION FOR YOUR PROCEDURE     INSTRUCTIONS MUST BE FOLLOWED OR YOU RUN THE RISK OF YOUR CASE BEING CANCELED     Information is attached to this e-mail for your upcoming procedure. (Look for the paperclip in your email to access this information)  Lab requisitions are also included as well as procedural instructions.    You are scheduled for your Bronchoscopy on  7/15/24,    with Dr. Andrzej Tovar    Arrival is at  7:00  AM,  for Check In prior to your actual procedure time. This allows us to prepare you for your procedure.  Location:   [place] [address]   Bronchoscopy / Endoscopy Suite   [facility] Room 1300.     Located on the 1st Floor close to the Main entrance of the hospital.  Enter through the front doors, turn left and we are the 1st hallway on the left hand side.(Room 1300 [facility]).  If you park in the visitor's garage you will come in on the second floor and will need to make your way down to the 1st level.     Patient information is right there if assistance is needed.    NPO (No food or drink) after midnight the night before your procedure. This includes coffee, water, and soda, hard candy, gum or mints.  If taking your morning medications, a small sip of water is allowed to get the medication down.    For your safety, you must have a responsible, adult  accompany you to your procedure.  You will not be permitted to drive yourself home if you have received any type of anesthesia or sedation.    Automated calls about your upcoming scheduled appointments can be quite confusing for patients.    The times may vary depending on what you have scheduled for procedure day. Follow the time/s I have given you  so there is no confusion.    Blood work will need to be done prior to your procedure, preferably at a  Facility.  There  are no restrictions for testing. Your blood work is current.  No need to repeat.      Our Nurse Practitioner, Fatoumata Damico CNP, will call you on Monday, 7/8/24, @ 4:20 PM    This is a phone visit to go over your medical history prior to your procedure, and is a necessary part of your workup.  The patient must be present at this phone visit.    Please reach out to me with any questions you may have Trudy @ [phone] or   Yayo @ [phone]    Have a nice day!    Trudy Villatoro    Bronchoscopy   Interventional Pulmonology    MD Oarlia Mendez MD Sameer Avasarala, MD Catalina Teba, MD Andrew Dunatchik, MD        [facility]  Pulmonary, Critical Care and Sleep Medicine  [address] [place] [address]  P [phone]  - [phone]  [email or web address]

## 2024-07-15 NOTE — ANESTHESIA PROCEDURE NOTES
Airway  Date/Time: 7/15/2024 7:56 AM  Urgency: elective    Airway not difficult    Staffing  Performed: PARISA   Authorized by: Cass Wesley MD    Performed by: RENAE Allen  Patient location during procedure: OR    Indications and Patient Condition  Indications for airway management: anesthesia  Spontaneous Ventilation: absent  Sedation level: deep  Preoxygenated: yes  Patient position: sniffing  Mask difficulty assessment: 1 - vent by mask    Final Airway Details  Final airway type: endotracheal airway      Successful airway: ETT  Cuffed: yes   Successful intubation technique: direct laryngoscopy  Facilitating devices/methods: intubating stylet and anterior pressure/BURP  Endotracheal tube insertion site: oral  Blade: Vivienne  Blade size: #3  ETT size (mm): 8.0  Cormack-Lehane Classification: grade I - full view of glottis  Placement verified by: chest auscultation and capnometry   Measured from: lips  ETT to lips (cm): 21  Number of attempts at approach: 1    Additional Comments  Easy atraumatic intubation per MSAS2 under direct supervision with no complications. Lips and gums in preanesthetic condition.

## 2024-07-15 NOTE — SIGNIFICANT EVENT
Patient has an [place] portable DNR. She does want her DNR reversed for the procedure today. Patient is now FULL CODE for duration of the bronchoscopy.

## 2024-07-15 NOTE — LETTER
Dear, Thu Ortega      7/8/2024                                              INFORMATION FOR YOUR PROCEDURE     INSTRUCTIONS MUST BE FOLLOWED OR YOU RUN THE RISK OF YOUR CASE BEING CANCELED     Information is attached to this e-mail for your upcoming procedure. (Look for the paperclip in your email to access this information)  Lab requisitions are also included as well as procedural instructions.    You are scheduled for your Bronchoscopy on  7/15/24,    with Dr. Andrzej Tovar    Arrival is at  7:00  AM,  for Check In prior to your actual procedure time. This allows us to prepare you for your procedure.  Location:   [place] [address]   Bronchoscopy / Endoscopy Suite   [facility] Room 1300.     Located on the 1st Floor close to the Main entrance of the hospital.  Enter through the front doors, turn left and we are the 1st hallway on the left hand side.(Room 1300 [facility]).  If you park in the visitor's garage you will come in on the second floor and will need to make your way down to the 1st level.     Patient information is right there if assistance is needed.    NPO (No food or drink) after midnight the night before your procedure. This includes coffee, water, and soda, hard candy, gum or mints.  If taking your morning medications, a small sip of water is allowed to get the medication down.    For your safety, you must have a responsible, adult  accompany you to your procedure.  You will not be permitted to drive yourself home if you have received any type of anesthesia or sedation.    Automated calls about your upcoming scheduled appointments can be quite confusing for patients.    The times may vary depending on what you have scheduled for procedure day. Follow the time/s I have given you  so there is no confusion.    Blood work will need to be done prior to your procedure, preferably at a  Facility.  There are  no restrictions for testing.  Your blood work is current.  No need to repeat.    Our Nurse Practitioner, Fatoumata Damico CNP, will call you on 7/8/24, @ 4:20 PM    This is a phone visit to go over your medical history prior to your procedure, and is a necessary part of your workup.  The patient must be present at this phone visit.    Please reach out to me with any questions you may have Trudy @ [phone] or   Yayo @ [phone]    Have a nice day!    Trudy Villatoro    Bronchoscopy   Interventional Pulmonology    MD Oralia Mendez MD Sameer Avasarala, MD Catalina Teba, MD Andrew Dunatchik, MD        [facility]  Pulmonary, Critical Care and Sleep Medicine  [address] [place] [address] [phone]  - [phone]  [email or web address]

## 2024-07-22 ENCOUNTER — LAB (OUTPATIENT)
Dept: LAB | Facility: CLINIC | Age: 69
End: 2024-07-22
Payer: MEDICARE

## 2024-07-22 DIAGNOSIS — C34.12 MALIGNANT NEOPLASM OF UPPER LOBE OF LEFT LUNG (MULTI): ICD-10-CM

## 2024-07-22 DIAGNOSIS — C34.92 LOCAL RECURRENCE OF LEFT LUNG CANCER (MULTI): ICD-10-CM

## 2024-07-22 LAB
ALBUMIN SERPL BCP-MCNC: 4.5 G/DL (ref 3.4–5)
ALP SERPL-CCNC: 108 U/L (ref 33–136)
ALT SERPL W P-5'-P-CCNC: 8 U/L (ref 7–45)
ANION GAP SERPL CALC-SCNC: 15 MMOL/L (ref 10–20)
AST SERPL W P-5'-P-CCNC: 12 U/L (ref 9–39)
BASOPHILS # BLD AUTO: 0.06 X10*3/UL (ref 0–0.1)
BASOPHILS NFR BLD AUTO: 0.7 %
BILIRUB SERPL-MCNC: 0.4 MG/DL (ref 0–1.2)
BUN SERPL-MCNC: 7 MG/DL (ref 6–23)
CALCIUM SERPL-MCNC: 9.7 MG/DL (ref 8.6–10.3)
CHLORIDE SERPL-SCNC: 99 MMOL/L (ref 98–107)
CO2 SERPL-SCNC: 28 MMOL/L (ref 21–32)
CREAT SERPL-MCNC: 0.77 MG/DL (ref 0.5–1.05)
EGFRCR SERPLBLD CKD-EPI 2021: 83 ML/MIN/1.73M*2
EOSINOPHIL # BLD AUTO: 0.5 X10*3/UL (ref 0–0.7)
EOSINOPHIL NFR BLD AUTO: 5.6 %
ERYTHROCYTE [DISTWIDTH] IN BLOOD BY AUTOMATED COUNT: 17.4 % (ref 11.5–14.5)
GLUCOSE SERPL-MCNC: 240 MG/DL (ref 74–99)
HCT VFR BLD AUTO: 37.6 % (ref 36–46)
HGB BLD-MCNC: 11.7 G/DL (ref 12–16)
IMM GRANULOCYTES # BLD AUTO: 0.06 X10*3/UL (ref 0–0.7)
IMM GRANULOCYTES NFR BLD AUTO: 0.7 % (ref 0–0.9)
LAB AP ASR DISCLAIMER: NORMAL
LABORATORY COMMENT REPORT: NORMAL
LABORATORY COMMENT REPORT: NORMAL
LYMPHOCYTES # BLD AUTO: 2.65 X10*3/UL (ref 1.2–4.8)
LYMPHOCYTES NFR BLD AUTO: 29.6 %
MCH RBC QN AUTO: 24.8 PG (ref 26–34)
MCHC RBC AUTO-ENTMCNC: 31.1 G/DL (ref 32–36)
MCV RBC AUTO: 80 FL (ref 80–100)
MONOCYTES # BLD AUTO: 0.79 X10*3/UL (ref 0.1–1)
MONOCYTES NFR BLD AUTO: 8.8 %
NEUTROPHILS # BLD AUTO: 4.89 X10*3/UL (ref 1.2–7.7)
NEUTROPHILS NFR BLD AUTO: 54.6 %
PATH REPORT.ADDENDUM SPEC: NORMAL
PATH REPORT.FINAL DX SPEC: NORMAL
PATH REPORT.GROSS SPEC: NORMAL
PATH REPORT.INTRAOP OBS SPEC DOC: NORMAL
PATH REPORT.TOTAL CANCER: NORMAL
PLATELET # BLD AUTO: 264 X10*3/UL (ref 150–450)
POTASSIUM SERPL-SCNC: 3.7 MMOL/L (ref 3.5–5.3)
PROT SERPL-MCNC: 7.1 G/DL (ref 6.4–8.2)
RBC # BLD AUTO: 4.71 X10*6/UL (ref 4–5.2)
RESIDENT REVIEW: NORMAL
SODIUM SERPL-SCNC: 138 MMOL/L (ref 136–145)
WBC # BLD AUTO: 9 X10*3/UL (ref 4.4–11.3)

## 2024-07-22 PROCEDURE — 84075 ASSAY ALKALINE PHOSPHATASE: CPT

## 2024-07-22 PROCEDURE — 36415 COLL VENOUS BLD VENIPUNCTURE: CPT

## 2024-07-22 PROCEDURE — 85025 COMPLETE CBC W/AUTO DIFF WBC: CPT

## 2024-07-23 ENCOUNTER — HOSPITAL ENCOUNTER (INPATIENT)
Facility: HOSPITAL | Age: 69
DRG: 177 | End: 2024-07-23
Attending: STUDENT IN AN ORGANIZED HEALTH CARE EDUCATION/TRAINING PROGRAM | Admitting: INTERNAL MEDICINE
Payer: MEDICARE

## 2024-07-23 ENCOUNTER — APPOINTMENT (OUTPATIENT)
Dept: RADIOLOGY | Facility: HOSPITAL | Age: 69
DRG: 177 | End: 2024-07-23
Payer: MEDICARE

## 2024-07-23 ENCOUNTER — APPOINTMENT (OUTPATIENT)
Dept: CARDIOLOGY | Facility: HOSPITAL | Age: 69
DRG: 177 | End: 2024-07-23
Payer: MEDICARE

## 2024-07-23 DIAGNOSIS — J96.11 CHRONIC HYPOXIC RESPIRATORY FAILURE (MULTI): ICD-10-CM

## 2024-07-23 DIAGNOSIS — Z79.4 TYPE 2 DIABETES MELLITUS WITHOUT COMPLICATION, WITH LONG-TERM CURRENT USE OF INSULIN (MULTI): ICD-10-CM

## 2024-07-23 DIAGNOSIS — J44.1 COPD EXACERBATION (MULTI): Primary | ICD-10-CM

## 2024-07-23 DIAGNOSIS — E11.9 TYPE 2 DIABETES MELLITUS WITHOUT COMPLICATION, WITH LONG-TERM CURRENT USE OF INSULIN (MULTI): ICD-10-CM

## 2024-07-23 DIAGNOSIS — J44.9 CHRONIC OBSTRUCTIVE PULMONARY DISEASE, UNSPECIFIED COPD TYPE (MULTI): ICD-10-CM

## 2024-07-23 DIAGNOSIS — J18.9 PNEUMONIA OF RIGHT MIDDLE LOBE DUE TO INFECTIOUS ORGANISM: ICD-10-CM

## 2024-07-23 LAB
ALBUMIN SERPL BCP-MCNC: 4.7 G/DL (ref 3.4–5)
ALP SERPL-CCNC: 105 U/L (ref 33–136)
ALT SERPL W P-5'-P-CCNC: 10 U/L (ref 7–45)
ANION GAP BLDV CALCULATED.4IONS-SCNC: 12 MMOL/L (ref 10–25)
ANION GAP BLDV CALCULATED.4IONS-SCNC: 9 MMOL/L (ref 10–25)
ANION GAP SERPL CALC-SCNC: 15 MMOL/L (ref 10–20)
APPEARANCE UR: CLEAR
AST SERPL W P-5'-P-CCNC: 13 U/L (ref 9–39)
ATRIAL RATE: 119 BPM
BASE EXCESS BLDV CALC-SCNC: 1 MMOL/L (ref -2–3)
BASE EXCESS BLDV CALC-SCNC: 1.7 MMOL/L (ref -2–3)
BASOPHILS # BLD AUTO: 0.02 X10*3/UL (ref 0–0.1)
BASOPHILS NFR BLD AUTO: 0.1 %
BILIRUB SERPL-MCNC: 0.4 MG/DL (ref 0–1.2)
BILIRUB UR STRIP.AUTO-MCNC: NEGATIVE MG/DL
BNP SERPL-MCNC: 10 PG/ML (ref 0–99)
BODY TEMPERATURE: ABNORMAL
BODY TEMPERATURE: ABNORMAL
BUN SERPL-MCNC: 9 MG/DL (ref 6–23)
CA-I BLDV-SCNC: 1.13 MMOL/L (ref 1.1–1.33)
CA-I BLDV-SCNC: 1.24 MMOL/L (ref 1.1–1.33)
CALCIUM SERPL-MCNC: 9.9 MG/DL (ref 8.6–10.3)
CARDIAC TROPONIN I PNL SERPL HS: 8 NG/L (ref 0–13)
CARDIAC TROPONIN I PNL SERPL HS: 8 NG/L (ref 0–13)
CHLORIDE BLDV-SCNC: 102 MMOL/L (ref 98–107)
CHLORIDE BLDV-SCNC: 103 MMOL/L (ref 98–107)
CHLORIDE SERPL-SCNC: 99 MMOL/L (ref 98–107)
CO2 SERPL-SCNC: 28 MMOL/L (ref 21–32)
COLOR UR: COLORLESS
CREAT SERPL-MCNC: 0.75 MG/DL (ref 0.5–1.05)
EGFRCR SERPLBLD CKD-EPI 2021: 86 ML/MIN/1.73M*2
EOSINOPHIL # BLD AUTO: 0.27 X10*3/UL (ref 0–0.7)
EOSINOPHIL NFR BLD AUTO: 1.9 %
ERYTHROCYTE [DISTWIDTH] IN BLOOD BY AUTOMATED COUNT: 17.4 % (ref 11.5–14.5)
GLUCOSE BLD MANUAL STRIP-MCNC: 241 MG/DL (ref 74–99)
GLUCOSE BLD MANUAL STRIP-MCNC: 248 MG/DL (ref 74–99)
GLUCOSE BLD MANUAL STRIP-MCNC: 281 MG/DL (ref 74–99)
GLUCOSE BLD MANUAL STRIP-MCNC: 320 MG/DL (ref 74–99)
GLUCOSE BLDV-MCNC: 201 MG/DL (ref 74–99)
GLUCOSE BLDV-MCNC: 299 MG/DL (ref 74–99)
GLUCOSE SERPL-MCNC: 186 MG/DL (ref 74–99)
GLUCOSE UR STRIP.AUTO-MCNC: ABNORMAL MG/DL
HCO3 BLDV-SCNC: 26 MMOL/L (ref 22–26)
HCO3 BLDV-SCNC: 30.1 MMOL/L (ref 22–26)
HCT VFR BLD AUTO: 41.9 % (ref 36–46)
HCT VFR BLD EST: 31 % (ref 36–46)
HCT VFR BLD EST: 40 % (ref 36–46)
HGB BLD-MCNC: 12.8 G/DL (ref 12–16)
HGB BLDV-MCNC: 10.2 G/DL (ref 12–16)
HGB BLDV-MCNC: 13.2 G/DL (ref 12–16)
HOLD SPECIMEN: NORMAL
HOLD SPECIMEN: NORMAL
IMM GRANULOCYTES # BLD AUTO: 0.05 X10*3/UL (ref 0–0.7)
IMM GRANULOCYTES NFR BLD AUTO: 0.3 % (ref 0–0.9)
INHALED O2 CONCENTRATION: 21 %
INHALED O2 CONCENTRATION: 40 %
KETONES UR STRIP.AUTO-MCNC: NEGATIVE MG/DL
LACTATE BLDV-SCNC: 2.2 MMOL/L (ref 0.4–2)
LACTATE BLDV-SCNC: 3.2 MMOL/L (ref 0.4–2)
LACTATE BLDV-SCNC: 3.7 MMOL/L (ref 0.4–2)
LEUKOCYTE ESTERASE UR QL STRIP.AUTO: NEGATIVE
LYMPHOCYTES # BLD AUTO: 2.92 X10*3/UL (ref 1.2–4.8)
LYMPHOCYTES NFR BLD AUTO: 20.3 %
MAGNESIUM SERPL-MCNC: 1.6 MG/DL (ref 1.6–2.4)
MCH RBC QN AUTO: 24.9 PG (ref 26–34)
MCHC RBC AUTO-ENTMCNC: 30.5 G/DL (ref 32–36)
MCV RBC AUTO: 81 FL (ref 80–100)
MONOCYTES # BLD AUTO: 0.47 X10*3/UL (ref 0.1–1)
MONOCYTES NFR BLD AUTO: 3.3 %
NEUTROPHILS # BLD AUTO: 10.66 X10*3/UL (ref 1.2–7.7)
NEUTROPHILS NFR BLD AUTO: 74.1 %
NITRITE UR QL STRIP.AUTO: NEGATIVE
NRBC BLD-RTO: 0 /100 WBCS (ref 0–0)
OXYHGB MFR BLDV: 16.1 % (ref 45–75)
OXYHGB MFR BLDV: 87.5 % (ref 45–75)
P AXIS: 53 DEGREES
P OFFSET: 179 MS
P ONSET: 143 MS
PCO2 BLDV: 42 MM HG (ref 41–51)
PCO2 BLDV: 64 MM HG (ref 41–51)
PH BLDV: 7.28 PH (ref 7.33–7.43)
PH BLDV: 7.4 PH (ref 7.33–7.43)
PH UR STRIP.AUTO: 5.5 [PH]
PHOSPHATE SERPL-MCNC: 2.8 MG/DL (ref 2.5–4.9)
PLATELET # BLD AUTO: 300 X10*3/UL (ref 150–450)
PO2 BLDV: 17 MM HG (ref 35–45)
PO2 BLDV: 58 MM HG (ref 35–45)
POTASSIUM BLDV-SCNC: 3.1 MMOL/L (ref 3.5–5.3)
POTASSIUM BLDV-SCNC: 3.4 MMOL/L (ref 3.5–5.3)
POTASSIUM SERPL-SCNC: 3 MMOL/L (ref 3.5–5.3)
PR INTERVAL: 138 MS
PROCALCITONIN SERPL-MCNC: 4.96 NG/ML
PROT SERPL-MCNC: 7.7 G/DL (ref 6.4–8.2)
PROT UR STRIP.AUTO-MCNC: NEGATIVE MG/DL
Q ONSET: 212 MS
QRS COUNT: 19 BEATS
QRS DURATION: 84 MS
QT INTERVAL: 306 MS
QTC CALCULATION(BAZETT): 430 MS
QTC FREDERICIA: 384 MS
R AXIS: -15 DEGREES
RBC # BLD AUTO: 5.15 X10*6/UL (ref 4–5.2)
RBC # UR STRIP.AUTO: NEGATIVE /UL
SAO2 % BLDV: 16 % (ref 45–75)
SAO2 % BLDV: 89 % (ref 45–75)
SODIUM BLDV-SCNC: 135 MMOL/L (ref 136–145)
SODIUM BLDV-SCNC: 141 MMOL/L (ref 136–145)
SODIUM SERPL-SCNC: 139 MMOL/L (ref 136–145)
SP GR UR STRIP.AUTO: 1.02
T AXIS: 118 DEGREES
T OFFSET: 365 MS
UROBILINOGEN UR STRIP.AUTO-MCNC: NORMAL MG/DL
VENTRICULAR RATE: 119 BPM
WBC # BLD AUTO: 14.4 X10*3/UL (ref 4.4–11.3)

## 2024-07-23 PROCEDURE — 94640 AIRWAY INHALATION TREATMENT: CPT

## 2024-07-23 PROCEDURE — 2500000004 HC RX 250 GENERAL PHARMACY W/ HCPCS (ALT 636 FOR OP/ED): Performed by: INTERNAL MEDICINE

## 2024-07-23 PROCEDURE — 71275 CT ANGIOGRAPHY CHEST: CPT | Mod: FOREIGN READ | Performed by: RADIOLOGY

## 2024-07-23 PROCEDURE — 84132 ASSAY OF SERUM POTASSIUM: CPT | Performed by: STUDENT IN AN ORGANIZED HEALTH CARE EDUCATION/TRAINING PROGRAM

## 2024-07-23 PROCEDURE — 85025 COMPLETE CBC W/AUTO DIFF WBC: CPT | Performed by: STUDENT IN AN ORGANIZED HEALTH CARE EDUCATION/TRAINING PROGRAM

## 2024-07-23 PROCEDURE — 99223 1ST HOSP IP/OBS HIGH 75: CPT | Performed by: INTERNAL MEDICINE

## 2024-07-23 PROCEDURE — 96367 TX/PROPH/DG ADDL SEQ IV INF: CPT

## 2024-07-23 PROCEDURE — 2500000001 HC RX 250 WO HCPCS SELF ADMINISTERED DRUGS (ALT 637 FOR MEDICARE OP): Performed by: INTERNAL MEDICINE

## 2024-07-23 PROCEDURE — 82947 ASSAY GLUCOSE BLOOD QUANT: CPT

## 2024-07-23 PROCEDURE — 84145 PROCALCITONIN (PCT): CPT | Mod: STJLAB | Performed by: INTERNAL MEDICINE

## 2024-07-23 PROCEDURE — 71045 X-RAY EXAM CHEST 1 VIEW: CPT

## 2024-07-23 PROCEDURE — 2060000001 HC INTERMEDIATE ICU ROOM DAILY

## 2024-07-23 PROCEDURE — 36415 COLL VENOUS BLD VENIPUNCTURE: CPT | Performed by: STUDENT IN AN ORGANIZED HEALTH CARE EDUCATION/TRAINING PROGRAM

## 2024-07-23 PROCEDURE — 96365 THER/PROPH/DIAG IV INF INIT: CPT

## 2024-07-23 PROCEDURE — 94660 CPAP INITIATION&MGMT: CPT

## 2024-07-23 PROCEDURE — 81003 URINALYSIS AUTO W/O SCOPE: CPT | Performed by: STUDENT IN AN ORGANIZED HEALTH CARE EDUCATION/TRAINING PROGRAM

## 2024-07-23 PROCEDURE — 96375 TX/PRO/DX INJ NEW DRUG ADDON: CPT

## 2024-07-23 PROCEDURE — 71275 CT ANGIOGRAPHY CHEST: CPT

## 2024-07-23 PROCEDURE — 96361 HYDRATE IV INFUSION ADD-ON: CPT

## 2024-07-23 PROCEDURE — 84484 ASSAY OF TROPONIN QUANT: CPT | Performed by: STUDENT IN AN ORGANIZED HEALTH CARE EDUCATION/TRAINING PROGRAM

## 2024-07-23 PROCEDURE — 83735 ASSAY OF MAGNESIUM: CPT | Performed by: STUDENT IN AN ORGANIZED HEALTH CARE EDUCATION/TRAINING PROGRAM

## 2024-07-23 PROCEDURE — 71045 X-RAY EXAM CHEST 1 VIEW: CPT | Mod: FOREIGN READ | Performed by: RADIOLOGY

## 2024-07-23 PROCEDURE — 2500000002 HC RX 250 W HCPCS SELF ADMINISTERED DRUGS (ALT 637 FOR MEDICARE OP, ALT 636 FOR OP/ED): Performed by: INTERNAL MEDICINE

## 2024-07-23 PROCEDURE — 83880 ASSAY OF NATRIURETIC PEPTIDE: CPT | Performed by: STUDENT IN AN ORGANIZED HEALTH CARE EDUCATION/TRAINING PROGRAM

## 2024-07-23 PROCEDURE — 99291 CRITICAL CARE FIRST HOUR: CPT | Performed by: STUDENT IN AN ORGANIZED HEALTH CARE EDUCATION/TRAINING PROGRAM

## 2024-07-23 PROCEDURE — 2500000004 HC RX 250 GENERAL PHARMACY W/ HCPCS (ALT 636 FOR OP/ED): Performed by: STUDENT IN AN ORGANIZED HEALTH CARE EDUCATION/TRAINING PROGRAM

## 2024-07-23 PROCEDURE — 93005 ELECTROCARDIOGRAM TRACING: CPT

## 2024-07-23 PROCEDURE — 2500000005 HC RX 250 GENERAL PHARMACY W/O HCPCS

## 2024-07-23 PROCEDURE — 84132 ASSAY OF SERUM POTASSIUM: CPT | Performed by: EMERGENCY MEDICINE

## 2024-07-23 PROCEDURE — 84100 ASSAY OF PHOSPHORUS: CPT | Performed by: STUDENT IN AN ORGANIZED HEALTH CARE EDUCATION/TRAINING PROGRAM

## 2024-07-23 PROCEDURE — 83605 ASSAY OF LACTIC ACID: CPT | Performed by: STUDENT IN AN ORGANIZED HEALTH CARE EDUCATION/TRAINING PROGRAM

## 2024-07-23 PROCEDURE — 87040 BLOOD CULTURE FOR BACTERIA: CPT | Mod: STJLAB

## 2024-07-23 PROCEDURE — 2500000004 HC RX 250 GENERAL PHARMACY W/ HCPCS (ALT 636 FOR OP/ED)

## 2024-07-23 PROCEDURE — 2550000001 HC RX 255 CONTRASTS: Performed by: STUDENT IN AN ORGANIZED HEALTH CARE EDUCATION/TRAINING PROGRAM

## 2024-07-23 PROCEDURE — 2500000002 HC RX 250 W HCPCS SELF ADMINISTERED DRUGS (ALT 637 FOR MEDICARE OP, ALT 636 FOR OP/ED): Performed by: STUDENT IN AN ORGANIZED HEALTH CARE EDUCATION/TRAINING PROGRAM

## 2024-07-23 RX ORDER — DEXTROSE 50 % IN WATER (D50W) INTRAVENOUS SYRINGE
12.5
Status: DISCONTINUED | OUTPATIENT
Start: 2024-07-23 | End: 2024-07-30 | Stop reason: HOSPADM

## 2024-07-23 RX ORDER — FORMOTEROL FUMARATE DIHYDRATE 20 UG/2ML
20 SOLUTION RESPIRATORY (INHALATION)
Status: DISCONTINUED | OUTPATIENT
Start: 2024-07-23 | End: 2024-07-30 | Stop reason: HOSPADM

## 2024-07-23 RX ORDER — DEXTROSE 50 % IN WATER (D50W) INTRAVENOUS SYRINGE
25
Status: DISCONTINUED | OUTPATIENT
Start: 2024-07-23 | End: 2024-07-30 | Stop reason: HOSPADM

## 2024-07-23 RX ORDER — IPRATROPIUM BROMIDE AND ALBUTEROL SULFATE 2.5; .5 MG/3ML; MG/3ML
3 SOLUTION RESPIRATORY (INHALATION) EVERY 20 MIN
Status: COMPLETED | OUTPATIENT
Start: 2024-07-23 | End: 2024-07-23

## 2024-07-23 RX ORDER — TRAZODONE HYDROCHLORIDE 50 MG/1
50 TABLET ORAL NIGHTLY
Status: DISCONTINUED | OUTPATIENT
Start: 2024-07-23 | End: 2024-07-30 | Stop reason: HOSPADM

## 2024-07-23 RX ORDER — AMLODIPINE BESYLATE 10 MG/1
10 TABLET ORAL
Status: DISCONTINUED | OUTPATIENT
Start: 2024-07-23 | End: 2024-07-30 | Stop reason: HOSPADM

## 2024-07-23 RX ORDER — INSULIN LISPRO 100 [IU]/ML
0-10 INJECTION, SOLUTION INTRAVENOUS; SUBCUTANEOUS
Status: DISCONTINUED | OUTPATIENT
Start: 2024-07-23 | End: 2024-07-30 | Stop reason: HOSPADM

## 2024-07-23 RX ORDER — LEVOTHYROXINE SODIUM 25 UG/1
25 TABLET ORAL
Status: DISCONTINUED | OUTPATIENT
Start: 2024-07-23 | End: 2024-07-30 | Stop reason: HOSPADM

## 2024-07-23 RX ORDER — DULOXETIN HYDROCHLORIDE 60 MG/1
60 CAPSULE, DELAYED RELEASE ORAL DAILY
Status: DISCONTINUED | OUTPATIENT
Start: 2024-07-23 | End: 2024-07-30 | Stop reason: HOSPADM

## 2024-07-23 RX ORDER — ATORVASTATIN CALCIUM 10 MG/1
10 TABLET, FILM COATED ORAL NIGHTLY
Status: DISCONTINUED | OUTPATIENT
Start: 2024-07-23 | End: 2024-07-30 | Stop reason: HOSPADM

## 2024-07-23 RX ORDER — MAGNESIUM SULFATE HEPTAHYDRATE 40 MG/ML
2 INJECTION, SOLUTION INTRAVENOUS ONCE
Status: COMPLETED | OUTPATIENT
Start: 2024-07-23 | End: 2024-07-23

## 2024-07-23 RX ORDER — ENOXAPARIN SODIUM 100 MG/ML
40 INJECTION SUBCUTANEOUS EVERY 24 HOURS
Status: DISCONTINUED | OUTPATIENT
Start: 2024-07-23 | End: 2024-07-30 | Stop reason: HOSPADM

## 2024-07-23 RX ORDER — CEFTRIAXONE 2 G/50ML
2 INJECTION, SOLUTION INTRAVENOUS ONCE
Status: COMPLETED | OUTPATIENT
Start: 2024-07-23 | End: 2024-07-23

## 2024-07-23 RX ORDER — BUDESONIDE 0.5 MG/2ML
0.5 INHALANT ORAL
Status: DISCONTINUED | OUTPATIENT
Start: 2024-07-23 | End: 2024-07-30 | Stop reason: HOSPADM

## 2024-07-23 RX ORDER — FLUTICASONE FUROATE AND VILANTEROL 200; 25 UG/1; UG/1
1 POWDER RESPIRATORY (INHALATION)
Status: DISCONTINUED | OUTPATIENT
Start: 2024-07-23 | End: 2024-07-23 | Stop reason: ALTCHOICE

## 2024-07-23 RX ORDER — NYSTATIN 100000 [USP'U]/G
1 POWDER TOPICAL 2 TIMES DAILY
Status: DISCONTINUED | OUTPATIENT
Start: 2024-07-23 | End: 2024-07-30 | Stop reason: HOSPADM

## 2024-07-23 RX ORDER — GUAIFENESIN 600 MG/1
1200 TABLET, EXTENDED RELEASE ORAL 2 TIMES DAILY
Status: DISCONTINUED | OUTPATIENT
Start: 2024-07-23 | End: 2024-07-30 | Stop reason: HOSPADM

## 2024-07-23 RX ORDER — CEFTRIAXONE 2 G/50ML
2 INJECTION, SOLUTION INTRAVENOUS EVERY 24 HOURS
Status: DISCONTINUED | OUTPATIENT
Start: 2024-07-23 | End: 2024-07-23

## 2024-07-23 RX ORDER — ACETAMINOPHEN 500 MG
5000 TABLET ORAL DAILY
Status: DISCONTINUED | OUTPATIENT
Start: 2024-07-23 | End: 2024-07-30 | Stop reason: HOSPADM

## 2024-07-23 RX ORDER — OXYCODONE AND ACETAMINOPHEN 5; 325 MG/1; MG/1
1 TABLET ORAL ONCE
Status: COMPLETED | OUTPATIENT
Start: 2024-07-23 | End: 2024-07-23

## 2024-07-23 RX ORDER — IPRATROPIUM BROMIDE AND ALBUTEROL SULFATE 2.5; .5 MG/3ML; MG/3ML
3 SOLUTION RESPIRATORY (INHALATION)
Status: DISCONTINUED | OUTPATIENT
Start: 2024-07-23 | End: 2024-07-24

## 2024-07-23 RX ORDER — ACETAMINOPHEN 325 MG/1
650 TABLET ORAL EVERY 6 HOURS PRN
Status: DISCONTINUED | OUTPATIENT
Start: 2024-07-23 | End: 2024-07-30 | Stop reason: HOSPADM

## 2024-07-23 RX ORDER — POTASSIUM CHLORIDE 20 MEQ/1
40 TABLET, EXTENDED RELEASE ORAL ONCE
Status: COMPLETED | OUTPATIENT
Start: 2024-07-23 | End: 2024-07-23

## 2024-07-23 RX ORDER — NAPROXEN SODIUM 220 MG/1
81 TABLET, FILM COATED ORAL DAILY
Status: DISCONTINUED | OUTPATIENT
Start: 2024-07-23 | End: 2024-07-30 | Stop reason: HOSPADM

## 2024-07-23 RX ORDER — CEFTRIAXONE 2 G/50ML
2 INJECTION, SOLUTION INTRAVENOUS EVERY 24 HOURS
Status: DISCONTINUED | OUTPATIENT
Start: 2024-07-24 | End: 2024-07-30 | Stop reason: HOSPADM

## 2024-07-23 RX ORDER — BIOTIN 10 MG
10 TABLET ORAL DAILY
Status: DISCONTINUED | OUTPATIENT
Start: 2024-07-23 | End: 2024-07-23

## 2024-07-23 RX ORDER — ALBUTEROL SULFATE 90 UG/1
2 AEROSOL, METERED RESPIRATORY (INHALATION) EVERY 6 HOURS PRN
COMMUNITY

## 2024-07-23 RX ORDER — PANTOPRAZOLE SODIUM 40 MG/1
40 TABLET, DELAYED RELEASE ORAL
Status: DISCONTINUED | OUTPATIENT
Start: 2024-07-23 | End: 2024-07-30 | Stop reason: HOSPADM

## 2024-07-23 SDOH — SOCIAL STABILITY: SOCIAL INSECURITY: DOES ANYONE TRY TO KEEP YOU FROM HAVING/CONTACTING OTHER FRIENDS OR DOING THINGS OUTSIDE YOUR HOME?: NO

## 2024-07-23 SDOH — SOCIAL STABILITY: SOCIAL INSECURITY: ARE YOU OR HAVE YOU BEEN THREATENED OR ABUSED PHYSICALLY, EMOTIONALLY, OR SEXUALLY BY ANYONE?: NO

## 2024-07-23 SDOH — SOCIAL STABILITY: SOCIAL INSECURITY: DO YOU FEEL UNSAFE GOING BACK TO THE PLACE WHERE YOU ARE LIVING?: NO

## 2024-07-23 SDOH — SOCIAL STABILITY: SOCIAL INSECURITY: DO YOU FEEL ANYONE HAS EXPLOITED OR TAKEN ADVANTAGE OF YOU FINANCIALLY OR OF YOUR PERSONAL PROPERTY?: NO

## 2024-07-23 SDOH — SOCIAL STABILITY: SOCIAL INSECURITY: HAVE YOU HAD ANY THOUGHTS OF HARMING ANYONE ELSE?: NO

## 2024-07-23 SDOH — SOCIAL STABILITY: SOCIAL INSECURITY: HAVE YOU HAD THOUGHTS OF HARMING ANYONE ELSE?: NO

## 2024-07-23 SDOH — SOCIAL STABILITY: SOCIAL INSECURITY: ARE THERE ANY APPARENT SIGNS OF INJURIES/BEHAVIORS THAT COULD BE RELATED TO ABUSE/NEGLECT?: NO

## 2024-07-23 SDOH — SOCIAL STABILITY: SOCIAL INSECURITY: WERE YOU ABLE TO COMPLETE ALL THE BEHAVIORAL HEALTH SCREENINGS?: YES

## 2024-07-23 SDOH — SOCIAL STABILITY: SOCIAL INSECURITY: ABUSE: ADULT

## 2024-07-23 SDOH — SOCIAL STABILITY: SOCIAL INSECURITY: HAS ANYONE EVER THREATENED TO HURT YOUR FAMILY OR YOUR PETS?: NO

## 2024-07-23 ASSESSMENT — COGNITIVE AND FUNCTIONAL STATUS - GENERAL
MOBILITY SCORE: 24
DAILY ACTIVITIY SCORE: 24
MOBILITY SCORE: 24
PATIENT BASELINE BEDBOUND: NO
DAILY ACTIVITIY SCORE: 24

## 2024-07-23 ASSESSMENT — PAIN - FUNCTIONAL ASSESSMENT
PAIN_FUNCTIONAL_ASSESSMENT: 0-10

## 2024-07-23 ASSESSMENT — PATIENT HEALTH QUESTIONNAIRE - PHQ9
SUM OF ALL RESPONSES TO PHQ9 QUESTIONS 1 & 2: 2
1. LITTLE INTEREST OR PLEASURE IN DOING THINGS: SEVERAL DAYS
2. FEELING DOWN, DEPRESSED OR HOPELESS: SEVERAL DAYS

## 2024-07-23 ASSESSMENT — PAIN SCALES - PAIN ASSESSMENT IN ADVANCED DEMENTIA (PAINAD): TOTALSCORE: MEDICATION (SEE MAR)

## 2024-07-23 ASSESSMENT — ACTIVITIES OF DAILY LIVING (ADL)
ADEQUATE_TO_COMPLETE_ADL: YES
HEARING - LEFT EAR: HEARING AID
PATIENT'S MEMORY ADEQUATE TO SAFELY COMPLETE DAILY ACTIVITIES?: YES
WALKS IN HOME: INDEPENDENT
DRESSING YOURSELF: INDEPENDENT
BATHING: INDEPENDENT
FEEDING YOURSELF: INDEPENDENT
HEARING - RIGHT EAR: HEARING AID
LACK_OF_TRANSPORTATION: NO
JUDGMENT_ADEQUATE_SAFELY_COMPLETE_DAILY_ACTIVITIES: YES
TOILETING: INDEPENDENT
GROOMING: INDEPENDENT

## 2024-07-23 ASSESSMENT — LIFESTYLE VARIABLES
EVER HAD A DRINK FIRST THING IN THE MORNING TO STEADY YOUR NERVES TO GET RID OF A HANGOVER: NO
PRESCIPTION_ABUSE_PAST_12_MONTHS: NO
EVER FELT BAD OR GUILTY ABOUT YOUR DRINKING: NO
HAVE YOU EVER FELT YOU SHOULD CUT DOWN ON YOUR DRINKING: NO
AUDIT-C TOTAL SCORE: 0
SKIP TO QUESTIONS 9-10: 1
HOW MANY STANDARD DRINKS CONTAINING ALCOHOL DO YOU HAVE ON A TYPICAL DAY: PATIENT DOES NOT DRINK
HAVE PEOPLE ANNOYED YOU BY CRITICIZING YOUR DRINKING: NO
HOW OFTEN DO YOU HAVE A DRINK CONTAINING ALCOHOL: NEVER
AUDIT-C TOTAL SCORE: 0
SUBSTANCE_ABUSE_PAST_12_MONTHS: NO
TOTAL SCORE: 0
HOW OFTEN DO YOU HAVE 6 OR MORE DRINKS ON ONE OCCASION: NEVER

## 2024-07-23 ASSESSMENT — PAIN SCALES - GENERAL
PAINLEVEL_OUTOF10: 6
PAINLEVEL_OUTOF10: 6
PAINLEVEL_OUTOF10: 0 - NO PAIN
PAINLEVEL_OUTOF10: 2
PAINLEVEL_OUTOF10: 0 - NO PAIN
PAINLEVEL_OUTOF10: 2
PAINLEVEL_OUTOF10: 4

## 2024-07-23 NOTE — NURSING NOTE
Pt remained hemodynamically stable throughout shift. Pt had complaints of headache, medicated per order (see MAR). Pt ambulated to the bathroom with a standby assist. No complaints of shortness of breath. Oxygen saturation maintained above 92% on 10L oximixer. Alarms on. Safety maintained.

## 2024-07-23 NOTE — ED PROVIDER NOTES
EMERGENCY DEPARTMENT ENCOUNTER      Pt Name: Thu Ortega  MRN: [id number]  Birthdate 1955  Date of evaluation: 7/23/2024    HISTORY OF PRESENT ILLNESS    Thu Ortega is an 69 y.o. female with history including hypertension, hyperlipidemia, type 2 diabetes, COPD, CHEY, non-small cell lung carcinoma without cancer, smoker, history of A-fib presenting to the emergency department for shortness of breath.  Patient states that she started to feel worsening shortness of breath over this evening.  Patient feels like she cannot get a deep breath in.  She does have COPD at baseline.  Patient does wear oxygen at home but states that it was not helping.  Patient feels like her chest is tight.  No recent fevers, chills.  She does have a cough at baseline.  No known positive sick contacts.  Patient has never had a history of blood clots and is not currently on blood thinners.      PAST MEDICAL HISTORY     Past Medical History:   Diagnosis Date    Adenocarcinoma of lung, left (Multi)     s/p robotic assisted NICHOL lobectomy on 1/31/23, c/b afib and aspiration PNA    Atrial fibrillation (Multi)     COPD (chronic obstructive pulmonary disease) (Multi)     F/W Dr. Kang, Wixela inhaler, PFT appointment scheduled for 01/12/2024    Depression     Taking Cymbalta    DJD (degenerative joint disease)     DM (diabetes mellitus) (Multi)     F/w Dr. Wong, Taking Metformin, Last A1c is 7.5 on 08/03/2023    Fibromyalgia, primary     F/W Pain    GERD (gastroesophageal reflux disease)     F/w PCP, on Protonix    Hearing aid worn     Bilateral hearing aides    History of blood transfusion     patient think she recieved a transfusion with her Lobectomy surgery    History of meningioma of the brain     s/p resection    HLD (hyperlipidemia)     F/W PCP: Taking Atorvastatin    HTN (hypertension)     F/W PCP    Irregular heart beat     Paroxysmal atrial fibrillation. Last seen by Dr. Fox on 01/03/24, Started on Amiodarone. Not on  anticoagulation.    Irritable bowel syndrome     Malignant neoplasm of upper lobe of left lung (Multi)     Nephrolithiasis     monitoring, no interventions    Panlobular emphysema (Multi)     Shortness of breath     PALENCIA previously on O2    Spinal stenosis     Urinary tract infection        SURGICAL HISTORY       Past Surgical History:   Procedure Laterality Date    BRAIN SURGERY      meningioma resection    CATARACT EXTRACTION      CT GUIDED PERCUTANEOUS BIOPSY LUNG  12/13/2023    CT GUIDED PERCUTANEOUS BIOPSY LUNG 12/13/2023 [facility]    CT GUIDED PERCUTANEOUS BIOPSY LUNG  12/15/2023    CT GUIDED PERCUTANEOUS BIOPSY LUNG    FOOT SURGERY      KNEE SURGERY      LUNG LOBECTOMY      TUBAL LIGATION      US GUIDED NEEDLE LIVER BIOPSY  02/07/2020    US GUIDED NEEDLE LIVER BIOPSY 2/7/2020 [facility]       CURRENT MEDICATIONS       Previous Medications    ACETAMINOPHEN (TYLENOL) 325 MG TABLET    Take 2 tablets (650 mg) by mouth every 4 hours if needed for mild pain (1 - 3) or fever (temp greater than 38.0 C).    AMLODIPINE (NORVASC) 10 MG TABLET    Take 1 tablet (10 mg) by mouth once daily in the morning. Take before meals.    ASPIRIN 81 MG CHEWABLE TABLET    Chew 1 tablet (81 mg) once daily.    ATORVASTATIN (LIPITOR) 10 MG TABLET    Take 1 tablet (10 mg) by mouth once daily.    BIOTIN 10 MG TABLET    Take 1 tablet (10 mg) by mouth once daily.    CHOLECALCIFEROL (VITAMIN D3) 5,000 UNITS TABLET    Take 1 tablet (5,000 Units) by mouth once daily.    DULOXETINE (CYMBALTA) 60 MG DR CAPSULE    Take 1 capsule (60 mg) by mouth once daily. Do not crush or chew.    INSULIN NPH AND REGULAR HUMAN (HUMULIN 70-30, NOVOLIN 70-30) 100 UNIT/ML (70-30) INJECTION    Inject 30 Units under the skin once daily at noon. Take before meals.. Take as directed per insulin instructions.    LEVOTHYROXINE (SYNTHROID, LEVOXYL) 25 MCG TABLET    Take 1 tablet (25 mcg) by mouth early in the morning..    METFORMIN (GLUCOPHAGE) 1,000 MG TABLET    Take 1  tablet (1,000 mg) by mouth 2 times a day.    NYSTATIN (MYCOSTATIN) 100,000 UNIT/GRAM POWDER    Apply 1 Application topically 2 times a day.    ONDANSETRON (ZOFRAN) 4 MG TABLET    Take 1 tablet (4 mg) by mouth every 8 hours if needed for nausea or vomiting.    PANTOPRAZOLE (PROTONIX) 40 MG EC TABLET    Take 1 tablet (40 mg) by mouth once daily in the morning. Take before meals.    TOPIRAMATE (TOPAMAX) 25 MG TABLET    Take 2 tablets (50 mg) by mouth 2 times a day.    TRAZODONE (DESYREL) 50 MG TABLET    Take 1 tablet (50 mg) by mouth once daily at bedtime.    WIXELA INHUB 250-50 MCG/DOSE DISKUS INHALER    USE 1 PUFF TWICE A DAY       ALLERGIES     Penicillin    FAMILY HISTORY       Family History   Problem Relation Name Age of Onset    Stroke Mother      Other (aortic valve disease) Mother      Heart disease Mother      Cancer Sister      Stroke Sister      Diabetes Sister      Diabetes Brother      Other (heart transplant) Brother          SOCIAL HISTORY       Social History     Socioeconomic History    Marital status:    Tobacco Use    Smoking status: Former     Current packs/day: 0.00     Types: Cigarettes     Quit date:      Years since quittin.5    Smokeless tobacco: Never   Vaping Use    Vaping status: Former   Substance and Sexual Activity    Alcohol use: Not Currently    Drug use: Never    Sexual activity: Defer     Social Determinants of Health     Financial Resource Strain: Low Risk  (2024)    Overall Financial Resource Strain (CARDIA)     Difficulty of Paying Living Expenses: Not hard at all   Food Insecurity: No Food Insecurity (2023)    [facility]    Hunger Vital Sign     Worried About Running Out of Food in the Last Year: Never true     Ran Out of Food in the Last Year: Never true   Transportation Needs: No Transportation Needs (2024)    PRAPARE - Transportation     Lack of Transportation (Medical): No     Lack of Transportation  (Non-Medical): No   Physical Activity: Inactive (7/25/2023)    Received from [facility], [facility]    Exercise Vital Sign     Days of Exercise per Week: 0 days     Minutes of Exercise per Session: 0 min   Housing Stability: Low Risk  (1/30/2024)    Housing Stability Vital Sign     Unable to Pay for Housing in the Last Year: No     Number of Places Lived in the Last Year: 1     Unstable Housing in the Last Year: No       PHYSICAL EXAM       ED Triage Vitals [07/23/24 0341]   Temperature Pulse Respirations BP   37.4 °C (99.3 °F) -- (!) 28 (!) 192/81      Pulse Ox Temp src Heart Rate Source Patient Position   (!) 91 % -- -- --      BP Location FiO2 (%)     Right arm --       Physical Exam  Vitals and nursing note reviewed.   Constitutional:       General: She is not in acute distress.     Appearance: She is well-developed.   HENT:      Head: Normocephalic and atraumatic.   Eyes:      Conjunctiva/sclera: Conjunctivae normal.   Cardiovascular:      Rate and Rhythm: Regular rhythm. Tachycardia present.      Heart sounds: No murmur heard.  Pulmonary:      Effort: Tachypnea, accessory muscle usage and respiratory distress present.      Breath sounds: Examination of the right-upper field reveals wheezing. Examination of the left-upper field reveals wheezing. Examination of the right-middle field reveals wheezing. Examination of the left-middle field reveals wheezing. Decreased breath sounds and wheezing present.   Abdominal:      Palpations: Abdomen is soft.      Tenderness: There is no abdominal tenderness.   Musculoskeletal:         General: No swelling.      Cervical back: Neck supple.   Skin:     General: Skin is warm and dry.      Capillary Refill: Capillary refill takes less than 2 seconds.   Neurological:      General: No focal deficit present.      Mental Status: She is alert and oriented to person, place, and time.   Psychiatric:         Mood and Affect: Mood normal.          DIAGNOSTIC RESULTS      LABS:  Labs Reviewed   BLOOD GAS VENOUS FULL PANEL - Abnormal       Result Value    POCT pH, Venous 7.28 (*)     POCT pCO2, Venous 64 (*)     POCT pO2, Venous 17 (*)     POCT SO2, Venous 16 (*)     POCT Oxy Hemoglobin, Venous 16.1 (*)     POCT Hematocrit Calculated, Venous 40.0      POCT Sodium, Venous 141      POCT Potassium, Venous 3.1 (*)     POCT Chloride, Venous 102      POCT Ionized Calicum, Venous 1.24      POCT Glucose, Venous 201 (*)     POCT Lactate, Venous 3.7 (*)     POCT Base Excess, Venous 1.7      POCT HCO3 Calculated, Venous 30.1 (*)     POCT Hemoglobin, Venous 13.2      POCT Anion Gap, Venous 12.0      Patient Temperature        FiO2 21     CBC WITH AUTO DIFFERENTIAL - Abnormal    WBC 14.4 (*)     nRBC 0.0      RBC 5.15      Hemoglobin 12.8      Hematocrit 41.9      MCV 81      MCH 24.9 (*)     MCHC 30.5 (*)     RDW 17.4 (*)     Platelets 300      Neutrophils % 74.1      Immature Granulocytes %, Automated 0.3      Lymphocytes % 20.3      Monocytes % 3.3      Eosinophils % 1.9      Basophils % 0.1      Neutrophils Absolute 10.66 (*)     Immature Granulocytes Absolute, Automated 0.05      Lymphocytes Absolute 2.92      Monocytes Absolute 0.47      Eosinophils Absolute 0.27      Basophils Absolute 0.02     COMPREHENSIVE METABOLIC PANEL - Abnormal    Glucose 186 (*)     Sodium 139      Potassium 3.0 (*)     Chloride 99      Bicarbonate 28      Anion Gap 15      Urea Nitrogen 9      Creatinine 0.75      eGFR 86      Calcium 9.9      Albumin 4.7      Alkaline Phosphatase 105      Total Protein 7.7      AST 13      Bilirubin, Total 0.4      ALT 10     BLOOD GAS LACTIC ACID, VENOUS - Abnormal    POCT Lactate, Venous 3.2 (*)    PHOSPHORUS - Normal    Phosphorus 2.8     MAGNESIUM - Normal    Magnesium 1.60     B-TYPE NATRIURETIC PEPTIDE - Normal    BNP 10      Narrative:        <100 pg/mL - Heart failure unlikely  100-299 pg/mL - Intermediate probability of acute heart                  failure  exacerbation. Correlate with clinical                  context and patient history.    >=300 pg/mL - Heart Failure likely. Correlate with clinical                  context and patient history.    BNP testing is performed using different testing methodology at [facility] than at other [facility]. Direct result comparisons should only be made within the same method.      SERIAL TROPONIN-INITIAL - Normal    Troponin I, High Sensitivity 8      Narrative:     Less than 99th percentile of normal range cutoff-  Female and children under 18 years old <14 ng/L; Male <21 ng/L: Negative  Repeat testing should be performed if clinically indicated.     Female and children under 18 years old 14-50 ng/L; Male 21-50 ng/L:  Consistent with possible cardiac damage and possible increased clinical   risk. Serial measurements may help to assess extent of myocardial damage.     >50 ng/L: Consistent with cardiac damage, increased clinical risk and  myocardial infarction. Serial measurements may help assess extent of   myocardial damage.      NOTE: Children less than 1 year old may have higher baseline troponin   levels and results should be interpreted in conjunction with the overall   clinical context.     NOTE: Troponin I testing is performed using a different   testing methodology at [facility] than at other   [facility]. Direct result comparisons should only   be made within the same method.   SERIAL TROPONIN, 1 HOUR - Normal    Troponin I, High Sensitivity 8      Narrative:     Less than 99th percentile of normal range cutoff-  Female and children under 18 years old <14 ng/L; Male <21 ng/L: Negative  Repeat testing should be performed if clinically indicated.     Female and children under 18 years old 14-50 ng/L; Male 21-50 ng/L:  Consistent with possible cardiac damage and possible increased clinical   risk. Serial measurements may help to assess extent of myocardial damage.     >50 ng/L:  Consistent with cardiac damage, increased clinical risk and  myocardial infarction. Serial measurements may help assess extent of   myocardial damage.      NOTE: Children less than 1 year old may have higher baseline troponin   levels and results should be interpreted in conjunction with the overall   clinical context.     NOTE: Troponin I testing is performed using a different   testing methodology at [facility] than at other   [facility]. Direct result comparisons should only   be made within the same method.   BLOOD CULTURE   BLOOD CULTURE   TROPONIN SERIES- (INITIAL, 1 HR)    Narrative:     The following orders were created for panel order Troponin I Series, High Sensitivity (0, 1 HR).  Procedure                               Abnormality         Status                     ---------                               -----------         ------                     Troponin I, High Sensiti...[[id number]]  Normal              Final result               Troponin, High Sensitivi...[[id number]]  Normal              Final result                 Please view results for these tests on the individual orders.   URINALYSIS WITH REFLEX CULTURE AND MICROSCOPIC    Narrative:     The following orders were created for panel order Urinalysis with Reflex Culture and Microscopic.  Procedure                               Abnormality         Status                     ---------                               -----------         ------                     Urinalysis with Reflex C...[[id number]]                                                 Extra Urine Gray Tube[[id number]]                                                         Please view results for these tests on the individual orders.   URINALYSIS WITH REFLEX CULTURE AND MICROSCOPIC   EXTRA URINE GRAY TUBE   PROCALCITONIN       All other labs were within normal range or not returned as of this dictation.    Imaging  CT angio chest for pulmonary embolism   Final Result    Multifocal airspace disease in the right lung suggesting developing   pneumonia in the appropriate clinical setting.   No definite pulmonary embolus identified.   Signed by Chirag Kessler      XR chest 1 view   Final Result   Multifocal bilateral airspace disease suggesting multilobar pneumonia   in the appropriate clinical setting.   Elevated left hemidiaphragm.   Signed by Chirag Kessler           Procedures  Procedures     EMERGENCY DEPARTMENT COURSE/MDM:   Medical Decision Making  Thu Ortega is an 69 y.o. female with history including hypertension, hyperlipidemia, type 2 diabetes, COPD, CHEY, non-small cell lung carcinoma without cancer, smoker, history of A-fib presenting to the emergency department for shortness of breath.  On arrival patient is hypoxic in triage with increased work of breathing.  On my initial evaluation patient is tachypneic with accessory muscle use is and diminished breath sounds throughout.  Patient does have a mild wheeze.  Concern for COPD exacerbation.  Patient treated with Solu-Medrol and 3 DuoNeb's.  Patient also placed on nonrebreather while receiving treatment.  Patient continued to have increased work of breathing upgraded to BiPAP.    Patient does have increased risk for PE because she is cancer patient.  CT angio ordered as patient is tachycardic and hypoxic on arrival.    Lab results reviewed and interpreted independently.Patient's initial ABG shows acidosis at 7.28.  CO2 is elevated.  Lactate is 3.1.  Patient has an elevated white count of 14.4 which could be indicative of acute infectious process.  Troponin and delta troponin are negative.  Patient is meeting multiple SIRS criteria fluids of 1 L ordered as patient does have a history of CHF do not want to overload patient.  Patient given Rocephin and azithromycin for antibiotic coverage.  Patient was also given magnesium for COPD exacerbation.  Reperfusion exam completed at 0600, good cap refill improvement of heart  rate.    CT imaging shows evidence of right-sided pneumonia but no evidence of PE.  Patient is appropriately being covered Rocephin and azithromycin.  Will attempt to wean patient from BiPAP to high flow nasal cannula and admit to medicine for continued management for COPD exacerbation and pneumonia.        Diagnoses as of 07/23/24 0719   COPD exacerbation (Multi)   Pneumonia of right middle lobe due to infectious organism        External records reviewed: recent inpatient, clinic, and prior ED notes  Labs and Diagnostic imaging independently reviewed/interpreted by me.    Patient plan, care, lab results and imaging were all discussed with attending.    ED Medications administered this visit:    Medications   oxygen (O2) therapy (has no administration in time range)   ipratropium-albuteroL (Duo-Neb) 0.5-2.5 mg/3 mL nebulizer solution 3 mL (3 mL nebulization Given 7/23/24 0353)   methylPREDNISolone sod succinate (SOLU-Medrol) injection 125 mg (125 mg intravenous Given 7/23/24 0354)   magnesium sulfate 2 g in sterile water for injection 50 mL (0 g intravenous Stopped 7/23/24 0414)   iohexol (OMNIPaque) 350 mg iodine/mL solution 75 mL (75 mL intravenous Given 7/23/24 0447)   lactated Ringer's bolus 1,000 mL (1,000 mL intravenous New Bag 7/23/24 0516)   cefTRIAXone (Rocephin) 2 g in dextrose (iso) IV 50 mL (0 g intravenous Stopped 7/23/24 0545)   azithromycin (Zithromax) in dextrose 5 % in water (D5W) 250 mL  mg (0 mg intravenous Stopped 7/23/24 0633)     New Prescriptions from this visit:    New Prescriptions    No medications on file       (Please note that portions of this note were completed with a voice recognition program.  Efforts were made to edit the dictations but occasionally words are mis-transcribed.)     Zoe Winn DO  Resident  07/23/24 0719

## 2024-07-23 NOTE — ED PROCEDURE NOTE
Procedure  Critical Care    Performed by: Abdulaziz Ortiz MD  Authorized by: Abdulaziz Ortiz MD    Critical care provider statement:     Critical care time (minutes):  61    Critical care time was exclusive of:  Separately billable procedures and treating other patients and teaching time    Critical care was necessary to treat or prevent imminent or life-threatening deterioration of the following conditions:  Respiratory failure and sepsis    Critical care was time spent personally by me on the following activities:  Development of treatment plan with patient or surrogate, evaluation of patient's response to treatment, interpretation of cardiac output measurements, ordering and performing treatments and interventions, ordering and review of laboratory studies, ordering and review of radiographic studies, pulse oximetry, re-evaluation of patient's condition and review of old charts    Care discussed with: admitting provider                 Abdulaziz Ortiz MD  07/23/24 8222

## 2024-07-23 NOTE — CARE PLAN
Problem: Fall/Injury  Goal: Not fall by end of shift  Outcome: Progressing  Goal: Be free from injury by end of the shift  Outcome: Progressing  Goal: Verbalize understanding of personal risk factors for fall in the hospital  Outcome: Progressing  Goal: Verbalize understanding of risk factor reduction measures to prevent injury from fall in the home  Outcome: Progressing  Goal: Pace activities to prevent fatigue by end of the shift  Outcome: Progressing     Problem: Skin  Goal: Participates in plan/prevention/treatment measures  Outcome: Progressing  Flowsheets (Taken 7/23/2024 0859)  Participates in plan/prevention/treatment measures: Increase activity/out of bed for meals  Goal: Prevent/manage excess moisture  Outcome: Progressing  Goal: Promote/optimize nutrition  Outcome: Progressing  Flowsheets (Taken 7/23/2024 0859)  Promote/optimize nutrition:   Consume > 50% meals/supplements   Monitor/record intake including meals     Problem: Respiratory  Goal: Minimize anxiety/maximize coping throughout shift  Outcome: Progressing  Goal: Minimal/no exertional discomfort or dyspnea this shift  Outcome: Progressing  Goal: No signs of respiratory distress (eg. Use of accessory muscles. Peds grunting)  Outcome: Progressing  Goal: Verbalize decreased shortness of breath this shift  Outcome: Progressing  Goal: Wean oxygen to maintain O2 saturation per order/standard this shift  Outcome: Progressing   The patient's goals for the shift include      The clinical goals for the shift include Wean oxygen if able    Over the shift, the patient did not make progress toward the following goals. Barriers to progression include new admission with high oxygen needs. Recommendations to address these barriers include meds,monitor.

## 2024-07-23 NOTE — H&P
History Of Present Illness  Thu Ortega is a 69 y.o. female with past medical history of hypertension, hyperlipidemia, type 2 diabetes mellitus, COPD, CHEY on nocturnal 2 L nasal cannula, non-small cell lung cancer, former smoker, history of atrial fibrillation presented to emergency department for shortness of breath and chills.  Patient states that she was feeling well up until Sunday but yesterday she started having cough with chills.  She did not check her temperature.  Cough got progressively worse and she could not get her breath.  She used her nocturnal O2 during the day but still felt like it did not help..  Cough was light yellow in color and she came to the emergency department for shortness of breath and was diagnosed with COPD exacerbation and right-sided pneumonia.  Patient did have bronchoscopy on 7/15/2024 at [facility].  She is unsure if she had a vomiting episode or aspiration at that point.  Patient is due for her Keytruda tomorrow with Dr. Galeano.    Past Medical History  She has a past medical history of Adenocarcinoma of lung, left (Multi), Atrial fibrillation (Multi), COPD (chronic obstructive pulmonary disease) (Multi), Depression, DJD (degenerative joint disease), DM (diabetes mellitus) (Multi), Fibromyalgia, primary, GERD (gastroesophageal reflux disease), Hearing aid worn, History of blood transfusion, History of meningioma of the brain, HLD (hyperlipidemia), HTN (hypertension), Irregular heart beat, Irritable bowel syndrome, Malignant neoplasm of upper lobe of left lung (Multi), Nephrolithiasis, Panlobular emphysema (Multi), Shortness of breath, Spinal stenosis, and Urinary tract infection.    Surgical History  She has a past surgical history that includes US guided needle liver biopsy (02/07/2020); Lung lobectomy; CT guided percutaneous biopsy lung (12/13/2023); CT guided percutaneous biopsy lung (12/15/2023); Brain surgery; Foot surgery; Knee surgery; Cataract extraction;  and Tubal ligation.     Social History  She reports that she quit smoking about 18 years ago. Her smoking use included cigarettes. She has never used smokeless tobacco. She reports that she does not currently use alcohol. She reports that she does not use drugs.    Family History  Family History   Problem Relation Name Age of Onset    Stroke Mother      Other (aortic valve disease) Mother      Heart disease Mother      Cancer Sister      Stroke Sister      Diabetes Sister      Diabetes Brother      Other (heart transplant) Brother          Allergies  Penicillin    Scheduled medications  amLODIPine, 10 mg, oral, Daily before breakfast  aspirin, 81 mg, oral, Daily  atorvastatin, 10 mg, oral, Daily  azithromycin, 500 mg, intravenous, q24h  biotin, 10 mg, oral, Daily  cefTRIAXone, 2 g, intravenous, q24h  cholecalciferol, 5,000 Units, oral, Daily  DULoxetine, 60 mg, oral, Daily  enoxaparin, 40 mg, subcutaneous, q24h  fluticasone furoate-vilanteroL, 1 puff, inhalation, Daily  guaiFENesin, 1,200 mg, oral, BID  insulin lispro, 0-10 Units, subcutaneous, TID  insulin NPH and regular human, 30 Units, subcutaneous, Daily  insulin NPH and regular human, 70 Units, subcutaneous, BID  ipratropium-albuteroL, 3 mL, nebulization, q6h  levothyroxine, 25 mcg, oral, Daily  magnesium sulfate, 2 g, intravenous, Once  methylPREDNISolone sodium succinate (PF), 40 mg, intravenous, q8h  nystatin, 1 Application, Topical, BID  oxygen, , inhalation, Continuous - Inhalation  oxygen, , inhalation, Continuous - Inhalation  pantoprazole, 40 mg, oral, Daily before breakfast  traZODone, 50 mg, oral, Nightly      Continuous medications     PRN medications  PRN medications: acetaminophen, dextrose, dextrose, glucagon, glucagon, oxygen      Physical Exam:  General: Alert and mild distress on Oxymizer  HEENT: PERRLA, head intact and normocephalic  Neck: Normal to inspection  Lungs: Clear to auscultation, work of breathing within normal limit  Cardiac:  Regular rate and rhythm  Abdomen: Soft nontender, positive bowel sounds  : Exam deferred  Skin: Intact  Hematology: No petechia or excessive ecchymosis  Musculoskeletal: Without significant trauma  Neurological: Alert awake oriented, no focal deficit, cranial nerves grossly intact  Psych: No suicidal ideation or homicidal ideation     Last Recorded Vitals  /69   Pulse 98   Temp 37.4 °C (99.3 °F)   Resp 20   Wt 66 kg (145 lb 8.1 oz)   SpO2 94%     Relevant Results  CT angio chest for pulmonary embolism    Result Date: 7/23/2024  STUDY: CT Angiogram of the Chest; 07/23/2024 4:50 AM INDICATION: Hypoxia, shortness of breath.  Lung cancer with recent recurrence on immunotherapy. COMPARISON: XR chest 07/23/2024.  CT chest 11/14/2023, 07/11/2023. ACCESSION NUMBER(S): [id number] ORDERING CLINICIAN: NEHA SIMONS TECHNIQUE:  CTA of the chest was performed with intravenous contrast. Images are reviewed and processed at a workstation according to the CT angiogram protocol with 3-D and/or MIP post processing imaging generated.  Omnipaque 350 75 mL was administered intravenously. Automated mA/kV exposure control was utilized and patient examination was performed in strict accordance with principles of ALARA. FINDINGS: Pulmonary arteries are adequately opacified without acute or chronic filling defects.  The thoracic aorta is normal in course and caliber without dissection or aneurysm.  Mild calcified plaque is seen in the thoracic aorta.  Left vertebral artery originates directly from the aortic arch, a normal variant. The heart is normal in size with a small pericardial effusion. Thoracic lymph nodes are not enlarged. There is no pleural effusion, pleural thickening, or pneumothorax. The airways are patent. Lungs are adequately expanded with patchy areas of airspace disease throughout the right lung.  The left lung is relatively clear although there is mild volume loss of the left lung compared to the right.  No  interstitial lung disease is identified.  No suspicious pulmonary nodules are appreciated. Upper abdomen demonstrates no acute pathology. There are no acute fractures.  No suspicious bony lesions.  Mild to moderate multilevel disc space narrowing is seen in the mid and lower thoracic spine with osteophytes at multiple levels.  There is a minimal dextroconvex curvature of the thoracic spine.  Healed fracture deformity is noted of the posterolateral aspect of the left seventh rib along with subtle healed fracture deformity of the posterolateral aspect of the left eighth rib.    Multifocal airspace disease in the right lung suggesting developing pneumonia in the appropriate clinical setting. No definite pulmonary embolus identified. Signed by Chirag Kessler    XR chest 1 view    Result Date: 7/23/2024  STUDY: Chest Radiograph; 07/23/2024 4:23 AM INDICATION: Shortness of breath, hypoxia. COMPARISON: XR chest 01/30/2024. ACCESSION NUMBER(S): [id number] ORDERING CLINICIAN: NEHA SIMONS TECHNIQUE:  Frontal chest was obtained at 04:23:00 hours. FINDINGS: CARDIOMEDIASTINAL SILHOUETTE: Cardiomediastinal silhouette is enlarged in size and configuration.  LUNGS: Right perihilar and bilateral basilar airspace opacities are noted. There is an elevated left hemidiaphragm.  ABDOMEN: No remarkable upper abdominal findings.  BONES: No acute osseous changes.    Multifocal bilateral airspace disease suggesting multilobar pneumonia in the appropriate clinical setting. Elevated left hemidiaphragm. Signed by Chirag Kessler    Bronchoscopy Tier 1; Diagnostic, w EBUS    Result Date: 7/15/2024  Bronchoscopy Report [facility] Location: [facility] procedure room 8 INDICATION: Mediastinal lymphadenopathy BRONCHOSCOPIST: Andrzej Tovar MD First Assistant: Pallavi Sharma, MD REFERRING:  MD Álvaro Bynum MD Merry A Werley, APRN-CNP FINDINGS: After obtaining informed consent and performing a  time out, the patient was anesthetized and an ET tube was placed by anesthesia.  The flexible bronchoscope was then introduced through the advanced airway, and an airway examination was performed. The ET tube is in good position.  The trachea is of normal caliber. The jarret is sharp. The tracheobronchial tree of the bilateral lung(s) was examined to at least the first subsegmental level.  Bronchial anatomy is normal; there are no endobronchial lesions, and no secretions. The flexible bronchoscope was removed from the airway, and exchanged for the curvilinear EBUS bronchoscope.  Lymph node sizing was performed via endobronchial ultrasound for suspected lung cancer.  Sampling by transbronchial needle aspiration was performed using a 22-gauge Olympus ViziShot needle and sent for routine cytology. The 7 (subcarinal) node measured 9.9 mm in size. Sampling was done, 7 samples with the needle were obtained. The 4L (lower paratracheal) node measured 15.3 mm in size. Sampling was done, 8 samples with the needle were obtained. Rapid On-Site Evaluation (KELSI): Preliminary cytology from the lymph node station(s) 7 showed lymphoid tissue (final results are pending). Preliminary cytology from the lymph node station(s) 4L showed malignancy (final results are pending). COMPLICATIONS: None ESTIMATED BLOOD LOSS:  Minimal, < 5 mL The physical status of the patient was re-assessed after the procedure.  The patient was transported to the recovery area / PACU in stable condition.      Linear EBUS was used to evaluate the lymph node station 4L, 7 and EBUS-TBNA was performed. Rapid On-Site Evaluation (KELSI): Preliminary cytology was suggestive of lymphoid tissue in node level 7 (final results are pending). Rapid On-Site Evaluation (KELSI): Preliminary cytology was suggestive of malignancy in node level 4L (final results are pending). RECOMMENDATION: The patient will be observed post-procedure, until all discharge criteria are met. Await  cytology, histopathology results. Follow-up with referring physician.      Results for orders placed or performed during the hospital encounter of 07/23/24 (from the past 24 hour(s))   Blood Gas, Venous Full Panel   Result Value Ref Range    POCT pH, Venous 7.28 (L) 7.33 - 7.43 pH    POCT pCO2, Venous 64 (H) 41 - 51 mm Hg    POCT pO2, Venous 17 (L) 35 - 45 mm Hg    POCT SO2, Venous 16 (L) 45 - 75 %    POCT Oxy Hemoglobin, Venous 16.1 (L) 45.0 - 75.0 %    POCT Hematocrit Calculated, Venous 40.0 36.0 - 46.0 %    POCT Sodium, Venous 141 136 - 145 mmol/L    POCT Potassium, Venous 3.1 (L) 3.5 - 5.3 mmol/L    POCT Chloride, Venous 102 98 - 107 mmol/L    POCT Ionized Calicum, Venous 1.24 1.10 - 1.33 mmol/L    POCT Glucose, Venous 201 (H) 74 - 99 mg/dL    POCT Lactate, Venous 3.7 (H) 0.4 - 2.0 mmol/L    POCT Base Excess, Venous 1.7 -2.0 - 3.0 mmol/L    POCT HCO3 Calculated, Venous 30.1 (H) 22.0 - 26.0 mmol/L    POCT Hemoglobin, Venous 13.2 12.0 - 16.0 g/dL    POCT Anion Gap, Venous 12.0 10.0 - 25.0 mmol/L    Patient Temperature      FiO2 21 %   Phosphorus   Result Value Ref Range    Phosphorus 2.8 2.5 - 4.9 mg/dL   Magnesium   Result Value Ref Range    Magnesium 1.60 1.60 - 2.40 mg/dL   CBC and Auto Differential   Result Value Ref Range    WBC 14.4 (H) 4.4 - 11.3 x10*3/uL    nRBC 0.0 0.0 - 0.0 /100 WBCs    RBC 5.15 4.00 - 5.20 x10*6/uL    Hemoglobin 12.8 12.0 - 16.0 g/dL    Hematocrit 41.9 36.0 - 46.0 %    MCV 81 80 - 100 fL    MCH 24.9 (L) 26.0 - 34.0 pg    MCHC 30.5 (L) 32.0 - 36.0 g/dL    RDW 17.4 (H) 11.5 - 14.5 %    Platelets 300 150 - 450 x10*3/uL    Neutrophils % 74.1 40.0 - 80.0 %    Immature Granulocytes %, Automated 0.3 0.0 - 0.9 %    Lymphocytes % 20.3 13.0 - 44.0 %    Monocytes % 3.3 2.0 - 10.0 %    Eosinophils % 1.9 0.0 - 6.0 %    Basophils % 0.1 0.0 - 2.0 %    Neutrophils Absolute 10.66 (H) 1.20 - 7.70 x10*3/uL    Immature Granulocytes Absolute, Automated 0.05 0.00 - 0.70 x10*3/uL    Lymphocytes Absolute 2.92  1.20 - 4.80 x10*3/uL    Monocytes Absolute 0.47 0.10 - 1.00 x10*3/uL    Eosinophils Absolute 0.27 0.00 - 0.70 x10*3/uL    Basophils Absolute 0.02 0.00 - 0.10 x10*3/uL   Comprehensive metabolic panel   Result Value Ref Range    Glucose 186 (H) 74 - 99 mg/dL    Sodium 139 136 - 145 mmol/L    Potassium 3.0 (L) 3.5 - 5.3 mmol/L    Chloride 99 98 - 107 mmol/L    Bicarbonate 28 21 - 32 mmol/L    Anion Gap 15 10 - 20 mmol/L    Urea Nitrogen 9 6 - 23 mg/dL    Creatinine 0.75 0.50 - 1.05 mg/dL    eGFR 86 >60 mL/min/1.73m*2    Calcium 9.9 8.6 - 10.3 mg/dL    Albumin 4.7 3.4 - 5.0 g/dL    Alkaline Phosphatase 105 33 - 136 U/L    Total Protein 7.7 6.4 - 8.2 g/dL    AST 13 9 - 39 U/L    Bilirubin, Total 0.4 0.0 - 1.2 mg/dL    ALT 10 7 - 45 U/L   B-Type Natriuretic Peptide   Result Value Ref Range    BNP 10 0 - 99 pg/mL   Troponin I, High Sensitivity, Initial   Result Value Ref Range    Troponin I, High Sensitivity 8 0 - 13 ng/L   Troponin, High Sensitivity, 1 Hour   Result Value Ref Range    Troponin I, High Sensitivity 8 0 - 13 ng/L   Blood Gas Lactic Acid, Venous   Result Value Ref Range    POCT Lactate, Venous 3.2 (H) 0.4 - 2.0 mmol/L   Blood Gas, Venous Full Panel   Result Value Ref Range    POCT pH, Venous 7.40 7.33 - 7.43 pH    POCT pCO2, Venous 42 41 - 51 mm Hg    POCT pO2, Venous 58 (H) 35 - 45 mm Hg    POCT SO2, Venous 89 (H) 45 - 75 %    POCT Oxy Hemoglobin, Venous 87.5 (H) 45.0 - 75.0 %    POCT Hematocrit Calculated, Venous 31.0 (L) 36.0 - 46.0 %    POCT Sodium, Venous 135 (L) 136 - 145 mmol/L    POCT Potassium, Venous 3.4 (L) 3.5 - 5.3 mmol/L    POCT Chloride, Venous 103 98 - 107 mmol/L    POCT Ionized Calicum, Venous 1.13 1.10 - 1.33 mmol/L    POCT Glucose, Venous 299 (H) 74 - 99 mg/dL    POCT Lactate, Venous 2.2 (H) 0.4 - 2.0 mmol/L    POCT Base Excess, Venous 1.0 -2.0 - 3.0 mmol/L    POCT HCO3 Calculated, Venous 26.0 22.0 - 26.0 mmol/L    POCT Hemoglobin, Venous 10.2 (L) 12.0 - 16.0 g/dL    POCT Anion Gap, Venous  9.0 (L) 10.0 - 25.0 mmol/L    Patient Temperature      FiO2 40 %        Assessment/Plan   Thu Ortega is a 69 y.o. female on day 0 of admission presenting with COPD exacerbation (Multi).  Principal Problem:    COPD exacerbation (Multi)      Thu Ortega is a 69 y.o. female with past medical history of hypertension, hyperlipidemia, type 2 diabetes mellitus, COPD, CHEY on nocturnal 2 L nasal cannula, non-small cell lung cancer, former smoker, history of atrial fibrillation presented to emergency department for shortness of breath and chills.      Right-sided pneumonia bacterial in origin with acute respiratory hypoxemic and hypercapnic failure.  Likely community-acquired pneumonia versus aspiration pneumonia after bronchoscopy last week.    Will treat for community-acquired pneumonia with ceftriaxone and azithromycin  Systemic steroids as ordered  Incentive spirometry  Flutter valve  Scheduled breathing treatment  Check procalcitonin  Sputum culture  Wean O2 as tolerated  No longer needing BiPAP  Repeat VBG showing improvement in pCO2 level    Non-small cell lung cancer s/p bronchoscopy and recent biopsy  Patient on Keytruda  Try sending a message to Dr. Galeano to let him know that patient is here as per patient's request but currently he is offline.  Will attempt later    Hypertension  Blood pressure is acceptable currently and we can continue with amlodipine    Hyperlipidemia  Atorvastatin    Type 2 diabetes mellitus  Accu-Chek with sliding scale insulin  With systemic steroid, expect hyperglycemia  Continue with long-acting and short acting insulin with sliding scale insulin    CHEY on 2 L nasal cannula  Supplemental O2 and wean as tolerated    DVT prophylaxis  Lovenox     Plan discussed with patient and  at bedside in ED bed #3  Full code as per patient  High level of MDM based on above issue and discussing plan    This note is created using voice recognition software. All efforts are made to minimize  errors, if there are errors there due to transcription.    Darian Macario  Hospitalist

## 2024-07-24 ENCOUNTER — APPOINTMENT (OUTPATIENT)
Dept: HEMATOLOGY/ONCOLOGY | Facility: CLINIC | Age: 69
End: 2024-07-24
Payer: MEDICARE

## 2024-07-24 ENCOUNTER — TELEPHONE (OUTPATIENT)
Dept: HEMATOLOGY/ONCOLOGY | Facility: CLINIC | Age: 69
End: 2024-07-24
Payer: MEDICARE

## 2024-07-24 LAB
ANION GAP SERPL CALC-SCNC: 11 MMOL/L (ref 10–20)
BASOPHILS # BLD AUTO: 0.02 X10*3/UL (ref 0–0.1)
BASOPHILS NFR BLD AUTO: 0.1 %
BUN SERPL-MCNC: 12 MG/DL (ref 6–23)
CALCIUM SERPL-MCNC: 9.5 MG/DL (ref 8.6–10.3)
CHLORIDE SERPL-SCNC: 104 MMOL/L (ref 98–107)
CO2 SERPL-SCNC: 26 MMOL/L (ref 21–32)
CREAT SERPL-MCNC: 0.76 MG/DL (ref 0.5–1.05)
EGFRCR SERPLBLD CKD-EPI 2021: 84 ML/MIN/1.73M*2
EOSINOPHIL # BLD AUTO: 0 X10*3/UL (ref 0–0.7)
EOSINOPHIL NFR BLD AUTO: 0 %
ERYTHROCYTE [DISTWIDTH] IN BLOOD BY AUTOMATED COUNT: 17.9 % (ref 11.5–14.5)
FOCUSED SOLID TUMOR DNA/RNA RESULTS: NORMAL
GLUCOSE BLD MANUAL STRIP-MCNC: 191 MG/DL (ref 74–99)
GLUCOSE BLD MANUAL STRIP-MCNC: 246 MG/DL (ref 74–99)
GLUCOSE BLD MANUAL STRIP-MCNC: 264 MG/DL (ref 74–99)
GLUCOSE BLD MANUAL STRIP-MCNC: 280 MG/DL (ref 74–99)
GLUCOSE SERPL-MCNC: 112 MG/DL (ref 74–99)
HCT VFR BLD AUTO: 34.3 % (ref 36–46)
HGB BLD-MCNC: 10.2 G/DL (ref 12–16)
IMM GRANULOCYTES # BLD AUTO: 0.35 X10*3/UL (ref 0–0.7)
IMM GRANULOCYTES NFR BLD AUTO: 2.2 % (ref 0–0.9)
LYMPHOCYTES # BLD AUTO: 1.26 X10*3/UL (ref 1.2–4.8)
LYMPHOCYTES NFR BLD AUTO: 7.8 %
MAGNESIUM SERPL-MCNC: 2.43 MG/DL (ref 1.6–2.4)
MCH RBC QN AUTO: 24.3 PG (ref 26–34)
MCHC RBC AUTO-ENTMCNC: 29.7 G/DL (ref 32–36)
MCV RBC AUTO: 82 FL (ref 80–100)
MONOCYTES # BLD AUTO: 0.59 X10*3/UL (ref 0.1–1)
MONOCYTES NFR BLD AUTO: 3.6 %
NEUTROPHILS # BLD AUTO: 13.96 X10*3/UL (ref 1.2–7.7)
NEUTROPHILS NFR BLD AUTO: 86.3 %
NRBC BLD-RTO: 0 /100 WBCS (ref 0–0)
PLATELET # BLD AUTO: 209 X10*3/UL (ref 150–450)
POTASSIUM SERPL-SCNC: 4.1 MMOL/L (ref 3.5–5.3)
RBC # BLD AUTO: 4.19 X10*6/UL (ref 4–5.2)
SODIUM SERPL-SCNC: 137 MMOL/L (ref 136–145)
WBC # BLD AUTO: 16.2 X10*3/UL (ref 4.4–11.3)

## 2024-07-24 PROCEDURE — 94640 AIRWAY INHALATION TREATMENT: CPT

## 2024-07-24 PROCEDURE — 99233 SBSQ HOSP IP/OBS HIGH 50: CPT | Performed by: INTERNAL MEDICINE

## 2024-07-24 PROCEDURE — 2500000001 HC RX 250 WO HCPCS SELF ADMINISTERED DRUGS (ALT 637 FOR MEDICARE OP): Performed by: INTERNAL MEDICINE

## 2024-07-24 PROCEDURE — 2500000002 HC RX 250 W HCPCS SELF ADMINISTERED DRUGS (ALT 637 FOR MEDICARE OP, ALT 636 FOR OP/ED): Performed by: INTERNAL MEDICINE

## 2024-07-24 PROCEDURE — 2060000001 HC INTERMEDIATE ICU ROOM DAILY

## 2024-07-24 PROCEDURE — 82374 ASSAY BLOOD CARBON DIOXIDE: CPT | Performed by: INTERNAL MEDICINE

## 2024-07-24 PROCEDURE — 85025 COMPLETE CBC W/AUTO DIFF WBC: CPT | Performed by: INTERNAL MEDICINE

## 2024-07-24 PROCEDURE — 87081 CULTURE SCREEN ONLY: CPT | Mod: STJLAB | Performed by: INTERNAL MEDICINE

## 2024-07-24 PROCEDURE — 2500000004 HC RX 250 GENERAL PHARMACY W/ HCPCS (ALT 636 FOR OP/ED): Performed by: INTERNAL MEDICINE

## 2024-07-24 PROCEDURE — 82947 ASSAY GLUCOSE BLOOD QUANT: CPT

## 2024-07-24 PROCEDURE — 36415 COLL VENOUS BLD VENIPUNCTURE: CPT | Performed by: INTERNAL MEDICINE

## 2024-07-24 PROCEDURE — 87449 NOS EACH ORGANISM AG IA: CPT | Mod: STJLAB | Performed by: INTERNAL MEDICINE

## 2024-07-24 PROCEDURE — 83735 ASSAY OF MAGNESIUM: CPT | Performed by: INTERNAL MEDICINE

## 2024-07-24 PROCEDURE — 87899 AGENT NOS ASSAY W/OPTIC: CPT | Mod: STJLAB | Performed by: INTERNAL MEDICINE

## 2024-07-24 RX ORDER — IPRATROPIUM BROMIDE AND ALBUTEROL SULFATE 2.5; .5 MG/3ML; MG/3ML
3 SOLUTION RESPIRATORY (INHALATION) 3 TIMES DAILY
Status: DISCONTINUED | OUTPATIENT
Start: 2024-07-24 | End: 2024-07-26

## 2024-07-24 RX ORDER — OXYCODONE AND ACETAMINOPHEN 5; 325 MG/1; MG/1
1 TABLET ORAL EVERY 6 HOURS PRN
Status: COMPLETED | OUTPATIENT
Start: 2024-07-24 | End: 2024-07-25

## 2024-07-24 RX ORDER — POLYETHYLENE GLYCOL 3350 17 G/17G
17 POWDER, FOR SOLUTION ORAL DAILY
Status: DISCONTINUED | OUTPATIENT
Start: 2024-07-24 | End: 2024-07-27

## 2024-07-24 RX ORDER — IPRATROPIUM BROMIDE AND ALBUTEROL SULFATE 2.5; .5 MG/3ML; MG/3ML
3 SOLUTION RESPIRATORY (INHALATION) EVERY 2 HOUR PRN
Status: DISCONTINUED | OUTPATIENT
Start: 2024-07-24 | End: 2024-07-30 | Stop reason: HOSPADM

## 2024-07-24 RX ORDER — AZITHROMYCIN 500 MG/1
500 TABLET, FILM COATED ORAL
Status: COMPLETED | OUTPATIENT
Start: 2024-07-25 | End: 2024-07-25

## 2024-07-24 ASSESSMENT — COGNITIVE AND FUNCTIONAL STATUS - GENERAL
MOBILITY SCORE: 24
DAILY ACTIVITIY SCORE: 24

## 2024-07-24 ASSESSMENT — PAIN SCALES - GENERAL
PAINLEVEL_OUTOF10: 3
PAINLEVEL_OUTOF10: 7
PAINLEVEL_OUTOF10: 0 - NO PAIN
PAINLEVEL_OUTOF10: 0 - NO PAIN
PAINLEVEL_OUTOF10: 4
PAINLEVEL_OUTOF10: 0 - NO PAIN
PAINLEVEL_OUTOF10: 6

## 2024-07-24 ASSESSMENT — PAIN - FUNCTIONAL ASSESSMENT
PAIN_FUNCTIONAL_ASSESSMENT: 0-10

## 2024-07-24 ASSESSMENT — ACTIVITIES OF DAILY LIVING (ADL): LACK_OF_TRANSPORTATION: NO

## 2024-07-24 NOTE — TELEPHONE ENCOUNTER
Spoke with the patient. Provided update from Dr. Galeano and verbalized understanding. She will call the office back once she is discharged to get a new [facility] appt scheduled. Pt had no further questions or concerns at this time.

## 2024-07-24 NOTE — TELEPHONE ENCOUNTER
Patient calling- currently admitted at [facility].  Had an appointment with Dr. Galeano today that needed to be cancelled, but patient anxious for results she was going to go over at appointment.  Asking for call back to go over this.

## 2024-07-24 NOTE — NURSING NOTE
Pt remained safe throughout shift. Chronic neck and back pain improved with percocet. Supplemental O2 weaned to 5L oximizer while sleeping. After ambulating to bathroom, pt became SOB and sp02 dropped to 81%. O2 increased to 10L. Sp02 recovered to 91-94% with rest. Dr. Ramirez notified.

## 2024-07-24 NOTE — CARE PLAN
The clinical goals for the shift include Pt will maintain Sp02>90% throughout shift    During resp treatment pts rr declined and RRT applied bipap. Rn spoke with provider regarding desaturation over night and yellow sputum. Throughout the day the patient was not able to provide a sample, provider informed.    Problem: Fall/Injury  Goal: Not fall by end of shift  Outcome: Met  Goal: Be free from injury by end of the shift  Outcome: Met  Goal: Verbalize understanding of personal risk factors for fall in the hospital  Outcome: Progressing  Goal: Verbalize understanding of risk factor reduction measures to prevent injury from fall in the home  Outcome: Progressing  Goal: Pace activities to prevent fatigue by end of the shift  Outcome: Progressing     Problem: Skin  Goal: Participates in plan/prevention/treatment measures  Outcome: Progressing  Goal: Prevent/manage excess moisture  Outcome: Progressing  Goal: Promote/optimize nutrition  Outcome: Progressing     Problem: Respiratory  Goal: Minimize anxiety/maximize coping throughout shift  Outcome: Progressing  Goal: Minimal/no exertional discomfort or dyspnea this shift  Outcome: Progressing  Goal: No signs of respiratory distress (eg. Use of accessory muscles. Peds grunting)  Outcome: Progressing  Goal: Verbalize decreased shortness of breath this shift  Outcome: Progressing  Goal: Wean oxygen to maintain O2 saturation per order/standard this shift  Outcome: Progressing

## 2024-07-24 NOTE — PROGRESS NOTES
07/24/24 1443   Discharge Planning   Living Arrangements Spouse/significant other   Support Systems Spouse/significant other   Assistance Needed none   Type of Residence Private residence   Home or Post Acute Services None   Expected Discharge Disposition Home   Housing Stability   In the last 12 months, was there a time when you were not able to pay the mortgage or rent on time? N   At any time in the past 12 months, were you homeless or living in a shelter (including now)? N   Transportation Needs   In the past 12 months, has lack of transportation kept you from medical appointments or from getting medications? no   In the past 12 months, has lack of transportation kept you from meetings, work, or from getting things needed for daily living? No     Spoke to patient at bedside to explain my role in discharge planning. Patient states she lives at home with her  and is independent at home. Patient states she is on 2-3L O2 at home, but has not ever needed BiPap at home. PCP is Dr Egan. Patient states she feels she effectively manages her health at home and plans to return home when medically ready.

## 2024-07-24 NOTE — PROGRESS NOTES
Thu Ortega is a 69 y.o. female on day 1 of admission presenting with COPD exacerbation (Multi).      Subjective   Patient feeling better Sleep Last Night.  Reports Being Constipated and Would like Something for It.  Receiving Breathing Treatment This Morning.  Coughing a lot but not bringing a whole lot of phlegm out.  Having some pain in the side.       Objective     Last Recorded Vitals  /69 (BP Location: Right arm, Patient Position: Sitting)   Pulse 88   Temp 36.4 °C (97.5 °F) (Temporal)   Resp 14   Wt 72 kg (158 lb 11.7 oz)   SpO2 97%   Intake/Output last 3 Shifts:    Intake/Output Summary (Last 24 hours) at 7/24/2024 0918  Last data filed at 7/24/2024 0600  Gross per 24 hour   Intake 720 ml   Output 2450 ml   Net -1730 ml       Admission Weight  Weight: 66 kg (145 lb 8.1 oz) (07/23/24 0341)    Daily Weight  07/24/24 : 72 kg (158 lb 11.7 oz)      Physical Exam:  General: Alert in mild distress on Oxymizer  HEENT: PERRLA, head intact and normocephalic  Neck: Normal to inspection  Lungs: Diminished with some expiratory wheezing  Cardiac: Regular rate and rhythm  Abdomen: Soft nontender, positive bowel sounds  : Exam deferred  Skin: Intact  Hematology: No petechia or excessive ecchymosis  Musculoskeletal: Without significant trauma  Neurological: Alert awake oriented, no focal deficit, cranial nerves grossly intact  Psych: No suicidal ideation or homicidal ideation    Relevant Results  Scheduled medications  amLODIPine, 10 mg, oral, Daily before breakfast  aspirin, 81 mg, oral, Daily  atorvastatin, 10 mg, oral, Nightly  azithromycin, 500 mg, intravenous, q24h  budesonide, 0.5 mg, nebulization, BID  cefTRIAXone, 2 g, intravenous, q24h  cholecalciferol, 5,000 Units, oral, Daily  DULoxetine, 60 mg, oral, Daily  enoxaparin, 40 mg, subcutaneous, q24h  [Held by provider] formoterol, 20 mcg, nebulization, BID  guaiFENesin, 1,200 mg, oral, BID  insulin lispro, 0-10 Units, subcutaneous, TID  insulin NPH and  regular human, 30 Units, subcutaneous, Daily with lunch  insulin NPH and regular human, 70 Units, subcutaneous, BID  ipratropium-albuteroL, 3 mL, nebulization, TID  levothyroxine, 25 mcg, oral, Daily  methylPREDNISolone sodium succinate (PF), 40 mg, intravenous, q8h  nystatin, 1 Application, Topical, BID  pantoprazole, 40 mg, oral, Daily before breakfast  polyethylene glycol, 17 g, oral, Daily  traZODone, 50 mg, oral, Nightly      Continuous medications     PRN medications  PRN medications: acetaminophen, dextrose, dextrose, glucagon, glucagon, ipratropium-albuteroL, oxyCODONE-acetaminophen, oxygen, oxygen   Labs  Results from last 7 days   Lab Units 07/24/24  0445 07/23/24  0351 07/22/24  1340   WBC AUTO x10*3/uL 16.2* 14.4* 9.0   HEMOGLOBIN g/dL 10.2* 12.8 11.7*   HEMATOCRIT % 34.3* 41.9 37.6   PLATELETS AUTO x10*3/uL 209 300 264     Results from last 7 days   Lab Units 07/24/24  0445 07/23/24  0351 07/22/24  1340   SODIUM mmol/L 137 139 138   POTASSIUM mmol/L 4.1 3.0* 3.7   CHLORIDE mmol/L 104 99 99   CO2 mmol/L 26 28 28   BUN mg/dL 12 9 7   CREATININE mg/dL 0.76 0.75 0.77   CALCIUM mg/dL 9.5 9.9 9.7   PROTEIN TOTAL g/dL  --  7.7 7.1   BILIRUBIN TOTAL mg/dL  --  0.4 0.4   ALK PHOS U/L  --  105 108   ALT U/L  --  10 8   AST U/L  --  13 12   GLUCOSE mg/dL 112* 186* 240*       ECG 12 lead    Result Date: 7/23/2024  Sinus tachycardia Cannot rule out Anterior infarct (cited on or before 23-JUL-2024) T wave abnormality, consider lateral ischemia Abnormal ECG When compared with ECG of 03-JAN-2024 11:25, Vent. rate has increased BY  39 BPM Serial changes of evolving Anterior infarct Present    CT angio chest for pulmonary embolism    Result Date: 7/23/2024  STUDY: CT Angiogram of the Chest; 07/23/2024 4:50 AM INDICATION: Hypoxia, shortness of breath.  Lung cancer with recent recurrence on immunotherapy. COMPARISON: XR chest 07/23/2024.  CT chest 11/14/2023, 07/11/2023. ACCESSION NUMBER(S): [id number] ORDERING  CLINICIAN: NEAH SIMONS TECHNIQUE:  CTA of the chest was performed with intravenous contrast. Images are reviewed and processed at a workstation according to the CT angiogram protocol with 3-D and/or MIP post processing imaging generated.  Omnipaque 350 75 mL was administered intravenously. Automated mA/kV exposure control was utilized and patient examination was performed in strict accordance with principles of ALARA. FINDINGS: Pulmonary arteries are adequately opacified without acute or chronic filling defects.  The thoracic aorta is normal in course and caliber without dissection or aneurysm.  Mild calcified plaque is seen in the thoracic aorta.  Left vertebral artery originates directly from the aortic arch, a normal variant. The heart is normal in size with a small pericardial effusion. Thoracic lymph nodes are not enlarged. There is no pleural effusion, pleural thickening, or pneumothorax. The airways are patent. Lungs are adequately expanded with patchy areas of airspace disease throughout the right lung.  The left lung is relatively clear although there is mild volume loss of the left lung compared to the right.  No interstitial lung disease is identified.  No suspicious pulmonary nodules are appreciated. Upper abdomen demonstrates no acute pathology. There are no acute fractures.  No suspicious bony lesions.  Mild to moderate multilevel disc space narrowing is seen in the mid and lower thoracic spine with osteophytes at multiple levels.  There is a minimal dextroconvex curvature of the thoracic spine.  Healed fracture deformity is noted of the posterolateral aspect of the left seventh rib along with subtle healed fracture deformity of the posterolateral aspect of the left eighth rib.    Multifocal airspace disease in the right lung suggesting developing pneumonia in the appropriate clinical setting. No definite pulmonary embolus identified. Signed by Chirag Kessler    XR chest 1 view    Result Date:  7/23/2024  STUDY: Chest Radiograph; 07/23/2024 4:23 AM INDICATION: Shortness of breath, hypoxia. COMPARISON: XR chest 01/30/2024. ACCESSION NUMBER(S): [id number] ORDERING CLINICIAN: NEHA SIMONS TECHNIQUE:  Frontal chest was obtained at 04:23:00 hours. FINDINGS: CARDIOMEDIASTINAL SILHOUETTE: Cardiomediastinal silhouette is enlarged in size and configuration.  LUNGS: Right perihilar and bilateral basilar airspace opacities are noted. There is an elevated left hemidiaphragm.  ABDOMEN: No remarkable upper abdominal findings.  BONES: No acute osseous changes.    Multifocal bilateral airspace disease suggesting multilobar pneumonia in the appropriate clinical setting. Elevated left hemidiaphragm. Signed by Chirag Kessler    Bronchoscopy Tier 1; Diagnostic, w EBUS    Result Date: 7/15/2024  Bronchoscopy Report [facility] Location: [facility] procedure room 8 INDICATION: Mediastinal lymphadenopathy BRONCHOSCOPIST: Andrzej Tovar MD First Assistant: Pallavi Sharma, MD REFERRING:  MD Álvaro Bynum MD Merry A Werley, APRN-CNP FINDINGS: After obtaining informed consent and performing a time out, the patient was anesthetized and an ET tube was placed by anesthesia.  The flexible bronchoscope was then introduced through the advanced airway, and an airway examination was performed. The ET tube is in good position.  The trachea is of normal caliber. The jarret is sharp. The tracheobronchial tree of the bilateral lung(s) was examined to at least the first subsegmental level.  Bronchial anatomy is normal; there are no endobronchial lesions, and no secretions. The flexible bronchoscope was removed from the airway, and exchanged for the curvilinear EBUS bronchoscope.  Lymph node sizing was performed via endobronchial ultrasound for suspected lung cancer.  Sampling by transbronchial needle aspiration was performed using a 22-gauge Olympus ViziShot needle and sent for routine  cytology. The 7 (subcarinal) node measured 9.9 mm in size. Sampling was done, 7 samples with the needle were obtained. The 4L (lower paratracheal) node measured 15.3 mm in size. Sampling was done, 8 samples with the needle were obtained. Rapid On-Site Evaluation (KELSI): Preliminary cytology from the lymph node station(s) 7 showed lymphoid tissue (final results are pending). Preliminary cytology from the lymph node station(s) 4L showed malignancy (final results are pending). COMPLICATIONS: None ESTIMATED BLOOD LOSS:  Minimal, < 5 mL The physical status of the patient was re-assessed after the procedure.  The patient was transported to the recovery area / PACU in stable condition.      Linear EBUS was used to evaluate the lymph node station 4L, 7 and EBUS-TBNA was performed. Rapid On-Site Evaluation (KELSI): Preliminary cytology was suggestive of lymphoid tissue in node level 7 (final results are pending). Rapid On-Site Evaluation (KELSI): Preliminary cytology was suggestive of malignancy in node level 4L (final results are pending). RECOMMENDATION: The patient will be observed post-procedure, until all discharge criteria are met. Await cytology, histopathology results. Follow-up with referring physician.           Assessment/Plan   Thu Ortega is a 69 y.o. female on day 1 of admission presenting with COPD exacerbation (Multi).  Principal Problem:    COPD exacerbation (Multi)      Thu Ortega is a 69 y.o. female with past medical history of hypertension, hyperlipidemia, type 2 diabetes mellitus, COPD, CHEY on nocturnal 2 L nasal cannula, non-small cell lung cancer, former smoker, history of atrial fibrillation presented to emergency department for shortness of breath and chills.       Right-sided pneumonia bacterial in origin with acute respiratory hypoxemic and hypercapnic failure.  Likely community-acquired pneumonia versus aspiration pneumonia after bronchoscopy last week.    Continue community-acquired  pneumonia treatment with ceftriaxone and azithromycin  Start weaning systemic steroids and decrease the frequency starting tomorrow  Continue incentive spirometry  Continue continue flutter valve  Continue scheduled breathing treatment  Procalcitonin elevated at 4.96 on admission  Sputum culture  Wean O2 as tolerated  pCO2 normal on repeat VBG on admission and off BiPAP     Non-small cell lung cancer s/p bronchoscopy and recent biopsy  Patient on Keytruda  Informed Dr. Galeano that patient is admitted to the hospital     Hypertension  Blood pressure is acceptable  Continue with amlodipine     Hyperlipidemia  Atorvastatin     Type 2 diabetes mellitus  Accu-Chek with sliding scale insulin  Hyperglycemia as expected with steroid  Will start reducing steroid as soon as possible  Continue with long-acting and short acting insulin with sliding scale insulin     CHEY on 2 L nasal cannula at night time  Supplemental O2 and wean as tolerated    Constipation  Miralax daily     DVT prophylaxis  Lovenox     Plan discussed with patient at bedside  Will keep patient in step down unit due to hypoxemia overnight and this am. As she may need hiflow or escalation of care.  High Level of MDM based on above issue and discussing plan    This note is created using voice recognition software. All efforts are made to minimize errors, if there are errors there due to transcription.    Darian Macario  Hospitalist

## 2024-07-25 LAB
ANION GAP SERPL CALC-SCNC: 13 MMOL/L (ref 10–20)
BASOPHILS # BLD AUTO: 0.01 X10*3/UL (ref 0–0.1)
BASOPHILS NFR BLD AUTO: 0.1 %
BUN SERPL-MCNC: 14 MG/DL (ref 6–23)
CALCIUM SERPL-MCNC: 9.4 MG/DL (ref 8.6–10.3)
CHLORIDE SERPL-SCNC: 103 MMOL/L (ref 98–107)
CO2 SERPL-SCNC: 27 MMOL/L (ref 21–32)
CREAT SERPL-MCNC: 0.77 MG/DL (ref 0.5–1.05)
EGFRCR SERPLBLD CKD-EPI 2021: 83 ML/MIN/1.73M*2
EOSINOPHIL # BLD AUTO: 0 X10*3/UL (ref 0–0.7)
EOSINOPHIL NFR BLD AUTO: 0 %
ERYTHROCYTE [DISTWIDTH] IN BLOOD BY AUTOMATED COUNT: 17.9 % (ref 11.5–14.5)
GLUCOSE BLD MANUAL STRIP-MCNC: 107 MG/DL (ref 74–99)
GLUCOSE BLD MANUAL STRIP-MCNC: 145 MG/DL (ref 74–99)
GLUCOSE BLD MANUAL STRIP-MCNC: 244 MG/DL (ref 74–99)
GLUCOSE BLD MANUAL STRIP-MCNC: 80 MG/DL (ref 74–99)
GLUCOSE SERPL-MCNC: 70 MG/DL (ref 74–99)
HCT VFR BLD AUTO: 31 % (ref 36–46)
HGB BLD-MCNC: 9.5 G/DL (ref 12–16)
IMM GRANULOCYTES # BLD AUTO: 0.55 X10*3/UL (ref 0–0.7)
IMM GRANULOCYTES NFR BLD AUTO: 3.4 % (ref 0–0.9)
LEGIONELLA AG UR QL: NEGATIVE
LYMPHOCYTES # BLD AUTO: 0.88 X10*3/UL (ref 1.2–4.8)
LYMPHOCYTES NFR BLD AUTO: 5.4 %
MAGNESIUM SERPL-MCNC: 2.13 MG/DL (ref 1.6–2.4)
MCH RBC QN AUTO: 24.8 PG (ref 26–34)
MCHC RBC AUTO-ENTMCNC: 30.6 G/DL (ref 32–36)
MCV RBC AUTO: 81 FL (ref 80–100)
MONOCYTES # BLD AUTO: 0.57 X10*3/UL (ref 0.1–1)
MONOCYTES NFR BLD AUTO: 3.5 %
NEUTROPHILS # BLD AUTO: 14.37 X10*3/UL (ref 1.2–7.7)
NEUTROPHILS NFR BLD AUTO: 87.6 %
NRBC BLD-RTO: 0 /100 WBCS (ref 0–0)
PLATELET # BLD AUTO: 208 X10*3/UL (ref 150–450)
POTASSIUM SERPL-SCNC: 4.2 MMOL/L (ref 3.5–5.3)
PROCALCITONIN SERPL-MCNC: 6.8 NG/ML
RBC # BLD AUTO: 3.83 X10*6/UL (ref 4–5.2)
S PNEUM AG UR QL: NEGATIVE
SODIUM SERPL-SCNC: 139 MMOL/L (ref 136–145)
WBC # BLD AUTO: 16.4 X10*3/UL (ref 4.4–11.3)

## 2024-07-25 PROCEDURE — 82947 ASSAY GLUCOSE BLOOD QUANT: CPT

## 2024-07-25 PROCEDURE — 36415 COLL VENOUS BLD VENIPUNCTURE: CPT | Performed by: INTERNAL MEDICINE

## 2024-07-25 PROCEDURE — 2060000001 HC INTERMEDIATE ICU ROOM DAILY

## 2024-07-25 PROCEDURE — 2500000004 HC RX 250 GENERAL PHARMACY W/ HCPCS (ALT 636 FOR OP/ED): Performed by: INTERNAL MEDICINE

## 2024-07-25 PROCEDURE — 2500000002 HC RX 250 W HCPCS SELF ADMINISTERED DRUGS (ALT 637 FOR MEDICARE OP, ALT 636 FOR OP/ED): Performed by: INTERNAL MEDICINE

## 2024-07-25 PROCEDURE — 83735 ASSAY OF MAGNESIUM: CPT | Performed by: INTERNAL MEDICINE

## 2024-07-25 PROCEDURE — 99233 SBSQ HOSP IP/OBS HIGH 50: CPT | Performed by: INTERNAL MEDICINE

## 2024-07-25 PROCEDURE — 2500000001 HC RX 250 WO HCPCS SELF ADMINISTERED DRUGS (ALT 637 FOR MEDICARE OP): Performed by: INTERNAL MEDICINE

## 2024-07-25 PROCEDURE — 94640 AIRWAY INHALATION TREATMENT: CPT

## 2024-07-25 PROCEDURE — 2500000005 HC RX 250 GENERAL PHARMACY W/O HCPCS: Performed by: INTERNAL MEDICINE

## 2024-07-25 PROCEDURE — 94660 CPAP INITIATION&MGMT: CPT

## 2024-07-25 PROCEDURE — 84145 PROCALCITONIN (PCT): CPT | Mod: STJLAB | Performed by: INTERNAL MEDICINE

## 2024-07-25 PROCEDURE — 82374 ASSAY BLOOD CARBON DIOXIDE: CPT | Performed by: INTERNAL MEDICINE

## 2024-07-25 PROCEDURE — 85025 COMPLETE CBC W/AUTO DIFF WBC: CPT | Performed by: INTERNAL MEDICINE

## 2024-07-25 RX ORDER — OXYCODONE AND ACETAMINOPHEN 5; 325 MG/1; MG/1
1 TABLET ORAL EVERY 6 HOURS PRN
Status: COMPLETED | OUTPATIENT
Start: 2024-07-25 | End: 2024-07-27

## 2024-07-25 ASSESSMENT — PAIN DESCRIPTION - DESCRIPTORS
DESCRIPTORS: DISCOMFORT
DESCRIPTORS: TENDER;SORE;DISCOMFORT

## 2024-07-25 ASSESSMENT — COGNITIVE AND FUNCTIONAL STATUS - GENERAL
PERSONAL GROOMING: A LITTLE
MOVING TO AND FROM BED TO CHAIR: A LITTLE
HELP NEEDED FOR BATHING: A LITTLE
TURNING FROM BACK TO SIDE WHILE IN FLAT BAD: A LITTLE
MOVING TO AND FROM BED TO CHAIR: A LITTLE
TURNING FROM BACK TO SIDE WHILE IN FLAT BAD: A LITTLE
CLIMB 3 TO 5 STEPS WITH RAILING: A LITTLE
CLIMB 3 TO 5 STEPS WITH RAILING: A LITTLE
STANDING UP FROM CHAIR USING ARMS: A LITTLE
TOILETING: A LITTLE
STANDING UP FROM CHAIR USING ARMS: A LITTLE
DRESSING REGULAR LOWER BODY CLOTHING: A LITTLE
DAILY ACTIVITIY SCORE: 19
DRESSING REGULAR UPPER BODY CLOTHING: A LITTLE
TOILETING: A LITTLE
CLIMB 3 TO 5 STEPS WITH RAILING: A LITTLE
STANDING UP FROM CHAIR USING ARMS: A LITTLE
DRESSING REGULAR LOWER BODY CLOTHING: A LITTLE
MOVING TO AND FROM BED TO CHAIR: A LITTLE
DRESSING REGULAR UPPER BODY CLOTHING: A LITTLE
PERSONAL GROOMING: A LITTLE
HELP NEEDED FOR BATHING: A LITTLE
WALKING IN HOSPITAL ROOM: A LITTLE
WALKING IN HOSPITAL ROOM: A LITTLE
TURNING FROM BACK TO SIDE WHILE IN FLAT BAD: A LITTLE
TOILETING: A LITTLE
PERSONAL GROOMING: A LITTLE
HELP NEEDED FOR BATHING: A LITTLE
DRESSING REGULAR UPPER BODY CLOTHING: A LITTLE
DAILY ACTIVITIY SCORE: 19
TURNING FROM BACK TO SIDE WHILE IN FLAT BAD: A LITTLE
DRESSING REGULAR LOWER BODY CLOTHING: A LITTLE
CLIMB 3 TO 5 STEPS WITH RAILING: A LITTLE
STANDING UP FROM CHAIR USING ARMS: A LITTLE
MOBILITY SCORE: 19
MOVING TO AND FROM BED TO CHAIR: A LITTLE
PERSONAL GROOMING: A LITTLE
DRESSING REGULAR LOWER BODY CLOTHING: A LITTLE
DAILY ACTIVITIY SCORE: 19
HELP NEEDED FOR BATHING: A LITTLE
DRESSING REGULAR UPPER BODY CLOTHING: A LITTLE
TOILETING: A LITTLE
WALKING IN HOSPITAL ROOM: A LITTLE
MOBILITY SCORE: 19
MOBILITY SCORE: 19
WALKING IN HOSPITAL ROOM: A LITTLE

## 2024-07-25 ASSESSMENT — PAIN SCALES - GENERAL
PAINLEVEL_OUTOF10: 7
PAINLEVEL_OUTOF10: 4
PAINLEVEL_OUTOF10: 0 - NO PAIN
PAINLEVEL_OUTOF10: 4
PAINLEVEL_OUTOF10: 7
PAINLEVEL_OUTOF10: 0 - NO PAIN
PAINLEVEL_OUTOF10: 0 - NO PAIN

## 2024-07-25 ASSESSMENT — PAIN - FUNCTIONAL ASSESSMENT
PAIN_FUNCTIONAL_ASSESSMENT: 0-10

## 2024-07-25 NOTE — CARE PLAN
The patient's goals for the shift include  FEELING LESS SHORT OF BREATH    The clinical goals for the shift include Pt will maintain Sp02>90% throughout shift

## 2024-07-25 NOTE — PROGRESS NOTES
Spiritual Care Visit    Clinical Encounter Type  Visited With: Patient  Routine Visit: Introduction  Continue Visiting: No    Scientologist Encounters  Scientologist Needs: Prayer                                       Taxonomy  Intended Effects: Establish rapport and connectedness, [place] affirmation, Build relationship of care and support, Demonstrate caring and concern    Xiang Hillman did the visiting.

## 2024-07-25 NOTE — NURSING NOTE
The pt  is a+o x3. Just returned from ambulating  from the chair to the bathroom on 10 l o2 per  nc oximizer. Rr 24-26 breaths/min. O2 Sats  87-88%. The pt stated she felt very sob. Breath sounds were very diminished to ascultated. No wheezing detected. C/o an occ dry non prod . cough. The pt was very anxious. Escorted to bed and placed on her BIPAP AT 40%. . Her o2 sats remained unsustained in the 88-89% range. Resps therapy was consulted and the pts o2 was increased to 60%. Her o2 sats immediately responded to 95-97%. The pt stated she felt better.  She denied having any pain at this time only fatigued from being up in the chair all day. Equal chest symmetry was noted with resps.   2200- The pt immediately was able to go into  a sound sleep. Appeared comfortable . No distress observed. O2 sats  observed at 93-97% with  resp rates of 14-16 breaths/min. Resps appear even and unlabored.   0030- The pts o2 sats have remained above 96-99%. The pt states her breathing is much better. Requested Oxycodone for her generalized fibromylagia pain 7/10. Asking if she will be discharged home sometime today. Reminded she requires a lot of o2 at this time and that her pna is being treated with antibiotics and steroids so no she is not ready yet and needs to have her 02 weened down and tolerated before that can happen. The pt was gotten oob for the bsc. Voiding well. Tolerated the activity with her bipap being worn. No drops in o2 sats observed. Escorted back to bed w/o incidence. No distress observed.  0200- Resp therapy dropped the pts  o2 to 50 % on the Bipap. 02 sats have been sustained at 95-96%. Rr 12-14 breaths/min. The pt is asleep.  0630- The pt remained on her bipap at 50% w/o any issues or complaints of feeling sob  overnight. .  The pt wore her bipap *+ hrs total tonite. Was placed back on her 10 l oximizer for the am.  O2 sats remained above 94%. The pt stated she feels like she's getting better air with inspiration  this am in response to wearing the bipap. The pt requested  oxycodone again this am-  dose #2 for her generalized fibromylagia pain. Vss. EKG-nsr. The pt is voiding w/o difficulty and is calling appropriately. Her gait is steady with a x1 assist to the bsc. Was escorted back to bed w/o incidence. Pt safety was maintained.    no back pain, no gout, no musculoskeletal pain, no neck pain, and no weakness.

## 2024-07-25 NOTE — PROGRESS NOTES
Thu Ortega is a 69 y.o. female on day 2 of admission presenting with COPD exacerbation (Multi).      Subjective   Had drop in saturation last night.  Slept really well on BiPAP.  Having some pain in the sides and the back area.  Reached out to Dr. Galeano and he stated that patient's biopsy results came back positive for cancer cells in the mediastinum and planning to switch her on to oral K-dolly inhibitor       Objective     Last Recorded Vitals  /63 (BP Location: Right arm, Patient Position: Lying)   Pulse 70   Temp 36.1 °C (97 °F) (Temporal)   Resp 15   Wt 69.1 kg (152 lb 5.4 oz)   SpO2 99%   Intake/Output last 3 Shifts:    Intake/Output Summary (Last 24 hours) at 7/25/2024 0943  Last data filed at 7/25/2024 0712  Gross per 24 hour   Intake 820 ml   Output 2250 ml   Net -1430 ml       Admission Weight  Weight: 66 kg (145 lb 8.1 oz) (07/23/24 0341)    Daily Weight  07/25/24 : 69.1 kg (152 lb 5.4 oz)      Physical Exam:  General: Alert in minimal distress on 10 L Oxymizer which was weaned down to 9  HEENT: PERRLA, head intact and normocephalic  Neck: Normal to inspection  Lungs: Lungs are diminished with minimal expiratory wheezing  Cardiac: Regular rate and rhythm  Abdomen: Soft nontender, positive bowel sounds  : Exam deferred  Skin: Intact  Hematology: No petechia or excessive ecchymosis  Musculoskeletal: Without significant trauma  Neurological: Alert awake oriented, no focal deficit, cranial nerves grossly intact  Psych: No suicidal ideation or homicidal ideation    Relevant Results  Scheduled medications  amLODIPine, 10 mg, oral, Daily before breakfast  aspirin, 81 mg, oral, Daily  atorvastatin, 10 mg, oral, Nightly  budesonide, 0.5 mg, nebulization, BID  cefTRIAXone, 2 g, intravenous, q24h  cholecalciferol, 5,000 Units, oral, Daily  DULoxetine, 60 mg, oral, Daily  enoxaparin, 40 mg, subcutaneous, q24h  [Held by provider] formoterol, 20 mcg, nebulization, BID  guaiFENesin, 1,200 mg, oral,  BID  insulin lispro, 0-10 Units, subcutaneous, TID  insulin NPH and regular human, 30 Units, subcutaneous, Daily with lunch  insulin NPH and regular human, 70 Units, subcutaneous, BID  ipratropium-albuteroL, 3 mL, nebulization, TID  levothyroxine, 25 mcg, oral, Daily  methylPREDNISolone sodium succinate (PF), 40 mg, intravenous, q8h  nystatin, 1 Application, Topical, BID  pantoprazole, 40 mg, oral, Daily before breakfast  polyethylene glycol, 17 g, oral, Daily  traZODone, 50 mg, oral, Nightly      Continuous medications     PRN medications  PRN medications: acetaminophen, dextrose, dextrose, glucagon, glucagon, ipratropium-albuteroL, oxygen, oxygen   Labs  Results from last 7 days   Lab Units 07/25/24  0434 07/24/24  0445 07/23/24  0351   WBC AUTO x10*3/uL 16.4* 16.2* 14.4*   HEMOGLOBIN g/dL 9.5* 10.2* 12.8   HEMATOCRIT % 31.0* 34.3* 41.9   PLATELETS AUTO x10*3/uL 208 209 300     Results from last 7 days   Lab Units 07/25/24  0434 07/24/24  0445 07/23/24  0351 07/22/24  1340   SODIUM mmol/L 139 137 139 138   POTASSIUM mmol/L 4.2 4.1 3.0* 3.7   CHLORIDE mmol/L 103 104 99 99   CO2 mmol/L 27 26 28 28   BUN mg/dL 14 12 9 7   CREATININE mg/dL 0.77 0.76 0.75 0.77   CALCIUM mg/dL 9.4 9.5 9.9 9.7   PROTEIN TOTAL g/dL  --   --  7.7 7.1   BILIRUBIN TOTAL mg/dL  --   --  0.4 0.4   ALK PHOS U/L  --   --  105 108   ALT U/L  --   --  10 8   AST U/L  --   --  13 12   GLUCOSE mg/dL 70* 112* 186* 240*       ECG 12 lead    Result Date: 7/23/2024  Sinus tachycardia Cannot rule out Anterior infarct (cited on or before 23-JUL-2024) T wave abnormality, consider lateral ischemia Abnormal ECG When compared with ECG of 03-JAN-2024 11:25, Vent. rate has increased BY  39 BPM Serial changes of evolving Anterior infarct Present    CT angio chest for pulmonary embolism    Result Date: 7/23/2024  STUDY: CT Angiogram of the Chest; 07/23/2024 4:50 AM INDICATION: Hypoxia, shortness of breath.  Lung cancer with recent recurrence on immunotherapy.  COMPARISON: XR chest 07/23/2024.  CT chest 11/14/2023, 07/11/2023. ACCESSION NUMBER(S): [id number] ORDERING CLINICIAN: NEHA SIMONS TECHNIQUE:  CTA of the chest was performed with intravenous contrast. Images are reviewed and processed at a workstation according to the CT angiogram protocol with 3-D and/or MIP post processing imaging generated.  Omnipaque 350 75 mL was administered intravenously. Automated mA/kV exposure control was utilized and patient examination was performed in strict accordance with principles of ALARA. FINDINGS: Pulmonary arteries are adequately opacified without acute or chronic filling defects.  The thoracic aorta is normal in course and caliber without dissection or aneurysm.  Mild calcified plaque is seen in the thoracic aorta.  Left vertebral artery originates directly from the aortic arch, a normal variant. The heart is normal in size with a small pericardial effusion. Thoracic lymph nodes are not enlarged. There is no pleural effusion, pleural thickening, or pneumothorax. The airways are patent. Lungs are adequately expanded with patchy areas of airspace disease throughout the right lung.  The left lung is relatively clear although there is mild volume loss of the left lung compared to the right.  No interstitial lung disease is identified.  No suspicious pulmonary nodules are appreciated. Upper abdomen demonstrates no acute pathology. There are no acute fractures.  No suspicious bony lesions.  Mild to moderate multilevel disc space narrowing is seen in the mid and lower thoracic spine with osteophytes at multiple levels.  There is a minimal dextroconvex curvature of the thoracic spine.  Healed fracture deformity is noted of the posterolateral aspect of the left seventh rib along with subtle healed fracture deformity of the posterolateral aspect of the left eighth rib.    Multifocal airspace disease in the right lung suggesting developing pneumonia in the appropriate clinical  setting. No definite pulmonary embolus identified. Signed by Chirag Kessler    XR chest 1 view    Result Date: 7/23/2024  STUDY: Chest Radiograph; 07/23/2024 4:23 AM INDICATION: Shortness of breath, hypoxia. COMPARISON: XR chest 01/30/2024. ACCESSION NUMBER(S): [id number] ORDERING CLINICIAN: NEHA SIMONS TECHNIQUE:  Frontal chest was obtained at 04:23:00 hours. FINDINGS: CARDIOMEDIASTINAL SILHOUETTE: Cardiomediastinal silhouette is enlarged in size and configuration.  LUNGS: Right perihilar and bilateral basilar airspace opacities are noted. There is an elevated left hemidiaphragm.  ABDOMEN: No remarkable upper abdominal findings.  BONES: No acute osseous changes.    Multifocal bilateral airspace disease suggesting multilobar pneumonia in the appropriate clinical setting. Elevated left hemidiaphragm. Signed by Chirag Kessler    Bronchoscopy Tier 1; Diagnostic, w EBUS    Result Date: 7/15/2024  Bronchoscopy Report [facility] Location: [facility] procedure room 8 INDICATION: Mediastinal lymphadenopathy BRONCHOSCOPIST: Andrzej Tovar MD First Assistant: Pallavi Sharma, MD REFERRING:  MD Álvaro Bynum MD Merry A Werley, APRN-CNP FINDINGS: After obtaining informed consent and performing a time out, the patient was anesthetized and an ET tube was placed by anesthesia.  The flexible bronchoscope was then introduced through the advanced airway, and an airway examination was performed. The ET tube is in good position.  The trachea is of normal caliber. The jarret is sharp. The tracheobronchial tree of the bilateral lung(s) was examined to at least the first subsegmental level.  Bronchial anatomy is normal; there are no endobronchial lesions, and no secretions. The flexible bronchoscope was removed from the airway, and exchanged for the curvilinear EBUS bronchoscope.  Lymph node sizing was performed via endobronchial ultrasound for suspected lung cancer.  Sampling by  transbronchial needle aspiration was performed using a 22-gauge Olympus ViziShot needle and sent for routine cytology. The 7 (subcarinal) node measured 9.9 mm in size. Sampling was done, 7 samples with the needle were obtained. The 4L (lower paratracheal) node measured 15.3 mm in size. Sampling was done, 8 samples with the needle were obtained. Rapid On-Site Evaluation (KELSI): Preliminary cytology from the lymph node station(s) 7 showed lymphoid tissue (final results are pending). Preliminary cytology from the lymph node station(s) 4L showed malignancy (final results are pending). COMPLICATIONS: None ESTIMATED BLOOD LOSS:  Minimal, < 5 mL The physical status of the patient was re-assessed after the procedure.  The patient was transported to the recovery area / PACU in stable condition.      Linear EBUS was used to evaluate the lymph node station 4L, 7 and EBUS-TBNA was performed. Rapid On-Site Evaluation (KELSI): Preliminary cytology was suggestive of lymphoid tissue in node level 7 (final results are pending). Rapid On-Site Evaluation (KELSI): Preliminary cytology was suggestive of malignancy in node level 4L (final results are pending). RECOMMENDATION: The patient will be observed post-procedure, until all discharge criteria are met. Await cytology, histopathology results. Follow-up with referring physician.           Assessment/Plan   Thu Ortega is a 69 y.o. female on day 2 of admission presenting with COPD exacerbation (Multi).  Principal Problem:    COPD exacerbation (Multi)      Thu Ortega is a 69 y.o. female with past medical history of hypertension, hyperlipidemia, type 2 diabetes mellitus, COPD, CHEY on nocturnal 2 L nasal cannula, non-small cell lung cancer, former smoker, history of atrial fibrillation presented to emergency department for shortness of breath and chills.       Right-sided pneumonia bacterial in origin with acute respiratory hypoxemic and hypercapnic failure.  Likely  community-acquired pneumonia versus aspiration pneumonia after bronchoscopy last week.    Continue community-acquired pneumonia treatment with ceftriaxone and azithromycin  Follow-up on MRSA swab, urine strep and Legionella antigen  Blood cultures are negative for 1 day  Decreasing the IV steroids to every 12 hours  Continue incentive spirometry  Continue continue flutter valve  BiPAP at nighttime and as needed  Continue scheduled breathing treatment  Procalcitonin elevated at 4.96 on admission repeat procalcitonin for tomorrow-  Sputum culture  Wean O2 as tolerated  pCO2 normal on repeat VBG on admission and off BiPAP     Non-small cell lung cancer s/p bronchoscopy and recent biopsy  Patient on Keytruda  Informed Dr. Galeano that patient is admitted to the hospital  He informed me on 7/24/2024 that patient is already reach out and let the office know  Patient's biopsy results  came back positive for malignant cells and mediastinum  Plan on switching her to oral K-dolly inhibitor treatment once she returns to clinic as per Dr. Galeano    Hypertension  Blood pressure is acceptable  Continue with amlodipine     Hyperlipidemia  Atorvastatin     Type 2 diabetes mellitus  Accu-Chek with sliding scale insulin  Hyperglycemia as expected with steroid  Reducing the steroids  Watch for signs of hypoglycemia  Continue with long-acting and short acting insulin with sliding scale insulin     CHEY on 2 L nasal cannula at night time  Supplemental O2 and wean as tolerated    Constipation  Miralax daily     DVT prophylaxis  Lovenox     Plan discussed with patient at bedside  With patient's oxygen requirement increasing at nighttime will maintain her in stepdown unit.  Once that is improved we can transition her to telemetry floor  High Level of MDM based on above issue and discussing plan    This note is created using voice recognition software. All efforts are made to minimize errors, if there are errors there due to  transcription.    Darian Macario  [facility]

## 2024-07-25 NOTE — CARE PLAN
The patient's goals for the shift include    Problem: Fall/Injury  Goal: Verbalize understanding of personal risk factors for fall in the hospital  Outcome: Progressing  Goal: Verbalize understanding of risk factor reduction measures to prevent injury from fall in the home  Outcome: Progressing  Goal: Pace activities to prevent fatigue by end of the shift  Outcome: Progressing     Problem: Skin  Goal: Participates in plan/prevention/treatment measures  Outcome: Progressing  Flowsheets (Taken 7/25/2024 1447)  Participates in plan/prevention/treatment measures: Discuss with provider PT/OT consult  Goal: Prevent/manage excess moisture  Outcome: Progressing  Flowsheets (Taken 7/25/2024 1447)  Prevent/manage excess moisture: Cleanse incontinence/protect with barrier cream  Goal: Promote/optimize nutrition  Outcome: Progressing  Flowsheets (Taken 7/25/2024 1447)  Promote/optimize nutrition: Assist with feeding     Problem: Respiratory  Goal: Minimize anxiety/maximize coping throughout shift  Outcome: Progressing  Goal: Minimal/no exertional discomfort or dyspnea this shift  Outcome: Progressing  Goal: No signs of respiratory distress (eg. Use of accessory muscles. Peds grunting)  Outcome: Progressing  Goal: Verbalize decreased shortness of breath this shift  Outcome: Progressing  Goal: Wean oxygen to maintain O2 saturation per order/standard this shift  Outcome: Progressing  Goal: Clear secretions with interventions this shift  Outcome: Progressing  Goal: Patent airway maintained this shift  Outcome: Progressing  Goal: Tolerate mechanical ventilation evidenced by VS/agitation level this shift  Outcome: Progressing  Goal: Tolerate pulmonary toileting this shift  Outcome: Progressing  Goal: Increase self care and/or family involvement in next 24 hours  Outcome: Progressing     Problem: Pain  Goal: Takes deep breaths with improved pain control throughout the shift  Outcome: Progressing  Goal: Turns in bed with improved pain  control throughout the shift  Outcome: Progressing  Goal: Walks with improved pain control throughout the shift  Outcome: Progressing  Goal: Performs ADL's with improved pain control throughout shift  Outcome: Progressing  Goal: Participates in PT with improved pain control throughout the shift  Outcome: Progressing  Goal: Free from opioid side effects throughout the shift  Outcome: Progressing  Goal: Free from acute confusion related to pain meds throughout the shift  Outcome: Progressing     Problem: Pain - Adult  Goal: Verbalizes/displays adequate comfort level or baseline comfort level  Outcome: Progressing     Problem: Discharge Planning  Goal: Discharge to home or other facility with appropriate resources  Outcome: Progressing     Problem: Chronic Conditions and Co-morbidities  Goal: Patient's chronic conditions and co-morbidity symptoms are monitored and maintained or improved  Outcome: Progressing     Problem: Chronic Conditions and Co-morbidities  Goal: Patient's chronic conditions and co-morbidity symptoms are monitored and maintained or improved  Outcome: Progressing       The clinical goals for the shift include Pt will maintain Sp02>90% throughout shift

## 2024-07-26 LAB
ANION GAP SERPL CALC-SCNC: 13 MMOL/L (ref 10–20)
BASOPHILS # BLD AUTO: 0.03 X10*3/UL (ref 0–0.1)
BASOPHILS NFR BLD AUTO: 0.2 %
BUN SERPL-MCNC: 17 MG/DL (ref 6–23)
CALCIUM SERPL-MCNC: 9.3 MG/DL (ref 8.6–10.3)
CHLORIDE SERPL-SCNC: 105 MMOL/L (ref 98–107)
CO2 SERPL-SCNC: 26 MMOL/L (ref 21–32)
CREAT SERPL-MCNC: 0.74 MG/DL (ref 0.5–1.05)
EGFRCR SERPLBLD CKD-EPI 2021: 87 ML/MIN/1.73M*2
EOSINOPHIL # BLD AUTO: 0.01 X10*3/UL (ref 0–0.7)
EOSINOPHIL NFR BLD AUTO: 0.1 %
ERYTHROCYTE [DISTWIDTH] IN BLOOD BY AUTOMATED COUNT: 18 % (ref 11.5–14.5)
GLUCOSE BLD MANUAL STRIP-MCNC: 104 MG/DL (ref 74–99)
GLUCOSE BLD MANUAL STRIP-MCNC: 117 MG/DL (ref 74–99)
GLUCOSE BLD MANUAL STRIP-MCNC: 189 MG/DL (ref 74–99)
GLUCOSE BLD MANUAL STRIP-MCNC: 233 MG/DL (ref 74–99)
GLUCOSE BLD MANUAL STRIP-MCNC: 52 MG/DL (ref 74–99)
GLUCOSE BLD MANUAL STRIP-MCNC: 67 MG/DL (ref 74–99)
GLUCOSE SERPL-MCNC: 57 MG/DL (ref 74–99)
HCT VFR BLD AUTO: 30.3 % (ref 36–46)
HGB BLD-MCNC: 9.2 G/DL (ref 12–16)
IMM GRANULOCYTES # BLD AUTO: 0.37 X10*3/UL (ref 0–0.7)
IMM GRANULOCYTES NFR BLD AUTO: 2.3 % (ref 0–0.9)
LYMPHOCYTES # BLD AUTO: 1.09 X10*3/UL (ref 1.2–4.8)
LYMPHOCYTES NFR BLD AUTO: 6.7 %
MAGNESIUM SERPL-MCNC: 2.24 MG/DL (ref 1.6–2.4)
MCH RBC QN AUTO: 24.9 PG (ref 26–34)
MCHC RBC AUTO-ENTMCNC: 30.4 G/DL (ref 32–36)
MCV RBC AUTO: 82 FL (ref 80–100)
MONOCYTES # BLD AUTO: 0.48 X10*3/UL (ref 0.1–1)
MONOCYTES NFR BLD AUTO: 3 %
NEUTROPHILS # BLD AUTO: 14.17 X10*3/UL (ref 1.2–7.7)
NEUTROPHILS NFR BLD AUTO: 87.7 %
NRBC BLD-RTO: 0 /100 WBCS (ref 0–0)
PLATELET # BLD AUTO: 215 X10*3/UL (ref 150–450)
PLATELET CLUMP BLD QL SMEAR: PRESENT
POTASSIUM SERPL-SCNC: 4.2 MMOL/L (ref 3.5–5.3)
RBC # BLD AUTO: 3.7 X10*6/UL (ref 4–5.2)
RBC MORPH BLD: NORMAL
SARS-COV-2 RNA RESP QL NAA+PROBE: NOT DETECTED
SODIUM SERPL-SCNC: 140 MMOL/L (ref 136–145)
STAPHYLOCOCCUS SPEC CULT: ABNORMAL
WBC # BLD AUTO: 16.2 X10*3/UL (ref 4.4–11.3)

## 2024-07-26 PROCEDURE — 82947 ASSAY GLUCOSE BLOOD QUANT: CPT

## 2024-07-26 PROCEDURE — 85025 COMPLETE CBC W/AUTO DIFF WBC: CPT | Performed by: INTERNAL MEDICINE

## 2024-07-26 PROCEDURE — 2500000005 HC RX 250 GENERAL PHARMACY W/O HCPCS: Performed by: INTERNAL MEDICINE

## 2024-07-26 PROCEDURE — 83735 ASSAY OF MAGNESIUM: CPT | Performed by: INTERNAL MEDICINE

## 2024-07-26 PROCEDURE — 80048 BASIC METABOLIC PNL TOTAL CA: CPT | Performed by: INTERNAL MEDICINE

## 2024-07-26 PROCEDURE — 99233 SBSQ HOSP IP/OBS HIGH 50: CPT | Performed by: STUDENT IN AN ORGANIZED HEALTH CARE EDUCATION/TRAINING PROGRAM

## 2024-07-26 PROCEDURE — 2060000001 HC INTERMEDIATE ICU ROOM DAILY

## 2024-07-26 PROCEDURE — 87635 SARS-COV-2 COVID-19 AMP PRB: CPT | Performed by: STUDENT IN AN ORGANIZED HEALTH CARE EDUCATION/TRAINING PROGRAM

## 2024-07-26 PROCEDURE — 94660 CPAP INITIATION&MGMT: CPT

## 2024-07-26 PROCEDURE — 36415 COLL VENOUS BLD VENIPUNCTURE: CPT | Performed by: INTERNAL MEDICINE

## 2024-07-26 PROCEDURE — 2500000001 HC RX 250 WO HCPCS SELF ADMINISTERED DRUGS (ALT 637 FOR MEDICARE OP): Performed by: INTERNAL MEDICINE

## 2024-07-26 PROCEDURE — 94640 AIRWAY INHALATION TREATMENT: CPT

## 2024-07-26 PROCEDURE — 2500000002 HC RX 250 W HCPCS SELF ADMINISTERED DRUGS (ALT 637 FOR MEDICARE OP, ALT 636 FOR OP/ED): Performed by: INTERNAL MEDICINE

## 2024-07-26 PROCEDURE — 2500000004 HC RX 250 GENERAL PHARMACY W/ HCPCS (ALT 636 FOR OP/ED): Performed by: INTERNAL MEDICINE

## 2024-07-26 PROCEDURE — 2500000002 HC RX 250 W HCPCS SELF ADMINISTERED DRUGS (ALT 637 FOR MEDICARE OP, ALT 636 FOR OP/ED): Performed by: STUDENT IN AN ORGANIZED HEALTH CARE EDUCATION/TRAINING PROGRAM

## 2024-07-26 RX ORDER — IPRATROPIUM BROMIDE AND ALBUTEROL SULFATE 2.5; .5 MG/3ML; MG/3ML
3 SOLUTION RESPIRATORY (INHALATION)
Status: DISCONTINUED | OUTPATIENT
Start: 2024-07-26 | End: 2024-07-29

## 2024-07-26 ASSESSMENT — COGNITIVE AND FUNCTIONAL STATUS - GENERAL
TURNING FROM BACK TO SIDE WHILE IN FLAT BAD: A LITTLE
MOBILITY SCORE: 19
WALKING IN HOSPITAL ROOM: A LITTLE
MOVING TO AND FROM BED TO CHAIR: A LITTLE
HELP NEEDED FOR BATHING: A LITTLE
DAILY ACTIVITIY SCORE: 19
HELP NEEDED FOR BATHING: A LITTLE
DRESSING REGULAR LOWER BODY CLOTHING: A LITTLE
PERSONAL GROOMING: A LITTLE
CLIMB 3 TO 5 STEPS WITH RAILING: A LITTLE
TURNING FROM BACK TO SIDE WHILE IN FLAT BAD: A LITTLE
PERSONAL GROOMING: A LITTLE
DRESSING REGULAR LOWER BODY CLOTHING: A LITTLE
STANDING UP FROM CHAIR USING ARMS: A LITTLE
CLIMB 3 TO 5 STEPS WITH RAILING: A LITTLE
STANDING UP FROM CHAIR USING ARMS: A LITTLE
TOILETING: A LITTLE
DRESSING REGULAR UPPER BODY CLOTHING: A LITTLE
MOBILITY SCORE: 19
TOILETING: A LITTLE
WALKING IN HOSPITAL ROOM: A LITTLE
DAILY ACTIVITIY SCORE: 19
MOVING TO AND FROM BED TO CHAIR: A LITTLE
DRESSING REGULAR UPPER BODY CLOTHING: A LITTLE

## 2024-07-26 ASSESSMENT — PAIN - FUNCTIONAL ASSESSMENT
PAIN_FUNCTIONAL_ASSESSMENT: 0-10

## 2024-07-26 ASSESSMENT — PAIN SCALES - GENERAL
PAINLEVEL_OUTOF10: 7
PAINLEVEL_OUTOF10: 0 - NO PAIN
PAINLEVEL_OUTOF10: 8
PAINLEVEL_OUTOF10: 7
PAINLEVEL_OUTOF10: 0 - NO PAIN
PAINLEVEL_OUTOF10: 0 - NO PAIN
PAINLEVEL_OUTOF10: 8
PAINLEVEL_OUTOF10: 0 - NO PAIN

## 2024-07-26 ASSESSMENT — PAIN DESCRIPTION - DESCRIPTORS: DESCRIPTORS: ACHING;BURNING

## 2024-07-26 NOTE — PROGRESS NOTES
Thu Ortega is a 69 y.o. female on day 3 of admission presenting with COPD exacerbation (Multi).      Subjective   Patient seen and examined at bedside.  Patient reports that she is just feeling kind of rundown.  She does not necessarily feel short of breath but is still requiring high amount of oxygen.  She was bumped from 8 L to 15 L overnight.       Objective     Last Recorded Vitals  /67 (BP Location: Left arm, Patient Position: Lying)   Pulse 84   Temp 36.4 °C (97.5 °F) (Temporal)   Resp 18   Wt 70.4 kg (155 lb 3.3 oz)   SpO2 96%   Intake/Output last 3 Shifts:    Intake/Output Summary (Last 24 hours) at 7/26/2024 1605  Last data filed at 7/26/2024 0600  Gross per 24 hour   Intake 400 ml   Output 1400 ml   Net -1000 ml       Admission Weight  Weight: 66 kg (145 lb 8.1 oz) (07/23/24 0341)    Daily Weight  07/26/24 : 70.4 kg (155 lb 3.3 oz)      Physical Exam:  Constitutional: Well developed, no distress, alert and cooperative, looks ill   Skin: Warm and dry  Eyes: EOMI, clear sclera  ENMT: mucous membranes moist  Respiratory: diminished, oxymizer in place, dry cough, no conversational dyspnea   Cardiovascular: Regular, rate and rhythm  Abdominal: Nondistended, soft, non-tender, +BS  MSK: ROM intact  Neuro: alert and oriented x3    Relevant Results  Scheduled medications  amLODIPine, 10 mg, oral, Daily before breakfast  aspirin, 81 mg, oral, Daily  atorvastatin, 10 mg, oral, Nightly  budesonide, 0.5 mg, nebulization, BID  cefTRIAXone, 2 g, intravenous, q24h  cholecalciferol, 5,000 Units, oral, Daily  DULoxetine, 60 mg, oral, Daily  enoxaparin, 40 mg, subcutaneous, q24h  [Held by provider] formoterol, 20 mcg, nebulization, BID  guaiFENesin, 1,200 mg, oral, BID  insulin lispro, 0-10 Units, subcutaneous, TID  insulin NPH and regular human, 30 Units, subcutaneous, Daily with lunch  insulin NPH and regular human, 70 Units, subcutaneous, BID  ipratropium-albuteroL, 3 mL, nebulization, q4h  levothyroxine,  25 mcg, oral, Daily  methylPREDNISolone sodium succinate (PF), 40 mg, intravenous, q12h  nystatin, 1 Application, Topical, BID  pantoprazole, 40 mg, oral, Daily before breakfast  polyethylene glycol, 17 g, oral, Daily  traZODone, 50 mg, oral, Nightly      Continuous medications     PRN medications  PRN medications: acetaminophen, dextrose, dextrose, glucagon, glucagon, ipratropium-albuteroL, oxyCODONE-acetaminophen, oxygen, oxygen   Labs  Results from last 7 days   Lab Units 07/26/24 0417 07/25/24 0434 07/24/24 0445   WBC AUTO x10*3/uL 16.2* 16.4* 16.2*   HEMOGLOBIN g/dL 9.2* 9.5* 10.2*   HEMATOCRIT % 30.3* 31.0* 34.3*   PLATELETS AUTO x10*3/uL 215 208 209     Results from last 7 days   Lab Units 07/26/24 0417 07/25/24 0434 07/24/24 0445 07/23/24  0351 07/22/24  1340   SODIUM mmol/L 140 139 137 139 138   POTASSIUM mmol/L 4.2 4.2 4.1 3.0* 3.7   CHLORIDE mmol/L 105 103 104 99 99   CO2 mmol/L 26 27 26 28 28   BUN mg/dL 17 14 12 9 7   CREATININE mg/dL 0.74 0.77 0.76 0.75 0.77   CALCIUM mg/dL 9.3 9.4 9.5 9.9 9.7   PROTEIN TOTAL g/dL  --   --   --  7.7 7.1   BILIRUBIN TOTAL mg/dL  --   --   --  0.4 0.4   ALK PHOS U/L  --   --   --  105 108   ALT U/L  --   --   --  10 8   AST U/L  --   --   --  13 12   GLUCOSE mg/dL 57* 70* 112* 186* 240*       ECG 12 lead    Result Date: 7/23/2024  Sinus tachycardia Cannot rule out Anterior infarct (cited on or before 23-JUL-2024) T wave abnormality, consider lateral ischemia Abnormal ECG When compared with ECG of 03-JAN-2024 11:25, Vent. rate has increased BY  39 BPM Serial changes of evolving Anterior infarct Present    CT angio chest for pulmonary embolism    Result Date: 7/23/2024  STUDY: CT Angiogram of the Chest; 07/23/2024 4:50 AM INDICATION: Hypoxia, shortness of breath.  Lung cancer with recent recurrence on immunotherapy. COMPARISON: XR chest 07/23/2024.  CT chest 11/14/2023, 07/11/2023. ACCESSION NUMBER(S): [id number] ORDERING CLINICIAN: NEHA SIMONS TECHNIQUE:  CTA  of the chest was performed with intravenous contrast. Images are reviewed and processed at a workstation according to the CT angiogram protocol with 3-D and/or MIP post processing imaging generated.  Omnipaque 350 75 mL was administered intravenously. Automated mA/kV exposure control was utilized and patient examination was performed in strict accordance with principles of ALARA. FINDINGS: Pulmonary arteries are adequately opacified without acute or chronic filling defects.  The thoracic aorta is normal in course and caliber without dissection or aneurysm.  Mild calcified plaque is seen in the thoracic aorta.  Left vertebral artery originates directly from the aortic arch, a normal variant. The heart is normal in size with a small pericardial effusion. Thoracic lymph nodes are not enlarged. There is no pleural effusion, pleural thickening, or pneumothorax. The airways are patent. Lungs are adequately expanded with patchy areas of airspace disease throughout the right lung.  The left lung is relatively clear although there is mild volume loss of the left lung compared to the right.  No interstitial lung disease is identified.  No suspicious pulmonary nodules are appreciated. Upper abdomen demonstrates no acute pathology. There are no acute fractures.  No suspicious bony lesions.  Mild to moderate multilevel disc space narrowing is seen in the mid and lower thoracic spine with osteophytes at multiple levels.  There is a minimal dextroconvex curvature of the thoracic spine.  Healed fracture deformity is noted of the posterolateral aspect of the left seventh rib along with subtle healed fracture deformity of the posterolateral aspect of the left eighth rib.    Multifocal airspace disease in the right lung suggesting developing pneumonia in the appropriate clinical setting. No definite pulmonary embolus identified. Signed by Chirag BABCOCK chest 1 view    Result Date: 7/23/2024  STUDY: Chest Radiograph; 07/23/2024  4:23 AM INDICATION: Shortness of breath, hypoxia. COMPARISON: XR chest 01/30/2024. ACCESSION NUMBER(S): [id number] ORDERING CLINICIAN: NEHA SIMONS TECHNIQUE:  Frontal chest was obtained at 04:23:00 hours. FINDINGS: CARDIOMEDIASTINAL SILHOUETTE: Cardiomediastinal silhouette is enlarged in size and configuration.  LUNGS: Right perihilar and bilateral basilar airspace opacities are noted. There is an elevated left hemidiaphragm.  ABDOMEN: No remarkable upper abdominal findings.  BONES: No acute osseous changes.    Multifocal bilateral airspace disease suggesting multilobar pneumonia in the appropriate clinical setting. Elevated left hemidiaphragm. Signed by Chirag Kessler    Bronchoscopy Tier 1; Diagnostic, w EBUS    Result Date: 7/15/2024  Bronchoscopy Report [facility] Location: [facility] procedure room 8 INDICATION: Mediastinal lymphadenopathy BRONCHOSCOPIST: Andrzej Tovar MD First Assistant: Pallavi Sharma, MD REFERRING:  MD Álvaro Bynum MD Merry A Werley, APRN-CNP FINDINGS: After obtaining informed consent and performing a time out, the patient was anesthetized and an ET tube was placed by anesthesia.  The flexible bronchoscope was then introduced through the advanced airway, and an airway examination was performed. The ET tube is in good position.  The trachea is of normal caliber. The jarret is sharp. The tracheobronchial tree of the bilateral lung(s) was examined to at least the first subsegmental level.  Bronchial anatomy is normal; there are no endobronchial lesions, and no secretions. The flexible bronchoscope was removed from the airway, and exchanged for the curvilinear EBUS bronchoscope.  Lymph node sizing was performed via endobronchial ultrasound for suspected lung cancer.  Sampling by transbronchial needle aspiration was performed using a 22-gauge Olympus ViziShot needle and sent for routine cytology. The 7 (subcarinal) node measured 9.9 mm in  size. Sampling was done, 7 samples with the needle were obtained. The 4L (lower paratracheal) node measured 15.3 mm in size. Sampling was done, 8 samples with the needle were obtained. Rapid On-Site Evaluation (KELSI): Preliminary cytology from the lymph node station(s) 7 showed lymphoid tissue (final results are pending). Preliminary cytology from the lymph node station(s) 4L showed malignancy (final results are pending). COMPLICATIONS: None ESTIMATED BLOOD LOSS:  Minimal, < 5 mL The physical status of the patient was re-assessed after the procedure.  The patient was transported to the recovery area / PACU in stable condition.      Linear EBUS was used to evaluate the lymph node station 4L, 7 and EBUS-TBNA was performed. Rapid On-Site Evaluation (KELSI): Preliminary cytology was suggestive of lymphoid tissue in node level 7 (final results are pending). Rapid On-Site Evaluation (KELSI): Preliminary cytology was suggestive of malignancy in node level 4L (final results are pending). RECOMMENDATION: The patient will be observed post-procedure, until all discharge criteria are met. Await cytology, histopathology results. Follow-up with referring physician.           Assessment/Plan   Thu Ortega is a 69 y.o. female on day 3 of admission presenting with COPD exacerbation (Multi).  Principal Problem:    COPD exacerbation (Multi)      Thu Ortega is a 69 y.o. female with past medical history of hypertension, hyperlipidemia, type 2 diabetes mellitus, COPD, CHEY on nocturnal 2 L nasal cannula, non-small cell lung cancer, former smoker, history of atrial fibrillation presented to emergency department for shortness of breath and chills.       Right-sided pneumonia bacterial in origin with acute respiratory hypoxemic and hypercapnic failure.    -Likely community-acquired pneumonia versus aspiration pneumonia after bronchoscopy last week.    -CT Multifocal airspace disease in the right lung suggesting developing pneumonia in  the appropriate clinical setting. No definite pulmonary embolus identified.  -ceftriaxone and azithromycin  -MSSA +  -urine strep and Legionella antigen negative  -covid neg   -Bcx neg x3d  -IV steroids every 12 hours  -persistent leukocytosis, thought to be related to steroid use   -Continue incentive spirometry and flutter valve  -BiPAP at nighttime and as needed  -Continue scheduled breathing treatment  -Procalcitonin 6.80<4.96  -f/u Sputum culture  -Wean O2 as tolerated  -pCO2 normal on repeat VBG on admission and off BiPAP   -OT/PT    Non-small cell lung cancer s/p bronchoscopy and recent biopsy  -Patient on Keytruda  -Dr. Galeano aware patient is admitted to the hospital  -Patient's biopsy results  came back positive for malignant cells and mediastinum  -Plan on switching her to oral K-dolly inhibitor treatment once she returns to clinic as per Dr. Galeano    Hypertension  -Blood pressure stable  -Continue with amlodipine     Hyperlipidemia  -Atorvastatin     Type 2 diabetes mellitus  -Accu-Chek with sliding scale insulin  -Hyperglycemia as expected with steroid  -Watch for signs of hypoglycemia  -Continue with long-acting and short acting insulin with sliding scale insulin     CHEY on 2 L nasal cannula at night time  -Supplemental O2 and wean as tolerated    Constipation  -Miralax daily     DVT prophylaxis  -Lovenox       Dispo: This is a 69-year-old female who is admitted with acute hypoxic and hypercapnic respiratory failure secondary to pneumonia with a history of non-small cell lung cancer.  Currently receiving steroids, breathing treatments and IV antibiotics.  Also requiring Oxymizer.  Will likely stay greater than 2 midnights.

## 2024-07-26 NOTE — CARE PLAN
The patient's goals for the shift include  to be less short of breath with exertion    The clinical goals for the shift include The patient will maintain a pulse ox of 94% or greater on supplemental oxygen with no symptoms of respiratory distress by 7/26 0700      Problem: Respiratory  Goal: Minimize anxiety/maximize coping throughout shift  Outcome: Progressing  Flowsheets (Taken 7/25/2024 2000)  Minimize anxiety/maximize coping throughout shift: Monitor pain/anxiety level     Problem: Respiratory  Goal: Minimal/no exertional discomfort or dyspnea this shift  Outcome: Progressing  Flowsheets (Taken 7/25/2024 2000)  Minimal/no exertional discomfort or dyspnea this shift: Positioning to promote ventilation/comfort     Problem: Respiratory  Goal: No signs of respiratory distress (eg. Use of accessory muscles. Peds grunting)  Outcome: Progressing     Problem: Respiratory  Goal: Verbalize decreased shortness of breath this shift  Outcome: Progressing     Problem: Respiratory  Goal: Wean oxygen to maintain O2 saturation per order/standard this shift  Outcome: Progressing  Flowsheets (Taken 7/25/2024 2000)  Wean oxygen to maintain O2 saturation per order/standard this shift: Encourage activity/mobility     Problem: Respiratory  Goal: Clear secretions with interventions this shift  Outcome: Progressing  Flowsheets (Taken 7/25/2024 2000)  Clear secretions with interventions this shift: Med administration/monitoring of effect     Problem: Respiratory  Goal: Tolerate mechanical ventilation evidenced by VS/agitation level this shift  Outcome: Progressing  Flowsheets (Taken 7/25/2024 2000)  Tolerate mechanical ventilation evidenced by VS/agitation level this shift: Mechanical ventilation  Note: Patient will comply with wearing BIPAP overnight     The patient would become very short of breath and pulse oximetry would desaturate to 85% while on 15 liters oximyzer with exertion getting up to the bedside commode. It would take her  10 minutes to recover. She was compliant with wearing BIPAP 12/6 at 55% FiO2 overnight, maintaining a pulse ox of 94%.

## 2024-07-27 LAB
ANION GAP SERPL CALC-SCNC: 14 MMOL/L (ref 10–20)
BACTERIA BLD CULT: NORMAL
BACTERIA BLD CULT: NORMAL
BUN SERPL-MCNC: 14 MG/DL (ref 6–23)
CALCIUM SERPL-MCNC: 9 MG/DL (ref 8.6–10.3)
CHLORIDE SERPL-SCNC: 102 MMOL/L (ref 98–107)
CO2 SERPL-SCNC: 25 MMOL/L (ref 21–32)
CREAT SERPL-MCNC: 0.71 MG/DL (ref 0.5–1.05)
EGFRCR SERPLBLD CKD-EPI 2021: >90 ML/MIN/1.73M*2
ERYTHROCYTE [DISTWIDTH] IN BLOOD BY AUTOMATED COUNT: 18 % (ref 11.5–14.5)
GLUCOSE BLD MANUAL STRIP-MCNC: 126 MG/DL (ref 74–99)
GLUCOSE BLD MANUAL STRIP-MCNC: 185 MG/DL (ref 74–99)
GLUCOSE BLD MANUAL STRIP-MCNC: 233 MG/DL (ref 74–99)
GLUCOSE BLD MANUAL STRIP-MCNC: 300 MG/DL (ref 74–99)
GLUCOSE BLD MANUAL STRIP-MCNC: 53 MG/DL (ref 74–99)
GLUCOSE SERPL-MCNC: 256 MG/DL (ref 74–99)
HCT VFR BLD AUTO: 32.2 % (ref 36–46)
HGB BLD-MCNC: 9.6 G/DL (ref 12–16)
MAGNESIUM SERPL-MCNC: 1.88 MG/DL (ref 1.6–2.4)
MCH RBC QN AUTO: 24.4 PG (ref 26–34)
MCHC RBC AUTO-ENTMCNC: 29.8 G/DL (ref 32–36)
MCV RBC AUTO: 82 FL (ref 80–100)
NRBC BLD-RTO: 0.1 /100 WBCS (ref 0–0)
PHOSPHATE SERPL-MCNC: 3.5 MG/DL (ref 2.5–4.9)
PLATELET # BLD AUTO: 240 X10*3/UL (ref 150–450)
POTASSIUM SERPL-SCNC: 3.9 MMOL/L (ref 3.5–5.3)
RBC # BLD AUTO: 3.93 X10*6/UL (ref 4–5.2)
SODIUM SERPL-SCNC: 137 MMOL/L (ref 136–145)
WBC # BLD AUTO: 13.4 X10*3/UL (ref 4.4–11.3)

## 2024-07-27 PROCEDURE — 80048 BASIC METABOLIC PNL TOTAL CA: CPT | Performed by: STUDENT IN AN ORGANIZED HEALTH CARE EDUCATION/TRAINING PROGRAM

## 2024-07-27 PROCEDURE — 1200000002 HC GENERAL ROOM WITH TELEMETRY DAILY

## 2024-07-27 PROCEDURE — 97161 PT EVAL LOW COMPLEX 20 MIN: CPT | Mod: GP

## 2024-07-27 PROCEDURE — 85027 COMPLETE CBC AUTOMATED: CPT | Performed by: STUDENT IN AN ORGANIZED HEALTH CARE EDUCATION/TRAINING PROGRAM

## 2024-07-27 PROCEDURE — 2500000005 HC RX 250 GENERAL PHARMACY W/O HCPCS: Performed by: INTERNAL MEDICINE

## 2024-07-27 PROCEDURE — 84100 ASSAY OF PHOSPHORUS: CPT | Performed by: STUDENT IN AN ORGANIZED HEALTH CARE EDUCATION/TRAINING PROGRAM

## 2024-07-27 PROCEDURE — 2500000002 HC RX 250 W HCPCS SELF ADMINISTERED DRUGS (ALT 637 FOR MEDICARE OP, ALT 636 FOR OP/ED): Performed by: INTERNAL MEDICINE

## 2024-07-27 PROCEDURE — 83735 ASSAY OF MAGNESIUM: CPT | Performed by: STUDENT IN AN ORGANIZED HEALTH CARE EDUCATION/TRAINING PROGRAM

## 2024-07-27 PROCEDURE — 2500000004 HC RX 250 GENERAL PHARMACY W/ HCPCS (ALT 636 FOR OP/ED): Performed by: INTERNAL MEDICINE

## 2024-07-27 PROCEDURE — 2500000002 HC RX 250 W HCPCS SELF ADMINISTERED DRUGS (ALT 637 FOR MEDICARE OP, ALT 636 FOR OP/ED): Performed by: STUDENT IN AN ORGANIZED HEALTH CARE EDUCATION/TRAINING PROGRAM

## 2024-07-27 PROCEDURE — 2500000001 HC RX 250 WO HCPCS SELF ADMINISTERED DRUGS (ALT 637 FOR MEDICARE OP): Performed by: STUDENT IN AN ORGANIZED HEALTH CARE EDUCATION/TRAINING PROGRAM

## 2024-07-27 PROCEDURE — 2500000001 HC RX 250 WO HCPCS SELF ADMINISTERED DRUGS (ALT 637 FOR MEDICARE OP): Performed by: INTERNAL MEDICINE

## 2024-07-27 PROCEDURE — 97116 GAIT TRAINING THERAPY: CPT | Mod: GP

## 2024-07-27 PROCEDURE — 36415 COLL VENOUS BLD VENIPUNCTURE: CPT | Performed by: STUDENT IN AN ORGANIZED HEALTH CARE EDUCATION/TRAINING PROGRAM

## 2024-07-27 PROCEDURE — 99233 SBSQ HOSP IP/OBS HIGH 50: CPT | Performed by: STUDENT IN AN ORGANIZED HEALTH CARE EDUCATION/TRAINING PROGRAM

## 2024-07-27 PROCEDURE — 94660 CPAP INITIATION&MGMT: CPT

## 2024-07-27 PROCEDURE — 82947 ASSAY GLUCOSE BLOOD QUANT: CPT

## 2024-07-27 PROCEDURE — 94640 AIRWAY INHALATION TREATMENT: CPT

## 2024-07-27 RX ORDER — AMOXICILLIN 250 MG
2 CAPSULE ORAL 2 TIMES DAILY
Status: DISCONTINUED | OUTPATIENT
Start: 2024-07-27 | End: 2024-07-30 | Stop reason: HOSPADM

## 2024-07-27 RX ORDER — BISACODYL 10 MG/1
10 SUPPOSITORY RECTAL ONCE
Status: COMPLETED | OUTPATIENT
Start: 2024-07-27 | End: 2024-07-27

## 2024-07-27 ASSESSMENT — COGNITIVE AND FUNCTIONAL STATUS - GENERAL
WALKING IN HOSPITAL ROOM: A LITTLE
TURNING FROM BACK TO SIDE WHILE IN FLAT BAD: A LITTLE
MOBILITY SCORE: 21
CLIMB 3 TO 5 STEPS WITH RAILING: A LITTLE
DAILY ACTIVITIY SCORE: 24
CLIMB 3 TO 5 STEPS WITH RAILING: A LITTLE
MOBILITY SCORE: 21
MOVING TO AND FROM BED TO CHAIR: A LITTLE
WALKING IN HOSPITAL ROOM: A LITTLE

## 2024-07-27 ASSESSMENT — PAIN SCALES - GENERAL
PAINLEVEL_OUTOF10: 5 - MODERATE PAIN
PAINLEVEL_OUTOF10: 2
PAINLEVEL_OUTOF10: 0 - NO PAIN
PAINLEVEL_OUTOF10: 2
PAINLEVEL_OUTOF10: 5 - MODERATE PAIN
PAINLEVEL_OUTOF10: 2
PAINLEVEL_OUTOF10: 0 - NO PAIN
PAINLEVEL_OUTOF10: 2
PAINLEVEL_OUTOF10: 0 - NO PAIN

## 2024-07-27 ASSESSMENT — PAIN - FUNCTIONAL ASSESSMENT
PAIN_FUNCTIONAL_ASSESSMENT: 0-10

## 2024-07-27 ASSESSMENT — PAIN SCALES - PAIN ASSESSMENT IN ADVANCED DEMENTIA (PAINAD): TOTALSCORE: MEDICATION (SEE MAR)

## 2024-07-27 ASSESSMENT — PAIN DESCRIPTION - DESCRIPTORS: DESCRIPTORS: HEADACHE

## 2024-07-27 NOTE — PROGRESS NOTES
Thu Ortega is a 69 y.o. female on day 4 of admission presenting with COPD exacerbation (Multi).      Subjective   Patient seen and examined at bedside.  Patient reports that she is feeling about the same today as she was yesterday.  She reports that she just feels fatigued.  She does not feel like her breathing is any better or worse but she does feel more short of breath when she is ambulating.  She is being weaned down on O2.        Objective     Last Recorded Vitals  /68 (BP Location: Left arm, Patient Position: Lying)   Pulse 80   Temp 36.2 °C (97.2 °F) (Temporal)   Resp 21   Wt 70.4 kg (155 lb 3.3 oz)   SpO2 94%   Intake/Output last 3 Shifts:    Intake/Output Summary (Last 24 hours) at 7/27/2024 1401  Last data filed at 7/27/2024 1200  Gross per 24 hour   Intake 240 ml   Output 1000 ml   Net -760 ml       Admission Weight  Weight: 66 kg (145 lb 8.1 oz) (07/23/24 0341)    Daily Weight  07/26/24 : 70.4 kg (155 lb 3.3 oz)      Physical Exam:  Constitutional: Well developed, no distress, alert and cooperative, looks ill but slightly improved from yesterday   Skin: Warm and dry  Eyes: EOMI, clear sclera  ENMT: mucous membranes moist  Respiratory: diminished, NC in place, dry cough, no conversational dyspnea   Cardiovascular: Regular, rate and rhythm  Abdominal: Nondistended, soft, non-tender, +BS  MSK: ROM intact  Neuro: alert and oriented x3    Relevant Results  Scheduled medications  amLODIPine, 10 mg, oral, Daily before breakfast  aspirin, 81 mg, oral, Daily  atorvastatin, 10 mg, oral, Nightly  budesonide, 0.5 mg, nebulization, BID  cefTRIAXone, 2 g, intravenous, q24h  cholecalciferol, 5,000 Units, oral, Daily  DULoxetine, 60 mg, oral, Daily  enoxaparin, 40 mg, subcutaneous, q24h  [Held by provider] formoterol, 20 mcg, nebulization, BID  guaiFENesin, 1,200 mg, oral, BID  insulin lispro, 0-10 Units, subcutaneous, TID  insulin NPH and regular human, 30 Units, subcutaneous, Daily with lunch  insulin  NPH and regular human, 70 Units, subcutaneous, BID  ipratropium-albuteroL, 3 mL, nebulization, q4h  levothyroxine, 25 mcg, oral, Daily  methylPREDNISolone sodium succinate (PF), 40 mg, intravenous, q12h  nystatin, 1 Application, Topical, BID  pantoprazole, 40 mg, oral, Daily before breakfast  sennosides-docusate sodium, 2 tablet, oral, BID  traZODone, 50 mg, oral, Nightly      Continuous medications     PRN medications  PRN medications: acetaminophen, dextrose, dextrose, glucagon, glucagon, ipratropium-albuteroL, oxyCODONE-acetaminophen, oxygen, oxygen   Labs  Results from last 7 days   Lab Units 07/27/24  0929 07/26/24 0417 07/25/24  0434   WBC AUTO x10*3/uL 13.4* 16.2* 16.4*   HEMOGLOBIN g/dL 9.6* 9.2* 9.5*   HEMATOCRIT % 32.2* 30.3* 31.0*   PLATELETS AUTO x10*3/uL 240 215 208     Results from last 7 days   Lab Units 07/27/24  0929 07/26/24 0417 07/25/24  0434 07/24/24  0445 07/23/24  0351 07/22/24  1340   SODIUM mmol/L 137 140 139   < > 139 138   POTASSIUM mmol/L 3.9 4.2 4.2   < > 3.0* 3.7   CHLORIDE mmol/L 102 105 103   < > 99 99   CO2 mmol/L 25 26 27   < > 28 28   BUN mg/dL 14 17 14   < > 9 7   CREATININE mg/dL 0.71 0.74 0.77   < > 0.75 0.77   CALCIUM mg/dL 9.0 9.3 9.4   < > 9.9 9.7   PROTEIN TOTAL g/dL  --   --   --   --  7.7 7.1   BILIRUBIN TOTAL mg/dL  --   --   --   --  0.4 0.4   ALK PHOS U/L  --   --   --   --  105 108   ALT U/L  --   --   --   --  10 8   AST U/L  --   --   --   --  13 12   GLUCOSE mg/dL 256* 57* 70*   < > 186* 240*    < > = values in this interval not displayed.       ECG 12 lead    Result Date: 7/23/2024  Sinus tachycardia Cannot rule out Anterior infarct (cited on or before 23-JUL-2024) T wave abnormality, consider lateral ischemia Abnormal ECG When compared with ECG of 03-JAN-2024 11:25, Vent. rate has increased BY  39 BPM Serial changes of evolving Anterior infarct Present    CT angio chest for pulmonary embolism    Result Date: 7/23/2024  STUDY: CT Angiogram of the Chest;  07/23/2024 4:50 AM INDICATION: Hypoxia, shortness of breath.  Lung cancer with recent recurrence on immunotherapy. COMPARISON: XR chest 07/23/2024.  CT chest 11/14/2023, 07/11/2023. ACCESSION NUMBER(S): [id number] ORDERING CLINICIAN: NEHA SIMONS TECHNIQUE:  CTA of the chest was performed with intravenous contrast. Images are reviewed and processed at a workstation according to the CT angiogram protocol with 3-D and/or MIP post processing imaging generated.  Omnipaque 350 75 mL was administered intravenously. Automated mA/kV exposure control was utilized and patient examination was performed in strict accordance with principles of ALARA. FINDINGS: Pulmonary arteries are adequately opacified without acute or chronic filling defects.  The thoracic aorta is normal in course and caliber without dissection or aneurysm.  Mild calcified plaque is seen in the thoracic aorta.  Left vertebral artery originates directly from the aortic arch, a normal variant. The heart is normal in size with a small pericardial effusion. Thoracic lymph nodes are not enlarged. There is no pleural effusion, pleural thickening, or pneumothorax. The airways are patent. Lungs are adequately expanded with patchy areas of airspace disease throughout the right lung.  The left lung is relatively clear although there is mild volume loss of the left lung compared to the right.  No interstitial lung disease is identified.  No suspicious pulmonary nodules are appreciated. Upper abdomen demonstrates no acute pathology. There are no acute fractures.  No suspicious bony lesions.  Mild to moderate multilevel disc space narrowing is seen in the mid and lower thoracic spine with osteophytes at multiple levels.  There is a minimal dextroconvex curvature of the thoracic spine.  Healed fracture deformity is noted of the posterolateral aspect of the left seventh rib along with subtle healed fracture deformity of the posterolateral aspect of the left eighth  rib.    Multifocal airspace disease in the right lung suggesting developing pneumonia in the appropriate clinical setting. No definite pulmonary embolus identified. Signed by Chirag Kessler    XR chest 1 view    Result Date: 7/23/2024  STUDY: Chest Radiograph; 07/23/2024 4:23 AM INDICATION: Shortness of breath, hypoxia. COMPARISON: XR chest 01/30/2024. ACCESSION NUMBER(S): [id number] ORDERING CLINICIAN: NEHA SIMONS TECHNIQUE:  Frontal chest was obtained at 04:23:00 hours. FINDINGS: CARDIOMEDIASTINAL SILHOUETTE: Cardiomediastinal silhouette is enlarged in size and configuration.  LUNGS: Right perihilar and bilateral basilar airspace opacities are noted. There is an elevated left hemidiaphragm.  ABDOMEN: No remarkable upper abdominal findings.  BONES: No acute osseous changes.    Multifocal bilateral airspace disease suggesting multilobar pneumonia in the appropriate clinical setting. Elevated left hemidiaphragm. Signed by Chirag Kessler    Bronchoscopy Tier 1; Diagnostic, w EBUS    Result Date: 7/15/2024  Bronchoscopy Report [facility] Location: [facility] procedure room 8 INDICATION: Mediastinal lymphadenopathy BRONCHOSCOPIST: Andrzej Tovar MD First Assistant: Pallavi Sharma, MD REFERRING:  MD Álvaro Bynum MD Merry A Werley, APRN-CNP FINDINGS: After obtaining informed consent and performing a time out, the patient was anesthetized and an ET tube was placed by anesthesia.  The flexible bronchoscope was then introduced through the advanced airway, and an airway examination was performed. The ET tube is in good position.  The trachea is of normal caliber. The jarret is sharp. The tracheobronchial tree of the bilateral lung(s) was examined to at least the first subsegmental level.  Bronchial anatomy is normal; there are no endobronchial lesions, and no secretions. The flexible bronchoscope was removed from the airway, and exchanged for the curvilinear EBUS  bronchoscope.  Lymph node sizing was performed via endobronchial ultrasound for suspected lung cancer.  Sampling by transbronchial needle aspiration was performed using a 22-gauge Olympus ViziShot needle and sent for routine cytology. The 7 (subcarinal) node measured 9.9 mm in size. Sampling was done, 7 samples with the needle were obtained. The 4L (lower paratracheal) node measured 15.3 mm in size. Sampling was done, 8 samples with the needle were obtained. Rapid On-Site Evaluation (KELSI): Preliminary cytology from the lymph node station(s) 7 showed lymphoid tissue (final results are pending). Preliminary cytology from the lymph node station(s) 4L showed malignancy (final results are pending). COMPLICATIONS: None ESTIMATED BLOOD LOSS:  Minimal, < 5 mL The physical status of the patient was re-assessed after the procedure.  The patient was transported to the recovery area / PACU in stable condition.      Linear EBUS was used to evaluate the lymph node station 4L, 7 and EBUS-TBNA was performed. Rapid On-Site Evaluation (KELSI): Preliminary cytology was suggestive of lymphoid tissue in node level 7 (final results are pending). Rapid On-Site Evaluation (KELSI): Preliminary cytology was suggestive of malignancy in node level 4L (final results are pending). RECOMMENDATION: The patient will be observed post-procedure, until all discharge criteria are met. Await cytology, histopathology results. Follow-up with referring physician.           Assessment/Plan   Thu Ortega is a 69 y.o. female on day 4 of admission presenting with COPD exacerbation (Multi).  Principal Problem:    COPD exacerbation (Multi)      Thu Ortega is a 69 y.o. female with past medical history of hypertension, hyperlipidemia, type 2 diabetes mellitus, COPD, CHEY on nocturnal 2 L nasal cannula, non-small cell lung cancer, former smoker, history of atrial fibrillation presented to emergency department for shortness of breath and chills.        Right-sided pneumonia bacterial in origin with acute respiratory hypoxemic and hypercapnic failure.    -Likely community-acquired pneumonia versus aspiration pneumonia after bronchoscopy last week.    -CT Multifocal airspace disease in the right lung suggesting developing pneumonia in the appropriate clinical setting. No definite pulmonary embolus identified.  -ceftriaxone and azithromycin  -MSSA +  -urine strep and Legionella antigen negative  -covid neg   -Bcx neg  -IV steroids every 12 hours, transition to daily tomorrow   -wbc 13.4<16.2; do suspect some component of steroid use   -Continue incentive spirometry and flutter valve  -BiPAP at nighttime and as needed  -Continue scheduled breathing treatment  -Procalcitonin 6.80<4.96  -f/u Sputum culture  -Wean O2 as tolerated  -pCO2 normal on repeat VBG on admission and off BiPAP   -OT/PT    Non-small cell lung cancer s/p bronchoscopy and recent biopsy  -Patient on Keytruda  -Dr. Galeano aware patient is admitted to the hospital  -Patient's biopsy results  came back positive for malignant cells and mediastinum  -Plan on switching her to oral K-dolly inhibitor treatment once she returns to clinic as per Dr. Galeano    Hypertension  -Blood pressure stable  -Continue with amlodipine     Hyperlipidemia  -Atorvastatin     Type 2 diabetes mellitus  -several episodes of hypoglycemia   -reduce home insulin by 20%   -continue sliding scale TID   -Watch for signs of hypoglycemia  -Hga1c 7.2 June 2024     CHEY on 2 L nasal cannula at night time  -Supplemental O2 and wean as tolerated    Constipation  -Miralax daily     DVT prophylaxis  -Lovenox       Dispo: This is a 69-year-old female who is admitted with acute hypoxic and hypercapnic respiratory failure secondary to pneumonia with a history of non-small cell lung cancer.  Currently receiving steroids, breathing treatments and IV antibiotics.  Improving.  Will likely stay greater than 2 midnights.

## 2024-07-27 NOTE — NURSING NOTE
Patient says she feels worse today than yesterday. She has chronic generalized pain and burning from neuropathy, fibromyalgia and cancer. She was medicated for pain with Percocet 5 mg po. One touch was 52. The patient is alert, oriented x 4. Skin is warm and dry. She says her blood sugar has been running low today. She denied feeling nausea, weakness. She was given a snack.

## 2024-07-27 NOTE — CARE PLAN
The patient's goals for the shift include      The clinical goals for the shift include The patient will maintain a pulse ox of 94% or greater on supplemental oxygen with no symptoms of respiratory distress by 7/26 0700    Over the shift, the patient did not make progress toward the following goals. Barriers to progression include ***. Recommendations to address these barriers include ***.

## 2024-07-27 NOTE — CARE PLAN
0806 Patient hypoglycemic this AM, noted fsbs was 53. Given dextrose per order. At this time patient is alert and oriented, denies hypoglycemic s/s. Breakfast on its way. Will recheck blood sugar.   0925 Discussed with Dr. Smalls episode of hypoglycemia this AM and patient requesting suppository for constipation relief.   1730 Patient 70/30 insulin dose changed, patient aware.   1830 Patient washed up, doing well at this time. Safety maintained throughout shift. No significant bowel movement noted by this time. Patient awaiting transfer to med/surg floor, 3N.         The patient's goals for the shift include  n/a.    The clinical goals for the shift include Patient will maintain spo2 >90% on supplemental oxygen throughout shift by end of shift 07/27/2024 @ 1700.    Over the shift, the patient did not make progress toward the following goals. Barriers to progression include comorbidities. Recommendations to address these barriers include medical management.

## 2024-07-28 VITALS
WEIGHT: 154.76 LBS | OXYGEN SATURATION: 98 % | SYSTOLIC BLOOD PRESSURE: 170 MMHG | HEIGHT: 59 IN | RESPIRATION RATE: 20 BRPM | HEART RATE: 85 BPM | BODY MASS INDEX: 31.2 KG/M2 | DIASTOLIC BLOOD PRESSURE: 83 MMHG | TEMPERATURE: 97.5 F

## 2024-07-28 LAB
ANION GAP SERPL CALC-SCNC: 13 MMOL/L (ref 10–20)
BUN SERPL-MCNC: 14 MG/DL (ref 6–23)
CALCIUM SERPL-MCNC: 9.2 MG/DL (ref 8.6–10.3)
CHLORIDE SERPL-SCNC: 102 MMOL/L (ref 98–107)
CO2 SERPL-SCNC: 28 MMOL/L (ref 21–32)
CREAT SERPL-MCNC: 0.72 MG/DL (ref 0.5–1.05)
EGFRCR SERPLBLD CKD-EPI 2021: 90 ML/MIN/1.73M*2
ERYTHROCYTE [DISTWIDTH] IN BLOOD BY AUTOMATED COUNT: 17.6 % (ref 11.5–14.5)
GLUCOSE BLD MANUAL STRIP-MCNC: 149 MG/DL (ref 74–99)
GLUCOSE BLD MANUAL STRIP-MCNC: 170 MG/DL (ref 74–99)
GLUCOSE BLD MANUAL STRIP-MCNC: 174 MG/DL (ref 74–99)
GLUCOSE BLD MANUAL STRIP-MCNC: 297 MG/DL (ref 74–99)
GLUCOSE BLD MANUAL STRIP-MCNC: 304 MG/DL (ref 74–99)
GLUCOSE SERPL-MCNC: 44 MG/DL (ref 74–99)
HCT VFR BLD AUTO: 35.2 % (ref 36–46)
HGB BLD-MCNC: 10.4 G/DL (ref 12–16)
MAGNESIUM SERPL-MCNC: 2.32 MG/DL (ref 1.6–2.4)
MCH RBC QN AUTO: 24.4 PG (ref 26–34)
MCHC RBC AUTO-ENTMCNC: 29.5 G/DL (ref 32–36)
MCV RBC AUTO: 83 FL (ref 80–100)
NRBC BLD-RTO: 0.2 /100 WBCS (ref 0–0)
PHOSPHATE SERPL-MCNC: 3.9 MG/DL (ref 2.5–4.9)
PLATELET # BLD AUTO: 244 X10*3/UL (ref 150–450)
POTASSIUM SERPL-SCNC: 4.1 MMOL/L (ref 3.5–5.3)
RBC # BLD AUTO: 4.26 X10*6/UL (ref 4–5.2)
SODIUM SERPL-SCNC: 139 MMOL/L (ref 136–145)
WBC # BLD AUTO: 11 X10*3/UL (ref 4.4–11.3)

## 2024-07-28 PROCEDURE — 2500000002 HC RX 250 W HCPCS SELF ADMINISTERED DRUGS (ALT 637 FOR MEDICARE OP, ALT 636 FOR OP/ED): Performed by: STUDENT IN AN ORGANIZED HEALTH CARE EDUCATION/TRAINING PROGRAM

## 2024-07-28 PROCEDURE — 83735 ASSAY OF MAGNESIUM: CPT | Performed by: STUDENT IN AN ORGANIZED HEALTH CARE EDUCATION/TRAINING PROGRAM

## 2024-07-28 PROCEDURE — 2500000001 HC RX 250 WO HCPCS SELF ADMINISTERED DRUGS (ALT 637 FOR MEDICARE OP)

## 2024-07-28 PROCEDURE — 2500000005 HC RX 250 GENERAL PHARMACY W/O HCPCS: Performed by: INTERNAL MEDICINE

## 2024-07-28 PROCEDURE — 1200000002 HC GENERAL ROOM WITH TELEMETRY DAILY

## 2024-07-28 PROCEDURE — 94640 AIRWAY INHALATION TREATMENT: CPT

## 2024-07-28 PROCEDURE — 82947 ASSAY GLUCOSE BLOOD QUANT: CPT

## 2024-07-28 PROCEDURE — 2500000002 HC RX 250 W HCPCS SELF ADMINISTERED DRUGS (ALT 637 FOR MEDICARE OP, ALT 636 FOR OP/ED): Performed by: INTERNAL MEDICINE

## 2024-07-28 PROCEDURE — 99233 SBSQ HOSP IP/OBS HIGH 50: CPT | Performed by: STUDENT IN AN ORGANIZED HEALTH CARE EDUCATION/TRAINING PROGRAM

## 2024-07-28 PROCEDURE — 36415 COLL VENOUS BLD VENIPUNCTURE: CPT | Performed by: STUDENT IN AN ORGANIZED HEALTH CARE EDUCATION/TRAINING PROGRAM

## 2024-07-28 PROCEDURE — 80048 BASIC METABOLIC PNL TOTAL CA: CPT | Performed by: STUDENT IN AN ORGANIZED HEALTH CARE EDUCATION/TRAINING PROGRAM

## 2024-07-28 PROCEDURE — 84100 ASSAY OF PHOSPHORUS: CPT | Performed by: STUDENT IN AN ORGANIZED HEALTH CARE EDUCATION/TRAINING PROGRAM

## 2024-07-28 PROCEDURE — 2500000004 HC RX 250 GENERAL PHARMACY W/ HCPCS (ALT 636 FOR OP/ED): Performed by: INTERNAL MEDICINE

## 2024-07-28 PROCEDURE — 85027 COMPLETE CBC AUTOMATED: CPT | Performed by: STUDENT IN AN ORGANIZED HEALTH CARE EDUCATION/TRAINING PROGRAM

## 2024-07-28 PROCEDURE — 2500000001 HC RX 250 WO HCPCS SELF ADMINISTERED DRUGS (ALT 637 FOR MEDICARE OP): Performed by: STUDENT IN AN ORGANIZED HEALTH CARE EDUCATION/TRAINING PROGRAM

## 2024-07-28 PROCEDURE — 2500000001 HC RX 250 WO HCPCS SELF ADMINISTERED DRUGS (ALT 637 FOR MEDICARE OP): Performed by: INTERNAL MEDICINE

## 2024-07-28 RX ORDER — PREDNISONE 20 MG/1
40 TABLET ORAL DAILY
Status: DISCONTINUED | OUTPATIENT
Start: 2024-07-29 | End: 2024-07-30 | Stop reason: HOSPADM

## 2024-07-28 RX ORDER — OXYCODONE AND ACETAMINOPHEN 5; 325 MG/1; MG/1
1 TABLET ORAL ONCE
Status: COMPLETED | OUTPATIENT
Start: 2024-07-28 | End: 2024-07-28

## 2024-07-28 ASSESSMENT — PAIN - FUNCTIONAL ASSESSMENT
PAIN_FUNCTIONAL_ASSESSMENT: 0-10

## 2024-07-28 ASSESSMENT — PAIN DESCRIPTION - LOCATION: LOCATION: BACK

## 2024-07-28 ASSESSMENT — PAIN SCALES - GENERAL
PAINLEVEL_OUTOF10: 0 - NO PAIN
PAINLEVEL_OUTOF10: 3
PAINLEVEL_OUTOF10: 0 - NO PAIN
PAINLEVEL_OUTOF10: 7

## 2024-07-28 NOTE — CARE PLAN
The patient's goals for the shift include titrate O2 from 5LNC to maintain O2 sat greater than 90%     The clinical goals for the shift include maintain spo2 greater than 90% on supplemental O2

## 2024-07-28 NOTE — NURSING NOTE
Patient ambulated X100ft on 4L NC; O2 sat to start was 97%;  pt became increasingly SOB on exertion and dropped to 91%, but quickly recovered after 1min of rest to  95% on 4LNC; tolerated well

## 2024-07-28 NOTE — NURSING NOTE
0610 lab notified that blood glucose 44.  Patient responsive. Gace 25g 50% Dextrose. Notified Dr. Chery at bedside.  Will recheck soon.  0628  Blood POC glucose 174.  Removed Bipap per patient request.  Back on 5L/NC.  Denied pain.

## 2024-07-28 NOTE — PROGRESS NOTES
Physical Therapy    Physical Therapy Evaluation & Treatment    Patient Name: Thu Ortega  MRN: [id number]  Today's Date: 7/27/2024   Time Calculation  Start Time: 1223  Stop Time: 1301  Time Calculation (min): 38 min    Assessment/Plan   PT Assessment  PT Assessment Results: Decreased strength, Decreased endurance, Impaired balance, Decreased mobility  End of Session Communication: Bedside nurse  End of Session Patient Position: Up in chair   IP OR SWING BED PT PLAN  Inpatient or Swing Bed: Inpatient  PT Plan  Treatment/Interventions: Bed mobility, Transfer training, Gait training, Stair training, Balance training, Neuromuscular re-education, Strengthening, Endurance training, Therapeutic exercise, Therapeutic activity, Home exercise program  PT Plan: Ongoing PT  PT Frequency: 5 times per week  PT Discharge Recommendations: Low intensity level of continued care  PT - OK to Discharge:  (Ok to discharge from acute to next recommended level of care once cleared by medical team)      Subjective     General Visit Information:  General  Reason for Referral: increased SOB  Referred By: Darian Macario MD  Past Medical History Relevant to Rehab: Adenocarcinoma of lung, left (Multi), Atrial fibrillation (Multi), COPD (chronic obstructive pulmonary disease) (Multi), Depression, DJD (degenerative joint disease), DM (diabetes mellitus) (Multi), Fibromyalgia, primary, GERD (gastroesophageal reflux disease), Hearing aid worn, History of blood transfusion, History of meningioma of the brain, HLD (hyperlipidemia), HTN (hypertension), Irregular heart beat, Irritable bowel syndrome, Malignant neoplasm of upper lobe of left lung (Multi), Nephrolithiasis, Panlobular emphysema (Multi), Shortness of breath, Spinal stenosis, and Urinary tract infection.  Prior to Session Communication: Bedside nurse  Patient Position Received: Bed, 2 rail up  General Comment: per EMR, diagnosed with COPD exacerbation and R-sided pneumonia  Home  Living:  Home Living  Type of Home: House  Lives With: Spouse  Home Layout: One level  Home Access:  (3 OKSANA, rail on R ascending;  retired and available to help)  Prior Level of Function:  Prior Function Per Pt/Caregiver Report  Level of [place]:  (on 2L O2 for the last 1.5 years; does not use AD for amb)  Precautions:     Vital Signs:  Vital Signs  Heart Rate: 92  SpO2: 94 %  BP: 142/68  Patient Position: Lying    Objective   Pain:  Pain Assessment  0-10 (Numeric) Pain Score: 5 - Moderate pain  Pain Location: Back  Pain Interventions:  (pt declined asking for pain meds; rest and repositioned)  Cognition:       General Assessments:  General Observation  General Observation: 5LO2; cardiac monitor; IV               Static Sitting Balance  Static Sitting-Balance Support: Feet supported, No upper extremity supported (sat EOB)  Static Sitting-Level of Assistance:  (SBA)  Static Sitting-Comment/Number of Minutes: 10 minutes    Dynamic Standing Balance  Dynamic Standing-Balance Support: No upper extremity supported (no AD)  Dynamic Standing-Comments: stands and fixes bed sheets/blankets without AD, without LOB, with SBA  Functional Assessments:  Bed Mobility 1  Bed Mobility 1: Supine to sitting  Level of Assistance 1:  (SBA)  Bed Mobility Comments 1: used bedrail    Transfer 1  Transfer From 1: Sit to  Transfer to 1: Stand  Transfer Device 1:  (no AD)  Transfer Level of Assistance 1:  (SBA)  Transfers 2  Transfer From 2: Bed to  Transfer to 2: Chair with arms  Transfer Device 2:  (no AD)  Transfer Level of Assistance 2:  (SBA)    Ambulation/Gait Training 1  Surface 1: Level tile  Device 1: No device  Assistance 1:  (SBA)  Quality of Gait 1: Decreased step length  Comments/Distance (ft) 1: 20 feet to/from bathroom with O2 tubing  Extremity/Trunk Assessments:  RUE AROM (degrees)  RUE AROM Comment: WFL  RUE Strength  RUE Overall Strength: Greater than or equal to 3/5 as evidenced by functional mobility  LUE AROM  (degrees)  LUE AROM Comment: WFL  LUE Strength  LUE Overall Strength: Greater than or equal to 3/5 as evidenced by functional mobility  AROM RLE (degrees)  RLE AROM Comment: WFL  Strength RLE  RLE Overall Strength: Greater than or equal to 3/5 as evidenced by functional mobility  AROM LLE (degrees)  LLE AROM Comment: WFL  Strength LLE  LLE Overall Strength: Greater than or equal to 3/5 as evidenced by functional mobility  Treatments:  Bed Mobility 1  Bed Mobility 1: Supine to sitting  Level of Assistance 1:  (SBA)  Bed Mobility Comments 1: used bedrail    Ambulation/Gait Training 1  Surface 1: Level tile  Device 1: No device  Assistance 1:  (SBA)  Quality of Gait 1: Decreased step length  Comments/Distance (ft) 1: 20 feet to/from bathroom with O2 tubing  Transfer 1  Transfer From 1: Sit to  Transfer to 1: Stand  Transfer Device 1:  (no AD)  Transfer Level of Assistance 1:  (SBA)  Transfers 2  Transfer From 2: Bed to  Transfer to 2: Chair with arms  Transfer Device 2:  (no AD)  Transfer Level of Assistance 2:  (SBA)  Outcome Measures:  [facility] Basic Mobility  Turning from your back to your side while in a flat bed without using bedrails: None  Moving from lying on your back to sitting on the side of a flat bed without using bedrails: A little  Moving to and from bed to chair (including a wheelchair): None  Standing up from a chair using your arms (e.g. wheelchair or bedside chair): None  To walk in hospital room: A little  Climbing 3-5 steps with railing: A little  Basic Mobility - Total Score: 21    Encounter Problems       Encounter Problems (Active)       Mobility       STG - Patient will ambulate at least 150 feet level surface without assistive device with assistance for O2 mgmt, Ricardo. (Progressing)       Start:  07/27/24    Expected End:  08/10/24            Pt will ascend/descend 3 stairs using one rail with supervision and with assistance for O2 mgmt. (Progressing)       Start:  07/27/24    Expected End:   08/10/24               PT Transfers       STG - Transfer from bed to chair without assistive device Ricardo (Progressing)       Start:  07/27/24    Expected End:  08/10/24            STG - Patient to transfer to and from sit to supine without bedrail Ricardo. (Progressing)       Start:  07/27/24    Expected End:  08/10/24            STG - Patient will transfer sit to and from stand without using assistive device Ricardo. (Progressing)       Start:  07/27/24    Expected End:  08/10/24               Pain - Adult              Education Documentation  Body Mechanics, taught by Liya Fernandez, PT at 7/27/2024  8:31 PM.  Learner: Patient  Readiness: Eager  Method: Explanation  Response: Needs Reinforcement    Mobility Training, taught by Liya Fernandez, PT at 7/27/2024  8:31 PM.  Learner: Patient  Readiness: Eager  Method: Explanation  Response: Needs Reinforcement    Education Comments  No comments found.

## 2024-07-28 NOTE — PROGRESS NOTES
Thu Ortega is a 69 y.o. female on day 5 of admission presenting with COPD exacerbation (Multi).      Subjective   Patient seen and examined at bedside.  Patient reports that she is doing ok today. She had a low BS this AM. She reports at home, she doesn't always take her insulin as prescribed/listed on home meds and will take anywhere from 5 to 30u. She also reports she often skips meals at home but  does monitor her BS. All questions answered.        Objective     Last Recorded Vitals  /73   Pulse 78   Temp 36.3 °C (97.3 °F) (Temporal)   Resp 20   Wt 70.2 kg (154 lb 12.2 oz)   SpO2 100%   Intake/Output last 3 Shifts:    Intake/Output Summary (Last 24 hours) at 7/28/2024 1344  Last data filed at 7/27/2024 1838  Gross per 24 hour   Intake 100 ml   Output --   Net 100 ml       Admission Weight  Weight: 66 kg (145 lb 8.1 oz) (07/23/24 0341)    Daily Weight  07/28/24 : 70.2 kg (154 lb 12.2 oz)      Physical Exam:  Constitutional: Well developed, no distress, alert and cooperative, looks improved   Skin: Warm and dry  Eyes: EOMI, clear sclera  ENMT: mucous membranes moist  Respiratory: diminished, NC in place, no conversational dyspnea   Cardiovascular: Regular, rate and rhythm  Abdominal: Nondistended, soft, non-tender, +BS  MSK: ROM intact  Neuro: alert and oriented x3    Relevant Results  Scheduled medications  amLODIPine, 10 mg, oral, Daily before breakfast  aspirin, 81 mg, oral, Daily  atorvastatin, 10 mg, oral, Nightly  budesonide, 0.5 mg, nebulization, BID  cefTRIAXone, 2 g, intravenous, q24h  cholecalciferol, 5,000 Units, oral, Daily  DULoxetine, 60 mg, oral, Daily  enoxaparin, 40 mg, subcutaneous, q24h  [Held by provider] formoterol, 20 mcg, nebulization, BID  guaiFENesin, 1,200 mg, oral, BID  insulin lispro, 0-10 Units, subcutaneous, TID  [Held by provider] insulin NPH and regular human, 24 Units, subcutaneous, Daily with lunch  [Held by provider] insulin NPH and regular human, 56 Units,  subcutaneous, BID  ipratropium-albuteroL, 3 mL, nebulization, q4h  levothyroxine, 25 mcg, oral, Daily  methylPREDNISolone sodium succinate (PF), 40 mg, intravenous, q12h  nystatin, 1 Application, Topical, BID  pantoprazole, 40 mg, oral, Daily before breakfast  sennosides-docusate sodium, 2 tablet, oral, BID  traZODone, 50 mg, oral, Nightly      Continuous medications     PRN medications  PRN medications: acetaminophen, dextrose, dextrose, glucagon, glucagon, ipratropium-albuteroL, oxygen, oxygen   Labs  Results from last 7 days   Lab Units 07/28/24 0428 07/27/24  0929 07/26/24  0417   WBC AUTO x10*3/uL 11.0 13.4* 16.2*   HEMOGLOBIN g/dL 10.4* 9.6* 9.2*   HEMATOCRIT % 35.2* 32.2* 30.3*   PLATELETS AUTO x10*3/uL 244 240 215     Results from last 7 days   Lab Units 07/28/24 0428 07/27/24  0929 07/26/24  0417 07/24/24  0445 07/23/24  0351 07/22/24  1340   SODIUM mmol/L 139 137 140   < > 139 138   POTASSIUM mmol/L 4.1 3.9 4.2   < > 3.0* 3.7   CHLORIDE mmol/L 102 102 105   < > 99 99   CO2 mmol/L 28 25 26   < > 28 28   BUN mg/dL 14 14 17   < > 9 7   CREATININE mg/dL 0.72 0.71 0.74   < > 0.75 0.77   CALCIUM mg/dL 9.2 9.0 9.3   < > 9.9 9.7   PROTEIN TOTAL g/dL  --   --   --   --  7.7 7.1   BILIRUBIN TOTAL mg/dL  --   --   --   --  0.4 0.4   ALK PHOS U/L  --   --   --   --  105 108   ALT U/L  --   --   --   --  10 8   AST U/L  --   --   --   --  13 12   GLUCOSE mg/dL 44* 256* 57*   < > 186* 240*    < > = values in this interval not displayed.       ECG 12 lead    Result Date: 7/23/2024  Sinus tachycardia Cannot rule out Anterior infarct (cited on or before 23-JUL-2024) T wave abnormality, consider lateral ischemia Abnormal ECG When compared with ECG of 03-JAN-2024 11:25, Vent. rate has increased BY  39 BPM Serial changes of evolving Anterior infarct Present    CT angio chest for pulmonary embolism    Result Date: 7/23/2024  STUDY: CT Angiogram of the Chest; 07/23/2024 4:50 AM INDICATION: Hypoxia, shortness of breath.  Lung  cancer with recent recurrence on immunotherapy. COMPARISON: XR chest 07/23/2024.  CT chest 11/14/2023, 07/11/2023. ACCESSION NUMBER(S): [id number] ORDERING CLINICIAN: NEHA SIMONS TECHNIQUE:  CTA of the chest was performed with intravenous contrast. Images are reviewed and processed at a workstation according to the CT angiogram protocol with 3-D and/or MIP post processing imaging generated.  Omnipaque 350 75 mL was administered intravenously. Automated mA/kV exposure control was utilized and patient examination was performed in strict accordance with principles of ALARA. FINDINGS: Pulmonary arteries are adequately opacified without acute or chronic filling defects.  The thoracic aorta is normal in course and caliber without dissection or aneurysm.  Mild calcified plaque is seen in the thoracic aorta.  Left vertebral artery originates directly from the aortic arch, a normal variant. The heart is normal in size with a small pericardial effusion. Thoracic lymph nodes are not enlarged. There is no pleural effusion, pleural thickening, or pneumothorax. The airways are patent. Lungs are adequately expanded with patchy areas of airspace disease throughout the right lung.  The left lung is relatively clear although there is mild volume loss of the left lung compared to the right.  No interstitial lung disease is identified.  No suspicious pulmonary nodules are appreciated. Upper abdomen demonstrates no acute pathology. There are no acute fractures.  No suspicious bony lesions.  Mild to moderate multilevel disc space narrowing is seen in the mid and lower thoracic spine with osteophytes at multiple levels.  There is a minimal dextroconvex curvature of the thoracic spine.  Healed fracture deformity is noted of the posterolateral aspect of the left seventh rib along with subtle healed fracture deformity of the posterolateral aspect of the left eighth rib.    Multifocal airspace disease in the right lung suggesting  developing pneumonia in the appropriate clinical setting. No definite pulmonary embolus identified. Signed by Chirag Kessler    XR chest 1 view    Result Date: 7/23/2024  STUDY: Chest Radiograph; 07/23/2024 4:23 AM INDICATION: Shortness of breath, hypoxia. COMPARISON: XR chest 01/30/2024. ACCESSION NUMBER(S): [id number] ORDERING CLINICIAN: NEHA SIMONS TECHNIQUE:  Frontal chest was obtained at 04:23:00 hours. FINDINGS: CARDIOMEDIASTINAL SILHOUETTE: Cardiomediastinal silhouette is enlarged in size and configuration.  LUNGS: Right perihilar and bilateral basilar airspace opacities are noted. There is an elevated left hemidiaphragm.  ABDOMEN: No remarkable upper abdominal findings.  BONES: No acute osseous changes.    Multifocal bilateral airspace disease suggesting multilobar pneumonia in the appropriate clinical setting. Elevated left hemidiaphragm. Signed by Chirag Kessler    Bronchoscopy Tier 1; Diagnostic, w EBUS    Result Date: 7/15/2024  Bronchoscopy Report [facility] Location: [facility] procedure room 8 INDICATION: Mediastinal lymphadenopathy BRONCHOSCOPIST: Andrzej Tovar MD First Assistant: Pallavi Sharma, MD REFERRING:  MD Álvaro Bynum MD Merry A Werley, APRN-CNP FINDINGS: After obtaining informed consent and performing a time out, the patient was anesthetized and an ET tube was placed by anesthesia.  The flexible bronchoscope was then introduced through the advanced airway, and an airway examination was performed. The ET tube is in good position.  The trachea is of normal caliber. The jarret is sharp. The tracheobronchial tree of the bilateral lung(s) was examined to at least the first subsegmental level.  Bronchial anatomy is normal; there are no endobronchial lesions, and no secretions. The flexible bronchoscope was removed from the airway, and exchanged for the curvilinear EBUS bronchoscope.  Lymph node sizing was performed via endobronchial  ultrasound for suspected lung cancer.  Sampling by transbronchial needle aspiration was performed using a 22-gauge Olympus ViziShot needle and sent for routine cytology. The 7 (subcarinal) node measured 9.9 mm in size. Sampling was done, 7 samples with the needle were obtained. The 4L (lower paratracheal) node measured 15.3 mm in size. Sampling was done, 8 samples with the needle were obtained. Rapid On-Site Evaluation (KELSI): Preliminary cytology from the lymph node station(s) 7 showed lymphoid tissue (final results are pending). Preliminary cytology from the lymph node station(s) 4L showed malignancy (final results are pending). COMPLICATIONS: None ESTIMATED BLOOD LOSS:  Minimal, < 5 mL The physical status of the patient was re-assessed after the procedure.  The patient was transported to the recovery area / PACU in stable condition.      Linear EBUS was used to evaluate the lymph node station 4L, 7 and EBUS-TBNA was performed. Rapid On-Site Evaluation (KELSI): Preliminary cytology was suggestive of lymphoid tissue in node level 7 (final results are pending). Rapid On-Site Evaluation (KELSI): Preliminary cytology was suggestive of malignancy in node level 4L (final results are pending). RECOMMENDATION: The patient will be observed post-procedure, until all discharge criteria are met. Await cytology, histopathology results. Follow-up with referring physician.           Assessment/Plan   Thu Ortega is a 69 y.o. female on day 5 of admission presenting with COPD exacerbation (Multi).  Principal Problem:    COPD exacerbation (Multi)      Thu Ortega is a 69 y.o. female with past medical history of hypertension, hyperlipidemia, type 2 diabetes mellitus, COPD, CHEY on nocturnal 2 L nasal cannula, non-small cell lung cancer, former smoker, history of atrial fibrillation presented to emergency department for shortness of breath and chills.       Right-sided pneumonia bacterial in origin with acute respiratory hypoxemic  and hypercapnic failure.    -Likely community-acquired pneumonia versus aspiration pneumonia after recent bronchoscopy  -CT Multifocal airspace disease in the right lung suggesting developing pneumonia in the appropriate clinical setting. No definite pulmonary embolus identified.  -ceftriaxone and azithromycin  -MSSA +  -urine strep and Legionella antigen negative, covid neg   -Bcx neg  -transition to prednisone 40mg daily   -wbc resolved   -Continue incentive spirometry and flutter valve  -BiPAP at nighttime and as needed  -Continue scheduled breathing treatment  -Procalcitonin 6.80<4.96  -f/u Sputum culture  -Wean O2 as tolerated   -OT/PT    Non-small cell lung cancer s/p bronchoscopy and recent biopsy  -Patient on Keytruda  -Dr. Galeano aware patient is admitted to the hospital  -Patient's biopsy results  came back positive for malignant cells and mediastinum  -Plan on switching her to oral K-dolly inhibitor treatment once she returns to clinic as per Dr. Galeano    Hypertension  -Blood pressure stable  -Continue with amlodipine     Hyperlipidemia  -Atorvastatin     Type 2 diabetes mellitus  -several episodes of hypoglycemia, continue to monitor closely   -hold home 70/30  -continue sliding scale TID   -Hga1c 7.2 June 2024  -last saw endo 6/2024 - reported on 70/30 TID 70u, 30u, 60-70u + metformin; pt reports she takes 5-30u TID     CHEY on 2 L nasal cannula at night time  -Supplemental O2 and wean as tolerated    Constipation  -Miralax daily     DVT prophylaxis  -Lovenox       Dispo: This is a 69-year-old female who is admitted with acute hypoxic and hypercapnic respiratory failure secondary to pneumonia with a history of non-small cell lung cancer.  Currently receiving steroids, breathing treatments and IV antibiotics.  Improving.  Will likely stay greater than 2 midnights.

## 2024-07-28 NOTE — CARE PLAN
Problem: Mobility  Goal: STG - Patient will ambulate at least 150 feet level surface without assistive device with assistance for O2 mgmt, Ricardo.  Outcome: Progressing  Goal: Pt will ascend/descend 3 stairs using one rail with supervision and with assistance for O2 mgmt.  Outcome: Progressing     Problem: PT Transfers  Goal: STG - Transfer from bed to chair without assistive device Ricardo  Outcome: Progressing  Goal: STG - Patient to transfer to and from sit to supine without bedrail Ricardo.  Outcome: Progressing  Goal: STG - Patient will transfer sit to and from stand without using assistive device Ricardo.  Outcome: Progressing

## 2024-07-29 LAB
ANION GAP SERPL CALC-SCNC: 12 MMOL/L (ref 10–20)
BUN SERPL-MCNC: 15 MG/DL (ref 6–23)
CALCIUM SERPL-MCNC: 8.9 MG/DL (ref 8.6–10.3)
CHLORIDE SERPL-SCNC: 102 MMOL/L (ref 98–107)
CO2 SERPL-SCNC: 29 MMOL/L (ref 21–32)
CREAT SERPL-MCNC: 0.75 MG/DL (ref 0.5–1.05)
EGFRCR SERPLBLD CKD-EPI 2021: 86 ML/MIN/1.73M*2
ERYTHROCYTE [DISTWIDTH] IN BLOOD BY AUTOMATED COUNT: 17.8 % (ref 11.5–14.5)
GLUCOSE BLD MANUAL STRIP-MCNC: 121 MG/DL (ref 74–99)
GLUCOSE BLD MANUAL STRIP-MCNC: 249 MG/DL (ref 74–99)
GLUCOSE BLD MANUAL STRIP-MCNC: 274 MG/DL (ref 74–99)
GLUCOSE BLD MANUAL STRIP-MCNC: 311 MG/DL (ref 74–99)
GLUCOSE BLD MANUAL STRIP-MCNC: 313 MG/DL (ref 74–99)
GLUCOSE SERPL-MCNC: 128 MG/DL (ref 74–99)
HCT VFR BLD AUTO: 35.2 % (ref 36–46)
HGB BLD-MCNC: 10.2 G/DL (ref 12–16)
MAGNESIUM SERPL-MCNC: 2.11 MG/DL (ref 1.6–2.4)
MCH RBC QN AUTO: 24.5 PG (ref 26–34)
MCHC RBC AUTO-ENTMCNC: 29 G/DL (ref 32–36)
MCV RBC AUTO: 85 FL (ref 80–100)
NRBC BLD-RTO: 0 /100 WBCS (ref 0–0)
PHOSPHATE SERPL-MCNC: 4.4 MG/DL (ref 2.5–4.9)
PLATELET # BLD AUTO: 251 X10*3/UL (ref 150–450)
POTASSIUM SERPL-SCNC: 3.8 MMOL/L (ref 3.5–5.3)
RBC # BLD AUTO: 4.16 X10*6/UL (ref 4–5.2)
SODIUM SERPL-SCNC: 139 MMOL/L (ref 136–145)
WBC # BLD AUTO: 11.5 X10*3/UL (ref 4.4–11.3)

## 2024-07-29 PROCEDURE — 2500000001 HC RX 250 WO HCPCS SELF ADMINISTERED DRUGS (ALT 637 FOR MEDICARE OP)

## 2024-07-29 PROCEDURE — 83735 ASSAY OF MAGNESIUM: CPT | Performed by: STUDENT IN AN ORGANIZED HEALTH CARE EDUCATION/TRAINING PROGRAM

## 2024-07-29 PROCEDURE — 2500000002 HC RX 250 W HCPCS SELF ADMINISTERED DRUGS (ALT 637 FOR MEDICARE OP, ALT 636 FOR OP/ED): Performed by: INTERNAL MEDICINE

## 2024-07-29 PROCEDURE — 2500000001 HC RX 250 WO HCPCS SELF ADMINISTERED DRUGS (ALT 637 FOR MEDICARE OP): Performed by: STUDENT IN AN ORGANIZED HEALTH CARE EDUCATION/TRAINING PROGRAM

## 2024-07-29 PROCEDURE — 94664 DEMO&/EVAL PT USE INHALER: CPT

## 2024-07-29 PROCEDURE — 84100 ASSAY OF PHOSPHORUS: CPT | Performed by: STUDENT IN AN ORGANIZED HEALTH CARE EDUCATION/TRAINING PROGRAM

## 2024-07-29 PROCEDURE — 2500000002 HC RX 250 W HCPCS SELF ADMINISTERED DRUGS (ALT 637 FOR MEDICARE OP, ALT 636 FOR OP/ED): Performed by: STUDENT IN AN ORGANIZED HEALTH CARE EDUCATION/TRAINING PROGRAM

## 2024-07-29 PROCEDURE — 2500000001 HC RX 250 WO HCPCS SELF ADMINISTERED DRUGS (ALT 637 FOR MEDICARE OP): Performed by: INTERNAL MEDICINE

## 2024-07-29 PROCEDURE — 36415 COLL VENOUS BLD VENIPUNCTURE: CPT | Performed by: STUDENT IN AN ORGANIZED HEALTH CARE EDUCATION/TRAINING PROGRAM

## 2024-07-29 PROCEDURE — 99233 SBSQ HOSP IP/OBS HIGH 50: CPT | Performed by: STUDENT IN AN ORGANIZED HEALTH CARE EDUCATION/TRAINING PROGRAM

## 2024-07-29 PROCEDURE — 2500000005 HC RX 250 GENERAL PHARMACY W/O HCPCS: Performed by: INTERNAL MEDICINE

## 2024-07-29 PROCEDURE — 80051 ELECTROLYTE PANEL: CPT | Performed by: STUDENT IN AN ORGANIZED HEALTH CARE EDUCATION/TRAINING PROGRAM

## 2024-07-29 PROCEDURE — 94660 CPAP INITIATION&MGMT: CPT

## 2024-07-29 PROCEDURE — 1100000001 HC PRIVATE ROOM DAILY

## 2024-07-29 PROCEDURE — 94640 AIRWAY INHALATION TREATMENT: CPT

## 2024-07-29 PROCEDURE — 82947 ASSAY GLUCOSE BLOOD QUANT: CPT

## 2024-07-29 PROCEDURE — 2500000004 HC RX 250 GENERAL PHARMACY W/ HCPCS (ALT 636 FOR OP/ED): Performed by: STUDENT IN AN ORGANIZED HEALTH CARE EDUCATION/TRAINING PROGRAM

## 2024-07-29 PROCEDURE — 2500000004 HC RX 250 GENERAL PHARMACY W/ HCPCS (ALT 636 FOR OP/ED): Performed by: INTERNAL MEDICINE

## 2024-07-29 PROCEDURE — 85027 COMPLETE CBC AUTOMATED: CPT | Performed by: STUDENT IN AN ORGANIZED HEALTH CARE EDUCATION/TRAINING PROGRAM

## 2024-07-29 RX ORDER — OXYCODONE AND ACETAMINOPHEN 5; 325 MG/1; MG/1
1 TABLET ORAL ONCE
Status: COMPLETED | OUTPATIENT
Start: 2024-07-29 | End: 2024-07-29

## 2024-07-29 RX ORDER — IPRATROPIUM BROMIDE AND ALBUTEROL SULFATE 2.5; .5 MG/3ML; MG/3ML
3 SOLUTION RESPIRATORY (INHALATION)
Status: DISCONTINUED | OUTPATIENT
Start: 2024-07-29 | End: 2024-07-30 | Stop reason: HOSPADM

## 2024-07-29 ASSESSMENT — COGNITIVE AND FUNCTIONAL STATUS - GENERAL
MOBILITY SCORE: 23
DAILY ACTIVITIY SCORE: 23
CLIMB 3 TO 5 STEPS WITH RAILING: A LITTLE
HELP NEEDED FOR BATHING: A LITTLE

## 2024-07-29 ASSESSMENT — PAIN DESCRIPTION - DESCRIPTORS
DESCRIPTORS: ACHING
DESCRIPTORS: ACHING

## 2024-07-29 ASSESSMENT — PAIN SCALES - GENERAL
PAINLEVEL_OUTOF10: 7
PAINLEVEL_OUTOF10: 6
PAINLEVEL_OUTOF10: 0 - NO PAIN
PAINLEVEL_OUTOF10: 7

## 2024-07-29 ASSESSMENT — PAIN - FUNCTIONAL ASSESSMENT
PAIN_FUNCTIONAL_ASSESSMENT: 0-10

## 2024-07-29 ASSESSMENT — PAIN DESCRIPTION - ORIENTATION
ORIENTATION: LOWER
ORIENTATION: LOWER

## 2024-07-29 ASSESSMENT — PAIN DESCRIPTION - LOCATION
LOCATION: BACK
LOCATION: BACK

## 2024-07-29 NOTE — PROGRESS NOTES
I spoke with this patient about COPD and the benefits of self-management. The patient is aware that this is a guideline and does not replace medical advice from their physician. We discussed pursed-lip and diaphragmatic breathing, recognizing their personal yellow zone, and reviewed the  Living Well With COPD book.   Is this a 30 re-admission?   Smoking cessation?   Wears home oxygen?   Does the patient know why they are here?   Most recent PFT:       This patient is here for sob per patient. She has an extensive history with RUL removal last year (per patient) and continued treatment for cancer. She states she now has cancer in her lymph nodes which is causing her anxiety. We discussed pursed-lip breathing and discussed the benefits of that when she gets anxious from being short of breath. She states she uses a bipap here and would like one at home but was told she would need a sleep study first. I will look into options for her as well. She wears oxygen at night and states that her oxygen will drop fast when she walks. We discussed the benefits of wearing oxygen while ambulating and at night. She states her doctor told her she does not have to wear it when at rest. She might benefit from an additional desaturation study to see if her oxygen need at rest has changed. We discussed her yellow zone and she states that she did have some yellow zone symptoms prior to red zone and coming in this admission.

## 2024-07-29 NOTE — CARE PLAN
The patient's goals for the shift include      The clinical goals for the shift include Pt will maintain O2 sat greater than 90%      Problem: Fall/Injury  Goal: Verbalize understanding of personal risk factors for fall in the hospital  Outcome: Progressing     Problem: Skin  Goal: Participates in plan/prevention/treatment measures  Outcome: Progressing     Problem: Respiratory  Goal: Minimize anxiety/maximize coping throughout shift  Outcome: Progressing     Problem: Pain  Goal: Takes deep breaths with improved pain control throughout the shift  Outcome: Progressing     Problem: Pain - Adult  Goal: Verbalizes/displays adequate comfort level or baseline comfort level  Outcome: Progressing     Problem: Safety - Adult  Goal: Free from fall injury  Outcome: Progressing     Problem: Discharge Planning  Goal: Discharge to home or other facility with appropriate resources  Outcome: Progressing     Problem: Diabetes  Goal: Achieve decreasing blood glucose levels by end of shift  Outcome: Progressing

## 2024-07-29 NOTE — PROGRESS NOTES
Physical Therapy                 Therapy Communication Note    Patient Name: Thu Oretga  MRN: [id number]  Today's Date: 7/29/2024     Discipline: Physical Therapy    Room 2103 (Transfer to [facility])    Missed Time:   Missed Visit Reason:   Comment:     07/29/24 1140   General   Missed Visit Yes  (Attempted so to see pt. at 1140 but nursing was being tended by nursing. Will see the next available time.)

## 2024-07-29 NOTE — PROGRESS NOTES
07/29/24 1146   Discharge Planning   Who is requesting discharge planning? Provider   Home or Post Acute Services None   Expected Discharge Disposition Home   Does the patient need discharge transport arranged? No     Met with patient at bedside. Discussed HHC with patient and she is refusing. Stating that she has had HHC before and she really didn't see the need for it now. Patient will go home with no needs. CT team to follow patient till discharge.

## 2024-07-29 NOTE — CARE PLAN
Problem: Fall/Injury  Goal: Verbalize understanding of personal risk factors for fall in the hospital  Outcome: Progressing  Goal: Verbalize understanding of risk factor reduction measures to prevent injury from fall in the home  Outcome: Progressing  Goal: Pace activities to prevent fatigue by end of the shift  Outcome: Progressing     Problem: Skin  Goal: Participates in plan/prevention/treatment measures  Outcome: Progressing  Goal: Prevent/manage excess moisture  Outcome: Progressing  Goal: Promote/optimize nutrition  Outcome: Progressing     Problem: Respiratory  Goal: Minimize anxiety/maximize coping throughout shift  Outcome: Progressing  Goal: Minimal/no exertional discomfort or dyspnea this shift  Outcome: Progressing  Goal: No signs of respiratory distress (eg. Use of accessory muscles. Peds grunting)  Outcome: Progressing  Goal: Verbalize decreased shortness of breath this shift  Outcome: Progressing  Goal: Wean oxygen to maintain O2 saturation per order/standard this shift  Outcome: Progressing  Goal: Clear secretions with interventions this shift  Outcome: Progressing  Goal: Patent airway maintained this shift  Outcome: Progressing  Goal: Tolerate mechanical ventilation evidenced by VS/agitation level this shift  Outcome: Progressing  Goal: Tolerate pulmonary toileting this shift  Outcome: Progressing  Goal: Increase self care and/or family involvement in next 24 hours  Outcome: Progressing     Problem: Pain  Goal: Takes deep breaths with improved pain control throughout the shift  Outcome: Progressing  Goal: Turns in bed with improved pain control throughout the shift  Outcome: Progressing  Goal: Walks with improved pain control throughout the shift  Outcome: Progressing  Goal: Performs ADL's with improved pain control throughout shift  Outcome: Progressing  Goal: Participates in PT with improved pain control throughout the shift  Outcome: Progressing  Goal: Free from opioid side effects throughout  the shift  Outcome: Progressing  Goal: Free from acute confusion related to pain meds throughout the shift  Outcome: Progressing     Problem: Pain - Adult  Goal: Verbalizes/displays adequate comfort level or baseline comfort level  Outcome: Progressing     Problem: Safety - Adult  Goal: Free from fall injury  Outcome: Progressing     Problem: Discharge Planning  Goal: Discharge to home or other facility with appropriate resources  Outcome: Progressing     Problem: Chronic Conditions and Co-morbidities  Goal: Patient's chronic conditions and co-morbidity symptoms are monitored and maintained or improved  Outcome: Progressing     Problem: Diabetes  Goal: Achieve decreasing blood glucose levels by end of shift  Outcome: Progressing  Goal: Increase stability of blood glucose readings by end of shift  Outcome: Progressing  Goal: Decrease in ketones present in urine by end of shift  Outcome: Progressing  Goal: Maintain electrolyte levels within acceptable range throughout shift  Outcome: Progressing  Goal: Maintain glucose levels >70mg/dl to <250mg/dl throughout shift  Outcome: Progressing  Goal: No changes in neurological exam by end of shift  Outcome: Progressing  Goal: Learn about and adhere to nutrition recommendations by end of shift  Outcome: Progressing  Goal: Vital signs within normal range for age by end of shift  Outcome: Progressing  Goal: Increase self care and/or family involovement by end of shift  Outcome: Progressing  Goal: Receive DSME education by end of shift  Outcome: Progressing   The patient's goals for the shift include      The clinical goals for the shift include Pt will maintain O2 sat greater than 90%

## 2024-07-29 NOTE — PROGRESS NOTES
Thu Ortega is a 69 y.o. female on day 6 of admission presenting with COPD exacerbation (Multi).      Subjective   Patient seen and examined at bedside.  Patient reports that she was up and moving more with therapy yesterday. She fears going home early and ending up having to come back. We discuss plan and patient was encouraged to ambulate the halls today. Turned O2 down to 3L. Will need repeat amb pulse ox tomorrow AM.        Objective     Last Recorded Vitals  /65 (BP Location: Right arm, Patient Position: Lying)   Pulse 76   Temp 36.3 °C (97.3 °F) (Temporal)   Resp 20   Wt 70.2 kg (154 lb 12.2 oz)   SpO2 98%   Intake/Output last 3 Shifts:    Intake/Output Summary (Last 24 hours) at 7/29/2024 1310  Last data filed at 7/29/2024 0250  Gross per 24 hour   Intake --   Output 1600 ml   Net -1600 ml       Admission Weight  Weight: 66 kg (145 lb 8.1 oz) (07/23/24 0341)    Daily Weight  07/28/24 : 70.2 kg (154 lb 12.2 oz)      Physical Exam:  Constitutional: Well developed, no distress, alert and cooperative, looks improved   Skin: Warm and dry  Eyes: EOMI, clear sclera  ENMT: mucous membranes moist  Respiratory: CATB, NC in place, no conversational dyspnea   Cardiovascular: Regular, rate and rhythm  Abdominal: Nondistended, soft, non-tender, +BS  MSK: ROM intact  Neuro: alert and oriented x3    Relevant Results  Scheduled medications  amLODIPine, 10 mg, oral, Daily before breakfast  aspirin, 81 mg, oral, Daily  atorvastatin, 10 mg, oral, Nightly  budesonide, 0.5 mg, nebulization, BID  cefTRIAXone, 2 g, intravenous, q24h  cholecalciferol, 5,000 Units, oral, Daily  DULoxetine, 60 mg, oral, Daily  enoxaparin, 40 mg, subcutaneous, q24h  [Held by provider] formoterol, 20 mcg, nebulization, BID  guaiFENesin, 1,200 mg, oral, BID  insulin lispro, 0-10 Units, subcutaneous, TID  [Held by provider] insulin NPH and regular human, 24 Units, subcutaneous, Daily with lunch  [Held by provider] insulin NPH and regular  human, 56 Units, subcutaneous, BID  ipratropium-albuteroL, 3 mL, nebulization, q4h  levothyroxine, 25 mcg, oral, Daily  nystatin, 1 Application, Topical, BID  pantoprazole, 40 mg, oral, Daily before breakfast  predniSONE, 40 mg, oral, Daily  sennosides-docusate sodium, 2 tablet, oral, BID  traZODone, 50 mg, oral, Nightly      Continuous medications     PRN medications  PRN medications: acetaminophen, dextrose, dextrose, glucagon, glucagon, ipratropium-albuteroL, oxygen, oxygen   Labs  Results from last 7 days   Lab Units 07/29/24  0557 07/28/24  0428 07/27/24  0929   WBC AUTO x10*3/uL 11.5* 11.0 13.4*   HEMOGLOBIN g/dL 10.2* 10.4* 9.6*   HEMATOCRIT % 35.2* 35.2* 32.2*   PLATELETS AUTO x10*3/uL 251 244 240     Results from last 7 days   Lab Units 07/29/24  0557 07/28/24  0428 07/27/24  0929 07/24/24  0445 07/23/24  0351 07/22/24  1340   SODIUM mmol/L 139 139 137   < > 139 138   POTASSIUM mmol/L 3.8 4.1 3.9   < > 3.0* 3.7   CHLORIDE mmol/L 102 102 102   < > 99 99   CO2 mmol/L 29 28 25   < > 28 28   BUN mg/dL 15 14 14   < > 9 7   CREATININE mg/dL 0.75 0.72 0.71   < > 0.75 0.77   CALCIUM mg/dL 8.9 9.2 9.0   < > 9.9 9.7   PROTEIN TOTAL g/dL  --   --   --   --  7.7 7.1   BILIRUBIN TOTAL mg/dL  --   --   --   --  0.4 0.4   ALK PHOS U/L  --   --   --   --  105 108   ALT U/L  --   --   --   --  10 8   AST U/L  --   --   --   --  13 12   GLUCOSE mg/dL 128* 44* 256*   < > 186* 240*    < > = values in this interval not displayed.       ECG 12 lead    Result Date: 7/23/2024  Sinus tachycardia Cannot rule out Anterior infarct (cited on or before 23-JUL-2024) T wave abnormality, consider lateral ischemia Abnormal ECG When compared with ECG of 03-JAN-2024 11:25, Vent. rate has increased BY  39 BPM Serial changes of evolving Anterior infarct Present    CT angio chest for pulmonary embolism    Result Date: 7/23/2024  STUDY: CT Angiogram of the Chest; 07/23/2024 4:50 AM INDICATION: Hypoxia, shortness of breath.  Lung cancer with  recent recurrence on immunotherapy. COMPARISON: XR chest 07/23/2024.  CT chest 11/14/2023, 07/11/2023. ACCESSION NUMBER(S): [id number] ORDERING CLINICIAN: NEHA SIMONS TECHNIQUE:  CTA of the chest was performed with intravenous contrast. Images are reviewed and processed at a workstation according to the CT angiogram protocol with 3-D and/or MIP post processing imaging generated.  Omnipaque 350 75 mL was administered intravenously. Automated mA/kV exposure control was utilized and patient examination was performed in strict accordance with principles of ALARA. FINDINGS: Pulmonary arteries are adequately opacified without acute or chronic filling defects.  The thoracic aorta is normal in course and caliber without dissection or aneurysm.  Mild calcified plaque is seen in the thoracic aorta.  Left vertebral artery originates directly from the aortic arch, a normal variant. The heart is normal in size with a small pericardial effusion. Thoracic lymph nodes are not enlarged. There is no pleural effusion, pleural thickening, or pneumothorax. The airways are patent. Lungs are adequately expanded with patchy areas of airspace disease throughout the right lung.  The left lung is relatively clear although there is mild volume loss of the left lung compared to the right.  No interstitial lung disease is identified.  No suspicious pulmonary nodules are appreciated. Upper abdomen demonstrates no acute pathology. There are no acute fractures.  No suspicious bony lesions.  Mild to moderate multilevel disc space narrowing is seen in the mid and lower thoracic spine with osteophytes at multiple levels.  There is a minimal dextroconvex curvature of the thoracic spine.  Healed fracture deformity is noted of the posterolateral aspect of the left seventh rib along with subtle healed fracture deformity of the posterolateral aspect of the left eighth rib.    Multifocal airspace disease in the right lung suggesting developing  pneumonia in the appropriate clinical setting. No definite pulmonary embolus identified. Signed by Chirag Kessler    XR chest 1 view    Result Date: 7/23/2024  STUDY: Chest Radiograph; 07/23/2024 4:23 AM INDICATION: Shortness of breath, hypoxia. COMPARISON: XR chest 01/30/2024. ACCESSION NUMBER(S): [id number] ORDERING CLINICIAN: NEHA SIMONS TECHNIQUE:  Frontal chest was obtained at 04:23:00 hours. FINDINGS: CARDIOMEDIASTINAL SILHOUETTE: Cardiomediastinal silhouette is enlarged in size and configuration.  LUNGS: Right perihilar and bilateral basilar airspace opacities are noted. There is an elevated left hemidiaphragm.  ABDOMEN: No remarkable upper abdominal findings.  BONES: No acute osseous changes.    Multifocal bilateral airspace disease suggesting multilobar pneumonia in the appropriate clinical setting. Elevated left hemidiaphragm. Signed by Chirag Kessler    Bronchoscopy Tier 1; Diagnostic, w EBUS    Result Date: 7/15/2024  Bronchoscopy Report [facility] Location: [facility] procedure room 8 INDICATION: Mediastinal lymphadenopathy BRONCHOSCOPIST: Andrzej Tovar MD First Assistant: Pallavi Sharma, MD REFERRING:  MD Álvaro Bynum MD Merry A Werley, APRN-CNP FINDINGS: After obtaining informed consent and performing a time out, the patient was anesthetized and an ET tube was placed by anesthesia.  The flexible bronchoscope was then introduced through the advanced airway, and an airway examination was performed. The ET tube is in good position.  The trachea is of normal caliber. The jarret is sharp. The tracheobronchial tree of the bilateral lung(s) was examined to at least the first subsegmental level.  Bronchial anatomy is normal; there are no endobronchial lesions, and no secretions. The flexible bronchoscope was removed from the airway, and exchanged for the curvilinear EBUS bronchoscope.  Lymph node sizing was performed via endobronchial ultrasound for  suspected lung cancer.  Sampling by transbronchial needle aspiration was performed using a 22-gauge Olympus ViziShot needle and sent for routine cytology. The 7 (subcarinal) node measured 9.9 mm in size. Sampling was done, 7 samples with the needle were obtained. The 4L (lower paratracheal) node measured 15.3 mm in size. Sampling was done, 8 samples with the needle were obtained. Rapid On-Site Evaluation (KELSI): Preliminary cytology from the lymph node station(s) 7 showed lymphoid tissue (final results are pending). Preliminary cytology from the lymph node station(s) 4L showed malignancy (final results are pending). COMPLICATIONS: None ESTIMATED BLOOD LOSS:  Minimal, < 5 mL The physical status of the patient was re-assessed after the procedure.  The patient was transported to the recovery area / PACU in stable condition.      Linear EBUS was used to evaluate the lymph node station 4L, 7 and EBUS-TBNA was performed. Rapid On-Site Evaluation (KELSI): Preliminary cytology was suggestive of lymphoid tissue in node level 7 (final results are pending). Rapid On-Site Evaluation (KELSI): Preliminary cytology was suggestive of malignancy in node level 4L (final results are pending). RECOMMENDATION: The patient will be observed post-procedure, until all discharge criteria are met. Await cytology, histopathology results. Follow-up with referring physician.           Assessment/Plan   Thu Ortega is a 69 y.o. female on day 6 of admission presenting with COPD exacerbation (Multi).  Principal Problem:    COPD exacerbation (Multi)      Thu Ortega is a 69 y.o. female with past medical history of hypertension, hyperlipidemia, type 2 diabetes mellitus, COPD, CHEY on nocturnal 2 L nasal cannula, non-small cell lung cancer, former smoker, history of atrial fibrillation presented to emergency department for shortness of breath and chills.       Right-sided pneumonia bacterial in origin with acute respiratory hypoxemic and  hypercapnic failure.    -ceftriaxone and azithromycin  -MSSA +  -Bcx neg  -prednisone 40mg daily start taper  -Continue incentive spirometry and flutter valve  -BiPAP at nighttime and as needed; will need outpatient sleep study   -Continue scheduled breathing treatment  -Wean O2 as tolerated   -OT/PT  -encourage ambulation today in anticipation of discharge     Non-small cell lung cancer s/p bronchoscopy and recent biopsy  -Patient on Keytruda  -Dr. Galeano aware patient is admitted to the hospital  -Patient's biopsy results  came back positive for malignant cells and mediastinum  -Plan on switching her to oral K-dolly inhibitor treatment once she returns to clinic as per Dr. Galeano    Hypertension  -Blood pressure stable  -Continue with amlodipine     Hyperlipidemia  -Atorvastatin     Type 2 diabetes mellitus  -no further hypoglycemic episodes   -hold home 70/30  -continue sliding scale TID   -Hga1c 7.2 June 2024  -last saw endo 6/2024 - reported on 70/30 TID 70u, 30u, 60-70u + metformin; pt reports she takes 5-30u TID     CHEY on 2 L nasal cannula at night time  -Supplemental O2 and wean as tolerated    Constipation  -Miralax daily     DVT prophylaxis  -Lovenox       Dispo: This is a 69-year-old female who is admitted with acute hypoxic and hypercapnic respiratory failure secondary to pneumonia with a history of non-small cell lung cancer.  Currently receiving steroids, breathing treatments and IV antibiotics.  Improving.  Pt to ambulate today. Anticipate discharge tomorrow.

## 2024-07-30 ENCOUNTER — TELEPHONE (OUTPATIENT)
Dept: HEMATOLOGY/ONCOLOGY | Facility: CLINIC | Age: 69
End: 2024-07-30
Payer: MEDICARE

## 2024-07-30 ENCOUNTER — HOSPITAL ENCOUNTER (EMERGENCY)
Facility: HOSPITAL | Age: 69
Discharge: HOME | End: 2024-07-30
Attending: STUDENT IN AN ORGANIZED HEALTH CARE EDUCATION/TRAINING PROGRAM
Payer: MEDICARE

## 2024-07-30 ENCOUNTER — HOSPITAL ENCOUNTER (OUTPATIENT)
Dept: CARDIOLOGY | Facility: HOSPITAL | Age: 69
Discharge: HOME | End: 2024-07-30
Payer: MEDICARE

## 2024-07-30 ENCOUNTER — TELEPHONE (OUTPATIENT)
Dept: ENDOCRINOLOGY | Age: 69
End: 2024-07-30

## 2024-07-30 VITALS
SYSTOLIC BLOOD PRESSURE: 120 MMHG | TEMPERATURE: 98.6 F | HEIGHT: 59 IN | HEART RATE: 72 BPM | RESPIRATION RATE: 18 BRPM | BODY MASS INDEX: 31.2 KG/M2 | WEIGHT: 154.76 LBS | OXYGEN SATURATION: 97 % | DIASTOLIC BLOOD PRESSURE: 65 MMHG

## 2024-07-30 VITALS
SYSTOLIC BLOOD PRESSURE: 124 MMHG | HEART RATE: 68 BPM | HEIGHT: 59 IN | TEMPERATURE: 97.3 F | WEIGHT: 146 LBS | OXYGEN SATURATION: 99 % | BODY MASS INDEX: 29.43 KG/M2 | RESPIRATION RATE: 18 BRPM | DIASTOLIC BLOOD PRESSURE: 66 MMHG

## 2024-07-30 DIAGNOSIS — R73.9 HYPERGLYCEMIA: Primary | ICD-10-CM

## 2024-07-30 PROBLEM — J96.21 ACUTE ON CHRONIC HYPOXIC RESPIRATORY FAILURE (MULTI): Status: ACTIVE | Noted: 2024-07-30

## 2024-07-30 PROBLEM — J44.1 COPD EXACERBATION (MULTI): Status: RESOLVED | Noted: 2024-07-23 | Resolved: 2024-07-30

## 2024-07-30 PROBLEM — J18.9 PNEUMONIA DUE TO INFECTIOUS ORGANISM: Status: ACTIVE | Noted: 2024-07-30

## 2024-07-30 LAB
ALBUMIN SERPL BCP-MCNC: 3.9 G/DL (ref 3.4–5)
ALP SERPL-CCNC: 184 U/L (ref 33–136)
ALT SERPL W P-5'-P-CCNC: 60 U/L (ref 7–45)
ANION GAP BLDV CALCULATED.4IONS-SCNC: 7 MMOL/L (ref 10–25)
ANION GAP SERPL CALC-SCNC: 12 MMOL/L (ref 10–20)
ANION GAP SERPL CALC-SCNC: 13 MMOL/L (ref 10–20)
AST SERPL W P-5'-P-CCNC: 21 U/L (ref 9–39)
BASE EXCESS BLDV CALC-SCNC: 4.1 MMOL/L (ref -2–3)
BASOPHILS # BLD MANUAL: 0 X10*3/UL (ref 0–0.1)
BASOPHILS NFR BLD MANUAL: 0 %
BILIRUB SERPL-MCNC: 0.6 MG/DL (ref 0–1.2)
BODY TEMPERATURE: ABNORMAL
BUN SERPL-MCNC: 14 MG/DL (ref 6–23)
BUN SERPL-MCNC: 16 MG/DL (ref 6–23)
CA-I BLDV-SCNC: 1.21 MMOL/L (ref 1.1–1.33)
CALCIUM SERPL-MCNC: 8.8 MG/DL (ref 8.6–10.3)
CALCIUM SERPL-MCNC: 9.4 MG/DL (ref 8.6–10.3)
CHLORIDE BLDV-SCNC: 100 MMOL/L (ref 98–107)
CHLORIDE SERPL-SCNC: 101 MMOL/L (ref 98–107)
CHLORIDE SERPL-SCNC: 97 MMOL/L (ref 98–107)
CO2 SERPL-SCNC: 27 MMOL/L (ref 21–32)
CO2 SERPL-SCNC: 28 MMOL/L (ref 21–32)
CREAT SERPL-MCNC: 0.74 MG/DL (ref 0.5–1.05)
CREAT SERPL-MCNC: 0.83 MG/DL (ref 0.5–1.05)
EGFRCR SERPLBLD CKD-EPI 2021: 76 ML/MIN/1.73M*2
EGFRCR SERPLBLD CKD-EPI 2021: 87 ML/MIN/1.73M*2
EOSINOPHIL # BLD MANUAL: 0.2 X10*3/UL (ref 0–0.7)
EOSINOPHIL NFR BLD MANUAL: 2 %
ERYTHROCYTE [DISTWIDTH] IN BLOOD BY AUTOMATED COUNT: 17.2 % (ref 11.5–14.5)
ERYTHROCYTE [DISTWIDTH] IN BLOOD BY AUTOMATED COUNT: 17.3 % (ref 11.5–14.5)
GLUCOSE BLD MANUAL STRIP-MCNC: 168 MG/DL (ref 74–99)
GLUCOSE BLD MANUAL STRIP-MCNC: 243 MG/DL (ref 74–99)
GLUCOSE BLD MANUAL STRIP-MCNC: 299 MG/DL (ref 74–99)
GLUCOSE BLD MANUAL STRIP-MCNC: 418 MG/DL (ref 74–99)
GLUCOSE BLDV-MCNC: 417 MG/DL (ref 74–99)
GLUCOSE SERPL-MCNC: 167 MG/DL (ref 74–99)
GLUCOSE SERPL-MCNC: 378 MG/DL (ref 74–99)
HCO3 BLDV-SCNC: 29.7 MMOL/L (ref 22–26)
HCT VFR BLD AUTO: 34.6 % (ref 36–46)
HCT VFR BLD AUTO: 35.1 % (ref 36–46)
HCT VFR BLD EST: 34 % (ref 36–46)
HGB BLD-MCNC: 10.2 G/DL (ref 12–16)
HGB BLD-MCNC: 10.9 G/DL (ref 12–16)
HGB BLDV-MCNC: 11.4 G/DL (ref 12–16)
HOLD SPECIMEN: NORMAL
IMM GRANULOCYTES # BLD AUTO: 0.72 X10*3/UL (ref 0–0.7)
IMM GRANULOCYTES NFR BLD AUTO: 7.1 % (ref 0–0.9)
INHALED O2 CONCENTRATION: 32 %
INR PPP: 1 (ref 0.9–1.1)
LACTATE BLDV-SCNC: 1.7 MMOL/L (ref 0.4–2)
LIPASE SERPL-CCNC: 541 U/L (ref 9–82)
LYMPHOCYTES # BLD MANUAL: 0.91 X10*3/UL (ref 1.2–4.8)
LYMPHOCYTES NFR BLD MANUAL: 9 %
MAGNESIUM SERPL-MCNC: 2.13 MG/DL (ref 1.6–2.4)
MCH RBC QN AUTO: 24.3 PG (ref 26–34)
MCH RBC QN AUTO: 24.9 PG (ref 26–34)
MCHC RBC AUTO-ENTMCNC: 29.5 G/DL (ref 32–36)
MCHC RBC AUTO-ENTMCNC: 31.1 G/DL (ref 32–36)
MCV RBC AUTO: 80 FL (ref 80–100)
MCV RBC AUTO: 83 FL (ref 80–100)
METAMYELOCYTES # BLD MANUAL: 0.2 X10*3/UL
METAMYELOCYTES NFR BLD MANUAL: 2 %
MONOCYTES # BLD MANUAL: 0.51 X10*3/UL (ref 0.1–1)
MONOCYTES NFR BLD MANUAL: 5 %
MYELOCYTES # BLD MANUAL: 0.1 X10*3/UL
MYELOCYTES NFR BLD MANUAL: 1 %
NEUTROPHILS # BLD MANUAL: 8.18 X10*3/UL (ref 1.2–7.7)
NEUTS BAND # BLD MANUAL: 0.2 X10*3/UL (ref 0–0.7)
NEUTS BAND NFR BLD MANUAL: 2 %
NEUTS SEG # BLD MANUAL: 7.98 X10*3/UL (ref 1.2–7)
NEUTS SEG NFR BLD MANUAL: 79 %
NRBC BLD-RTO: 0 /100 WBCS (ref 0–0)
NRBC BLD-RTO: 0 /100 WBCS (ref 0–0)
OVALOCYTES BLD QL SMEAR: ABNORMAL
OXYHGB MFR BLDV: 72.7 % (ref 45–75)
PCO2 BLDV: 48 MM HG (ref 41–51)
PH BLDV: 7.4 PH (ref 7.33–7.43)
PHOSPHATE SERPL-MCNC: 3.3 MG/DL (ref 2.5–4.9)
PLATELET # BLD AUTO: 255 X10*3/UL (ref 150–450)
PLATELET # BLD AUTO: 328 X10*3/UL (ref 150–450)
PO2 BLDV: 45 MM HG (ref 35–45)
POTASSIUM BLDV-SCNC: 4.3 MMOL/L (ref 3.5–5.3)
POTASSIUM SERPL-SCNC: 3.6 MMOL/L (ref 3.5–5.3)
POTASSIUM SERPL-SCNC: 4.3 MMOL/L (ref 3.5–5.3)
PROT SERPL-MCNC: 7 G/DL (ref 6.4–8.2)
PROTHROMBIN TIME: 11.2 SECONDS (ref 9.8–12.8)
RBC # BLD AUTO: 4.19 X10*6/UL (ref 4–5.2)
RBC # BLD AUTO: 4.38 X10*6/UL (ref 4–5.2)
RBC MORPH BLD: ABNORMAL
SAO2 % BLDV: 74 % (ref 45–75)
SODIUM BLDV-SCNC: 132 MMOL/L (ref 136–145)
SODIUM SERPL-SCNC: 133 MMOL/L (ref 136–145)
SODIUM SERPL-SCNC: 137 MMOL/L (ref 136–145)
TOTAL CELLS COUNTED BLD: 100
WBC # BLD AUTO: 10 X10*3/UL (ref 4.4–11.3)
WBC # BLD AUTO: 10.1 X10*3/UL (ref 4.4–11.3)

## 2024-07-30 PROCEDURE — 2500000001 HC RX 250 WO HCPCS SELF ADMINISTERED DRUGS (ALT 637 FOR MEDICARE OP): Performed by: INTERNAL MEDICINE

## 2024-07-30 PROCEDURE — 82947 ASSAY GLUCOSE BLOOD QUANT: CPT

## 2024-07-30 PROCEDURE — 93010 ELECTROCARDIOGRAM REPORT: CPT | Performed by: STUDENT IN AN ORGANIZED HEALTH CARE EDUCATION/TRAINING PROGRAM

## 2024-07-30 PROCEDURE — 80048 BASIC METABOLIC PNL TOTAL CA: CPT | Performed by: STUDENT IN AN ORGANIZED HEALTH CARE EDUCATION/TRAINING PROGRAM

## 2024-07-30 PROCEDURE — 2500000004 HC RX 250 GENERAL PHARMACY W/ HCPCS (ALT 636 FOR OP/ED): Performed by: STUDENT IN AN ORGANIZED HEALTH CARE EDUCATION/TRAINING PROGRAM

## 2024-07-30 PROCEDURE — 36415 COLL VENOUS BLD VENIPUNCTURE: CPT | Performed by: STUDENT IN AN ORGANIZED HEALTH CARE EDUCATION/TRAINING PROGRAM

## 2024-07-30 PROCEDURE — 2500000004 HC RX 250 GENERAL PHARMACY W/ HCPCS (ALT 636 FOR OP/ED): Performed by: INTERNAL MEDICINE

## 2024-07-30 PROCEDURE — 2500000001 HC RX 250 WO HCPCS SELF ADMINISTERED DRUGS (ALT 637 FOR MEDICARE OP): Performed by: STUDENT IN AN ORGANIZED HEALTH CARE EDUCATION/TRAINING PROGRAM

## 2024-07-30 PROCEDURE — 82810 BLOOD GASES O2 SAT ONLY: CPT | Mod: CCI

## 2024-07-30 PROCEDURE — 94640 AIRWAY INHALATION TREATMENT: CPT

## 2024-07-30 PROCEDURE — 2500000002 HC RX 250 W HCPCS SELF ADMINISTERED DRUGS (ALT 637 FOR MEDICARE OP, ALT 636 FOR OP/ED): Performed by: STUDENT IN AN ORGANIZED HEALTH CARE EDUCATION/TRAINING PROGRAM

## 2024-07-30 PROCEDURE — 99239 HOSP IP/OBS DSCHRG MGMT >30: CPT | Performed by: STUDENT IN AN ORGANIZED HEALTH CARE EDUCATION/TRAINING PROGRAM

## 2024-07-30 PROCEDURE — 85027 COMPLETE CBC AUTOMATED: CPT | Performed by: STUDENT IN AN ORGANIZED HEALTH CARE EDUCATION/TRAINING PROGRAM

## 2024-07-30 PROCEDURE — 84132 ASSAY OF SERUM POTASSIUM: CPT | Performed by: STUDENT IN AN ORGANIZED HEALTH CARE EDUCATION/TRAINING PROGRAM

## 2024-07-30 PROCEDURE — 85007 BL SMEAR W/DIFF WBC COUNT: CPT | Performed by: STUDENT IN AN ORGANIZED HEALTH CARE EDUCATION/TRAINING PROGRAM

## 2024-07-30 PROCEDURE — 84132 ASSAY OF SERUM POTASSIUM: CPT

## 2024-07-30 PROCEDURE — 99285 EMERGENCY DEPT VISIT HI MDM: CPT | Performed by: STUDENT IN AN ORGANIZED HEALTH CARE EDUCATION/TRAINING PROGRAM

## 2024-07-30 PROCEDURE — 99284 EMERGENCY DEPT VISIT MOD MDM: CPT | Mod: 25

## 2024-07-30 PROCEDURE — 93005 ELECTROCARDIOGRAM TRACING: CPT

## 2024-07-30 PROCEDURE — 85610 PROTHROMBIN TIME: CPT | Performed by: STUDENT IN AN ORGANIZED HEALTH CARE EDUCATION/TRAINING PROGRAM

## 2024-07-30 PROCEDURE — 83690 ASSAY OF LIPASE: CPT | Performed by: STUDENT IN AN ORGANIZED HEALTH CARE EDUCATION/TRAINING PROGRAM

## 2024-07-30 PROCEDURE — 83735 ASSAY OF MAGNESIUM: CPT | Performed by: STUDENT IN AN ORGANIZED HEALTH CARE EDUCATION/TRAINING PROGRAM

## 2024-07-30 PROCEDURE — 84100 ASSAY OF PHOSPHORUS: CPT | Performed by: STUDENT IN AN ORGANIZED HEALTH CARE EDUCATION/TRAINING PROGRAM

## 2024-07-30 PROCEDURE — 2500000005 HC RX 250 GENERAL PHARMACY W/O HCPCS: Performed by: INTERNAL MEDICINE

## 2024-07-30 PROCEDURE — 2500000002 HC RX 250 W HCPCS SELF ADMINISTERED DRUGS (ALT 637 FOR MEDICARE OP, ALT 636 FOR OP/ED): Performed by: INTERNAL MEDICINE

## 2024-07-30 PROCEDURE — 84075 ASSAY ALKALINE PHOSPHATASE: CPT | Performed by: STUDENT IN AN ORGANIZED HEALTH CARE EDUCATION/TRAINING PROGRAM

## 2024-07-30 PROCEDURE — 36415 COLL VENOUS BLD VENIPUNCTURE: CPT

## 2024-07-30 PROCEDURE — 2500000004 HC RX 250 GENERAL PHARMACY W/ HCPCS (ALT 636 FOR OP/ED)

## 2024-07-30 RX ORDER — GLIPIZIDE 5 MG/1
5 TABLET ORAL ONCE
Qty: 30 TABLET | Refills: 0 | Status: SHIPPED | OUTPATIENT
Start: 2024-07-30 | End: 2024-07-30

## 2024-07-30 RX ORDER — DEXTROSE 50 % IN WATER (D50W) INTRAVENOUS SYRINGE
12.5
Status: DISCONTINUED | OUTPATIENT
Start: 2024-07-30 | End: 2024-07-30 | Stop reason: HOSPADM

## 2024-07-30 RX ORDER — CEFUROXIME AXETIL 500 MG/1
500 TABLET ORAL 2 TIMES DAILY
Qty: 6 TABLET | Refills: 0 | Status: SHIPPED | OUTPATIENT
Start: 2024-07-31 | End: 2024-08-03

## 2024-07-30 RX ORDER — INSULIN LISPRO 100 [IU]/ML
5 INJECTION, SOLUTION INTRAVENOUS; SUBCUTANEOUS ONCE
Status: COMPLETED | OUTPATIENT
Start: 2024-07-30 | End: 2024-07-30

## 2024-07-30 RX ORDER — INSULIN ASPART 100 [IU]/ML
15 INJECTION, SOLUTION INTRAVENOUS; SUBCUTANEOUS
Qty: 15 ML | Refills: 0 | Status: SHIPPED | OUTPATIENT
Start: 2024-07-30 | End: 2024-09-01

## 2024-07-30 RX ORDER — DEXTROSE 50 % IN WATER (D50W) INTRAVENOUS SYRINGE
25
Status: DISCONTINUED | OUTPATIENT
Start: 2024-07-30 | End: 2024-07-30 | Stop reason: HOSPADM

## 2024-07-30 RX ORDER — GLIPIZIDE 5 MG/1
5 TABLET ORAL ONCE
Status: COMPLETED | OUTPATIENT
Start: 2024-07-30 | End: 2024-07-30

## 2024-07-30 RX ORDER — PREDNISONE 10 MG/1
TABLET ORAL
Qty: 18 TABLET | Refills: 0 | Status: SHIPPED | OUTPATIENT
Start: 2024-07-31 | End: 2024-08-09

## 2024-07-30 RX ORDER — NAPROXEN SODIUM 220 MG
TABLET ORAL
Qty: 100 EACH | Refills: 0 | OUTPATIENT
Start: 2024-07-30 | End: 2024-07-30

## 2024-07-30 ASSESSMENT — LIFESTYLE VARIABLES
EVER FELT BAD OR GUILTY ABOUT YOUR DRINKING: NO
EVER HAD A DRINK FIRST THING IN THE MORNING TO STEADY YOUR NERVES TO GET RID OF A HANGOVER: NO
HAVE PEOPLE ANNOYED YOU BY CRITICIZING YOUR DRINKING: NO
HAVE YOU EVER FELT YOU SHOULD CUT DOWN ON YOUR DRINKING: NO
TOTAL SCORE: 0

## 2024-07-30 ASSESSMENT — COGNITIVE AND FUNCTIONAL STATUS - GENERAL
HELP NEEDED FOR BATHING: A LITTLE
DAILY ACTIVITIY SCORE: 23

## 2024-07-30 ASSESSMENT — PAIN DESCRIPTION - PAIN TYPE: TYPE: ACUTE PAIN

## 2024-07-30 ASSESSMENT — PAIN SCALES - GENERAL
PAINLEVEL_OUTOF10: 0 - NO PAIN
PAINLEVEL_OUTOF10: 6
PAINLEVEL_OUTOF10: 0 - NO PAIN

## 2024-07-30 ASSESSMENT — PAIN - FUNCTIONAL ASSESSMENT
PAIN_FUNCTIONAL_ASSESSMENT: 0-10
PAIN_FUNCTIONAL_ASSESSMENT: 0-10

## 2024-07-30 ASSESSMENT — PAIN DESCRIPTION - LOCATION: LOCATION: BACK

## 2024-07-30 NOTE — DISCHARGE INSTRUCTIONS
-New prescriptions were sent to your pharmacy, please review your list carefully  -Please follow-up with your primary care doctor in 7 to 10 days  -We recommend that you have an outpatient sleep study, your primary care provider can help you schedule this  -Please follow-up with Dr. Galeano as previously scheduled    -Your blood sugar was controlled with only a sliding scale insulin. A new prescription for this was sent to your pharmacy.   -Please follow-up with your endocrinologist as soon as possible.   -Please keep a very close eye and log on your blood sugars    Mild insulin sliding scale for insulin aspart (short acting insulin)  Hypoglycemia protocol if Blood Glucose is between 0 - 70 mg/dL      0 unit(s) if Blood glucose is between    2 unit(s) if Blood glucose is between 151-200   4 unit(s) if Blood glucose is between 201-250   6 unit(s) if Blood glucose is between 251-300   8 unit(s) if Blood glucose is between 301-350   10 unit(s) if Blood glucose is between 351-400      Notify provider unit(s) if Blood Glucose is greater than 400 mg/dL

## 2024-07-30 NOTE — DISCHARGE SUMMARY
Discharge Diagnosis  COPD exacerbation (Multi)    Issues Requiring Follow-Up  Insulin use/blood sugars   COPD  Pneumonia  Oxygen use     Discharge Meds     Your medication list        START taking these medications        Instructions Last Dose Given Next Dose Due   cefuroxime 500 mg tablet  Commonly known as: Ceftin  Start taking on: July 31, 2024      Take 1 tablet (500 mg) by mouth 2 times a day for 3 days. Do not fill before July 31, 2024.       oxygen gas therapy  Commonly known as: O2      Inhale 1 each once every 24 hours.       predniSONE 10 mg tablet  Commonly known as: Deltasone  Start taking on: July 31, 2024      Take 3 tablets (30 mg) by mouth once daily for 3 days, THEN 2 tablets (20 mg) once daily for 3 days, THEN 1 tablet (10 mg) once daily for 3 days.              CONTINUE taking these medications        Instructions Last Dose Given Next Dose Due   acetaminophen 325 mg tablet  Commonly known as: Tylenol      Take 2 tablets (650 mg) by mouth every 4 hours if needed for mild pain (1 - 3) or fever (temp greater than 38.0 C).       albuterol 90 mcg/actuation inhaler           amLODIPine 10 mg tablet  Commonly known as: Norvasc           aspirin 81 mg chewable tablet           atorvastatin 10 mg tablet  Commonly known as: Lipitor           biotin 10 mg tablet           DULoxetine 60 mg DR capsule  Commonly known as: Cymbalta           insulin NPH and regular human 100 unit/mL (70-30) injection  Commonly known as: HumuLIN 70-30, NovoLIN 70-30           insulin NPH and regular human 100 unit/mL (70-30) injection  Commonly known as: HumuLIN 70-30, NovoLIN 70-30           levothyroxine 25 mcg tablet  Commonly known as: Synthroid, Levoxyl           metFORMIN 1,000 mg tablet  Commonly known as: Glucophage           nystatin 100,000 unit/gram powder  Commonly known as: Mycostatin      Apply 1 Application topically 2 times a day.       ondansetron 4 mg tablet  Commonly known as: Zofran      Take 1 tablet (4 mg)  by mouth every 8 hours if needed for nausea or vomiting.       pantoprazole 40 mg EC tablet  Commonly known as: ProtoNix           traZODone 50 mg tablet  Commonly known as: Desyrel           Vitamin D3 5,000 Units tablet  Generic drug: cholecalciferol           Wixela Inhub 250-50 mcg/dose diskus inhaler  Generic drug: fluticasone propion-salmeteroL                     Where to Get Your Medications        These medications were sent to [facility]/pharmacy #[id number] - [place], [place] - [address] [facility]  [address] [address] [address]      Phone: [phone]   cefuroxime 500 mg tablet  predniSONE 10 mg tablet       Information about where to get these medications is not yet available    Ask your nurse or doctor about these medications  oxygen gas therapy         Test Results Pending At Discharge  Pending Labs       No current pending labs.            Hospital Course   This is a 69-year-old female who is admitted with acute hypoxic and hypercapnic respiratory failure secondary to pneumonia and COPD exacerbation with a history of non-small cell lung cancer. Pt was started on steroids, breathing treatments and IV antibiotics. Oxygen was weaned down to 2L and patient was recommended for outpatient sleep study. She continued to improve and was determined to be medically stable for discharge home with instruction for close follow up.     D/c >30min     Pertinent Physical Exam At Time of Discharge  Physical Exam  Constitutional: Well developed, no distress, alert and cooperative, looks improved   Skin: Warm and dry  Eyes: EOMI, clear sclera  ENMT: mucous membranes moist  Respiratory: CTAB, NC in place, no conversational dyspnea   Cardiovascular: Regular, rate and rhythm  Abdominal: Nondistended, soft, non-tender, +BS  MSK: ROM intact  Neuro: alert and oriented x3    Outpatient Follow-Up  Future Appointments   Date Time Provider Department Center   8/5/2024  9:20 AM Álvaro Galeano,  MD [id number] [place]   8/16/2024  7:45 AM ELY CT 2 [facility] [place]   8/16/2024  8:30 AM Reinaldo Calixto MD [id number] [place]   1/2/2025  8:40 AM Dino Fox MD [id number] [place]         Yanni Smalls, DO

## 2024-07-30 NOTE — CARE PLAN
The patient's goals for the shift include adequate oxygenation and pain management.    The clinical goals for the shift include adequate oxygenation & pain management.    Over the shift, the patient did not make progress toward the following goals. Barriers to progression include n/a. Recommendations to address these barriers include n/a.

## 2024-07-30 NOTE — ED PROVIDER NOTES
EMERGENCY DEPARTMENT ENCOUNTER      Pt Name: Thu Ortega  MRN: [id number]  Birthdate 1955  Date of evaluation: 7/30/2024  Provider: Brandon Pérez MD    CHIEF COMPLAINT       Chief Complaint   Patient presents with    Hyperglycemia     Pt was discharged from [address] [place] [address] today at noon and pt states at 2pm her BS was 597. BS is currently 418. Type 2 diabetic - last dose of insulin (novolin) was prior to DC today. Pt was admitted for pneumonia - on 3L at baseline. Also complains of back pain and headache that she rates 6/10.     Subjective    HISTORY OF PRESENT ILLNESS    69-year-old female with history of COPD on 2 LNC and recently discharged due to pneumonia and COPD exacerbation presenting to ED with complaint of hyperglycemia.  Patient reports that she was discharged and 2 hours later at home her blood sugar was in the 300s which concerned her.  States that she rechecked it an hour later and was in the 500s in which she called her PCP and was advised to go to the ED. Discharged on new corticosteroid and insulin pen which she was not able to fill at her pharmacy causing the increasing glucose level.      History provided by:  Patient and spouse      Nursing Notes were reviewed.    PAST MEDICAL HISTORY     Past Medical History:   Diagnosis Date    Adenocarcinoma of lung, left (Multi)     s/p robotic assisted NICHOL lobectomy on 1/31/23, c/b afib and aspiration PNA    Atrial fibrillation (Multi)     COPD (chronic obstructive pulmonary disease) (Multi)     F/W Dr. Kang, Wixela inhaler, PFT appointment scheduled for 01/12/2024    Depression     Taking Cymbalta    DJD (degenerative joint disease)     DM (diabetes mellitus) (Multi)     F/w Dr. Wong, Taking Metformin, Last A1c is 7.5 on 08/03/2023    Fibromyalgia, primary     F/W Pain    GERD (gastroesophageal reflux disease)     F/w PCP, on Protonix    Hearing aid worn     Bilateral hearing aides    History of blood transfusion     patient think she recieved a transfusion  with her Lobectomy surgery    History of meningioma of the brain     s/p resection    HLD (hyperlipidemia)     F/W PCP: Taking Atorvastatin    HTN (hypertension)     F/W PCP    Irregular heart beat     Paroxysmal atrial fibrillation. Last seen by Dr. Fox on 01/03/24, Started on Amiodarone. Not on anticoagulation.    Irritable bowel syndrome     Malignant neoplasm of upper lobe of left lung (Multi)     Nephrolithiasis     monitoring, no interventions    Panlobular emphysema (Multi)     Shortness of breath     PALENCIA previously on O2    Spinal stenosis     Urinary tract infection          SURGICAL HISTORY       Past Surgical History:   Procedure Laterality Date    BRAIN SURGERY      meningioma resection    CATARACT EXTRACTION      CT GUIDED PERCUTANEOUS BIOPSY LUNG  12/13/2023    CT GUIDED PERCUTANEOUS BIOPSY LUNG 12/13/2023 [facility]    CT GUIDED PERCUTANEOUS BIOPSY LUNG  12/15/2023    CT GUIDED PERCUTANEOUS BIOPSY LUNG    FOOT SURGERY      KNEE SURGERY      LUNG LOBECTOMY      TUBAL LIGATION      US GUIDED NEEDLE LIVER BIOPSY  02/07/2020    US GUIDED NEEDLE LIVER BIOPSY 2/7/2020 [facility]         CURRENT MEDICATIONS       Discharge Medication List as of 7/30/2024  8:44 PM        CONTINUE these medications which have NOT CHANGED    Details   amLODIPine (Norvasc) 10 mg tablet Take 1 tablet (10 mg) by mouth once daily in the morning. Take before meals., Historical Med      aspirin 81 mg chewable tablet Chew 1 tablet (81 mg) once daily., Historical Med      atorvastatin (Lipitor) 10 mg tablet Take 1 tablet (10 mg) by mouth once daily., Historical Med      biotin 10 mg tablet Take 1 tablet (10 mg) by mouth once daily., Historical Med      cefuroxime (Ceftin) 500 mg tablet Take 1 tablet (500 mg) by mouth 2 times a day for 3 days. Do not fill before July 31, 2024., Starting Wed 7/31/2024, Until Sat 8/3/2024, Normal      cholecalciferol (Vitamin D3) 5,000 Units tablet Take 1 tablet (5,000 Units) by mouth once daily.,  Historical Med      DULoxetine (Cymbalta) 60 mg DR capsule Take 1 capsule (60 mg) by mouth once daily. Do not crush or chew., Historical Med      insulin aspart (NovoLOG PenFill U-100 Insulin) 100 unit/mL pen cartridge Inject 15 Units under the skin 3 times daily (morning, midday, late afternoon). Take as directed per insulin instructions. Mild insulin sliding scale, Starting Tue 7/30/2024, Until Sun 9/1/2024, Normal      levothyroxine (Synthroid, Levoxyl) 25 mcg tablet Take 1 tablet (25 mcg) by mouth early in the morning.., Historical Med      metFORMIN (Glucophage) 1,000 mg tablet Take 1 tablet (1,000 mg) by mouth 2 times a day., Historical Med      nystatin (Mycostatin) 100,000 unit/gram powder Apply 1 Application topically 2 times a day., Starting Wed 3/20/2024, Until Thu 3/20/2025, Normal      ondansetron (Zofran) 4 mg tablet Take 1 tablet (4 mg) by mouth every 8 hours if needed for nausea or vomiting., Starting Mon 3/18/2024, Normal      pantoprazole (ProtoNix) 40 mg EC tablet Take 1 tablet (40 mg) by mouth once daily in the morning. Take before meals., Historical Med      traZODone (Desyrel) 50 mg tablet Take 1 tablet (50 mg) by mouth once daily at bedtime., Historical Med      Wixela Inhub 250-50 mcg/dose diskus inhaler Inhale 1 puff 2 times a day., Historical Med      acetaminophen (Tylenol) 325 mg tablet Take 2 tablets (650 mg) by mouth every 4 hours if needed for mild pain (1 - 3) or fever (temp greater than 38.0 C)., Starting Wed 1/31/2024, No Print      albuterol 90 mcg/actuation inhaler Inhale 2 puffs every 6 hours if needed for wheezing., Historical Med      oxygen (O2) gas therapy Inhale 1 each once every 24 hours., Starting Tue 7/30/2024, No Print      predniSONE (Deltasone) 10 mg tablet Multiple Dosages:Starting Wed 7/31/2024, Until Fri 8/2/2024 at 2359, THEN Starting Sat 8/3/2024, Until Mon 8/5/2024 at 2359, THEN Starting Tue 8/6/2024, Until Thu 8/8/2024 at 2359Take 3 tablets (30 mg) by mouth once  daily for 3 days, THEN 2 tablets ( 20 mg) once daily for 3 days, THEN 1 tablet (10 mg) once daily for 3 days., Normal             ALLERGIES     Penicillin    FAMILY HISTORY       Family History   Problem Relation Name Age of Onset    Stroke Mother      Other (aortic valve disease) Mother      Heart disease Mother      Cancer Sister      Stroke Sister      Diabetes Sister      Diabetes Brother      Other (heart transplant) Brother         SOCIAL HISTORY       Social History     Socioeconomic History    Marital status:    Tobacco Use    Smoking status: Former     Current packs/day: 0.00     Types: Cigarettes     Quit date:      Years since quittin.5    Smokeless tobacco: Never   Vaping Use    Vaping status: Former   Substance and Sexual Activity    Alcohol use: Not Currently    Drug use: Never    Sexual activity: Defer     Social Determinants of Health     Financial Resource Strain: Low Risk  (2024)    Overall Financial Resource Strain (CARDIA)     Difficulty of Paying Living Expenses: Not hard at all   Food Insecurity: No Food Insecurity (2023)    [facility]    Hunger Vital Sign     Worried About Running Out of Food in the Last Year: Never true     Ran Out of Food in the Last Year: Never true   Transportation Needs: No Transportation Needs (2024)    PRAPARE - Transportation     Lack of Transportation (Medical): No     Lack of Transportation (Non-Medical): No   Physical Activity: Inactive (2023)    [facility]    Exercise Vital Sign     Days of Exercise per Week: 0 days     Minutes of Exercise per Session: 0 min   Housing Stability: Low Risk  (2024)    Housing Stability Vital Sign     Unable to Pay for Housing in the Last Year: No     Number of Times Moved in the Last Year: 1     Homeless in the Last Year: No       SCREENINGS                       Objective    PHYSICAL EXAM    (up to 7 for level 4, 8 or more  for level 5)     ED Triage Vitals [07/30/24 1740]   Temperature Heart Rate Respirations BP   36.3 °C (97.3 °F) 75 18 141/79      Pulse Ox Temp Source Heart Rate Source Patient Position   96 % Temporal Monitor Sitting      BP Location FiO2 (%)     Right arm --       Physical Exam  Vitals and nursing note reviewed.   Constitutional:       General: She is awake. She is not in acute distress.     Appearance: Normal appearance. She is well-developed.   HENT:      Head: Normocephalic and atraumatic.      Right Ear: External ear normal.      Left Ear: External ear normal.      Nose: Nose normal. No congestion or rhinorrhea.      Mouth/Throat:      Mouth: Mucous membranes are moist.      Pharynx: Oropharynx is clear. No posterior oropharyngeal erythema.   Eyes:      General: No scleral icterus.        Right eye: No discharge.         Left eye: No discharge.      Extraocular Movements: Extraocular movements intact.      Conjunctiva/sclera: Conjunctivae normal.   Cardiovascular:      Rate and Rhythm: Normal rate and regular rhythm.      Pulses: Normal pulses.      Heart sounds: Normal heart sounds. No murmur (grade 3 or above) heard.     No friction rub.   Pulmonary:      Effort: Pulmonary effort is normal. No respiratory distress.      Breath sounds: Normal breath sounds. No stridor. No wheezing or rales.   Abdominal:      General: There is no distension.      Palpations: Abdomen is soft.      Tenderness: There is no abdominal tenderness. There is no right CVA tenderness, left CVA tenderness, guarding or rebound.   Musculoskeletal:         General: No swelling or tenderness.      Cervical back: Normal range of motion and neck supple.      Right lower leg: No edema.      Left lower leg: No edema.   Skin:     General: Skin is warm and dry.      Capillary Refill: Capillary refill takes less than 2 seconds.      Coloration: Skin is not jaundiced or pale.      Findings: No lesion or rash.   Neurological:      General: No focal  deficit present.      Mental Status: She is alert and oriented to person, place, and time. Mental status is at baseline.      Cranial Nerves: No cranial nerve deficit.      Sensory: No sensory deficit.      Motor: No weakness.   Psychiatric:         Mood and Affect: Mood normal.         Behavior: Behavior normal. Behavior is cooperative.         Thought Content: Thought content normal.         Judgment: Judgment normal.          DIAGNOSTIC RESULTS     LABS:  Labs Reviewed   BLOOD GAS VENOUS FULL PANEL - Abnormal       Result Value    POCT pH, Venous 7.40      POCT pCO2, Venous 48      POCT pO2, Venous 45      POCT SO2, Venous 74      POCT Oxy Hemoglobin, Venous 72.7      POCT Hematocrit Calculated, Venous 34.0 (*)     POCT Sodium, Venous 132 (*)     POCT Potassium, Venous 4.3      POCT Chloride, Venous 100      POCT Ionized Calicum, Venous 1.21      POCT Glucose, Venous 417 (*)     POCT Lactate, Venous 1.7      POCT Base Excess, Venous 4.1 (*)     POCT HCO3 Calculated, Venous 29.7 (*)     POCT Hemoglobin, Venous 11.4 (*)     POCT Anion Gap, Venous 7.0 (*)     Patient Temperature        FiO2 32     CBC WITH AUTO DIFFERENTIAL - Abnormal    WBC 10.1      nRBC 0.0      RBC 4.38      Hemoglobin 10.9 (*)     Hematocrit 35.1 (*)     MCV 80      MCH 24.9 (*)     MCHC 31.1 (*)     RDW 17.2 (*)     Platelets 328      Immature Granulocytes %, Automated 7.1 (*)     Immature Granulocytes Absolute, Automated 0.72 (*)     Narrative:     The previously reported component Neutrophils % is no longer being reported.  The previously reported component Lymphocytes % is no longer being reported.  The previously reported component Monocytes % is no longer being reported.  The previously   reported component Eosinophils % is no longer being reported.  The previously reported component Basophils % is no longer being reported.  The previously reported component Absolute Neutrophils is no longer being reported.  The previously reported    component Absolute Lymphocytes is no longer being reported.  The previously reported component Absolute Monocytes is no longer being reported.  The previously reported component Absolute Eosinophils is no longer being reported.  The previously reported   component Absolute Basophils is no longer being reported.   COMPREHENSIVE METABOLIC PANEL - Abnormal    Glucose 378 (*)     Sodium 133 (*)     Potassium 4.3      Chloride 97 (*)     Bicarbonate 28      Anion Gap 12      Urea Nitrogen 16      Creatinine 0.83      eGFR 76      Calcium 9.4      Albumin 3.9      Alkaline Phosphatase 184 (*)     Total Protein 7.0      AST 21      Bilirubin, Total 0.6      ALT 60 (*)    LIPASE - Abnormal    Lipase 541 (*)     Narrative:     Venipuncture immediately after or during the administration of Metamizole may lead to falsely low results. Testing should be performed immediately prior to Metamizole dosing.   POCT GLUCOSE - Abnormal    POCT Glucose 418 (*)    POCT GLUCOSE - Abnormal    POCT Glucose 243 (*)    MANUAL DIFFERENTIAL - Abnormal    Neutrophils %, Manual 79.0      Bands %, Manual 2.0      Lymphocytes %, Manual 9.0      Monocytes %, Manual 5.0      Eosinophils %, Manual 2.0      Basophils %, Manual 0.0      Metamyelocytes %, Manual 2.0      Myelocytes %, Manual 1.0      Seg Neutrophils Absolute, Manual 7.98 (*)     Bands Absolute, Manual 0.20      Lymphocytes Absolute, Manual 0.91 (*)     Monocytes Absolute, Manual 0.51      Eosinophils Absolute, Manual 0.20      Basophils Absolute, Manual 0.00      Metamyelocytes Absolute, Manual 0.20      Myelocytes Absolute, Manual 0.10      Total Cells Counted 100      Neutrophils Absolute, Manual 8.18 (*)     RBC Morphology See Below      Ovalocytes Few     PROTIME-INR - Normal    Protime 11.2      INR 1.0     GREEN TOP    Extra Tube Hold for add-ons.     POCT GLUCOSE   POCT GLUCOSE   POCT GLUCOSE       All other labs were within normal range or not returned as of this  dictation.    Imaging  No orders to display            [facility]/EMERGENCY DEPARTMENT COURSE:   Final Impression:   1. Hyperglycemia        Medical Decision Making  69-year-old female presenting to the ED with complaint of hyperglycemia.  Patient is afebrile, sinus, hypertensive, saturating well on 2 L nasal cannula and in no acute distress.  On exam patient has clear lung sounds, soft nontender abdomen, and is otherwise well-appearing.  VBG ordered to assess for acidosis.  History obtained from: Patient    External records reviewed:  Diagnostic interpretations performed by me: Lab results are unimpressive for systemic inflammation or infection, acute anemia or blood loss, SONAM, acidosis, hepatitis, or electrolyte abnormalities.  Social determinants of health affecting disposition: Patient's insurance does not cover insulin injections prescribed at discharge today.  Management discussed with: Supervising attending, Dr. Guthrie, and hospitalist, Dr. Macario  Interventions: Patient provided 500 cc of NS, 5 mg of glipizide, and 5 units of Humalog  Response to therapies provided: Blood glucose improved to 243.    Patient updated on findings and reassured.  Instructed to follow-up with her PCP in 1-3 days regarding further management of blood glucose.  Patient is agreeable with plan and will utilize glipizide prescription and sliding scale at home.  Strict return precautions provided.          ED Course as of 07/30/24 2055 Tue Jul 30, 2024 1839 EKG performed at 18: 19 and independently reviewed by provider: Reveals NSR with a rate of 75 bpm, normal axis, normal intervals, no ST changes, no T wave abnormalities, no ectopy. No STEMI. [TL]   1839 Based on venous blood gas patient is not in DKA at this time. [TL]   1847 Spoke to Dr. Macario for recommendations given patient was discharged on insulin regimen not covered by insurance to determine what other agent they would like her on. Recommended sulfonylurea glipizide which was  confirmed would be covered by patient pharmacy by ED pharmacist and to discontinue the insulin at home. Will give lispro here and recheck. First dose glipizide given in ED.  [TL]   1924 Nursing triage had ordered lipase.  At this time I do not clinically have any concern or indication for hospitalization for pancreatitis.  Patient has no epigastric pain.  She can follow-up on this as an outpatient.  She has been tolerating p.o. and denies any difficulty with tolerating p.o..  No pain to palpation of the abdomen at this time. [TL]      ED Course User Index  [TL] Aman Guthrie DO         Diagnoses as of 07/30/24 2055   Hyperglycemia        Patient and or family in agreement and understanding of treatment plan.  All questions answered.      I reviewed the case with the attending ED physician. The attending ED physician agrees with the plan. Patient and/or patient´s representative was counseled regarding labs, imaging, likely diagnosis, and plan. All questions were answered.    ED Medications administered this visit:    Medications   glucagon (Glucagen) injection 1 mg (has no administration in time range)   dextrose 50 % injection 25 g (has no administration in time range)   glucagon (Glucagen) injection 1 mg (has no administration in time range)   dextrose 50 % injection 12.5 g (has no administration in time range)   insulin lispro (HumaLOG) injection 5 Units (5 Units subcutaneous Given 7/30/24 1925)   glipiZIDE (Glucotrol) tablet 5 mg (5 mg oral Given 7/30/24 1925)   sodium chloride 0.9 % bolus 500 mL (0 mL intravenous Stopped 7/30/24 1955)       New Prescriptions from this visit:    Discharge Medication List as of 7/30/2024  8:44 PM        START taking these medications    Details   glipiZIDE (Glucotrol) 5 mg tablet Take 1 tablet (5 mg) by mouth 1 time for 1 dose., Starting Tue 7/30/2024, Normal             Follow-up:  Miladis Egan, APRN-CNP  [address] [facility] [address] [phone]    In 2  days         (Please note that portions of this note were completed with a voice recognition program.  Efforts were made to edit the dictations but occasionally words are mis-transcribed.)    Procedures  Procedures      Brandon Pérez MD  Resident  07/30/24 2055      I performed a history and physical examination of Thu Ortega and discussed her management with Dr. Pérez.  I agree with the history, physical, assessment, and plan of care, with the following exceptions: None    I was present for the following procedures: None  Time Spent in Critical Care of the patient: None  Time spent in discussions with the patient and family: 35    Aman Guthrie, DO Aman Guthrie DO  08/01/24 1527

## 2024-07-30 NOTE — TELEPHONE ENCOUNTER
Pt called and stated that she just got out the hospital this afternoon with COPD and Pneumonia. Pt stated that she also has cancer.    Pt stated that the hospital was giving her prednisone as well as Novolin and Humalog.    Pt stated that she just took 70 units of Novolin at about 2:30 pm.    Pt stated that she just checked her sugar and it is 596 so she took 30 more units of Novolin.    Pt stated that the hospital sent her home with a script for the pens but her insurance wont cover it.    Pt stated that she is afraid she will stroke out.    Pt wants to know what Dr. Child suggests for her to do.

## 2024-07-31 ENCOUNTER — DOCUMENTATION (OUTPATIENT)
Dept: INTERNAL MEDICINE | Facility: HOSPITAL | Age: 69
End: 2024-07-31
Payer: MEDICARE

## 2024-07-31 NOTE — PROGRESS NOTES
Notified in the morning of 7/31 that patient returned to the emergency department yesterday evening after her sugars were elevated at home.  When she was discharged yesterday her sugars had previously been controlled in the hospital on a mild insulin sliding scale.  When she was receiving her home 70/30 in the hospital, she was having hypoglycemic episodes thus we made the change with improvement and resolution of hypoglycemic episodes.  However, upon arrival home she was having increasingly elevated sugars and reached out to her endocrinologist Dr. Wong who advised her to come back to the emergency department.  She was discharged home from the emergency department and when I was notified this morning, I gave her a call.      She reported that the short acting insulin we sent to her pharmacy was not covered by her insurance.  I advised her to resume her 70/30 insulin as she had reported to be doing it at home prior to hospitalization. She was adjusting the dose her self as needed for her blood sugars.  I recommended she continue to do that and follow-up with her endocrinologist as her blood sugars were previously well-controlled prior to arrival to the hospital.  She may hold off on filling the glipizide until she discusses with her endocrinologist.  She expressed understanding and will resume her 70/30 insulin.  I will reach out to Dr. Wong as well.

## 2024-07-31 NOTE — DISCHARGE INSTRUCTIONS
Please utilize new prescription of glipizide.  Follow-up with your PCP in 1-3 days regarding further adjustments to blood sugar medications.    Seek immediate medical attention should you have:  Lightheadedness, fevers, shortness of breath, muscle spasms, weakness, confusion, vomiting, or any worsening or concerning symptoms.

## 2024-08-01 NOTE — TELEPHONE ENCOUNTER
Pt called and stated that she went to the emergency room and was given glipizide.     Pt stated that after hospital discharge,the doctor from the hospital called and instructed her to go back on Novolin.    Pt also stated that the doctor advised her to follow up with Dr. Child in case he wants to add another medication.     Pt wants to know if her sugar goes back up high again, how much Novolin should she take to get it down without having to go back to the ER?

## 2024-08-01 NOTE — TELEPHONE ENCOUNTER
To resume her 70/30 insulin 3 times a day as per her last visit in June continue using freestyle bridgett discontinue glipizide

## 2024-08-02 ENCOUNTER — TELEPHONE (OUTPATIENT)
Dept: ENDOCRINOLOGY | Age: 69
End: 2024-08-02

## 2024-08-02 LAB
ATRIAL RATE: 119 BPM
P AXIS: 53 DEGREES
P OFFSET: 179 MS
P ONSET: 143 MS
PR INTERVAL: 138 MS
Q ONSET: 212 MS
QRS COUNT: 19 BEATS
QRS DURATION: 84 MS
QT INTERVAL: 306 MS
QTC CALCULATION(BAZETT): 430 MS
QTC FREDERICIA: 384 MS
R AXIS: -15 DEGREES
T AXIS: 118 DEGREES
T OFFSET: 365 MS
VENTRICULAR RATE: 119 BPM

## 2024-08-02 NOTE — TELEPHONE ENCOUNTER
Patient was released on Tuesday from the hospital. She is currently taking Prednisone therapy for 9 days and [facility] Her glucose was 357 last night after taking the prednisone and this morning it's at 190. She want's to know how much insulin she should take. Please advice. Patient on the Novolin 70/30,

## 2024-08-02 NOTE — DOCUMENTATION CLARIFICATION NOTE
"    PATIENT:               JERMAIN PONCE  ACCT #:                  [id number]  MRN:                       [id number]  :                       1955  ADMIT DATE:       2024 3:47 AM  DISCH DATE:        2024 12:30 PM  RESPONDING PROVIDER #:        [id number]          PROVIDER RESPONSE TEXT:    MSSA PNA    CDI QUERY TEXT:    Clarification    Instruction:    Based on your assessment of the patient and the clinical information, please provide the requested documentation by clicking on the appropriate radio button and enter any additional information if prompted.    Question: Please further specify the type of pneumonia being treated    When answering this query, please exercise your independent professional judgment. The fact that a question is being asked, does not imply that any particular answer is desired or expected.    The patient's clinical indicators include:  Clinical Information: 70 y/o F admitted with Pneumonia and respiratory failure    Clinical Indicators:    urine legionella and strep pneumonia (-)  nares: MSSA positive  procalcitonin: 4.96, 6.80    ED note  Dr. Winn: \"presenting to the emergency department for shortness of breath.  Patient feels like she cannot get a deep breath in.  Pulmonary: Tachypnea, accessory muscle usage and respiratory distress present. Decreased breath sounds and wheezing.  CT imaging shows evidence of right-sided pneumonia but no evidence of PE.  Patient is appropriately being covered Rocephin and azithromycin.  COPD and PNA\"    H/P  Dr. Macario: \"Right-sided pneumonia bacterial in origin with acute respiratory hypoxemic and hypercapnic failure.  Likely community-acquired pneumonia\"    MD EVANS  Dr. Macario: \"Continue community-acquired pneumonia treatment with ceftriaxone and azithromycin\"    MD EVANS  Dr. Macario: \"Right-sided pneumonia bacterial in origin with acute respiratory hypoxemic and hypercapnic failure.  Likely community-acquired pneumonia versus aspiration " "pneumonia after bronchoscopy last week.  Continue community-acquired pneumonia treatment with ceftriaxone and azithromycin\"    MD PN 7/26, 7/27, 7/28 Dr. Smalls: \"Likely community-acquired pneumonia versus aspiration pneumonia after bronchoscopy last week. ceftriaxone and azithromycin.  MSSA positive\"    MD PN 7/29 Dr. Smalls: \"Right-sided pneumonia bacterial in origin\"    D/C summary 7/30 Dr. Smalls: \"acute hypoxic and hypercapnic respiratory failure secondary to pneumonia and COPD exacerbation\"    Treatment: CXR, CT chest, BC, nares, culture, urine legionella and strep pneumonia, procalcitonin    Risk Factors: Pneumonia, MSSA positive, respiratory failure  Options provided:  -- MSSA PNA  -- Simple PNA  -- Other - I will add my own diagnosis  -- Refer to Clinical Documentation Reviewer    Query created by: Emily Patino on 8/2/2024 6:22 AM      Electronically signed by:  CANDI HARRIS MD 8/2/2024 8:44 AM          "

## 2024-08-03 LAB
ATRIAL RATE: 75 BPM
P AXIS: 33 DEGREES
P OFFSET: 191 MS
P ONSET: 145 MS
PR INTERVAL: 134 MS
Q ONSET: 212 MS
QRS COUNT: 12 BEATS
QRS DURATION: 90 MS
QT INTERVAL: 398 MS
QTC CALCULATION(BAZETT): 444 MS
QTC FREDERICIA: 428 MS
R AXIS: -10 DEGREES
T AXIS: 67 DEGREES
T OFFSET: 411 MS
VENTRICULAR RATE: 75 BPM

## 2024-08-05 ENCOUNTER — SPECIALTY PHARMACY (OUTPATIENT)
Dept: HEMATOLOGY/ONCOLOGY | Facility: CLINIC | Age: 69
End: 2024-08-05

## 2024-08-05 ENCOUNTER — SPECIALTY PHARMACY (OUTPATIENT)
Dept: PHARMACY | Facility: CLINIC | Age: 69
End: 2024-08-05

## 2024-08-05 ENCOUNTER — OFFICE VISIT (OUTPATIENT)
Dept: HEMATOLOGY/ONCOLOGY | Facility: CLINIC | Age: 69
End: 2024-08-05
Payer: MEDICARE

## 2024-08-05 ENCOUNTER — ONCOLOGY MEDICATION OUTREACH (OUTPATIENT)
Dept: HEMATOLOGY/ONCOLOGY | Facility: CLINIC | Age: 69
End: 2024-08-05
Payer: MEDICARE

## 2024-08-05 VITALS
BODY MASS INDEX: 30.37 KG/M2 | RESPIRATION RATE: 18 BRPM | DIASTOLIC BLOOD PRESSURE: 71 MMHG | HEART RATE: 84 BPM | SYSTOLIC BLOOD PRESSURE: 126 MMHG | WEIGHT: 150.35 LBS | TEMPERATURE: 97.7 F | OXYGEN SATURATION: 97 %

## 2024-08-05 DIAGNOSIS — Z79.4 TYPE 2 DIABETES MELLITUS WITHOUT COMPLICATION, WITH LONG-TERM CURRENT USE OF INSULIN (MULTI): ICD-10-CM

## 2024-08-05 DIAGNOSIS — E78.2 MIXED HYPERLIPIDEMIA: ICD-10-CM

## 2024-08-05 DIAGNOSIS — E11.9 TYPE 2 DIABETES MELLITUS WITHOUT COMPLICATION, WITH LONG-TERM CURRENT USE OF INSULIN (MULTI): ICD-10-CM

## 2024-08-05 DIAGNOSIS — I48.0 PAF (PAROXYSMAL ATRIAL FIBRILLATION) (MULTI): ICD-10-CM

## 2024-08-05 DIAGNOSIS — I10 PRIMARY HYPERTENSION: ICD-10-CM

## 2024-08-05 DIAGNOSIS — F33.41 RECURRENT MAJOR DEPRESSIVE DISORDER, IN PARTIAL REMISSION (CMS-HCC): ICD-10-CM

## 2024-08-05 DIAGNOSIS — J44.9 CHRONIC OBSTRUCTIVE PULMONARY DISEASE, UNSPECIFIED COPD TYPE (MULTI): ICD-10-CM

## 2024-08-05 DIAGNOSIS — M79.7 FIBROMYALGIA: ICD-10-CM

## 2024-08-05 DIAGNOSIS — C34.12 MALIGNANT NEOPLASM OF UPPER LOBE OF LEFT LUNG (MULTI): Primary | ICD-10-CM

## 2024-08-05 DIAGNOSIS — C34.92 LOCAL RECURRENCE OF LEFT LUNG CANCER (MULTI): ICD-10-CM

## 2024-08-05 PROCEDURE — 3074F SYST BP LT 130 MM HG: CPT | Performed by: INTERNAL MEDICINE

## 2024-08-05 PROCEDURE — 3051F HG A1C>EQUAL 7.0%<8.0%: CPT | Performed by: INTERNAL MEDICINE

## 2024-08-05 PROCEDURE — 99214 OFFICE O/P EST MOD 30 MIN: CPT | Performed by: INTERNAL MEDICINE

## 2024-08-05 PROCEDURE — 1159F MED LIST DOCD IN RCRD: CPT | Performed by: INTERNAL MEDICINE

## 2024-08-05 PROCEDURE — 1111F DSCHRG MED/CURRENT MED MERGE: CPT | Performed by: INTERNAL MEDICINE

## 2024-08-05 PROCEDURE — 3078F DIAST BP <80 MM HG: CPT | Performed by: INTERNAL MEDICINE

## 2024-08-05 PROCEDURE — 1125F AMNT PAIN NOTED PAIN PRSNT: CPT | Performed by: INTERNAL MEDICINE

## 2024-08-05 RX ORDER — PROCHLORPERAZINE MALEATE 10 MG
10 TABLET ORAL EVERY 6 HOURS PRN
Qty: 30 TABLET | Refills: 5 | Status: SHIPPED | OUTPATIENT
Start: 2024-08-05

## 2024-08-05 ASSESSMENT — ENCOUNTER SYMPTOMS
NEUROLOGICAL NEGATIVE: 1
COUGH: 1
EYES NEGATIVE: 1
SHORTNESS OF BREATH: 1
CARDIOVASCULAR NEGATIVE: 1
CONSTITUTIONAL NEGATIVE: 1
PSYCHIATRIC NEGATIVE: 1
HEMATOLOGIC/LYMPHATIC NEGATIVE: 1
GASTROINTESTINAL NEGATIVE: 1
ENDOCRINE NEGATIVE: 1
MUSCULOSKELETAL NEGATIVE: 1

## 2024-08-05 ASSESSMENT — PAIN SCALES - GENERAL: PAINLEVEL: 5

## 2024-08-05 NOTE — PROGRESS NOTES
Patient ID: Thu Ortega is a 69 y.o. female.  Referring Physician: No referring provider defined for this encounter.  Primary Care Provider: DEANNA Rodriguez  Visit Type: Follow Up      Subjective    HPI What did my biopsy show?  I am also having a lot of pain from my fibromyalgia    Review of Systems   Constitutional: Negative.    HENT:  Negative.     Eyes: Negative.    Respiratory:  Positive for cough and shortness of breath.    Cardiovascular: Negative.    Gastrointestinal: Negative.    Endocrine: Negative.    Genitourinary: Negative.     Musculoskeletal: Negative.    Neurological: Negative.    Hematological: Negative.    Psychiatric/Behavioral: Negative.          Objective   BSA: 1.68 meters squared  /71 (BP Location: Right arm)   Pulse 84   Temp 36.5 °C (97.7 °F) (Temporal)   Resp 18   Wt 68.2 kg (150 lb 5.7 oz)   SpO2 97% Comment: on 2 L of O2  BMI 30.37 kg/m²      has a past medical history of Adenocarcinoma of lung, left (Multi), Atrial fibrillation (Multi), COPD (chronic obstructive pulmonary disease) (Multi), Depression, DJD (degenerative joint disease), DM (diabetes mellitus) (Multi), Fibromyalgia, primary, GERD (gastroesophageal reflux disease), Hearing aid worn, History of blood transfusion, History of meningioma of the brain, HLD (hyperlipidemia), HTN (hypertension), Irregular heart beat, Irritable bowel syndrome, Malignant neoplasm of upper lobe of left lung (Multi), Nephrolithiasis, Panlobular emphysema (Multi), Shortness of breath, Spinal stenosis, and Urinary tract infection.   has a past surgical history that includes US guided needle liver biopsy (02/07/2020); Lung lobectomy; CT guided percutaneous biopsy lung (12/13/2023); CT guided percutaneous biopsy lung (12/15/2023); Brain surgery; Foot surgery; Knee surgery; Cataract extraction; and Tubal ligation.  Family History   Problem Relation Name Age of Onset    Stroke Mother      Other (aortic valve disease) Mother      Heart  disease Mother      Cancer Sister      Stroke Sister      Diabetes Sister      Diabetes Brother      Other (heart transplant) Brother       Oncology History   Malignant neoplasm of upper lobe of left lung (Multi)   12/3/2023 Initial Diagnosis    Malignant neoplasm of upper lobe of left lung (CMS/HCC)     2/28/2024 -  Chemotherapy    Pembrolizumab, 21 Day Cycles     Local recurrence of left lung cancer (Multi)   1/4/2024 Initial Diagnosis    Local recurrence of left lung cancer (CMS/HCC)     2/28/2024 -  Chemotherapy    Pembrolizumab, 21 Day Cycles         Thu Ortega  reports that she quit smoking about 18 years ago. Her smoking use included cigarettes. She has never used smokeless tobacco.  She  reports that she does not currently use alcohol.  She  reports no history of drug use.    Physical Exam  Vitals reviewed.   Constitutional:       Appearance: Normal appearance.   HENT:      Head: Normocephalic.      Mouth/Throat:      Mouth: Mucous membranes are moist.   Eyes:      Extraocular Movements: Extraocular movements intact.      Pupils: Pupils are equal, round, and reactive to light.   Cardiovascular:      Rate and Rhythm: Normal rate and regular rhythm.      Heart sounds: Normal heart sounds.   Pulmonary:      Breath sounds: Normal breath sounds.   Abdominal:      General: Bowel sounds are normal.      Palpations: Abdomen is soft.   Musculoskeletal:         General: Normal range of motion.      Cervical back: Normal range of motion and neck supple.   Skin:     General: Skin is warm.   Neurological:      General: No focal deficit present.      Mental Status: She is alert and oriented to person, place, and time.   Psychiatric:         Mood and Affect: Mood normal.         Behavior: Behavior normal.         WBC   Date/Time Value Ref Range Status   07/30/2024 06:29 PM 10.1 4.4 - 11.3 x10*3/uL Final   07/30/2024 05:58 AM 10.0 4.4 - 11.3 x10*3/uL Final   07/29/2024 05:57 AM 11.5 (H) 4.4 - 11.3 x10*3/uL Final  "    nRBC   Date Value Ref Range Status   07/30/2024 0.0 0.0 - 0.0 /100 WBCs Final   07/30/2024 0.0 0.0 - 0.0 /100 WBCs Final   07/29/2024 0.0 0.0 - 0.0 /100 WBCs Final     RBC   Date Value Ref Range Status   07/30/2024 4.38 4.00 - 5.20 x10*6/uL Final   07/30/2024 4.19 4.00 - 5.20 x10*6/uL Final   07/29/2024 4.16 4.00 - 5.20 x10*6/uL Final     Hemoglobin   Date Value Ref Range Status   07/30/2024 10.9 (L) 12.0 - 16.0 g/dL Final   07/30/2024 10.2 (L) 12.0 - 16.0 g/dL Final   07/29/2024 10.2 (L) 12.0 - 16.0 g/dL Final     Hematocrit   Date Value Ref Range Status   07/30/2024 35.1 (L) 36.0 - 46.0 % Final   07/30/2024 34.6 (L) 36.0 - 46.0 % Final   07/29/2024 35.2 (L) 36.0 - 46.0 % Final     MCV   Date/Time Value Ref Range Status   07/30/2024 06:29 PM 80 80 - 100 fL Final   07/30/2024 05:58 AM 83 80 - 100 fL Final   07/29/2024 05:57 AM 85 80 - 100 fL Final     MCH   Date/Time Value Ref Range Status   07/30/2024 06:29 PM 24.9 (L) 26.0 - 34.0 pg Final   07/30/2024 05:58 AM 24.3 (L) 26.0 - 34.0 pg Final   07/29/2024 05:57 AM 24.5 (L) 26.0 - 34.0 pg Final     MCHC   Date/Time Value Ref Range Status   07/30/2024 06:29 PM 31.1 (L) 32.0 - 36.0 g/dL Final   07/30/2024 05:58 AM 29.5 (L) 32.0 - 36.0 g/dL Final   07/29/2024 05:57 AM 29.0 (L) 32.0 - 36.0 g/dL Final     RDW   Date/Time Value Ref Range Status   07/30/2024 06:29 PM 17.2 (H) 11.5 - 14.5 % Final   07/30/2024 05:58 AM 17.3 (H) 11.5 - 14.5 % Final   07/29/2024 05:57 AM 17.8 (H) 11.5 - 14.5 % Final     Platelets   Date/Time Value Ref Range Status   07/30/2024 06:29  150 - 450 x10*3/uL Final   07/30/2024 05:58  150 - 450 x10*3/uL Final   07/29/2024 05:57  150 - 450 x10*3/uL Final     No results found for: \"MPV\"  Neutrophils %   Date/Time Value Ref Range Status   07/26/2024 04:17 AM 87.7 40.0 - 80.0 % Final   07/25/2024 04:34 AM 87.6 40.0 - 80.0 % Final   07/24/2024 04:45 AM 86.3 40.0 - 80.0 % Final     Immature Granulocytes %, Automated   Date/Time Value " Ref Range Status   07/30/2024 06:29 PM 7.1 (H) 0.0 - 0.9 % Final     Comment:     Immature Granulocyte Count (IG) includes promyelocytes, myelocytes and metamyelocytes but does not include bands. Percent differential counts (%) should be interpreted in the context of the absolute cell counts (cells/UL).   07/26/2024 04:17 AM 2.3 (H) 0.0 - 0.9 % Final     Comment:     Immature Granulocyte Count (IG) includes promyelocytes, myelocytes and metamyelocytes but does not include bands. Percent differential counts (%) should be interpreted in the context of the absolute cell counts (cells/UL).   07/25/2024 04:34 AM 3.4 (H) 0.0 - 0.9 % Final     Comment:     Immature Granulocyte Count (IG) includes promyelocytes, myelocytes and metamyelocytes but does not include bands. Percent differential counts (%) should be interpreted in the context of the absolute cell counts (cells/UL).     Lymphocytes %, Manual   Date/Time Value Ref Range Status   07/30/2024 06:29 PM 9.0 13.0 - 44.0 % Final     Lymphocytes %   Date/Time Value Ref Range Status   07/26/2024 04:17 AM 6.7 13.0 - 44.0 % Final   07/25/2024 04:34 AM 5.4 13.0 - 44.0 % Final   07/24/2024 04:45 AM 7.8 13.0 - 44.0 % Final     Monocytes %, Manual   Date/Time Value Ref Range Status   07/30/2024 06:29 PM 5.0 2.0 - 10.0 % Final     Monocytes %   Date/Time Value Ref Range Status   07/26/2024 04:17 AM 3.0 2.0 - 10.0 % Final   07/25/2024 04:34 AM 3.5 2.0 - 10.0 % Final   07/24/2024 04:45 AM 3.6 2.0 - 10.0 % Final     Eosinophils %, Manual   Date/Time Value Ref Range Status   07/30/2024 06:29 PM 2.0 0.0 - 6.0 % Final     Eosinophils %   Date/Time Value Ref Range Status   07/26/2024 04:17 AM 0.1 0.0 - 6.0 % Final   07/25/2024 04:34 AM 0.0 0.0 - 6.0 % Final   07/24/2024 04:45 AM 0.0 0.0 - 6.0 % Final     Basophils %, Manual   Date/Time Value Ref Range Status   07/30/2024 06:29 PM 0.0 0.0 - 2.0 % Final     Basophils %   Date/Time Value Ref Range Status   07/26/2024 04:17 AM 0.2 0.0 - 2.0  % Final   07/25/2024 04:34 AM 0.1 0.0 - 2.0 % Final   07/24/2024 04:45 AM 0.1 0.0 - 2.0 % Final     Neutrophils Absolute   Date/Time Value Ref Range Status   07/26/2024 04:17 AM 14.17 (H) 1.20 - 7.70 x10*3/uL Final     Comment:     Percent differential counts (%) should be interpreted in the context of the absolute cell counts (cells/uL).   07/25/2024 04:34 AM 14.37 (H) 1.20 - 7.70 x10*3/uL Final     Comment:     Percent differential counts (%) should be interpreted in the context of the absolute cell counts (cells/uL).   07/24/2024 04:45 AM 13.96 (H) 1.20 - 7.70 x10*3/uL Final     Comment:     Percent differential counts (%) should be interpreted in the context of the absolute cell counts (cells/uL).     Immature Granulocytes Absolute, Automated   Date/Time Value Ref Range Status   07/30/2024 06:29 PM 0.72 (H) 0.00 - 0.70 x10*3/uL Final   07/26/2024 04:17 AM 0.37 0.00 - 0.70 x10*3/uL Final   07/25/2024 04:34 AM 0.55 0.00 - 0.70 x10*3/uL Final     Lymphocytes Absolute   Date/Time Value Ref Range Status   07/26/2024 04:17 AM 1.09 (L) 1.20 - 4.80 x10*3/uL Final   07/25/2024 04:34 AM 0.88 (L) 1.20 - 4.80 x10*3/uL Final   07/24/2024 04:45 AM 1.26 1.20 - 4.80 x10*3/uL Final     Monocytes Absolute   Date/Time Value Ref Range Status   07/26/2024 04:17 AM 0.48 0.10 - 1.00 x10*3/uL Final   07/25/2024 04:34 AM 0.57 0.10 - 1.00 x10*3/uL Final   07/24/2024 04:45 AM 0.59 0.10 - 1.00 x10*3/uL Final     Eosinophils Absolute   Date/Time Value Ref Range Status   07/26/2024 04:17 AM 0.01 0.00 - 0.70 x10*3/uL Final   07/25/2024 04:34 AM 0.00 0.00 - 0.70 x10*3/uL Final   07/24/2024 04:45 AM 0.00 0.00 - 0.70 x10*3/uL Final     Eosinophils Absolute, Manual   Date/Time Value Ref Range Status   07/30/2024 06:29 PM 0.20 0.00 - 0.70 x10*3/uL Final     Basophils Absolute   Date/Time Value Ref Range Status   07/26/2024 04:17 AM 0.03 0.00 - 0.10 x10*3/uL Final   07/25/2024 04:34 AM 0.01 0.00 - 0.10 x10*3/uL Final   07/24/2024 04:45 AM 0.02 0.00  "- 0.10 x10*3/uL Final     Basophils Absolute, Manual   Date/Time Value Ref Range Status   2024 06:29 PM 0.00 0.00 - 0.10 x10*3/uL Final       No components found for: \"PT\"  aPTT   Date/Time Value Ref Range Status   2024 01:46 PM 31 27 - 38 seconds Final     Medication Documentation Review Audit       Reviewed by Marisa Khalil MA (Medical Assistant) on 24 at 0910      Medication Order Taking? Sig Documenting Provider Last Dose Status   acetaminophen (Tylenol) 325 mg tablet [id number] Yes Take 2 tablets (650 mg) by mouth every 4 hours if needed for mild pain (1 - 3) or fever (temp greater than 38.0 C). Sunshine Hankins PA-C Taking Active   albuterol 90 mcg/actuation inhaler [id number] Yes Inhale 2 puffs every 6 hours if needed for wheezing. Historical Provider, MD Taking Active   amLODIPine (Norvasc) 10 mg tablet [id number] Yes Take 1 tablet (10 mg) by mouth once daily in the morning. Take before meals. Historical Provider, MD Taking Active   aspirin 81 mg chewable tablet [id number] Yes Chew 1 tablet (81 mg) once daily. Historical Provider, MD Taking Active   atorvastatin (Lipitor) 10 mg tablet [id number] Yes Take 1 tablet (10 mg) by mouth once daily. Historical Provider, MD Taking Active   biotin 10 mg tablet [id number] Yes Take 1 tablet (10 mg) by mouth once daily. Historical Provider, MD Taking Active   cefuroxime (Ceftin) 500 mg tablet [id number]  Take 1 tablet (500 mg) by mouth 2 times a day for 3 days. Do not fill before 2024. Yanni M DO Serina   24 [id number]   cholecalciferol (Vitamin D3) 5,000 Units tablet [id number] Yes Take 1 tablet (5,000 Units) by mouth once daily. Historical Provider, MD Taking Active   DULoxetine (Cymbalta) 60 mg DR capsule [id number] Yes Take 1 capsule (60 mg) by mouth once daily. Do not crush or chew. Historical Provider, MD Taking Active   glipiZIDE (Glucotrol) 5 mg tablet [id number]  Take 1 tablet (5 mg) by mouth 1 time for 1 dose. Aman Guthrie, DO  "  24 2359   insulin aspart (NovoLOG PenFill U-100 Insulin) 100 unit/mL pen cartridge [id number] Yes Inject 15 Units under the skin 3 times daily (morning, midday, late afternoon). Take as directed per insulin instructions. Mild insulin sliding scale Yanni Smalls DO Taking Active     Discontinued 24 1234     Discontinued 24 1234     Discontinued 24   levothyroxine (Synthroid, Levoxyl) 25 mcg tablet [id number] Yes Take 1 tablet (25 mcg) by mouth early in the morning.. Historical Provider, MD Taking Active   metFORMIN (Glucophage) 1,000 mg tablet [id number] Yes Take 1 tablet (1,000 mg) by mouth 2 times a day. Historical Provider, MD Taking Active   nystatin (Mycostatin) 100,000 unit/gram powder [id number] No Apply 1 Application topically 2 times a day.   Patient not taking: Reported on 2024    Álvaro Galeano MD Not Taking Active   ondansetron (Zofran) 4 mg tablet [id number] Yes Take 1 tablet (4 mg) by mouth every 8 hours if needed for nausea or vomiting. Álvaro Galeano MD Taking Active   oxygen (O2) gas therapy [id number] Yes Inhale 1 each once every 24 hours. Yanni Smalls DO Taking Active   pantoprazole (ProtoNix) 40 mg EC tablet [id number] Yes Take 1 tablet (40 mg) by mouth once daily in the morning. Take before meals. Historical Provider, MD Taking Active   predniSONE (Deltasone) 10 mg tablet [id number] No Take 3 tablets (30 mg) by mouth once daily for 3 days, THEN 2 tablets (20 mg) once daily for 3 days, THEN 1 tablet (10 mg) once daily for 3 days.   Patient not taking: Reported on 2024    Yanni Smalls DO Not Taking Active   traZODone (Desyrel) 50 mg tablet [id number] Yes Take 1 tablet (50 mg) by mouth once daily at bedtime. Historical Provider, MD Taking Active   Wixela Inhub 250-50 mcg/dose diskus inhaler [id number] Yes Inhale 1 puff 2 times a day. Historical Provider, MD Taking Active                   Assessment/Plan    1) lung cancer  -2022 chest CT: NICHOL  anterior segment 1.6 x 2.2 cm nodule, partial pleural attachment, nonspecific low volume paratracheal mediastinal LN largest near AP window 1.0 x 1.4 cm, right subcarinal space 8 x 14 mm  -12/12/2022 PET: NICHOL 1.8 x 2.3 cm mass with SUV 5.8, lateral margin abuts pleura; no significant mediastinal or hilar hypermetabolic lymphadenopathy  -12/28/2022 chest CT: 2.5 cm cavitary nodule in NICHOL  -1/7/2023 PET scan  -had surgery for stage I NSCLC, s/p lobectomy (Dr Fagan, 1/31/2023)  -2/1/2023 CT chest: no PE, postop changes in left chest, small left PTX in left upper anterior chest  -2/26/2023 chest CT no PE, continued mildly enlarged mediastinal lymph nodes  -saw Dr Solis at [facility] on 3/13/2023 - she had a [id number] (stage Ib) lung adenocarcinoma (poor NCCN risk factors - G3, + visceral involvement), s/p left robotic assisted anatomic upper lobectomy, PD-L1 90%, +[id number]  -per his charting, he recommended either adjuvant cisplatin + pemetrexed x 4 cycles vs surveillance  -according to  Thu, Dr Solis never told her about her high risk features nor any adjuvant option, and that the only thing he recommended was observation  -7/11/2023 CT chest : stable postsurgical changes of prior left thoracotomy and left upper lobectomy with interval resolution of bilateral pleural effusions; interval progression of lymphadenopathy in the chest concerning for progressing ghazala metastatic disease     7/25/2023 underwent EBUS: A - EBUS TRANSBRONCHIAL FINE NEEDLE ASPIRATE, LYMPH NODE  - 4L             Negative for malignant cells.             Benign lymphoid sample (see comment).   B - EBUS TRANSBRONCHIAL FINE NEEDLE ASPIRATE, LYMPH NODE  - 10L             Negative for malignant cells.                   Benign lymphoid sample.  C - EBUS TRANSBRONCHIAL FINE NEEDLE ASPIRATE, LYMPH NODE  - STATION 7             Negative for malignant cells.                 Benign lymphoid sample.   -8/9/2023 PET scan: 3.0 x 2.1 cm left prevascular node with SUV 7.9;  few additional prominent mediastinal nodes with low level uptake; left lower paratracheal node 0.8 cm with SUV 2.2; mild FDG uptake in left hilum SUV 3.1  -11/14/2023 chest CT: enlarged 2.2 cm prevascular lymph node not significantly changed  -she was told by her pulmonologist Dr Kang that she has cancer  -discussed her original path with her--while it was a small tumor and stage Ib, she did have a couple high risk features that would have warranted adjuvant chemotherapy followed by atezolizumab; also if she does have a recurrence that isn't amenable to surgery, she would also be a candidate for immunotherapy then KRAS inhibitor (FDA approved only in 2nd line setting)  -will discuss with thoracic surgeon--will import all [facility] films to review; may need CT guided bx by IR of this prevascular node   -she had biopsy done on 12/13/2023--path confirmed poorly differentiated lung carcinoma, PD-L1 90%, KRAS G12C mutation  -she saw Dr Calixto on 12/28/2023--he advised surgery, provided that she can pass her PFTs  - on 1/30/2024 Dr Calixto took her to the OR for robotic assisted redo left mediastinal exploration, left anterior mediastinal mass resection, diaphragmatic pacer placement  -path showed poorly differentiated carcinoma with pleomorphic/spindle cell and clear cell features; 4 lymph nodes with no evidence of malignancy; sections show a poorly differentiated carcinoma with pleomorphic/spindle cell and clear cell features involving fibroadipose tissue with a large area of central necrosis; tumor cells are positive for AE1/AE3, CAM 5.2, CK7 and negative for TTF-1, p40.  -pt is most interested in doing whatever is necessary to reduce her risk for recurrence  -she and  state again that the [facility] oncologist told them that adjuvant therapy was not necessary after her first surgery, even though his note documented that he recommended it  -as her tumor has high PD-L1 expression, she is a candidate for  pembrolizumab; if she relapses, she is also a candidate for KRAS G12C inhibitor  -has completed 6 doses of adjuvant pembrolizumab (out of 17)  -CT scan done on 6/19/2024 showed  mediastinum with subcarinal lymphadenopathy 10 mm in short axis, there is component of AP window lymphadenopathy 17 x 15 mm; mesentery demonstrates soft tissue lesion within midline lower abdominal mesentery 1.6 x 1.2 cm; pleural based nodular lesion in RLL posteriorly 8 mm with surrounding ground glass airspace infiltrate  -she was admitted recently for pneumonia--CT angio done on 7/23 showed no evidence of pulmonary embolus, multifocal airspace disease in the right lung suggesting pneumonia  -right after she was discharged from [place], she then went back to the ER for hyperglycemia ()  -Dr Calixto has scheduled her next chest CT for 8/2024--pt is wondering if that is still necessary  -on 7/14/2024 she underwent an EBUS--lymph node level 4L was sampled--showed NSCLC, favor squamous cell carcinoma  -given that she is now confirmed to have new and/or persistent disease in her mediastinum, I have advised switching to new therapy; as her tumor has the KRAS G12C mutation, I have recommended switching to KRAS G12C inhibitor, namely adagrasib  -benefits, risks, potential morbidity related to adagrasib were reviewed with Thu and she signed informed consent to proceed  -she will take adagrasib (Krazati) 600 mg PO BID  -she also has severe body pains secondary to fibromyalgia--she  says in the hospital she was prescribed percocet PRN and asked if I could prescribe it; I did inform her that I will treat pain only secondary to cancer; she therefore was willing to be referred to pain management (Dr Melendrez)       2) COPD  -on albuterol  -on incruse ellipta     3) atrial fibrillation  -on amiodarone  -on warfarin     4) hypertension  -on norvasc  -on chlorthalidone  -on lasix  -on lisinopril  -on metoprolol     5) hyperlipidemia  -on  atorvastatin     6) major depression  -on cymbalta     7) diabetes  -on novolog insulin  -on metformin     Problem List Items Addressed This Visit    None           Álvaro Galeano MD

## 2024-08-05 NOTE — PROGRESS NOTES
Planned Initiation date:  TBD    Date of education:  8/5/24  Patient was educated on the medication's administration, warnings, precautions, common adverse effects, proper storage, handling, and disposal  We discussed the medications purpose and the patient's goals of therapy for their   Please see details below    Follow up needed: N/A    Reassessment date: Approx 3 - 6 months as applicable  Patient Discussion:   Introduction:   - Patient, accompanied by her  Horace, contacted in person to conduct the medication first fill assessment and new start patient education.   - Verified that the medication was sent to [facility] and reviewed that the pharmacy supplied welcome kit will be given upon first delivery of the medication    Clinical Background:  - The Patient's medication list, allergies, and comorbid conditions were verified. No contraindications to therapy noted at this time.  - Patient advised to contact the pharmacy if there are any changes to her medication list, including prescriptions, OTC medications, herbal products, or supplements.   - Current clinically significant drug-drug interactions include: (1) adagrasib - Wixela Inhub (salmeterol - fluticasone) - adagrasib can increase salmeterol levels - will contact prescriber to recommend switch to another combo product Dulera (formeterol - mometasone);  (2) adagrasib - trazodone - adagrasib can increase trazodone levels - recommend dose reduction of trazodone and monitoring.      - Labs were reviewed and no concerns were noted regarding the patient's therapy at this time     -Medication is clinically appropriate.    Education/Discussion:  Patient accepted the pharmacist's offer of counseling.    Indication:  Lumakras is being used for this patient's NSCLC cancer, progressive disease s/p pembrolizumab therapy    Dose:  adagrasib 600mg (3 x 200 mg tablets) PO twice daily    Administration:   Take 3 x 200 mg tablets by mouth  twice a day  May take with or without food; try to take close to the same time each day  Avoid consuming grapefruit and grapefruit juice (class C interaction)  Swallow tablet whole. Do not crush, cut or chew tablet    Does the patient have any barriers to self-administration (including physical and mental)? No   List barriers:  N/A  Describe actions taken to mitigate barriers: N/A  Patient/caregiver counseled on proper technique for self-administration: Yes     Missed Doses:  The importance of adherence was discussed with the patient and they were advised to take the medication as prescribed by their provider.   We discussed the use of a calendar, oral chemo tracking sheet, or cell phone alarm to keep track of their doses.     If you miss a dose, take it as soon as possible if it is less than 4 hours from the scheduled time, then resume next dose at the regularly scheduled time.   If greater than 4 hours, skip the dose and take the next dose at regularly scheduled time.   Do not take an extra dose or 2 doses at the same time.  If a dose is vomited, do not repeat the dose.  Patient was encouraged to call their physician's office if they have a question regarding a missed dose.     What barriers to adherence does the patient have? (answer Y/N for all):  Patient has a difficult time remembering to take medications? No  Difficult administration technique? No  Medication cost? TBD  Patient does not think medication is beneficial? No  Other? No  What actions were taken to address barriers? N/A  Warnings, Precautions, and Adverse Reactions:   - Discussed common adverse effects, warnings and precautions pertinent to the medication including but not limited to:   Diarrhea  Nausea/Vomiting  Moderate or high emetic potential; ensure patient was prescribed antiemetics PRN  Abdominal pain, decreased appetite  Fatigue, dizziness  Fluid retention  Muscle or joint pain  Cough, shortness of breath  Hepatotoxicity   Bone marrow  suppression: platelets, lymphocytopenia  QTc prolongation    - Patient was encouraged to reach out to their doctor's office if they develop:   Shortness of breath/difficulty breathing, cough  Signs of liver toxicity: yellowing skin, pain/tenderness on right side of abdomen  GI toxicity:  Nausea/vomiting, diarrhea, constipation that has not resolved with supportive medications   Fever equal or over 100.4 F.  Must make an immediate call to physician's office. If you are unable to get a hold of the office, please go to the nearest emergency room for evaluation  Arrhythmias: your provider may request an echocardiogram to be completed prior to or during therapy to assess for QTc prolongation; risk is higher with other concomitant QT prolonging medications and in those with heart failure, bradyarrhythmias, or electrolyte abnormalities   Any unexplained, new or concerning symptom    - Reviewed that the patient should call and discuss with their provider regarding any vaccinations while they are on therapy    Handling and Storage   Store at room temperature in a dry location    Disposal:  If you have any unused medication:  Do not throw it in the trash, do not flush it down the sink or toilet  Take any unused medication to an appropriate police station or [facility] drop box location for proper disposal. (You can use [employer]rugdropbox[employer]org to see police stations and  retail pharmacies in your area that are able to take back the medication)    Goals of therapy:  Oncology goals of therapy:  Slow progression of cancer  Optimize quality of life  Continuously evaluate safety and appropriateness of medication  Prolong life  Proactively manage commonly reported side effects (ex: nausea/vomiting, constipation, fatigue)  Report and evaluate adverse reactions to prescriber team  Continuously evaluate safety and appropriateness of therapy  Maintain therapy adherence as appropriate  Ultimate goal: progression free  survival  Patient goals:  Control disease, prevent spread and recurrence    Efficacy timeline:   Patient's prescriber will monitor for continued efficacy, progression, and therapy appropriateness    Conclusion:  - Quality of life:  not discussed  - Pharmacy Satisfaction: TBD  - High risk status (pregnancy, geriatric, pediatric):  Geriatric  - Is clinical intervention necessary? No   - If yes, what additional action was taken? N/A  Patient was advised of [facility]'s dispensing process, refill timeline, contact information ([phone]), and patient management follow up. Patient confirmed understanding of education conducted during assessment. All patient questions and concerns were addressed to the best of my ability. Patient was encouraged to contact the specialty pharmacy with any questions or concerns.

## 2024-08-05 NOTE — PATIENT INSTRUCTIONS
"You will start adagrasib , then return 2 weeks later for a \"toxicity check\"    I will refer you to Dr Melendrez (pain management director at [place] ) to help with the fibromyalgia  "

## 2024-08-06 ENCOUNTER — SPECIALTY PHARMACY (OUTPATIENT)
Dept: PHARMACY | Facility: CLINIC | Age: 69
End: 2024-08-06

## 2024-08-06 PROCEDURE — RXMED WILLOW AMBULATORY MEDICATION CHARGE

## 2024-08-08 ENCOUNTER — PHARMACY VISIT (OUTPATIENT)
Dept: PHARMACY | Facility: CLINIC | Age: 69
End: 2024-08-08
Payer: MEDICARE

## 2024-08-09 ENCOUNTER — TELEPHONE (OUTPATIENT)
Dept: HEMATOLOGY/ONCOLOGY | Facility: CLINIC | Age: 69
End: 2024-08-09

## 2024-08-09 ENCOUNTER — APPOINTMENT (OUTPATIENT)
Dept: RADIOLOGY | Facility: HOSPITAL | Age: 69
End: 2024-08-09
Payer: MEDICARE

## 2024-08-09 ENCOUNTER — APPOINTMENT (OUTPATIENT)
Dept: SURGERY | Facility: CLINIC | Age: 69
End: 2024-08-09
Payer: MEDICARE

## 2024-08-09 ENCOUNTER — ONCOLOGY MEDICATION OUTREACH (OUTPATIENT)
Dept: HEMATOLOGY/ONCOLOGY | Facility: CLINIC | Age: 69
End: 2024-08-09

## 2024-08-09 NOTE — TELEPHONE ENCOUNTER
Spoke with the patient. Aware she needs to wait to hear from Tariq before starting her medication.

## 2024-08-09 NOTE — PROGRESS NOTES
Per [facility], Thu will be receiving adagrasib today 8/9/24.      A review of her home medications revealed a significant category X DDI between adagrasib and her Wixela combination inhaler for her COPD.  This can potentially result in increased concentrations of the beta agonist component salmeterol.      I contacted the prescriber's office (Dr. Kang, [facility] pulmonologist, phone [phone]), and received a call back from Clint.  I explained the situation and suggested discontinuing Wixela and going with an alternative like Dulera that has a lower category (C) DDI potential, and monitoring closely.  This recommendation was accepted and Dr. Kang will send this in to Thu's local pharmacy.    Thu had also been taking trazodone for sleep until just recently, when she switched to zopidem.  There was a potential category D interaction between adagrasib and trazodone, but this has now been mitigated by the switch to zopidem.    Finally, I called Thu back and reviewed all of this with her.  She has rescue albuterol inhaler on hand as needed until she has the new Dulera prescription.  I reviewed administration information and reiterated how the specialty pharmacy dispensing system works, including timeframes.  Thu will start adagrasib tomorrow morning Sat 8/10/24.  She already has a 2-wk FUV with Dr. Galeano scheduled for 8/26/24.    Thu verbalized understanding of all of this.  No further questions or concerns at this time.

## 2024-08-16 ENCOUNTER — TELEPHONE (OUTPATIENT)
Dept: HEMATOLOGY/ONCOLOGY | Facility: CLINIC | Age: 69
End: 2024-08-16

## 2024-08-16 ENCOUNTER — APPOINTMENT (OUTPATIENT)
Dept: RADIOLOGY | Facility: HOSPITAL | Age: 69
End: 2024-08-16
Payer: MEDICARE

## 2024-08-16 ENCOUNTER — APPOINTMENT (OUTPATIENT)
Dept: SURGERY | Facility: CLINIC | Age: 69
End: 2024-08-16
Payer: MEDICARE

## 2024-08-16 NOTE — TELEPHONE ENCOUNTER
"Today 8/16/24 I received a phone call from Thu Ortega.    She began taking Krazati (adagrasib) 600 mg (3 x 200 mg) PO twice daily on Sat 8/10/24.  Due to potential for DDI with existing Wixela inhaler, her pulmonologist, Dr. Kang ([facility]) changed her inhaler to Dulera and added several new medications:  Spiriva inhaler as well as short courses of erythromycin x 5 days and a 12-day prednisone taper.    Thu asked if there were any significant DDI with the Spiriva, erythromycin and prednisone.  Although adagrasib can enhance QT-prolonging potential of erythromycin, she is on a short 5 day course, with recent ECG (7/30/24) showing Qtc 248 - 444.  I advised Thu to proceed with Spiriva, erythromycin and prednisone, care team monitoring on-going.    Thu also mentioned that as of yesterday, she began experiencing significant forgetfulness and difficulty with concentration.  Examples given included remembering if she took her pills;  her  provided assistance.  Said she feels \"weird\", asking if new cancer drug or others can cause this.      I will reach out to Dr. Galeano and team on this, and will follow up with Thu accordingly.      Thu has a FUV with Dr. Galeano scheduled for 8/26/24.  "

## 2024-08-25 DIAGNOSIS — E11.65 UNCONTROLLED TYPE 2 DIABETES MELLITUS WITH HYPERGLYCEMIA (HCC): ICD-10-CM

## 2024-08-26 ENCOUNTER — OFFICE VISIT (OUTPATIENT)
Dept: HEMATOLOGY/ONCOLOGY | Facility: CLINIC | Age: 69
End: 2024-08-26
Payer: MEDICARE

## 2024-08-26 ENCOUNTER — SPECIALTY PHARMACY (OUTPATIENT)
Dept: HEMATOLOGY/ONCOLOGY | Facility: CLINIC | Age: 69
End: 2024-08-26

## 2024-08-26 VITALS
SYSTOLIC BLOOD PRESSURE: 123 MMHG | OXYGEN SATURATION: 94 % | TEMPERATURE: 98.1 F | RESPIRATION RATE: 16 BRPM | DIASTOLIC BLOOD PRESSURE: 67 MMHG | WEIGHT: 150.57 LBS | BODY MASS INDEX: 30.41 KG/M2 | HEART RATE: 87 BPM

## 2024-08-26 DIAGNOSIS — C34.12 MALIGNANT NEOPLASM OF UPPER LOBE OF LEFT LUNG (MULTI): ICD-10-CM

## 2024-08-26 DIAGNOSIS — E11.9 TYPE 2 DIABETES MELLITUS WITHOUT COMPLICATION, WITH LONG-TERM CURRENT USE OF INSULIN (MULTI): ICD-10-CM

## 2024-08-26 DIAGNOSIS — F33.41 RECURRENT MAJOR DEPRESSIVE DISORDER, IN PARTIAL REMISSION (CMS-HCC): ICD-10-CM

## 2024-08-26 DIAGNOSIS — Z79.4 TYPE 2 DIABETES MELLITUS WITHOUT COMPLICATION, WITH LONG-TERM CURRENT USE OF INSULIN (MULTI): ICD-10-CM

## 2024-08-26 DIAGNOSIS — E78.2 MIXED HYPERLIPIDEMIA: ICD-10-CM

## 2024-08-26 DIAGNOSIS — J44.9 CHRONIC OBSTRUCTIVE PULMONARY DISEASE, UNSPECIFIED COPD TYPE (MULTI): Primary | ICD-10-CM

## 2024-08-26 DIAGNOSIS — I48.0 PAF (PAROXYSMAL ATRIAL FIBRILLATION) (MULTI): ICD-10-CM

## 2024-08-26 DIAGNOSIS — I10 PRIMARY HYPERTENSION: ICD-10-CM

## 2024-08-26 PROCEDURE — 3078F DIAST BP <80 MM HG: CPT | Performed by: INTERNAL MEDICINE

## 2024-08-26 PROCEDURE — 3051F HG A1C>EQUAL 7.0%<8.0%: CPT | Performed by: INTERNAL MEDICINE

## 2024-08-26 PROCEDURE — 1111F DSCHRG MED/CURRENT MED MERGE: CPT | Performed by: INTERNAL MEDICINE

## 2024-08-26 PROCEDURE — 99214 OFFICE O/P EST MOD 30 MIN: CPT | Performed by: INTERNAL MEDICINE

## 2024-08-26 PROCEDURE — 1126F AMNT PAIN NOTED NONE PRSNT: CPT | Performed by: INTERNAL MEDICINE

## 2024-08-26 PROCEDURE — 1159F MED LIST DOCD IN RCRD: CPT | Performed by: INTERNAL MEDICINE

## 2024-08-26 PROCEDURE — 3074F SYST BP LT 130 MM HG: CPT | Performed by: INTERNAL MEDICINE

## 2024-08-26 RX ORDER — SYRINGE-NEEDLE,INSULIN,0.5 ML 27GX1/2"
SYRINGE, EMPTY DISPOSABLE MISCELLANEOUS
Qty: 100 EACH | Refills: 3 | Status: SHIPPED | OUTPATIENT
Start: 2024-08-26

## 2024-08-26 RX ORDER — ZOLPIDEM TARTRATE 6.25 MG/1
10 TABLET, FILM COATED, EXTENDED RELEASE ORAL NIGHTLY PRN
COMMUNITY

## 2024-08-26 ASSESSMENT — PAIN SCALES - GENERAL: PAINLEVEL: 0-NO PAIN

## 2024-08-26 NOTE — PROGRESS NOTES
Patient ID: Thu Ortega is a 69 y.o. female.  Referring Physician: Álvaro Galeano MD  [id number]  [place] [address]  Primary Care Provider: DEANNA Rodriguez  Visit Type: Follow Up      Subjective    HPI  I feel more short of breath since I started taking the new pill    Review of Systems   Constitutional: Negative.    HENT:  Negative.     Eyes: Negative.    Respiratory:  Positive for shortness of breath.    Cardiovascular: Negative.    Gastrointestinal: Negative.    Endocrine: Negative.    Genitourinary: Negative.     Musculoskeletal: Negative.    Skin: Negative.    Neurological: Negative.    Hematological: Negative.    Psychiatric/Behavioral: Negative.          Objective   BSA: There is no height or weight on file to calculate BSA.  There were no vitals taken for this visit.     has a past medical history of Adenocarcinoma of lung, left (Multi), Atrial fibrillation (Multi), COPD (chronic obstructive pulmonary disease) (Multi), Depression, DJD (degenerative joint disease), DM (diabetes mellitus) (Multi), Fibromyalgia, primary, GERD (gastroesophageal reflux disease), Hearing aid worn, History of blood transfusion, History of meningioma of the brain, HLD (hyperlipidemia), HTN (hypertension), Irregular heart beat, Irritable bowel syndrome, Malignant neoplasm of upper lobe of left lung (Multi), Nephrolithiasis, Panlobular emphysema (Multi), Shortness of breath, Spinal stenosis, and Urinary tract infection.   has a past surgical history that includes US guided needle liver biopsy (02/07/2020); Lung lobectomy; CT guided percutaneous biopsy lung (12/13/2023); CT guided percutaneous biopsy lung (12/15/2023); Brain surgery; Foot surgery; Knee surgery; Cataract extraction; and Tubal ligation.  Family History   Problem Relation Name Age of Onset    Stroke Mother      Other (aortic valve disease) Mother      Heart disease Mother      Cancer Sister      Stroke Sister      Diabetes Sister       Diabetes Brother      Other (heart transplant) Brother       Oncology History   Malignant neoplasm of upper lobe of left lung (Multi)   12/3/2023 Initial Diagnosis    Malignant neoplasm of upper lobe of left lung (CMS/HCC)     2/28/2024 - 7/3/2024 Chemotherapy    Pembrolizumab, 21 Day Cycles     8/12/2024 -  Chemotherapy    Adagrasib, 28 Day Cycles     Local recurrence of left lung cancer (Multi)   1/4/2024 Initial Diagnosis    Local recurrence of left lung cancer (CMS/HCC)     2/28/2024 - 7/3/2024 Chemotherapy    Pembrolizumab, 21 Day Cycles     8/12/2024 -  Chemotherapy    Adagrasib, 28 Day Cycles         Thu Ortega  reports that she quit smoking about 18 years ago. Her smoking use included cigarettes. She has never used smokeless tobacco.  She  reports that she does not currently use alcohol.  She  reports no history of drug use.    Physical Exam  Vitals reviewed.   Constitutional:       Appearance: Normal appearance.   HENT:      Head: Normocephalic.      Mouth/Throat:      Mouth: Mucous membranes are moist.   Eyes:      Extraocular Movements: Extraocular movements intact.      Pupils: Pupils are equal, round, and reactive to light.   Cardiovascular:      Rate and Rhythm: Normal rate and regular rhythm.      Pulses: Normal pulses.      Heart sounds: Normal heart sounds.   Pulmonary:      Effort: Pulmonary effort is normal.      Breath sounds: Normal breath sounds.   Abdominal:      General: Bowel sounds are normal.      Palpations: Abdomen is soft.   Musculoskeletal:         General: Normal range of motion.      Cervical back: Neck supple.   Skin:     General: Skin is warm.   Neurological:      General: No focal deficit present.      Mental Status: She is alert and oriented to person, place, and time.   Psychiatric:         Mood and Affect: Mood normal.         Behavior: Behavior normal.         WBC   Date/Time Value Ref Range Status   07/30/2024 06:29 PM 10.1 4.4 - 11.3 x10*3/uL Final   07/30/2024 05:58 AM  "10.0 4.4 - 11.3 x10*3/uL Final   07/29/2024 05:57 AM 11.5 (H) 4.4 - 11.3 x10*3/uL Final     nRBC   Date Value Ref Range Status   07/30/2024 0.0 0.0 - 0.0 /100 WBCs Final   07/30/2024 0.0 0.0 - 0.0 /100 WBCs Final   07/29/2024 0.0 0.0 - 0.0 /100 WBCs Final     RBC   Date Value Ref Range Status   07/30/2024 4.38 4.00 - 5.20 x10*6/uL Final   07/30/2024 4.19 4.00 - 5.20 x10*6/uL Final   07/29/2024 4.16 4.00 - 5.20 x10*6/uL Final     Hemoglobin   Date Value Ref Range Status   07/30/2024 10.9 (L) 12.0 - 16.0 g/dL Final   07/30/2024 10.2 (L) 12.0 - 16.0 g/dL Final   07/29/2024 10.2 (L) 12.0 - 16.0 g/dL Final     Hematocrit   Date Value Ref Range Status   07/30/2024 35.1 (L) 36.0 - 46.0 % Final   07/30/2024 34.6 (L) 36.0 - 46.0 % Final   07/29/2024 35.2 (L) 36.0 - 46.0 % Final     MCV   Date/Time Value Ref Range Status   07/30/2024 06:29 PM 80 80 - 100 fL Final   07/30/2024 05:58 AM 83 80 - 100 fL Final   07/29/2024 05:57 AM 85 80 - 100 fL Final     MCH   Date/Time Value Ref Range Status   07/30/2024 06:29 PM 24.9 (L) 26.0 - 34.0 pg Final   07/30/2024 05:58 AM 24.3 (L) 26.0 - 34.0 pg Final   07/29/2024 05:57 AM 24.5 (L) 26.0 - 34.0 pg Final     MCHC   Date/Time Value Ref Range Status   07/30/2024 06:29 PM 31.1 (L) 32.0 - 36.0 g/dL Final   07/30/2024 05:58 AM 29.5 (L) 32.0 - 36.0 g/dL Final   07/29/2024 05:57 AM 29.0 (L) 32.0 - 36.0 g/dL Final     RDW   Date/Time Value Ref Range Status   07/30/2024 06:29 PM 17.2 (H) 11.5 - 14.5 % Final   07/30/2024 05:58 AM 17.3 (H) 11.5 - 14.5 % Final   07/29/2024 05:57 AM 17.8 (H) 11.5 - 14.5 % Final     Platelets   Date/Time Value Ref Range Status   07/30/2024 06:29  150 - 450 x10*3/uL Final   07/30/2024 05:58  150 - 450 x10*3/uL Final   07/29/2024 05:57  150 - 450 x10*3/uL Final     No results found for: \"MPV\"  Neutrophils %   Date/Time Value Ref Range Status   07/26/2024 04:17 AM 87.7 40.0 - 80.0 % Final   07/25/2024 04:34 AM 87.6 40.0 - 80.0 % Final   07/24/2024 04:45 " AM 86.3 40.0 - 80.0 % Final     Immature Granulocytes %, Automated   Date/Time Value Ref Range Status   07/30/2024 06:29 PM 7.1 (H) 0.0 - 0.9 % Final     Comment:     Immature Granulocyte Count (IG) includes promyelocytes, myelocytes and metamyelocytes but does not include bands. Percent differential counts (%) should be interpreted in the context of the absolute cell counts (cells/UL).   07/26/2024 04:17 AM 2.3 (H) 0.0 - 0.9 % Final     Comment:     Immature Granulocyte Count (IG) includes promyelocytes, myelocytes and metamyelocytes but does not include bands. Percent differential counts (%) should be interpreted in the context of the absolute cell counts (cells/UL).   07/25/2024 04:34 AM 3.4 (H) 0.0 - 0.9 % Final     Comment:     Immature Granulocyte Count (IG) includes promyelocytes, myelocytes and metamyelocytes but does not include bands. Percent differential counts (%) should be interpreted in the context of the absolute cell counts (cells/UL).     Lymphocytes %, Manual   Date/Time Value Ref Range Status   07/30/2024 06:29 PM 9.0 13.0 - 44.0 % Final     Lymphocytes %   Date/Time Value Ref Range Status   07/26/2024 04:17 AM 6.7 13.0 - 44.0 % Final   07/25/2024 04:34 AM 5.4 13.0 - 44.0 % Final   07/24/2024 04:45 AM 7.8 13.0 - 44.0 % Final     Monocytes %, Manual   Date/Time Value Ref Range Status   07/30/2024 06:29 PM 5.0 2.0 - 10.0 % Final     Monocytes %   Date/Time Value Ref Range Status   07/26/2024 04:17 AM 3.0 2.0 - 10.0 % Final   07/25/2024 04:34 AM 3.5 2.0 - 10.0 % Final   07/24/2024 04:45 AM 3.6 2.0 - 10.0 % Final     Eosinophils %, Manual   Date/Time Value Ref Range Status   07/30/2024 06:29 PM 2.0 0.0 - 6.0 % Final     Eosinophils %   Date/Time Value Ref Range Status   07/26/2024 04:17 AM 0.1 0.0 - 6.0 % Final   07/25/2024 04:34 AM 0.0 0.0 - 6.0 % Final   07/24/2024 04:45 AM 0.0 0.0 - 6.0 % Final     Basophils %, Manual   Date/Time Value Ref Range Status   07/30/2024 06:29 PM 0.0 0.0 - 2.0 % Final      Basophils %   Date/Time Value Ref Range Status   07/26/2024 04:17 AM 0.2 0.0 - 2.0 % Final   07/25/2024 04:34 AM 0.1 0.0 - 2.0 % Final   07/24/2024 04:45 AM 0.1 0.0 - 2.0 % Final     Neutrophils Absolute   Date/Time Value Ref Range Status   07/26/2024 04:17 AM 14.17 (H) 1.20 - 7.70 x10*3/uL Final     Comment:     Percent differential counts (%) should be interpreted in the context of the absolute cell counts (cells/uL).   07/25/2024 04:34 AM 14.37 (H) 1.20 - 7.70 x10*3/uL Final     Comment:     Percent differential counts (%) should be interpreted in the context of the absolute cell counts (cells/uL).   07/24/2024 04:45 AM 13.96 (H) 1.20 - 7.70 x10*3/uL Final     Comment:     Percent differential counts (%) should be interpreted in the context of the absolute cell counts (cells/uL).     Immature Granulocytes Absolute, Automated   Date/Time Value Ref Range Status   07/30/2024 06:29 PM 0.72 (H) 0.00 - 0.70 x10*3/uL Final   07/26/2024 04:17 AM 0.37 0.00 - 0.70 x10*3/uL Final   07/25/2024 04:34 AM 0.55 0.00 - 0.70 x10*3/uL Final     Lymphocytes Absolute   Date/Time Value Ref Range Status   07/26/2024 04:17 AM 1.09 (L) 1.20 - 4.80 x10*3/uL Final   07/25/2024 04:34 AM 0.88 (L) 1.20 - 4.80 x10*3/uL Final   07/24/2024 04:45 AM 1.26 1.20 - 4.80 x10*3/uL Final     Monocytes Absolute   Date/Time Value Ref Range Status   07/26/2024 04:17 AM 0.48 0.10 - 1.00 x10*3/uL Final   07/25/2024 04:34 AM 0.57 0.10 - 1.00 x10*3/uL Final   07/24/2024 04:45 AM 0.59 0.10 - 1.00 x10*3/uL Final     Eosinophils Absolute   Date/Time Value Ref Range Status   07/26/2024 04:17 AM 0.01 0.00 - 0.70 x10*3/uL Final   07/25/2024 04:34 AM 0.00 0.00 - 0.70 x10*3/uL Final   07/24/2024 04:45 AM 0.00 0.00 - 0.70 x10*3/uL Final     Eosinophils Absolute, Manual   Date/Time Value Ref Range Status   07/30/2024 06:29 PM 0.20 0.00 - 0.70 x10*3/uL Final     Basophils Absolute   Date/Time Value Ref Range Status   07/26/2024 04:17 AM 0.03 0.00 - 0.10 x10*3/uL  "Final   2024 04:34 AM 0.01 0.00 - 0.10 x10*3/uL Final   2024 04:45 AM 0.02 0.00 - 0.10 x10*3/uL Final     Basophils Absolute, Manual   Date/Time Value Ref Range Status   2024 06:29 PM 0.00 0.00 - 0.10 x10*3/uL Final       No components found for: \"PT\"  aPTT   Date/Time Value Ref Range Status   2024 01:46 PM 31 27 - 38 seconds Final     Medication Documentation Review Audit       Reviewed by Marisa Khalil MA (Medical Assistant) on 24 at 0910      Medication Order Taking? Sig Documenting Provider Last Dose Status   acetaminophen (Tylenol) 325 mg tablet [id number] Yes Take 2 tablets (650 mg) by mouth every 4 hours if needed for mild pain (1 - 3) or fever (temp greater than 38.0 C). Sunshine Hankins PA-C Taking Active   albuterol 90 mcg/actuation inhaler [id number] Yes Inhale 2 puffs every 6 hours if needed for wheezing. Historical Provider, MD Taking Active   amLODIPine (Norvasc) 10 mg tablet [id number] Yes Take 1 tablet (10 mg) by mouth once daily in the morning. Take before meals. Historical Provider, MD Taking Active   aspirin 81 mg chewable tablet [id number] Yes Chew 1 tablet (81 mg) once daily. Historical Provider, MD Taking Active   atorvastatin (Lipitor) 10 mg tablet [id number] Yes Take 1 tablet (10 mg) by mouth once daily. Historical Provider, MD Taking Active   biotin 10 mg tablet [id number] Yes Take 1 tablet (10 mg) by mouth once daily. Historical Provider, MD Taking Active   cefuroxime (Ceftin) 500 mg tablet [id number]  Take 1 tablet (500 mg) by mouth 2 times a day for 3 days. Do not fill before 2024. Yanni Smalls DO   24 7564   cholecalciferol (Vitamin D3) 5,000 Units tablet [id number] Yes Take 1 tablet (5,000 Units) by mouth once daily. Historical Provider, MD Taking Active   DULoxetine (Cymbalta) 60 mg DR capsule [id number] Yes Take 1 capsule (60 mg) by mouth once daily. Do not crush or chew. Historical Provider, MD Taking Active   glipiZIDE (Glucotrol) 5 " mg tablet [id number]  Take 1 tablet (5 mg) by mouth 1 time for 1 dose. Aman Guthrie, DO   24 2359   insulin aspart (NovoLOG PenFill U-100 Insulin) 100 unit/mL pen cartridge [id number] Yes Inject 15 Units under the skin 3 times daily (morning, midday, late afternoon). Take as directed per insulin instructions. Mild insulin sliding scale Yanni Smalls, DO Taking Active     Discontinued 24 1234     Discontinued 24 1234     Discontinued 24   levothyroxine (Synthroid, Levoxyl) 25 mcg tablet [id number] Yes Take 1 tablet (25 mcg) by mouth early in the morning.. Historical Provider, MD Taking Active   metFORMIN (Glucophage) 1,000 mg tablet [id number] Yes Take 1 tablet (1,000 mg) by mouth 2 times a day. Historical Provider, MD Taking Active   nystatin (Mycostatin) 100,000 unit/gram powder [id number] No Apply 1 Application topically 2 times a day.   Patient not taking: Reported on 2024    Álvaro Galeano MD Not Taking Active   ondansetron (Zofran) 4 mg tablet [id number] Yes Take 1 tablet (4 mg) by mouth every 8 hours if needed for nausea or vomiting. Álvaro Galeano MD Taking Active   oxygen (O2) gas therapy [id number] Yes Inhale 1 each once every 24 hours. Yanni Smalls DO Taking Active   pantoprazole (ProtoNix) 40 mg EC tablet [id number] Yes Take 1 tablet (40 mg) by mouth once daily in the morning. Take before meals. Historical Provider, MD Taking Active   predniSONE (Deltasone) 10 mg tablet [id number] No Take 3 tablets (30 mg) by mouth once daily for 3 days, THEN 2 tablets (20 mg) once daily for 3 days, THEN 1 tablet (10 mg) once daily for 3 days.   Patient not taking: Reported on 2024    Yanni Smalls DO Not Taking Active   traZODone (Desyrel) 50 mg tablet [id number] Yes Take 1 tablet (50 mg) by mouth once daily at bedtime. Historical Provider, MD Taking Active   Wixela Inhub 250-50 mcg/dose diskus inhaler [id number] Yes Inhale 1 puff 2 times a day. Historical Provider, MD  Taking Active                   Assessment/Plan       1) lung cancer  -12/8/2022 chest CT: NICHOL anterior segment 1.6 x 2.2 cm nodule, partial pleural attachment, nonspecific low volume paratracheal mediastinal LN largest near AP window 1.0 x 1.4 cm, right subcarinal space 8 x 14 mm  -12/12/2022 PET: NICHOL 1.8 x 2.3 cm mass with SUV 5.8, lateral margin abuts pleura; no significant mediastinal or hilar hypermetabolic lymphadenopathy  -12/28/2022 chest CT: 2.5 cm cavitary nodule in NICHOL  -1/7/2023 PET scan  -had surgery for stage I NSCLC, s/p lobectomy (Dr Fagan, 1/31/2023)  -2/1/2023 CT chest: no PE, postop changes in left chest, small left PTX in left upper anterior chest  -2/26/2023 chest CT no PE, continued mildly enlarged mediastinal lymph nodes  -saw Dr Solis at [facility] on 3/13/2023 - she had a [id number] (stage Ib) lung adenocarcinoma (poor NCCN risk factors - G3, + visceral involvement), s/p left robotic assisted anatomic upper lobectomy, PD-L1 90%, [id number]  -per his charting, he recommended either adjuvant cisplatin + pemetrexed x 4 cycles vs surveillance  -according to  Thu, Dr Solis never told her about her high risk features nor any adjuvant option, and that the only thing he recommended was observation  -7/11/2023 CT chest : stable postsurgical changes of prior left thoracotomy and left upper lobectomy with interval resolution of bilateral pleural effusions; interval progression of lymphadenopathy in the chest concerning for progressing ghazala metastatic disease     7/25/2023 underwent EBUS: A - EBUS TRANSBRONCHIAL FINE NEEDLE ASPIRATE, LYMPH NODE  - 4L             Negative for malignant cells.             Benign lymphoid sample (see comment).   B - EBUS TRANSBRONCHIAL FINE NEEDLE ASPIRATE, LYMPH NODE  - 10L             Negative for malignant cells.                   Benign lymphoid sample.  C - EBUS TRANSBRONCHIAL FINE NEEDLE ASPIRATE, LYMPH NODE  - STATION 7             Negative for malignant cells.                  Benign lymphoid sample.   -8/9/2023 PET scan: 3.0 x 2.1 cm left prevascular node with SUV 7.9; few additional prominent mediastinal nodes with low level uptake; left lower paratracheal node 0.8 cm with SUV 2.2; mild FDG uptake in left hilum SUV 3.1  -11/14/2023 chest CT: enlarged 2.2 cm prevascular lymph node not significantly changed  -she was told by her pulmonologist Dr Kang that she has cancer  -discussed her original path with her--while it was a small tumor and stage Ib, she did have a couple high risk features that would have warranted adjuvant chemotherapy followed by atezolizumab; also if she does have a recurrence that isn't amenable to surgery, she would also be a candidate for immunotherapy then KRAS inhibitor (FDA approved only in 2nd line setting)  -will discuss with thoracic surgeon--will import all [facility] films to review; may need CT guided bx by IR of this prevascular node   -she had biopsy done on 12/13/2023--path confirmed poorly differentiated lung carcinoma, PD-L1 90%, KRAS G12C mutation  -she saw Dr Calixto on 12/28/2023--he advised surgery, provided that she can pass her PFTs  - on 1/30/2024 Dr Calixto took her to the OR for robotic assisted redo left mediastinal exploration, left anterior mediastinal mass resection, diaphragmatic pacer placement  -path showed poorly differentiated carcinoma with pleomorphic/spindle cell and clear cell features; 4 lymph nodes with no evidence of malignancy; sections show a poorly differentiated carcinoma with pleomorphic/spindle cell and clear cell features involving fibroadipose tissue with a large area of central necrosis; tumor cells are positive for AE1/AE3, CAM 5.2, CK7 and negative for TTF-1, p40.  -pt is most interested in doing whatever is necessary to reduce her risk for recurrence  -she and  state again that the [facility] oncologist told them that adjuvant therapy was not necessary after her first surgery, even though his note documented  that he recommended it  -as her tumor has high PD-L1 expression, she is a candidate for pembrolizumab; if she relapses, she is also a candidate for KRAS G12C inhibitor  -has completed 6 doses of adjuvant pembrolizumab (out of 17)  -CT scan done on 6/19/2024 showed  mediastinum with subcarinal lymphadenopathy 10 mm in short axis, there is component of AP window lymphadenopathy 17 x 15 mm; mesentery demonstrates soft tissue lesion within midline lower abdominal mesentery 1.6 x 1.2 cm; pleural based nodular lesion in RLL posteriorly 8 mm with surrounding ground glass airspace infiltrate  -she was admitted recently for pneumonia--CT angio done on 7/23 showed no evidence of pulmonary embolus, multifocal airspace disease in the right lung suggesting pneumonia  -right after she was discharged from [place], she then went back to the ER for hyperglycemia ()  -Dr Calixto has scheduled her next chest CT for 8/2024--pt is wondering if that is still necessary  -on 7/14/2024 she underwent an EBUS--lymph node level 4L was sampled--showed NSCLC, favor squamous cell carcinoma  -given that she is now confirmed to have new and/or persistent disease in her mediastinum, I have advised switching to new therapy; as her tumor has the KRAS G12C mutation, I have recommended switching to KRAS G12C inhibitor, namely adagrasib  -benefits, risks, potential morbidity related to adagrasib were reviewed with Thu and she signed informed consent to proceed  -she will take adagrasib (Krazati) 600 mg PO BID  -she also has severe body pains secondary to fibromyalgia--she  says in the hospital she was prescribed percocet PRN and asked if I could prescribe it; I did inform her that I will treat pain only secondary to cancer; she therefore was willing to be referred to pain management (Dr Melendrez)  -here for interval followup  -has been taking adagrasib 600 mg BID for 2 weeks now  -since starting it, she says she feels even more short of  breath  -wants to vacation in [place] again  -still recovering from recent pneumonia  -adagrasib is associated with dyspnea  -advised Thu to titrate down adagrasib to 400 mg BID  -will see her again in 4 weeks        2) COPD  -on albuterol  -on incruse ellipta     3) atrial fibrillation  -on amiodarone  -on warfarin     4) hypertension  -on norvasc  -on chlorthalidone  -on lasix  -on lisinopril  -on metoprolol     5) hyperlipidemia  -on atorvastatin     6) major depression  -on cymbalta     7) diabetes  -on novolog insulin  -on metformin  Problem List Items Addressed This Visit    None           Álvaro Galeano MD

## 2024-08-28 ENCOUNTER — OFFICE VISIT (OUTPATIENT)
Dept: PAIN MEDICINE | Facility: CLINIC | Age: 69
End: 2024-08-28
Payer: MEDICARE

## 2024-08-28 ENCOUNTER — HOSPITAL ENCOUNTER (OUTPATIENT)
Dept: RADIOLOGY | Facility: HOSPITAL | Age: 69
Discharge: HOME | End: 2024-08-28
Payer: MEDICARE

## 2024-08-28 VITALS
DIASTOLIC BLOOD PRESSURE: 66 MMHG | RESPIRATION RATE: 18 BRPM | SYSTOLIC BLOOD PRESSURE: 139 MMHG | HEART RATE: 81 BPM | TEMPERATURE: 97.2 F | OXYGEN SATURATION: 97 %

## 2024-08-28 DIAGNOSIS — M47.816 SPONDYLOSIS WITHOUT MYELOPATHY OR RADICULOPATHY, LUMBAR REGION: ICD-10-CM

## 2024-08-28 DIAGNOSIS — M79.7 FIBROMYALGIA: ICD-10-CM

## 2024-08-28 DIAGNOSIS — M47.816 SPONDYLOSIS WITHOUT MYELOPATHY OR RADICULOPATHY, LUMBAR REGION: Primary | ICD-10-CM

## 2024-08-28 PROCEDURE — 1111F DSCHRG MED/CURRENT MED MERGE: CPT | Performed by: PAIN MEDICINE

## 2024-08-28 PROCEDURE — 72100 X-RAY EXAM L-S SPINE 2/3 VWS: CPT

## 2024-08-28 PROCEDURE — 99204 OFFICE O/P NEW MOD 45 MIN: CPT | Performed by: PAIN MEDICINE

## 2024-08-28 PROCEDURE — 99214 OFFICE O/P EST MOD 30 MIN: CPT | Performed by: PAIN MEDICINE

## 2024-08-28 PROCEDURE — 1159F MED LIST DOCD IN RCRD: CPT | Performed by: PAIN MEDICINE

## 2024-08-28 PROCEDURE — 3078F DIAST BP <80 MM HG: CPT | Performed by: PAIN MEDICINE

## 2024-08-28 PROCEDURE — 1125F AMNT PAIN NOTED PAIN PRSNT: CPT | Performed by: PAIN MEDICINE

## 2024-08-28 PROCEDURE — 3051F HG A1C>EQUAL 7.0%<8.0%: CPT | Performed by: PAIN MEDICINE

## 2024-08-28 PROCEDURE — 3075F SYST BP GE 130 - 139MM HG: CPT | Performed by: PAIN MEDICINE

## 2024-08-28 PROCEDURE — G2211 COMPLEX E/M VISIT ADD ON: HCPCS | Performed by: PAIN MEDICINE

## 2024-08-28 RX ORDER — SODIUM CHLORIDE, SODIUM LACTATE, POTASSIUM CHLORIDE, CALCIUM CHLORIDE 600; 310; 30; 20 MG/100ML; MG/100ML; MG/100ML; MG/100ML
20 INJECTION, SOLUTION INTRAVENOUS CONTINUOUS
OUTPATIENT
Start: 2024-08-28

## 2024-08-28 ASSESSMENT — PAIN SCALES - GENERAL: PAINLEVEL: 6

## 2024-08-28 NOTE — PROGRESS NOTES
States she has back neck hands and knees also states she has christie to the center of her chest  I have cancer for the third time it is in my lymph nodes  I have been dealing with oain for 45 years   Was taking gabapentin in the past off for the last 2 years  Did take percocet when she was in the hospital

## 2024-08-28 NOTE — H&P
History Of Present Illness  Thu Ortega is a 69 y.o. female presenting with back pain     States she has back ,neck hands and knees also states she has pain to the center of her chest   Was taking gabapentin in the past off for the last 2 years  Did take percocet when she was in the hospital      She was under the care of Dr. Chavez  who was helping her for her pain complaint.  Was tried on naltrexone and that she did not have any significant improvement with it.  Currently describing the back pain radiating down to the lower extremity worse currently on the right side to  the knee , seems to be aggravated during walking.  Rating her pain currently at a level of 6 out of 10 she has tried Tylenol with minimal improvement was advised to stay away from nonsteroidal anti-inflammatory secondary to her cancer treatment medication and secondary to a prior gastritis.  Tried on prednisone without any significant improvement for her back pain she have completed multiple sessions of physical therapy without any significant improvement her grandson is a physical therapist and she continues to do the the treatment at home without any significant improvement.  Patient did fill in a Oswestry low back pain disability questionnaire and scored 34   Past Medical History  Past Medical History:   Diagnosis Date    Adenocarcinoma of lung, left (Multi)     s/p robotic assisted NICHOL lobectomy on 1/31/23, c/b afib and aspiration PNA    Atrial fibrillation (Multi)     COPD (chronic obstructive pulmonary disease) (Multi)     F/W Dr. Kang, Wixela inhaler, PFT appointment scheduled for 01/12/2024    Depression     Taking Cymbalta    DJD (degenerative joint disease)     DM (diabetes mellitus) (Multi)     F/w Dr. Wong, Taking Metformin, Last A1c is 7.5 on 08/03/2023    Fibromyalgia, primary     F/W Pain    GERD (gastroesophageal reflux disease)     F/w PCP, on Protonix    Hearing aid worn     Bilateral hearing aides    History of blood  transfusion     patient think she recieved a transfusion with her Lobectomy surgery    History of meningioma of the brain     s/p resection    HLD (hyperlipidemia)     F/W PCP: Taking Atorvastatin    HTN (hypertension)     F/W PCP    Irregular heart beat     Paroxysmal atrial fibrillation. Last seen by Dr. Fox on 01/03/24, Started on Amiodarone. Not on anticoagulation.    Irritable bowel syndrome     Malignant neoplasm of upper lobe of left lung (Multi)     Nephrolithiasis     monitoring, no interventions    Panlobular emphysema (Multi)     Shortness of breath     PALENCIA previously on O2    Spinal stenosis     Urinary tract infection      Surgical History  Past Surgical History:   Procedure Laterality Date    BRAIN SURGERY      meningioma resection    CATARACT EXTRACTION      CT GUIDED PERCUTANEOUS BIOPSY LUNG  12/13/2023    CT GUIDED PERCUTANEOUS BIOPSY LUNG 12/13/2023 [facility]    CT GUIDED PERCUTANEOUS BIOPSY LUNG  12/15/2023    CT GUIDED PERCUTANEOUS BIOPSY LUNG    FOOT SURGERY      KNEE SURGERY      LUNG LOBECTOMY      TUBAL LIGATION      US GUIDED NEEDLE LIVER BIOPSY  02/07/2020    US GUIDED NEEDLE LIVER BIOPSY 2/7/2020 [facility]     Social History  She reports that she quit smoking about 18 years ago. Her smoking use included cigarettes. She has never used smokeless tobacco. She reports that she does not currently use alcohol. She reports that she does not use drugs.    Family History  Family History   Problem Relation Name Age of Onset    Stroke Mother      Other (aortic valve disease) Mother      Heart disease Mother      Cancer Sister      Stroke Sister      Diabetes Sister      Diabetes Brother      Other (heart transplant) Brother          Allergies  Allergies   Allergen Reactions    Penicillin Hives     Review of Systems   12 Systems have been reviewed as follows.   Constitutional: Fever, weight gain, weight loss, appetite change, night sweats, fatigue, chills.  Eyes : blurry, double vision, vision,  loss, tearing, redness, pain, sensitivity to light, glaucoma.  Ears, nose, mouth, and throat: Hearing loss, ringing in the ears, ear pain, nasal congestion, nasal drainage, nosebleeds, mouth, throat, irritation tooth problem.  Cardiovascular :chest pain, pressure, heart tracing,palpaitations , sweating, leg swelling, high or low blood pressure  Pulmonary: Cough, yellow or green sputum, blood and sputum, shortness of breath, wheezing  Gastrointestinal: Nause, vomiting, diarrhea, constipation, pain, blood in stool, or vomitus, heartburn, difficulty swallowing  Genitourinary: incontinence, abnormal bleeding, abnormal discharge, urinary frequency, urinary hesitancy, pain, impotence sexual problem, infection, urinary retention  Musculoskeletal: Pain, stiffness, joint, redness or warmth, arthritis, back pain, weakness, muscle wasting, sprain or fracture  Neuro: Weight weakness, dizziness, change in voice, change in taste change in vision, change in hearing, loss, or change of sensation, trouble walking, balance problems coordination problems, shaking, speech problem  Endocrine , cold or heat intolerance, blood sugar problem, weight gain or loss missed periods hot flashes, sweats, change in body hair, change in libido, increased thirst, increased urination  Heme/lymph: Swelling, bleeding, problem anemia, bruising, enlarged lymph nodes  Allergic/immunologic: H. plus nasal drip, watery itchy eyes, nasal drainage, immunosuppressed  The above, were reviewed and noted negative except as noted.    Physical Exam   Vital signs reviewed, documented in chart     General:  Appears well, does not look in any major distress  Alert    HEENT:  Head atraumatic  Eyes normal inspection  PERRL  Normal ENT inspection  No signs of dehydration    NECK:  Normal inspection  Range of motion within normal     RESPIRATORY:  No respiratory distress    CVS:  Heart rate and rhythm regular    ABDOMEN/GI  Soft  Non-tender  No distention  No  organomegaly      BACK:  Normal inspection, flexion and extension limited and provocative of pain   Positive tenderness upon the palpation of the facet joint  Si joints none tender to palpations     EXTREMITIES:  Non-Tender  Full ROM  Normal appearance  No Pedal edema  Power symmetrical , sensory examination preserved.    NEURO:  Alert and oriented X 3  CNS normal as tested without focal neurological deficit   Sensation normal  Motor normal  reflexes normal    PSYCH:  Mood normal  Affect normal    SKIN:  Color normal  No rash  Warm  Dry  no sign of skin marking supportive of IV drug usage /abuse.     Last Recorded Vitals  Blood pressure 139/66, pulse 81, temperature 36.2 °C (97.2 °F), resp. rate 18, SpO2 97%.    Assessment/Plan   69 years old with history and physical examination supportive of lumbago lumbar spondylosis tried and failed conservative management    Plan  Discussed with the patient the different modalities available for the treatment of her condition I recommended for the patient to obtain an x-ray for the lumbar spine area  Knowing that the pain has persisted for over few years that conservative management did not provide her with any significant improvement I would recommend to start with a diagnostic medial nerve branch block targeting the L3-L4 L4-L5 bilaterally under fluoroscopic guidance if the patient described positive response at that time could proceed with the denervation with a radiofrequency ablation of the medial nerve branches bilaterally L3-L4 L4-L5 to be performed under fluoroscopic guidance we will leave the option of steroid injection as a second option secondary to her diabetes knowing that the steroid will increase her glucose level  Benefits and risk were discussed with the patient and she would like to proceed      The above clinical summary has been dictated with voice recognition software. It has not been proofread for grammatical errors, typographical mistakes, or other  semantic inconsistencies.    Thank you for visiting our office today. It was our pleasure to take part in your healthcare.     Please do not hesitate to contact the pain clinic after your visit with any questions or concerns at  M-F 8-4 pm       Christiano Melendrez M.D.  Medical Director , Division of Pain Medicine [facility]   of Anesthesiology and Pain Medicine  [facility] [employer] [facility]     [place] [address] [address] [place] [address]     Office: [phone]  Fax: [phone]      Christiano Melendrez MD

## 2024-08-31 ASSESSMENT — ENCOUNTER SYMPTOMS
ENDOCRINE NEGATIVE: 1
PSYCHIATRIC NEGATIVE: 1
GASTROINTESTINAL NEGATIVE: 1
CARDIOVASCULAR NEGATIVE: 1
HEMATOLOGIC/LYMPHATIC NEGATIVE: 1
SHORTNESS OF BREATH: 1
EYES NEGATIVE: 1
NEUROLOGICAL NEGATIVE: 1
MUSCULOSKELETAL NEGATIVE: 1
CONSTITUTIONAL NEGATIVE: 1

## 2024-09-02 NOTE — PROGRESS NOTES
Planned Initiation date:  TBD    Date of education:  8/5/24  Patient was educated on the medication's administration, warnings, precautions, common adverse effects, proper storage, handling, and disposal  We discussed the medications purpose and the patient's goals of therapy for their   Please see details below    Follow up needed: N/A    Reassessment date: Approx 3 - 6 months as applicable  Patient Discussion:   Introduction:   - Patient, accompanied by her  Horace, contacted in person to conduct the medication first fill assessment and new start patient education.   - Verified that the medication was sent to [facility] [place] [facility] and reviewed that the pharmacy supplied welcome kit will be given upon first delivery of the medication    Clinical Background:  - The Patient's medication list, allergies, and comorbid conditions were verified. No contraindications to therapy noted at this time.  - Patient advised to contact the pharmacy if there are any changes to her medication list, including prescriptions, OTC medications, herbal products, or supplements.   - Current clinically significant drug-drug interactions include: (1) adagrasib - Wixela Inhub (salmeterol - fluticasone) - adagrasib can increase salmeterol levels - will contact prescriber to recommend switch to another combo product Dulera (formeterol - mometasone);  (2) adagrasib - trazodone - adagrasib can increase trazodone levels - recommend dose reduction of trazodone and monitoring.      - Labs were reviewed and no concerns were noted regarding the patient's therapy at this time     -Medication is clinically appropriate.    Education/Discussion:  Patient accepted the pharmacist's offer of counseling.    Indication:  Lumakras is being used for this patient's NSCLC cancer, progressive disease s/p pembrolizumab therapy    Dose:  200mg tablets    starting dose = 600mg (3 tablets) twice daily    Administration:   Take 3 x 200 mg tablets by  mouth twice a day  May take with or without food; try to take close to the same time each day  Avoid consuming grapefruit and grapefruit juice (class C interaction)  Swallow tablet whole. Do not crush, cut or chew tablet    Does the patient have any barriers to self-administration (including physical and mental)? No   List barriers:  N/A  Describe actions taken to mitigate barriers: N/A  Patient/caregiver counseled on proper technique for self-administration: Yes     Missed Doses:  The importance of adherence was discussed with the patient and they were advised to take the medication as prescribed by their provider.   We discussed the use of a calendar, oral chemo tracking sheet, or cell phone alarm to keep track of their doses.     If you miss a dose, take it as soon as possible if it is less than 4 hours from the scheduled time, then resume next dose at the regularly scheduled time.   If greater than 4 hours, skip the dose and take the next dose at regularly scheduled time.   Do not take an extra dose or 2 doses at the same time.  If a dose is vomited, do not repeat the dose.  Patient was encouraged to call their physician's office if they have a question regarding a missed dose.     What barriers to adherence does the patient have? (answer Y/N for all):  Patient has a difficult time remembering to take medications? No  Difficult administration technique? No  Medication cost? TBD  Patient does not think medication is beneficial? No  Other? No  What actions were taken to address barriers? N/A  Warnings, Precautions, and Adverse Reactions:   - Discussed common adverse effects, warnings and precautions pertinent to the medication including but not limited to:   Diarrhea  Nausea/Vomiting  Moderate or high emetic potential; ensure patient was prescribed antiemetics PRN  Abdominal pain, decreased appetite  Fatigue, dizziness  Fluid retention  Muscle or joint pain  Cough, shortness of breath  Hepatotoxicity   Bone marrow  suppression: platelets, lymphocytopenia  QTc prolongation    - Patient was encouraged to reach out to their doctor's office if they develop:   Shortness of breath/difficulty breathing, cough  Signs of liver toxicity: yellowing skin, pain/tenderness on right side of abdomen  GI toxicity:  Nausea/vomiting, diarrhea, constipation that has not resolved with supportive medications   Fever equal or over 100.4 F.  Must make an immediate call to physician's office. If you are unable to get a hold of the office, please go to the nearest emergency room for evaluation  Arrhythmias: your provider may request an echocardiogram to be completed prior to or during therapy to assess for QTc prolongation; risk is higher with other concomitant QT prolonging medications and in those with heart failure, bradyarrhythmias, or electrolyte abnormalities   Any unexplained, new or concerning symptom    - Reviewed that the patient should call and discuss with their provider regarding any vaccinations while they are on therapy    Handling and Storage   Store at room temperature in a dry location    Disposal:  If you have any unused medication:  Do not throw it in the trash, do not flush it down the sink or toilet  Take any unused medication to an appropriate police station or [facility] drop box location for proper disposal. (You can use [employer]xdrugdropbox.org to see police stations and  retail pharmacies in your area that are able to take back the medication)    Goals of therapy:  Oncology goals of therapy:  Slow progression of cancer  Optimize quality of life  Continuously evaluate safety and appropriateness of medication  Prolong life  Proactively manage commonly reported side effects (ex: nausea/vomiting, constipation, fatigue)  Report and evaluate adverse reactions to prescriber team  Continuously evaluate safety and appropriateness of therapy  Maintain therapy adherence as appropriate  Ultimate goal: progression free  survival  Patient goals:  Control disease, prevent spread and recurrence    Efficacy timeline:   Patient's prescriber will monitor for continued efficacy, progression, and therapy appropriateness    Conclusion:  - Quality of life:  not discussed  - Pharmacy Satisfaction: TBD  - High risk status (pregnancy, geriatric, pediatric):  Geriatric  - Is clinical intervention necessary? No   - If yes, what additional action was taken? N/A  Patient was advised of [facility]'s dispensing process, refill timeline, contact information ([phone]), and patient management follow up. Patient confirmed understanding of education conducted during assessment. All patient questions and concerns were addressed to the best of my ability. Patient was encouraged to contact the specialty pharmacy with any questions or concerns.

## 2024-09-02 NOTE — PROGRESS NOTES
Thu Ortega is a 69 y.o. year old female patient who is currently being treated with adagrasib 600 mg (3 x 200 mg) PO twice daily for their NSCLC, KRAS G12C+.  Thu is begin followed by Dr. Galeano's clinic.    Refer to the prior EPIC documentation for details about initial FFA patient counseling and education.    Medication Compliance and Adherence:    We reviewed appropriate dosing and administration, as well as appropriate storage and handling.  Thu Ortega verbalized understanding.  The importance of adherence was discussed with Thu Ortega and she was advised to take the medication as prescribed by their provider.   Thu Ortega has had 1 unplanned missed dose.  She was encouraged to call myself or Dr. Galeano's team if she has a question regarding a missed dose.     Tolerance:    Refer to earlier notes.    Home Medications:  Thu Ortega has changed her COPD inhaler to Dulera, due to clinically significant DDI between adagrasib and prior Wixela inhaler.    Impression/Plan  Is patient high risk? (potential patients:  pregnancy, geriatric, pediatric)  Geriatric   Is laboratory follow-up needed?  Routine per Dr. Galeano's clinic  Is a clinical intervention needed?  No    All patient questions and concerns were addressed to the best of my ability.      Additional comments:  N/A    Next assessment is 3 - 6 months as applicable.      Tariq Walsh, [facility], MS, BCOP  Clinical Pharmacist Specialist - Ambulatory Oncology

## 2024-09-04 ENCOUNTER — SPECIALTY PHARMACY (OUTPATIENT)
Dept: PHARMACY | Facility: CLINIC | Age: 69
End: 2024-09-04

## 2024-09-04 ENCOUNTER — NURSE TRIAGE (OUTPATIENT)
Dept: HEMATOLOGY/ONCOLOGY | Facility: CLINIC | Age: 69
End: 2024-09-04
Payer: MEDICARE

## 2024-09-04 DIAGNOSIS — C34.92 LOCAL RECURRENCE OF LEFT LUNG CANCER (MULTI): ICD-10-CM

## 2024-09-04 PROCEDURE — RXMED WILLOW AMBULATORY MEDICATION CHARGE

## 2024-09-04 RX ORDER — HYDROCHLOROTHIAZIDE 12.5 MG/1
12.5 TABLET ORAL DAILY
Qty: 30 TABLET | Refills: 1 | Status: SHIPPED | OUTPATIENT
Start: 2024-09-04 | End: 2025-09-04

## 2024-09-04 NOTE — TELEPHONE ENCOUNTER
Spoke with the patient. Provided update from Dr. Galeano. Pt in agreement to try diuretic. Hydrochlorothiazide script pended to Dr. Galeano for approval after verifying pharmacy with patient. Pt verbalized understanding of how to take medication and had no further questions or concerns at this time,

## 2024-09-04 NOTE — TELEPHONE ENCOUNTER
Spoke with the patient. States bilat foot swelling. Doesn't go into legs or calf. Also feels like she isn't urinating as much as she previously has. Denies any UTI s/s. Pt calling the office to ask if her oral chemo medication could be causing this concern. Explained I would update Dr. Galeano and then call her back once I received a response.       Additional Information   Are you taking any blood thinners?     ASA daily   Commented on: Where is your swelling?     States bilat foot swelling. Right greater than left. Started 3 days ago   Commented on: Do you have pain in the area that's swollen? On a scale of 0-10 (0 being no pain and 10 being the worst possible) how would you rate your pain?     No pain. No redness. No drainage or broken skin.   Commented on: What helps these problems?     Swelling goes down- almost gone- when she elevated her feet at night or while in a chair. Returns when her legs are dependent during the day   Commented on: Have you had any of these things recently?     Denies these s/s    Protocols used: Swelling/Deep Venous Thrombosis (DVT)

## 2024-09-11 ENCOUNTER — TELEPHONE (OUTPATIENT)
Dept: HEMATOLOGY/ONCOLOGY | Facility: CLINIC | Age: 69
End: 2024-09-11
Payer: MEDICARE

## 2024-09-11 NOTE — TELEPHONE ENCOUNTER
Spoke with the patient. Aware no scan is needed before next FUV but patient does need to get lab work drawn 1-2 days before. Pt verbalized understanding and had no further questions or concerns at this time.

## 2024-09-12 ENCOUNTER — LAB (OUTPATIENT)
Dept: LAB | Facility: CLINIC | Age: 69
End: 2024-09-12
Payer: MEDICARE

## 2024-09-12 ENCOUNTER — PHARMACY VISIT (OUTPATIENT)
Dept: PHARMACY | Facility: CLINIC | Age: 69
End: 2024-09-12
Payer: MEDICARE

## 2024-09-12 DIAGNOSIS — C34.12 MALIGNANT NEOPLASM OF UPPER LOBE OF LEFT LUNG (MULTI): ICD-10-CM

## 2024-09-12 LAB
ALBUMIN SERPL BCP-MCNC: 4.2 G/DL (ref 3.4–5)
ALP SERPL-CCNC: 132 U/L (ref 33–136)
ALT SERPL W P-5'-P-CCNC: 14 U/L (ref 7–45)
ANION GAP SERPL CALC-SCNC: 15 MMOL/L (ref 10–20)
AST SERPL W P-5'-P-CCNC: 19 U/L (ref 9–39)
BASOPHILS # BLD AUTO: 0.08 X10*3/UL (ref 0–0.1)
BASOPHILS NFR BLD AUTO: 1.1 %
BILIRUB SERPL-MCNC: 0.4 MG/DL (ref 0–1.2)
BUN SERPL-MCNC: 7 MG/DL (ref 6–23)
CALCIUM SERPL-MCNC: 9.5 MG/DL (ref 8.6–10.3)
CHLORIDE SERPL-SCNC: 100 MMOL/L (ref 98–107)
CO2 SERPL-SCNC: 27 MMOL/L (ref 21–32)
CREAT SERPL-MCNC: 0.95 MG/DL (ref 0.5–1.05)
EGFRCR SERPLBLD CKD-EPI 2021: 65 ML/MIN/1.73M*2
EOSINOPHIL # BLD AUTO: 0.57 X10*3/UL (ref 0–0.7)
EOSINOPHIL NFR BLD AUTO: 7.9 %
ERYTHROCYTE [DISTWIDTH] IN BLOOD BY AUTOMATED COUNT: 16 % (ref 11.5–14.5)
GLUCOSE SERPL-MCNC: 215 MG/DL (ref 74–99)
HCT VFR BLD AUTO: 32.3 % (ref 36–46)
HGB BLD-MCNC: 10.3 G/DL (ref 12–16)
IMM GRANULOCYTES # BLD AUTO: 0.03 X10*3/UL (ref 0–0.7)
IMM GRANULOCYTES NFR BLD AUTO: 0.4 % (ref 0–0.9)
LYMPHOCYTES # BLD AUTO: 1.32 X10*3/UL (ref 1.2–4.8)
LYMPHOCYTES NFR BLD AUTO: 18.4 %
MCH RBC QN AUTO: 25.9 PG (ref 26–34)
MCHC RBC AUTO-ENTMCNC: 31.9 G/DL (ref 32–36)
MCV RBC AUTO: 81 FL (ref 80–100)
MONOCYTES # BLD AUTO: 0.7 X10*3/UL (ref 0.1–1)
MONOCYTES NFR BLD AUTO: 9.8 %
NEUTROPHILS # BLD AUTO: 4.47 X10*3/UL (ref 1.2–7.7)
NEUTROPHILS NFR BLD AUTO: 62.4 %
PLATELET # BLD AUTO: 259 X10*3/UL (ref 150–450)
POTASSIUM SERPL-SCNC: 3.2 MMOL/L (ref 3.5–5.3)
PROT SERPL-MCNC: 6.8 G/DL (ref 6.4–8.2)
RBC # BLD AUTO: 3.98 X10*6/UL (ref 4–5.2)
SODIUM SERPL-SCNC: 139 MMOL/L (ref 136–145)
WBC # BLD AUTO: 7.2 X10*3/UL (ref 4.4–11.3)

## 2024-09-12 PROCEDURE — 82378 CARCINOEMBRYONIC ANTIGEN: CPT | Performed by: INTERNAL MEDICINE

## 2024-09-12 PROCEDURE — 36415 COLL VENOUS BLD VENIPUNCTURE: CPT

## 2024-09-12 PROCEDURE — 85025 COMPLETE CBC W/AUTO DIFF WBC: CPT

## 2024-09-12 PROCEDURE — 80053 COMPREHEN METABOLIC PANEL: CPT

## 2024-09-13 LAB — CEA SERPL-MCNC: 3 UG/L

## 2024-09-17 ENCOUNTER — HOSPITAL ENCOUNTER (OUTPATIENT)
Dept: PAIN MEDICINE | Facility: CLINIC | Age: 69
Discharge: HOME | End: 2024-09-17
Payer: MEDICARE

## 2024-09-17 VITALS
DIASTOLIC BLOOD PRESSURE: 67 MMHG | SYSTOLIC BLOOD PRESSURE: 122 MMHG | RESPIRATION RATE: 18 BRPM | HEART RATE: 76 BPM | TEMPERATURE: 96.9 F | OXYGEN SATURATION: 94 %

## 2024-09-17 DIAGNOSIS — M47.816 SPONDYLOSIS WITHOUT MYELOPATHY OR RADICULOPATHY, LUMBAR REGION: ICD-10-CM

## 2024-09-17 PROCEDURE — 64493 INJ PARAVERT F JNT L/S 1 LEV: CPT | Mod: 50 | Performed by: PAIN MEDICINE

## 2024-09-17 PROCEDURE — 64494 INJ PARAVERT F JNT L/S 2 LEV: CPT | Performed by: PAIN MEDICINE

## 2024-09-17 PROCEDURE — 2500000005 HC RX 250 GENERAL PHARMACY W/O HCPCS: Performed by: PAIN MEDICINE

## 2024-09-17 PROCEDURE — 64493 INJ PARAVERT F JNT L/S 1 LEV: CPT | Performed by: PAIN MEDICINE

## 2024-09-17 PROCEDURE — 64494 INJ PARAVERT F JNT L/S 2 LEV: CPT | Mod: 50 | Performed by: PAIN MEDICINE

## 2024-09-17 RX ORDER — LIDOCAINE HYDROCHLORIDE 20 MG/ML
INJECTION, SOLUTION EPIDURAL; INFILTRATION; INTRACAUDAL; PERINEURAL AS NEEDED
Status: DISCONTINUED | OUTPATIENT
Start: 2024-09-17 | End: 2024-09-18 | Stop reason: HOSPADM

## 2024-09-17 RX ORDER — LIDOCAINE HYDROCHLORIDE 10 MG/ML
INJECTION, SOLUTION EPIDURAL; INFILTRATION; INTRACAUDAL; PERINEURAL AS NEEDED
Status: DISCONTINUED | OUTPATIENT
Start: 2024-09-17 | End: 2024-09-18 | Stop reason: HOSPADM

## 2024-09-17 ASSESSMENT — ENCOUNTER SYMPTOMS
OCCASIONAL FEELINGS OF UNSTEADINESS: 1
LOSS OF SENSATION IN FEET: 0

## 2024-09-17 ASSESSMENT — PAIN - FUNCTIONAL ASSESSMENT: PAIN_FUNCTIONAL_ASSESSMENT: 0-10

## 2024-09-17 ASSESSMENT — PATIENT HEALTH QUESTIONNAIRE - PHQ9
10. IF YOU CHECKED OFF ANY PROBLEMS, HOW DIFFICULT HAVE THESE PROBLEMS MADE IT FOR YOU TO DO YOUR WORK, TAKE CARE OF THINGS AT HOME, OR GET ALONG WITH OTHER PEOPLE: SOMEWHAT DIFFICULT
1. LITTLE INTEREST OR PLEASURE IN DOING THINGS: SEVERAL DAYS
2. FEELING DOWN, DEPRESSED OR HOPELESS: SEVERAL DAYS
SUM OF ALL RESPONSES TO PHQ9 QUESTIONS 1 AND 2: 2

## 2024-09-17 ASSESSMENT — PAIN DESCRIPTION - DESCRIPTORS: DESCRIPTORS: ACHING;SHARP

## 2024-09-17 ASSESSMENT — PAIN SCALES - GENERAL: PAINLEVEL_OUTOF10: 7

## 2024-09-17 NOTE — DISCHARGE INSTRUCTIONS
Post-injection instructions FOR FACET BLOCK      Pay attention to how much pain relief (what percentage compared to before the procedure) you get and for how long it lasts.     THIS IS A TEMPORARY NUMBING OF PAIN THIS IS BEING USED AS A DIAGNOSTIC INDICATOR IF THIS IS THE SOURCE OF YOUR PAIN    Activity:  RETURN TO NORMAL ACTIVITY  SEE IF YOU CAN APPRECIATE AN IMPROVEMENT IN THE PAIN    Bandages: Remove after 24 hours     Showering/Bathing: You may shower after bandage is removed     Follow up: CALL OFFICE NEXT BUSINESS -[phone] LEAVE MESSAGE ABOUT THE % OF RELIEF THAT WAS OBTAINED AND FOR HOW MANY HOURS      Call the OFFICE immediately: if you notice:     Excessive bleeding from procedure site (brisk bright red bleeding from the site or bleeding that soaks the bandages or does not stop)   Severe headache  Inability to walk, leg or arm weakness or numbness that is worse after the procedure   Uncontrolled pain   New urinary or fecal incontinence   Signs of infection: Fever above 101.5F, redness, swelling, pus or drainage from the site

## 2024-09-19 ENCOUNTER — TELEPHONE (OUTPATIENT)
Dept: HEMATOLOGY/ONCOLOGY | Facility: CLINIC | Age: 69
End: 2024-09-19
Payer: MEDICARE

## 2024-09-19 NOTE — TELEPHONE ENCOUNTER
Thu Ortega called me today 9/19/24, wondering if OTC guaifenesin would be compatible with adagrasib.    I checked and there is no DDI between the two.      I called back and advised her as such.  No further questions at this time.      Next FUV with Dr. Galeano is 9/23/24.

## 2024-09-23 ENCOUNTER — OFFICE VISIT (OUTPATIENT)
Dept: HEMATOLOGY/ONCOLOGY | Facility: CLINIC | Age: 69
End: 2024-09-23
Payer: MEDICARE

## 2024-09-23 VITALS
OXYGEN SATURATION: 93 % | RESPIRATION RATE: 16 BRPM | DIASTOLIC BLOOD PRESSURE: 74 MMHG | TEMPERATURE: 98.1 F | SYSTOLIC BLOOD PRESSURE: 135 MMHG | WEIGHT: 153.66 LBS | BODY MASS INDEX: 31.04 KG/M2 | HEART RATE: 82 BPM

## 2024-09-23 DIAGNOSIS — E11.9 TYPE 2 DIABETES MELLITUS WITHOUT COMPLICATION, WITH LONG-TERM CURRENT USE OF INSULIN (MULTI): ICD-10-CM

## 2024-09-23 DIAGNOSIS — Z79.4 TYPE 2 DIABETES MELLITUS WITHOUT COMPLICATION, WITH LONG-TERM CURRENT USE OF INSULIN (MULTI): ICD-10-CM

## 2024-09-23 DIAGNOSIS — J44.9 CHRONIC OBSTRUCTIVE PULMONARY DISEASE, UNSPECIFIED COPD TYPE (MULTI): ICD-10-CM

## 2024-09-23 DIAGNOSIS — F33.41 RECURRENT MAJOR DEPRESSIVE DISORDER, IN PARTIAL REMISSION (CMS-HCC): ICD-10-CM

## 2024-09-23 DIAGNOSIS — R60.0 BILATERAL LEG EDEMA: ICD-10-CM

## 2024-09-23 DIAGNOSIS — C34.92 LOCAL RECURRENCE OF LEFT LUNG CANCER (MULTI): ICD-10-CM

## 2024-09-23 DIAGNOSIS — I10 PRIMARY HYPERTENSION: ICD-10-CM

## 2024-09-23 DIAGNOSIS — I48.0 PAF (PAROXYSMAL ATRIAL FIBRILLATION) (MULTI): ICD-10-CM

## 2024-09-23 DIAGNOSIS — E78.2 MIXED HYPERLIPIDEMIA: ICD-10-CM

## 2024-09-23 DIAGNOSIS — C34.12 MALIGNANT NEOPLASM OF UPPER LOBE OF LEFT LUNG (MULTI): Primary | ICD-10-CM

## 2024-09-23 PROCEDURE — 3051F HG A1C>EQUAL 7.0%<8.0%: CPT | Performed by: INTERNAL MEDICINE

## 2024-09-23 PROCEDURE — 99214 OFFICE O/P EST MOD 30 MIN: CPT | Performed by: INTERNAL MEDICINE

## 2024-09-23 PROCEDURE — 1125F AMNT PAIN NOTED PAIN PRSNT: CPT | Performed by: INTERNAL MEDICINE

## 2024-09-23 PROCEDURE — 3075F SYST BP GE 130 - 139MM HG: CPT | Performed by: INTERNAL MEDICINE

## 2024-09-23 PROCEDURE — 3078F DIAST BP <80 MM HG: CPT | Performed by: INTERNAL MEDICINE

## 2024-09-23 PROCEDURE — 1159F MED LIST DOCD IN RCRD: CPT | Performed by: INTERNAL MEDICINE

## 2024-09-23 RX ORDER — TIOTROPIUM BROMIDE 18 UG/1
1 CAPSULE ORAL; RESPIRATORY (INHALATION)
COMMUNITY

## 2024-09-23 ASSESSMENT — PAIN SCALES - GENERAL: PAINLEVEL: 7

## 2024-09-23 NOTE — PATIENT INSTRUCTIONS
Double up on the water pill    Continue taking the adagrasib at current dosing schedule    You will have a new PET scan done within the next couple weeks    You will have also a doppler of your legs to make sure there are no blood clots causing the swelling

## 2024-09-24 ENCOUNTER — TELEPHONE (OUTPATIENT)
Dept: PAIN MEDICINE | Facility: CLINIC | Age: 69
End: 2024-09-24
Payer: MEDICARE

## 2024-09-25 ENCOUNTER — HOSPITAL ENCOUNTER (OUTPATIENT)
Dept: RADIOLOGY | Facility: CLINIC | Age: 69
Discharge: HOME | End: 2024-09-25
Payer: MEDICARE

## 2024-09-25 DIAGNOSIS — R60.0 BILATERAL LEG EDEMA: ICD-10-CM

## 2024-09-25 PROCEDURE — 93970 EXTREMITY STUDY: CPT

## 2024-09-25 PROCEDURE — 93971 EXTREMITY STUDY: CPT | Performed by: STUDENT IN AN ORGANIZED HEALTH CARE EDUCATION/TRAINING PROGRAM

## 2024-09-26 PROBLEM — R60.0 BILATERAL LEG EDEMA: Status: ACTIVE | Noted: 2024-09-26

## 2024-09-26 RX ORDER — HYDROCHLOROTHIAZIDE 25 MG/1
25 TABLET ORAL DAILY
Qty: 30 TABLET | Refills: 1 | Status: SHIPPED | OUTPATIENT
Start: 2024-09-26 | End: 2025-09-26

## 2024-09-26 RX ORDER — HYDROCHLOROTHIAZIDE 12.5 MG/1
25 TABLET ORAL DAILY
Qty: 30 TABLET | Refills: 1 | Status: SHIPPED | OUTPATIENT
Start: 2024-09-26 | End: 2024-09-26 | Stop reason: WASHOUT

## 2024-09-26 ASSESSMENT — ENCOUNTER SYMPTOMS
NECK PAIN: 1
ENDOCRINE NEGATIVE: 1
NEUROLOGICAL NEGATIVE: 1
CONSTITUTIONAL NEGATIVE: 1
FLANK PAIN: 1
LEG SWELLING: 1
PSYCHIATRIC NEGATIVE: 1
HEMATOLOGIC/LYMPHATIC NEGATIVE: 1
GASTROINTESTINAL NEGATIVE: 1
SHORTNESS OF BREATH: 1
ARTHRALGIAS: 1
EYES NEGATIVE: 1

## 2024-09-26 NOTE — PROGRESS NOTES
Patient ID: Thu Ortega is a 69 y.o. female.  Referring Physician: Álvaro Galeano MD  [address] [facility] [address]  [place] [address]  Primary Care Provider: DEANNA Rodriguez  Visit Type: Follow Up      Subjective    HPI My legs are all swollen  My bones and joints hurt    Review of Systems   Constitutional: Negative.    HENT:  Negative.     Eyes: Negative.    Respiratory:  Positive for shortness of breath.    Cardiovascular:  Positive for leg swelling.   Gastrointestinal: Negative.    Endocrine: Negative.    Musculoskeletal:  Positive for arthralgias, flank pain and neck pain.   Skin: Negative.    Neurological: Negative.    Hematological: Negative.    Psychiatric/Behavioral: Negative.          Objective   BSA: 1.7 meters squared  /74 (BP Location: Right arm)   Pulse 82   Temp 36.7 °C (98.1 °F) (Temporal)   Resp 16   Wt 69.7 kg (153 lb 10.6 oz)   SpO2 93% Comment: pt is 1 L of O2  BMI 31.04 kg/m²      has a past medical history of Adenocarcinoma of lung, left (Multi), Atrial fibrillation (Multi), COPD (chronic obstructive pulmonary disease) (Multi), Depression, DJD (degenerative joint disease), DM (diabetes mellitus) (Multi), Fibromyalgia, primary, GERD (gastroesophageal reflux disease), Hearing aid worn, History of blood transfusion, History of meningioma of the brain, HLD (hyperlipidemia), HTN (hypertension), Irregular heart beat, Irritable bowel syndrome, Malignant neoplasm of upper lobe of left lung (Multi), Nephrolithiasis, Panlobular emphysema (Multi), Shortness of breath, Spinal stenosis, and Urinary tract infection.   has a past surgical history that includes US guided needle liver biopsy (02/07/2020); Lung lobectomy; CT guided percutaneous biopsy lung (12/13/2023); CT guided percutaneous biopsy lung (12/15/2023); Brain surgery; Foot surgery; Knee surgery; Cataract extraction; and Tubal ligation.  Family History   Problem Relation Name Age of Onset    Stroke Mother      Other  (aortic valve disease) Mother      Heart disease Mother      Cancer Sister      Stroke Sister      Diabetes Sister      Diabetes Brother      Other (heart transplant) Brother       Oncology History   Malignant neoplasm of upper lobe of left lung (Multi)   12/3/2023 Initial Diagnosis    Malignant neoplasm of upper lobe of left lung (CMS/HCC)     2/28/2024 - 7/3/2024 Chemotherapy    Pembrolizumab, 21 Day Cycles     8/12/2024 -  Chemotherapy    Adagrasib, 28 Day Cycles     Local recurrence of left lung cancer (Multi)   1/4/2024 Initial Diagnosis    Local recurrence of left lung cancer (CMS/HCC)     2/28/2024 - 7/3/2024 Chemotherapy    Pembrolizumab, 21 Day Cycles     8/12/2024 -  Chemotherapy    Adagrasib, 28 Day Cycles         Thu Ortega  reports that she quit smoking about 18 years ago. Her smoking use included cigarettes. She has never used smokeless tobacco.  She  reports that she does not currently use alcohol.  She  reports no history of drug use.    Physical Exam  Vitals reviewed.   Constitutional:       Appearance: Normal appearance.   HENT:      Head: Normocephalic.      Mouth/Throat:      Mouth: Mucous membranes are moist.   Eyes:      Extraocular Movements: Extraocular movements intact.      Pupils: Pupils are equal, round, and reactive to light.   Cardiovascular:      Rate and Rhythm: Normal rate and regular rhythm.      Pulses: Normal pulses.      Heart sounds: Normal heart sounds.   Pulmonary:      Effort: Pulmonary effort is normal.      Breath sounds: Normal breath sounds.   Abdominal:      General: Bowel sounds are normal.      Palpations: Abdomen is soft.   Musculoskeletal:         General: Swelling present.      Cervical back: Normal range of motion and neck supple.      Right lower leg: Edema present.      Left lower leg: Edema present.   Skin:     General: Skin is warm.   Neurological:      General: No focal deficit present.      Mental Status: She is alert and oriented to person, place, and  time.   Psychiatric:         Mood and Affect: Mood normal.         Behavior: Behavior normal.         WBC   Date/Time Value Ref Range Status   09/12/2024 01:16 PM 7.2 4.4 - 11.3 x10*3/uL Final   07/30/2024 06:29 PM 10.1 4.4 - 11.3 x10*3/uL Final   07/30/2024 05:58 AM 10.0 4.4 - 11.3 x10*3/uL Final     nRBC   Date Value Ref Range Status   07/30/2024 0.0 0.0 - 0.0 /100 WBCs Final   07/30/2024 0.0 0.0 - 0.0 /100 WBCs Final   07/29/2024 0.0 0.0 - 0.0 /100 WBCs Final     RBC   Date Value Ref Range Status   09/12/2024 3.98 (L) 4.00 - 5.20 x10*6/uL Final   07/30/2024 4.38 4.00 - 5.20 x10*6/uL Final   07/30/2024 4.19 4.00 - 5.20 x10*6/uL Final     Hemoglobin   Date Value Ref Range Status   09/12/2024 10.3 (L) 12.0 - 16.0 g/dL Final   07/30/2024 10.9 (L) 12.0 - 16.0 g/dL Final   07/30/2024 10.2 (L) 12.0 - 16.0 g/dL Final     Hematocrit   Date Value Ref Range Status   09/12/2024 32.3 (L) 36.0 - 46.0 % Final   07/30/2024 35.1 (L) 36.0 - 46.0 % Final   07/30/2024 34.6 (L) 36.0 - 46.0 % Final     MCV   Date/Time Value Ref Range Status   09/12/2024 01:16 PM 81 80 - 100 fL Final   07/30/2024 06:29 PM 80 80 - 100 fL Final   07/30/2024 05:58 AM 83 80 - 100 fL Final     MCH   Date/Time Value Ref Range Status   09/12/2024 01:16 PM 25.9 (L) 26.0 - 34.0 pg Final   07/30/2024 06:29 PM 24.9 (L) 26.0 - 34.0 pg Final   07/30/2024 05:58 AM 24.3 (L) 26.0 - 34.0 pg Final     MCHC   Date/Time Value Ref Range Status   09/12/2024 01:16 PM 31.9 (L) 32.0 - 36.0 g/dL Final   07/30/2024 06:29 PM 31.1 (L) 32.0 - 36.0 g/dL Final   07/30/2024 05:58 AM 29.5 (L) 32.0 - 36.0 g/dL Final     RDW   Date/Time Value Ref Range Status   09/12/2024 01:16 PM 16.0 (H) 11.5 - 14.5 % Final   07/30/2024 06:29 PM 17.2 (H) 11.5 - 14.5 % Final   07/30/2024 05:58 AM 17.3 (H) 11.5 - 14.5 % Final     Platelets   Date/Time Value Ref Range Status   09/12/2024 01:16  150 - 450 x10*3/uL Final   07/30/2024 06:29  150 - 450 x10*3/uL Final   07/30/2024 05:58  150  "- 450 x10*3/uL Final     No results found for: \"MPV\"  Neutrophils %   Date/Time Value Ref Range Status   09/12/2024 01:16 PM 62.4 40.0 - 80.0 % Final   07/26/2024 04:17 AM 87.7 40.0 - 80.0 % Final   07/25/2024 04:34 AM 87.6 40.0 - 80.0 % Final     Immature Granulocytes %, Automated   Date/Time Value Ref Range Status   09/12/2024 01:16 PM 0.4 0.0 - 0.9 % Final     Comment:     Immature Granulocyte Count (IG) includes promyelocytes, myelocytes and metamyelocytes but does not include bands. Percent differential counts (%) should be interpreted in the context of the absolute cell counts (cells/UL).   07/30/2024 06:29 PM 7.1 (H) 0.0 - 0.9 % Final     Comment:     Immature Granulocyte Count (IG) includes promyelocytes, myelocytes and metamyelocytes but does not include bands. Percent differential counts (%) should be interpreted in the context of the absolute cell counts (cells/UL).   07/26/2024 04:17 AM 2.3 (H) 0.0 - 0.9 % Final     Comment:     Immature Granulocyte Count (IG) includes promyelocytes, myelocytes and metamyelocytes but does not include bands. Percent differential counts (%) should be interpreted in the context of the absolute cell counts (cells/UL).     Lymphocytes %, Manual   Date/Time Value Ref Range Status   07/30/2024 06:29 PM 9.0 13.0 - 44.0 % Final     Lymphocytes %   Date/Time Value Ref Range Status   09/12/2024 01:16 PM 18.4 13.0 - 44.0 % Final   07/26/2024 04:17 AM 6.7 13.0 - 44.0 % Final   07/25/2024 04:34 AM 5.4 13.0 - 44.0 % Final     Monocytes %, Manual   Date/Time Value Ref Range Status   07/30/2024 06:29 PM 5.0 2.0 - 10.0 % Final     Monocytes %   Date/Time Value Ref Range Status   09/12/2024 01:16 PM 9.8 2.0 - 10.0 % Final   07/26/2024 04:17 AM 3.0 2.0 - 10.0 % Final   07/25/2024 04:34 AM 3.5 2.0 - 10.0 % Final     Eosinophils %, Manual   Date/Time Value Ref Range Status   07/30/2024 06:29 PM 2.0 0.0 - 6.0 % Final     Eosinophils %   Date/Time Value Ref Range Status   09/12/2024 01:16 PM " 7.9 0.0 - 6.0 % Final   07/26/2024 04:17 AM 0.1 0.0 - 6.0 % Final   07/25/2024 04:34 AM 0.0 0.0 - 6.0 % Final     Basophils %, Manual   Date/Time Value Ref Range Status   07/30/2024 06:29 PM 0.0 0.0 - 2.0 % Final     Basophils %   Date/Time Value Ref Range Status   09/12/2024 01:16 PM 1.1 0.0 - 2.0 % Final   07/26/2024 04:17 AM 0.2 0.0 - 2.0 % Final   07/25/2024 04:34 AM 0.1 0.0 - 2.0 % Final     Neutrophils Absolute   Date/Time Value Ref Range Status   09/12/2024 01:16 PM 4.47 1.20 - 7.70 x10*3/uL Final     Comment:     Percent differential counts (%) should be interpreted in the context of the absolute cell counts (cells/uL).   07/26/2024 04:17 AM 14.17 (H) 1.20 - 7.70 x10*3/uL Final     Comment:     Percent differential counts (%) should be interpreted in the context of the absolute cell counts (cells/uL).   07/25/2024 04:34 AM 14.37 (H) 1.20 - 7.70 x10*3/uL Final     Comment:     Percent differential counts (%) should be interpreted in the context of the absolute cell counts (cells/uL).     Immature Granulocytes Absolute, Automated   Date/Time Value Ref Range Status   09/12/2024 01:16 PM 0.03 0.00 - 0.70 x10*3/uL Final   07/30/2024 06:29 PM 0.72 (H) 0.00 - 0.70 x10*3/uL Final   07/26/2024 04:17 AM 0.37 0.00 - 0.70 x10*3/uL Final     Lymphocytes Absolute   Date/Time Value Ref Range Status   09/12/2024 01:16 PM 1.32 1.20 - 4.80 x10*3/uL Final   07/26/2024 04:17 AM 1.09 (L) 1.20 - 4.80 x10*3/uL Final   07/25/2024 04:34 AM 0.88 (L) 1.20 - 4.80 x10*3/uL Final     Monocytes Absolute   Date/Time Value Ref Range Status   09/12/2024 01:16 PM 0.70 0.10 - 1.00 x10*3/uL Final   07/26/2024 04:17 AM 0.48 0.10 - 1.00 x10*3/uL Final   07/25/2024 04:34 AM 0.57 0.10 - 1.00 x10*3/uL Final     Eosinophils Absolute   Date/Time Value Ref Range Status   09/12/2024 01:16 PM 0.57 0.00 - 0.70 x10*3/uL Final   07/26/2024 04:17 AM 0.01 0.00 - 0.70 x10*3/uL Final   07/25/2024 04:34 AM 0.00 0.00 - 0.70 x10*3/uL Final     Eosinophils  "Absolute, Manual   Date/Time Value Ref Range Status   07/30/2024 06:29 PM 0.20 0.00 - 0.70 x10*3/uL Final     Basophils Absolute   Date/Time Value Ref Range Status   09/12/2024 01:16 PM 0.08 0.00 - 0.10 x10*3/uL Final   07/26/2024 04:17 AM 0.03 0.00 - 0.10 x10*3/uL Final   07/25/2024 04:34 AM 0.01 0.00 - 0.10 x10*3/uL Final     Basophils Absolute, Manual   Date/Time Value Ref Range Status   07/30/2024 06:29 PM 0.00 0.00 - 0.10 x10*3/uL Final       No components found for: \"PT\"  aPTT   Date/Time Value Ref Range Status   01/12/2024 01:46 PM 31 27 - 38 seconds Final     Medication Documentation Review Audit       Reviewed by Marisa Khalil MA (Medical Assistant) on 09/23/24 at 1312      Medication Order Taking? Sig Documenting Provider Last Dose Status   acetaminophen (Tylenol) 325 mg tablet [id number] Yes Take 2 tablets (650 mg) by mouth every 4 hours if needed for mild pain (1 - 3) or fever (temp greater than 38.0 C). Sunshine Hankins PA-C Taking Active   adagrasib (Krazati) 200 mg tablet [id number] Yes Take 3 tablets (600 mg total) by mouth 2 times a day. Álvaro Galeano MD Taking Active   albuterol 90 mcg/actuation inhaler [id number] Yes Inhale 2 puffs every 6 hours if needed for wheezing. Historical Provider, MD Taking Active   amLODIPine (Norvasc) 10 mg tablet [id number] Yes Take 1 tablet (10 mg) by mouth once daily in the morning. Take before meals. Historical Provider, MD Taking Active   aspirin 81 mg chewable tablet [id number] Yes Chew 1 tablet (81 mg) once daily. Historical Provider, MD Taking Active   atorvastatin (Lipitor) 10 mg tablet [id number] Yes Take 1 tablet (10 mg) by mouth once daily. Historical Provider, MD Taking Active   biotin 10 mg tablet [id number] Yes Take 1 tablet (10 mg) by mouth once daily. Historical Provider, MD Taking Active   cholecalciferol (Vitamin D3) 5,000 Units tablet [id number] Yes Take 1 tablet (5,000 Units) by mouth once daily. Historical Provider, MD Taking Active   DULoxetine " (Cymbalta) 60 mg DR capsule [id number] Yes Take 1 capsule (60 mg) by mouth once daily. Do not crush or chew. Historical Provider, MD Taking Active   glipiZIDE (Glucotrol) 5 mg tablet [id number]  Take 1 tablet (5 mg) by mouth 1 time for 1 dose. Aman Guthrie, DO   24 235   hydroCHLOROthiazide (Microzide) 12.5 mg tablet [id number] Yes Take 1 tablet (12.5 mg) by mouth once daily. Álvaro Galeano MD Taking Active   insulin aspart (NovoLOG PenFill U-100 Insulin) 100 unit/mL pen cartridge [id number]  Inject 15 Units under the skin 3 times daily (morning, midday, late afternoon). Take as directed per insulin instructions. Mild insulin sliding scale Yanni Smalls, DO   24   levothyroxine (Synthroid, Levoxyl) 25 mcg tablet [id number] Yes Take 1 tablet (25 mcg) by mouth early in the morning.. Historical Provider, MD Taking Active   metFORMIN (Glucophage) 1,000 mg tablet [id number] Yes Take 1 tablet (1,000 mg) by mouth 2 times a day. Historical Provider, MD Taking Active   nystatin (Mycostatin) 100,000 unit/gram powder [id number] Yes Apply 1 Application topically 2 times a day. Álvaro Galeano MD Taking Active   ondansetron (Zofran) 4 mg tablet [id number] Yes Take 1 tablet (4 mg) by mouth every 8 hours if needed for nausea or vomiting. Álvaro Galeano MD Taking Active   oxygen (O2) gas therapy [id number] Yes Inhale 1 each once every 24 hours. Yanni Smalls DO Taking Active   pantoprazole (ProtoNix) 40 mg EC tablet [id number] Yes Take 1 tablet (40 mg) by mouth once daily in the morning. Take before meals. Historical Provider, MD Taking Active   prochlorperazine (Compazine) 10 mg tablet [id number] Yes Take 1 tablet (10 mg) by mouth every 6 hours if needed for nausea or vomiting. Álvaro Galeano MD Taking Active   tiotropium (Spiriva) 18 mcg inhalation capsule [id number] Yes Place 1 capsule (18 mcg) into inhaler and inhale once daily. Historical Provider, MD Taking Active   traZODone (Desyrel) 50 mg  tablet [id number] No Take 1 tablet (50 mg) by mouth once daily at bedtime. Historical Provider, MD Not Taking Active   Wixela Inhub 250-50 mcg/dose diskus inhaler [id number] No Inhale 1 puff 2 times a day. Historical Provider, MD Not Taking Active   zolpidem CR (Ambien CR) 6.25 mg ER tablet [id number] Yes Take 10 mg by mouth as needed at bedtime for sleep. Do not crush, chew, or split. Historical Provider, MD Taking Active                   Assessment/Plan     1) lung cancer  -12/8/2022 chest CT: NICHOL anterior segment 1.6 x 2.2 cm nodule, partial pleural attachment, nonspecific low volume paratracheal mediastinal LN largest near AP window 1.0 x 1.4 cm, right subcarinal space 8 x 14 mm  -12/12/2022 PET: NICHOL 1.8 x 2.3 cm mass with SUV 5.8, lateral margin abuts pleura; no significant mediastinal or hilar hypermetabolic lymphadenopathy  -12/28/2022 chest CT: 2.5 cm cavitary nodule in NICHOL  -1/7/2023 PET scan  -had surgery for stage I NSCLC, s/p lobectomy (Dr Fagan, 1/31/2023)  -2/1/2023 CT chest: no PE, postop changes in left chest, small left PTX in left upper anterior chest  -2/26/2023 chest CT no PE, continued mildly enlarged mediastinal lymph nodes  -saw Dr Solis at [facility] on 3/13/2023 - she had a [id number] (stage Ib) lung adenocarcinoma (poor NCCN risk factors - G3, + visceral involvement), s/p left robotic assisted anatomic upper lobectomy, PD-L1 90%, +[id number]  -per his charting, he recommended either adjuvant cisplatin + pemetrexed x 4 cycles vs surveillance  -according to  Thu, Dr Solis never told her about her high risk features nor any adjuvant option, and that the only thing he recommended was observation  -7/11/2023 CT chest : stable postsurgical changes of prior left thoracotomy and left upper lobectomy with interval resolution of bilateral pleural effusions; interval progression of lymphadenopathy in the chest concerning for progressing ghazala metastatic disease     7/25/2023 underwent EBUS: A - EBUS TRANSBRONCHIAL  FINE NEEDLE ASPIRATE, LYMPH NODE  - 4L             Negative for malignant cells.             Benign lymphoid sample (see comment).   B - EBUS TRANSBRONCHIAL FINE NEEDLE ASPIRATE, LYMPH NODE  - 10L             Negative for malignant cells.                   Benign lymphoid sample.  C - EBUS TRANSBRONCHIAL FINE NEEDLE ASPIRATE, LYMPH NODE  - STATION 7             Negative for malignant cells.                 Benign lymphoid sample.   -8/9/2023 PET scan: 3.0 x 2.1 cm left prevascular node with SUV 7.9; few additional prominent mediastinal nodes with low level uptake; left lower paratracheal node 0.8 cm with SUV 2.2; mild FDG uptake in left hilum SUV 3.1  -11/14/2023 chest CT: enlarged 2.2 cm prevascular lymph node not significantly changed  -she was told by her pulmonologist Dr Kang that she has cancer  -discussed her original path with her--while it was a small tumor and stage Ib, she did have a couple high risk features that would have warranted adjuvant chemotherapy followed by atezolizumab; also if she does have a recurrence that isn't amenable to surgery, she would also be a candidate for immunotherapy then KRAS inhibitor (FDA approved only in 2nd line setting)  -will discuss with thoracic surgeon--will import all [facility] films to review; may need CT guided bx by IR of this prevascular node   -she had biopsy done on 12/13/2023--path confirmed poorly differentiated lung carcinoma, PD-L1 90%, KRAS G12C mutation  -she saw Dr Calixto on 12/28/2023--he advised surgery, provided that she can pass her PFTs  - on 1/30/2024 Dr Calixto took her to the OR for robotic assisted redo left mediastinal exploration, left anterior mediastinal mass resection, diaphragmatic pacer placement  -path showed poorly differentiated carcinoma with pleomorphic/spindle cell and clear cell features; 4 lymph nodes with no evidence of malignancy; sections show a poorly differentiated carcinoma with pleomorphic/spindle cell and clear cell features  involving fibroadipose tissue with a large area of central necrosis; tumor cells are positive for AE1/AE3, CAM 5.2, CK7 and negative for TTF-1, p40.  -pt is most interested in doing whatever is necessary to reduce her risk for recurrence  -she and  state again that the [facility] oncologist told them that adjuvant therapy was not necessary after her first surgery, even though his note documented that he recommended it  -as her tumor has high PD-L1 expression, she is a candidate for pembrolizumab; if she relapses, she is also a candidate for KRAS G12C inhibitor  -has completed 6 doses of adjuvant pembrolizumab (out of 17)  -CT scan done on 6/19/2024 showed  mediastinum with subcarinal lymphadenopathy 10 mm in short axis, there is component of AP window lymphadenopathy 17 x 15 mm; mesentery demonstrates soft tissue lesion within midline lower abdominal mesentery 1.6 x 1.2 cm; pleural based nodular lesion in RLL posteriorly 8 mm with surrounding ground glass airspace infiltrate  -she was admitted recently for pneumonia--CT angio done on 7/23 showed no evidence of pulmonary embolus, multifocal airspace disease in the right lung suggesting pneumonia  -right after she was discharged from [facility], she then went back to the ER for hyperglycemia ()  -Dr Calixto has scheduled her next chest CT for 8/2024--pt is wondering if that is still necessary  -on 7/14/2024 she underwent an EBUS--lymph node level 4L was sampled--showed NSCLC, favor squamous cell carcinoma  -given that she is now confirmed to have new and/or persistent disease in her mediastinum, I have advised switching to new therapy; as her tumor has the KRAS G12C mutation, I have recommended switching to KRAS G12C inhibitor, namely adagrasib  -benefits, risks, potential morbidity related to adagrasib were reviewed with Thu and she signed informed consent to proceed  -she will take adagrasib (Krazati) 600 mg PO BID  -she also has severe body pains  "secondary to fibromyalgia--she  says in the hospital she was prescribed percocet PRN and asked if I could prescribe it; I did inform her that I will treat pain only secondary to cancer; she therefore was willing to be referred to pain management (Dr Melendrez)  -here for interval followup  -has been taking adagrasib 400 mg BID as adagrasib can be associated with leg edema/fluid retention, however dropping the dose down did not seem to help with reducing the leg edema; she also has been taking hydrochlorothiazide 12.5 mg daily without any effect; also reported \"not urinating enough\"--will check leg doppler to rule out DVT and also advised her to increase hydrochlorothiazide to 25 mg daily  -wants to vacation in [place] again--and will be going there for at least 2 weeks in October  -saw Dr Melendrez--she received a diagnostic medial nerve branch block targeting L3-L4--she said pain relief lasted only 4 days  -labs done on 9/12/2024 included CBC +COMP + CEA  -results reviewed--wbc 7.2, hgb 10.3, plt 259,000, potassium 3.2, creatinine 0.95, calcium 9.5, AST 19, ALT 14, CEA 3.0  -advised Thu to continue with  adagrasib to 400 mg BID  -will now schedule PET scan to be done in next couple weeks as restaging of her lung cancer        2) COPD  -on albuterol  -on incruse ellipta     3) atrial fibrillation  -on amiodarone  -on warfarin     4) hypertension  -on norvasc  -on chlorthalidone  -on lasix  -on lisinopril  -on metoprolol     5) hyperlipidemia  -on atorvastatin     6) major depression  -on cymbalta     7) diabetes  -on novolog insulin  -on metformin     Problem List Items Addressed This Visit             ICD-10-CM    Malignant neoplasm of upper lobe of left lung (Multi) C34.12    Relevant Orders    NM PET CT bone skull base to mid thigh    Clinic Appointment Request Follow Up; LUÍS FINCH; [facility] MEDON     Other Visit Diagnoses         Codes    Bilateral leg edema    -  Primary R60.0    Relevant Orders "    Vascular US lower extremity venous duplex bilateral (Completed)    Clinic Appointment Request Follow Up; ÁLVARO GALEANO; [facility] MEDON[facility]                 Álvaro Galeano MD

## 2024-10-03 ENCOUNTER — HOSPITAL ENCOUNTER (OUTPATIENT)
Dept: RADIOLOGY | Facility: CLINIC | Age: 69
Discharge: HOME | End: 2024-10-03
Payer: MEDICARE

## 2024-10-03 DIAGNOSIS — C34.12 MALIGNANT NEOPLASM OF UPPER LOBE OF LEFT LUNG (MULTI): ICD-10-CM

## 2024-10-03 PROCEDURE — 78815 PET IMAGE W/CT SKULL-THIGH: CPT | Mod: PS

## 2024-10-03 PROCEDURE — 3430000001 HC RX 343 DIAGNOSTIC RADIOPHARMACEUTICALS: Performed by: INTERNAL MEDICINE

## 2024-10-03 PROCEDURE — A9552 F18 FDG: HCPCS | Performed by: INTERNAL MEDICINE

## 2024-10-03 RX ORDER — FLUDEOXYGLUCOSE F 18 200 MCI/ML
11.5 INJECTION, SOLUTION INTRAVENOUS
Status: COMPLETED | OUTPATIENT
Start: 2024-10-03 | End: 2024-10-03

## 2024-10-04 ENCOUNTER — TELEMEDICINE (OUTPATIENT)
Dept: HEMATOLOGY/ONCOLOGY | Facility: CLINIC | Age: 69
End: 2024-10-04
Payer: MEDICARE

## 2024-10-04 DIAGNOSIS — Z86.011 HISTORY OF MENINGIOMA OF THE BRAIN: ICD-10-CM

## 2024-10-04 DIAGNOSIS — J44.9 CHRONIC OBSTRUCTIVE PULMONARY DISEASE, UNSPECIFIED COPD TYPE (MULTI): Primary | ICD-10-CM

## 2024-10-04 DIAGNOSIS — E78.2 MIXED HYPERLIPIDEMIA: ICD-10-CM

## 2024-10-04 DIAGNOSIS — I48.0 PAF (PAROXYSMAL ATRIAL FIBRILLATION) (MULTI): ICD-10-CM

## 2024-10-04 DIAGNOSIS — Z79.4 TYPE 2 DIABETES MELLITUS WITHOUT COMPLICATION, WITH LONG-TERM CURRENT USE OF INSULIN (MULTI): ICD-10-CM

## 2024-10-04 DIAGNOSIS — C34.12 MALIGNANT NEOPLASM OF UPPER LOBE OF LEFT LUNG (MULTI): ICD-10-CM

## 2024-10-04 DIAGNOSIS — E11.9 TYPE 2 DIABETES MELLITUS WITHOUT COMPLICATION, WITH LONG-TERM CURRENT USE OF INSULIN (MULTI): ICD-10-CM

## 2024-10-04 DIAGNOSIS — F33.41 RECURRENT MAJOR DEPRESSIVE DISORDER, IN PARTIAL REMISSION (CMS-HCC): ICD-10-CM

## 2024-10-04 DIAGNOSIS — I10 PRIMARY HYPERTENSION: ICD-10-CM

## 2024-10-04 DIAGNOSIS — R60.0 BILATERAL LEG EDEMA: ICD-10-CM

## 2024-10-04 PROCEDURE — 3051F HG A1C>EQUAL 7.0%<8.0%: CPT | Performed by: INTERNAL MEDICINE

## 2024-10-04 PROCEDURE — 99442 PR PHYS/QHP TELEPHONE EVALUATION 11-20 MIN: CPT | Performed by: INTERNAL MEDICINE

## 2024-10-04 NOTE — PATIENT INSTRUCTIONS
You can pause the krazati for now, to allow your legs to decompress while in [place].    See you again on 10/28

## 2024-10-05 ASSESSMENT — ENCOUNTER SYMPTOMS
GASTROINTESTINAL NEGATIVE: 1
NEUROLOGICAL NEGATIVE: 1
LEG SWELLING: 1
ENDOCRINE NEGATIVE: 1
MUSCULOSKELETAL NEGATIVE: 1
PSYCHIATRIC NEGATIVE: 1
HEMATOLOGIC/LYMPHATIC NEGATIVE: 1
SHORTNESS OF BREATH: 1
EYES NEGATIVE: 1
CONSTITUTIONAL NEGATIVE: 1

## 2024-10-06 NOTE — PROGRESS NOTES
Patient ID: Thu Ortega is a 69 y.o. female.  Referring Physician: No referring provider defined for this encounter.  Primary Care Provider: DEANNA Rodriguez  Visit Type:  Follow Up     Verbal consent was requested and obtained from patient on this date for a telehealth visit.    Subjective    HPI How was my PET scan?    Review of Systems   Constitutional: Negative.    HENT:  Negative.     Eyes: Negative.    Respiratory:  Positive for shortness of breath.    Cardiovascular:  Positive for leg swelling.   Gastrointestinal: Negative.    Endocrine: Negative.    Genitourinary: Negative.     Musculoskeletal: Negative.    Skin: Negative.    Neurological: Negative.    Hematological: Negative.    Psychiatric/Behavioral: Negative.          Objective   BSA: There is no height or weight on file to calculate BSA.  There were no vitals taken for this visit.     has a past medical history of Adenocarcinoma of lung, left (Multi), Atrial fibrillation (Multi), COPD (chronic obstructive pulmonary disease) (Multi), Depression, DJD (degenerative joint disease), DM (diabetes mellitus) (Multi), Fibromyalgia, primary, GERD (gastroesophageal reflux disease), Hearing aid worn, History of blood transfusion, History of meningioma of the brain, HLD (hyperlipidemia), HTN (hypertension), Irregular heart beat, Irritable bowel syndrome, Malignant neoplasm of upper lobe of left lung (Multi), Nephrolithiasis, Panlobular emphysema (Multi), Shortness of breath, Spinal stenosis, and Urinary tract infection.   has a past surgical history that includes US guided needle liver biopsy (02/07/2020); Lung lobectomy; CT guided percutaneous biopsy lung (12/13/2023); CT guided percutaneous biopsy lung (12/15/2023); Brain surgery; Foot surgery; Knee surgery; Cataract extraction; and Tubal ligation.  Family History   Problem Relation Name Age of Onset    Stroke Mother      Other (aortic valve disease) Mother      Heart disease Mother      Cancer Sister       Stroke Sister      Diabetes Sister      Diabetes Brother      Other (heart transplant) Brother       Oncology History   Malignant neoplasm of upper lobe of left lung (Multi)   12/3/2023 Initial Diagnosis    Malignant neoplasm of upper lobe of left lung (CMS/HCC)     2/28/2024 - 7/3/2024 Chemotherapy    Pembrolizumab, 21 Day Cycles     8/12/2024 -  Chemotherapy    Adagrasib, 28 Day Cycles     Local recurrence of left lung cancer (Multi)   1/4/2024 Initial Diagnosis    Local recurrence of left lung cancer (CMS/HCC)     2/28/2024 - 7/3/2024 Chemotherapy    Pembrolizumab, 21 Day Cycles     8/12/2024 -  Chemotherapy    Adagrasib, 28 Day Cycles         Thu Ortega  reports that she quit smoking about 18 years ago. Her smoking use included cigarettes. She has never used smokeless tobacco.  She  reports that she does not currently use alcohol.  She  reports no history of drug use.    Physical Exam    WBC   Date/Time Value Ref Range Status   09/12/2024 01:16 PM 7.2 4.4 - 11.3 x10*3/uL Final   07/30/2024 06:29 PM 10.1 4.4 - 11.3 x10*3/uL Final   07/30/2024 05:58 AM 10.0 4.4 - 11.3 x10*3/uL Final     nRBC   Date Value Ref Range Status   07/30/2024 0.0 0.0 - 0.0 /100 WBCs Final   07/30/2024 0.0 0.0 - 0.0 /100 WBCs Final   07/29/2024 0.0 0.0 - 0.0 /100 WBCs Final     RBC   Date Value Ref Range Status   09/12/2024 3.98 (L) 4.00 - 5.20 x10*6/uL Final   07/30/2024 4.38 4.00 - 5.20 x10*6/uL Final   07/30/2024 4.19 4.00 - 5.20 x10*6/uL Final     Hemoglobin   Date Value Ref Range Status   09/12/2024 10.3 (L) 12.0 - 16.0 g/dL Final   07/30/2024 10.9 (L) 12.0 - 16.0 g/dL Final   07/30/2024 10.2 (L) 12.0 - 16.0 g/dL Final     Hematocrit   Date Value Ref Range Status   09/12/2024 32.3 (L) 36.0 - 46.0 % Final   07/30/2024 35.1 (L) 36.0 - 46.0 % Final   07/30/2024 34.6 (L) 36.0 - 46.0 % Final     MCV   Date/Time Value Ref Range Status   09/12/2024 01:16 PM 81 80 - 100 fL Final   07/30/2024 06:29 PM 80 80 - 100 fL Final   07/30/2024  "05:58 AM 83 80 - 100 fL Final     MCH   Date/Time Value Ref Range Status   09/12/2024 01:16 PM 25.9 (L) 26.0 - 34.0 pg Final   07/30/2024 06:29 PM 24.9 (L) 26.0 - 34.0 pg Final   07/30/2024 05:58 AM 24.3 (L) 26.0 - 34.0 pg Final     MCHC   Date/Time Value Ref Range Status   09/12/2024 01:16 PM 31.9 (L) 32.0 - 36.0 g/dL Final   07/30/2024 06:29 PM 31.1 (L) 32.0 - 36.0 g/dL Final   07/30/2024 05:58 AM 29.5 (L) 32.0 - 36.0 g/dL Final     RDW   Date/Time Value Ref Range Status   09/12/2024 01:16 PM 16.0 (H) 11.5 - 14.5 % Final   07/30/2024 06:29 PM 17.2 (H) 11.5 - 14.5 % Final   07/30/2024 05:58 AM 17.3 (H) 11.5 - 14.5 % Final     Platelets   Date/Time Value Ref Range Status   09/12/2024 01:16  150 - 450 x10*3/uL Final   07/30/2024 06:29  150 - 450 x10*3/uL Final   07/30/2024 05:58  150 - 450 x10*3/uL Final     No results found for: \"MPV\"  Neutrophils %   Date/Time Value Ref Range Status   09/12/2024 01:16 PM 62.4 40.0 - 80.0 % Final   07/26/2024 04:17 AM 87.7 40.0 - 80.0 % Final   07/25/2024 04:34 AM 87.6 40.0 - 80.0 % Final     Immature Granulocytes %, Automated   Date/Time Value Ref Range Status   09/12/2024 01:16 PM 0.4 0.0 - 0.9 % Final     Comment:     Immature Granulocyte Count (IG) includes promyelocytes, myelocytes and metamyelocytes but does not include bands. Percent differential counts (%) should be interpreted in the context of the absolute cell counts (cells/UL).   07/30/2024 06:29 PM 7.1 (H) 0.0 - 0.9 % Final     Comment:     Immature Granulocyte Count (IG) includes promyelocytes, myelocytes and metamyelocytes but does not include bands. Percent differential counts (%) should be interpreted in the context of the absolute cell counts (cells/UL).   07/26/2024 04:17 AM 2.3 (H) 0.0 - 0.9 % Final     Comment:     Immature Granulocyte Count (IG) includes promyelocytes, myelocytes and metamyelocytes but does not include bands. Percent differential counts (%) should be interpreted in the context " of the absolute cell counts (cells/UL).     Lymphocytes %, Manual   Date/Time Value Ref Range Status   07/30/2024 06:29 PM 9.0 13.0 - 44.0 % Final     Lymphocytes %   Date/Time Value Ref Range Status   09/12/2024 01:16 PM 18.4 13.0 - 44.0 % Final   07/26/2024 04:17 AM 6.7 13.0 - 44.0 % Final   07/25/2024 04:34 AM 5.4 13.0 - 44.0 % Final     Monocytes %, Manual   Date/Time Value Ref Range Status   07/30/2024 06:29 PM 5.0 2.0 - 10.0 % Final     Monocytes %   Date/Time Value Ref Range Status   09/12/2024 01:16 PM 9.8 2.0 - 10.0 % Final   07/26/2024 04:17 AM 3.0 2.0 - 10.0 % Final   07/25/2024 04:34 AM 3.5 2.0 - 10.0 % Final     Eosinophils %, Manual   Date/Time Value Ref Range Status   07/30/2024 06:29 PM 2.0 0.0 - 6.0 % Final     Eosinophils %   Date/Time Value Ref Range Status   09/12/2024 01:16 PM 7.9 0.0 - 6.0 % Final   07/26/2024 04:17 AM 0.1 0.0 - 6.0 % Final   07/25/2024 04:34 AM 0.0 0.0 - 6.0 % Final     Basophils %, Manual   Date/Time Value Ref Range Status   07/30/2024 06:29 PM 0.0 0.0 - 2.0 % Final     Basophils %   Date/Time Value Ref Range Status   09/12/2024 01:16 PM 1.1 0.0 - 2.0 % Final   07/26/2024 04:17 AM 0.2 0.0 - 2.0 % Final   07/25/2024 04:34 AM 0.1 0.0 - 2.0 % Final     Neutrophils Absolute   Date/Time Value Ref Range Status   09/12/2024 01:16 PM 4.47 1.20 - 7.70 x10*3/uL Final     Comment:     Percent differential counts (%) should be interpreted in the context of the absolute cell counts (cells/uL).   07/26/2024 04:17 AM 14.17 (H) 1.20 - 7.70 x10*3/uL Final     Comment:     Percent differential counts (%) should be interpreted in the context of the absolute cell counts (cells/uL).   07/25/2024 04:34 AM 14.37 (H) 1.20 - 7.70 x10*3/uL Final     Comment:     Percent differential counts (%) should be interpreted in the context of the absolute cell counts (cells/uL).     Immature Granulocytes Absolute, Automated   Date/Time Value Ref Range Status   09/12/2024 01:16 PM 0.03 0.00 - 0.70 x10*3/uL Final  "  07/30/2024 06:29 PM 0.72 (H) 0.00 - 0.70 x10*3/uL Final   07/26/2024 04:17 AM 0.37 0.00 - 0.70 x10*3/uL Final     Lymphocytes Absolute   Date/Time Value Ref Range Status   09/12/2024 01:16 PM 1.32 1.20 - 4.80 x10*3/uL Final   07/26/2024 04:17 AM 1.09 (L) 1.20 - 4.80 x10*3/uL Final   07/25/2024 04:34 AM 0.88 (L) 1.20 - 4.80 x10*3/uL Final     Monocytes Absolute   Date/Time Value Ref Range Status   09/12/2024 01:16 PM 0.70 0.10 - 1.00 x10*3/uL Final   07/26/2024 04:17 AM 0.48 0.10 - 1.00 x10*3/uL Final   07/25/2024 04:34 AM 0.57 0.10 - 1.00 x10*3/uL Final     Eosinophils Absolute   Date/Time Value Ref Range Status   09/12/2024 01:16 PM 0.57 0.00 - 0.70 x10*3/uL Final   07/26/2024 04:17 AM 0.01 0.00 - 0.70 x10*3/uL Final   07/25/2024 04:34 AM 0.00 0.00 - 0.70 x10*3/uL Final     Eosinophils Absolute, Manual   Date/Time Value Ref Range Status   07/30/2024 06:29 PM 0.20 0.00 - 0.70 x10*3/uL Final     Basophils Absolute   Date/Time Value Ref Range Status   09/12/2024 01:16 PM 0.08 0.00 - 0.10 x10*3/uL Final   07/26/2024 04:17 AM 0.03 0.00 - 0.10 x10*3/uL Final   07/25/2024 04:34 AM 0.01 0.00 - 0.10 x10*3/uL Final     Basophils Absolute, Manual   Date/Time Value Ref Range Status   07/30/2024 06:29 PM 0.00 0.00 - 0.10 x10*3/uL Final       No components found for: \"PT\"  aPTT   Date/Time Value Ref Range Status   01/12/2024 01:46 PM 31 27 - 38 seconds Final     Medication Documentation Review Audit       Reviewed by Marisa Khalil MA (Medical Assistant) on 09/23/24 at 1312      Medication Order Taking? Sig Documenting Provider Last Dose Status   acetaminophen (Tylenol) 325 mg tablet [id number] Yes Take 2 tablets (650 mg) by mouth every 4 hours if needed for mild pain (1 - 3) or fever (temp greater than 38.0 C). Sunshine Hankins PA-C Taking Active   adagrasib (Krazati) 200 mg tablet [id number] Yes Take 3 tablets (600 mg total) by mouth 2 times a day. Álvaro Galeano MD Taking Active   albuterol 90 mcg/actuation inhaler " [id number] Yes Inhale 2 puffs every 6 hours if needed for wheezing. Historical Provider, MD Taking Active   amLODIPine (Norvasc) 10 mg tablet [id number] Yes Take 1 tablet (10 mg) by mouth once daily in the morning. Take before meals. Historical Provider, MD Taking Active   aspirin 81 mg chewable tablet [id number] Yes Chew 1 tablet (81 mg) once daily. Historical Provider, MD Taking Active   atorvastatin (Lipitor) 10 mg tablet [id number] Yes Take 1 tablet (10 mg) by mouth once daily. Historical Provider, MD Taking Active   biotin 10 mg tablet [id number] Yes Take 1 tablet (10 mg) by mouth once daily. Historical Provider, MD Taking Active   cholecalciferol (Vitamin D3) 5,000 Units tablet [id number] Yes Take 1 tablet (5,000 Units) by mouth once daily. Historical Provider, MD Taking Active   DULoxetine (Cymbalta) 60 mg DR capsule [id number] Yes Take 1 capsule (60 mg) by mouth once daily. Do not crush or chew. Historical Provider, MD Taking Active   glipiZIDE (Glucotrol) 5 mg tablet [id number]  Take 1 tablet (5 mg) by mouth 1 time for 1 dose. Aman Guthrie,    24 2359   hydroCHLOROthiazide (Microzide) 12.5 mg tablet [id number] Yes Take 1 tablet (12.5 mg) by mouth once daily. Álvaro Galeano MD Taking Active   insulin aspart (NovoLOG PenFill U-100 Insulin) 100 unit/mL pen cartridge [id number]  Inject 15 Units under the skin 3 times daily (morning, midday, late afternoon). Take as directed per insulin instructions. Mild insulin sliding scale Yanni Smalls,    24 2359   levothyroxine (Synthroid, Levoxyl) 25 mcg tablet [id number] Yes Take 1 tablet (25 mcg) by mouth early in the morning.. Historical Provider, MD Taking Active   metFORMIN (Glucophage) 1,000 mg tablet [id number] Yes Take 1 tablet (1,000 mg) by mouth 2 times a day. Historical Provider, MD Taking Active   nystatin (Mycostatin) 100,000 unit/gram powder [id number] Yes Apply 1 Application topically 2 times a day. Álvaro Gaelano MD Taking Active    ondansetron (Zofran) 4 mg tablet [id number] Yes Take 1 tablet (4 mg) by mouth every 8 hours if needed for nausea or vomiting. Álvaro Galeano MD Taking Active   oxygen (O2) gas therapy [id number] Yes Inhale 1 each once every 24 hours. Yanni Smalls DO Taking Active   pantoprazole (ProtoNix) 40 mg EC tablet [id number] Yes Take 1 tablet (40 mg) by mouth once daily in the morning. Take before meals. Historical Provider, MD Taking Active   prochlorperazine (Compazine) 10 mg tablet [id number] Yes Take 1 tablet (10 mg) by mouth every 6 hours if needed for nausea or vomiting. Álvaro Galeano MD Taking Active   tiotropium (Spiriva) 18 mcg inhalation capsule [id number] Yes Place 1 capsule (18 mcg) into inhaler and inhale once daily. Historical Provider, MD Taking Active   traZODone (Desyrel) 50 mg tablet [id number] No Take 1 tablet (50 mg) by mouth once daily at bedtime. Historical Provider, MD Not Taking Active   Wixela Inhub 250-50 mcg/dose diskus inhaler [id number] No Inhale 1 puff 2 times a day. Historical Provider, MD Not Taking Active   zolpidem CR (Ambien CR) 6.25 mg ER tablet [id number] Yes Take 10 mg by mouth as needed at bedtime for sleep. Do not crush, chew, or split. Historical Provider, MD Taking Active                   Assessment/Plan    1) lung cancer  -12/8/2022 chest CT: NICHOL anterior segment 1.6 x 2.2 cm nodule, partial pleural attachment, nonspecific low volume paratracheal mediastinal LN largest near AP window 1.0 x 1.4 cm, right subcarinal space 8 x 14 mm  -12/12/2022 PET: NICHOL 1.8 x 2.3 cm mass with SUV 5.8, lateral margin abuts pleura; no significant mediastinal or hilar hypermetabolic lymphadenopathy  -12/28/2022 chest CT: 2.5 cm cavitary nodule in NICHOL  -1/7/2023 PET scan  -had surgery for stage I NSCLC, s/p lobectomy (Dr Fagan, 1/31/2023)  -2/1/2023 CT chest: no PE, postop changes in left chest, small left PTX in left upper anterior chest  -2/26/2023 chest CT no PE, continued mildly enlarged  mediastinal lymph nodes  -saw Dr Solis at [facility] on 3/13/2023 - she had a [id number] (stage Ib) lung adenocarcinoma (poor NCCN risk factors - G3, + visceral involvement), s/p left robotic assisted anatomic upper lobectomy, PD-L1 90%, [id number]  -per his charting, he recommended either adjuvant cisplatin + pemetrexed x 4 cycles vs surveillance  -according to  Thu, Dr Solis never told her about her high risk features nor any adjuvant option, and that the only thing he recommended was observation  -7/11/2023 CT chest : stable postsurgical changes of prior left thoracotomy and left upper lobectomy with interval resolution of bilateral pleural effusions; interval progression of lymphadenopathy in the chest concerning for progressing ghazala metastatic disease     7/25/2023 underwent EBUS: A - EBUS TRANSBRONCHIAL FINE NEEDLE ASPIRATE, LYMPH NODE  - 4L             Negative for malignant cells.             Benign lymphoid sample (see comment).   B - EBUS TRANSBRONCHIAL FINE NEEDLE ASPIRATE, LYMPH NODE  - 10L             Negative for malignant cells.                   Benign lymphoid sample.  C - EBUS TRANSBRONCHIAL FINE NEEDLE ASPIRATE, LYMPH NODE  - STATION 7             Negative for malignant cells.                 Benign lymphoid sample.   -8/9/2023 PET scan: 3.0 x 2.1 cm left prevascular node with SUV 7.9; few additional prominent mediastinal nodes with low level uptake; left lower paratracheal node 0.8 cm with SUV 2.2; mild FDG uptake in left hilum SUV 3.1  -11/14/2023 chest CT: enlarged 2.2 cm prevascular lymph node not significantly changed  -she was told by her pulmonologist Dr Kang that she has cancer  -discussed her original path with her--while it was a small tumor and stage Ib, she did have a couple high risk features that would have warranted adjuvant chemotherapy followed by atezolizumab; also if she does have a recurrence that isn't amenable to surgery, she would also be a candidate for immunotherapy then KRAS  inhibitor (FDA approved only in 2nd line setting)  -will discuss with thoracic surgeon--will import all [facility] films to review; may need CT guided bx by IR of this prevascular node   -she had biopsy done on 12/13/2023--path confirmed poorly differentiated lung carcinoma, PD-L1 90%, KRAS G12C mutation  -she saw Dr Calixto on 12/28/2023--he advised surgery, provided that she can pass her PFTs  - on 1/30/2024 Dr Calixto took her to the OR for robotic assisted redo left mediastinal exploration, left anterior mediastinal mass resection, diaphragmatic pacer placement  -path showed poorly differentiated carcinoma with pleomorphic/spindle cell and clear cell features; 4 lymph nodes with no evidence of malignancy; sections show a poorly differentiated carcinoma with pleomorphic/spindle cell and clear cell features involving fibroadipose tissue with a large area of central necrosis; tumor cells are positive for AE1/AE3, CAM 5.2, CK7 and negative for TTF-1, p40.  -pt is most interested in doing whatever is necessary to reduce her risk for recurrence  -she and  state again that the [facility] oncologist told them that adjuvant therapy was not necessary after her first surgery, even though his note documented that he recommended it  -as her tumor has high PD-L1 expression, she is a candidate for pembrolizumab; if she relapses, she is also a candidate for KRAS G12C inhibitor  -has completed 6 doses of adjuvant pembrolizumab (out of 17)  -CT scan done on 6/19/2024 showed  mediastinum with subcarinal lymphadenopathy 10 mm in short axis, there is component of AP window lymphadenopathy 17 x 15 mm; mesentery demonstrates soft tissue lesion within midline lower abdominal mesentery 1.6 x 1.2 cm; pleural based nodular lesion in RLL posteriorly 8 mm with surrounding ground glass airspace infiltrate  -she was admitted recently for pneumonia--CT angio done on 7/23 showed no evidence of pulmonary embolus, multifocal airspace disease in  "the right lung suggesting pneumonia  -right after she was discharged from [place], she then went back to the ER for hyperglycemia ()  -Dr Calixto has scheduled her next chest CT for 8/2024--pt is wondering if that is still necessary  -on 7/14/2024 she underwent an EBUS--lymph node level 4L was sampled--showed NSCLC, favor squamous cell carcinoma  -given that she is now confirmed to have new and/or persistent disease in her mediastinum, I have advised switching to new therapy; as her tumor has the KRAS G12C mutation, I have recommended switching to KRAS G12C inhibitor, namely adagrasib  -benefits, risks, potential morbidity related to adagrasib were reviewed with Thu and she signed informed consent to proceed  -she will take adagrasib (Krazati) 600 mg PO BID  -she also has severe body pains secondary to fibromyalgia--she  says in the hospital she was prescribed percocet PRN and asked if I could prescribe it; I did inform her that I will treat pain only secondary to cancer; she therefore was willing to be referred to pain management (Dr Melendrez)  -here for interval followup  -has been taking adagrasib 400 mg BID as adagrasib can be associated with leg edema/fluid retention, however dropping the dose down did not seem to help with reducing the leg edema; she also has been taking hydrochlorothiazide 12.5 mg daily without any effect; also reported \"not urinating enough\"--will check leg doppler to rule out DVT and also advised her to increase hydrochlorothiazide to 25 mg daily  -wants to vacation in [place] again--and will be going there for at least 2 weeks in October  -saw Dr Melendrez--she received a diagnostic medial nerve branch block targeting L3-L4--she said pain relief lasted only 4 days  -labs done on 9/12/2024 included CBC +COMP + CEA  -results reviewed--wbc 7.2, hgb 10.3, plt 259,000, potassium 3.2, creatinine 0.95, calcium 9.5, AST 19, ALT 14, CEA 3.0  -advised Thu to continue with  adagrasib to " 400 mg BID  -will now schedule PET scan to be done in next couple weeks as restaging of her lung cancer  -here for interval followup via telephone  -leaving for [place] on Monday  -9/25/2024 doppler of legs showed no DVT in either leg  -edema improved with doubling of hydrochlorothiazide  -PET scan done on 10/3/2024 reviewed--postsurgical changes within the right parietal bone compatible with craniectomy; postsurgical changes compatible with left upper lobectomy without focal hypermetabolic activity to suggest disease recurrence; interval resolution of multifocal ground glass and nodular opacities within right upper lobe; no hypermetabolic pulmonary lesions; no hypermetabolic mediastinal lymphadenopathy; prominent bilateral axillary lymph nodes with mild hypermetabolic activity up to 1.3 on right and 1.1 on left likely reactive; no focal hypermetabolic lesion to suggest osseous metastasis  -for the sake of maintaining quality of life, I suggested to [facility] so that she can enjoy her 2 weeks in [place] and to allow for decompression of her leg edema  -will see her again at the end of this month        2) COPD  -on albuterol  -on incruse ellipta     3) atrial fibrillation  -on amiodarone  -on warfarin     4) hypertension  -on norvasc  -on chlorthalidone  -on lasix  -on lisinopril  -on metoprolol     5) hyperlipidemia  -on atorvastatin     6) major depression  -on cymbalta     7) diabetes  -on novolog insulin  -on metformin          Problem List Items Addressed This Visit             ICD-10-CM    Malignant neoplasm of upper lobe of left lung (Multi) C34.12    Bilateral leg edema R60.0            Álvaro Galeano MD

## 2024-10-23 ENCOUNTER — TELEPHONE (OUTPATIENT)
Dept: PAIN MEDICINE | Facility: CLINIC | Age: 69
End: 2024-10-23
Payer: MEDICARE

## 2024-10-23 DIAGNOSIS — M47.816 LUMBAR SPONDYLOSIS: Primary | ICD-10-CM

## 2024-10-23 DIAGNOSIS — C34.92 NON-SMALL CELL CANCER OF LEFT LUNG (MULTI): ICD-10-CM

## 2024-10-23 NOTE — TELEPHONE ENCOUNTER
She states that the facet block gave her at least 80% improvement for 4 hours she was able to function much better after having the facet  she would like to proceed with the next step

## 2024-10-29 ENCOUNTER — OFFICE VISIT (OUTPATIENT)
Dept: HEMATOLOGY/ONCOLOGY | Facility: CLINIC | Age: 69
End: 2024-10-29
Payer: MEDICARE

## 2024-10-29 ENCOUNTER — LAB (OUTPATIENT)
Dept: LAB | Facility: CLINIC | Age: 69
End: 2024-10-29
Payer: MEDICARE

## 2024-10-29 VITALS
RESPIRATION RATE: 16 BRPM | BODY MASS INDEX: 30.32 KG/M2 | DIASTOLIC BLOOD PRESSURE: 69 MMHG | HEART RATE: 93 BPM | WEIGHT: 150.13 LBS | SYSTOLIC BLOOD PRESSURE: 121 MMHG | TEMPERATURE: 97 F | OXYGEN SATURATION: 95 %

## 2024-10-29 DIAGNOSIS — I48.0 PAF (PAROXYSMAL ATRIAL FIBRILLATION) (MULTI): ICD-10-CM

## 2024-10-29 DIAGNOSIS — E11.9 TYPE 2 DIABETES MELLITUS WITHOUT COMPLICATION, WITH LONG-TERM CURRENT USE OF INSULIN (MULTI): ICD-10-CM

## 2024-10-29 DIAGNOSIS — J44.9 CHRONIC OBSTRUCTIVE PULMONARY DISEASE, UNSPECIFIED COPD TYPE (MULTI): ICD-10-CM

## 2024-10-29 DIAGNOSIS — I10 PRIMARY HYPERTENSION: ICD-10-CM

## 2024-10-29 DIAGNOSIS — D64.9 ANEMIA, UNSPECIFIED TYPE: ICD-10-CM

## 2024-10-29 DIAGNOSIS — C34.12 MALIGNANT NEOPLASM OF UPPER LOBE OF LEFT LUNG (MULTI): Primary | ICD-10-CM

## 2024-10-29 DIAGNOSIS — C34.92 NON-SMALL CELL CANCER OF LEFT LUNG (MULTI): ICD-10-CM

## 2024-10-29 DIAGNOSIS — E78.2 MIXED HYPERLIPIDEMIA: ICD-10-CM

## 2024-10-29 DIAGNOSIS — Z79.4 TYPE 2 DIABETES MELLITUS WITHOUT COMPLICATION, WITH LONG-TERM CURRENT USE OF INSULIN (MULTI): ICD-10-CM

## 2024-10-29 DIAGNOSIS — F33.41 RECURRENT MAJOR DEPRESSIVE DISORDER, IN PARTIAL REMISSION (CMS-HCC): ICD-10-CM

## 2024-10-29 DIAGNOSIS — C34.12 MALIGNANT NEOPLASM OF UPPER LOBE OF LEFT LUNG (MULTI): ICD-10-CM

## 2024-10-29 LAB
ALBUMIN SERPL BCP-MCNC: 4.6 G/DL (ref 3.4–5)
ALP SERPL-CCNC: 119 U/L (ref 33–136)
ALT SERPL W P-5'-P-CCNC: 7 U/L (ref 7–45)
ANION GAP SERPL CALC-SCNC: 14 MMOL/L (ref 10–20)
AST SERPL W P-5'-P-CCNC: 13 U/L (ref 9–39)
BASOPHILS # BLD AUTO: 0.06 X10*3/UL (ref 0–0.1)
BASOPHILS NFR BLD AUTO: 0.7 %
BILIRUB SERPL-MCNC: 0.5 MG/DL (ref 0–1.2)
BUN SERPL-MCNC: 16 MG/DL (ref 6–23)
CALCIUM SERPL-MCNC: 10.2 MG/DL (ref 8.6–10.3)
CEA SERPL-MCNC: 2.8 UG/L
CHLORIDE SERPL-SCNC: 96 MMOL/L (ref 98–107)
CO2 SERPL-SCNC: 31 MMOL/L (ref 21–32)
CREAT SERPL-MCNC: 1.2 MG/DL (ref 0.5–1.05)
EGFRCR SERPLBLD CKD-EPI 2021: 49 ML/MIN/1.73M*2
EOSINOPHIL # BLD AUTO: 0.56 X10*3/UL (ref 0–0.7)
EOSINOPHIL NFR BLD AUTO: 6.7 %
ERYTHROCYTE [DISTWIDTH] IN BLOOD BY AUTOMATED COUNT: 14.8 % (ref 11.5–14.5)
GLUCOSE SERPL-MCNC: 168 MG/DL (ref 74–99)
HCT VFR BLD AUTO: 34.9 % (ref 36–46)
HGB BLD-MCNC: 11.2 G/DL (ref 12–16)
IMM GRANULOCYTES # BLD AUTO: 0.01 X10*3/UL (ref 0–0.7)
IMM GRANULOCYTES NFR BLD AUTO: 0.1 % (ref 0–0.9)
LYMPHOCYTES # BLD AUTO: 1.56 X10*3/UL (ref 1.2–4.8)
LYMPHOCYTES NFR BLD AUTO: 18.7 %
MCH RBC QN AUTO: 24.5 PG (ref 26–34)
MCHC RBC AUTO-ENTMCNC: 32.1 G/DL (ref 32–36)
MCV RBC AUTO: 76 FL (ref 80–100)
MONOCYTES # BLD AUTO: 0.82 X10*3/UL (ref 0.1–1)
MONOCYTES NFR BLD AUTO: 9.8 %
NEUTROPHILS # BLD AUTO: 5.32 X10*3/UL (ref 1.2–7.7)
NEUTROPHILS NFR BLD AUTO: 64 %
PLATELET # BLD AUTO: 274 X10*3/UL (ref 150–450)
POTASSIUM SERPL-SCNC: 3.2 MMOL/L (ref 3.5–5.3)
PROT SERPL-MCNC: 7.8 G/DL (ref 6.4–8.2)
RBC # BLD AUTO: 4.57 X10*6/UL (ref 4–5.2)
SODIUM SERPL-SCNC: 138 MMOL/L (ref 136–145)
WBC # BLD AUTO: 8.3 X10*3/UL (ref 4.4–11.3)

## 2024-10-29 PROCEDURE — 80053 COMPREHEN METABOLIC PANEL: CPT

## 2024-10-29 PROCEDURE — 85025 COMPLETE CBC W/AUTO DIFF WBC: CPT

## 2024-10-29 PROCEDURE — 99214 OFFICE O/P EST MOD 30 MIN: CPT | Performed by: INTERNAL MEDICINE

## 2024-10-29 PROCEDURE — 3051F HG A1C>EQUAL 7.0%<8.0%: CPT | Performed by: INTERNAL MEDICINE

## 2024-10-29 PROCEDURE — 1125F AMNT PAIN NOTED PAIN PRSNT: CPT | Performed by: INTERNAL MEDICINE

## 2024-10-29 PROCEDURE — 1159F MED LIST DOCD IN RCRD: CPT | Performed by: INTERNAL MEDICINE

## 2024-10-29 PROCEDURE — 3074F SYST BP LT 130 MM HG: CPT | Performed by: INTERNAL MEDICINE

## 2024-10-29 PROCEDURE — 82378 CARCINOEMBRYONIC ANTIGEN: CPT

## 2024-10-29 PROCEDURE — 3078F DIAST BP <80 MM HG: CPT | Performed by: INTERNAL MEDICINE

## 2024-10-29 PROCEDURE — 36415 COLL VENOUS BLD VENIPUNCTURE: CPT

## 2024-10-29 PROCEDURE — G2211 COMPLEX E/M VISIT ADD ON: HCPCS | Performed by: INTERNAL MEDICINE

## 2024-10-29 ASSESSMENT — PAIN SCALES - GENERAL: PAINLEVEL_OUTOF10: 6

## 2024-10-30 ASSESSMENT — ENCOUNTER SYMPTOMS
CARDIOVASCULAR NEGATIVE: 1
HEMATOLOGIC/LYMPHATIC NEGATIVE: 1
CONSTITUTIONAL NEGATIVE: 1
EYES NEGATIVE: 1
PSYCHIATRIC NEGATIVE: 1
RESPIRATORY NEGATIVE: 1
ENDOCRINE NEGATIVE: 1
NEUROLOGICAL NEGATIVE: 1
GASTROINTESTINAL NEGATIVE: 1
MUSCULOSKELETAL NEGATIVE: 1

## 2024-11-06 ENCOUNTER — SPECIALTY PHARMACY (OUTPATIENT)
Dept: PHARMACY | Facility: CLINIC | Age: 69
End: 2024-11-06

## 2024-11-06 PROCEDURE — RXMED WILLOW AMBULATORY MEDICATION CHARGE

## 2024-11-07 ENCOUNTER — PHARMACY VISIT (OUTPATIENT)
Dept: PHARMACY | Facility: CLINIC | Age: 69
End: 2024-11-07
Payer: MEDICARE

## 2024-11-25 ENCOUNTER — OFFICE VISIT (OUTPATIENT)
Dept: HEMATOLOGY/ONCOLOGY | Facility: CLINIC | Age: 69
End: 2024-11-25
Payer: MEDICARE

## 2024-11-25 ENCOUNTER — LAB (OUTPATIENT)
Dept: LAB | Facility: CLINIC | Age: 69
End: 2024-11-25
Payer: MEDICARE

## 2024-11-25 VITALS
OXYGEN SATURATION: 92 % | TEMPERATURE: 97.7 F | WEIGHT: 149.91 LBS | BODY MASS INDEX: 30.28 KG/M2 | HEART RATE: 83 BPM | DIASTOLIC BLOOD PRESSURE: 77 MMHG | SYSTOLIC BLOOD PRESSURE: 127 MMHG | RESPIRATION RATE: 16 BRPM

## 2024-11-25 DIAGNOSIS — Z79.4 TYPE 2 DIABETES MELLITUS WITHOUT COMPLICATION, WITH LONG-TERM CURRENT USE OF INSULIN (MULTI): ICD-10-CM

## 2024-11-25 DIAGNOSIS — I10 PRIMARY HYPERTENSION: ICD-10-CM

## 2024-11-25 DIAGNOSIS — C34.12 MALIGNANT NEOPLASM OF UPPER LOBE OF LEFT LUNG (MULTI): ICD-10-CM

## 2024-11-25 DIAGNOSIS — F33.41 RECURRENT MAJOR DEPRESSIVE DISORDER, IN PARTIAL REMISSION (CMS-HCC): ICD-10-CM

## 2024-11-25 DIAGNOSIS — E11.9 TYPE 2 DIABETES MELLITUS WITHOUT COMPLICATION, WITH LONG-TERM CURRENT USE OF INSULIN (MULTI): ICD-10-CM

## 2024-11-25 DIAGNOSIS — I48.0 PAF (PAROXYSMAL ATRIAL FIBRILLATION) (MULTI): ICD-10-CM

## 2024-11-25 DIAGNOSIS — E78.2 MIXED HYPERLIPIDEMIA: ICD-10-CM

## 2024-11-25 DIAGNOSIS — D64.9 ANEMIA, UNSPECIFIED TYPE: ICD-10-CM

## 2024-11-25 DIAGNOSIS — J44.9 CHRONIC OBSTRUCTIVE PULMONARY DISEASE, UNSPECIFIED COPD TYPE (MULTI): Primary | ICD-10-CM

## 2024-11-25 LAB
ALBUMIN SERPL BCP-MCNC: 5.1 G/DL (ref 3.4–5)
ALP SERPL-CCNC: 135 U/L (ref 33–136)
ALT SERPL W P-5'-P-CCNC: 13 U/L (ref 7–45)
ANION GAP SERPL CALC-SCNC: 13 MMOL/L (ref 10–20)
AST SERPL W P-5'-P-CCNC: 23 U/L (ref 9–39)
BASOPHILS # BLD AUTO: 0.08 X10*3/UL (ref 0–0.1)
BASOPHILS NFR BLD AUTO: 1 %
BILIRUB SERPL-MCNC: 0.5 MG/DL (ref 0–1.2)
BUN SERPL-MCNC: 13 MG/DL (ref 6–23)
CALCIUM SERPL-MCNC: 11 MG/DL (ref 8.6–10.3)
CHLORIDE SERPL-SCNC: 96 MMOL/L (ref 98–107)
CO2 SERPL-SCNC: 34 MMOL/L (ref 21–32)
CREAT SERPL-MCNC: 1.17 MG/DL (ref 0.5–1.05)
EGFRCR SERPLBLD CKD-EPI 2021: 50 ML/MIN/1.73M*2
EOSINOPHIL # BLD AUTO: 0.67 X10*3/UL (ref 0–0.7)
EOSINOPHIL NFR BLD AUTO: 8.4 %
ERYTHROCYTE [DISTWIDTH] IN BLOOD BY AUTOMATED COUNT: 15.6 % (ref 11.5–14.5)
FERRITIN SERPL-MCNC: 25 NG/ML (ref 8–150)
GLUCOSE SERPL-MCNC: 89 MG/DL (ref 74–99)
HCT VFR BLD AUTO: 37.9 % (ref 36–46)
HGB BLD-MCNC: 12.2 G/DL (ref 12–16)
IMM GRANULOCYTES # BLD AUTO: 0.02 X10*3/UL (ref 0–0.7)
IMM GRANULOCYTES NFR BLD AUTO: 0.2 % (ref 0–0.9)
IRON SATN MFR SERPL: ABNORMAL %
IRON SERPL-MCNC: 53 UG/DL (ref 35–150)
LYMPHOCYTES # BLD AUTO: 1.84 X10*3/UL (ref 1.2–4.8)
LYMPHOCYTES NFR BLD AUTO: 22.9 %
MCH RBC QN AUTO: 24.4 PG (ref 26–34)
MCHC RBC AUTO-ENTMCNC: 32.2 G/DL (ref 32–36)
MCV RBC AUTO: 76 FL (ref 80–100)
MONOCYTES # BLD AUTO: 0.81 X10*3/UL (ref 0.1–1)
MONOCYTES NFR BLD AUTO: 10.1 %
NEUTROPHILS # BLD AUTO: 4.6 X10*3/UL (ref 1.2–7.7)
NEUTROPHILS NFR BLD AUTO: 57.4 %
PLATELET # BLD AUTO: 324 X10*3/UL (ref 150–450)
POTASSIUM SERPL-SCNC: 3.3 MMOL/L (ref 3.5–5.3)
PROT SERPL-MCNC: 8.7 G/DL (ref 6.4–8.2)
RBC # BLD AUTO: 4.99 X10*6/UL (ref 4–5.2)
SODIUM SERPL-SCNC: 140 MMOL/L (ref 136–145)
TIBC SERPL-MCNC: ABNORMAL UG/DL
UIBC SERPL-MCNC: >450 UG/DL (ref 110–370)
WBC # BLD AUTO: 8 X10*3/UL (ref 4.4–11.3)

## 2024-11-25 PROCEDURE — 1125F AMNT PAIN NOTED PAIN PRSNT: CPT | Performed by: INTERNAL MEDICINE

## 2024-11-25 PROCEDURE — 1159F MED LIST DOCD IN RCRD: CPT | Performed by: INTERNAL MEDICINE

## 2024-11-25 PROCEDURE — 3074F SYST BP LT 130 MM HG: CPT | Performed by: INTERNAL MEDICINE

## 2024-11-25 PROCEDURE — 99214 OFFICE O/P EST MOD 30 MIN: CPT | Performed by: INTERNAL MEDICINE

## 2024-11-25 PROCEDURE — 36415 COLL VENOUS BLD VENIPUNCTURE: CPT

## 2024-11-25 PROCEDURE — 83540 ASSAY OF IRON: CPT

## 2024-11-25 PROCEDURE — 3051F HG A1C>EQUAL 7.0%<8.0%: CPT | Performed by: INTERNAL MEDICINE

## 2024-11-25 PROCEDURE — 82728 ASSAY OF FERRITIN: CPT

## 2024-11-25 PROCEDURE — 80053 COMPREHEN METABOLIC PANEL: CPT

## 2024-11-25 PROCEDURE — 82378 CARCINOEMBRYONIC ANTIGEN: CPT

## 2024-11-25 PROCEDURE — 3078F DIAST BP <80 MM HG: CPT | Performed by: INTERNAL MEDICINE

## 2024-11-25 PROCEDURE — 85025 COMPLETE CBC W/AUTO DIFF WBC: CPT

## 2024-11-25 PROCEDURE — G2211 COMPLEX E/M VISIT ADD ON: HCPCS | Performed by: INTERNAL MEDICINE

## 2024-11-25 ASSESSMENT — PAIN SCALES - GENERAL: PAINLEVEL_OUTOF10: 6

## 2024-11-25 NOTE — PATIENT INSTRUCTIONS
Continue taking the adagrasib 400 mg in AM, 200 mg in PM    You will have a new chest CT done the week of 12/16

## 2024-11-25 NOTE — PROGRESS NOTES
Patient ID: Thu Ortega is a 69 y.o. female.  Referring Physician: Álvaro Galeano MD  [address]  [place] [address]  Primary Care Provider: DEANNA Rodriguez  Visit Type: Follow Up      Subjective    HPI I continue to take the cancer pill; I have some leg swelling    Review of Systems   Constitutional:  Positive for fatigue.   HENT:  Negative.     Eyes: Negative.    Respiratory:  Positive for shortness of breath.    Cardiovascular:  Positive for leg swelling.   Gastrointestinal: Negative.    Endocrine: Negative.    Genitourinary: Negative.     Musculoskeletal: Negative.    Skin: Negative.    Neurological: Negative.    Hematological: Negative.    Psychiatric/Behavioral: Negative.          Objective   BSA: 1.68 meters squared  /77 (BP Location: Right arm, Patient Position: Sitting, BP Cuff Size: Adult)   Pulse 83   Temp 36.5 °C (97.7 °F) (Temporal)   Resp 16   Wt 68 kg (149 lb 14.6 oz)   SpO2 92%   BMI 30.28 kg/m²      has a past medical history of Adenocarcinoma of lung, left (Multi), Atrial fibrillation (Multi), COPD (chronic obstructive pulmonary disease) (Multi), Depression, DJD (degenerative joint disease), DM (diabetes mellitus) (Multi), Fibromyalgia, primary, GERD (gastroesophageal reflux disease), Hearing aid worn, History of blood transfusion, History of meningioma of the brain, HLD (hyperlipidemia), HTN (hypertension), Irregular heart beat, Irritable bowel syndrome, Malignant neoplasm of upper lobe of left lung (Multi), Nephrolithiasis, Panlobular emphysema (Multi), Shortness of breath, Spinal stenosis, and Urinary tract infection.   has a past surgical history that includes US guided needle liver biopsy (02/07/2020); Lung lobectomy; CT guided percutaneous biopsy lung (12/13/2023); CT guided percutaneous biopsy lung (12/15/2023); Brain surgery; Foot surgery; Knee surgery; Cataract extraction; and Tubal ligation.  Family History   Problem Relation Name Age of Onset     Stroke Mother      Other (aortic valve disease) Mother      Heart disease Mother      Cancer Sister      Stroke Sister      Diabetes Sister      Diabetes Brother      Other (heart transplant) Brother       Oncology History   Malignant neoplasm of upper lobe of left lung (Multi)   12/3/2023 Initial Diagnosis    Malignant neoplasm of upper lobe of left lung (CMS/HCC)     2/28/2024 - 7/3/2024 Chemotherapy    Pembrolizumab, 21 Day Cycles     8/12/2024 -  Chemotherapy    Adagrasib, 28 Day Cycles     Local recurrence of left lung cancer (Multi)   1/4/2024 Initial Diagnosis    Local recurrence of left lung cancer (CMS/HCC)     2/28/2024 - 7/3/2024 Chemotherapy    Pembrolizumab, 21 Day Cycles     8/12/2024 -  Chemotherapy    Adagrasib, 28 Day Cycles         Thu Ortega  reports that she quit smoking about 18 years ago. Her smoking use included cigarettes. She has never used smokeless tobacco.  She  reports that she does not currently use alcohol.  She  reports no history of drug use.    Physical Exam  Vitals reviewed.   Constitutional:       Appearance: Normal appearance.   HENT:      Head: Normocephalic.      Mouth/Throat:      Mouth: Mucous membranes are moist.   Eyes:      Extraocular Movements: Extraocular movements intact.      Pupils: Pupils are equal, round, and reactive to light.   Cardiovascular:      Rate and Rhythm: Normal rate and regular rhythm.      Pulses: Normal pulses.      Heart sounds: Normal heart sounds.   Pulmonary:      Effort: Pulmonary effort is normal.      Breath sounds: Normal breath sounds.   Abdominal:      General: Bowel sounds are normal.      Palpations: Abdomen is soft.   Musculoskeletal:      Cervical back: Normal range of motion and neck supple.      Right lower leg: Edema present.      Left lower leg: Edema present.   Skin:     General: Skin is warm.   Neurological:      General: No focal deficit present.      Mental Status: She is alert and oriented to person, place, and time.    Psychiatric:         Mood and Affect: Mood normal.         Behavior: Behavior normal.         WBC   Date/Time Value Ref Range Status   11/25/2024 02:48 PM 8.0 4.4 - 11.3 x10*3/uL Final   10/29/2024 10:46 AM 8.3 4.4 - 11.3 x10*3/uL Final   09/12/2024 01:16 PM 7.2 4.4 - 11.3 x10*3/uL Final     nRBC   Date Value Ref Range Status   07/30/2024 0.0 0.0 - 0.0 /100 WBCs Final   07/30/2024 0.0 0.0 - 0.0 /100 WBCs Final   07/29/2024 0.0 0.0 - 0.0 /100 WBCs Final     RBC   Date Value Ref Range Status   11/25/2024 4.99 4.00 - 5.20 x10*6/uL Final   10/29/2024 4.57 4.00 - 5.20 x10*6/uL Final   09/12/2024 3.98 (L) 4.00 - 5.20 x10*6/uL Final     Hemoglobin   Date Value Ref Range Status   11/25/2024 12.2 12.0 - 16.0 g/dL Final   10/29/2024 11.2 (L) 12.0 - 16.0 g/dL Final   09/12/2024 10.3 (L) 12.0 - 16.0 g/dL Final     Hematocrit   Date Value Ref Range Status   11/25/2024 37.9 36.0 - 46.0 % Final   10/29/2024 34.9 (L) 36.0 - 46.0 % Final   09/12/2024 32.3 (L) 36.0 - 46.0 % Final     MCV   Date/Time Value Ref Range Status   11/25/2024 02:48 PM 76 (L) 80 - 100 fL Final   10/29/2024 10:46 AM 76 (L) 80 - 100 fL Final   09/12/2024 01:16 PM 81 80 - 100 fL Final     MCH   Date/Time Value Ref Range Status   11/25/2024 02:48 PM 24.4 (L) 26.0 - 34.0 pg Final   10/29/2024 10:46 AM 24.5 (L) 26.0 - 34.0 pg Final   09/12/2024 01:16 PM 25.9 (L) 26.0 - 34.0 pg Final     MCHC   Date/Time Value Ref Range Status   11/25/2024 02:48 PM 32.2 32.0 - 36.0 g/dL Final   10/29/2024 10:46 AM 32.1 32.0 - 36.0 g/dL Final   09/12/2024 01:16 PM 31.9 (L) 32.0 - 36.0 g/dL Final     RDW   Date/Time Value Ref Range Status   11/25/2024 02:48 PM 15.6 (H) 11.5 - 14.5 % Final   10/29/2024 10:46 AM 14.8 (H) 11.5 - 14.5 % Final   09/12/2024 01:16 PM 16.0 (H) 11.5 - 14.5 % Final     Platelets   Date/Time Value Ref Range Status   11/25/2024 02:48  150 - 450 x10*3/uL Final   10/29/2024 10:46  150 - 450 x10*3/uL Final   09/12/2024 01:16  150 - 450 x10*3/uL  "Final     No results found for: \"MPV\"  Neutrophils %   Date/Time Value Ref Range Status   11/25/2024 02:48 PM 57.4 40.0 - 80.0 % Final   10/29/2024 10:46 AM 64.0 40.0 - 80.0 % Final   09/12/2024 01:16 PM 62.4 40.0 - 80.0 % Final     Immature Granulocytes %, Automated   Date/Time Value Ref Range Status   11/25/2024 02:48 PM 0.2 0.0 - 0.9 % Final     Comment:     Immature Granulocyte Count (IG) includes promyelocytes, myelocytes and metamyelocytes but does not include bands. Percent differential counts (%) should be interpreted in the context of the absolute cell counts (cells/UL).   10/29/2024 10:46 AM 0.1 0.0 - 0.9 % Final     Comment:     Immature Granulocyte Count (IG) includes promyelocytes, myelocytes and metamyelocytes but does not include bands. Percent differential counts (%) should be interpreted in the context of the absolute cell counts (cells/UL).   09/12/2024 01:16 PM 0.4 0.0 - 0.9 % Final     Comment:     Immature Granulocyte Count (IG) includes promyelocytes, myelocytes and metamyelocytes but does not include bands. Percent differential counts (%) should be interpreted in the context of the absolute cell counts (cells/UL).     Lymphocytes %, Manual   Date/Time Value Ref Range Status   07/30/2024 06:29 PM 9.0 13.0 - 44.0 % Final     Lymphocytes %   Date/Time Value Ref Range Status   11/25/2024 02:48 PM 22.9 13.0 - 44.0 % Final   10/29/2024 10:46 AM 18.7 13.0 - 44.0 % Final   09/12/2024 01:16 PM 18.4 13.0 - 44.0 % Final     Monocytes %, Manual   Date/Time Value Ref Range Status   07/30/2024 06:29 PM 5.0 2.0 - 10.0 % Final     Monocytes %   Date/Time Value Ref Range Status   11/25/2024 02:48 PM 10.1 2.0 - 10.0 % Final   10/29/2024 10:46 AM 9.8 2.0 - 10.0 % Final   09/12/2024 01:16 PM 9.8 2.0 - 10.0 % Final     Eosinophils %, Manual   Date/Time Value Ref Range Status   07/30/2024 06:29 PM 2.0 0.0 - 6.0 % Final     Eosinophils %   Date/Time Value Ref Range Status   11/25/2024 02:48 PM 8.4 0.0 - 6.0 % Final "   10/29/2024 10:46 AM 6.7 0.0 - 6.0 % Final   09/12/2024 01:16 PM 7.9 0.0 - 6.0 % Final     Basophils %, Manual   Date/Time Value Ref Range Status   07/30/2024 06:29 PM 0.0 0.0 - 2.0 % Final     Basophils %   Date/Time Value Ref Range Status   11/25/2024 02:48 PM 1.0 0.0 - 2.0 % Final   10/29/2024 10:46 AM 0.7 0.0 - 2.0 % Final   09/12/2024 01:16 PM 1.1 0.0 - 2.0 % Final     Neutrophils Absolute   Date/Time Value Ref Range Status   11/25/2024 02:48 PM 4.60 1.20 - 7.70 x10*3/uL Final     Comment:     Percent differential counts (%) should be interpreted in the context of the absolute cell counts (cells/uL).   10/29/2024 10:46 AM 5.32 1.20 - 7.70 x10*3/uL Final     Comment:     Percent differential counts (%) should be interpreted in the context of the absolute cell counts (cells/uL).   09/12/2024 01:16 PM 4.47 1.20 - 7.70 x10*3/uL Final     Comment:     Percent differential counts (%) should be interpreted in the context of the absolute cell counts (cells/uL).     Immature Granulocytes Absolute, Automated   Date/Time Value Ref Range Status   11/25/2024 02:48 PM 0.02 0.00 - 0.70 x10*3/uL Final   10/29/2024 10:46 AM 0.01 0.00 - 0.70 x10*3/uL Final   09/12/2024 01:16 PM 0.03 0.00 - 0.70 x10*3/uL Final     Lymphocytes Absolute   Date/Time Value Ref Range Status   11/25/2024 02:48 PM 1.84 1.20 - 4.80 x10*3/uL Final   10/29/2024 10:46 AM 1.56 1.20 - 4.80 x10*3/uL Final   09/12/2024 01:16 PM 1.32 1.20 - 4.80 x10*3/uL Final     Monocytes Absolute   Date/Time Value Ref Range Status   11/25/2024 02:48 PM 0.81 0.10 - 1.00 x10*3/uL Final   10/29/2024 10:46 AM 0.82 0.10 - 1.00 x10*3/uL Final   09/12/2024 01:16 PM 0.70 0.10 - 1.00 x10*3/uL Final     Eosinophils Absolute   Date/Time Value Ref Range Status   11/25/2024 02:48 PM 0.67 0.00 - 0.70 x10*3/uL Final   10/29/2024 10:46 AM 0.56 0.00 - 0.70 x10*3/uL Final   09/12/2024 01:16 PM 0.57 0.00 - 0.70 x10*3/uL Final     Eosinophils Absolute, Manual   Date/Time Value Ref Range Status  "  07/30/2024 06:29 PM 0.20 0.00 - 0.70 x10*3/uL Final     Basophils Absolute   Date/Time Value Ref Range Status   11/25/2024 02:48 PM 0.08 0.00 - 0.10 x10*3/uL Final   10/29/2024 10:46 AM 0.06 0.00 - 0.10 x10*3/uL Final   09/12/2024 01:16 PM 0.08 0.00 - 0.10 x10*3/uL Final     Basophils Absolute, Manual   Date/Time Value Ref Range Status   07/30/2024 06:29 PM 0.00 0.00 - 0.10 x10*3/uL Final       No components found for: \"PT\"  aPTT   Date/Time Value Ref Range Status   01/12/2024 01:46 PM 31 27 - 38 seconds Final     Medication Documentation Review Audit       Reviewed by Ranya Adams MA (Medical Assistant) on 11/25/24 at 1504      Medication Order Taking? Sig Documenting Provider Last Dose Status   acetaminophen (Tylenol) 325 mg tablet [id number] Yes Take 2 tablets (650 mg) by mouth every 4 hours if needed for mild pain (1 - 3) or fever (temp greater than 38.0 C). Sunshine Hankins PA-C  Active   adagrasib (Krazati) 200 mg tablet [id number] Yes Take 3 tablets (600 mg total) by mouth 2 times a day. Álvaro Galeano MD  Active   albuterol 90 mcg/actuation inhaler [id number] Yes Inhale 2 puffs every 6 hours if needed for wheezing. Historical Provider, MD  Active   amLODIPine (Norvasc) 10 mg tablet [id number] Yes Take 1 tablet (10 mg) by mouth once daily in the morning. Take before meals. Historical Provider, MD  Active   aspirin 81 mg chewable tablet [id number] Yes Chew 1 tablet (81 mg) once daily. Historical Provider, MD  Active   atorvastatin (Lipitor) 10 mg tablet [id number] Yes Take 1 tablet (10 mg) by mouth once daily. Historical Provider, MD  Active   biotin 10 mg tablet [id number] Yes Take 1 tablet (10 mg) by mouth once daily. Historical Provider, MD  Active   cholecalciferol (Vitamin D3) 5,000 Units tablet [id number] Yes Take 1 tablet (5,000 Units) by mouth once daily. Historical Provider, MD  Active   DULoxetine (Cymbalta) 60 mg DR capsule [id number] Yes Take 1 capsule (60 mg) by mouth once daily. Do not crush or " chew. Historical Provider, MD  Active   glipiZIDE (Glucotrol) 5 mg tablet [id number]  Take 1 tablet (5 mg) by mouth 1 time for 1 dose. Aman Guthrie, DO   24 235   hydroCHLOROthiazide (HYDRODiuril) 25 mg tablet [id number] Yes Take 1 tablet (25 mg) by mouth once daily. Álvaro Galeano MD  Active   insulin aspart (NovoLOG PenFill U-100 Insulin) 100 unit/mL pen cartridge [id number]  Inject 15 Units under the skin 3 times daily (morning, midday, late afternoon). Take as directed per insulin instructions. Mild insulin sliding scale Yanni Smalls, DO   24 235   levothyroxine (Synthroid, Levoxyl) 25 mcg tablet [id number] Yes Take 1 tablet (25 mcg) by mouth early in the morning.. Historical Provider, MD  Active   metFORMIN (Glucophage) 1,000 mg tablet [id number] Yes Take 1 tablet (1,000 mg) by mouth 2 times a day. Historical Provider, MD  Active   nystatin (Mycostatin) 100,000 unit/gram powder [id number] Yes Apply 1 Application topically 2 times a day. Álvaro Galeano MD  Active   ondansetron (Zofran) 4 mg tablet [id number] Yes Take 1 tablet (4 mg) by mouth every 8 hours if needed for nausea or vomiting. Álvaro Galeano MD  Active   oxygen (O2) gas therapy [id number] Yes Inhale 1 each once every 24 hours. Yanni Smalls,   Active   pantoprazole (ProtoNix) 40 mg EC tablet [id number] Yes Take 1 tablet (40 mg) by mouth once daily in the morning. Take before meals. Historical Provider, MD  Active   prochlorperazine (Compazine) 10 mg tablet [id number] Yes Take 1 tablet (10 mg) by mouth every 6 hours if needed for nausea or vomiting. Álvaro Galeano MD  Active   tiotropium (Spiriva) 18 mcg inhalation capsule [id number] Yes Place 1 capsule (18 mcg) into inhaler and inhale once daily. Historical Provider, MD  Active   traZODone (Desyrel) 50 mg tablet [id number] Yes Take 1 tablet (50 mg) by mouth once daily at bedtime. Historical Provider, MD  Active   Wixela Inhub 250-50 mcg/dose diskus inhaler [id number]  Yes Inhale 1 puff 2 times a day. Historical Provider, MD  Active   zolpidem CR (Ambien CR) 6.25 mg ER tablet [id number] Yes Take 10 mg by mouth as needed at bedtime for sleep. Do not crush, chew, or split. Historical Provider, MD  Active                   Assessment/Plan    1) lung cancer  -12/8/2022 chest CT: NICHOL anterior segment 1.6 x 2.2 cm nodule, partial pleural attachment, nonspecific low volume paratracheal mediastinal LN largest near AP window 1.0 x 1.4 cm, right subcarinal space 8 x 14 mm  -12/12/2022 PET: NICHOL 1.8 x 2.3 cm mass with SUV 5.8, lateral margin abuts pleura; no significant mediastinal or hilar hypermetabolic lymphadenopathy  -12/28/2022 chest CT: 2.5 cm cavitary nodule in NICHOL  -1/7/2023 PET scan  -had surgery for stage I NSCLC, s/p lobectomy (Dr Fagan, 1/31/2023)  -2/1/2023 CT chest: no PE, postop changes in left chest, small left PTX in left upper anterior chest  -2/26/2023 chest CT no PE, continued mildly enlarged mediastinal lymph nodes  -saw Dr Solis at [facility] on 3/13/2023 - she had a [id number] (stage Ib) lung adenocarcinoma (poor NCCN risk factors - G3, + visceral involvement), s/p left robotic assisted anatomic upper lobectomy, PD-L1 90%, +[id number]  -per his charting, he recommended either adjuvant cisplatin + pemetrexed x 4 cycles vs surveillance  -according to  Thu, Dr Solis never told her about her high risk features nor any adjuvant option, and that the only thing he recommended was observation  -7/11/2023 CT chest : stable postsurgical changes of prior left thoracotomy and left upper lobectomy with interval resolution of bilateral pleural effusions; interval progression of lymphadenopathy in the chest concerning for progressing ghazala metastatic disease     7/25/2023 underwent EBUS: A - EBUS TRANSBRONCHIAL FINE NEEDLE ASPIRATE, LYMPH NODE  - 4L             Negative for malignant cells.             Benign lymphoid sample (see comment).   B - EBUS TRANSBRONCHIAL FINE NEEDLE ASPIRATE, LYMPH NODE   - 10L             Negative for malignant cells.                   Benign lymphoid sample.  C - EBUS TRANSBRONCHIAL FINE NEEDLE ASPIRATE, LYMPH NODE  - STATION 7             Negative for malignant cells.                 Benign lymphoid sample.   -8/9/2023 PET scan: 3.0 x 2.1 cm left prevascular node with SUV 7.9; few additional prominent mediastinal nodes with low level uptake; left lower paratracheal node 0.8 cm with SUV 2.2; mild FDG uptake in left hilum SUV 3.1  -11/14/2023 chest CT: enlarged 2.2 cm prevascular lymph node not significantly changed  -she was told by her pulmonologist Dr Kang that she has cancer  -discussed her original path with her--while it was a small tumor and stage Ib, she did have a couple high risk features that would have warranted adjuvant chemotherapy followed by atezolizumab; also if she does have a recurrence that isn't amenable to surgery, she would also be a candidate for immunotherapy then KRAS inhibitor (FDA approved only in 2nd line setting)  -will discuss with thoracic surgeon--will import all [facility] films to review; may need CT guided bx by IR of this prevascular node   -she had biopsy done on 12/13/2023--path confirmed poorly differentiated lung carcinoma, PD-L1 90%, KRAS G12C mutation  -she saw Dr Calixto on 12/28/2023--he advised surgery, provided that she can pass her PFTs  - on 1/30/2024 Dr Calixto took her to the OR for robotic assisted redo left mediastinal exploration, left anterior mediastinal mass resection, diaphragmatic pacer placement  -path showed poorly differentiated carcinoma with pleomorphic/spindle cell and clear cell features; 4 lymph nodes with no evidence of malignancy; sections show a poorly differentiated carcinoma with pleomorphic/spindle cell and clear cell features involving fibroadipose tissue with a large area of central necrosis; tumor cells are positive for AE1/AE3, CAM 5.2, CK7 and negative for TTF-1, p40.  -pt is most interested in doing whatever is  necessary to reduce her risk for recurrence  -she and  state again that the [facility] oncologist told them that adjuvant therapy was not necessary after her first surgery, even though his note documented that he recommended it  -as her tumor has high PD-L1 expression, she is a candidate for pembrolizumab; if she relapses, she is also a candidate for KRAS G12C inhibitor  -has completed 6 doses of adjuvant pembrolizumab (out of 17)  -CT scan done on 6/19/2024 showed  mediastinum with subcarinal lymphadenopathy 10 mm in short axis, there is component of AP window lymphadenopathy 17 x 15 mm; mesentery demonstrates soft tissue lesion within midline lower abdominal mesentery 1.6 x 1.2 cm; pleural based nodular lesion in RLL posteriorly 8 mm with surrounding ground glass airspace infiltrate  -she was admitted recently for pneumonia--CT angio done on 7/23 showed no evidence of pulmonary embolus, multifocal airspace disease in the right lung suggesting pneumonia  -right after she was discharged from [place], she then went back to the ER for hyperglycemia ()  -Dr Calixto has scheduled her next chest CT for 8/2024--pt is wondering if that is still necessary  -on 7/14/2024 she underwent an EBUS--lymph node level 4L was sampled--showed NSCLC, favor squamous cell carcinoma  -given that she is now confirmed to have new and/or persistent disease in her mediastinum, I have advised switching to new therapy; as her tumor has the KRAS G12C mutation, I have recommended switching to KRAS G12C inhibitor, namely adagrasib  -benefits, risks, potential morbidity related to adagrasib were reviewed with Thu and she signed informed consent to proceed  -she will take adagrasib (Krazati) 600 mg PO BID  -she also has severe body pains secondary to fibromyalgia--she  says in the hospital she was prescribed percocet PRN and asked if I could prescribe it; I did inform her that I will treat pain only secondary to cancer; she  "therefore was willing to be referred to pain management (Dr Melendrez)  -here for interval followup  -has been taking adagrasib 400 mg BID as adagrasib can be associated with leg edema/fluid retention, however dropping the dose down did not seem to help with reducing the leg edema; she also has been taking hydrochlorothiazide 12.5 mg daily without any effect; also reported \"not urinating enough\"--will check leg doppler to rule out DVT and also advised her to increase hydrochlorothiazide to 25 mg daily  -wants to vacation in [place] again--and will be going there for at least 2 weeks in October  -saw Dr Melendrez--she received a diagnostic medial nerve branch block targeting L3-L4--she said pain relief lasted only 4 days  -labs done on 9/12/2024 included CBC +COMP + CEA  -results reviewed--wbc 7.2, hgb 10.3, plt 259,000, potassium 3.2, creatinine 0.95, calcium 9.5, AST 19, ALT 14, CEA 3.0  -advised Thu to continue with  adagrasib to 400 mg BID  -will now schedule PET scan to be done in next couple weeks as restaging of her lung cancer  -here for interval followup via telephone  -leaving for [place] on Monday  -9/25/2024 doppler of legs showed no DVT in either leg  -edema improved with doubling of hydrochlorothiazide  -PET scan done on 10/3/2024 reviewed--postsurgical changes within the right parietal bone compatible with craniectomy; postsurgical changes compatible with left upper lobectomy without focal hypermetabolic activity to suggest disease recurrence; interval resolution of multifocal ground glass and nodular opacities within right upper lobe; no hypermetabolic pulmonary lesions; no hypermetabolic mediastinal lymphadenopathy; prominent bilateral axillary lymph nodes with mild hypermetabolic activity up to 1.3 on right and 1.1 on left likely reactive; no focal hypermetabolic lesion to suggest osseous metastasis  -here for interval followup  -I did advise Thu to hold off on taking adagrasib for the " 2 weeks she was in [place]--her leg edema completely decompressed and resolved  -as soon as she returned home to [place], she restarted the adagrasib 400 mg BID along with hydrochlorothiazide  -however now taking 400 mg in AM, 200 mg in PM  -has developed some light pitting edema in her legs (trace to 1+)  -labs to be done today include CBC + COMP, CEA  -results reviewed--wbc 8.0, hgb 12.2, plt 324,000, potassium 3.3, creatinine 1.17, calcium 11, alk phos 135, AST 23, ALT 13, CEA 3.2  -will see her again in 1 month  -will plan for next followup CT scan to be done by the end of December     2) COPD  -on albuterol  -on incruse ellipta     3) atrial fibrillation  -on amiodarone  -on warfarin     4) hypertension  -on norvasc  -on chlorthalidone  -on lasix  -on lisinopril  -on metoprolol     5) hyperlipidemia  -on atorvastatin     6) major depression  -on cymbalta     7) diabetes  -on novolog insulin  -on metformin     Problem List Items Addressed This Visit             ICD-10-CM    Malignant neoplasm of upper lobe of left lung (Multi) C34.12    Relevant Orders    CT chest w IV contrast    Clinic Appointment Request Follow Up; ÁLVARO GALEANO; [facility]    CBC and Auto Differential    Comprehensive metabolic panel    CEA            Álvaro Galeano MD

## 2024-11-26 LAB — CEA SERPL-MCNC: 3.2 UG/L

## 2024-12-02 DIAGNOSIS — C34.92 LOCAL RECURRENCE OF LEFT LUNG CANCER (MULTI): ICD-10-CM

## 2024-12-02 RX ORDER — HYDROCHLOROTHIAZIDE 25 MG/1
25 TABLET ORAL DAILY
Qty: 30 TABLET | Refills: 11 | Status: SHIPPED | OUTPATIENT
Start: 2024-12-02 | End: 2025-12-02

## 2024-12-03 ASSESSMENT — ENCOUNTER SYMPTOMS
EYES NEGATIVE: 1
FATIGUE: 1
PSYCHIATRIC NEGATIVE: 1
GASTROINTESTINAL NEGATIVE: 1
MUSCULOSKELETAL NEGATIVE: 1
ENDOCRINE NEGATIVE: 1
NEUROLOGICAL NEGATIVE: 1
HEMATOLOGIC/LYMPHATIC NEGATIVE: 1
LEG SWELLING: 1
SHORTNESS OF BREATH: 1

## 2024-12-18 ENCOUNTER — LAB (OUTPATIENT)
Dept: LAB | Facility: LAB | Age: 69
End: 2024-12-18
Payer: MEDICARE

## 2024-12-18 ENCOUNTER — HOSPITAL ENCOUNTER (OUTPATIENT)
Dept: RADIOLOGY | Facility: HOSPITAL | Age: 69
Discharge: HOME | End: 2024-12-18
Payer: MEDICARE

## 2024-12-18 DIAGNOSIS — C34.12 MALIGNANT NEOPLASM OF UPPER LOBE OF LEFT LUNG (MULTI): ICD-10-CM

## 2024-12-18 LAB
ALBUMIN SERPL BCP-MCNC: 4.9 G/DL (ref 3.4–5)
ALP SERPL-CCNC: 110 U/L (ref 33–136)
ALT SERPL W P-5'-P-CCNC: 13 U/L (ref 7–45)
ANION GAP SERPL CALC-SCNC: 15 MMOL/L (ref 10–20)
AST SERPL W P-5'-P-CCNC: 20 U/L (ref 9–39)
BASOPHILS # BLD AUTO: 0.08 X10*3/UL (ref 0–0.1)
BASOPHILS NFR BLD AUTO: 1.1 %
BILIRUB SERPL-MCNC: 0.5 MG/DL (ref 0–1.2)
BUN SERPL-MCNC: 13 MG/DL (ref 6–23)
CALCIUM SERPL-MCNC: 10.2 MG/DL (ref 8.6–10.3)
CHLORIDE SERPL-SCNC: 96 MMOL/L (ref 98–107)
CO2 SERPL-SCNC: 31 MMOL/L (ref 21–32)
CREAT SERPL-MCNC: 1.21 MG/DL (ref 0.5–1.05)
EGFRCR SERPLBLD CKD-EPI 2021: 48 ML/MIN/1.73M*2
EOSINOPHIL # BLD AUTO: 0.5 X10*3/UL (ref 0–0.7)
EOSINOPHIL NFR BLD AUTO: 6.9 %
ERYTHROCYTE [DISTWIDTH] IN BLOOD BY AUTOMATED COUNT: 16.4 % (ref 11.5–14.5)
GLUCOSE SERPL-MCNC: 159 MG/DL (ref 74–99)
HCT VFR BLD AUTO: 34.3 % (ref 36–46)
HGB BLD-MCNC: 10.7 G/DL (ref 12–16)
IMM GRANULOCYTES # BLD AUTO: 0.03 X10*3/UL (ref 0–0.7)
IMM GRANULOCYTES NFR BLD AUTO: 0.4 % (ref 0–0.9)
LYMPHOCYTES # BLD AUTO: 1.64 X10*3/UL (ref 1.2–4.8)
LYMPHOCYTES NFR BLD AUTO: 22.5 %
MCH RBC QN AUTO: 23.7 PG (ref 26–34)
MCHC RBC AUTO-ENTMCNC: 31.2 G/DL (ref 32–36)
MCV RBC AUTO: 76 FL (ref 80–100)
MONOCYTES # BLD AUTO: 0.81 X10*3/UL (ref 0.1–1)
MONOCYTES NFR BLD AUTO: 11.1 %
NEUTROPHILS # BLD AUTO: 4.22 X10*3/UL (ref 1.2–7.7)
NEUTROPHILS NFR BLD AUTO: 58 %
NRBC BLD-RTO: 0 /100 WBCS (ref 0–0)
PLATELET # BLD AUTO: 289 X10*3/UL (ref 150–450)
POTASSIUM SERPL-SCNC: 3.6 MMOL/L (ref 3.5–5.3)
PROT SERPL-MCNC: 7.8 G/DL (ref 6.4–8.2)
RBC # BLD AUTO: 4.51 X10*6/UL (ref 4–5.2)
SODIUM SERPL-SCNC: 138 MMOL/L (ref 136–145)
WBC # BLD AUTO: 7.3 X10*3/UL (ref 4.4–11.3)

## 2024-12-18 PROCEDURE — 71260 CT THORAX DX C+: CPT

## 2024-12-18 PROCEDURE — 80053 COMPREHEN METABOLIC PANEL: CPT

## 2024-12-18 PROCEDURE — 2550000001 HC RX 255 CONTRASTS: Performed by: INTERNAL MEDICINE

## 2024-12-18 PROCEDURE — 85025 COMPLETE CBC W/AUTO DIFF WBC: CPT

## 2024-12-18 PROCEDURE — 82378 CARCINOEMBRYONIC ANTIGEN: CPT

## 2024-12-18 PROCEDURE — 71260 CT THORAX DX C+: CPT | Performed by: RADIOLOGY

## 2024-12-18 PROCEDURE — 36415 COLL VENOUS BLD VENIPUNCTURE: CPT

## 2024-12-19 LAB — CEA SERPL-MCNC: 2.9 UG/L

## 2024-12-23 ENCOUNTER — TELEMEDICINE (OUTPATIENT)
Dept: HEMATOLOGY/ONCOLOGY | Facility: CLINIC | Age: 69
End: 2024-12-23
Payer: MEDICARE

## 2024-12-23 DIAGNOSIS — E11.9 TYPE 2 DIABETES MELLITUS WITHOUT COMPLICATION, WITH LONG-TERM CURRENT USE OF INSULIN (MULTI): ICD-10-CM

## 2024-12-23 DIAGNOSIS — C34.12 MALIGNANT NEOPLASM OF UPPER LOBE OF LEFT LUNG (MULTI): Primary | ICD-10-CM

## 2024-12-23 DIAGNOSIS — I10 PRIMARY HYPERTENSION: ICD-10-CM

## 2024-12-23 DIAGNOSIS — J44.9 CHRONIC OBSTRUCTIVE PULMONARY DISEASE, UNSPECIFIED COPD TYPE (MULTI): ICD-10-CM

## 2024-12-23 DIAGNOSIS — I48.0 PAF (PAROXYSMAL ATRIAL FIBRILLATION) (MULTI): ICD-10-CM

## 2024-12-23 DIAGNOSIS — E78.2 MIXED HYPERLIPIDEMIA: ICD-10-CM

## 2024-12-23 DIAGNOSIS — Z79.4 TYPE 2 DIABETES MELLITUS WITHOUT COMPLICATION, WITH LONG-TERM CURRENT USE OF INSULIN (MULTI): ICD-10-CM

## 2024-12-23 DIAGNOSIS — F33.41 RECURRENT MAJOR DEPRESSIVE DISORDER, IN PARTIAL REMISSION (CMS-HCC): ICD-10-CM

## 2024-12-23 PROCEDURE — 3051F HG A1C>EQUAL 7.0%<8.0%: CPT | Performed by: INTERNAL MEDICINE

## 2024-12-23 PROCEDURE — 99442 PR PHYS/QHP TELEPHONE EVALUATION 11-20 MIN: CPT | Performed by: INTERNAL MEDICINE

## 2024-12-23 NOTE — PROGRESS NOTES
Patient ID: Thu Ortega is a 69 y.o. female.  Referring Physician: Álvaro Galeano MD  [address]  [place] [address]  Primary Care Provider: DEANNA Rodriguez  Visit Type:  Follow Up     Verbal consent was requested and obtained from patient on this date for a telehealth visit.    Subjective    HPI How was my CT scan?    Review of Systems   Constitutional:  Positive for fatigue.   HENT:  Negative.     Eyes: Negative.    Respiratory:  Positive for shortness of breath.    Cardiovascular:  Positive for leg swelling.   Gastrointestinal: Negative.    Endocrine: Negative.    Genitourinary: Negative.     Musculoskeletal:  Positive for back pain.   Skin: Negative.    Neurological: Negative.    Hematological: Negative.    Psychiatric/Behavioral: Negative.          Objective   BSA: There is no height or weight on file to calculate BSA.  There were no vitals taken for this visit.     has a past medical history of Adenocarcinoma of lung, left (Multi), Atrial fibrillation (Multi), COPD (chronic obstructive pulmonary disease) (Multi), Depression, DJD (degenerative joint disease), DM (diabetes mellitus) (Multi), Fibromyalgia, primary, GERD (gastroesophageal reflux disease), Hearing aid worn, History of blood transfusion, History of meningioma of the brain, HLD (hyperlipidemia), HTN (hypertension), Irregular heart beat, Irritable bowel syndrome, Malignant neoplasm of upper lobe of left lung (Multi), Nephrolithiasis, Panlobular emphysema (Multi), Shortness of breath, Spinal stenosis, and Urinary tract infection.   has a past surgical history that includes US guided needle liver biopsy (02/07/2020); Lung lobectomy; CT guided percutaneous biopsy lung (12/13/2023); CT guided percutaneous biopsy lung (12/15/2023); Brain surgery; Foot surgery; Knee surgery; Cataract extraction; and Tubal ligation.  Family History   Problem Relation Name Age of Onset    Stroke Mother      Other (aortic valve disease) Mother       Heart disease Mother      Cancer Sister      Stroke Sister      Diabetes Sister      Diabetes Brother      Other (heart transplant) Brother       Oncology History   Malignant neoplasm of upper lobe of left lung (Multi)   12/3/2023 Initial Diagnosis    Malignant neoplasm of upper lobe of left lung (CMS/HCC)     2/28/2024 - 7/3/2024 Chemotherapy    Pembrolizumab, 21 Day Cycles     8/12/2024 -  Chemotherapy    Adagrasib, 28 Day Cycles     Local recurrence of left lung cancer (Multi)   1/4/2024 Initial Diagnosis    Local recurrence of left lung cancer (CMS/HCC)     2/28/2024 - 7/3/2024 Chemotherapy    Pembrolizumab, 21 Day Cycles     8/12/2024 -  Chemotherapy    Adagrasib, 28 Day Cycles         Thu Ortega  reports that she quit smoking about 18 years ago. Her smoking use included cigarettes. She has never used smokeless tobacco.  She  reports that she does not currently use alcohol.  She  reports no history of drug use.    Physical Exam    WBC   Date/Time Value Ref Range Status   12/18/2024 03:30 PM 7.3 4.4 - 11.3 x10*3/uL Final   11/25/2024 02:48 PM 8.0 4.4 - 11.3 x10*3/uL Final   10/29/2024 10:46 AM 8.3 4.4 - 11.3 x10*3/uL Final     nRBC   Date Value Ref Range Status   12/18/2024 0.0 0.0 - 0.0 /100 WBCs Final   07/30/2024 0.0 0.0 - 0.0 /100 WBCs Final   07/30/2024 0.0 0.0 - 0.0 /100 WBCs Final     RBC   Date Value Ref Range Status   12/18/2024 4.51 4.00 - 5.20 x10*6/uL Final   11/25/2024 4.99 4.00 - 5.20 x10*6/uL Final   10/29/2024 4.57 4.00 - 5.20 x10*6/uL Final     Hemoglobin   Date Value Ref Range Status   12/18/2024 10.7 (L) 12.0 - 16.0 g/dL Final   11/25/2024 12.2 12.0 - 16.0 g/dL Final   10/29/2024 11.2 (L) 12.0 - 16.0 g/dL Final     Hematocrit   Date Value Ref Range Status   12/18/2024 34.3 (L) 36.0 - 46.0 % Final   11/25/2024 37.9 36.0 - 46.0 % Final   10/29/2024 34.9 (L) 36.0 - 46.0 % Final     MCV   Date/Time Value Ref Range Status   12/18/2024 03:30 PM 76 (L) 80 - 100 fL Final   11/25/2024 02:48 PM  "76 (L) 80 - 100 fL Final   10/29/2024 10:46 AM 76 (L) 80 - 100 fL Final     MCH   Date/Time Value Ref Range Status   12/18/2024 03:30 PM 23.7 (L) 26.0 - 34.0 pg Final   11/25/2024 02:48 PM 24.4 (L) 26.0 - 34.0 pg Final   10/29/2024 10:46 AM 24.5 (L) 26.0 - 34.0 pg Final     MCHC   Date/Time Value Ref Range Status   12/18/2024 03:30 PM 31.2 (L) 32.0 - 36.0 g/dL Final   11/25/2024 02:48 PM 32.2 32.0 - 36.0 g/dL Final   10/29/2024 10:46 AM 32.1 32.0 - 36.0 g/dL Final     RDW   Date/Time Value Ref Range Status   12/18/2024 03:30 PM 16.4 (H) 11.5 - 14.5 % Final   11/25/2024 02:48 PM 15.6 (H) 11.5 - 14.5 % Final   10/29/2024 10:46 AM 14.8 (H) 11.5 - 14.5 % Final     Platelets   Date/Time Value Ref Range Status   12/18/2024 03:30  150 - 450 x10*3/uL Final   11/25/2024 02:48  150 - 450 x10*3/uL Final   10/29/2024 10:46  150 - 450 x10*3/uL Final     No results found for: \"MPV\"  Neutrophils %   Date/Time Value Ref Range Status   12/18/2024 03:30 PM 58.0 40.0 - 80.0 % Final   11/25/2024 02:48 PM 57.4 40.0 - 80.0 % Final   10/29/2024 10:46 AM 64.0 40.0 - 80.0 % Final     Immature Granulocytes %, Automated   Date/Time Value Ref Range Status   12/18/2024 03:30 PM 0.4 0.0 - 0.9 % Final     Comment:     Immature Granulocyte Count (IG) includes promyelocytes, myelocytes and metamyelocytes but does not include bands. Percent differential counts (%) should be interpreted in the context of the absolute cell counts (cells/UL).   11/25/2024 02:48 PM 0.2 0.0 - 0.9 % Final     Comment:     Immature Granulocyte Count (IG) includes promyelocytes, myelocytes and metamyelocytes but does not include bands. Percent differential counts (%) should be interpreted in the context of the absolute cell counts (cells/UL).   10/29/2024 10:46 AM 0.1 0.0 - 0.9 % Final     Comment:     Immature Granulocyte Count (IG) includes promyelocytes, myelocytes and metamyelocytes but does not include bands. Percent differential counts (%) should be " interpreted in the context of the absolute cell counts (cells/UL).     Lymphocytes %, Manual   Date/Time Value Ref Range Status   07/30/2024 06:29 PM 9.0 13.0 - 44.0 % Final     Lymphocytes %   Date/Time Value Ref Range Status   12/18/2024 03:30 PM 22.5 13.0 - 44.0 % Final   11/25/2024 02:48 PM 22.9 13.0 - 44.0 % Final   10/29/2024 10:46 AM 18.7 13.0 - 44.0 % Final     Monocytes %, Manual   Date/Time Value Ref Range Status   07/30/2024 06:29 PM 5.0 2.0 - 10.0 % Final     Monocytes %   Date/Time Value Ref Range Status   12/18/2024 03:30 PM 11.1 2.0 - 10.0 % Final   11/25/2024 02:48 PM 10.1 2.0 - 10.0 % Final   10/29/2024 10:46 AM 9.8 2.0 - 10.0 % Final     Eosinophils %, Manual   Date/Time Value Ref Range Status   07/30/2024 06:29 PM 2.0 0.0 - 6.0 % Final     Eosinophils %   Date/Time Value Ref Range Status   12/18/2024 03:30 PM 6.9 0.0 - 6.0 % Final   11/25/2024 02:48 PM 8.4 0.0 - 6.0 % Final   10/29/2024 10:46 AM 6.7 0.0 - 6.0 % Final     Basophils %, Manual   Date/Time Value Ref Range Status   07/30/2024 06:29 PM 0.0 0.0 - 2.0 % Final     Basophils %   Date/Time Value Ref Range Status   12/18/2024 03:30 PM 1.1 0.0 - 2.0 % Final   11/25/2024 02:48 PM 1.0 0.0 - 2.0 % Final   10/29/2024 10:46 AM 0.7 0.0 - 2.0 % Final     Neutrophils Absolute   Date/Time Value Ref Range Status   12/18/2024 03:30 PM 4.22 1.20 - 7.70 x10*3/uL Final     Comment:     Percent differential counts (%) should be interpreted in the context of the absolute cell counts (cells/uL).   11/25/2024 02:48 PM 4.60 1.20 - 7.70 x10*3/uL Final     Comment:     Percent differential counts (%) should be interpreted in the context of the absolute cell counts (cells/uL).   10/29/2024 10:46 AM 5.32 1.20 - 7.70 x10*3/uL Final     Comment:     Percent differential counts (%) should be interpreted in the context of the absolute cell counts (cells/uL).     Immature Granulocytes Absolute, Automated   Date/Time Value Ref Range Status   12/18/2024 03:30 PM 0.03 0.00  "- 0.70 x10*3/uL Final   11/25/2024 02:48 PM 0.02 0.00 - 0.70 x10*3/uL Final   10/29/2024 10:46 AM 0.01 0.00 - 0.70 x10*3/uL Final     Lymphocytes Absolute   Date/Time Value Ref Range Status   12/18/2024 03:30 PM 1.64 1.20 - 4.80 x10*3/uL Final   11/25/2024 02:48 PM 1.84 1.20 - 4.80 x10*3/uL Final   10/29/2024 10:46 AM 1.56 1.20 - 4.80 x10*3/uL Final     Monocytes Absolute   Date/Time Value Ref Range Status   12/18/2024 03:30 PM 0.81 0.10 - 1.00 x10*3/uL Final   11/25/2024 02:48 PM 0.81 0.10 - 1.00 x10*3/uL Final   10/29/2024 10:46 AM 0.82 0.10 - 1.00 x10*3/uL Final     Eosinophils Absolute   Date/Time Value Ref Range Status   12/18/2024 03:30 PM 0.50 0.00 - 0.70 x10*3/uL Final   11/25/2024 02:48 PM 0.67 0.00 - 0.70 x10*3/uL Final   10/29/2024 10:46 AM 0.56 0.00 - 0.70 x10*3/uL Final     Eosinophils Absolute, Manual   Date/Time Value Ref Range Status   07/30/2024 06:29 PM 0.20 0.00 - 0.70 x10*3/uL Final     Basophils Absolute   Date/Time Value Ref Range Status   12/18/2024 03:30 PM 0.08 0.00 - 0.10 x10*3/uL Final   11/25/2024 02:48 PM 0.08 0.00 - 0.10 x10*3/uL Final   10/29/2024 10:46 AM 0.06 0.00 - 0.10 x10*3/uL Final     Basophils Absolute, Manual   Date/Time Value Ref Range Status   07/30/2024 06:29 PM 0.00 0.00 - 0.10 x10*3/uL Final       No components found for: \"PT\"  aPTT   Date/Time Value Ref Range Status   01/12/2024 01:46 PM 31 27 - 38 seconds Final     Medication Documentation Review Audit       Reviewed by Rayna Adams MA (Medical Assistant) on 11/25/24 at 1504      Medication Order Taking? Sig Documenting Provider Last Dose Status   acetaminophen (Tylenol) 325 mg tablet [id number] Yes Take 2 tablets (650 mg) by mouth every 4 hours if needed for mild pain (1 - 3) or fever (temp greater than 38.0 C). Sunshine Hankins PA-C  Active   adagrasib (Krazati) 200 mg tablet [id number] Yes Take 3 tablets (600 mg total) by mouth 2 times a day. Álavro Galeano MD  Active   albuterol 90 mcg/actuation inhaler [id number] " Yes Inhale 2 puffs every 6 hours if needed for wheezing. Historical Provider, MD  Active   amLODIPine (Norvasc) 10 mg tablet [id number] Yes Take 1 tablet (10 mg) by mouth once daily in the morning. Take before meals. Historical Provider, MD  Active   aspirin 81 mg chewable tablet [id number] Yes Chew 1 tablet (81 mg) once daily. Historical Provider, MD  Active   atorvastatin (Lipitor) 10 mg tablet [id number] Yes Take 1 tablet (10 mg) by mouth once daily. Historical Provider, MD  Active   biotin 10 mg tablet [id number] Yes Take 1 tablet (10 mg) by mouth once daily. Historical Provider, MD  Active   cholecalciferol (Vitamin D3) 5,000 Units tablet [id number] Yes Take 1 tablet (5,000 Units) by mouth once daily. Historical Provider, MD  Active   DULoxetine (Cymbalta) 60 mg DR capsule [id number] Yes Take 1 capsule (60 mg) by mouth once daily. Do not crush or chew. Historical Provider, MD  Active   glipiZIDE (Glucotrol) 5 mg tablet [id number]  Take 1 tablet (5 mg) by mouth 1 time for 1 dose. Aman Guthrie, DO   24 2359   hydroCHLOROthiazide (HYDRODiuril) 25 mg tablet [id number] Yes Take 1 tablet (25 mg) by mouth once daily. Álvaro Galeano MD  Active   insulin aspart (NovoLOG PenFill U-100 Insulin) 100 unit/mL pen cartridge [id number]  Inject 15 Units under the skin 3 times daily (morning, midday, late afternoon). Take as directed per insulin instructions. Mild insulin sliding scale Yanni Smalls, DO   24 2359   levothyroxine (Synthroid, Levoxyl) 25 mcg tablet [id number] Yes Take 1 tablet (25 mcg) by mouth early in the morning.. Historical Provider, MD  Active   metFORMIN (Glucophage) 1,000 mg tablet [id number] Yes Take 1 tablet (1,000 mg) by mouth 2 times a day. Historical Provider, MD  Active   nystatin (Mycostatin) 100,000 unit/gram powder [id number] Yes Apply 1 Application topically 2 times a day. Álvaro Galeano MD  Active   ondansetron (Zofran) 4 mg tablet [id number] Yes Take 1 tablet (4 mg) by mouth  every 8 hours if needed for nausea or vomiting. Álvaro Galeano MD  Active   oxygen (O2) gas therapy [id number] Yes Inhale 1 each once every 24 hours. Yanni Smalls,   Active   pantoprazole (ProtoNix) 40 mg EC tablet [id number] Yes Take 1 tablet (40 mg) by mouth once daily in the morning. Take before meals. Historical Provider, MD  Active   prochlorperazine (Compazine) 10 mg tablet [id number] Yes Take 1 tablet (10 mg) by mouth every 6 hours if needed for nausea or vomiting. Álvaro Galeano MD  Active   tiotropium (Spiriva) 18 mcg inhalation capsule [id number] Yes Place 1 capsule (18 mcg) into inhaler and inhale once daily. Historical Provider, MD  Active   traZODone (Desyrel) 50 mg tablet [id number] Yes Take 1 tablet (50 mg) by mouth once daily at bedtime. Historical Provider, MD  Active   Wixela Inhub 250-50 mcg/dose diskus inhaler [id number] Yes Inhale 1 puff 2 times a day. Historical Provider, MD  Active   zolpidem CR (Ambien CR) 6.25 mg ER tablet [id number] Yes Take 10 mg by mouth as needed at bedtime for sleep. Do not crush, chew, or split. Historical Provider, MD  Active                   Assessment/Plan    1) lung cancer  -12/8/2022 chest CT: NICHOL anterior segment 1.6 x 2.2 cm nodule, partial pleural attachment, nonspecific low volume paratracheal mediastinal LN largest near AP window 1.0 x 1.4 cm, right subcarinal space 8 x 14 mm  -12/12/2022 PET: NICHOL 1.8 x 2.3 cm mass with SUV 5.8, lateral margin abuts pleura; no significant mediastinal or hilar hypermetabolic lymphadenopathy  -12/28/2022 chest CT: 2.5 cm cavitary nodule in NICHOL  -1/7/2023 PET scan  -had surgery for stage I NSCLC, s/p lobectomy (Dr Fagan, 1/31/2023)  -2/1/2023 CT chest: no PE, postop changes in left chest, small left PTX in left upper anterior chest  -2/26/2023 chest CT no PE, continued mildly enlarged mediastinal lymph nodes  -saw Dr Solis at [facility] on 3/13/2023 - she had a [id number] (stage Ib) lung adenocarcinoma (poor NCCN risk factors - G3, +  visceral involvement), s/p left robotic assisted anatomic upper lobectomy, PD-L1 90%, +[id number]  -per his charting, he recommended either adjuvant cisplatin + pemetrexed x 4 cycles vs surveillance  -according to  Thu, Dr Solis never told her about her high risk features nor any adjuvant option, and that the only thing he recommended was observation  -7/11/2023 CT chest : stable postsurgical changes of prior left thoracotomy and left upper lobectomy with interval resolution of bilateral pleural effusions; interval progression of lymphadenopathy in the chest concerning for progressing ghazala metastatic disease     7/25/2023 underwent EBUS: A - EBUS TRANSBRONCHIAL FINE NEEDLE ASPIRATE, LYMPH NODE  - 4L             Negative for malignant cells.             Benign lymphoid sample (see comment).   B - EBUS TRANSBRONCHIAL FINE NEEDLE ASPIRATE, LYMPH NODE  - 10L             Negative for malignant cells.                   Benign lymphoid sample.  C - EBUS TRANSBRONCHIAL FINE NEEDLE ASPIRATE, LYMPH NODE  - STATION 7             Negative for malignant cells.                 Benign lymphoid sample.   -8/9/2023 PET scan: 3.0 x 2.1 cm left prevascular node with SUV 7.9; few additional prominent mediastinal nodes with low level uptake; left lower paratracheal node 0.8 cm with SUV 2.2; mild FDG uptake in left hilum SUV 3.1  -11/14/2023 chest CT: enlarged 2.2 cm prevascular lymph node not significantly changed  -she was told by her pulmonologist Dr Kang that she has cancer  -discussed her original path with her--while it was a small tumor and stage Ib, she did have a couple high risk features that would have warranted adjuvant chemotherapy followed by atezolizumab; also if she does have a recurrence that isn't amenable to surgery, she would also be a candidate for immunotherapy then KRAS inhibitor (FDA approved only in 2nd line setting)  -will discuss with thoracic surgeon--will import all [facility] films to review; may need CT guided bx  by IR of this prevascular node   -she had biopsy done on 12/13/2023--path confirmed poorly differentiated lung carcinoma, PD-L1 90%, KRAS G12C mutation  -she saw Dr Calixto on 12/28/2023--he advised surgery, provided that she can pass her PFTs  - on 1/30/2024 Dr Calixto took her to the OR for robotic assisted redo left mediastinal exploration, left anterior mediastinal mass resection, diaphragmatic pacer placement  -path showed poorly differentiated carcinoma with pleomorphic/spindle cell and clear cell features; 4 lymph nodes with no evidence of malignancy; sections show a poorly differentiated carcinoma with pleomorphic/spindle cell and clear cell features involving fibroadipose tissue with a large area of central necrosis; tumor cells are positive for AE1/AE3, CAM 5.2, CK7 and negative for TTF-1, p40.  -pt is most interested in doing whatever is necessary to reduce her risk for recurrence  -she and  state again that the [facility] oncologist told them that adjuvant therapy was not necessary after her first surgery, even though his note documented that he recommended it  -as her tumor has high PD-L1 expression, she is a candidate for pembrolizumab; if she relapses, she is also a candidate for KRAS G12C inhibitor  -has completed 6 doses of adjuvant pembrolizumab (out of 17)  -CT scan done on 6/19/2024 showed  mediastinum with subcarinal lymphadenopathy 10 mm in short axis, there is component of AP window lymphadenopathy 17 x 15 mm; mesentery demonstrates soft tissue lesion within midline lower abdominal mesentery 1.6 x 1.2 cm; pleural based nodular lesion in RLL posteriorly 8 mm with surrounding ground glass airspace infiltrate  -she was admitted recently for pneumonia--CT angio done on 7/23 showed no evidence of pulmonary embolus, multifocal airspace disease in the right lung suggesting pneumonia  -right after she was discharged from [facility], she then went back to the ER for hyperglycemia ()  -  "Durga has scheduled her next chest CT for 8/2024--pt is wondering if that is still necessary  -on 7/14/2024 she underwent an EBUS--lymph node level 4L was sampled--showed NSCLC, favor squamous cell carcinoma  -given that she is now confirmed to have new and/or persistent disease in her mediastinum, I have advised switching to new therapy; as her tumor has the KRAS G12C mutation, I have recommended switching to KRAS G12C inhibitor, namely adagrasib  -benefits, risks, potential morbidity related to adagrasib were reviewed with Thu and she signed informed consent to proceed  -she will take adagrasib (Krazati) 600 mg PO BID  -she also has severe body pains secondary to fibromyalgia--she  says in the hospital she was prescribed percocet PRN and asked if I could prescribe it; I did inform her that I will treat pain only secondary to cancer; she therefore was willing to be referred to pain management (Dr Melendrez)  -here for interval followup  -has been taking adagrasib 400 mg BID as adagrasib can be associated with leg edema/fluid retention, however dropping the dose down did not seem to help with reducing the leg edema; she also has been taking hydrochlorothiazide 12.5 mg daily without any effect; also reported \"not urinating enough\"--will check leg doppler to rule out DVT and also advised her to increase hydrochlorothiazide to 25 mg daily  -wants to vacation in [place] again--and will be going there for at least 2 weeks in October  -saw Dr Melendrez--she received a diagnostic medial nerve branch block targeting L3-L4--she said pain relief lasted only 4 days  -advised Thu to continue with  adagrasib to 400 mg BID  -will now schedule PET scan to be done in next couple weeks as restaging of her lung cancer  -here for interval followup via telephone  -leaving for [place] on Monday  -9/25/2024 doppler of legs showed no DVT in either leg  -edema improved with doubling of hydrochlorothiazide  -PET scan done on " 10/3/2024 reviewed--postsurgical changes within the right parietal bone compatible with craniectomy; postsurgical changes compatible with left upper lobectomy without focal hypermetabolic activity to suggest disease recurrence; interval resolution of multifocal ground glass and nodular opacities within right upper lobe; no hypermetabolic pulmonary lesions; no hypermetabolic mediastinal lymphadenopathy; prominent bilateral axillary lymph nodes with mild hypermetabolic activity up to 1.3 on right and 1.1 on left likely reactive; no focal hypermetabolic lesion to suggest osseous metastasis  -here for interval followup  -I did advise Thu to hold off on taking adagrasib for the 2 weeks she was in [place]--her leg edema completely decompressed and resolved  -as soon as she returned home to [place], she restarted the adagrasib 400 mg BID along with hydrochlorothiazide  -however now taking 400 mg in AM, 200 mg in PM  -has developed some light pitting edema in her legs (trace to 1+)  -here for interval followup via telephone  -labs done on 12/18/2024 included CBC + COMP, CEA  results reviewed--wbc 7.2, hgb 10.3, plt 259,000, potassium 3.6 creatinine 1.21, calcium 10.2, AST 20, ALT 13, CEA 2.9  -CT chest done on 12/18/2024 reviewed--there is no hilar or mediastinal lymphadenopathy; minimal left pleural effusion; 5 mm RLL nodule laterally; there is nearly complete clearing of the ground glass infiltrates within the right hemithorax; there are few small patchy foci of residual ground glass infiltrate within the superior segment right lower lobe and posterior segment right lower lobe  -Thu will continue to take adagrasib 400 mg in AM, 200 mg in PM  -will see her again in 2 months     2) COPD  -on albuterol  -on incruse ellipta     3) atrial fibrillation  -on amiodarone  -on warfarin     4) hypertension  -on norvasc  -on chlorthalidone  -on lasix  -on lisinopril  -on metoprolol     5) hyperlipidemia  -on atorvastatin      6) major depression  -on cymbalta     7) diabetes  -on novolog insulin  -on metformin       Problem List Items Addressed This Visit             ICD-10-CM    Malignant neoplasm of upper lobe of left lung (Multi) C34.12            Álvaro Galeano MD

## 2024-12-23 NOTE — PROGRESS NOTES
Pt lactate resulted with 2.5 and 3.2 on VBG.  MD/ PA made aware of results. Child completed bolus and is ordered for a repeat lac. First draw from heel, lab called with QNS, explained that child is 53 days old, lab states they needed 3-4 ml to complete test.  Sukhjinder capone at bedside speaking with mother and md hahn.  Plan is to still collect lactate for possible admission vs dc Thu Ortega is a 69 y.o. year old female patient who had a patient care encounter with Dr. Galeano on 12/23/24 for ongoing management of her NSCLC, KRAS G12C+.  Thu is currently being treated with adagrasib.    Results from last 7 days   Lab Units 12/18/24  1530   WBC AUTO x10*3/uL 7.3   HEMOGLOBIN g/dL 10.7*   HEMATOCRIT % 34.3*   PLATELETS AUTO x10*3/uL 289   NEUTROS PCT AUTO % 58.0   LYMPHS PCT AUTO % 22.5   MONOS PCT AUTO % 11.1   EOS PCT AUTO % 6.9      Results from last 7 days   Lab Units 12/18/24  1530   SODIUM mmol/L 138   POTASSIUM mmol/L 3.6   CHLORIDE mmol/L 96*   CO2 mmol/L 31   BUN mg/dL 13   CREATININE mg/dL 1.21*   CALCIUM mg/dL 10.2   PROTEIN TOTAL g/dL 7.8   BILIRUBIN TOTAL mg/dL 0.5   ALK PHOS U/L 110   ALT U/L 13   AST U/L 20   GLUCOSE mg/dL 159*     Continue adagrasib at current dose-reduced dose of 400 mg in AM, 200 mg in PM (per 11/25/24 clinic).      Per collaborative practice agreement with Dr. Galeano, on 12/23/24 I revised the [facility] plan for adagrasib to be for 400 mg (2 x 200 mg) PO in AM and 200 mg PO in PM, 30-day cycle.  Qty # 90 with 3 refills.  The plan was sent to Dr. Galeano for approval, pending subsequent electronic transmission to  Specialty Pharmacy.    Next FUV is in approx. 2 mo.      Tariq Walsh [facility], MS, BCOP  Clinical Pharmacist Specialist - Ambulatory Oncology

## 2024-12-24 DIAGNOSIS — C34.92 LOCAL RECURRENCE OF LEFT LUNG CANCER (MULTI): Primary | ICD-10-CM

## 2024-12-24 DIAGNOSIS — C34.12 MALIGNANT NEOPLASM OF UPPER LOBE OF LEFT LUNG (MULTI): ICD-10-CM

## 2024-12-26 PROCEDURE — RXMED WILLOW AMBULATORY MEDICATION CHARGE

## 2024-12-27 ENCOUNTER — SPECIALTY PHARMACY (OUTPATIENT)
Dept: PHARMACY | Facility: CLINIC | Age: 69
End: 2024-12-27

## 2024-12-29 ASSESSMENT — ENCOUNTER SYMPTOMS
HEMATOLOGIC/LYMPHATIC NEGATIVE: 1
ENDOCRINE NEGATIVE: 1
NEUROLOGICAL NEGATIVE: 1
BACK PAIN: 1
PSYCHIATRIC NEGATIVE: 1
SHORTNESS OF BREATH: 1
LEG SWELLING: 1
FATIGUE: 1
EYES NEGATIVE: 1
GASTROINTESTINAL NEGATIVE: 1

## 2024-12-30 ENCOUNTER — PHARMACY VISIT (OUTPATIENT)
Dept: PHARMACY | Facility: CLINIC | Age: 69
End: 2024-12-30
Payer: MEDICARE

## 2025-01-02 ENCOUNTER — APPOINTMENT (OUTPATIENT)
Dept: CARDIOLOGY | Facility: CLINIC | Age: 70
End: 2025-01-02
Payer: MEDICARE

## 2025-01-06 ENCOUNTER — APPOINTMENT (OUTPATIENT)
Dept: CARDIOLOGY | Facility: CLINIC | Age: 70
End: 2025-01-06
Payer: MEDICARE

## 2025-01-06 VITALS
DIASTOLIC BLOOD PRESSURE: 66 MMHG | BODY MASS INDEX: 30.44 KG/M2 | SYSTOLIC BLOOD PRESSURE: 122 MMHG | WEIGHT: 151 LBS | OXYGEN SATURATION: 96 % | HEIGHT: 59 IN | HEART RATE: 76 BPM

## 2025-01-06 DIAGNOSIS — I10 PRIMARY HYPERTENSION: ICD-10-CM

## 2025-01-06 DIAGNOSIS — I48.0 PAF (PAROXYSMAL ATRIAL FIBRILLATION) (MULTI): Primary | ICD-10-CM

## 2025-01-06 PROCEDURE — 1036F TOBACCO NON-USER: CPT | Performed by: INTERNAL MEDICINE

## 2025-01-06 PROCEDURE — 99213 OFFICE O/P EST LOW 20 MIN: CPT | Performed by: INTERNAL MEDICINE

## 2025-01-06 PROCEDURE — 3074F SYST BP LT 130 MM HG: CPT | Performed by: INTERNAL MEDICINE

## 2025-01-06 PROCEDURE — 1125F AMNT PAIN NOTED PAIN PRSNT: CPT | Performed by: INTERNAL MEDICINE

## 2025-01-06 PROCEDURE — 3008F BODY MASS INDEX DOCD: CPT | Performed by: INTERNAL MEDICINE

## 2025-01-06 PROCEDURE — 3078F DIAST BP <80 MM HG: CPT | Performed by: INTERNAL MEDICINE

## 2025-01-06 PROCEDURE — 1159F MED LIST DOCD IN RCRD: CPT | Performed by: INTERNAL MEDICINE

## 2025-01-06 ASSESSMENT — PAIN SCALES - GENERAL: PAINLEVEL_OUTOF10: 7

## 2025-01-06 NOTE — PROGRESS NOTES
Name : Thu Ortega   : 1955   MRN : [id number]   ENC Date : 2025      Assessment and Plan:  Paroxysmal atrial fibrillation: Patient remains in normal sinus rhythm.  No clinical recurrence.  Okay to remain off anticoagulation and not resume amiodarone.  Krazati therapy: I reviewed the literature on this and spoke with cardio oncology.  The only significant risk appears to be QTc prolongation.  EKGs were checked after initiation of therapy and QTc interval was normal.  There is no specific further monitoring needed.  Hypertension: Blood pressure is well-controlled recommend no changes  Disp: RTO in 6 months or sooner if needed    HPI:  Patient returns today doing well from a cardiac standpoint.  She reports no palpitations or clinical recurrence of atrial fibrillation.  Her chemotherapy regimen was changed last year and she has had good tumor response but has some nonspecific side effects from the therapy.  I reviewed the new treatment regimen, Krazati.  The only significant potential side effect is for QTc prolongation.  EKGs were checked after initiation and QTc intervals were normal.  She has had no syncopal events.  No palpitations.  Nothing to suggest any arrhythmias.  She is otherwise tolerating her medications well.    Problem list overview:   Patient Active Problem List   Diagnosis    Malignant neoplasm of upper lobe of left lung (Multi)    Panlobular emphysema (Multi)    Primary hypertension    Mixed hyperlipidemia    Recurrent major depressive disorder, in partial remission (CMS-HCC)    Type 2 diabetes mellitus without complication, with long-term current use of insulin (Multi)    PAF (paroxysmal atrial fibrillation) (Multi)    Local recurrence of left lung cancer (Multi)    Non-small cell lung cancer without metastasis (Multi)    CHEY (obstructive sleep apnea)    Chronic obstructive pulmonary disease (Multi)    Non-small cell cancer of left lung (Multi)    History of meningioma of the brain     Mediastinal adenopathy    Dyspnea on exertion    History of home oxygen therapy    Pneumonia due to infectious organism    Acute on chronic hypoxic respiratory failure (Multi)    Fibromyalgia    Bilateral leg edema       Meds:   Current Outpatient Medications on File Prior to Visit   Medication Sig Dispense Refill    acetaminophen (Tylenol) 325 mg tablet Take 2 tablets (650 mg) by mouth every 4 hours if needed for mild pain (1 - 3) or fever (temp greater than 38.0 C). 30 tablet 0    adagrasib (Krazati) 200 mg tablet Take 2 tablets (400 mg) by mouth in the morning and take 1 tablet (200 mg) by mouth in the evening. 90 tablet 3    albuterol 90 mcg/actuation inhaler Inhale 2 puffs every 6 hours if needed for wheezing.      amLODIPine (Norvasc) 10 mg tablet Take 1 tablet (10 mg) by mouth once daily in the morning. Take before meals.      aspirin 81 mg chewable tablet Chew 1 tablet (81 mg) once daily.      atorvastatin (Lipitor) 10 mg tablet Take 1 tablet (10 mg) by mouth once daily.      biotin 10 mg tablet Take 1 tablet (10 mg) by mouth once daily.      cholecalciferol (Vitamin D3) 5,000 Units tablet Take 1 tablet (5,000 Units) by mouth once daily.      DULoxetine (Cymbalta) 60 mg DR capsule Take 1 capsule (60 mg) by mouth once daily. Do not crush or chew.      hydroCHLOROthiazide (HYDRODiuril) 25 mg tablet Take 1 tablet (25 mg) by mouth once daily. 30 tablet 11    insulin aspart (NovoLOG PenFill U-100 Insulin) 100 unit/mL pen cartridge Inject 15 Units under the skin 3 times daily (morning, midday, late afternoon). Take as directed per insulin instructions. Mild insulin sliding scale 15 mL 0    levothyroxine (Synthroid, Levoxyl) 25 mcg tablet Take 1 tablet (25 mcg) by mouth early in the morning..      metFORMIN (Glucophage) 1,000 mg tablet Take 1 tablet (1,000 mg) by mouth 2 times a day.      ondansetron (Zofran) 4 mg tablet Take 1 tablet (4 mg) by mouth every 8 hours if needed for nausea or vomiting. 60 tablet 2     "oxygen (O2) gas therapy Inhale 1 each once every 24 hours.      pantoprazole (ProtoNix) 40 mg EC tablet Take 1 tablet (40 mg) by mouth once daily in the morning. Take before meals.      prochlorperazine (Compazine) 10 mg tablet Take 1 tablet (10 mg) by mouth every 6 hours if needed for nausea or vomiting. 30 tablet 5    tiotropium (Spiriva) 18 mcg inhalation capsule Place 1 capsule (18 mcg) into inhaler and inhale once daily.      zolpidem CR (Ambien CR) 6.25 mg ER tablet Take 10 mg by mouth as needed at bedtime for sleep. Do not crush, chew, or split.      glipiZIDE (Glucotrol) 5 mg tablet Take 1 tablet (5 mg) by mouth 1 time for 1 dose. (Patient not taking: Reported on 2025) 30 tablet 0    nystatin (Mycostatin) 100,000 unit/gram powder Apply 1 Application topically 2 times a day. (Patient not taking: Reported on 2025) 15 g 2    traZODone (Desyrel) 50 mg tablet Take 1 tablet (50 mg) by mouth once daily at bedtime. (Patient not taking: Reported on 2025)      Wixela Inhub 250-50 mcg/dose diskus inhaler Inhale 1 puff 2 times a day. (Patient not taking: Reported on 2025)       No current facility-administered medications on file prior to visit.        VS:  /66 (BP Location: Right arm, Patient Position: Sitting, BP Cuff Size: Adult)   Pulse 76   Ht 1.499 m (4' 11\")   Wt 68.5 kg (151 lb)   SpO2 96%   BMI 30.50 kg/m²     Vitals reviewed.   Neck:      Vascular: No JVD.   Pulmonary:      Effort: Pulmonary effort is normal.      Breath sounds: Normal breath sounds.   Cardiovascular:      Normal rate. Regular rhythm.      Murmurs: There is no murmur.      No gallop.    Pulses:     Intact distal pulses.   Edema:     Peripheral edema absent.   Abdominal:      General: Abdomen is flat.      Palpations: Abdomen is soft.   Neurological:      General: No focal deficit present.      Mental Status: Alert.   Psychiatric:         Mood and Affect: Mood normal.         EC2024: Normal sinus rhythm.  " Borderline LVH.  QTc interval normal.      Dino Fox MD

## 2025-01-25 ENCOUNTER — SPECIALTY PHARMACY (OUTPATIENT)
Dept: PHARMACY | Facility: CLINIC | Age: 70
End: 2025-01-25

## 2025-01-25 PROCEDURE — RXMED WILLOW AMBULATORY MEDICATION CHARGE

## 2025-01-28 ENCOUNTER — PHARMACY VISIT (OUTPATIENT)
Dept: PHARMACY | Facility: CLINIC | Age: 70
End: 2025-01-28
Payer: COMMERCIAL

## 2025-01-30 ENCOUNTER — OFFICE VISIT (OUTPATIENT)
Dept: ENDOCRINOLOGY | Age: 70
End: 2025-01-30
Payer: MEDICARE

## 2025-01-30 VITALS
OXYGEN SATURATION: 98 % | DIASTOLIC BLOOD PRESSURE: 64 MMHG | WEIGHT: 147 LBS | HEIGHT: 59 IN | SYSTOLIC BLOOD PRESSURE: 111 MMHG | BODY MASS INDEX: 29.64 KG/M2

## 2025-01-30 DIAGNOSIS — E03.9 ACQUIRED HYPOTHYROIDISM: ICD-10-CM

## 2025-01-30 DIAGNOSIS — E11.65 UNCONTROLLED TYPE 2 DIABETES MELLITUS WITH HYPERGLYCEMIA (HCC): Primary | ICD-10-CM

## 2025-01-30 LAB
CHP ED QC CHECK: NORMAL
GLUCOSE BLD-MCNC: 178 MG/DL
HBA1C MFR BLD: 7.4 %

## 2025-01-30 PROCEDURE — G8417 CALC BMI ABV UP PARAM F/U: HCPCS | Performed by: INTERNAL MEDICINE

## 2025-01-30 PROCEDURE — 1123F ACP DISCUSS/DSCN MKR DOCD: CPT | Performed by: INTERNAL MEDICINE

## 2025-01-30 PROCEDURE — G8400 PT W/DXA NO RESULTS DOC: HCPCS | Performed by: INTERNAL MEDICINE

## 2025-01-30 PROCEDURE — 3017F COLORECTAL CA SCREEN DOC REV: CPT | Performed by: INTERNAL MEDICINE

## 2025-01-30 PROCEDURE — 3051F HG A1C>EQUAL 7.0%<8.0%: CPT | Performed by: INTERNAL MEDICINE

## 2025-01-30 PROCEDURE — 3078F DIAST BP <80 MM HG: CPT | Performed by: INTERNAL MEDICINE

## 2025-01-30 PROCEDURE — 82962 GLUCOSE BLOOD TEST: CPT | Performed by: INTERNAL MEDICINE

## 2025-01-30 PROCEDURE — 1090F PRES/ABSN URINE INCON ASSESS: CPT | Performed by: INTERNAL MEDICINE

## 2025-01-30 PROCEDURE — 2022F DILAT RTA XM EVC RTNOPTHY: CPT | Performed by: INTERNAL MEDICINE

## 2025-01-30 PROCEDURE — 99214 OFFICE O/P EST MOD 30 MIN: CPT | Performed by: INTERNAL MEDICINE

## 2025-01-30 PROCEDURE — G8428 CUR MEDS NOT DOCUMENT: HCPCS | Performed by: INTERNAL MEDICINE

## 2025-01-30 PROCEDURE — 1036F TOBACCO NON-USER: CPT | Performed by: INTERNAL MEDICINE

## 2025-01-30 PROCEDURE — 83036 HEMOGLOBIN GLYCOSYLATED A1C: CPT | Performed by: INTERNAL MEDICINE

## 2025-01-30 PROCEDURE — 3074F SYST BP LT 130 MM HG: CPT | Performed by: INTERNAL MEDICINE

## 2025-01-30 RX ORDER — AZITHROMYCIN 250 MG/1
250 TABLET, FILM COATED ORAL
COMMUNITY
Start: 2024-08-15

## 2025-01-30 RX ORDER — AMLODIPINE BESYLATE 10 MG/1
10 TABLET ORAL DAILY
COMMUNITY
Start: 2024-12-04

## 2025-01-30 RX ORDER — TIOTROPIUM BROMIDE INHALATION SPRAY 3.12 UG/1
2 SPRAY, METERED RESPIRATORY (INHALATION)
COMMUNITY
Start: 2024-08-15

## 2025-01-30 RX ORDER — HYDROCHLOROTHIAZIDE 25 MG/1
25 TABLET ORAL
COMMUNITY
Start: 2024-12-02

## 2025-01-30 RX ORDER — LEVOTHYROXINE SODIUM 125 UG/1
125 TABLET ORAL DAILY
Qty: 90 TABLET | Refills: 3 | Status: SHIPPED | OUTPATIENT
Start: 2025-01-30

## 2025-01-30 RX ORDER — CELECOXIB 100 MG/1
100 CAPSULE ORAL 2 TIMES DAILY
COMMUNITY
Start: 2025-01-13 | End: 2026-01-13

## 2025-01-30 RX ORDER — PREGABALIN 25 MG/1
25 CAPSULE ORAL 2 TIMES DAILY
COMMUNITY
Start: 2025-01-08

## 2025-01-30 RX ORDER — PREDNISONE 10 MG/1
10 TABLET ORAL
COMMUNITY
Start: 2024-08-15

## 2025-01-30 RX ORDER — TRAMADOL HYDROCHLORIDE 100 MG/1
TABLET, COATED ORAL
COMMUNITY
Start: 2025-01-14

## 2025-01-30 NOTE — PROGRESS NOTES
reference interval    For someone without known diabetes, a glucose  value >125 mg/dL indicates that they may have  diabetes and this should be confirmed with a  follow-up test.   BUN  7 - 25 mg/dL 15    Creatinine  0.60 - 1.00 mg/dL 1.27 High     EGFR  > OR = 60 mL/min/1.73m2 45 Low     BUN/CREATININE RATIO  6 - 22 (calc) 12    Sodium  135 - 146 mmol/L 139    Potassium, Bld  3.5 - 5.3 mmol/L 4.5    Chloride  98 - 110 mmol/L 98    Carbon Dioxide  20 - 32 mmol/L 27    Calcium  8.6 - 10.4 mg/dL 10.2    PROTEIN, TOTAL  6.1 - 8.1 g/dL 8.1    ALBUMIN  3.6 - 5.1 g/dL 5.1    GLOBULIN  1.9 - 3.7 g/dL (calc) 3    ALBUMIN/GLOBULIN RATIO  1.0 - 2.5 (calc) 1.7    BILIRUBIN, TOTAL  0.2 - 1.2 mg/dL 0.5    ALKALINE PHOSPHATASE  37 - 153 U/L 115    AST  10 - 35 U/L 25    ALT  6 - 29 U/L 17    Resulting Agency [facility]    Narrative  Performed by [facility]  FASTING:YES    FASTING: YES  Resulting Agency    Component  Ref Range & Units 3 wk ago Comments   WHITE BLOOD CELL COUNT  3.8 - 10.8 Thousand/uL 6.9    RED BLOOD CELL COUNT  3.80 - 5.10 Million/uL 4.87    HEMOGLOBIN  11.7 - 15.5 g/dL 11.3 Low     HEMATOCRIT  35.0 - 45.0 % 37.3    MCV  80.0 - 100.0 fL 76.6 Low     MCH  27.0 - 33.0 pg 23.2 Low     MCHC  32.0 - 36.0 g/dL 30.3 Low  For adults, a slight decrease in the calculated MCHC  value (in the range of 30 to 32 g/dL) is most likely  not clinically significant; however, it should be  interpreted with caution in correlation with other  red cell parameters and the patient's clinical  condition.   RDW  11.0 - 15.0 % 16.8 High     PLATELET COUNT  140 - 400 Thousand/uL 301    MPV  7.5 - 12.5 fL 10.6    ABSOLUTE NEUTROPHILS  1500 - 7800 cells/uL 3,981    ABSOLUTE LYMPHOCYTES  850 - 3900 cells/uL 1,725    ABSOLUTE MONOCYTES  200 - 950 cells/uL 600    ABSOLUTE EOSINOPHILS  15 - 500 cells/uL 511 High     ABSOLUTE BASOPHILS  0 - 200 cells/uL 83    NEUTROPHILS  % 57.7    LYMPHOCYTES  % 25    MONOCYTES  % 8.7    EOSINOPHILS  % 7.4    BASOPHILS  %

## 2025-02-03 ASSESSMENT — ENCOUNTER SYMPTOMS: EYES NEGATIVE: 1

## 2025-02-12 ENCOUNTER — HOSPITAL ENCOUNTER (OUTPATIENT)
Dept: RADIOLOGY | Facility: CLINIC | Age: 70
Discharge: HOME | End: 2025-02-12
Payer: MEDICARE

## 2025-02-12 VITALS — WEIGHT: 151 LBS | BODY MASS INDEX: 30.44 KG/M2 | HEIGHT: 59 IN

## 2025-02-12 DIAGNOSIS — Z12.31 ENCOUNTER FOR SCREENING MAMMOGRAM FOR MALIGNANT NEOPLASM OF BREAST: ICD-10-CM

## 2025-02-12 PROCEDURE — 77067 SCR MAMMO BI INCL CAD: CPT

## 2025-02-17 ENCOUNTER — HOSPITAL ENCOUNTER (OUTPATIENT)
Dept: RADIOLOGY | Facility: EXTERNAL LOCATION | Age: 70
Discharge: HOME | End: 2025-02-17
Payer: MEDICARE

## 2025-02-19 ENCOUNTER — OFFICE VISIT (OUTPATIENT)
Dept: PAIN MEDICINE | Facility: CLINIC | Age: 70
End: 2025-02-19
Payer: MEDICARE

## 2025-02-19 VITALS
RESPIRATION RATE: 18 BRPM | DIASTOLIC BLOOD PRESSURE: 64 MMHG | SYSTOLIC BLOOD PRESSURE: 120 MMHG | OXYGEN SATURATION: 100 % | HEART RATE: 88 BPM | TEMPERATURE: 98.1 F

## 2025-02-19 DIAGNOSIS — M54.2 CERVICALGIA: Primary | ICD-10-CM

## 2025-02-19 PROCEDURE — 1159F MED LIST DOCD IN RCRD: CPT

## 2025-02-19 PROCEDURE — 99214 OFFICE O/P EST MOD 30 MIN: CPT

## 2025-02-19 PROCEDURE — 1125F AMNT PAIN NOTED PAIN PRSNT: CPT

## 2025-02-19 PROCEDURE — 3074F SYST BP LT 130 MM HG: CPT

## 2025-02-19 PROCEDURE — 3078F DIAST BP <80 MM HG: CPT

## 2025-02-19 RX ORDER — METHOCARBAMOL 500 MG/1
500 TABLET, FILM COATED ORAL 3 TIMES DAILY PRN
Qty: 30 TABLET | Refills: 0 | Status: SHIPPED | OUTPATIENT
Start: 2025-02-19 | End: 2025-03-01

## 2025-02-19 ASSESSMENT — PAIN SCALES - GENERAL: PAINLEVEL_OUTOF10: 9

## 2025-02-19 NOTE — PROGRESS NOTES
Subjective   Patient ID: Thu Ortega is a 69 y.o. female who presents for No chief complaint on file..  HPI    68 yo female presents today for neck pain. She was last seen in clinic in August 2024 for low back pain. Reports neck pain that radiates down the bilateral arms with the left being worse than the right. Pain started one month ago. Reports occasional headaches. Denies tinnitus, denies visual changes. Reports numbness and tingling in the hands and toes. Denies bowel or bladder incontinence. States that she is unable to lift her left arm above her head due to the pain. Pain is rated 9/10 in the neck. Alleviating factors include nothing, has tried topicals. Aggravating factors include movement. Was given tramadol 100mg twice daily by another provider, did not appreciate any relief from this. Tylenol and ibuprofen are ineffective. She is prescribed pregabalin 25mg once daily, unable to tolerate higher doses. Had trigger point injection in low back 2 weeks ago. Currently being treated for lung cancer by Dr. Galeano, on adagrasib. Has never completed physical therapy for her neck.     Review of Systems  All 13 systems were reviewed and are within normal levels except as noted below or per HPI. Positive and pertinent negative responses are noted below or in the HPI   Denied any fever or chills. No weight loss and no night sweats. No cough or sputum production. No diarrhea   Denies constipation.   No bladder and bowel incontinence and no other changes in bladder and bowel. No skin changes. Reports tiredness and fatigability only if the pain is not controlled.   Denied opioids diversion and abuse and denies alcoholism. Denies overuse of the pain medications.          Objective   Physical Exam  General   Alert and oriented x4, pleasant and cooperative.      HEENT  Pupils are equal and normal in size. Ears, nose, mouth, and throat appear to be WNL.  Head atraumatic, symmetric.      No signs of sedation or signs of  withdrawal apparent.     Psychiatric   No signs of depression apparent. Appropriate mood and affect.     Neuro   No focal neurological deficit apparent. Ambulation at baseline. Normal visual inspection of the cervical spine. ROM of cervical spine intact. Cervical paraspinals tender to palpation. Normal strength and sensation in the bilateral upper extremities.      Respiratory  No respiratory distress, respirations equal and unlabored. On 2L O2 NC.      Abdomen  Soft and nontender, no distention noted.     Skin  Warm, dry and intact. No skin markings supportive of recent IV drug usage .            Assessment/Plan     68 yo female with neck pain associated with cervicalgia, cervical radiculopathy.     Obtain X-Ray of cervical spine. Referral for physical therapy provided. If pain persists we discussed cervical epidural steroid injection under fluoroscopic guidance. Case discussed with Dr. Melendrez.     Prescription for methocarbamol provided for relief of muscle spasms, patient advised to take the least amount of medication needed.     Follow up as needed.     Explained plan to this patient, and patient verbalized understanding and agreement with the plan.     Please do not hesitate to contact the pain clinic after your visit with any questions or concerns at  M-F 8-4 pm     Patient was reminded not to share medications, not to take prescription medications that were not prescribed to the patient, and not to increase or change dose without consulting the pain clinic. I advised the patient to always take the least amount of medication needed to keep symptoms under control.          Angelique Tabor, ANAYA-CNP 02/19/25 1:54 PM

## 2025-02-24 DIAGNOSIS — C34.12 MALIGNANT NEOPLASM OF UPPER LOBE OF LEFT LUNG (MULTI): ICD-10-CM

## 2025-02-26 ENCOUNTER — OFFICE VISIT (OUTPATIENT)
Dept: HEMATOLOGY/ONCOLOGY | Facility: CLINIC | Age: 70
End: 2025-02-26
Payer: MEDICARE

## 2025-02-26 ENCOUNTER — LAB (OUTPATIENT)
Dept: LAB | Facility: CLINIC | Age: 70
End: 2025-02-26
Payer: MEDICARE

## 2025-02-26 VITALS
TEMPERATURE: 97.9 F | HEART RATE: 84 BPM | WEIGHT: 143.3 LBS | OXYGEN SATURATION: 97 % | BODY MASS INDEX: 28.94 KG/M2 | DIASTOLIC BLOOD PRESSURE: 71 MMHG | RESPIRATION RATE: 18 BRPM | SYSTOLIC BLOOD PRESSURE: 122 MMHG

## 2025-02-26 DIAGNOSIS — E61.1 IRON DEFICIENCY: ICD-10-CM

## 2025-02-26 DIAGNOSIS — I10 PRIMARY HYPERTENSION: ICD-10-CM

## 2025-02-26 DIAGNOSIS — C34.12 MALIGNANT NEOPLASM OF UPPER LOBE OF LEFT LUNG (MULTI): Primary | ICD-10-CM

## 2025-02-26 DIAGNOSIS — I48.0 PAF (PAROXYSMAL ATRIAL FIBRILLATION) (MULTI): ICD-10-CM

## 2025-02-26 DIAGNOSIS — F33.41 RECURRENT MAJOR DEPRESSIVE DISORDER, IN PARTIAL REMISSION (CMS-HCC): ICD-10-CM

## 2025-02-26 DIAGNOSIS — J43.1 PANLOBULAR EMPHYSEMA (MULTI): ICD-10-CM

## 2025-02-26 DIAGNOSIS — E11.9 TYPE 2 DIABETES MELLITUS WITHOUT COMPLICATION, WITH LONG-TERM CURRENT USE OF INSULIN (MULTI): ICD-10-CM

## 2025-02-26 DIAGNOSIS — Z79.4 TYPE 2 DIABETES MELLITUS WITHOUT COMPLICATION, WITH LONG-TERM CURRENT USE OF INSULIN (MULTI): ICD-10-CM

## 2025-02-26 DIAGNOSIS — E78.2 MIXED HYPERLIPIDEMIA: ICD-10-CM

## 2025-02-26 DIAGNOSIS — C34.12 MALIGNANT NEOPLASM OF UPPER LOBE OF LEFT LUNG (MULTI): ICD-10-CM

## 2025-02-26 LAB
ALBUMIN SERPL BCP-MCNC: 4.4 G/DL (ref 3.4–5)
ALP SERPL-CCNC: 113 U/L (ref 33–136)
ALT SERPL W P-5'-P-CCNC: 14 U/L (ref 7–45)
ANION GAP SERPL CALC-SCNC: 12 MMOL/L (ref 10–20)
AST SERPL W P-5'-P-CCNC: 17 U/L (ref 9–39)
BASOPHILS # BLD AUTO: 0.07 X10*3/UL (ref 0–0.1)
BASOPHILS NFR BLD AUTO: 1 %
BILIRUB SERPL-MCNC: 0.4 MG/DL (ref 0–1.2)
BUN SERPL-MCNC: 10 MG/DL (ref 6–23)
CALCIUM SERPL-MCNC: 9.5 MG/DL (ref 8.6–10.3)
CEA SERPL-MCNC: 2.9 UG/L
CHLORIDE SERPL-SCNC: 101 MMOL/L (ref 98–107)
CO2 SERPL-SCNC: 33 MMOL/L (ref 21–32)
CREAT SERPL-MCNC: 1.02 MG/DL (ref 0.5–1.05)
EGFRCR SERPLBLD CKD-EPI 2021: 59 ML/MIN/1.73M*2
EOSINOPHIL # BLD AUTO: 0.49 X10*3/UL (ref 0–0.7)
EOSINOPHIL NFR BLD AUTO: 7.3 %
ERYTHROCYTE [DISTWIDTH] IN BLOOD BY AUTOMATED COUNT: 16 % (ref 11.5–14.5)
GLUCOSE SERPL-MCNC: 292 MG/DL (ref 74–99)
HCT VFR BLD AUTO: 33.7 % (ref 36–46)
HGB BLD-MCNC: 10.7 G/DL (ref 12–16)
IMM GRANULOCYTES # BLD AUTO: 0.02 X10*3/UL (ref 0–0.7)
IMM GRANULOCYTES NFR BLD AUTO: 0.3 % (ref 0–0.9)
LYMPHOCYTES # BLD AUTO: 1.34 X10*3/UL (ref 1.2–4.8)
LYMPHOCYTES NFR BLD AUTO: 19.9 %
MCH RBC QN AUTO: 24.5 PG (ref 26–34)
MCHC RBC AUTO-ENTMCNC: 31.8 G/DL (ref 32–36)
MCV RBC AUTO: 77 FL (ref 80–100)
MONOCYTES # BLD AUTO: 0.66 X10*3/UL (ref 0.1–1)
MONOCYTES NFR BLD AUTO: 9.8 %
NEUTROPHILS # BLD AUTO: 4.14 X10*3/UL (ref 1.2–7.7)
NEUTROPHILS NFR BLD AUTO: 61.7 %
PLATELET # BLD AUTO: 242 X10*3/UL (ref 150–450)
POTASSIUM SERPL-SCNC: 3.5 MMOL/L (ref 3.5–5.3)
PROT SERPL-MCNC: 7.4 G/DL (ref 6.4–8.2)
RBC # BLD AUTO: 4.36 X10*6/UL (ref 4–5.2)
SODIUM SERPL-SCNC: 142 MMOL/L (ref 136–145)
WBC # BLD AUTO: 6.7 X10*3/UL (ref 4.4–11.3)

## 2025-02-26 PROCEDURE — 82378 CARCINOEMBRYONIC ANTIGEN: CPT

## 2025-02-26 PROCEDURE — G2211 COMPLEX E/M VISIT ADD ON: HCPCS | Performed by: INTERNAL MEDICINE

## 2025-02-26 PROCEDURE — 99214 OFFICE O/P EST MOD 30 MIN: CPT | Performed by: INTERNAL MEDICINE

## 2025-02-26 PROCEDURE — 36415 COLL VENOUS BLD VENIPUNCTURE: CPT

## 2025-02-26 PROCEDURE — 85025 COMPLETE CBC W/AUTO DIFF WBC: CPT

## 2025-02-26 PROCEDURE — 3074F SYST BP LT 130 MM HG: CPT | Performed by: INTERNAL MEDICINE

## 2025-02-26 PROCEDURE — 80053 COMPREHEN METABOLIC PANEL: CPT

## 2025-02-26 PROCEDURE — 1160F RVW MEDS BY RX/DR IN RCRD: CPT | Performed by: INTERNAL MEDICINE

## 2025-02-26 PROCEDURE — 1125F AMNT PAIN NOTED PAIN PRSNT: CPT | Performed by: INTERNAL MEDICINE

## 2025-02-26 PROCEDURE — 3078F DIAST BP <80 MM HG: CPT | Performed by: INTERNAL MEDICINE

## 2025-02-26 PROCEDURE — 1159F MED LIST DOCD IN RCRD: CPT | Performed by: INTERNAL MEDICINE

## 2025-02-26 RX ORDER — CELECOXIB 100 MG/1
100 CAPSULE ORAL 2 TIMES DAILY
COMMUNITY

## 2025-02-26 RX ORDER — TRAMADOL HYDROCHLORIDE 100 MG/1
TABLET, EXTENDED RELEASE ORAL
COMMUNITY

## 2025-02-26 ASSESSMENT — ENCOUNTER SYMPTOMS
ARTHRALGIAS: 1
EYES NEGATIVE: 1
GASTROINTESTINAL NEGATIVE: 1
PSYCHIATRIC NEGATIVE: 1
FATIGUE: 1
HEMATOLOGIC/LYMPHATIC NEGATIVE: 1
LEG SWELLING: 1
SHORTNESS OF BREATH: 1
NECK PAIN: 1
NEUROLOGICAL NEGATIVE: 1
ENDOCRINE NEGATIVE: 1

## 2025-02-26 ASSESSMENT — PAIN SCALES - GENERAL: PAINLEVEL_OUTOF10: 7

## 2025-02-26 NOTE — PROGRESS NOTES
Patient ID: Thu Ortega is a 69 y.o. female.  Referring Physician: No referring provider defined for this encounter.  Primary Care Provider: DEANNA Rodriguez  Visit Type: Follow Up      Subjective    HPI I don't go out at all, so I'm more tired than usual    Review of Systems   Constitutional:  Positive for fatigue.   HENT:  Negative.     Eyes: Negative.    Respiratory:  Positive for shortness of breath.    Cardiovascular:  Positive for leg swelling.   Gastrointestinal: Negative.    Endocrine: Negative.    Genitourinary: Negative.     Musculoskeletal:  Positive for arthralgias and neck pain.   Skin: Negative.    Neurological: Negative.    Hematological: Negative.    Psychiatric/Behavioral: Negative.          Objective   BSA: 1.64 meters squared  /71 (BP Location: Left arm)   Pulse 84   Temp 36.6 °C (97.9 °F) (Temporal)   Resp 18   Wt 65 kg (143 lb 4.8 oz)   SpO2 97% Comment: Pt is on 2 L of O2  BMI 28.94 kg/m²      has a past medical history of Adenocarcinoma of lung, left (Multi), Atrial fibrillation (Multi), COPD (chronic obstructive pulmonary disease) (Multi), Depression, DJD (degenerative joint disease), DM (diabetes mellitus) (Multi), Fibromyalgia, primary, GERD (gastroesophageal reflux disease), Hearing aid worn, History of blood transfusion, History of meningioma of the brain, HLD (hyperlipidemia), HTN (hypertension), antineoplastic chemo (january2023), Irregular heart beat, Irritable bowel syndrome, Malignant neoplasm of upper lobe of left lung (Multi), Nephrolithiasis, Panlobular emphysema (Multi), Shortness of breath, Spinal stenosis, and Urinary tract infection.   has a past surgical history that includes US guided needle liver biopsy (02/07/2020); Lung lobectomy; CT guided percutaneous biopsy lung (12/13/2023); CT guided percutaneous biopsy lung (12/15/2023); Brain surgery; Foot surgery; Knee surgery; Cataract extraction; Tubal ligation; and Breast biopsy (january2023).  Family  History   Problem Relation Name Age of Onset    Stroke Mother      Other (aortic valve disease) Mother      Heart disease Mother      Cancer Sister      Stroke Sister      Diabetes Sister      Diabetes Brother      Other (heart transplant) Brother       Oncology History   Malignant neoplasm of upper lobe of left lung (Multi)   12/3/2023 Initial Diagnosis    Malignant neoplasm of upper lobe of left lung (CMS/HCC)     2/28/2024 - 7/3/2024 Chemotherapy    Pembrolizumab, 21 Day Cycles     8/12/2024 -  Chemotherapy    Adagrasib, 28 Day Cycles     Local recurrence of left lung cancer (Multi)   1/4/2024 Initial Diagnosis    Local recurrence of left lung cancer (CMS/HCC)     2/28/2024 - 7/3/2024 Chemotherapy    Pembrolizumab, 21 Day Cycles     8/12/2024 -  Chemotherapy    Adagrasib, 28 Day Cycles         Thu Ortega  reports that she quit smoking about 19 years ago. Her smoking use included cigarettes. She has never used smokeless tobacco.  She  reports that she does not currently use alcohol.  She  reports no history of drug use.    Physical Exam  Vitals reviewed.   Constitutional:       Appearance: Normal appearance.   HENT:      Head: Normocephalic.      Mouth/Throat:      Mouth: Mucous membranes are moist.   Eyes:      Extraocular Movements: Extraocular movements intact.      Pupils: Pupils are equal, round, and reactive to light.   Cardiovascular:      Rate and Rhythm: Normal rate and regular rhythm.      Pulses: Normal pulses.      Heart sounds: Normal heart sounds.   Pulmonary:      Effort: Pulmonary effort is normal.      Breath sounds: Rales present.   Abdominal:      General: Bowel sounds are normal.      Palpations: Abdomen is soft.   Musculoskeletal:         General: Normal range of motion.      Cervical back: Normal range of motion and neck supple.   Skin:     General: Skin is warm.   Neurological:      General: No focal deficit present.      Mental Status: She is alert and oriented to person, place, and  time.   Psychiatric:         Mood and Affect: Mood normal.         Behavior: Behavior normal.         WBC   Date/Time Value Ref Range Status   02/26/2025 08:46 AM 6.7 4.4 - 11.3 x10*3/uL Final   12/18/2024 03:30 PM 7.3 4.4 - 11.3 x10*3/uL Final   11/25/2024 02:48 PM 8.0 4.4 - 11.3 x10*3/uL Final     nRBC   Date Value Ref Range Status   12/18/2024 0.0 0.0 - 0.0 /100 WBCs Final   07/30/2024 0.0 0.0 - 0.0 /100 WBCs Final   07/30/2024 0.0 0.0 - 0.0 /100 WBCs Final     RBC   Date Value Ref Range Status   02/26/2025 4.36 4.00 - 5.20 x10*6/uL Final   12/18/2024 4.51 4.00 - 5.20 x10*6/uL Final   11/25/2024 4.99 4.00 - 5.20 x10*6/uL Final     Hemoglobin   Date Value Ref Range Status   02/26/2025 10.7 (L) 12.0 - 16.0 g/dL Final   12/18/2024 10.7 (L) 12.0 - 16.0 g/dL Final   11/25/2024 12.2 12.0 - 16.0 g/dL Final     Hematocrit   Date Value Ref Range Status   02/26/2025 33.7 (L) 36.0 - 46.0 % Final   12/18/2024 34.3 (L) 36.0 - 46.0 % Final   11/25/2024 37.9 36.0 - 46.0 % Final     MCV   Date/Time Value Ref Range Status   02/26/2025 08:46 AM 77 (L) 80 - 100 fL Final   12/18/2024 03:30 PM 76 (L) 80 - 100 fL Final   11/25/2024 02:48 PM 76 (L) 80 - 100 fL Final     MCH   Date/Time Value Ref Range Status   02/26/2025 08:46 AM 24.5 (L) 26.0 - 34.0 pg Final   12/18/2024 03:30 PM 23.7 (L) 26.0 - 34.0 pg Final   11/25/2024 02:48 PM 24.4 (L) 26.0 - 34.0 pg Final     MCHC   Date/Time Value Ref Range Status   02/26/2025 08:46 AM 31.8 (L) 32.0 - 36.0 g/dL Final   12/18/2024 03:30 PM 31.2 (L) 32.0 - 36.0 g/dL Final   11/25/2024 02:48 PM 32.2 32.0 - 36.0 g/dL Final     RDW   Date/Time Value Ref Range Status   02/26/2025 08:46 AM 16.0 (H) 11.5 - 14.5 % Final   12/18/2024 03:30 PM 16.4 (H) 11.5 - 14.5 % Final   11/25/2024 02:48 PM 15.6 (H) 11.5 - 14.5 % Final     Platelets   Date/Time Value Ref Range Status   02/26/2025 08:46  150 - 450 x10*3/uL Final   12/18/2024 03:30  150 - 450 x10*3/uL Final   11/25/2024 02:48  150 - 450  "x10*3/uL Final     No results found for: \"MPV\"  Neutrophils %   Date/Time Value Ref Range Status   02/26/2025 08:46 AM 61.7 40.0 - 80.0 % Final   12/18/2024 03:30 PM 58.0 40.0 - 80.0 % Final   11/25/2024 02:48 PM 57.4 40.0 - 80.0 % Final     Immature Granulocytes %, Automated   Date/Time Value Ref Range Status   02/26/2025 08:46 AM 0.3 0.0 - 0.9 % Final     Comment:     Immature Granulocyte Count (IG) includes promyelocytes, myelocytes and metamyelocytes but does not include bands. Percent differential counts (%) should be interpreted in the context of the absolute cell counts (cells/UL).   12/18/2024 03:30 PM 0.4 0.0 - 0.9 % Final     Comment:     Immature Granulocyte Count (IG) includes promyelocytes, myelocytes and metamyelocytes but does not include bands. Percent differential counts (%) should be interpreted in the context of the absolute cell counts (cells/UL).   11/25/2024 02:48 PM 0.2 0.0 - 0.9 % Final     Comment:     Immature Granulocyte Count (IG) includes promyelocytes, myelocytes and metamyelocytes but does not include bands. Percent differential counts (%) should be interpreted in the context of the absolute cell counts (cells/UL).     Lymphocytes %, Manual   Date/Time Value Ref Range Status   07/30/2024 06:29 PM 9.0 13.0 - 44.0 % Final     Lymphocytes %   Date/Time Value Ref Range Status   02/26/2025 08:46 AM 19.9 13.0 - 44.0 % Final   12/18/2024 03:30 PM 22.5 13.0 - 44.0 % Final   11/25/2024 02:48 PM 22.9 13.0 - 44.0 % Final     Monocytes %, Manual   Date/Time Value Ref Range Status   07/30/2024 06:29 PM 5.0 2.0 - 10.0 % Final     Monocytes %   Date/Time Value Ref Range Status   02/26/2025 08:46 AM 9.8 2.0 - 10.0 % Final   12/18/2024 03:30 PM 11.1 2.0 - 10.0 % Final   11/25/2024 02:48 PM 10.1 2.0 - 10.0 % Final     Eosinophils %, Manual   Date/Time Value Ref Range Status   07/30/2024 06:29 PM 2.0 0.0 - 6.0 % Final     Eosinophils %   Date/Time Value Ref Range Status   02/26/2025 08:46 AM 7.3 0.0 - " 6.0 % Final   12/18/2024 03:30 PM 6.9 0.0 - 6.0 % Final   11/25/2024 02:48 PM 8.4 0.0 - 6.0 % Final     Basophils %, Manual   Date/Time Value Ref Range Status   07/30/2024 06:29 PM 0.0 0.0 - 2.0 % Final     Basophils %   Date/Time Value Ref Range Status   02/26/2025 08:46 AM 1.0 0.0 - 2.0 % Final   12/18/2024 03:30 PM 1.1 0.0 - 2.0 % Final   11/25/2024 02:48 PM 1.0 0.0 - 2.0 % Final     Neutrophils Absolute   Date/Time Value Ref Range Status   02/26/2025 08:46 AM 4.14 1.20 - 7.70 x10*3/uL Final     Comment:     Percent differential counts (%) should be interpreted in the context of the absolute cell counts (cells/uL).   12/18/2024 03:30 PM 4.22 1.20 - 7.70 x10*3/uL Final     Comment:     Percent differential counts (%) should be interpreted in the context of the absolute cell counts (cells/uL).   11/25/2024 02:48 PM 4.60 1.20 - 7.70 x10*3/uL Final     Comment:     Percent differential counts (%) should be interpreted in the context of the absolute cell counts (cells/uL).     Immature Granulocytes Absolute, Automated   Date/Time Value Ref Range Status   02/26/2025 08:46 AM 0.02 0.00 - 0.70 x10*3/uL Final   12/18/2024 03:30 PM 0.03 0.00 - 0.70 x10*3/uL Final   11/25/2024 02:48 PM 0.02 0.00 - 0.70 x10*3/uL Final     Lymphocytes Absolute   Date/Time Value Ref Range Status   02/26/2025 08:46 AM 1.34 1.20 - 4.80 x10*3/uL Final   12/18/2024 03:30 PM 1.64 1.20 - 4.80 x10*3/uL Final   11/25/2024 02:48 PM 1.84 1.20 - 4.80 x10*3/uL Final     Monocytes Absolute   Date/Time Value Ref Range Status   02/26/2025 08:46 AM 0.66 0.10 - 1.00 x10*3/uL Final   12/18/2024 03:30 PM 0.81 0.10 - 1.00 x10*3/uL Final   11/25/2024 02:48 PM 0.81 0.10 - 1.00 x10*3/uL Final     Eosinophils Absolute   Date/Time Value Ref Range Status   02/26/2025 08:46 AM 0.49 0.00 - 0.70 x10*3/uL Final   12/18/2024 03:30 PM 0.50 0.00 - 0.70 x10*3/uL Final   11/25/2024 02:48 PM 0.67 0.00 - 0.70 x10*3/uL Final     Eosinophils Absolute, Manual   Date/Time Value Ref  "Range Status   07/30/2024 06:29 PM 0.20 0.00 - 0.70 x10*3/uL Final     Basophils Absolute   Date/Time Value Ref Range Status   02/26/2025 08:46 AM 0.07 0.00 - 0.10 x10*3/uL Final   12/18/2024 03:30 PM 0.08 0.00 - 0.10 x10*3/uL Final   11/25/2024 02:48 PM 0.08 0.00 - 0.10 x10*3/uL Final     Basophils Absolute, Manual   Date/Time Value Ref Range Status   07/30/2024 06:29 PM 0.00 0.00 - 0.10 x10*3/uL Final       No components found for: \"PT\"  aPTT   Date/Time Value Ref Range Status   01/12/2024 01:46 PM 31 27 - 38 seconds Final       Assessment/Plan    1) lung cancer  -12/8/2022 chest CT: NICHOL anterior segment 1.6 x 2.2 cm nodule, partial pleural attachment, nonspecific low volume paratracheal mediastinal LN largest near AP window 1.0 x 1.4 cm, right subcarinal space 8 x 14 mm  -12/12/2022 PET: NICHOL 1.8 x 2.3 cm mass with SUV 5.8, lateral margin abuts pleura; no significant mediastinal or hilar hypermetabolic lymphadenopathy  -12/28/2022 chest CT: 2.5 cm cavitary nodule in NICHOL  -1/7/2023 PET scan  -had surgery for stage I NSCLC, s/p lobectomy (Dr Fagan, 1/31/2023)  -2/1/2023 CT chest: no PE, postop changes in left chest, small left PTX in left upper anterior chest  -2/26/2023 chest CT no PE, continued mildly enlarged mediastinal lymph nodes  -saw Dr Solis at [facility] on 3/13/2023 - she had a [id number] (stage Ib) lung adenocarcinoma (poor NCCN risk factors - G3, + visceral involvement), s/p left robotic assisted anatomic upper lobectomy, PD-L1 90%, [id number]  -per his charting, he recommended either adjuvant cisplatin + pemetrexed x 4 cycles vs surveillance  -according to  Thu, Dr Solis never told her about her high risk features nor any adjuvant option, and that the only thing he recommended was observation  -7/11/2023 CT chest : stable postsurgical changes of prior left thoracotomy and left upper lobectomy with interval resolution of bilateral pleural effusions; interval progression of lymphadenopathy in the chest concerning for " progressing ghazala metastatic disease     7/25/2023 underwent EBUS: A - EBUS TRANSBRONCHIAL FINE NEEDLE ASPIRATE, LYMPH NODE  - 4L             Negative for malignant cells.             Benign lymphoid sample (see comment).   B - EBUS TRANSBRONCHIAL FINE NEEDLE ASPIRATE, LYMPH NODE  - 10L             Negative for malignant cells.                   Benign lymphoid sample.  C - EBUS TRANSBRONCHIAL FINE NEEDLE ASPIRATE, LYMPH NODE  - STATION 7             Negative for malignant cells.                 Benign lymphoid sample.   -8/9/2023 PET scan: 3.0 x 2.1 cm left prevascular node with SUV 7.9; few additional prominent mediastinal nodes with low level uptake; left lower paratracheal node 0.8 cm with SUV 2.2; mild FDG uptake in left hilum SUV 3.1  -11/14/2023 chest CT: enlarged 2.2 cm prevascular lymph node not significantly changed  -she was told by her pulmonologist Dr Kang that she has cancer  -discussed her original path with her--while it was a small tumor and stage Ib, she did have a couple high risk features that would have warranted adjuvant chemotherapy followed by atezolizumab; also if she does have a recurrence that isn't amenable to surgery, she would also be a candidate for immunotherapy then KRAS inhibitor (FDA approved only in 2nd line setting)  -will discuss with thoracic surgeon--will import all [facility] films to review; may need CT guided bx by IR of this prevascular node   -she had biopsy done on 12/13/2023--path confirmed poorly differentiated lung carcinoma, PD-L1 90%, KRAS G12C mutation  -she saw Dr Calixto on 12/28/2023--he advised surgery, provided that she can pass her PFTs  - on 1/30/2024 Dr Calixto took her to the OR for robotic assisted redo left mediastinal exploration, left anterior mediastinal mass resection, diaphragmatic pacer placement  -path showed poorly differentiated carcinoma with pleomorphic/spindle cell and clear cell features; 4 lymph nodes with no evidence of malignancy; sections show a  poorly differentiated carcinoma with pleomorphic/spindle cell and clear cell features involving fibroadipose tissue with a large area of central necrosis; tumor cells are positive for AE1/AE3, CAM 5.2, CK7 and negative for TTF-1, p40.  -pt is most interested in doing whatever is necessary to reduce her risk for recurrence  -she and  state again that the [facility] oncologist told them that adjuvant therapy was not necessary after her first surgery, even though his note documented that he recommended it  -as her tumor has high PD-L1 expression, she is a candidate for pembrolizumab; if she relapses, she is also a candidate for KRAS G12C inhibitor  -has completed 6 doses of adjuvant pembrolizumab (out of 17)  -CT scan done on 6/19/2024 showed  mediastinum with subcarinal lymphadenopathy 10 mm in short axis, there is component of AP window lymphadenopathy 17 x 15 mm; mesentery demonstrates soft tissue lesion within midline lower abdominal mesentery 1.6 x 1.2 cm; pleural based nodular lesion in RLL posteriorly 8 mm with surrounding ground glass airspace infiltrate  -she was admitted recently for pneumonia--CT angio done on 7/23 showed no evidence of pulmonary embolus, multifocal airspace disease in the right lung suggesting pneumonia  -right after she was discharged from [place], she then went back to the ER for hyperglycemia ()  -Dr Calixto has scheduled her next chest CT for 8/2024--pt is wondering if that is still necessary  -on 7/14/2024 she underwent an EBUS--lymph node level 4L was sampled--showed NSCLC, favor squamous cell carcinoma  -given that she is now confirmed to have new and/or persistent disease in her mediastinum, I have advised switching to new therapy; as her tumor has the KRAS G12C mutation, I have recommended switching to KRAS G12C inhibitor, namely adagrasib  -benefits, risks, potential morbidity related to adagrasib were reviewed with Thu and she signed informed consent to  "proceed  -she will take adagrasib (Krazati) 600 mg PO BID  -she also has severe body pains secondary to fibromyalgia--she  says in the hospital she was prescribed percocet PRN and asked if I could prescribe it; I did inform her that I will treat pain only secondary to cancer; she therefore was willing to be referred to pain management (Dr Melendrez)  -here for interval followup  -has been taking adagrasib 400 mg BID as adagrasib can be associated with leg edema/fluid retention, however dropping the dose down did not seem to help with reducing the leg edema; she also has been taking hydrochlorothiazide 12.5 mg daily without any effect; also reported \"not urinating enough\"--will check leg doppler to rule out DVT and also advised her to increase hydrochlorothiazide to 25 mg daily  -wants to vacation in [place] again--and will be going there for at least 2 weeks in October  -saw Dr Melendrez--she received a diagnostic medial nerve branch block targeting L3-L4--she said pain relief lasted only 4 days  -advised Thu to continue with  adagrasib to 400 mg BID  -will now schedule PET scan to be done in next couple weeks as restaging of her lung cancer  -here for interval followup via telephone  -leaving for [place] on Monday  -9/25/2024 doppler of legs showed no DVT in either leg  -edema improved with doubling of hydrochlorothiazide  -PET scan done on 10/3/2024 reviewed--postsurgical changes within the right parietal bone compatible with craniectomy; postsurgical changes compatible with left upper lobectomy without focal hypermetabolic activity to suggest disease recurrence; interval resolution of multifocal ground glass and nodular opacities within right upper lobe; no hypermetabolic pulmonary lesions; no hypermetabolic mediastinal lymphadenopathy; prominent bilateral axillary lymph nodes with mild hypermetabolic activity up to 1.3 on right and 1.1 on left likely reactive; no focal hypermetabolic lesion to " "suggest osseous metastasis  -here for interval followup  -I did advise Thu to hold off on taking adagrasib for the 2 weeks she was in [place]--her leg edema completely decompressed and resolved  -as soon as she returned home to [place], she restarted the adagrasib 400 mg BID along with hydrochlorothiazide  -however now taking 400 mg in AM, 200 mg in PM  -has developed some light pitting edema in her legs (trace to 1+)  -here for interval followup via telephone  -labs done on 12/18/2024 included CBC + COMP, CEA  results reviewed--wbc 7.2, hgb 10.3, plt 259,000, potassium 3.6 creatinine 1.21, calcium 10.2, AST 20, ALT 13, CEA 2.9  -CT chest done on 12/18/2024 reviewed--there is no hilar or mediastinal lymphadenopathy; minimal left pleural effusion; 5 mm RLL nodule laterally; there is nearly complete clearing of the ground glass infiltrates within the right hemithorax; there are few small patchy foci of residual ground glass infiltrate within the superior segment right lower lobe and posterior segment right lower lobe  -Thu will continue to take adagrasib 400 mg in AM, 200 mg in PM  -will see her again in 2 months  -here for interval followup  -does not get out of the house-- does all the grocery shopping  -feels more depressed, cymbalta not helping--she will discuss with PCP  -having bilateral neck and shoulder pain--has been seeing pain management and started seeing neurologist--was told she had \"3 pinched nerves\"  -leg edema under control--takes the hydrochlorothiazide 2 days at a time as needed  -continues on adagrasib 400 mg in AM, 200 mg in PM  -also noted to be iron deficient in November--advised for her to start OTC iron supplementation every other day (as already constipated at baseline)  -labs to be done today include CBC + COMP + CEA  -results reviewed--wbc 6.7, hgb 10.7, MCV 77, plt 242,000, creatinine 1.02, calcium 9.5, alk phos 113, AST 17, ALT 14, CEA pending  -she will have a new CT chest " done just before next visit       2) COPD  -on albuterol  -on incruse ellipta     3) atrial fibrillation  -on amiodarone  -on warfarin     4) hypertension  -on norvasc  -on chlorthalidone  -on lasix  -on lisinopril  -on metoprolol     5) hyperlipidemia  -on atorvastatin     6) major depression  -on cymbalta     7) diabetes  -on novolog insulin  -on metformin        Problem List Items Addressed This Visit    None           Álvaro Galeano MD

## 2025-02-27 ENCOUNTER — HOSPITAL ENCOUNTER (EMERGENCY)
Facility: HOSPITAL | Age: 70
Discharge: HOME | End: 2025-02-27
Attending: STUDENT IN AN ORGANIZED HEALTH CARE EDUCATION/TRAINING PROGRAM
Payer: MEDICARE

## 2025-02-27 ENCOUNTER — APPOINTMENT (OUTPATIENT)
Dept: RADIOLOGY | Facility: HOSPITAL | Age: 70
End: 2025-02-27
Payer: MEDICARE

## 2025-02-27 VITALS
HEIGHT: 59 IN | BODY MASS INDEX: 28.89 KG/M2 | WEIGHT: 143.3 LBS | DIASTOLIC BLOOD PRESSURE: 66 MMHG | TEMPERATURE: 98.2 F | SYSTOLIC BLOOD PRESSURE: 128 MMHG | OXYGEN SATURATION: 98 % | RESPIRATION RATE: 16 BRPM | HEART RATE: 75 BPM

## 2025-02-27 DIAGNOSIS — G89.29 CHRONIC UPPER BACK PAIN: Primary | ICD-10-CM

## 2025-02-27 DIAGNOSIS — M25.512 BILATERAL SHOULDER PAIN, UNSPECIFIED CHRONICITY: ICD-10-CM

## 2025-02-27 DIAGNOSIS — M25.511 BILATERAL SHOULDER PAIN, UNSPECIFIED CHRONICITY: ICD-10-CM

## 2025-02-27 DIAGNOSIS — M54.9 CHRONIC UPPER BACK PAIN: Primary | ICD-10-CM

## 2025-02-27 LAB
ALBUMIN SERPL BCP-MCNC: 5 G/DL (ref 3.4–5)
ALP SERPL-CCNC: 128 U/L (ref 33–136)
ALT SERPL W P-5'-P-CCNC: 18 U/L (ref 7–45)
ANION GAP SERPL CALC-SCNC: 14 MMOL/L (ref 10–20)
AST SERPL W P-5'-P-CCNC: 25 U/L (ref 9–39)
BASOPHILS # BLD AUTO: 0.07 X10*3/UL (ref 0–0.1)
BASOPHILS NFR BLD AUTO: 1 %
BILIRUB SERPL-MCNC: 0.6 MG/DL (ref 0–1.2)
BNP SERPL-MCNC: 24 PG/ML (ref 0–99)
BUN SERPL-MCNC: 11 MG/DL (ref 6–23)
CALCIUM SERPL-MCNC: 10.4 MG/DL (ref 8.6–10.3)
CARDIAC TROPONIN I PNL SERPL HS: 6 NG/L (ref 0–13)
CHLORIDE SERPL-SCNC: 99 MMOL/L (ref 98–107)
CO2 SERPL-SCNC: 31 MMOL/L (ref 21–32)
CREAT SERPL-MCNC: 1.04 MG/DL (ref 0.5–1.05)
EGFRCR SERPLBLD CKD-EPI 2021: 58 ML/MIN/1.73M*2
EOSINOPHIL # BLD AUTO: 0.49 X10*3/UL (ref 0–0.7)
EOSINOPHIL NFR BLD AUTO: 6.9 %
ERYTHROCYTE [DISTWIDTH] IN BLOOD BY AUTOMATED COUNT: 16.1 % (ref 11.5–14.5)
GLUCOSE SERPL-MCNC: 149 MG/DL (ref 74–99)
HCT VFR BLD AUTO: 39.6 % (ref 36–46)
HGB BLD-MCNC: 12.3 G/DL (ref 12–16)
IMM GRANULOCYTES # BLD AUTO: 0.03 X10*3/UL (ref 0–0.7)
IMM GRANULOCYTES NFR BLD AUTO: 0.4 % (ref 0–0.9)
LYMPHOCYTES # BLD AUTO: 1.72 X10*3/UL (ref 1.2–4.8)
LYMPHOCYTES NFR BLD AUTO: 24.2 %
MCH RBC QN AUTO: 23.9 PG (ref 26–34)
MCHC RBC AUTO-ENTMCNC: 31.1 G/DL (ref 32–36)
MCV RBC AUTO: 77 FL (ref 80–100)
MONOCYTES # BLD AUTO: 0.72 X10*3/UL (ref 0.1–1)
MONOCYTES NFR BLD AUTO: 10.1 %
NEUTROPHILS # BLD AUTO: 4.07 X10*3/UL (ref 1.2–7.7)
NEUTROPHILS NFR BLD AUTO: 57.4 %
NRBC BLD-RTO: 0 /100 WBCS (ref 0–0)
PLATELET # BLD AUTO: 262 X10*3/UL (ref 150–450)
POTASSIUM SERPL-SCNC: 3.7 MMOL/L (ref 3.5–5.3)
PROT SERPL-MCNC: 8.5 G/DL (ref 6.4–8.2)
RBC # BLD AUTO: 5.15 X10*6/UL (ref 4–5.2)
SODIUM SERPL-SCNC: 140 MMOL/L (ref 136–145)
WBC # BLD AUTO: 7.1 X10*3/UL (ref 4.4–11.3)

## 2025-02-27 PROCEDURE — 2550000001 HC RX 255 CONTRASTS: Performed by: STUDENT IN AN ORGANIZED HEALTH CARE EDUCATION/TRAINING PROGRAM

## 2025-02-27 PROCEDURE — 99285 EMERGENCY DEPT VISIT HI MDM: CPT | Mod: 25 | Performed by: STUDENT IN AN ORGANIZED HEALTH CARE EDUCATION/TRAINING PROGRAM

## 2025-02-27 PROCEDURE — 71275 CT ANGIOGRAPHY CHEST: CPT | Performed by: RADIOLOGY

## 2025-02-27 PROCEDURE — 72125 CT NECK SPINE W/O DYE: CPT | Performed by: RADIOLOGY

## 2025-02-27 PROCEDURE — 2500000001 HC RX 250 WO HCPCS SELF ADMINISTERED DRUGS (ALT 637 FOR MEDICARE OP): Performed by: STUDENT IN AN ORGANIZED HEALTH CARE EDUCATION/TRAINING PROGRAM

## 2025-02-27 PROCEDURE — 84484 ASSAY OF TROPONIN QUANT: CPT | Performed by: STUDENT IN AN ORGANIZED HEALTH CARE EDUCATION/TRAINING PROGRAM

## 2025-02-27 PROCEDURE — 70450 CT HEAD/BRAIN W/O DYE: CPT

## 2025-02-27 PROCEDURE — 72128 CT CHEST SPINE W/O DYE: CPT | Performed by: RADIOLOGY

## 2025-02-27 PROCEDURE — 72125 CT NECK SPINE W/O DYE: CPT

## 2025-02-27 PROCEDURE — 99285 EMERGENCY DEPT VISIT HI MDM: CPT

## 2025-02-27 PROCEDURE — 72128 CT CHEST SPINE W/O DYE: CPT

## 2025-02-27 PROCEDURE — 70450 CT HEAD/BRAIN W/O DYE: CPT | Performed by: RADIOLOGY

## 2025-02-27 PROCEDURE — 80053 COMPREHEN METABOLIC PANEL: CPT | Performed by: STUDENT IN AN ORGANIZED HEALTH CARE EDUCATION/TRAINING PROGRAM

## 2025-02-27 PROCEDURE — 85025 COMPLETE CBC W/AUTO DIFF WBC: CPT | Performed by: STUDENT IN AN ORGANIZED HEALTH CARE EDUCATION/TRAINING PROGRAM

## 2025-02-27 PROCEDURE — 71275 CT ANGIOGRAPHY CHEST: CPT

## 2025-02-27 PROCEDURE — 83880 ASSAY OF NATRIURETIC PEPTIDE: CPT | Performed by: STUDENT IN AN ORGANIZED HEALTH CARE EDUCATION/TRAINING PROGRAM

## 2025-02-27 PROCEDURE — 36415 COLL VENOUS BLD VENIPUNCTURE: CPT | Performed by: STUDENT IN AN ORGANIZED HEALTH CARE EDUCATION/TRAINING PROGRAM

## 2025-02-27 RX ORDER — OXYCODONE AND ACETAMINOPHEN 5; 325 MG/1; MG/1
1 TABLET ORAL ONCE
Status: COMPLETED | OUTPATIENT
Start: 2025-02-27 | End: 2025-02-27

## 2025-02-27 RX ADMIN — IOHEXOL 60 ML: 350 INJECTION, SOLUTION INTRAVENOUS at 13:09

## 2025-02-27 RX ADMIN — OXYCODONE HYDROCHLORIDE AND ACETAMINOPHEN 1 TABLET: 5; 325 TABLET ORAL at 12:52

## 2025-02-27 ASSESSMENT — LIFESTYLE VARIABLES
EVER HAD A DRINK FIRST THING IN THE MORNING TO STEADY YOUR NERVES TO GET RID OF A HANGOVER: NO
EVER FELT BAD OR GUILTY ABOUT YOUR DRINKING: NO
HAVE YOU EVER FELT YOU SHOULD CUT DOWN ON YOUR DRINKING: NO
TOTAL SCORE: 0
HAVE PEOPLE ANNOYED YOU BY CRITICIZING YOUR DRINKING: NO

## 2025-02-27 ASSESSMENT — PAIN SCALES - GENERAL
PAINLEVEL_OUTOF10: 8
PAINLEVEL_OUTOF10: 8
PAINLEVEL_OUTOF10: 7

## 2025-02-27 ASSESSMENT — COLUMBIA-SUICIDE SEVERITY RATING SCALE - C-SSRS
6. HAVE YOU EVER DONE ANYTHING, STARTED TO DO ANYTHING, OR PREPARED TO DO ANYTHING TO END YOUR LIFE?: NO
2. HAVE YOU ACTUALLY HAD ANY THOUGHTS OF KILLING YOURSELF?: NO
1. IN THE PAST MONTH, HAVE YOU WISHED YOU WERE DEAD OR WISHED YOU COULD GO TO SLEEP AND NOT WAKE UP?: NO

## 2025-02-27 ASSESSMENT — PAIN DESCRIPTION - LOCATION: LOCATION: SHOULDER

## 2025-02-27 ASSESSMENT — PAIN DESCRIPTION - ORIENTATION: ORIENTATION: LEFT

## 2025-02-27 ASSESSMENT — PAIN - FUNCTIONAL ASSESSMENT: PAIN_FUNCTIONAL_ASSESSMENT: 0-10

## 2025-02-27 NOTE — DISCHARGE INSTRUCTIONS
The workup we obtained including labs to check your blood levels, electrolytes, and heart function as well as imaging of your head, chest, and spine did not show acute abnormalities or concerns of stroke.  The findings associated with your spine are things you have been diagnosed with in the past and that you are seeing pain management for.  I would continue taking your medications as prescribed.  I did place an internal referral for physical therapy.  They should be reaching out to you to make that appointment.  If you do not hear from them in the next couple days, you can call [phone] to get that situated.  Physical therapy should help with both your neck and shoulder pain.  Otherwise, you can follow-up with your PCP/neurologist/pain management provider as needed.

## 2025-02-27 NOTE — ED PROVIDER NOTES
HPI   Chief Complaint   Patient presents with    Back Pain    Shoulder Pain     L shoulder pain radiating to back       Patient is a 69-year-old female with a past medical history significant for lung cancer currently on oral chemotherapy as well as 2 L nasal cannula baseline, HTN, HLD, DM2, CHEY, depression presenting to the ED with back and shoulder pain.  Of note, it is also listed in the patient's chart that she has A-fib and takes amiodarone and warfarin, although I do not see prescription evidence of this.  Patient states she has pain in her upper back as well as bilateral upper extremities (shoulders) for over the past week.  She denies inciting falls, injury, or trauma.  Patient states she has been taking various medications at home as well as using ice, heat, and applying rubs without symptomatic relief.  She states the symptoms on her left side is worse than the right.  She is right-hand dominant.  She states she has been able to use her right hand as normal, although feels weak increasing weakness of her left arm/hand.  Patient states she reached out to her providers, 1 of which told her she likely has a pinched nerve and the other 2 prior to presenting to the ED for stroke workup.  Patient denies any headache, lightheadedness, dizziness, vision changes, confusion, or disorientation.  She does not need she feels increasingly unsteady on her feet often recently.                Patient History   Past Medical History:   Diagnosis Date    Adenocarcinoma of lung, left (Multi)     s/p robotic assisted NICHOL lobectomy on 1/31/23, c/b afib and aspiration PNA    Atrial fibrillation (Multi)     COPD (chronic obstructive pulmonary disease) (Multi)     F/W Dr. Kang, Wixela inhaler, PFT appointment scheduled for 01/12/2024    Depression     Taking Cymbalta    DJD (degenerative joint disease)     DM (diabetes mellitus) (Multi)     F/w Dr. Wong, Taking Metformin, Last A1c is 7.5 on 08/03/2023    Fibromyalgia, primary      F/W Pain    GERD (gastroesophageal reflux disease)     F/w PCP, on Protonix    Hearing aid worn     Bilateral hearing aides    History of blood transfusion     patient think she recieved a transfusion with her Lobectomy surgery    History of meningioma of the brain     s/p resection    HLD (hyperlipidemia)     F/W PCP: Taking Atorvastatin    HTN (hypertension)     F/W PCP    Hx antineoplastic chemo 2023    Irregular heart beat     Paroxysmal atrial fibrillation. Last seen by Dr. Fox on 24, Started on Amiodarone. Not on anticoagulation.    Irritable bowel syndrome     Malignant neoplasm of upper lobe of left lung (Multi)     Nephrolithiasis     monitoring, no interventions    Panlobular emphysema (Multi)     Shortness of breath     PALENCIA previously on O2    Spinal stenosis     Urinary tract infection      Past Surgical History:   Procedure Laterality Date    BRAIN SURGERY      meningioma resection    BREAST BIOPSY  2023    CATARACT EXTRACTION      CT GUIDED PERCUTANEOUS BIOPSY LUNG  2023    CT GUIDED PERCUTANEOUS BIOPSY LUNG 2023 [facility]    CT GUIDED PERCUTANEOUS BIOPSY LUNG  12/15/2023    CT GUIDED PERCUTANEOUS BIOPSY LUNG    FOOT SURGERY      KNEE SURGERY      LUNG LOBECTOMY      TUBAL LIGATION      US GUIDED NEEDLE LIVER BIOPSY  2020    US GUIDED NEEDLE LIVER BIOPSY 2020 [facility]     Family History   Problem Relation Name Age of Onset    Stroke Mother      Other (aortic valve disease) Mother      Heart disease Mother      Cancer Sister      Stroke Sister      Diabetes Sister      Diabetes Brother      Other (heart transplant) Brother       Social History     Tobacco Use    Smoking status: Former     Current packs/day: 0.00     Types: Cigarettes     Quit date:      Years since quittin.1    Smokeless tobacco: Never   Vaping Use    Vaping status: Former   Substance Use Topics    Alcohol use: Not Currently    Drug use: Never       Physical Exam   ED Triage  Vitals [02/27/25 1106]   Temperature Heart Rate Respirations BP   36.8 °C (98.2 °F) 88 18 104/53      Pulse Ox Temp Source Heart Rate Source Patient Position   98 % Temporal Monitor --      BP Location FiO2 (%)     -- --       Physical Exam  Vitals reviewed.   Constitutional:       General: She is not in acute distress.     Appearance: She is not ill-appearing.   Cardiovascular:      Rate and Rhythm: Normal rate.   Pulmonary:      Effort: Pulmonary effort is normal. No respiratory distress.      Breath sounds: Normal breath sounds.      Comments: On 2L NC  Musculoskeletal:      Comments: Reproducible TTP to the midline of the cervical spine as well as surrounding paraspinal muscles.  Also nonspecific but reproducible TTP to the anterior and superior aspects of the bilateral shoulders.  Endorses pain with abduction of the LUE.  RUE unremarkable.  Full and equal ROM and strength of the bilateral lower extremities.    Neurological:      Mental Status: She is alert and oriented to person, place, and time.      Cranial Nerves: Cranial nerves 2-12 are intact.      Sensory: Sensation is intact.      Motor: Motor function is intact.      Coordination: Coordination is intact. Romberg sign negative.      Gait: Gait is intact.           ED Course & MDM   ED Course as of 02/27/25 1432   Thu Feb 27, 2025   1226 69-year-old female who presents with acute on chronic bilateral shoulder, diffuse thoracic back pain, follows with pain specialist and saw her oncologist yesterday, continues on oral chemotherapy, indicates that the pain is chronic, but acutely worsened over the last month.  She called her PCP over the last 24 hours, let her know that she was having more trouble lifting her left arm, and she reports that her PCP told her to present to the ED immediately to rule out stroke.  This is not a documented conversation in the chart.  On my neurologic exam, patient has normal cranial nerves, normal 5/5 strength in her upper and  lower extremities with isolation of her deltoid, tricep/bicep, , hip flexion/extension, knee flexion/extension, dorsiflexion/plantarflexion.  She has no sensation deficits, she has a negative Romberg, she is on her home O2 2 L/min.  She has a positive Neer's, empty drawer and Harrison on the left, with negative provocative maneuvers on the right shoulder.  She does have tenderness diffusely over the mid thoracic pain, but no midline.  We discussed that she has obvious reasons for her acute on chronic pain including her known cervical radiculopathy for which she follows with pain management.  We discussed that I believe that she also has a rotator cuff injury/tendinitis.  Does not appear to be a full tear as can passively AB duct, but just has significant pain with empty can.  We discussed the patient had a generally negative PET CT scan 4 months ago, is hemodynamically stable and afebrile, and we could focus on symptom control as patient's tramadol is not helping.  Patient was only mildly reassured by our clinical history and physical, and therefore patient centered decision made to get CT scans that more definitively rule out major decompensation [JH]      ED Course User Index  [JH] Matthew Adams MD         Diagnoses as of 02/27/25 1432   Chronic upper back pain   Bilateral shoulder pain, unspecified chronicity     Labs Reviewed   CBC WITH AUTO DIFFERENTIAL - Abnormal       Result Value    WBC 7.1      nRBC 0.0      RBC 5.15      Hemoglobin 12.3      Hematocrit 39.6      MCV 77 (*)     MCH 23.9 (*)     MCHC 31.1 (*)     RDW 16.1 (*)     Platelets 262      Neutrophils % 57.4      Immature Granulocytes %, Automated 0.4      Lymphocytes % 24.2      Monocytes % 10.1      Eosinophils % 6.9      Basophils % 1.0      Neutrophils Absolute 4.07      Immature Granulocytes Absolute, Automated 0.03      Lymphocytes Absolute 1.72      Monocytes Absolute 0.72      Eosinophils Absolute 0.49      Basophils Absolute 0.07      COMPREHENSIVE METABOLIC PANEL - Abnormal    Glucose 149 (*)     Sodium 140      Potassium 3.7      Chloride 99      Bicarbonate 31      Anion Gap 14      Urea Nitrogen 11      Creatinine 1.04      eGFR 58 (*)     Calcium 10.4 (*)     Albumin 5.0      Alkaline Phosphatase 128      Total Protein 8.5 (*)     AST 25      Bilirubin, Total 0.6      ALT 18     TROPONIN I, HIGH SENSITIVITY - Normal    Troponin I, High Sensitivity 6      Narrative:     Less than 99th percentile of normal range cutoff-  Female and children under 18 years old <14 ng/L; Male <21 ng/L: Negative  Repeat testing should be performed if clinically indicated.     Female and children under 18 years old 14-50 ng/L; Male 21-50 ng/L:  Consistent with possible cardiac damage and possible increased clinical   risk. Serial measurements may help to assess extent of myocardial damage.     >50 ng/L: Consistent with cardiac damage, increased clinical risk and  myocardial infarction. Serial measurements may help assess extent of   myocardial damage.      NOTE: Children less than 1 year old may have higher baseline troponin   levels and results should be interpreted in conjunction with the overall   clinical context.     NOTE: Troponin I testing is performed using a different   testing methodology at [facility] than at other   [facility]. Direct result comparisons should only   be made within the same method.   B-TYPE NATRIURETIC PEPTIDE - Normal    BNP 24      Narrative:        <100 pg/mL - Heart failure unlikely  100-299 pg/mL - Intermediate probability of acute heart                  failure exacerbation. Correlate with clinical                  context and patient history.    >=300 pg/mL - Heart Failure likely. Correlate with clinical                  context and patient history.    BNP testing is performed using different testing methodology at [facility] than at other [facility]. Direct result comparisons should  only be made within the same method.        CT head wo IV contrast   Final Result   Changes of previous right parieto-occipital craniectomy with mesh   similar to prior. No acute intracranial process.        New mild mastoiditis changes, left greater than right.        MACRO:   None.        Signed by: Tom Fine 2/27/2025 1:51 PM   Dictation workstation:   [id number]      CT cervical spine wo IV contrast   Final Result   No acute fracture or subluxation of the cervical spine.        Multilevel degenerative disc and facet changes of the cervical spine   most prominent at C3-4, C5-6 and C6-7 with bilateral foraminal   narrowing at these levels.        MACRO:   None.        Signed by: Tom Fine 2/27/2025 1:53 PM   Dictation workstation:   [id number]      CT thoracic spine wo IV contrast   Final Result   1.  No evidence of pulmonary embolism.   2. Changes of previous left upper lobectomy similar to prior.   3. Mild multifocal predominantly dependent and basilar atelectasis   bilaterally along with mild nonspecific patchy ground-glass opacities   of the lungs.   4. Right lower lobe basilar aspect stable 5 mm pulmonary nodule.   5. Multilevel disc space narrowing and predominantly anterior   endplate spurring/partially bridging osteophytes of the thoracic   spine similar to prior as well as mild endplate spurring posteriorly   at T7-8 similar to prior.                  MACRO:   None.        Signed by: Tom Fine 2/27/2025 2:02 PM   Dictation workstation:   [id number]      CT angio chest for pulmonary embolism   Final Result   1.  No evidence of pulmonary embolism.   2. Changes of previous left upper lobectomy similar to prior.   3. Mild multifocal predominantly dependent and basilar atelectasis   bilaterally along with mild nonspecific patchy ground-glass opacities   of the lungs.   4. Right lower lobe basilar aspect stable 5 mm pulmonary nodule.   5. Multilevel disc space narrowing and  predominantly anterior   endplate spurring/partially bridging osteophytes of the thoracic   spine similar to prior as well as mild endplate spurring posteriorly   at T7-8 similar to prior.                  MACRO:   None.        Signed by: Tom Fine 2/27/2025 2:02 PM   Dictation workstation:   [id number]                  No data recorded     [place] Coma Scale Score: 15 (02/27/25 1147 : Sandra Price RN)                           Medical Decision Making  Patient is a 69-year-old female with a past medical history significant for lung cancer currently on oral chemotherapy as well as 2 L nasal cannula baseline, HTN, HLD, DM2, CHEY, depression presenting to the ED with back and shoulder pain.  Of note, it is also listed in the patient's chart that she has A-fib and takes amiodarone and warfarin, although I do not see prescription evidence of this.  History was obtained from the patient as well as her chart.  Endorses pain in her upper back and bilateral shoulders for over the past week.  No inciting injuries or falls.  States her symptoms on the left are worse than the right.  She is right-hand dominant.  Endorses increasing weakness of the left arm/hand.  Has been using the right as normal.  She said she has seen 2 providers regarding this issue.  1 told her she has pinched nerves and the other want her to come here for a stroke workup.  Patient tells me she has felt increasingly unsteady on her feet.  She denies any other acute or neurologic symptoms, as noted in HPI.  On physical exam, patient is sitting comfortably and in no apparent distress.  She is on 2 L nasal cannula which is her baseline.  There is reproducible TTP to the midline of the cervical spine as well surrounding paraspinal muscles.  There is also nonspecific but reproducible TTP to the anterior and superior aspects of the bilateral shoulders.  She endorses pain with abduction of the LUE.  RUE unremarkable.  Full and equal ROM and strength  "of the bilateral lower extremities.  Neurologically intact without obvious deficit.  Negative Romberg.  Remainder of exam as noted above.  Vital signs are stable.    Review of patient's chart reveals her neck pain with associated radiculopathy appears chronic in nature.  She sees pain management for this purpose.  She recently saw them on February 19 in which they planned to obtain x-rays of the cervical spine and a referral to PT was provided.  They stated if pain persisted they would discuss steroid injections.  Patient then saw her PCP, Dr. Galeano, yesterday on February 26.  He has documented the patient is having bilateral neck and shoulder pain and has been seeing pain management as well as neurology for this issue.  He documented that the patient was told she has \"3 pinched nerves.\"  Further review of patient's chart reveals she had a chest CT with contrast performed just this past December that showed stable findings of her lung cancer and associated left upper lobectomy.  She also had a full body PET scan in the fall 2024 that was negative for hypermetabolic local disease recurrence.    Please refer to attestation and ED course by attending physician regarding this patient's workup.  Workup was obtained including CBC, CMP, troponin, BNP, head CT, cervical and thoracic spine CTs, as well as CT angio for PE.  Patient treated with a dose of Percocet.  CBC normal without leukocytosis or anemia.  CMP with slight hyperglycemia of 149.  GFR stable at 58.  Otherwise grossly unremarkable.  Troponin and BNP both normal.  All of patient's scans were without acute abnormalities.  No evidence of PE.  Head without intracranial abnormalities.  Spine imaging with chronic findings.  No concerning or significant stenosis.  Patient was informed of all these results.  I went and told her that the symptoms she is experiencing is the same thing she has been dealing with, per her chart.  When inquiring about her recent visit with " pain management, patient states she was never provided anything to go to physical therapy.  Therefore, I did place this referral as well as provided her with the phone number I can find on hand.  Patient inquired whether her tramadol could be switched to something stronger.  I told her that she cannot have narcotics prescribed from 2 different providers and she would have to reach out to pain management regarding those concerns.  Otherwise, patient did remain stable and ready for discharge, plan as above in terms of physical therapy.  She has to follow-up with her PCP/neurologist/pain management provider going forward.  Otherwise, reassuring workup was obtained today.  Patient is agreeable to the plan and all of her questions were answered to satisfaction.  She was discharged in stable condition.        Procedure  Procedures     Leonie Durbin PA-C  02/27/25 9845

## 2025-02-28 ENCOUNTER — SPECIALTY PHARMACY (OUTPATIENT)
Dept: PHARMACY | Facility: CLINIC | Age: 70
End: 2025-02-28

## 2025-02-28 PROCEDURE — RXMED WILLOW AMBULATORY MEDICATION CHARGE

## 2025-03-03 ENCOUNTER — TELEPHONE (OUTPATIENT)
Dept: HEMATOLOGY/ONCOLOGY | Facility: CLINIC | Age: 70
End: 2025-03-03
Payer: MEDICARE

## 2025-03-03 NOTE — TELEPHONE ENCOUNTER
Spoke with the patient. Provided update from Dr. Galeano. Aware that CT scheduled for 3.14 was cancelled. Reviewed upcoming appointments with verbal understanding. Pt had no further questions or concerns at this time

## 2025-03-05 ENCOUNTER — PHARMACY VISIT (OUTPATIENT)
Dept: PHARMACY | Facility: CLINIC | Age: 70
End: 2025-03-05
Payer: COMMERCIAL

## 2025-03-11 ENCOUNTER — HOSPITAL ENCOUNTER (OUTPATIENT)
Dept: PAIN MEDICINE | Facility: CLINIC | Age: 70
Discharge: HOME | End: 2025-03-11
Payer: MEDICARE

## 2025-03-11 VITALS
OXYGEN SATURATION: 99 % | TEMPERATURE: 98.2 F | RESPIRATION RATE: 20 BRPM | HEART RATE: 74 BPM | SYSTOLIC BLOOD PRESSURE: 131 MMHG | DIASTOLIC BLOOD PRESSURE: 60 MMHG

## 2025-03-11 DIAGNOSIS — M47.816 LUMBAR SPONDYLOSIS: ICD-10-CM

## 2025-03-11 PROCEDURE — 64493 INJ PARAVERT F JNT L/S 1 LEV: CPT | Performed by: PAIN MEDICINE

## 2025-03-11 PROCEDURE — 2500000004 HC RX 250 GENERAL PHARMACY W/ HCPCS (ALT 636 FOR OP/ED): Performed by: PAIN MEDICINE

## 2025-03-11 PROCEDURE — 64493 INJ PARAVERT F JNT L/S 1 LEV: CPT | Mod: 50 | Performed by: PAIN MEDICINE

## 2025-03-11 PROCEDURE — 64494 INJ PARAVERT F JNT L/S 2 LEV: CPT | Performed by: PAIN MEDICINE

## 2025-03-11 PROCEDURE — 64494 INJ PARAVERT F JNT L/S 2 LEV: CPT | Mod: 50 | Performed by: PAIN MEDICINE

## 2025-03-11 RX ORDER — BUPIVACAINE HYDROCHLORIDE 5 MG/ML
INJECTION, SOLUTION EPIDURAL; INTRACAUDAL AS NEEDED
Status: COMPLETED | OUTPATIENT
Start: 2025-03-11 | End: 2025-03-11

## 2025-03-11 RX ORDER — LIDOCAINE HYDROCHLORIDE 10 MG/ML
INJECTION, SOLUTION EPIDURAL; INFILTRATION; INTRACAUDAL; PERINEURAL AS NEEDED
Status: COMPLETED | OUTPATIENT
Start: 2025-03-11 | End: 2025-03-11

## 2025-03-11 RX ADMIN — BUPIVACAINE HYDROCHLORIDE 10 ML: 5 INJECTION, SOLUTION EPIDURAL; INTRACAUDAL; PERINEURAL at 08:42

## 2025-03-11 RX ADMIN — LIDOCAINE HYDROCHLORIDE 10 ML: 10 INJECTION, SOLUTION EPIDURAL; INFILTRATION; INTRACAUDAL; PERINEURAL at 08:41

## 2025-03-11 ASSESSMENT — PAIN SCALES - GENERAL
PAINLEVEL_OUTOF10: 0 - NO PAIN
PAINLEVEL_OUTOF10: 6

## 2025-03-11 ASSESSMENT — PAIN DESCRIPTION - DESCRIPTORS: DESCRIPTORS: ACHING;DULL

## 2025-03-11 ASSESSMENT — PAIN - FUNCTIONAL ASSESSMENT: PAIN_FUNCTIONAL_ASSESSMENT: 0-10

## 2025-03-11 NOTE — DISCHARGE INSTRUCTIONS
Post-injection instructions FOR FACET BLOCK      Pay attention to how much pain relief (what percentage compared to before the procedure) you get and for how long it lasts.     THIS IS A TEMPORARY NUMBING OF PAIN THIS IS BEING USED AS A DIAGNOSTIC INDICATOR IF THIS IS THE SOURCE OF YOUR PAIN    Activity:  RETURN TO NORMAL ACTIVITY  SEE IF YOU CAN APPRECIATE AN IMPROVEMENT IN THE PAIN    Bandages: Remove after 24 hours     Showering/Bathing: You may shower after bandage is removed     Follow up: CALL OFFICE NEXT BUSINESS -[phone] LEAVE MESSAGE ABOUT THE % OF RELIEF THAT WAS OBTAINED AND FOR HOW MANY HOURS      Call the OFFICE immediately: if you notice:     Excessive bleeding from procedure site (brisk bright red bleeding from the site or bleeding that soaks the bandages or does not stop)   Severe headache  Inability to walk, leg or arm weakness or numbness that is worse after the procedure   Uncontrolled pain   New urinary or fecal incontinence   Signs of infection: Fever above 101.5F, redness, swelling, pus or drainage from the site

## 2025-03-12 ENCOUNTER — TELEPHONE (OUTPATIENT)
Dept: PAIN MEDICINE | Facility: CLINIC | Age: 70
End: 2025-03-12
Payer: MEDICARE

## 2025-03-12 DIAGNOSIS — M47.816 LUMBAR SPONDYLOSIS: Primary | ICD-10-CM

## 2025-03-12 NOTE — TELEPHONE ENCOUNTER
She reports 90% improvement  for at least 6 hours  she would like to proceed with the next step  she does make mentioned that after the block she did notice increased itching to her upper back across the shoulder blade  no rash but just a lot of itching  she states this has never happened before

## 2025-03-13 ENCOUNTER — EVALUATION (OUTPATIENT)
Dept: PHYSICAL THERAPY | Facility: CLINIC | Age: 70
End: 2025-03-13
Payer: MEDICARE

## 2025-03-13 DIAGNOSIS — M25.511 BILATERAL SHOULDER PAIN, UNSPECIFIED CHRONICITY: ICD-10-CM

## 2025-03-13 DIAGNOSIS — M25.512 BILATERAL SHOULDER PAIN, UNSPECIFIED CHRONICITY: ICD-10-CM

## 2025-03-13 DIAGNOSIS — G89.29 CHRONIC UPPER BACK PAIN: ICD-10-CM

## 2025-03-13 DIAGNOSIS — M54.9 CHRONIC UPPER BACK PAIN: ICD-10-CM

## 2025-03-13 PROCEDURE — 97161 PT EVAL LOW COMPLEX 20 MIN: CPT | Mod: GP

## 2025-03-13 PROCEDURE — 97110 THERAPEUTIC EXERCISES: CPT | Mod: GP

## 2025-03-13 ASSESSMENT — PAIN - FUNCTIONAL ASSESSMENT: PAIN_FUNCTIONAL_ASSESSMENT: 0-10

## 2025-03-13 ASSESSMENT — ENCOUNTER SYMPTOMS
OCCASIONAL FEELINGS OF UNSTEADINESS: 1
DEPRESSION: 1
LOSS OF SENSATION IN FEET: 1

## 2025-03-13 ASSESSMENT — PAIN SCALES - GENERAL: PAINLEVEL_OUTOF10: 7

## 2025-03-13 NOTE — PROGRESS NOTES
Physical Therapy     Physical Therapy Evaluation                                        Patient Name: Thu Ortega  MRN: [id number]  Today's Date: 3/13/2025  Time Calculation  Start Time: 1049  Stop Time: 1134  Time Calculation (min): 45 min     Reason for Visit  Referred by: Leonie Durbin  Referral DX: Chronic upper back pain, B shoulder pain     Insurance:  Visit: #1  Authorized:     Payor: MEDICARE / Plan: MEDICARE PART A AND B / Product Type: *No Product type* /      Diagnosis:  1. Chronic upper back pain    2. Bilateral shoulder pain, unspecified chronicity            Subjective:  Date of Onset: L shoulder pain and decreased ROM, for past two months. Went to hospital with CT and reported to her rotator cuff or pinched nerve. Pt reports pain relief with self-splinting and rest. Pt also limited with ADL's due to pain.   JOANNA: unknown     Work status/occupation: Retired  Recreational activity: Caodaism     Patient stated goals for treatment: Build muscle and decrease pain.            Pain:  Location: L shoulder  Current pain: 7/10  Aggravating factor: Movement   Alleviating factor: self-splinting, medication, ice/heat, massage, topical pain med     Objective:  Observation/Posture: B shoulder rolled fwd, fwd head pos     AROM standing:   Right Flex WNL  Left 111 deg  Right abd: WNL  Left abd 87 deg     PROM/mobility:   Flex: 132 L in supine     MMT in stance:   Shoulder flex: R 4/5  L 3+/5  Shoulder ext: R: 4+/5  L 4/5  Shoulder Abd: R 4+/5  L 3/5  IR: R 4+/5  L 3+/5  ER: 4/5  L 3/5     Neck ROM:  38 deg flex, 15 ext  35 deg R SB, 42 L SB, with empty end feel.      Special Test:   Spurlings: +  Bicep load +        Outcome Measure:   Other Measures  Disability of Arm Shoulder Hand (DASH): 48  Oswestry Disablity Index (LATASHA): 27        Treatment:  Educated pt on course of therapy  L shoulder AAROM with cane into should abd and scaption: 2x10 each direction   Cervical manual traction: x2 min with relief of sx         Assessment:  Pt presents with dx shoulder and upper back pain, ongoing for 2 months. Demonstrates impaired ROM, strength, and tender to palpation. Pt will benefit from therapy to address deficits.     Plan:  Frequency/duration: 2x/week for 2 weeks, then 1x/week for 2 weeks.   Planned interventions: Therapeutic exercise (ROM, stretching, strengthening), manual therapy, balance/gait training, education and activity modification; Focus on increased pain free ROM and pain management.     Rehab Potential to achieve goals: Good     Goals:  Pt will report adherence to HEP with IND.   Pt will report 2/10 pain or better for at least 3 days to demonstrate improved pain management in order for pt to be able to don/doff upper body dressing.  Pt will improve shoulder ABD to 100deg or better in order to increase ROM during dressing.   Pt will improve neck ext to 25 deg or better to demonstrate improved pain management with increased ROM.       Ketan Johnson, PT

## 2025-03-14 ENCOUNTER — APPOINTMENT (OUTPATIENT)
Dept: RADIOLOGY | Facility: HOSPITAL | Age: 70
End: 2025-03-14
Payer: MEDICARE

## 2025-03-17 ENCOUNTER — LAB (OUTPATIENT)
Dept: LAB | Facility: CLINIC | Age: 70
End: 2025-03-17
Payer: MEDICARE

## 2025-03-17 DIAGNOSIS — C34.12 MALIGNANT NEOPLASM OF UPPER LOBE OF LEFT LUNG (MULTI): ICD-10-CM

## 2025-03-17 DIAGNOSIS — E61.1 IRON DEFICIENCY: ICD-10-CM

## 2025-03-17 LAB
ALBUMIN SERPL BCP-MCNC: 4.4 G/DL (ref 3.4–5)
ALP SERPL-CCNC: 109 U/L (ref 33–136)
ALT SERPL W P-5'-P-CCNC: 15 U/L (ref 7–45)
ANION GAP SERPL CALC-SCNC: 13 MMOL/L (ref 10–20)
AST SERPL W P-5'-P-CCNC: 17 U/L (ref 9–39)
BASOPHILS # BLD AUTO: 0.07 X10*3/UL (ref 0–0.1)
BASOPHILS NFR BLD AUTO: 0.9 %
BILIRUB SERPL-MCNC: 0.4 MG/DL (ref 0–1.2)
BUN SERPL-MCNC: 13 MG/DL (ref 6–23)
CALCIUM SERPL-MCNC: 9.8 MG/DL (ref 8.6–10.3)
CHLORIDE SERPL-SCNC: 103 MMOL/L (ref 98–107)
CO2 SERPL-SCNC: 30 MMOL/L (ref 21–32)
CREAT SERPL-MCNC: 1.09 MG/DL (ref 0.5–1.05)
EGFRCR SERPLBLD CKD-EPI 2021: 55 ML/MIN/1.73M*2
EOSINOPHIL # BLD AUTO: 0.63 X10*3/UL (ref 0–0.7)
EOSINOPHIL NFR BLD AUTO: 8.3 %
ERYTHROCYTE [DISTWIDTH] IN BLOOD BY AUTOMATED COUNT: 15.7 % (ref 11.5–14.5)
FERRITIN SERPL-MCNC: 23 NG/ML (ref 8–150)
GLUCOSE SERPL-MCNC: 126 MG/DL (ref 74–99)
HCT VFR BLD AUTO: 34 % (ref 36–46)
HGB BLD-MCNC: 10.9 G/DL (ref 12–16)
IMM GRANULOCYTES # BLD AUTO: 0.04 X10*3/UL (ref 0–0.7)
IMM GRANULOCYTES NFR BLD AUTO: 0.5 % (ref 0–0.9)
IRON SATN MFR SERPL: 16 % (ref 25–45)
IRON SERPL-MCNC: 76 UG/DL (ref 35–150)
LYMPHOCYTES # BLD AUTO: 1.72 X10*3/UL (ref 1.2–4.8)
LYMPHOCYTES NFR BLD AUTO: 22.6 %
MCH RBC QN AUTO: 24.7 PG (ref 26–34)
MCHC RBC AUTO-ENTMCNC: 32.1 G/DL (ref 32–36)
MCV RBC AUTO: 77 FL (ref 80–100)
MONOCYTES # BLD AUTO: 1.03 X10*3/UL (ref 0.1–1)
MONOCYTES NFR BLD AUTO: 13.5 %
NEUTROPHILS # BLD AUTO: 4.12 X10*3/UL (ref 1.2–7.7)
NEUTROPHILS NFR BLD AUTO: 54.2 %
PLATELET # BLD AUTO: 279 X10*3/UL (ref 150–450)
POTASSIUM SERPL-SCNC: 3.6 MMOL/L (ref 3.5–5.3)
PROT SERPL-MCNC: 7 G/DL (ref 6.4–8.2)
RBC # BLD AUTO: 4.42 X10*6/UL (ref 4–5.2)
SODIUM SERPL-SCNC: 142 MMOL/L (ref 136–145)
TIBC SERPL-MCNC: 462 UG/DL (ref 240–445)
UIBC SERPL-MCNC: 386 UG/DL (ref 110–370)
WBC # BLD AUTO: 7.6 X10*3/UL (ref 4.4–11.3)

## 2025-03-17 PROCEDURE — 85025 COMPLETE CBC W/AUTO DIFF WBC: CPT

## 2025-03-17 PROCEDURE — 82728 ASSAY OF FERRITIN: CPT

## 2025-03-17 PROCEDURE — 80053 COMPREHEN METABOLIC PANEL: CPT

## 2025-03-17 PROCEDURE — 83540 ASSAY OF IRON: CPT

## 2025-03-17 PROCEDURE — 36415 COLL VENOUS BLD VENIPUNCTURE: CPT

## 2025-03-18 ENCOUNTER — TREATMENT (OUTPATIENT)
Dept: PHYSICAL THERAPY | Facility: CLINIC | Age: 70
End: 2025-03-18
Payer: MEDICARE

## 2025-03-18 DIAGNOSIS — M25.511 BILATERAL SHOULDER PAIN, UNSPECIFIED CHRONICITY: ICD-10-CM

## 2025-03-18 DIAGNOSIS — M54.9 CHRONIC UPPER BACK PAIN: ICD-10-CM

## 2025-03-18 DIAGNOSIS — G89.29 CHRONIC UPPER BACK PAIN: ICD-10-CM

## 2025-03-18 DIAGNOSIS — M25.512 BILATERAL SHOULDER PAIN, UNSPECIFIED CHRONICITY: ICD-10-CM

## 2025-03-18 PROCEDURE — 97110 THERAPEUTIC EXERCISES: CPT | Mod: GP

## 2025-03-18 ASSESSMENT — PAIN - FUNCTIONAL ASSESSMENT: PAIN_FUNCTIONAL_ASSESSMENT: 0-10

## 2025-03-18 NOTE — PROGRESS NOTES
Physical Therapy     Physical Therapy Treatment     Patient Name: Thu Ortega  MRN: [id number]  Today's Date: 3/18/2025  Time Calculation  Start Time: 1054  Stop Time: 1141  Time Calculation (min): 47 min           Insurance:  Visit: #2  Authorized: 6  Certification: 3/13/25 to 5/12/25  Payor: MEDICARE / Plan: MEDICARE PART A AND B / Product Type: *No Product type* /      Subjective:  Felt better after eval, but next day same pain returns. Pt reports exercises help relieve pain but does not last. Pt arrives with 02 pack nasal cannula.      Current pain: 5/10  Objective:  Vitals taken after SciFit bout: SpO2 96%                                                  bpm  Verbal cues for pause during pulley activity to encourage stretch.      Treatment:  SciFit for UE x5 min   Pulley into shoulder flex then shoulder abd on L: 2x2 min each direction with 5 sec pause each rep  Neck AROM with towel placed at Lower Cervical vert, with cues to ext neck: 2x10 with 10 sec pause each rep  Manual traction to cervical vert: 2x4 min with uncoupled SB and contralateral rotation  Deep neck flexor AROM into towel roll: 2x10  Horizontal shoulder abd with yellow TB: 2x12     OP Education: HEP:  Access Code: [email or web address]  URL: [email or web address]  Date: 03/18/2025  Prepared by: Ketan Johnson    Exercises  - Standing Shoulder Horizontal Abduction with Resistance  - 1 x daily - 3 x weekly - 3 sets - 10 reps  - Seated Cervical Sidebending Stretch  - 1 x daily - 3 x weekly - 3 sets - 3 reps - 30 sec hold  - Supine Cervical Retraction with Towel  - 1 x daily - 7 x weekly - 3 sets - 10 reps         Outcome Measure:        Diagnosis:  1. Chronic upper back pain    2. Bilateral shoulder pain, unspecified chronicity      Problem List Items Addressed This Visit             ICD-10-CM       Musculoskeletal and Injuries    Chronic upper back pain M54.9, G89.29    Bilateral shoulder pain M25.511, M25.512        Assessment:  Pt  demonstrates activity intolerance with exercises and activity this session. Therapeutic rest required between exercises for pain management. Pt reports pain in both shoulders, with L worse than R. Pt ends session with 6/10 pain.      Plan:  Continue 2x/week for 2 weeks, then 1x/week for 2 weeks.      Goals:  Pt will report adherence to HEP with IND.   Pt will report 2/10 pain or better for at least 3 days to demonstrate improved pain management in order for pt to be able to don/doff upper body dressing.  Pt will improve L shoulder ABD to 100deg or better in order to increase ROM during dressing.   Pt will improve neck ext to 25 deg or better to demonstrate improved pain management with increased ROM.    Pt will report being able to walk her dog for 30 min or longer to demonstrate improved pain management in order to reach personal goal.     Ketna Johnson, PT   3/18/2025

## 2025-03-19 ENCOUNTER — SOCIAL WORK (OUTPATIENT)
Dept: HEMATOLOGY/ONCOLOGY | Facility: CLINIC | Age: 70
End: 2025-03-19
Payer: MEDICARE

## 2025-03-19 ENCOUNTER — OFFICE VISIT (OUTPATIENT)
Dept: HEMATOLOGY/ONCOLOGY | Facility: CLINIC | Age: 70
End: 2025-03-19
Payer: MEDICARE

## 2025-03-19 ENCOUNTER — SPECIALTY PHARMACY (OUTPATIENT)
Dept: HEMATOLOGY/ONCOLOGY | Facility: CLINIC | Age: 70
End: 2025-03-19
Payer: MEDICARE

## 2025-03-19 VITALS
WEIGHT: 145.72 LBS | RESPIRATION RATE: 18 BRPM | DIASTOLIC BLOOD PRESSURE: 72 MMHG | HEART RATE: 91 BPM | OXYGEN SATURATION: 91 % | BODY MASS INDEX: 29.43 KG/M2 | TEMPERATURE: 97.9 F | SYSTOLIC BLOOD PRESSURE: 127 MMHG

## 2025-03-19 DIAGNOSIS — I10 PRIMARY HYPERTENSION: ICD-10-CM

## 2025-03-19 DIAGNOSIS — E11.9 TYPE 2 DIABETES MELLITUS WITHOUT COMPLICATION, WITH LONG-TERM CURRENT USE OF INSULIN (MULTI): ICD-10-CM

## 2025-03-19 DIAGNOSIS — I48.0 PAF (PAROXYSMAL ATRIAL FIBRILLATION) (MULTI): ICD-10-CM

## 2025-03-19 DIAGNOSIS — C34.12 MALIGNANT NEOPLASM OF UPPER LOBE OF LEFT LUNG (MULTI): Primary | ICD-10-CM

## 2025-03-19 DIAGNOSIS — J44.9 CHRONIC OBSTRUCTIVE PULMONARY DISEASE, UNSPECIFIED COPD TYPE (MULTI): ICD-10-CM

## 2025-03-19 DIAGNOSIS — E03.9 ACQUIRED HYPOTHYROIDISM: ICD-10-CM

## 2025-03-19 DIAGNOSIS — Z79.4 TYPE 2 DIABETES MELLITUS WITHOUT COMPLICATION, WITH LONG-TERM CURRENT USE OF INSULIN (MULTI): ICD-10-CM

## 2025-03-19 DIAGNOSIS — E78.2 MIXED HYPERLIPIDEMIA: ICD-10-CM

## 2025-03-19 DIAGNOSIS — F33.41 RECURRENT MAJOR DEPRESSIVE DISORDER, IN PARTIAL REMISSION (CMS-HCC): ICD-10-CM

## 2025-03-19 PROCEDURE — 1159F MED LIST DOCD IN RCRD: CPT | Performed by: INTERNAL MEDICINE

## 2025-03-19 PROCEDURE — 3074F SYST BP LT 130 MM HG: CPT | Performed by: INTERNAL MEDICINE

## 2025-03-19 PROCEDURE — 3078F DIAST BP <80 MM HG: CPT | Performed by: INTERNAL MEDICINE

## 2025-03-19 PROCEDURE — 99214 OFFICE O/P EST MOD 30 MIN: CPT | Performed by: INTERNAL MEDICINE

## 2025-03-19 PROCEDURE — 1160F RVW MEDS BY RX/DR IN RCRD: CPT | Performed by: INTERNAL MEDICINE

## 2025-03-19 PROCEDURE — G2211 COMPLEX E/M VISIT ADD ON: HCPCS | Performed by: INTERNAL MEDICINE

## 2025-03-19 PROCEDURE — 1125F AMNT PAIN NOTED PAIN PRSNT: CPT | Performed by: INTERNAL MEDICINE

## 2025-03-19 RX ORDER — PREGABALIN 25 MG/1
25 CAPSULE ORAL 2 TIMES DAILY
COMMUNITY

## 2025-03-19 ASSESSMENT — PAIN SCALES - GENERAL: PAINLEVEL_OUTOF10: 6

## 2025-03-19 NOTE — PROGRESS NOTES
The patient is a 69 year old who has been diagnosed with lung cancer. She requested information about [facility] at her appointment today. I called her to discuss further; she was pleasant and easily engaged in conversation over the phone. She said that she heard [facility] offers support groups. I explained they offer support groups as well as other programs, such as nutrition and exercise. I offered to send more information via email or regular mail for her review. She is agreeable and requested information be sent through the mail. I verified her address and sent current [facility] program guide to her in the mail today. I also included my contact information and encouraged her to reach out to me if I can be of further assistance. Social work will remain available to assist this patient.    SYDNEY Ward, GUANACO-S

## 2025-03-19 NOTE — PROGRESS NOTES
[facility] Clinical Note  Patient Reassessment     Introduction  Thu Ortega is a 69 y.o. female who is on the specialty pharmacy service for management of: Oncology Core.      [facility] supplied medication: adagrasib 200 mg PO in AM, 400 mg PO in PM, qty #90, 3 ref    Duration of therapy: Until drug toxicity or progression    The most recent encounter visit with the referring prescriber Álvaro Galeano MD on 3/19/25 was reviewed.  Pharmacy will continue to collaborate in the care of this patient with the referring prescriber.    Discussion  Thu was contacted on 3/19/2025 at 13:00 for a pharmacy visit with encounter number [id number] from:   [facility] [id number] [facility]   [id number] [facility]   [address]  [facility] [address]  Dept: [phone]  Dept Fax: [phone]  Thu consented to a/an In person visit, which was performed.    Efficacy  Patient has developed new symptoms of condition: Yes - back pain, although this may be d/t spinal stenosis and pinched nerves.  Patient/caregiver feels medication is affecting the disease state: yes, helping keep disease in check.    Goals  Provided education on goals and possible outcomes of therapy:  Adherence with therapy  Timely completion of appropriate labs  Timely and appropriate follow up with provider  Identify and address medication interactions with presciption medications, OTC medications and supplements  Optimize or maintain quality of life  Oncology: Prolong life/No disease progression  Manage side effects (ex: nausea/vomiting, constipation, fatigue) in conjunction with care team  Patient has documented target(s) for goals of therapy: Yes    Tolerance  Patient has experienced side effects from this medication: Yes - fatigue  Changes to current therapy regimen: No    The follow-up timeline was discussed. Every person responds to and reacts to therapy differently. Patient should be assessed for efficacy and  tolerability in approximately: other - approx 4 - 6 months.    Adherence  Patient Information  Informant: Self (Patient)  Demonstrates Understanding of Importance of Adherence: Yes  Does the patient have any barriers to self-administration (including physical and mental?): No  Support Network for Adherence: Family Member, Healthcare Provider  Medication Information  Medication: adagrasib (Krazati)  Patient Reported Missed Doses in the Last 4 Weeks: 0  Estimated Medication Adherence Level: Good  Adherence Estimation Source: Claims history  Barriers to Adherence: No Problems identified   The importance of adherence was discussed and patient/caregiver was advised to take the medication as prescribed by their provider. Encouraged patient/caregiver to call physician's office or specialty pharmacy if they have a question regarding a missed dose.    General Assessment  Changes to home medications, OTCs or supplements: Yes - DDI updated on 3/19/25, no significant issues.  ** NOTE:  pt is NOT on Wixela inhaler but rather on Dulera (better potential DDI profile).  Current Outpatient Medications   Medication Sig Dispense Refill    acetaminophen (Tylenol) 325 mg tablet Take 2 tablets (650 mg) by mouth every 4 hours if needed for mild pain (1 - 3) or fever (temp greater than 38.0 C). 30 tablet 0    adagrasib (Krazati) 200 mg tablet Take 2 tablets (400 mg) by mouth in the morning and take 1 tablet (200 mg) by mouth in the evening. 90 tablet 3    albuterol 90 mcg/actuation inhaler Inhale 2 puffs every 6 hours if needed for wheezing.      amLODIPine (Norvasc) 10 mg tablet Take 1 tablet (10 mg) by mouth once daily in the morning. Take before meals.      aspirin 81 mg chewable tablet Chew 1 tablet (81 mg) once daily.      atorvastatin (Lipitor) 10 mg tablet Take 1 tablet (10 mg) by mouth once daily.      biotin 10 mg tablet Take 1 tablet (10 mg) by mouth once daily. (Patient not taking: Reported on 2/26/2025)      celecoxib  (CeleBREX) 100 mg capsule Take 1 capsule (100 mg) by mouth 2 times a day.      cholecalciferol (Vitamin D3) 5,000 Units tablet Take 1 tablet (5,000 Units) by mouth once daily. (Patient not taking: Reported on 2/26/2025)      DULoxetine (Cymbalta) 60 mg DR capsule Take 1 capsule (60 mg) by mouth once daily. Do not crush or chew.      glipiZIDE (Glucotrol) 5 mg tablet Take 1 tablet (5 mg) by mouth 1 time for 1 dose. (Patient not taking: Reported on 1/6/2025) 30 tablet 0    hydroCHLOROthiazide (HYDRODiuril) 25 mg tablet Take 1 tablet (25 mg) by mouth once daily. 30 tablet 11    insulin aspart (NovoLOG PenFill U-100 Insulin) 100 unit/mL pen cartridge Inject 15 Units under the skin 3 times daily (morning, midday, late afternoon). Take as directed per insulin instructions. Mild insulin sliding scale 15 mL 0    levothyroxine (Synthroid, Levoxyl) 25 mcg tablet Take 1 tablet (25 mcg) by mouth early in the morning..      metFORMIN (Glucophage) 1,000 mg tablet Take 1 tablet (1,000 mg) by mouth 2 times a day.      methocarbamol (Robaxin) 500 mg tablet Take 1 tablet (500 mg) by mouth 3 times a day as needed for muscle spasms for up to 10 days. 30 tablet 0    nystatin (Mycostatin) 100,000 unit/gram powder Apply 1 Application topically 2 times a day. (Patient not taking: Reported on 1/6/2025) 15 g 2    ondansetron (Zofran) 4 mg tablet Take 1 tablet (4 mg) by mouth every 8 hours if needed for nausea or vomiting. 60 tablet 2    oxygen (O2) gas therapy Inhale 1 each once every 24 hours.      pantoprazole (ProtoNix) 40 mg EC tablet Take 1 tablet (40 mg) by mouth once daily in the morning. Take before meals.      pregabalin (Lyrica) 25 mg capsule Take 1 capsule (25 mg) by mouth 2 times a day.      prochlorperazine (Compazine) 10 mg tablet Take 1 tablet (10 mg) by mouth every 6 hours if needed for nausea or vomiting. 30 tablet 5    tiotropium (Spiriva) 18 mcg inhalation capsule Place 1 capsule (18 mcg) into inhaler and inhale once  daily.      traMADol ER (Ultram-ER) 100 mg 24 hr tablet Take by mouth. Do not crush, chew, or split.      traZODone (Desyrel) 50 mg tablet Take 1 tablet (50 mg) by mouth once daily at bedtime. (Patient not taking: Reported on 1/6/2025)      Wixela Inhub 250-50 mcg/dose diskus inhaler Inhale 1 puff 2 times a day. (Patient not taking: Reported on 1/6/2025)      zolpidem CR (Ambien CR) 6.25 mg ER tablet Take 10 mg by mouth as needed at bedtime for sleep. Do not crush, chew, or split.       No current facility-administered medications for this visit.     Reported new allergies: No  Reported new medical conditions: No  Additional monitoring reviewed: Oncology - CBC-diff:   Lab Results   Component Value Date    WBC 7.6 03/17/2025    RBC 4.42 03/17/2025    HGB 10.9 (L) 03/17/2025    HCT 34.0 (L) 03/17/2025    MCV 77 (L) 03/17/2025    MCHC 32.1 03/17/2025     03/17/2025    RDW 15.7 (H) 03/17/2025    NEUTOPHILPCT 54.2 03/17/2025    IGPCT 0.5 03/17/2025    LYMPHOPCT 22.6 03/17/2025    MONOPCT 13.5 03/17/2025    EOSPCT 8.3 03/17/2025    BASOPCT 0.9 03/17/2025    NEUTROABS 4.12 03/17/2025    LYMPHSABS 1.72 03/17/2025    MONOSABS 1.03 (H) 03/17/2025    EOSABS 0.63 03/17/2025    BASOSABS 0.07 03/17/2025    and CMP:   Lab Results   Component Value Date    GLUCOSE 126 (H) 03/17/2025     03/17/2025    K 3.6 03/17/2025     03/17/2025    CO2 30 03/17/2025    ANIONGAP 13 03/17/2025    BUN 13 03/17/2025    CREATININE 1.09 (H) 03/17/2025    CALCIUM 9.8 03/17/2025    ALBUMIN 4.4 03/17/2025    ALKPHOS 109 03/17/2025    PROT 7.0 03/17/2025    AST 17 03/17/2025    BILITOT 0.4 03/17/2025    ALT 15 03/17/2025     Is laboratory follow up needed? Yes - routine per clinic monitoring.    Advised to contact the pharmacy if there are any changes to the patient's medication list, including prescriptions, OTC medications, herbal products, or supplements.    Impression/Plan  This patient has been identified as high risk due to  Geriatric (over 65 years of age).  The following action was taken: N/A.    QOL/Patient Satisfaction  Rate your quality of life on scale of 1-10: 5  Rate your satisfaction with  Specialty Pharmacy on scale of 1-10: 10 - Completely satisfied    Provided contact information ([phone]) for [facility] and reviewed dispensing process, refill timeline and patient management follow up. Confirmed understanding of education conducted during assessment. All questions and concerns were addressed and patient/caregiver was encouraged to reach out for additional questions or concerns.    Based on the patient's diagnosis, medication list, progress towards goals, adherence, tolerance, and medication list, medication remains appropriate: Therapy remains appropriate (I attest)    Tariq Walsh [facility] MS BCOP  Clinical Pharmacy Specialist - Ambulatory Oncology

## 2025-03-19 NOTE — PROGRESS NOTES
Patient ID: Thu rOtega is a 69 y.o. female.  Referring Physician: Álvaro Galeano MD  [address]  [place] [address]  Primary Care Provider: DEANNA Rodriguez  Visit Type: Follow Up      Subjective    HPI How was my CT scan?    Review of Systems   Constitutional: Negative.    HENT:  Negative.     Eyes: Negative.    Respiratory: Negative.     Cardiovascular: Negative.    Gastrointestinal: Negative.    Endocrine: Negative.    Genitourinary: Negative.     Musculoskeletal: Negative.    Skin: Negative.    Neurological: Negative.    Hematological: Negative.    Psychiatric/Behavioral: Negative.          Objective   BSA: 1.66 meters squared  /72 (BP Location: Right arm)   Pulse 91   Temp 36.6 °C (97.9 °F) (Temporal)   Resp 18   Wt 66.1 kg (145 lb 11.6 oz)   SpO2 91% Comment: Pt is on 2L of O2  BMI 29.43 kg/m²      has a past medical history of Adenocarcinoma of lung, left (Multi), Atrial fibrillation (Multi), COPD (chronic obstructive pulmonary disease) (Multi), Depression, DJD (degenerative joint disease), DM (diabetes mellitus) (Multi), Fibromyalgia, primary, GERD (gastroesophageal reflux disease), Hearing aid worn, History of blood transfusion, History of meningioma of the brain, HLD (hyperlipidemia), HTN (hypertension), antineoplastic chemo (january2023), Irregular heart beat, Irritable bowel syndrome, Malignant neoplasm of upper lobe of left lung (Multi), Nephrolithiasis, Panlobular emphysema (Multi), Shortness of breath, Spinal stenosis, and Urinary tract infection.   has a past surgical history that includes US guided needle liver biopsy (02/07/2020); Lung lobectomy; CT guided percutaneous biopsy lung (12/13/2023); CT guided percutaneous biopsy lung (12/15/2023); Brain surgery; Foot surgery; Knee surgery; Cataract extraction; Tubal ligation; and Breast biopsy (january2023).  Family History   Problem Relation Name Age of Onset    Stroke Mother      Other (aortic valve  disease) Mother      Heart disease Mother      Cancer Sister      Stroke Sister      Diabetes Sister      Diabetes Brother      Other (heart transplant) Brother       Oncology History   Malignant neoplasm of upper lobe of left lung (Multi)   12/3/2023 Initial Diagnosis    Malignant neoplasm of upper lobe of left lung (CMS/HCC)     2/28/2024 - 7/3/2024 Chemotherapy    Pembrolizumab, 21 Day Cycles     8/12/2024 -  Chemotherapy    Adagrasib, 28 Day Cycles     Local recurrence of left lung cancer (Multi)   1/4/2024 Initial Diagnosis    Local recurrence of left lung cancer (CMS/HCC)     2/28/2024 - 7/3/2024 Chemotherapy    Pembrolizumab, 21 Day Cycles     8/12/2024 -  Chemotherapy    Adagrasib, 28 Day Cycles         Thu Ortega  reports that she quit smoking about 19 years ago. Her smoking use included cigarettes. She has never used smokeless tobacco.  She  reports that she does not currently use alcohol.  She  reports no history of drug use.    Physical Exam  Vitals reviewed.   Constitutional:       Appearance: Normal appearance.   HENT:      Head: Normocephalic.      Mouth/Throat:      Mouth: Mucous membranes are moist.   Eyes:      Extraocular Movements: Extraocular movements intact.      Pupils: Pupils are equal, round, and reactive to light.   Cardiovascular:      Rate and Rhythm: Normal rate and regular rhythm.      Pulses: Normal pulses.      Heart sounds: Normal heart sounds.   Pulmonary:      Effort: Pulmonary effort is normal.      Breath sounds: Normal breath sounds.   Abdominal:      General: Bowel sounds are normal.      Palpations: Abdomen is soft.   Musculoskeletal:         General: Normal range of motion.      Cervical back: Normal range of motion and neck supple.   Skin:     General: Skin is warm.   Neurological:      General: No focal deficit present.      Mental Status: She is alert and oriented to person, place, and time.         WBC   Date/Time Value Ref Range Status   03/17/2025 04:16 PM 7.6 4.4  "- 11.3 x10*3/uL Final   02/27/2025 12:48 PM 7.1 4.4 - 11.3 x10*3/uL Final   02/26/2025 08:46 AM 6.7 4.4 - 11.3 x10*3/uL Final     nRBC   Date Value Ref Range Status   02/27/2025 0.0 0.0 - 0.0 /100 WBCs Final   12/18/2024 0.0 0.0 - 0.0 /100 WBCs Final   07/30/2024 0.0 0.0 - 0.0 /100 WBCs Final     RBC   Date Value Ref Range Status   03/17/2025 4.42 4.00 - 5.20 x10*6/uL Final   02/27/2025 5.15 4.00 - 5.20 x10*6/uL Final   02/26/2025 4.36 4.00 - 5.20 x10*6/uL Final     Hemoglobin   Date Value Ref Range Status   03/17/2025 10.9 (L) 12.0 - 16.0 g/dL Final   02/27/2025 12.3 12.0 - 16.0 g/dL Final   02/26/2025 10.7 (L) 12.0 - 16.0 g/dL Final     Hematocrit   Date Value Ref Range Status   03/17/2025 34.0 (L) 36.0 - 46.0 % Final   02/27/2025 39.6 36.0 - 46.0 % Final   02/26/2025 33.7 (L) 36.0 - 46.0 % Final     MCV   Date/Time Value Ref Range Status   03/17/2025 04:16 PM 77 (L) 80 - 100 fL Final   02/27/2025 12:48 PM 77 (L) 80 - 100 fL Final   02/26/2025 08:46 AM 77 (L) 80 - 100 fL Final     MCH   Date/Time Value Ref Range Status   03/17/2025 04:16 PM 24.7 (L) 26.0 - 34.0 pg Final   02/27/2025 12:48 PM 23.9 (L) 26.0 - 34.0 pg Final   02/26/2025 08:46 AM 24.5 (L) 26.0 - 34.0 pg Final     MCHC   Date/Time Value Ref Range Status   03/17/2025 04:16 PM 32.1 32.0 - 36.0 g/dL Final   02/27/2025 12:48 PM 31.1 (L) 32.0 - 36.0 g/dL Final   02/26/2025 08:46 AM 31.8 (L) 32.0 - 36.0 g/dL Final     RDW   Date/Time Value Ref Range Status   03/17/2025 04:16 PM 15.7 (H) 11.5 - 14.5 % Final   02/27/2025 12:48 PM 16.1 (H) 11.5 - 14.5 % Final   02/26/2025 08:46 AM 16.0 (H) 11.5 - 14.5 % Final     Platelets   Date/Time Value Ref Range Status   03/17/2025 04:16  150 - 450 x10*3/uL Final   02/27/2025 12:48  150 - 450 x10*3/uL Final   02/26/2025 08:46  150 - 450 x10*3/uL Final     No results found for: \"MPV\"  Neutrophils %   Date/Time Value Ref Range Status   03/17/2025 04:16 PM 54.2 40.0 - 80.0 % Final   02/27/2025 12:48 PM 57.4 " 40.0 - 80.0 % Final   02/26/2025 08:46 AM 61.7 40.0 - 80.0 % Final     Immature Granulocytes %, Automated   Date/Time Value Ref Range Status   03/17/2025 04:16 PM 0.5 0.0 - 0.9 % Final     Comment:     Immature Granulocyte Count (IG) includes promyelocytes, myelocytes and metamyelocytes but does not include bands. Percent differential counts (%) should be interpreted in the context of the absolute cell counts (cells/UL).   02/27/2025 12:48 PM 0.4 0.0 - 0.9 % Final     Comment:     Immature Granulocyte Count (IG) includes promyelocytes, myelocytes and metamyelocytes but does not include bands. Percent differential counts (%) should be interpreted in the context of the absolute cell counts (cells/UL).   02/26/2025 08:46 AM 0.3 0.0 - 0.9 % Final     Comment:     Immature Granulocyte Count (IG) includes promyelocytes, myelocytes and metamyelocytes but does not include bands. Percent differential counts (%) should be interpreted in the context of the absolute cell counts (cells/UL).     Lymphocytes %, Manual   Date/Time Value Ref Range Status   07/30/2024 06:29 PM 9.0 13.0 - 44.0 % Final     Lymphocytes %   Date/Time Value Ref Range Status   03/17/2025 04:16 PM 22.6 13.0 - 44.0 % Final   02/27/2025 12:48 PM 24.2 13.0 - 44.0 % Final   02/26/2025 08:46 AM 19.9 13.0 - 44.0 % Final     Monocytes %, Manual   Date/Time Value Ref Range Status   07/30/2024 06:29 PM 5.0 2.0 - 10.0 % Final     Monocytes %   Date/Time Value Ref Range Status   03/17/2025 04:16 PM 13.5 2.0 - 10.0 % Final   02/27/2025 12:48 PM 10.1 2.0 - 10.0 % Final   02/26/2025 08:46 AM 9.8 2.0 - 10.0 % Final     Eosinophils %, Manual   Date/Time Value Ref Range Status   07/30/2024 06:29 PM 2.0 0.0 - 6.0 % Final     Eosinophils %   Date/Time Value Ref Range Status   03/17/2025 04:16 PM 8.3 0.0 - 6.0 % Final   02/27/2025 12:48 PM 6.9 0.0 - 6.0 % Final   02/26/2025 08:46 AM 7.3 0.0 - 6.0 % Final     Basophils %, Manual   Date/Time Value Ref Range Status   07/30/2024  06:29 PM 0.0 0.0 - 2.0 % Final     Basophils %   Date/Time Value Ref Range Status   03/17/2025 04:16 PM 0.9 0.0 - 2.0 % Final   02/27/2025 12:48 PM 1.0 0.0 - 2.0 % Final   02/26/2025 08:46 AM 1.0 0.0 - 2.0 % Final     Neutrophils Absolute   Date/Time Value Ref Range Status   03/17/2025 04:16 PM 4.12 1.20 - 7.70 x10*3/uL Final     Comment:     Percent differential counts (%) should be interpreted in the context of the absolute cell counts (cells/uL).   02/27/2025 12:48 PM 4.07 1.20 - 7.70 x10*3/uL Final     Comment:     Percent differential counts (%) should be interpreted in the context of the absolute cell counts (cells/uL).   02/26/2025 08:46 AM 4.14 1.20 - 7.70 x10*3/uL Final     Comment:     Percent differential counts (%) should be interpreted in the context of the absolute cell counts (cells/uL).     Immature Granulocytes Absolute, Automated   Date/Time Value Ref Range Status   03/17/2025 04:16 PM 0.04 0.00 - 0.70 x10*3/uL Final   02/27/2025 12:48 PM 0.03 0.00 - 0.70 x10*3/uL Final   02/26/2025 08:46 AM 0.02 0.00 - 0.70 x10*3/uL Final     Lymphocytes Absolute   Date/Time Value Ref Range Status   03/17/2025 04:16 PM 1.72 1.20 - 4.80 x10*3/uL Final   02/27/2025 12:48 PM 1.72 1.20 - 4.80 x10*3/uL Final   02/26/2025 08:46 AM 1.34 1.20 - 4.80 x10*3/uL Final     Monocytes Absolute   Date/Time Value Ref Range Status   03/17/2025 04:16 PM 1.03 (H) 0.10 - 1.00 x10*3/uL Final   02/27/2025 12:48 PM 0.72 0.10 - 1.00 x10*3/uL Final   02/26/2025 08:46 AM 0.66 0.10 - 1.00 x10*3/uL Final     Eosinophils Absolute   Date/Time Value Ref Range Status   03/17/2025 04:16 PM 0.63 0.00 - 0.70 x10*3/uL Final   02/27/2025 12:48 PM 0.49 0.00 - 0.70 x10*3/uL Final   02/26/2025 08:46 AM 0.49 0.00 - 0.70 x10*3/uL Final     Eosinophils Absolute, Manual   Date/Time Value Ref Range Status   07/30/2024 06:29 PM 0.20 0.00 - 0.70 x10*3/uL Final     Basophils Absolute   Date/Time Value Ref Range Status   03/17/2025 04:16 PM 0.07 0.00 - 0.10  "x10*3/uL Final   02/27/2025 12:48 PM 0.07 0.00 - 0.10 x10*3/uL Final   02/26/2025 08:46 AM 0.07 0.00 - 0.10 x10*3/uL Final     Basophils Absolute, Manual   Date/Time Value Ref Range Status   07/30/2024 06:29 PM 0.00 0.00 - 0.10 x10*3/uL Final       No components found for: \"PT\"  aPTT   Date/Time Value Ref Range Status   01/12/2024 01:46 PM 31 27 - 38 seconds Final     Medication Documentation Review Audit       Reviewed by Marisa Khalil MA (Medical Assistant) on 03/19/25 at 1317      Medication Order Taking? Sig Documenting Provider Last Dose Status   acetaminophen (Tylenol) 325 mg tablet [id number] Yes Take 2 tablets (650 mg) by mouth every 4 hours if needed for mild pain (1 - 3) or fever (temp greater than 38.0 C). Sunshine Hankins PA-C  Active   adagrasib (Krazati) 200 mg tablet [id number] Yes Take 2 tablets (400 mg) by mouth in the morning and take 1 tablet (200 mg) by mouth in the evening. Álvaro Galeano MD  Active   albuterol 90 mcg/actuation inhaler [id number] Yes Inhale 2 puffs every 6 hours if needed for wheezing. Historical Provider, MD  Active   amLODIPine (Norvasc) 10 mg tablet [id number] Yes Take 1 tablet (10 mg) by mouth once daily in the morning. Take before meals. Historical Provider, MD  Active   aspirin 81 mg chewable tablet [id number] Yes Chew 1 tablet (81 mg) once daily. Historical Provider, MD  Active   atorvastatin (Lipitor) 10 mg tablet [id number] Yes Take 1 tablet (10 mg) by mouth once daily. Historical Provider, MD  Active   biotin 10 mg tablet [id number]  Take 1 tablet (10 mg) by mouth once daily.   Patient not taking: Reported on 2/26/2025    Historical MD Eulalia  Active   celecoxib (CeleBREX) 100 mg capsule [id number] Yes Take 1 capsule (100 mg) by mouth 2 times a day. Historical Provider, MD  Active   cholecalciferol (Vitamin D3) 5,000 Units tablet [id number]  Take 1 tablet (5,000 Units) by mouth once daily.   Patient not taking: Reported on 2/26/2025    Historical Provider, MD  Active "   DULoxetine (Cymbalta) 60 mg DR capsule [id number] Yes Take 1 capsule (60 mg) by mouth once daily. Do not crush or chew. Historical Provider, MD  Active   glipiZIDE (Glucotrol) 5 mg tablet [id number]  Take 1 tablet (5 mg) by mouth 1 time for 1 dose.   Patient not taking: Reported on 2025    Aman Guthrie, DO   24 2359   hydroCHLOROthiazide (HYDRODiuril) 25 mg tablet [id number] Yes Take 1 tablet (25 mg) by mouth once daily. Álvaro Galeano MD  Active   insulin aspart (NovoLOG PenFill U-100 Insulin) 100 unit/mL pen cartridge [id number]  Inject 15 Units under the skin 3 times daily (morning, midday, late afternoon). Take as directed per insulin instructions. Mild insulin sliding scale Yanni Smalls,    25 2359   levothyroxine (Synthroid, Levoxyl) 25 mcg tablet [id number] Yes Take 1 tablet (25 mcg) by mouth early in the morning.. Historical Provider, MD  Active   metFORMIN (Glucophage) 1,000 mg tablet [id number] Yes Take 1 tablet (1,000 mg) by mouth 2 times a day. Historical Provider, MD  Active   methocarbamol (Robaxin) 500 mg tablet [id number]  Take 1 tablet (500 mg) by mouth 3 times a day as needed for muscle spasms for up to 10 days. Angelique Tabor, APRN-CNP   25 2359   nystatin (Mycostatin) 100,000 unit/gram powder [id number]  Apply 1 Application topically 2 times a day.   Patient not taking: Reported on 2025    Álvaro Galeano MD  Active   ondansetron (Zofran) 4 mg tablet [id number] Yes Take 1 tablet (4 mg) by mouth every 8 hours if needed for nausea or vomiting. Álvaro Galeano MD  Active   oxygen (O2) gas therapy [id number] Yes Inhale 1 each once every 24 hours. Yanni Smalls DO  Active   pantoprazole (ProtoNix) 40 mg EC tablet [id number] Yes Take 1 tablet (40 mg) by mouth once daily in the morning. Take before meals. Historical Provider, MD  Active   pregabalin (Lyrica) 25 mg capsule [id number] Yes Take 1 capsule (25 mg) by mouth 2 times a day. Historical Provider,  MD  Active   prochlorperazine (Compazine) 10 mg tablet [id number] Yes Take 1 tablet (10 mg) by mouth every 6 hours if needed for nausea or vomiting. Álvaro Galeano MD  Active   tiotropium (Spiriva) 18 mcg inhalation capsule [id number] Yes Place 1 capsule (18 mcg) into inhaler and inhale once daily. Historical Provider, MD  Active   traMADol ER (Ultram-ER) 100 mg 24 hr tablet [id number] Yes Take by mouth. Do not crush, chew, or split. Historical Provider, MD  Active   traZODone (Desyrel) 50 mg tablet [id number]  Take 1 tablet (50 mg) by mouth once daily at bedtime.   Patient not taking: Reported on 1/6/2025    Historical Provider, MD  Active   Wixela Inhub 250-50 mcg/dose diskus inhaler [id number]  Inhale 1 puff 2 times a day.   Patient not taking: Reported on 1/6/2025    Historical Provider, MD  Active   zolpidem CR (Ambien CR) 6.25 mg ER tablet [id number] Yes Take 10 mg by mouth as needed at bedtime for sleep. Do not crush, chew, or split. Historical Provider, MD  Active                   Assessment/Plan    1) lung cancer  -12/8/2022 chest CT: NICHOL anterior segment 1.6 x 2.2 cm nodule, partial pleural attachment, nonspecific low volume paratracheal mediastinal LN largest near AP window 1.0 x 1.4 cm, right subcarinal space 8 x 14 mm  -12/12/2022 PET: NICHOL 1.8 x 2.3 cm mass with SUV 5.8, lateral margin abuts pleura; no significant mediastinal or hilar hypermetabolic lymphadenopathy  -12/28/2022 chest CT: 2.5 cm cavitary nodule in NICHOL  -1/7/2023 PET scan  -had surgery for stage I NSCLC, s/p lobectomy (Dr Fagan, 1/31/2023)  -2/1/2023 CT chest: no PE, postop changes in left chest, small left PTX in left upper anterior chest  -2/26/2023 chest CT no PE, continued mildly enlarged mediastinal lymph nodes  -saw Dr Solis at [facility] on 3/13/2023 - she had a [id number] (stage Ib) lung adenocarcinoma (poor NCCN risk factors - G3, + visceral involvement), s/p left robotic assisted anatomic upper lobectomy, PD-L1 90%, +[id number]  -per his  charting, he recommended either adjuvant cisplatin + pemetrexed x 4 cycles vs surveillance  -according to  Thu, Dr Solis never told her about her high risk features nor any adjuvant option, and that the only thing he recommended was observation  -7/11/2023 CT chest : stable postsurgical changes of prior left thoracotomy and left upper lobectomy with interval resolution of bilateral pleural effusions; interval progression of lymphadenopathy in the chest concerning for progressing ghazala metastatic disease     7/25/2023 underwent EBUS: A - EBUS TRANSBRONCHIAL FINE NEEDLE ASPIRATE, LYMPH NODE  - 4L             Negative for malignant cells.             Benign lymphoid sample (see comment).   B - EBUS TRANSBRONCHIAL FINE NEEDLE ASPIRATE, LYMPH NODE  - 10L             Negative for malignant cells.                   Benign lymphoid sample.  C - EBUS TRANSBRONCHIAL FINE NEEDLE ASPIRATE, LYMPH NODE  - STATION 7             Negative for malignant cells.                 Benign lymphoid sample.   -8/9/2023 PET scan: 3.0 x 2.1 cm left prevascular node with SUV 7.9; few additional prominent mediastinal nodes with low level uptake; left lower paratracheal node 0.8 cm with SUV 2.2; mild FDG uptake in left hilum SUV 3.1  -11/14/2023 chest CT: enlarged 2.2 cm prevascular lymph node not significantly changed  -she was told by her pulmonologist Dr Kang that she has cancer  -discussed her original path with her--while it was a small tumor and stage Ib, she did have a couple high risk features that would have warranted adjuvant chemotherapy followed by atezolizumab; also if she does have a recurrence that isn't amenable to surgery, she would also be a candidate for immunotherapy then KRAS inhibitor (FDA approved only in 2nd line setting)  -will discuss with thoracic surgeon--will import all [facility] films to review; may need CT guided bx by IR of this prevascular node   -she had biopsy done on 12/13/2023--path confirmed poorly differentiated  lung carcinoma, PD-L1 90%, KRAS G12C mutation  -she saw Dr Calixto on 12/28/2023--he advised surgery, provided that she can pass her PFTs  - on 1/30/2024 Dr Calixto took her to the OR for robotic assisted redo left mediastinal exploration, left anterior mediastinal mass resection, diaphragmatic pacer placement  -path showed poorly differentiated carcinoma with pleomorphic/spindle cell and clear cell features; 4 lymph nodes with no evidence of malignancy; sections show a poorly differentiated carcinoma with pleomorphic/spindle cell and clear cell features involving fibroadipose tissue with a large area of central necrosis; tumor cells are positive for AE1/AE3, CAM 5.2, CK7 and negative for TTF-1, p40.  -pt is most interested in doing whatever is necessary to reduce her risk for recurrence  -she and  state again that the [facility] oncologist told them that adjuvant therapy was not necessary after her first surgery, even though his note documented that he recommended it  -as her tumor has high PD-L1 expression, she is a candidate for pembrolizumab; if she relapses, she is also a candidate for KRAS G12C inhibitor  -has completed 6 doses of adjuvant pembrolizumab (out of 17)  -CT scan done on 6/19/2024 showed  mediastinum with subcarinal lymphadenopathy 10 mm in short axis, there is component of AP window lymphadenopathy 17 x 15 mm; mesentery demonstrates soft tissue lesion within midline lower abdominal mesentery 1.6 x 1.2 cm; pleural based nodular lesion in RLL posteriorly 8 mm with surrounding ground glass airspace infiltrate  -she was admitted recently for pneumonia--CT angio done on 7/23 showed no evidence of pulmonary embolus, multifocal airspace disease in the right lung suggesting pneumonia  -right after she was discharged from [place], she then went back to the ER for hyperglycemia ()  -Dr Calixto has scheduled her next chest CT for 8/2024--pt is wondering if that is still necessary  -on 7/14/2024  "she underwent an EBUS--lymph node level 4L was sampled--showed NSCLC, favor squamous cell carcinoma  -given that she is now confirmed to have new and/or persistent disease in her mediastinum, I have advised switching to new therapy; as her tumor has the KRAS G12C mutation, I have recommended switching to KRAS G12C inhibitor, namely adagrasib  -benefits, risks, potential morbidity related to adagrasib were reviewed with Thu and she signed informed consent to proceed  -she will take adagrasib (Krazati) 600 mg PO BID  -she also has severe body pains secondary to fibromyalgia--she  says in the hospital she was prescribed percocet PRN and asked if I could prescribe it; I did inform her that I will treat pain only secondary to cancer; she therefore was willing to be referred to pain management (Dr Melendrez)  -here for interval followup  -has been taking adagrasib 400 mg BID as adagrasib can be associated with leg edema/fluid retention, however dropping the dose down did not seem to help with reducing the leg edema; she also has been taking hydrochlorothiazide 12.5 mg daily without any effect; also reported \"not urinating enough\"--will check leg doppler to rule out DVT and also advised her to increase hydrochlorothiazide to 25 mg daily  -wants to vacation in [place] again--and will be going there for at least 2 weeks in October  -saw Dr Melendrez--she received a diagnostic medial nerve branch block targeting L3-L4--she said pain relief lasted only 4 days  -advised Thu to continue with  adagrasib to 400 mg BID  -will now schedule PET scan to be done in next couple weeks as restaging of her lung cancer  -here for interval followup via telephone  -leaving for [place] on Monday  -9/25/2024 doppler of legs showed no DVT in either leg  -edema improved with doubling of hydrochlorothiazide  -PET scan done on 10/3/2024 reviewed--postsurgical changes within the right parietal bone compatible with craniectomy; " "postsurgical changes compatible with left upper lobectomy without focal hypermetabolic activity to suggest disease recurrence; interval resolution of multifocal ground glass and nodular opacities within right upper lobe; no hypermetabolic pulmonary lesions; no hypermetabolic mediastinal lymphadenopathy; prominent bilateral axillary lymph nodes with mild hypermetabolic activity up to 1.3 on right and 1.1 on left likely reactive; no focal hypermetabolic lesion to suggest osseous metastasis  -here for interval followup  -I did advise Thu to hold off on taking adagrasib for the 2 weeks she was in [place]--her leg edema completely decompressed and resolved  -as soon as she returned home to [place], she restarted the adagrasib 400 mg BID along with hydrochlorothiazide  -however now taking 400 mg in AM, 200 mg in PM  -has developed some light pitting edema in her legs (trace to 1+)  -here for interval followup via telephone  -labs done on 12/18/2024 included CBC + COMP, CEA  results reviewed--wbc 7.2, hgb 10.3, plt 259,000, potassium 3.6 creatinine 1.21, calcium 10.2, AST 20, ALT 13, CEA 2.9  -CT chest done on 12/18/2024 reviewed--there is no hilar or mediastinal lymphadenopathy; minimal left pleural effusion; 5 mm RLL nodule laterally; there is nearly complete clearing of the ground glass infiltrates within the right hemithorax; there are few small patchy foci of residual ground glass infiltrate within the superior segment right lower lobe and posterior segment right lower lobe  -Thu will continue to take adagrasib 400 mg in AM, 200 mg in PM  -will see her again in 2 months  -here for interval followup  -does not get out of the house-- does all the grocery shopping  -feels more depressed, cymbalta not helping--she will discuss with PCP  -having bilateral neck and shoulder pain--has been seeing pain management and started seeing neurologist--was told she had \"3 pinched nerves\"  -leg edema under " control--takes the hydrochlorothiazide 2 days at a time as needed  -continues on adagrasib 400 mg in AM, 200 mg in PM  -also noted to be iron deficient in November--advised for her to start OTC iron supplementation every other day (as already constipated at baseline)  -labs to be done today include CBC + COMP + CEA  -results reviewed--wbc 6.7, hgb 10.7, MCV 77, plt 242,000, creatinine 1.02, calcium 9.5, alk phos 113, AST 17, ALT 14, CEA pending  -she will have a new CT chest done just before next visit  -here for interval followup  -went to the ED on 2/27/2025--CT angio chest was done--no evidence of pulmonary embolism; no mediastinal or hilar lymphadenopathy; nonspecific small bilateral axillary lymph nodes similar to prior; changes of previous left upper lobectomy; right lower lobe basilar aspect round 5 mm pulmonary nodule is unchanged  -has had chronic back pain--sees pain management  -labs done on 3/17/2025 included CBC + COMP + CEA + iron panel + ferritin  -results reviewed--wbc 7.6, hgb 10.9, MCV 77, plt 279,000, creatinine 1.09, calcium 9.8, alk phos 109, AST 17, total bili 0.4, ALT 15, TIBC 462, sat 16%, ferritin 23 (CEA was missed)  -advised her to continue taking the krazati  -will see her again in 2 months  -next chest CT due in 3-4 months        2) COPD  -on albuterol  -on incruse ellipta     3) atrial fibrillation  -on amiodarone  -on warfarin     4) hypertension  -on norvasc  -on chlorthalidone  -on lasix  -on lisinopril  -on metoprolol     5) hyperlipidemia  -on atorvastatin     6) major depression  -on cymbalta     7) diabetes  -on novolog insulin  -on metformin           Problem List Items Addressed This Visit             ICD-10-CM    Malignant neoplasm of upper lobe of left lung (Multi) C34.12    Relevant Orders    Clinic Appointment Request Follow Up; [facility]; [facility] MEDON[facility]    CBC and Auto Differential    Comprehensive metabolic panel    CEA     Other Visit Diagnoses         Codes     Acquired hypothyroidism    -  Primary E03.9    Relevant Orders    TSH with reflex to Free T4 if abnormal                 Álvaro Galeano MD

## 2025-03-20 ENCOUNTER — TREATMENT (OUTPATIENT)
Dept: PHYSICAL THERAPY | Facility: CLINIC | Age: 70
End: 2025-03-20
Payer: MEDICARE

## 2025-03-20 DIAGNOSIS — M54.9 CHRONIC UPPER BACK PAIN: ICD-10-CM

## 2025-03-20 DIAGNOSIS — M25.511 BILATERAL SHOULDER PAIN, UNSPECIFIED CHRONICITY: ICD-10-CM

## 2025-03-20 DIAGNOSIS — G89.29 CHRONIC UPPER BACK PAIN: ICD-10-CM

## 2025-03-20 DIAGNOSIS — M25.512 BILATERAL SHOULDER PAIN, UNSPECIFIED CHRONICITY: ICD-10-CM

## 2025-03-20 PROCEDURE — 97110 THERAPEUTIC EXERCISES: CPT | Mod: GP

## 2025-03-20 PROCEDURE — 97140 MANUAL THERAPY 1/> REGIONS: CPT | Mod: GP

## 2025-03-20 ASSESSMENT — PAIN SCALES - GENERAL: PAINLEVEL_OUTOF10: 8

## 2025-03-20 ASSESSMENT — PAIN - FUNCTIONAL ASSESSMENT: PAIN_FUNCTIONAL_ASSESSMENT: 0-10

## 2025-03-20 NOTE — PROGRESS NOTES
Physical Therapy     Physical Therapy Treatment     Patient Name: Thu Ortega  MRN: [id number]  Today's Date: 3/20/2025  Time Calculation  Start Time: 1430  Stop Time: 1512  Time Calculation (min): 42 min      Units Billed:   TE: 2  Manual: 1     Insurance:  Visit: #3  Authorized: 6  Certification: 3/13/25 to 5/12/25  Payor: MEDICARE / Plan: MEDICARE PART A AND B / Product Type: *No Product type* /      Subjective:  Pt arrives with c/o pain in L shoulder from HEP by going too far with horizontal abd. Feels tingling in back is worse since then.      Current pain: 8/10  Objective:  Cues required for neck ROM and coordination.      Treatment:  SciFit for UE x5 min   Pulley into shoulder flex then shoulder abd on L: 2x2 min each direction with 5 sec pause each rep NT  Neck AROM with towel placed at Lower Cervical vert, with cues to ext neck: 2x10 with 10 sec pause each rep NT  Manual traction to cervical vert: 2x4 min with uncoupled SB and contralateral rotation  Deep neck flexor AROM into towel roll: 2x10 NT  Horizontal shoulder abd with yellow TB on wall: 2x12 each direction  Supine shoulder abd with cane AAROM: 2x12 with leading L UE  Manual joint play to L shoulder: traction, distraction, A/P mobs grade 1-2: 4 min  Manual soft tissue release to occipital mm:   Seated shoulder abd with cane AAROM: 2x12    Deep neck flex with yellow TB in seated: 1x15 difficult to coord  Rows with green band: 3x12 bilaterally     OP Education: HEP:  Access Code: [email or web address]  URL: [email or web address]  Date: 03/18/2025  Prepared by: Ketan Johnson    Exercises  - Standing Shoulder Horizontal Abduction with Resistance  - 1 x daily - 3 x weekly - 3 sets - 10 reps  - Seated Cervical Sidebending Stretch  - 1 x daily - 3 x weekly - 3 sets - 3 reps - 30 sec hold  - Supine Cervical Retraction with Towel  - 1 x daily - 7 x weekly - 3 sets - 10 reps         Outcome Measure:        Diagnosis:  1. Chronic upper back pain    2.  Bilateral shoulder pain, unspecified chronicity        Problem List Items Addressed This Visit             ICD-10-CM    Chronic upper back pain M54.9, G89.29    Bilateral shoulder pain M25.511, M25.512          Assessment:  Pt demonstrates activity intolerance with exercises and activity this session. Therapeutic rest required between exercises for pain management. Pt reports pain in both shoulders, with L worse than R. Pt ends session with 6/10 pain.      Plan:  Continue 2x/week for 2 weeks, then 1x/week for 2 weeks.      Goals:  Pt will report adherence to HEP with IND.   Pt will report 2/10 pain or better for at least 3 days to demonstrate improved pain management in order for pt to be able to don/doff upper body dressing.  Pt will improve L shoulder ABD to 100deg or better in order to increase ROM during dressing.   Pt will improve neck ext to 25 deg or better to demonstrate improved pain management with increased ROM.    Pt will report being able to walk her dog for 30 min or longer to demonstrate improved pain management in order to reach personal goal.     Ketan Johnson, PT   3/20/2025

## 2025-03-22 PROBLEM — E03.9 ACQUIRED HYPOTHYROIDISM: Status: ACTIVE | Noted: 2025-03-22

## 2025-03-22 ASSESSMENT — ENCOUNTER SYMPTOMS
ENDOCRINE NEGATIVE: 1
MUSCULOSKELETAL NEGATIVE: 1
NEUROLOGICAL NEGATIVE: 1
GASTROINTESTINAL NEGATIVE: 1
EYES NEGATIVE: 1
CARDIOVASCULAR NEGATIVE: 1
HEMATOLOGIC/LYMPHATIC NEGATIVE: 1
CONSTITUTIONAL NEGATIVE: 1
RESPIRATORY NEGATIVE: 1
PSYCHIATRIC NEGATIVE: 1

## 2025-03-24 DIAGNOSIS — R11.2 CHEMOTHERAPY INDUCED NAUSEA AND VOMITING: ICD-10-CM

## 2025-03-24 DIAGNOSIS — C34.12 MALIGNANT NEOPLASM OF UPPER LOBE OF LEFT LUNG (MULTI): ICD-10-CM

## 2025-03-24 DIAGNOSIS — T45.1X5A CHEMOTHERAPY INDUCED NAUSEA AND VOMITING: ICD-10-CM

## 2025-03-24 RX ORDER — ONDANSETRON 4 MG/1
4 TABLET, FILM COATED ORAL EVERY 8 HOURS PRN
Qty: 60 TABLET | Refills: 2 | Status: SHIPPED | OUTPATIENT
Start: 2025-03-24

## 2025-03-25 ENCOUNTER — TREATMENT (OUTPATIENT)
Dept: PHYSICAL THERAPY | Facility: CLINIC | Age: 70
End: 2025-03-25
Payer: MEDICARE

## 2025-03-25 DIAGNOSIS — M25.511 BILATERAL SHOULDER PAIN, UNSPECIFIED CHRONICITY: ICD-10-CM

## 2025-03-25 DIAGNOSIS — G89.29 CHRONIC UPPER BACK PAIN: Primary | ICD-10-CM

## 2025-03-25 DIAGNOSIS — M54.9 CHRONIC UPPER BACK PAIN: Primary | ICD-10-CM

## 2025-03-25 DIAGNOSIS — M25.512 BILATERAL SHOULDER PAIN, UNSPECIFIED CHRONICITY: ICD-10-CM

## 2025-03-25 PROCEDURE — 97110 THERAPEUTIC EXERCISES: CPT | Mod: GP

## 2025-03-25 ASSESSMENT — PAIN - FUNCTIONAL ASSESSMENT: PAIN_FUNCTIONAL_ASSESSMENT: 0-10

## 2025-03-25 ASSESSMENT — PAIN SCALES - GENERAL: PAINLEVEL_OUTOF10: 3

## 2025-03-25 NOTE — PROGRESS NOTES
Physical Therapy     Physical Therapy Treatment     Patient Name: Thu Ortega  MRN: [id number]  Today's Date: 3/25/2025  Time Calculation  Start Time: 1013  Stop Time: 1058  Time Calculation (min): 45 min      Units Billed:     Insurance:  Visit: #4  Authorized: 6  Certification: 3/13/25 to 5/12/25  Payor: MEDICARE / Plan: MEDICARE PART A AND B / Product Type: *No Product type* /      Subjective:  Pt arrives reporting improved ROM to shoulders, but continued pain at end range during horizontal shoulder ext.     Current pain: 8/10  Objective:  Cues required for shoulder adduction, as pt tends to reduce ROM at full add.   Vitals after SciFit: SpO2: 91%  96bpm with personal O2 nasal canula      Treatment:  SciFit for UE x5 min   Pulley into shoulder flex then shoulder abd on L: 2x2 min each direction with 5 sec pause each rep   Neck AROM with towel placed at Lower Cervical vert, with cues to ext neck: 2x10 with 10 sec pause each rep NT  Manual traction to cervical vert: 2x4 min with uncoupled SB and contralateral rotationNT  Deep neck flexor AROM into towel roll: 2x10 NT  Horizontal shoulder abd with yellow TB on wall: 2x12 each direction  Supine shoulder abd with cane AAROM: 2x12 with leading L UE: NT  Manual joint play to L shoulder: traction, distraction, A/P mobs grade 1-2: 4 min NT  Manual soft tissue release to occipital mm: NT  Seated shoulder abd with cane AAROM: 2x12    Deep neck flex with yellow TB in seated: 1x15 NT  Rows with green band: 3x12 bilaterally NT  Open book in side lying: 2x15 each direction   Side lying single arm Rows with yellow TB: 2x15 each arm  L shoulder ADD within painfree ROM: 2x15   Banded B shoulder ext with yellow TB: 2x15  Yellow TB banded IR/ER to L shoulder: 2x15 each direction  Wall clock, counter/clockwise: 2x10 each direction, each arm     OP Education: HEP:  Access Code: [email or web address]  URL: [email or web address]  Date: 03/25/2025  Prepared by: Ketan  Elizabeth    Exercises  - Sidelying Open Book Thoracic Rotation with Knee on Foam Roll  - 1 x daily - 3 x weekly - 3 sets - 10 reps  - Wall Clock  - 1 x daily - 3 x weekly - 3 sets - 10 reps    Access Code: [email or web address]  URL: [email or web address]  Date: 03/18/2025  Prepared by: Ketan Johnson    Exercises  - Standing Shoulder Horizontal Abduction with Resistance  - 1 x daily - 3 x weekly - 3 sets - 10 reps  - Seated Cervical Sidebending Stretch  - 1 x daily - 3 x weekly - 3 sets - 3 reps - 30 sec hold  - Supine Cervical Retraction with Towel  - 1 x daily - 7 x weekly - 3 sets - 10 reps         Outcome Measure:        Diagnosis:    Problem List Items Addressed This Visit             ICD-10-CM    Chronic upper back pain - Primary M54.9, G89.29    Bilateral shoulder pain M25.511, M25.512            Assessment:  Pt demos improved activity tolerance compared to previous session, with few rest breaks required due to fatigue and pain. Pt educated on follow up to PCP after the next two sessions if no significant decrease in pain.     Plan:  Continue 2x/week for 2 weeks, then 1x/week for 2 weeks.      Goals:  Pt will report adherence to HEP with IND.   Pt will report 2/10 pain or better for at least 3 days to demonstrate improved pain management in order for pt to be able to don/doff upper body dressing.  Pt will improve L shoulder ABD to 100deg or better in order to increase ROM during dressing.   Pt will improve neck ext to 25 deg or better to demonstrate improved pain management with increased ROM.    Pt will report being able to walk her dog for 30 min or longer to demonstrate improved pain management in order to reach personal goal.     Ketan Johnson, PT   3/25/2025

## 2025-03-26 NOTE — PROGRESS NOTES
Physical Therapy     Physical Therapy Treatment     Patient Name: Thu Ortega  MRN: [id number]  Today's Date: 3/27/2025  Time Calculation  Start Time: 1058  Stop Time: 1146  Time Calculation (min): 48 min      Units Billed:     Insurance:  Visit: #5  Authorized: 6  Certification: 3/13/25 to 5/12/25  Payor: MEDICARE / Plan: MEDICARE PART A AND B / Product Type: *No Product type* /      Subjective:  Pt reports continued high pain into shoulder ext on L. She will attempt to walk dogs this afternoon for 30 min, as this is her long term goal.      Current pain: 4/10    Objective:       Treatment:  SciFit for UE x5 min   Pulley into shoulder flex then shoulder abd on L: 2x2 min each direction with no pause  Neck AROM with towel placed at Lower Cervical vert, with cues to ext neck: 2x10 with 10 sec pause each rep NT  Manual traction to cervical vert: 2x4 min with uncoupled SB and contralateral rotationNT  Deep neck flexor AROM into towel roll: 2x10 NT  Horizontal shoulder abd with yellow TB on wall: 2x12 each direction NT  Supine shoulder abd with cane AAROM: 2x12 with leading L UE: NT  Manual joint play to L shoulder: traction, distraction, A/P mobs grade 1-2: 4 min NT  Manual soft tissue release to occipital mm: NT  Seated shoulder abd with cane AAROM: 2x12 N/T   Deep neck flex with yellow TB in seated: 1x15 NT  Rows with blue band: 3x12 alternating each rep  Open book in side lying: 2x15 each direction NT  Side lying single arm Rows with yellow TB: 2x15 each arm NT  L shoulder ADD within painfree ROM: 2x15 NT  Banded B shoulder ext with yellow TB: 2x15  Yellow TB banded IR/ER to L shoulder: 2x15 each direction  Wall clock, counter/clockwise: 2x10 each direction, each arm  Banded shoulder abd, alt arms each rep: 2x12 each arm  Manual scapular assist to L shoulder in seated, into shoulder flex then abd: x5 min  Manual scap assist to L shoulder in R side lying, into shoulder flex then abd: x5 min  Supine shoulder flex  with dowel: 2x40 sec with 3 sec pause at flex end range  R side lying horizontal shoulder abd: 3x12  Second and third set with 1lb DB  R side lying DB punch: 2x12 with 1lb DB     OP Education: HEP:  Access Code: [email or web address]  URL: [email or web address]  Date: 03/25/2025  Prepared by: Ketan Johnson    Exercises  - Sidelying Open Book Thoracic Rotation with Knee on Foam Roll  - 1 x daily - 3 x weekly - 3 sets - 10 reps  - Wall Clock  - 1 x daily - 3 x weekly - 3 sets - 10 reps    Access Code: [email or web address]  URL: [email or web address]  Date: 03/18/2025  Prepared by: Ketan Johnson    Exercises  - Standing Shoulder Horizontal Abduction with Resistance  - 1 x daily - 3 x weekly - 3 sets - 10 reps  - Seated Cervical Sidebending Stretch  - 1 x daily - 3 x weekly - 3 sets - 3 reps - 30 sec hold  - Supine Cervical Retraction with Towel  - 1 x daily - 7 x weekly - 3 sets - 10 reps         Diagnosis:    Problem List Items Addressed This Visit             ICD-10-CM    Chronic upper back pain - Primary M54.9, G89.29    Bilateral shoulder pain M25.511, M25.512              Assessment:  Pt with pain at end range of shoulder flex/ext, and all angles of shoulder abd. Pt has one more session before recheck and assessment of goals. Pt has appointment for pain management on 4/3/25.     Plan:  Continue 2x/week for 2 weeks, then 1x/week for 2 weeks.      Goals:  Pt will report adherence to HEP with IND.   Pt will report 2/10 pain or better for at least 3 days to demonstrate improved pain management in order for pt to be able to don/doff upper body dressing.  Pt will improve L shoulder ABD to 100deg or better in order to increase ROM during dressing.   Pt will improve neck ext to 25 deg or better to demonstrate improved pain management with increased ROM.    Pt will report being able to walk her dog for 30 min or longer to demonstrate improved pain management in order to reach personal goal.     Ketan  Elizabeth, PT   3/27/2025

## 2025-03-27 ENCOUNTER — TREATMENT (OUTPATIENT)
Dept: PHYSICAL THERAPY | Facility: CLINIC | Age: 70
End: 2025-03-27
Payer: MEDICARE

## 2025-03-27 DIAGNOSIS — M54.9 CHRONIC UPPER BACK PAIN: Primary | ICD-10-CM

## 2025-03-27 DIAGNOSIS — M25.512 BILATERAL SHOULDER PAIN, UNSPECIFIED CHRONICITY: ICD-10-CM

## 2025-03-27 DIAGNOSIS — G89.29 CHRONIC UPPER BACK PAIN: Primary | ICD-10-CM

## 2025-03-27 DIAGNOSIS — M25.511 BILATERAL SHOULDER PAIN, UNSPECIFIED CHRONICITY: ICD-10-CM

## 2025-03-27 PROCEDURE — 97110 THERAPEUTIC EXERCISES: CPT | Mod: GP

## 2025-03-27 ASSESSMENT — PAIN SCALES - GENERAL: PAINLEVEL_OUTOF10: 4

## 2025-03-27 ASSESSMENT — PAIN - FUNCTIONAL ASSESSMENT: PAIN_FUNCTIONAL_ASSESSMENT: 0-10

## 2025-03-29 PROCEDURE — RXMED WILLOW AMBULATORY MEDICATION CHARGE

## 2025-04-01 ENCOUNTER — SPECIALTY PHARMACY (OUTPATIENT)
Dept: PHARMACY | Facility: CLINIC | Age: 70
End: 2025-04-01

## 2025-04-01 ENCOUNTER — TREATMENT (OUTPATIENT)
Dept: PHYSICAL THERAPY | Facility: CLINIC | Age: 70
End: 2025-04-01
Payer: MEDICARE

## 2025-04-01 DIAGNOSIS — M25.512 BILATERAL SHOULDER PAIN, UNSPECIFIED CHRONICITY: ICD-10-CM

## 2025-04-01 DIAGNOSIS — M25.511 BILATERAL SHOULDER PAIN, UNSPECIFIED CHRONICITY: ICD-10-CM

## 2025-04-01 DIAGNOSIS — G89.29 CHRONIC UPPER BACK PAIN: Primary | ICD-10-CM

## 2025-04-01 DIAGNOSIS — M54.9 CHRONIC UPPER BACK PAIN: Primary | ICD-10-CM

## 2025-04-01 PROCEDURE — 97110 THERAPEUTIC EXERCISES: CPT | Mod: GP

## 2025-04-01 ASSESSMENT — PAIN - FUNCTIONAL ASSESSMENT: PAIN_FUNCTIONAL_ASSESSMENT: 0-10

## 2025-04-01 ASSESSMENT — PAIN SCALES - GENERAL: PAINLEVEL_OUTOF10: 5 - MODERATE PAIN

## 2025-04-01 NOTE — PROGRESS NOTES
Physical Therapy     Physical Therapy Progress Note     Patient Name: Thu Ortega  MRN: [id number]  Today's Date: 4/1/2025  Time Calculation  Start Time: 1100  Stop Time: 1145  Time Calculation (min): 45 min      Units Billed: TE x3    Insurance:  Visit: #6  Authorized: 6  Certification: 3/13/25 to 5/12/25  Payor: MEDICARE / Plan: MEDICARE PART A AND B / Product Type: *No Product type* /      Subjective:  Pt reports having same pain in back, will have appointment for injection to back for pain management. Pt reports not making improvement with therapy at this time. Having increased pain on R posterior shoulder that is new.      Current pain: 5/10    Objective:  From Eval: 3/13/25  Other Measures  Disability of Arm Shoulder Hand (DASH): 48  Oswestry Disablity Index (LATASHA): 27    From 6th visit  Other Measures  Disability of Arm Shoulder Hand (DASH): 45  Oswestry Disablity Index (LATASHA): 17    Vitals after SciFit 98% SpO2                              88 bpm    Treatment:  SciFit for UE x4 min   Pulley into shoulder flex then shoulder abd on L: 2x2 min each direction with no pause  Neck AROM with towel placed at Lower Cervical vert, with cues to ext neck: 2x10 with 10 sec pause each rep NT  Manual traction to cervical vert: 2x4 min with uncoupled SB and contralateral rotation NT  Deep neck flexor AROM into towel roll: 2x10 NT  Horizontal shoulder abd with yellow TB on wall: 2x12 each direction NT  Supine shoulder abd with cane AAROM: 2x12 with leading L UE: NT  Manual joint play to L shoulder: traction, distraction, A/P mobs grade 1-2: 4 min NT  Manual soft tissue release to occipital mm: NT  Seated shoulder abd with cane AAROM: 2x12 N/T   Deep neck flex with yellow TB in seated: 1x15 NT  Rows with blue band: 3x12 alternating each rep  Open book in side lying: 2x15 each direction   Side lying single arm Rows with yellow TB: 2x15 each arm NT  L shoulder ADD within painfree ROM: 2x15 NT  Banded B shoulder ext with yellow  TB: 2x15  Yellow TB banded IR/ER to L shoulder: 2x15 each direction  Wall clock, counter/clockwise: 2x10 each direction, each arm  Banded shoulder abd, alt arms each rep: 2x12 each arm   Manual scapular assist to L shoulder in seated, into shoulder flex then abd: x5 min NT  Manual scap assist to L shoulder in R side lying, into shoulder flex then abd: x5 min NT  Supine shoulder flex with dowel: 2x40 sec with 3 sec pause at flex end rangeNT  R side lying horizontal shoulder abd: 3x12 NT  Second and third set with 1lb DB  R side lying DB punch: 2x12 with 1lb DB NT  Supine horizontal shoulder abd with yellow TB: 2x12  Supine shoulder flex holding red stability ball: 2x15 with cues to press into ball     OP Education: HEP:  Access Code: [email or web address]  URL: [email or web address]  Date: 03/25/2025  Prepared by: Ketan Johnson    Exercises  - Sidelying Open Book Thoracic Rotation with Knee on Foam Roll  - 1 x daily - 3 x weekly - 3 sets - 10 reps  - Wall Clock  - 1 x daily - 3 x weekly - 3 sets - 10 reps    Access Code: [email or web address]  URL: [email or web address]  Date: 03/18/2025  Prepared by: Ketan Johnson    Exercises  - Standing Shoulder Horizontal Abduction with Resistance  - 1 x daily - 3 x weekly - 3 sets - 10 reps  - Seated Cervical Sidebending Stretch  - 1 x daily - 3 x weekly - 3 sets - 3 reps - 30 sec hold  - Supine Cervical Retraction with Towel  - 1 x daily - 7 x weekly - 3 sets - 10 reps         Diagnosis:    Problem List Items Addressed This Visit             ICD-10-CM    Chronic upper back pain - Primary M54.9, G89.29    Bilateral shoulder pain M25.511, M25.512                Assessment:  Pt has made minor improvement with DASH, decreased by 3 points. But has made significant progress with ROM of L shoulder ABD and with her LATASHA score. Pt will have pain management appt on 4/3/25 where she will have ABLATION to back. Pt continues to c/o pain in middle back at [facility] of  scapulae. Pt will hold off on PT at this time until cleared by PCP after ablation procedure. She will continue with 1x/week for 8 weeks upon return to PT.     Plan:  OP PT Plan  Treatment/Interventions: Education/ Instruction, Electrical stimulation, Fluidotherapy, Hot pack, Manual therapy, Mechanical traction, Neuromuscular re-education, Taping techniques, Therapeutic activities, Therapeutic exercises, Ultrasound, Vasopneumatic device  PT Plan: Skilled PT  PT Frequency: 1 time per week  Duration: 8 visits  Certification Period Start Date: 04/01/25  Certification Period End Date: 06/30/25  Rehab Potential: Good  Plan of Care Agreement: Patient     Goals:  Pt will report adherence to HEP with IND. Goal met  Pt will report 2/10 pain or better for at least 3 days to demonstrate improved pain management in order for pt to be able to don/doff upper body dressing. Not met, 4-5/10  Pt will improve L shoulder ABD to 100deg or better in order to increase ROM during dressing. Goal Met. L shoulder abd taken in supine: 131 deg  Pt will improve neck ext to 25 deg or better to demonstrate improved pain management with increased ROM.  4/1/25, 21 deg  Pt will report being able to walk her dog for 30 min or longer to demonstrate improved pain management in order to reach personal goal. Partially met, as she can walk 30 min but has increased neck pain.    Ketan Johnson, PT   4/1/2025

## 2025-04-03 ENCOUNTER — HOSPITAL ENCOUNTER (OUTPATIENT)
Dept: PAIN MEDICINE | Facility: CLINIC | Age: 70
Discharge: HOME | End: 2025-04-03
Payer: MEDICARE

## 2025-04-03 VITALS
HEART RATE: 71 BPM | DIASTOLIC BLOOD PRESSURE: 62 MMHG | OXYGEN SATURATION: 99 % | TEMPERATURE: 96.8 F | SYSTOLIC BLOOD PRESSURE: 121 MMHG | RESPIRATION RATE: 18 BRPM

## 2025-04-03 DIAGNOSIS — M47.816 LUMBAR SPONDYLOSIS: ICD-10-CM

## 2025-04-03 PROCEDURE — 64636 DESTROY L/S FACET JNT ADDL: CPT | Mod: 50 | Performed by: PAIN MEDICINE

## 2025-04-03 PROCEDURE — 99152 MOD SED SAME PHYS/QHP 5/>YRS: CPT | Performed by: PAIN MEDICINE

## 2025-04-03 PROCEDURE — 64636 DESTROY L/S FACET JNT ADDL: CPT | Performed by: PAIN MEDICINE

## 2025-04-03 PROCEDURE — 64635 DESTROY LUMB/SAC FACET JNT: CPT | Mod: 50 | Performed by: PAIN MEDICINE

## 2025-04-03 PROCEDURE — 64635 DESTROY LUMB/SAC FACET JNT: CPT | Performed by: PAIN MEDICINE

## 2025-04-03 PROCEDURE — 2720000007 HC OR 272 NO HCPCS: Performed by: PAIN MEDICINE

## 2025-04-03 PROCEDURE — 3700000012 HC SEDATION LEVEL 5+ TIME - INITIAL 15 MINUTES 5/> YEARS: Performed by: PAIN MEDICINE

## 2025-04-03 PROCEDURE — 2500000004 HC RX 250 GENERAL PHARMACY W/ HCPCS (ALT 636 FOR OP/ED): Performed by: PAIN MEDICINE

## 2025-04-03 RX ORDER — LIDOCAINE HYDROCHLORIDE 10 MG/ML
INJECTION, SOLUTION EPIDURAL; INFILTRATION; INTRACAUDAL; PERINEURAL AS NEEDED
Status: DISCONTINUED | OUTPATIENT
Start: 2025-04-03 | End: 2025-04-04 | Stop reason: HOSPADM

## 2025-04-03 RX ORDER — BUPIVACAINE HYDROCHLORIDE 5 MG/ML
INJECTION, SOLUTION EPIDURAL; INTRACAUDAL; PERINEURAL AS NEEDED
Status: DISCONTINUED | OUTPATIENT
Start: 2025-04-03 | End: 2025-04-04 | Stop reason: HOSPADM

## 2025-04-03 RX ORDER — MIDAZOLAM HYDROCHLORIDE 1 MG/ML
INJECTION, SOLUTION INTRAMUSCULAR; INTRAVENOUS AS NEEDED
Status: DISCONTINUED | OUTPATIENT
Start: 2025-04-03 | End: 2025-04-04 | Stop reason: HOSPADM

## 2025-04-03 RX ADMIN — MIDAZOLAM 2 MG: 1 INJECTION INTRAMUSCULAR; INTRAVENOUS at 08:36

## 2025-04-03 RX ADMIN — LIDOCAINE HYDROCHLORIDE 10 ML: 10 INJECTION, SOLUTION EPIDURAL; INFILTRATION; INTRACAUDAL; PERINEURAL at 08:36

## 2025-04-03 RX ADMIN — BUPIVACAINE HYDROCHLORIDE 10 ML: 5 INJECTION, SOLUTION EPIDURAL; INTRACAUDAL; PERINEURAL at 08:36

## 2025-04-03 NOTE — DISCHARGE INSTRUCTIONS
Post-injection instructions FOR Radiofrequency Ablation    Your radiofrequency ablation was completed today is not unusual to have some exacerbation of the pain before you get better.  Radiofrequency ablation takes an average of 2 to 3 weeks before it completely starts showing full results      Activity:  RETURN TO NORMAL ACTIVITY once the sedation is completely worn off do not sign any legal document for the first 24 hours do not drive or operate machinery for the first 24 hours until the sedation effect is completely worn off    Bandages: Remove after 24 hours     Showering/Bathing: You may shower after bandage is removed     May use ice at the site of the injection for the first 24 hours      Call the OFFICE immediately: if you notice:     Excessive bleeding from procedure site (brisk bright red bleeding from the site or bleeding that soaks the bandages or does not stop)   Severe headache  Inability to walk, leg or arm weakness or numbness that is worse after the procedure   Uncontrolled pain   New urinary or fecal incontinence   Signs of infection: Fever above 101.5F, redness, swelling, pus or drainage from the site    Please contact the pain clinic at [phone]  in 3 weeks to report results or with any further questions or concerns

## 2025-04-03 NOTE — H&P
History Of Present Illness  Thu Ortega is a 69 y.o. female presenting with BACK PAIN      Past Medical History  Past Medical History:   Diagnosis Date    Adenocarcinoma of lung, left (Multi)     s/p robotic assisted NICHOL lobectomy on 1/31/23, c/b afib and aspiration PNA    Atrial fibrillation (Multi)     COPD (chronic obstructive pulmonary disease) (Multi)     F/W Dr. Kang, Wixela inhaler, PFT appointment scheduled for 01/12/2024    Depression     Taking Cymbalta    DJD (degenerative joint disease)     DM (diabetes mellitus) (Multi)     F/w Dr. Wong, Taking Metformin, Last A1c is 7.5 on 08/03/2023    Fibromyalgia, primary     F/W Pain    GERD (gastroesophageal reflux disease)     F/w PCP, on Protonix    Hearing aid worn     Bilateral hearing aides    History of blood transfusion     patient think she recieved a transfusion with her Lobectomy surgery    History of meningioma of the brain     s/p resection    HLD (hyperlipidemia)     F/W PCP: Taking Atorvastatin    HTN (hypertension)     F/W PCP    Hx antineoplastic chemo january2023    Irregular heart beat     Paroxysmal atrial fibrillation. Last seen by Dr. Fox on 01/03/24, Started on Amiodarone. Not on anticoagulation.    Irritable bowel syndrome     Malignant neoplasm of upper lobe of left lung (Multi)     Nephrolithiasis     monitoring, no interventions    Panlobular emphysema (Multi)     Shortness of breath     PALENCIA previously on O2    Spinal stenosis     Urinary tract infection      Surgical History  Past Surgical History:   Procedure Laterality Date    BRAIN SURGERY      meningioma resection    BREAST BIOPSY  january2023    CATARACT EXTRACTION      CT GUIDED PERCUTANEOUS BIOPSY LUNG  12/13/2023    CT GUIDED PERCUTANEOUS BIOPSY LUNG 12/13/2023 [facility]    CT GUIDED PERCUTANEOUS BIOPSY LUNG  12/15/2023    CT GUIDED PERCUTANEOUS BIOPSY LUNG    FOOT SURGERY      KNEE SURGERY      LUNG LOBECTOMY      TUBAL LIGATION      US GUIDED NEEDLE LIVER BIOPSY   02/07/2020     GUIDED NEEDLE LIVER BIOPSY 2/7/2020 [facility]     Social History  She reports that she quit smoking about 19 years ago. Her smoking use included cigarettes. She has never used smokeless tobacco. She reports that she does not currently use alcohol. She reports that she does not use drugs.    Family History  Family History   Problem Relation Name Age of Onset    Stroke Mother      Other (aortic valve disease) Mother      Heart disease Mother      Cancer Sister      Stroke Sister      Diabetes Sister      Diabetes Brother      Other (heart transplant) Brother          Allergies  Allergies   Allergen Reactions    Penicillin Hives     Review of Systems  All 13 systems were reviewed and are within normal levels except as noted below or per HPI. Positive and pertinent negative responses are noted below or in the HPI   Denied any fever or chills. No weight loss and no night sweats. No cough or sputum production. No diarrhea   No constipation  No bladder and bowel incontinence and no other changes in bladder and bowel. No skin changes.   Denied opioids diversion and abuse and denies alcoholism. Denies overuse of  pain medications.     Physical Exam       Past medical history no interval changes has been noted    On physical examination    General   Alert, oriented x3 pleasant and cooperative. Does not look in any major distress.    HEENT  Pupils normal in size. Ears, nose, mouth, and throat appear to be in normal condition.  Head atraumatic      No signs of sedation or signs of withdrawal apparent.    Psychiatric   No signs of depression apparent.    Neuro   No focal neurological deficit apparent. Ambulation at baseline.      Respiratory  No respiratory distress     Abdomen  no distention     Skin  No skin markings supportive of recent IV drug usage .    Cardiovascular  Regular rate and rhythm     Last Recorded Vitals  Blood pressure 147/67, pulse 78, temperature 36 °C (96.8 °F), resp. rate 18, SpO2  95%.    Assessment/Plan   HERE FOR LUMBAR RF      Christiano Melendrez MD

## 2025-04-08 ENCOUNTER — TREATMENT (OUTPATIENT)
Dept: PHYSICAL THERAPY | Facility: CLINIC | Age: 70
End: 2025-04-08
Payer: MEDICARE

## 2025-04-08 DIAGNOSIS — M54.9 CHRONIC UPPER BACK PAIN: Primary | ICD-10-CM

## 2025-04-08 DIAGNOSIS — M25.512 BILATERAL SHOULDER PAIN, UNSPECIFIED CHRONICITY: ICD-10-CM

## 2025-04-08 DIAGNOSIS — G89.29 CHRONIC UPPER BACK PAIN: Primary | ICD-10-CM

## 2025-04-08 DIAGNOSIS — M25.511 BILATERAL SHOULDER PAIN, UNSPECIFIED CHRONICITY: ICD-10-CM

## 2025-04-08 PROCEDURE — 97110 THERAPEUTIC EXERCISES: CPT | Mod: GP

## 2025-04-08 PROCEDURE — 97140 MANUAL THERAPY 1/> REGIONS: CPT | Mod: GP

## 2025-04-08 ASSESSMENT — PAIN SCALES - GENERAL: PAINLEVEL_OUTOF10: 4

## 2025-04-08 ASSESSMENT — PAIN - FUNCTIONAL ASSESSMENT: PAIN_FUNCTIONAL_ASSESSMENT: 0-10

## 2025-04-08 NOTE — PROGRESS NOTES
Physical Therapy     Physical Therapy Re-check     Patient Name: Thu Ortega  MRN: [id number]  Today's Date: 4/8/2025  Time Calculation  Start Time: 1101  Stop Time: 1146  Time Calculation (min): 45 min      Units Billed: TE x2                       Manual x1    Insurance:  Visit: #7  Authorized: 6  Certification: 3/13/25 to 5/12/25  Payor: MEDICARE / Plan: MEDICARE PART A AND B / Product Type: *No Product type* /      Subjective:  Pt reports having new pain under L axilla area and is tender to the touch. New pain occ since Friday, possibly from sleeping with hand above head.      Current pain: 4/10    Objective:  From Eval: 3/13/25  Other Measures  Disability of Arm Shoulder Hand (DASH): 48  Oswestry Disablity Index (LATASHA): 27    From 6th visit  Other Measures  Disability of Arm Shoulder Hand (DASH): 45  Oswestry Disablity Index (LATASHA): 17    Vitals after SciFit 98% SpO2                              88 bpm    Treatment:  SciFit for UE x4 min NT  Jaclyn into shoulder flex then shoulder abd on L: 2x2 min each direction with no pause  Neck AROM with towel placed at Lower Cervical vert, with cues to ext neck: 2x10 with 10 sec pause each rep NT  Manual traction to cervical vert: 2x4 min with uncoupled SB and contralateral rotation NT  Deep neck flexor AROM into towel roll: 2x10 NT  Horizontal shoulder abd with yellow TB on wall: 2x12 each direction NT  Supine shoulder abd with cane AAROM: 2x12 with leading L UE: NT  Manual joint play to L shoulder: traction, distraction, A/P mobs grade 1-2: 5 min   Manual soft tissue release to occipital mm: x5 min  Manual to neck uncoupled motion: x3 min  Seated shoulder abd with cane AAROM: 2x12 N/T   Deep neck flex with yellow TB in seated: 1x15 NT  Rows with blue band: 3x12 alternating each rep NT  Open book in side lying: 2x15 each direction  NT  Side lying single arm Rows with yellow TB: 2x15 each arm NT  L shoulder ADD within painfree ROM: 2x15 NT  Banded B shoulder ext with  yellow TB: 2x15  Yellow TB banded IR/ER to L shoulder: 2x15 each direction NT  Wall clock, counter/clockwise: 2x10 each direction, each arm NT  Banded shoulder abd, alt arms each rep: 2x12 each arm NT  Manual scapular assist to L shoulder in seated, into shoulder flex then abd: x5 min NT  Manual scap assist to L shoulder in R side lying, into shoulder flex then abd: x5 min NT  Supine shoulder flex with dowel: 2x40 sec with 3 sec pause at flex end rangeNT  R side lying horizontal shoulder abd: 3x12 NT  Second and third set with 1lb DB  R side lying DB punch: 2x12 with 1lb DB NT  Supine horizontal shoulder abd with yellow TB: 2x12  Supine shoulder flex holding red stability ball: 2x15 with cues to press into ball  With arms straight in front of chest, for iso trunk contractions: 2x45 sec in all directions  Standing row with green TB: 2x15  High row then middle row  Supine shoulder alphabet with 2lb med ball: x3  Supine iso into shoulder IR/ER: 2x12 each direction with shoulder at 90 deg abd     OP Education: HEP:  Access Code: [email or web address]  URL: [email or web address]  Date: 03/25/2025  Prepared by: Ketan Johnson    Exercises  - Sidelying Open Book Thoracic Rotation with Knee on Foam Roll  - 1 x daily - 3 x weekly - 3 sets - 10 reps  - Wall Clock  - 1 x daily - 3 x weekly - 3 sets - 10 reps    Access Code: [email or web address]  URL: [email or web address]  Date: 03/18/2025  Prepared by: Ketan Johnson    Exercises  - Standing Shoulder Horizontal Abduction with Resistance  - 1 x daily - 3 x weekly - 3 sets - 10 reps  - Seated Cervical Sidebending Stretch  - 1 x daily - 3 x weekly - 3 sets - 3 reps - 30 sec hold  - Supine Cervical Retraction with Towel  - 1 x daily - 7 x weekly - 3 sets - 10 reps         Diagnosis:    Problem List Items Addressed This Visit             ICD-10-CM    Chronic upper back pain - Primary M54.9, G89.29    Bilateral shoulder pain M25.511, M25.512                   Assessment:  Pt returns after shot of cortisone in lower back where she has relief from pain and will inquire with her Dr to complete shots into neck to decrease radicular sx. Pt with continued pain in L side of neck, into shoulder with overhead activities and decreases with rest.     Plan:  Continue 1x per week for 7 more visits to increase stability to shoulder and neck.   OP PT Plan  Treatment/Interventions: Education/ Instruction, Electrical stimulation, Fluidotherapy, Hot pack, Manual therapy, Mechanical traction, Neuromuscular re-education, Taping techniques, Therapeutic activities, Therapeutic exercises, Ultrasound, Vasopneumatic device  PT Plan: Skilled PT  PT Frequency: 1 time per week  Duration: 8 visits  Certification Period Start Date: 04/01/25  Certification Period End Date: 06/30/25  Rehab Potential: Good  Plan of Care Agreement: Patient     Goals:  Pt will report adherence to HEP with IND. Goal met  Pt will report 2/10 pain or better for at least 3 days to demonstrate improved pain management in order for pt to be able to don/doff upper body dressing. Not met, 4-5/10  Pt will improve L shoulder ABD to 100deg or better in order to increase ROM during dressing. Goal Met. L shoulder abd taken in supine: 131 deg  Pt will improve neck ext to 25 deg or better to demonstrate improved pain management with increased ROM.  4/1/25, 21 deg  Pt will report being able to walk her dog for 30 min or longer to demonstrate improved pain management in order to reach personal goal. Partially met, as she can walk 30 min but has increased neck pain.    Ketan Johnson, PT   4/8/2025

## 2025-04-10 ENCOUNTER — PHARMACY VISIT (OUTPATIENT)
Dept: PHARMACY | Facility: CLINIC | Age: 70
End: 2025-04-10
Payer: COMMERCIAL

## 2025-04-16 ENCOUNTER — TREATMENT (OUTPATIENT)
Dept: PHYSICAL THERAPY | Facility: CLINIC | Age: 70
End: 2025-04-16
Payer: MEDICARE

## 2025-04-16 DIAGNOSIS — G89.29 CHRONIC UPPER BACK PAIN: Primary | ICD-10-CM

## 2025-04-16 DIAGNOSIS — M54.9 CHRONIC UPPER BACK PAIN: Primary | ICD-10-CM

## 2025-04-16 DIAGNOSIS — M25.512 BILATERAL SHOULDER PAIN, UNSPECIFIED CHRONICITY: ICD-10-CM

## 2025-04-16 DIAGNOSIS — M25.511 BILATERAL SHOULDER PAIN, UNSPECIFIED CHRONICITY: ICD-10-CM

## 2025-04-16 PROCEDURE — 97140 MANUAL THERAPY 1/> REGIONS: CPT | Mod: GP,CQ

## 2025-04-16 PROCEDURE — 97110 THERAPEUTIC EXERCISES: CPT | Mod: GP,CQ

## 2025-04-16 ASSESSMENT — PAIN SCALES - GENERAL: PAINLEVEL_OUTOF10: 5 - MODERATE PAIN

## 2025-04-16 ASSESSMENT — PAIN - FUNCTIONAL ASSESSMENT: PAIN_FUNCTIONAL_ASSESSMENT: 0-10

## 2025-04-16 NOTE — PROGRESS NOTES
Physical Therapy     Physical Therapy Re-check     Patient Name: Thu Ortega  MRN: [id number]  Today's Date: 4/16/2025  Time Calculation  Start Time: 0930  Stop Time: 1014  Time Calculation (min): 44 min      Units Billed: TE x1                       Manual x2    Insurance:  Visit: #8  Authorized: 15  Certification: 3/13/25 to 5/12/25  Payor: MEDICARE / Plan: MEDICARE PART A AND B / Product Type: *No Product type* /      Subjective:  Pt reports having pain in L shoulder and across mid back. Notes having ablation on LB which helped a lot.     Current pain: 4/10    Objective:  L 1st rib elevated  Rot=R= 72, L=50, 64 (after L 1st rib MET)    Vitals after SciFit 98% SpO2, 92 bpm    Treatment:  Therapeutic Exercise   SciFit for UE x 2min  fwd L1  Pulley into shoulder flex then shoulder abd on L: 2x2 min each direction  Neck AROM with towel placed at Lower Cervical vert, with cues to ext neck: 2x10 with 10 sec pause each rep NT  Deep neck flexor AROM into towel roll: 2x10 NT  Horizontal shoulder abd with yellow TB on wall: 2x12 each direction NT  Supine shoulder flex and abd with cane AAROM: x15ea  Seated shoulder abd with cane AAROM: 2x12 N/T  Deep neck flex with yellow TB in seated: 1x15 NT  Rows with blue band: 3x12 alternating each rep NT  Standing row with green TB: x15  High row then middle row x15  Shrugs 2# x20  B Shoulder ER YTB x15      Manual  Manual joint play to L shoulder: traction, distraction, A/P mobs grade 1-2  Manual soft tissue massage/ light TPR to B Uts/ L supraspinatus  L 1st rib MET   SOR     OP Education: HEP:  Access Code: [email or web address]  URL: [email or web address]  Date: 03/25/2025  Prepared by: Ketan Johnson    Exercises  - Sidelying Open Book Thoracic Rotation with Knee on Foam Roll  - 1 x daily - 3 x weekly - 3 sets - 10 reps  - Wall Clock  - 1 x daily - 3 x weekly - 3 sets - 10 reps    Access Code: [email or web address]  URL: [email or web address]  Date:  "03/18/2025  Prepared by: Ketan Johnson    Exercises  - Standing Shoulder Horizontal Abduction with Resistance  - 1 x daily - 3 x weekly - 3 sets - 10 reps  - Seated Cervical Sidebending Stretch  - 1 x daily - 3 x weekly - 3 sets - 3 reps - 30 sec hold  - Supine Cervical Retraction with Towel  - 1 x daily - 7 x weekly - 3 sets - 10 reps         Diagnosis:    Problem List Items Addressed This Visit           ICD-10-CM    Chronic upper back pain - Primary M54.9, G89.29    Bilateral shoulder pain M25.511, M25.512          Assessment:  Decreased malalignment/ muscle tightness noted after manual. Pt reports \"it's a little less pain than when I came in\" after tx. Patient would benefit from P.T. to continue to address impairments in order to improve strength, flexibility, posture, and  to decrease symptoms and increase overall function.        Plan:  Continue 1x per week for 6 more visits to increase stability to shoulder and neck.   OP PT Plan  Treatment/Interventions: Education/ Instruction, Electrical stimulation, Fluidotherapy, Hot pack, Manual therapy, Mechanical traction, Neuromuscular re-education, Taping techniques, Therapeutic activities, Therapeutic exercises, Ultrasound, Vasopneumatic device       Goals:  Pt will report adherence to HEP with IND. Goal met  Pt will report 2/10 pain or better for at least 3 days to demonstrate improved pain management in order for pt to be able to don/doff upper body dressing. Not met, 4-5/10  Pt will improve L shoulder ABD to 100deg or better in order to increase ROM during dressing. Goal Met. L shoulder abd taken in supine: 131 deg  Pt will improve neck ext to 25 deg or better to demonstrate improved pain management with increased ROM.  4/1/25, 21 deg  Pt will report being able to walk her dog for 30 min or longer to demonstrate improved pain management in order to reach personal goal. Partially met, as she can walk 30 min but has increased neck pain.    Yuni NOLAN" Panda, PTA   4/16/2025

## 2025-04-21 ENCOUNTER — TREATMENT (OUTPATIENT)
Dept: PHYSICAL THERAPY | Facility: CLINIC | Age: 70
End: 2025-04-21
Payer: MEDICARE

## 2025-04-21 DIAGNOSIS — M25.512 BILATERAL SHOULDER PAIN, UNSPECIFIED CHRONICITY: ICD-10-CM

## 2025-04-21 DIAGNOSIS — M25.511 BILATERAL SHOULDER PAIN, UNSPECIFIED CHRONICITY: ICD-10-CM

## 2025-04-21 DIAGNOSIS — G89.29 CHRONIC UPPER BACK PAIN: ICD-10-CM

## 2025-04-21 DIAGNOSIS — M54.9 CHRONIC UPPER BACK PAIN: ICD-10-CM

## 2025-04-21 PROCEDURE — 97110 THERAPEUTIC EXERCISES: CPT | Mod: GP

## 2025-04-21 PROCEDURE — 97140 MANUAL THERAPY 1/> REGIONS: CPT | Mod: GP

## 2025-04-21 ASSESSMENT — PAIN SCALES - GENERAL: PAINLEVEL_OUTOF10: 3

## 2025-04-21 ASSESSMENT — PAIN - FUNCTIONAL ASSESSMENT: PAIN_FUNCTIONAL_ASSESSMENT: 0-10

## 2025-04-21 NOTE — PROGRESS NOTES
Physical Therapy     Physical Therapy Re-check     Patient Name: Thu Ortega  MRN: [id number]  Today's Date: 4/21/2025  Time Calculation  Start Time: 1014  Stop Time: 1100  Time Calculation (min): 46 min      Units Billed: TE x1                       Manual x2    Insurance:  Visit: #9  Authorized: 15  Certification: 3/13/25 to 5/12/25  Payor: MEDICARE / Plan: MEDICARE PART A AND B / Product Type: *No Product type* /      Subjective:  Pt reports feeling sore after Thursday's session for one day but feels better overall.      Current pain: 3/10    Objective:  Cues for decreased guarding during STM    Treatment:  Therapeutic Exercise   SciFit for UE x 2min  fwd L1  Pulley into shoulder flex then shoulder abd on L: 2x2 min each direction  Neck AROM with towel placed at Lower Cervical vert, with cues to ext neck: 2x10 with 10 sec pause each rep NT  Deep neck flexor AROM into towel roll: 2x10 NT  Horizontal shoulder abd with yellow TB on wall: 2x12 each direction NT  Supine shoulder flex and abd with cane AAROM: x15ea  Seated shoulder abd with cane AAROM: 2x12 N/T  Deep neck flex with yellow TB in seated: 1x15 NT  Rows with blue band: 3x12 alternating each rep NT  Standing row with green TB: x15  High row then middle row x15  Shrugs 2# x20  B Shoulder ER YTB x15  4/21/25  STM to Bilat upper traps, rhomboids, supraspinatus: x7 min  Distraction to C-spine: 3x30 sec  Grade 1-2 to C-spine: x3 min  Single arm supine horizontal shoulder abd with yellow TB: 2x10 alt each rep  Shoulder abd with yellow TB: 2x10 each arm: 2x12 each arm  Seated horizontal iso add, with added chest press, using therapy ball: 2x12  Alphabet into mat with therapy ball: 1x each arm  Self stretch to upper cervical into ext with towel at lower>upper C-spine: 8x10 sec hold  Standing at wall with 90 abd, horizontal abd with 1lb DB: 2x12 each arm  Green TB row, increasing elbow height: 1x12  Second set of 12 alt arms    OP Education: HEP:  Access Code:  [email or web address]  URL: [email or web address]  Date: 03/25/2025  Prepared by: Ketan Johnson    Exercises  - Sidelying Open Book Thoracic Rotation with Knee on Foam Roll  - 1 x daily - 3 x weekly - 3 sets - 10 reps  - Wall Clock  - 1 x daily - 3 x weekly - 3 sets - 10 reps    Access Code: [email or web address]  URL: [email or web address]  Date: 03/18/2025  Prepared by: Ketan Johnson    Exercises  - Standing Shoulder Horizontal Abduction with Resistance  - 1 x daily - 3 x weekly - 3 sets - 10 reps  - Seated Cervical Sidebending Stretch  - 1 x daily - 3 x weekly - 3 sets - 3 reps - 30 sec hold  - Supine Cervical Retraction with Towel  - 1 x daily - 7 x weekly - 3 sets - 10 reps         Diagnosis:    Problem List Items Addressed This Visit           ICD-10-CM    Chronic upper back pain M54.9, G89.29    Bilateral shoulder pain M25.511, M25.512            Assessment:  Focus on activities that did not increase pain more than 5/10, while increasing strength and endurance to bilat shoulders. STM and distraction to neck/shoulders decreased pain, otherwise pt quick to soreness and required rest breaks to recover to baseline.     Plan:  Continue 1x per week with focus on increased stability to shoulder and neck.   OP PT Plan  Treatment/Interventions: Education/ Instruction, Electrical stimulation, Fluidotherapy, Hot pack, Manual therapy, Mechanical traction, Neuromuscular re-education, Taping techniques, Therapeutic activities, Therapeutic exercises, Ultrasound, Vasopneumatic device       Goals:  Pt will report adherence to HEP with IND. Goal met  Pt will report 2/10 pain or better for at least 3 days to demonstrate improved pain management in order for pt to be able to don/doff upper body dressing. Not met, 4-5/10  Pt will improve L shoulder ABD to 100deg or better in order to increase ROM during dressing. Goal Met. L shoulder abd taken in supine: 131 deg  Pt will improve neck ext to 25 deg or better to  demonstrate improved pain management with increased ROM.  4/1/25, 21 deg  Pt will report being able to walk her dog for 30 min or longer to demonstrate improved pain management in order to reach personal goal. Partially met, as she can walk 30 min but has increased neck pain.    Ketan Johnson, PT   4/21/2025

## 2025-04-24 ENCOUNTER — TREATMENT (OUTPATIENT)
Dept: PHYSICAL THERAPY | Facility: CLINIC | Age: 70
End: 2025-04-24
Payer: MEDICARE

## 2025-04-24 DIAGNOSIS — M25.511 BILATERAL SHOULDER PAIN, UNSPECIFIED CHRONICITY: ICD-10-CM

## 2025-04-24 DIAGNOSIS — M25.512 BILATERAL SHOULDER PAIN, UNSPECIFIED CHRONICITY: ICD-10-CM

## 2025-04-24 DIAGNOSIS — G89.29 CHRONIC UPPER BACK PAIN: ICD-10-CM

## 2025-04-24 DIAGNOSIS — M54.9 CHRONIC UPPER BACK PAIN: ICD-10-CM

## 2025-04-24 PROCEDURE — 97140 MANUAL THERAPY 1/> REGIONS: CPT | Mod: GP

## 2025-04-24 PROCEDURE — 97110 THERAPEUTIC EXERCISES: CPT | Mod: GP

## 2025-04-24 ASSESSMENT — PAIN - FUNCTIONAL ASSESSMENT: PAIN_FUNCTIONAL_ASSESSMENT: 0-10

## 2025-04-24 ASSESSMENT — PAIN SCALES - GENERAL: PAINLEVEL_OUTOF10: 2

## 2025-04-24 NOTE — PROGRESS NOTES
Physical Therapy     Physical Therapy Re-check     Patient Name: Thu Ortega  MRN: [id number]  Today's Date: 4/24/2025  Time Calculation  Start Time: 1102  Stop Time: 1147  Time Calculation (min): 45 min      Units Billed: TE x1                       Manual x2    Insurance:  Visit: #9  Authorized: 15  Certification: 3/13/25 to 5/12/25  Payor: MEDICARE / Plan: MEDICARE PART A AND B / Product Type: *No Product type* /      Subjective:  Pt reports feeling better than usual and moving arms better, but has chronic pain every day. Will see Dr on May 5, 2025 and hold off of therapy until after Dr appt. Sleeping better.     Current pain: 2/10    Objective:  Consistent cues to decrease guarding at cervical and rhomboid mm.     Treatment:  Therapeutic Exercise   SciFit for UE x 2min  fwd L1  Pulley into shoulder flex then shoulder abd on L: 2x2 min each direction  Neck AROM with towel placed at Lower Cervical vert, with cues to ext neck: 2x10 with 10 sec pause each rep NT  Deep neck flexor AROM into towel roll: 2x10 NT  Horizontal shoulder abd with yellow TB on wall: 2x12 each direction NT  Supine shoulder flex and abd with cane AAROM: x15ea  Seated shoulder abd with cane AAROM: 2x12 N/T  Deep neck flex with yellow TB in seated: 1x15 NT  Rows with blue band: 3x12 alternating each rep NT  Standing row with green TB: x15  High row then middle row x15  Shrugs 2# x20  B Shoulder ER YTB x15  4/21/25  STM to Bilat upper traps, rhomboids, supraspinatus: x7 min  Distraction to C-spine: 3x30 sec  Grade 1-2 to C-spine: x3 min  Single arm supine horizontal shoulder abd with yellow TB: 2x10 alt each rep  Shoulder abd with yellow TB: 2x10 each arm: 2x12 each arm  Seated horizontal iso add, with added chest press, using therapy ball: 2x12  Alphabet into mat with therapy ball: 1x each arm  Self stretch to upper cervical into ext with towel at lower>upper C-spine: 8x10 sec hold  Standing at wall with 90 abd, horizontal abd with 1lb DB: 2x12  each arm  Green TB row, increasing elbow height: 1x12  Second set of 12 alt arms  4/24/25  STM to upper trap and supraspinatus, distraction to C-spine, translation glides to C-spine: x13 min  X2 min of medial boarder  Self-mobilization to upper Cervical vert on ipsilateral side with neck rotation into/out of ball that blocks her motion: 2x15 each side.   Open book with scap manual assist: 2x12 each side  Pulley 2x2 min with 10 sec pause into flex  Second set into abd  Seated row with green TB that changes angle from straight ahead to 45 deg horizontal abd: 2x15    OP Education: HEP:  Access Code: [email or web address]  URL: [email or web address]  Date: 03/25/2025  Prepared by: Ketan Johnson    Exercises  - Sidelying Open Book Thoracic Rotation with Knee on Foam Roll  - 1 x daily - 3 x weekly - 3 sets - 10 reps  - Wall Clock  - 1 x daily - 3 x weekly - 3 sets - 10 reps    Access Code: [email or web address]  URL: [email or web address]  Date: 03/18/2025  Prepared by: Ketan Johnson    Exercises  - Standing Shoulder Horizontal Abduction with Resistance  - 1 x daily - 3 x weekly - 3 sets - 10 reps  - Seated Cervical Sidebending Stretch  - 1 x daily - 3 x weekly - 3 sets - 3 reps - 30 sec hold  - Supine Cervical Retraction with Towel  - 1 x daily - 7 x weekly - 3 sets - 10 reps         Diagnosis:    Problem List Items Addressed This Visit           ICD-10-CM    Chronic upper back pain M54.9, G89.29    Bilateral shoulder pain M25.511, M25.512            Assessment:  Focus of session on manual, PROM, and AAROM, as AROM caused increased pain throughout session. She demos increased guarding with initial PROM and STM, but able to decrease guarding within 1 min. She has good scapular motor control, but has trigger points at medial boarder bilaterally. She continues to benefit from skilled therapy to improve pain-free ROM.     Plan:  Pt will hold off on scheduling sessions until after Dr cameron tomorrow.     OP PT  Plan  Treatment/Interventions: Education/ Instruction, Electrical stimulation, Fluidotherapy, Hot pack, Manual therapy, Mechanical traction, Neuromuscular re-education, Taping techniques, Therapeutic activities, Therapeutic exercises, Ultrasound, Vasopneumatic device       Goals:  Pt will report adherence to HEP with IND. Goal met  Pt will report 2/10 pain or better for at least 3 days to demonstrate improved pain management in order for pt to be able to don/doff upper body dressing. Not met, 4-5/10  Pt will improve L shoulder ABD to 100deg or better in order to increase ROM during dressing. Goal Met. L shoulder abd taken in supine: 131 deg  Pt will improve neck ext to 25 deg or better to demonstrate improved pain management with increased ROM.  4/1/25, 21 deg  Pt will report being able to walk her dog for 30 min or longer to demonstrate improved pain management in order to reach personal goal. Partially met, as she can walk 30 min but has increased neck pain.    Ketan Johnson, PT   4/24/2025

## 2025-04-25 DIAGNOSIS — E11.65 UNCONTROLLED TYPE 2 DIABETES MELLITUS WITH HYPERGLYCEMIA (HCC): ICD-10-CM

## 2025-04-25 DIAGNOSIS — E03.9 ACQUIRED HYPOTHYROIDISM: ICD-10-CM

## 2025-04-25 LAB
ANION GAP SERPL CALCULATED.3IONS-SCNC: 12 MEQ/L (ref 9–15)
BUN SERPL-MCNC: 19 MG/DL (ref 8–23)
CALCIUM SERPL-MCNC: 9.8 MG/DL (ref 8.5–9.9)
CHLORIDE SERPL-SCNC: 103 MEQ/L (ref 95–107)
CHOLEST SERPL-MCNC: 238 MG/DL (ref 0–199)
CO2 SERPL-SCNC: 28 MEQ/L (ref 20–31)
CREAT SERPL-MCNC: 1.14 MG/DL (ref 0.5–0.9)
GLUCOSE SERPL-MCNC: 112 MG/DL (ref 70–99)
HDLC SERPL-MCNC: 47 MG/DL (ref 40–59)
LDLC SERPL CALC-MCNC: 155 MG/DL (ref 0–129)
POTASSIUM SERPL-SCNC: 4.2 MEQ/L (ref 3.4–4.9)
SODIUM SERPL-SCNC: 143 MEQ/L (ref 135–144)
T4 FREE SERPL-MCNC: 1.01 NG/DL (ref 0.84–1.68)
T4 FREE SERPL-MCNC: 1.02 NG/DL (ref 0.84–1.68)
TRIGL SERPL-MCNC: 182 MG/DL (ref 0–150)
TSH REFLEX: 35.79 UIU/ML (ref 0.44–3.86)
TSH SERPL-MCNC: 36.11 UIU/ML (ref 0.44–3.86)

## 2025-04-26 LAB
ESTIMATED AVERAGE GLUCOSE: 169 MG/DL
HBA1C MFR BLD: 7.5 % (ref 4–6)

## 2025-04-27 LAB — TSI SER-ACNC: <0.1 IU/L

## 2025-04-28 LAB — THYROPEROXIDASE IGG SERPL-ACNC: 4.3 IU/ML (ref 0–25)

## 2025-04-30 ENCOUNTER — OFFICE VISIT (OUTPATIENT)
Age: 70
End: 2025-04-30
Payer: MEDICARE

## 2025-04-30 VITALS
HEIGHT: 59 IN | BODY MASS INDEX: 28.83 KG/M2 | SYSTOLIC BLOOD PRESSURE: 125 MMHG | WEIGHT: 143 LBS | DIASTOLIC BLOOD PRESSURE: 69 MMHG | HEART RATE: 82 BPM

## 2025-04-30 DIAGNOSIS — E11.65 UNCONTROLLED TYPE 2 DIABETES MELLITUS WITH HYPERGLYCEMIA (HCC): Primary | ICD-10-CM

## 2025-04-30 DIAGNOSIS — E03.9 ACQUIRED HYPOTHYROIDISM: ICD-10-CM

## 2025-04-30 LAB
CHP ED QC CHECK: NORMAL
GLUCOSE BLD-MCNC: 137 MG/DL

## 2025-04-30 PROCEDURE — 2022F DILAT RTA XM EVC RTNOPTHY: CPT | Performed by: INTERNAL MEDICINE

## 2025-04-30 PROCEDURE — 3078F DIAST BP <80 MM HG: CPT | Performed by: INTERNAL MEDICINE

## 2025-04-30 PROCEDURE — 1159F MED LIST DOCD IN RCRD: CPT | Performed by: INTERNAL MEDICINE

## 2025-04-30 PROCEDURE — 3051F HG A1C>EQUAL 7.0%<8.0%: CPT | Performed by: INTERNAL MEDICINE

## 2025-04-30 PROCEDURE — G8400 PT W/DXA NO RESULTS DOC: HCPCS | Performed by: INTERNAL MEDICINE

## 2025-04-30 PROCEDURE — G8427 DOCREV CUR MEDS BY ELIG CLIN: HCPCS | Performed by: INTERNAL MEDICINE

## 2025-04-30 PROCEDURE — 1126F AMNT PAIN NOTED NONE PRSNT: CPT | Performed by: INTERNAL MEDICINE

## 2025-04-30 PROCEDURE — 3017F COLORECTAL CA SCREEN DOC REV: CPT | Performed by: INTERNAL MEDICINE

## 2025-04-30 PROCEDURE — 82962 GLUCOSE BLOOD TEST: CPT | Performed by: INTERNAL MEDICINE

## 2025-04-30 PROCEDURE — 1090F PRES/ABSN URINE INCON ASSESS: CPT | Performed by: INTERNAL MEDICINE

## 2025-04-30 PROCEDURE — 1036F TOBACCO NON-USER: CPT | Performed by: INTERNAL MEDICINE

## 2025-04-30 PROCEDURE — 1123F ACP DISCUSS/DSCN MKR DOCD: CPT | Performed by: INTERNAL MEDICINE

## 2025-04-30 PROCEDURE — 99214 OFFICE O/P EST MOD 30 MIN: CPT | Performed by: INTERNAL MEDICINE

## 2025-04-30 PROCEDURE — 3074F SYST BP LT 130 MM HG: CPT | Performed by: INTERNAL MEDICINE

## 2025-04-30 PROCEDURE — PBSHW POCT GLUCOSE: Performed by: INTERNAL MEDICINE

## 2025-04-30 PROCEDURE — 99213 OFFICE O/P EST LOW 20 MIN: CPT | Performed by: INTERNAL MEDICINE

## 2025-04-30 PROCEDURE — G8417 CALC BMI ABV UP PARAM F/U: HCPCS | Performed by: INTERNAL MEDICINE

## 2025-04-30 RX ORDER — DULOXETIN HYDROCHLORIDE 30 MG/1
30 CAPSULE, DELAYED RELEASE ORAL DAILY
COMMUNITY
Start: 2025-04-08

## 2025-04-30 RX ORDER — MOMETASONE FUROATE AND FORMOTEROL FUMARATE DIHYDRATE 200; 5 UG/1; UG/1
AEROSOL RESPIRATORY (INHALATION)
COMMUNITY
Start: 2024-08-09

## 2025-04-30 RX ORDER — ATORVASTATIN CALCIUM 40 MG/1
40 TABLET, FILM COATED ORAL EVERY MORNING
COMMUNITY
Start: 2025-04-11

## 2025-04-30 RX ORDER — TRAMADOL HYDROCHLORIDE 50 MG/1
50 TABLET ORAL EVERY 6 HOURS PRN
COMMUNITY
Start: 2025-02-22

## 2025-04-30 RX ORDER — PROCHLORPERAZINE MALEATE 10 MG
10 TABLET ORAL EVERY 6 HOURS PRN
COMMUNITY
Start: 2024-08-05

## 2025-04-30 RX ORDER — LEVOTHYROXINE SODIUM 175 UG/1
175 TABLET ORAL DAILY
Qty: 90 TABLET | Refills: 4 | Status: SHIPPED | OUTPATIENT
Start: 2025-04-30

## 2025-04-30 NOTE — PROGRESS NOTES
4/30/2025    Assessment:       Diagnosis Orders   1. Uncontrolled type 2 diabetes mellitus with hyperglycemia (HCC)  POCT Glucose      2. Acquired hypothyroidism              PLAN:     Orders Placed This Encounter   Procedures    Basic Metabolic Panel     Standing Status:   Future     Expected Date:   4/30/2025     Expiration Date:   4/30/2026    Hemoglobin A1C     Standing Status:   Future     Expected Date:   4/30/2025     Expiration Date:   4/30/2026    Albumin/Creatinine Ratio, Urine     Standing Status:   Future     Expected Date:   4/30/2025     Expiration Date:   4/30/2026    T4, Free     Standing Status:   Future     Expected Date:   4/30/2025     Expiration Date:   4/30/2026    TSH reflex to FT4     Standing Status:   Future     Expected Date:   4/30/2025     Expiration Date:   4/30/2026    POCT Glucose     Orders Placed This Encounter   Medications    levothyroxine (SYNTHROID) 175 MCG tablet     Sig: Take 1 tablet by mouth Daily     Dispense:  90 tablet     Refill:  4   Increase dose of Synthroid continue current dose of insulin A1c goal of less than 8 more than 50% of 30 minutes spent in patient education counseling      Orders Placed This Encounter   Procedures    POCT Glucose     No orders of the defined types were placed in this encounter.    No follow-ups on file.  Subjective:     Chief Complaint   Patient presents with    Diabetes     CGM    Hypothyroidism     Vitals:    04/30/25 1511   BP: 125/69   Pulse: 82   Weight: 64.9 kg (143 lb)   Height: 1.499 m (4' 11.02\")     Wt Readings from Last 3 Encounters:   04/30/25 64.9 kg (143 lb)   01/30/25 66.7 kg (147 lb)   06/10/24 67.6 kg (149 lb)     BP Readings from Last 3 Encounters:   04/30/25 125/69   01/30/25 111/64   06/10/24 123/67       Follow-up on type 2 diabetes hypothyroidism labs were done recently reviewed hemoglobin A1c was 7.5 also using freestyle bridgett reviewed 2-week download average glucose was 185  42% in range 29% slightly high 22% very

## 2025-05-02 ASSESSMENT — ENCOUNTER SYMPTOMS: EYES NEGATIVE: 1

## 2025-05-05 ENCOUNTER — OFFICE VISIT (OUTPATIENT)
Dept: PAIN MEDICINE | Facility: CLINIC | Age: 70
End: 2025-05-05
Payer: MEDICARE

## 2025-05-05 DIAGNOSIS — C34.92 LOCAL RECURRENCE OF LEFT LUNG CANCER (MULTI): ICD-10-CM

## 2025-05-05 DIAGNOSIS — M54.12 RADICULOPATHY OF CERVICAL SPINE: ICD-10-CM

## 2025-05-05 DIAGNOSIS — M54.12 BRACHIAL NEURITIS: Primary | ICD-10-CM

## 2025-05-05 DIAGNOSIS — C34.12 MALIGNANT NEOPLASM OF UPPER LOBE OF LEFT LUNG (MULTI): ICD-10-CM

## 2025-05-05 PROCEDURE — 99214 OFFICE O/P EST MOD 30 MIN: CPT | Performed by: PAIN MEDICINE

## 2025-05-05 PROCEDURE — G2211 COMPLEX E/M VISIT ADD ON: HCPCS | Performed by: PAIN MEDICINE

## 2025-05-05 PROCEDURE — 1125F AMNT PAIN NOTED PAIN PRSNT: CPT | Performed by: PAIN MEDICINE

## 2025-05-05 ASSESSMENT — COLUMBIA-SUICIDE SEVERITY RATING SCALE - C-SSRS
1. IN THE PAST MONTH, HAVE YOU WISHED YOU WERE DEAD OR WISHED YOU COULD GO TO SLEEP AND NOT WAKE UP?: NO
2. HAVE YOU ACTUALLY HAD ANY THOUGHTS OF KILLING YOURSELF?: NO
6. HAVE YOU EVER DONE ANYTHING, STARTED TO DO ANYTHING, OR PREPARED TO DO ANYTHING TO END YOUR LIFE?: NO

## 2025-05-05 ASSESSMENT — PAIN - FUNCTIONAL ASSESSMENT: PAIN_FUNCTIONAL_ASSESSMENT: 0-10

## 2025-05-05 ASSESSMENT — PAIN DESCRIPTION - DESCRIPTORS: DESCRIPTORS: DULL;ACHING;BURNING

## 2025-05-05 ASSESSMENT — PAIN SCALES - GENERAL: PAINLEVEL_OUTOF10: 3

## 2025-05-05 NOTE — H&P (VIEW-ONLY)
History Of Present Illness  Thu Ortega is a 69 y.o. female presenting with chronic neck pain for last 6 months .  She completed 12 weeks of physical therapy with minimal help she did not feel that the pain was taken away describing currently the pain at the base of her neck radiating bilaterally across the shoulder and shoulder blade area.  Rating the pain between 3-4 out of 10 aggravated to a 9 out of 10 triggered by walking or doing some cleaning in her house.  Describing it as a burning sensation.With minimal improvements on medication including muscle relaxant , otc Nsaids , celebrex , gabapentin and tramadol.    Past Medical History  Medical History[1]  Surgical History  Surgical History[2]  Social History  She reports that she quit smoking about 19 years ago. Her smoking use included cigarettes. She has never used smokeless tobacco. She reports that she does not currently use alcohol. She reports that she does not use drugs.    Family History  Family History[3]     Allergies  Allergies[4]  Review of Systems   All 13 systems were reviewed and are within normal levels except as noted below or per HPI. Positive and pertinent negative responses are noted below or in the HPI   Denied any fever or chills. No weight loss and no night sweats. No cough or sputum production. No diarrhea   No constipation  No bladder and bowel incontinence and no other changes in bladder and bowel. No skin changes.   Denied opioids diversion and abuse and denies alcoholism. Denies overuse of  pain medications.    Physical Exam       Past medical history no interval changes has been noted    On physical examination    General   Alert, oriented x3 pleasant and cooperative. Does not look in any major distress.    HEENT  Pupils normal in size. Ears, nose, mouth, and throat appear to be in normal condition.  Head atraumatic      No signs of sedation or signs of withdrawal apparent.    Psychiatric   No signs of depression  apparent.    Neuro   No focal neurological deficit apparent. Ambulation at baseline.      Respiratory  No respiratory distress     Abdomen  no distention     Skin  No skin markings supportive of recent IV drug usage .    Cardiovascular  Regular rate and rhythm     Last Recorded Vitals  There were no vitals taken for this visit.  CT thoracic spine wo IV contrast  Result Date: 2/27/2025  Interpreted By:  Tom Fine, STUDY: CT ANGIO CHEST FOR PULMONARY EMBOLISM; CT THORACIC SPINE WO IV CONTRAST;  2/27/2025 1:26 pm   INDICATION: Signs/Symptoms:On home O2, assess for alternative cause of likely MSK pain in thoracic, specifically rule out PE in setting of lung cancer; Signs/Symptoms:Pain, chronic.     COMPARISON: CT chest of 12/18/2024.   ACCESSION NUMBER(S): [id number]; [id number]   ORDERING CLINICIAN: ELEAZAR RUIZ   TECHNIQUE: Helical data acquisition of the chest was obtained following intravenous administration of  60 mL Omnipaque 350 contrast. Images were reformatted in axial, coronal, and sagittal planes.   MIP images were created and reviewed.   Dedicated images of the thoracic spine were reviewed in the axial, coronal, and sagittal planes.   FINDINGS: POTENTIAL LIMITATIONS OF THE STUDY:  None   HEART AND VESSELS: There is no evidence of pulmonary embolism.   There is no evidence of aortic dissection or aneurysm. Irregular atherosclerotic calcifications present in the aorta and branch vessels.   Trace pericardial fluid mainly toward the superior pericardial recess is less prominent from prior. Trace fluid within the anterior epicardial space superiorly left of midline is less prominent from prior as well.   MEDIASTINUM AND MARIELY, LOWER NECK AND AXILLA: No mediastinal or hilar lymphadenopathy.  Nonspecific small bilateral axillary lymph nodes are similar to prior.   LUNGS AND AIRWAYS: Changes of previous left upper lobectomy are again seen. Mild multifocal predominantly dependent and basilar atelectasis is  seen along with nonspecific mild patchy ground-glass opacities of the lungs.   A right lower lobe basilar aspect round 5 mm pulmonary nodule is unchanged (image 227 of 284). No significant new pulmonary nodule is seen.   No pleural effusion or pneumothorax.   UPPER ABDOMEN: Included portions of the upper abdomen are unremarkable.   CHEST WALL AND OSSEOUS STRUCTURES: There are degenerative changes of both shoulders. Stable nonacute contour deformities of the left 7th and 8th ribs lateral aspects may be related to old trauma and/or prior surgery.   THORACIC SPINE: Thoracic alignment is intact. Multilevel disc space narrowing and predominantly anterior endplate spurring/partially bridging osteophytes are again seen greatest in the mid-lower thoracic spine. Tiny vacuum discs are present at several levels of the midthoracic spine. No acute fracture, subluxation, or compression deformity is seen. Posterior endplate spurring is most prominent at T7-8 similar to prior.       1.  No evidence of pulmonary embolism. 2. Changes of previous left upper lobectomy similar to prior. 3. Mild multifocal predominantly dependent and basilar atelectasis bilaterally along with mild nonspecific patchy ground-glass opacities of the lungs. 4. Right lower lobe basilar aspect stable 5 mm pulmonary nodule. 5. Multilevel disc space narrowing and predominantly anterior endplate spurring/partially bridging osteophytes of the thoracic spine similar to prior as well as mild endplate spurring posteriorly at T7-8 similar to prior.       MACRO: None.   Signed by: Tom Fine 2/27/2025 2:02 PM Dictation workstation:   [id number]    CT cervical spine wo IV contrast  Result Date: 2/27/2025  Interpreted By:  Tom Fine, STUDY: CT CERVICAL SPINE WO IV CONTRAST;  2/27/2025 1:26 pm   INDICATION: Signs/Symptoms:Known cervical disc disease, worsening bilateral shoulder pain but normal neurologic exam, assess for interval change from previous  imaging.     COMPARISON: None.   ACCESSION NUMBER(S): [id number]   ORDERING CLINICIAN: ELEAZAR RUIZ   TECHNIQUE: Contiguous unenhanced axial images were obtained through the cervical spine with coronal and sagittal reformations of the axial data.   FINDINGS: ALIGNMENT: The craniocervical junction appears intact.  The dens and atlantoaxial relation appear intact.  There is straightening of the cervical lordosis which may be exaggerated by positioning and/or spasm.   VERTEBRAE/DISC SPACES: Multilevel disc space narrowing and endplate spurring is present throughout the cervical spine most prominent at C3-4, C5-6 and C6-7. Multilevel facet arthrosis is present bilaterally throughout the cervical spine. No acute fracture, subluxation, or compression deformity is seen. Degenerative changes contribute to bilateral foraminal narrowing at C3-4, C5-6 and C6-7.   ADDITIONAL FINDINGS: Prevertebral soft tissues are not thickened.       No acute fracture or subluxation of the cervical spine.   Multilevel degenerative disc and facet changes of the cervical spine most prominent at C3-4, C5-6 and C6-7 with bilateral foraminal narrowing at these levels.   MACRO: None.   Signed by: Tom Fine 2/27/2025 1:53 PM Dictation workstation:   [id number]    XR lumbar spine 2-3 views  Result Date: 9/3/2024  Interpreted By:  Eligio Mccrary, STUDY: XR LUMBAR SPINE 2-3 VIEWS; ;  8/28/2024 11:51 am   INDICATION: Signs/Symptoms:pain.   ,M47.816 Spondylosis without myelopathy or radiculopathy, lumbar region   COMPARISON: None.   ACCESSION NUMBER(S): [id number]   ORDERING CLINICIAN: SASHA CHANDLER   FINDINGS: Lumbar spine, three views   Mild multilevel osteophytosis throughout the lumbar spine. There is mild anterolisthesis L4 on L5. There is no disc space narrowing. There is facet disease lower lumbar spine. There is no fracture       Mild multilevel spondylosis throughout the lumbar spine. Mild anterolisthesis L4 on L5.   MACRO: None    Signed by: Eligio Mccrary 9/3/2024 5:51 PM Dictation workstation:   [id number]    XR lumbar spine 2-3 views  Result Date: 9/3/2024  Interpreted By:  Eligio Mccrary, STUDY: XR LUMBAR SPINE 2-3 VIEWS; ;  8/28/2024 11:51 am    INDICATION: Signs/Symptoms:pain.    ,M47.816 Spondylosis without myelopathy or radiculopathy, lumbar region    COMPARISON: None.    ACCESSION NUMBER(S): [id number]    ORDERING CLINICIAN: SASHA CHANDLER    FINDINGS: Lumbar spine, three views    Mild multilevel osteophytosis throughout the lumbar spine. There is mild anterolisthesis L4 on L5. There is no disc space narrowing. There is facet disease lower lumbar spine. There is no fracture    IMPRESSION: Mild multilevel spondylosis throughout the lumbar spine. Mild anterolisthesis L4 on L5.    MACRO: None    Signed by: Eligio Mccrary 9/3/2024 5:51 PM Dictation workstation:   [id number]      Assessment/Plan   69 years old with history and physical examination supportive of cervical degenerative disc disease and cervical radiculopathy tried and failed conservative management   Plan  Advised the patient about the different modalities available for the treatment of her condition I recommended for her a cervical epidural steroid injection to be performed under fluoroscopic guidance targeting the C7-T1 level with injection of contrast material to confirm needle placement   Discussed procedure risks/benefits in detail with patient. Patient meets medical necessity for procedure due to failure of conservative measures. Reviewed procedural risks including bleeding, infection, nerve damage, paralysis and other complications . Also reviewed mitigating factors such as screening for infection,blood thinner use, sterile precautions, and image-guidance when applicable. All questions answered. Patient  expressed understanding and choose to proceed.      The above clinical summary has been dictated with voice recognition software. It has not been  proofread for grammatical errors, typographical mistakes, or other semantic inconsistencies.    Thank you for visiting our office today. It was our pleasure to take part in your healthcare.     Please do not hesitate to contact the pain clinic after your visit with any questions or concerns at  M-F 8-4 pm       Christiano Melendrez M.D.  Medical Director , Division of Pain Medicine [facility]   of Anesthesiology and Pain Medicine  [employer] [facility] [employer] [place] [address]     Office: [phone]  Fax: [phone]     Christiano Melendrez MD       [1]   Past Medical History:  Diagnosis Date    Adenocarcinoma of lung, left (Multi)     s/p robotic assisted NICHOL lobectomy on 1/31/23, c/b afib and aspiration PNA    Atrial fibrillation (Multi)     COPD (chronic obstructive pulmonary disease) (Multi)     F/W Dr. Kang, Wixela inhaler, PFT appointment scheduled for 01/12/2024    Depression     Taking Cymbalta    DJD (degenerative joint disease)     DM (diabetes mellitus) (Multi)     F/w Dr. Wong, Taking Metformin, Last A1c is 7.5 on 08/03/2023    Fibromyalgia, primary     F/W Pain    GERD (gastroesophageal reflux disease)     F/w PCP, on Protonix    Hearing aid worn     Bilateral hearing aides    History of blood transfusion     patient think she recieved a transfusion with her Lobectomy surgery    History of meningioma of the brain     s/p resection    HLD (hyperlipidemia)     F/W PCP: Taking Atorvastatin    HTN (hypertension)     F/W PCP    Hx antineoplastic chemo january2023    Irregular heart beat     Paroxysmal atrial fibrillation. Last seen by Dr. Fox on 01/03/24, Started on Amiodarone. Not on anticoagulation.    Irritable bowel syndrome     Malignant neoplasm of upper lobe of left lung (Multi)      Nephrolithiasis     monitoring, no interventions    Panlobular emphysema (Multi)     Shortness of breath     PALENCIA previously on O2    Spinal stenosis     Urinary tract infection    [2]   Past Surgical History:  Procedure Laterality Date    BRAIN SURGERY      meningioma resection    BREAST BIOPSY  [id number]    CATARACT EXTRACTION      CT GUIDED PERCUTANEOUS BIOPSY LUNG  12/13/2023    CT GUIDED PERCUTANEOUS BIOPSY LUNG 12/13/2023 [facility] CT    CT GUIDED PERCUTANEOUS BIOPSY LUNG  12/15/2023    CT GUIDED PERCUTANEOUS BIOPSY LUNG    FOOT SURGERY      KNEE SURGERY      LUNG LOBECTOMY      TUBAL LIGATION      US GUIDED NEEDLE LIVER BIOPSY  02/07/2020    US GUIDED NEEDLE LIVER BIOPSY 2/7/2020 [facility]   [3]   Family History  Problem Relation Name Age of Onset    Stroke Mother      Other (aortic valve disease) Mother      Heart disease Mother      Cancer Sister      Stroke Sister      Diabetes Sister      Diabetes Brother      Other (heart transplant) Brother     [4]   Allergies  Allergen Reactions    Penicillin Hives

## 2025-05-05 NOTE — PROGRESS NOTES
Here to talk about injections in the neck. Pain radiates from neck to middle back. Recent imaging and PT

## 2025-05-05 NOTE — H&P
History Of Present Illness  Thu Ortega is a 69 y.o. female presenting with chronic neck pain for last 6 months .  She completed 12 weeks of physical therapy with minimal help she did not feel that the pain was taken away describing currently the pain at the base of her neck radiating bilaterally across the shoulder and shoulder blade area.  Rating the pain between 3-4 out of 10 aggravated to a 9 out of 10 triggered by walking or doing some cleaning in her house.  Describing it as a burning sensation.With minimal improvements on medication including muscle relaxant , otc Nsaids , celebrex , gabapentin and tramadol.    Past Medical History  Medical History[1]  Surgical History  Surgical History[2]  Social History  She reports that she quit smoking about 19 years ago. Her smoking use included cigarettes. She has never used smokeless tobacco. She reports that she does not currently use alcohol. She reports that she does not use drugs.    Family History  Family History[3]     Allergies  Allergies[4]  Review of Systems   All 13 systems were reviewed and are within normal levels except as noted below or per HPI. Positive and pertinent negative responses are noted below or in the HPI   Denied any fever or chills. No weight loss and no night sweats. No cough or sputum production. No diarrhea   No constipation  No bladder and bowel incontinence and no other changes in bladder and bowel. No skin changes.   Denied opioids diversion and abuse and denies alcoholism. Denies overuse of  pain medications.    Physical Exam       Past medical history no interval changes has been noted    On physical examination    General   Alert, oriented x3 pleasant and cooperative. Does not look in any major distress.    HEENT  Pupils normal in size. Ears, nose, mouth, and throat appear to be in normal condition.  Head atraumatic      No signs of sedation or signs of withdrawal apparent.    Psychiatric   No signs of depression  apparent.    Neuro   No focal neurological deficit apparent. Ambulation at baseline.      Respiratory  No respiratory distress     Abdomen  no distention     Skin  No skin markings supportive of recent IV drug usage .    Cardiovascular  Regular rate and rhythm     Last Recorded Vitals  There were no vitals taken for this visit.  CT thoracic spine wo IV contrast  Result Date: 2/27/2025  Interpreted By:  Tom Fine, STUDY: CT ANGIO CHEST FOR PULMONARY EMBOLISM; CT THORACIC SPINE WO IV CONTRAST;  2/27/2025 1:26 pm   INDICATION: Signs/Symptoms:On home O2, assess for alternative cause of likely MSK pain in thoracic, specifically rule out PE in setting of lung cancer; Signs/Symptoms:Pain, chronic.     COMPARISON: CT chest of 12/18/2024.   ACCESSION NUMBER(S): [id number]; [id number]   ORDERING CLINICIAN: ELEAZAR RUIZ   TECHNIQUE: Helical data acquisition of the chest was obtained following intravenous administration of  60 mL Omnipaque 350 contrast. Images were reformatted in axial, coronal, and sagittal planes.   MIP images were created and reviewed.   Dedicated images of the thoracic spine were reviewed in the axial, coronal, and sagittal planes.   FINDINGS: POTENTIAL LIMITATIONS OF THE STUDY:  None   HEART AND VESSELS: There is no evidence of pulmonary embolism.   There is no evidence of aortic dissection or aneurysm. Irregular atherosclerotic calcifications present in the aorta and branch vessels.   Trace pericardial fluid mainly toward the superior pericardial recess is less prominent from prior. Trace fluid within the anterior epicardial space superiorly left of midline is less prominent from prior as well.   MEDIASTINUM AND MARIELY, LOWER NECK AND AXILLA: No mediastinal or hilar lymphadenopathy.  Nonspecific small bilateral axillary lymph nodes are similar to prior.   LUNGS AND AIRWAYS: Changes of previous left upper lobectomy are again seen. Mild multifocal predominantly dependent and basilar atelectasis is  seen along with nonspecific mild patchy ground-glass opacities of the lungs.   A right lower lobe basilar aspect round 5 mm pulmonary nodule is unchanged (image 227 of 284). No significant new pulmonary nodule is seen.   No pleural effusion or pneumothorax.   UPPER ABDOMEN: Included portions of the upper abdomen are unremarkable.   CHEST WALL AND OSSEOUS STRUCTURES: There are degenerative changes of both shoulders. Stable nonacute contour deformities of the left 7th and 8th ribs lateral aspects may be related to old trauma and/or prior surgery.   THORACIC SPINE: Thoracic alignment is intact. Multilevel disc space narrowing and predominantly anterior endplate spurring/partially bridging osteophytes are again seen greatest in the mid-lower thoracic spine. Tiny vacuum discs are present at several levels of the midthoracic spine. No acute fracture, subluxation, or compression deformity is seen. Posterior endplate spurring is most prominent at T7-8 similar to prior.       1.  No evidence of pulmonary embolism. 2. Changes of previous left upper lobectomy similar to prior. 3. Mild multifocal predominantly dependent and basilar atelectasis bilaterally along with mild nonspecific patchy ground-glass opacities of the lungs. 4. Right lower lobe basilar aspect stable 5 mm pulmonary nodule. 5. Multilevel disc space narrowing and predominantly anterior endplate spurring/partially bridging osteophytes of the thoracic spine similar to prior as well as mild endplate spurring posteriorly at T7-8 similar to prior.       MACRO: None.   Signed by: Tom Fine 2/27/2025 2:02 PM Dictation workstation:   [id number]    CT cervical spine wo IV contrast  Result Date: 2/27/2025  Interpreted By:  Tom Fine, STUDY: CT CERVICAL SPINE WO IV CONTRAST;  2/27/2025 1:26 pm   INDICATION: Signs/Symptoms:Known cervical disc disease, worsening bilateral shoulder pain but normal neurologic exam, assess for interval change from previous  imaging.     COMPARISON: None.   ACCESSION NUMBER(S): [id number]   ORDERING CLINICIAN: ELEAZAR RUIZ   TECHNIQUE: Contiguous unenhanced axial images were obtained through the cervical spine with coronal and sagittal reformations of the axial data.   FINDINGS: ALIGNMENT: The craniocervical junction appears intact.  The dens and atlantoaxial relation appear intact.  There is straightening of the cervical lordosis which may be exaggerated by positioning and/or spasm.   VERTEBRAE/DISC SPACES: Multilevel disc space narrowing and endplate spurring is present throughout the cervical spine most prominent at C3-4, C5-6 and C6-7. Multilevel facet arthrosis is present bilaterally throughout the cervical spine. No acute fracture, subluxation, or compression deformity is seen. Degenerative changes contribute to bilateral foraminal narrowing at C3-4, C5-6 and C6-7.   ADDITIONAL FINDINGS: Prevertebral soft tissues are not thickened.       No acute fracture or subluxation of the cervical spine.   Multilevel degenerative disc and facet changes of the cervical spine most prominent at C3-4, C5-6 and C6-7 with bilateral foraminal narrowing at these levels.   MACRO: None.   Signed by: Tom Fine 2/27/2025 1:53 PM Dictation workstation:   [id number]    XR lumbar spine 2-3 views  Result Date: 9/3/2024  Interpreted By:  Eligio Mccrary, STUDY: XR LUMBAR SPINE 2-3 VIEWS; ;  8/28/2024 11:51 am   INDICATION: Signs/Symptoms:pain.   ,M47.816 Spondylosis without myelopathy or radiculopathy, lumbar region   COMPARISON: None.   ACCESSION NUMBER(S): [id number]   ORDERING CLINICIAN: SASHA CHANDLER   FINDINGS: Lumbar spine, three views   Mild multilevel osteophytosis throughout the lumbar spine. There is mild anterolisthesis L4 on L5. There is no disc space narrowing. There is facet disease lower lumbar spine. There is no fracture       Mild multilevel spondylosis throughout the lumbar spine. Mild anterolisthesis L4 on L5.   MACRO: None    Signed by: Eligio Mccrary 9/3/2024 5:51 PM Dictation workstation:   [id number]    XR lumbar spine 2-3 views  Result Date: 9/3/2024  Interpreted By:  Eligio Mccrary, STUDY: XR LUMBAR SPINE 2-3 VIEWS; ;  8/28/2024 11:51 am    INDICATION: Signs/Symptoms:pain.    ,M47.816 Spondylosis without myelopathy or radiculopathy, lumbar region    COMPARISON: None.    ACCESSION NUMBER(S): [id number]    ORDERING CLINICIAN: SASHA CHANDLER    FINDINGS: Lumbar spine, three views    Mild multilevel osteophytosis throughout the lumbar spine. There is mild anterolisthesis L4 on L5. There is no disc space narrowing. There is facet disease lower lumbar spine. There is no fracture    IMPRESSION: Mild multilevel spondylosis throughout the lumbar spine. Mild anterolisthesis L4 on L5.    MACRO: None    Signed by: Eligio Mccrary 9/3/2024 5:51 PM Dictation workstation:   [id number]      Assessment/Plan   69 years old with history and physical examination supportive of cervical degenerative disc disease and cervical radiculopathy tried and failed conservative management   Plan  Advised the patient about the different modalities available for the treatment of her condition I recommended for her a cervical epidural steroid injection to be performed under fluoroscopic guidance targeting the C7-T1 level with injection of contrast material to confirm needle placement   Discussed procedure risks/benefits in detail with patient. Patient meets medical necessity for procedure due to failure of conservative measures. Reviewed procedural risks including bleeding, infection, nerve damage, paralysis and other complications . Also reviewed mitigating factors such as screening for infection,blood thinner use, sterile precautions, and image-guidance when applicable. All questions answered. Patient  expressed understanding and choose to proceed.      The above clinical summary has been dictated with voice recognition software. It has not been  proofread for grammatical errors, typographical mistakes, or other semantic inconsistencies.    Thank you for visiting our office today. It was our pleasure to take part in your healthcare.     Please do not hesitate to contact the pain clinic after your visit with any questions or concerns at  M-F 8-4 pm       Christiano Melendrez M.D.  Medical Director , Division of Pain Medicine [facility]   of Anesthesiology and Pain Medicine  [facility] [place] [address]     Office: [phone]  Fax: [phone]     Christiano Melendrez MD       [1]   Past Medical History:  Diagnosis Date    Adenocarcinoma of lung, left (Multi)     s/p robotic assisted NICHOL lobectomy on 1/31/23, c/b afib and aspiration PNA    Atrial fibrillation (Multi)     COPD (chronic obstructive pulmonary disease) (Multi)     F/W Dr. Kang, Wixela inhaler, PFT appointment scheduled for 01/12/2024    Depression     Taking Cymbalta    DJD (degenerative joint disease)     DM (diabetes mellitus) (Multi)     F/w Dr. Wong, Taking Metformin, Last A1c is 7.5 on 08/03/2023    Fibromyalgia, primary     F/W Pain    GERD (gastroesophageal reflux disease)     F/w PCP, on Protonix    Hearing aid worn     Bilateral hearing aides    History of blood transfusion     patient think she recieved a transfusion with her Lobectomy surgery    History of meningioma of the brain     s/p resection    HLD (hyperlipidemia)     F/W PCP: Taking Atorvastatin    HTN (hypertension)     F/W PCP    Hx antineoplastic chemo [id number]    Irregular heart beat     Paroxysmal atrial fibrillation. Last seen by Dr. Fox on 01/03/24, Started on Amiodarone. Not on anticoagulation.    Irritable bowel syndrome     Malignant neoplasm of upper lobe of left lung (Multi)      Nephrolithiasis     monitoring, no interventions    Panlobular emphysema (Multi)     Shortness of breath     PALENCIA previously on O2    Spinal stenosis     Urinary tract infection    [2]   Past Surgical History:  Procedure Laterality Date    BRAIN SURGERY      meningioma resection    BREAST BIOPSY  [id number]    CATARACT EXTRACTION      CT GUIDED PERCUTANEOUS BIOPSY LUNG  12/13/2023    CT GUIDED PERCUTANEOUS BIOPSY LUNG 12/13/2023 [facility]    CT GUIDED PERCUTANEOUS BIOPSY LUNG  12/15/2023    CT GUIDED PERCUTANEOUS BIOPSY LUNG    FOOT SURGERY      KNEE SURGERY      LUNG LOBECTOMY      TUBAL LIGATION      US GUIDED NEEDLE LIVER BIOPSY  02/07/2020    US GUIDED NEEDLE LIVER BIOPSY 2/7/2020 [facility]   [3]   Family History  Problem Relation Name Age of Onset    Stroke Mother      Other (aortic valve disease) Mother      Heart disease Mother      Cancer Sister      Stroke Sister      Diabetes Sister      Diabetes Brother      Other (heart transplant) Brother     [4]   Allergies  Allergen Reactions    Penicillin Hives

## 2025-05-08 ENCOUNTER — HOSPITAL ENCOUNTER (OUTPATIENT)
Dept: PAIN MEDICINE | Facility: CLINIC | Age: 70
Discharge: HOME | End: 2025-05-08
Payer: MEDICARE

## 2025-05-08 VITALS
DIASTOLIC BLOOD PRESSURE: 70 MMHG | SYSTOLIC BLOOD PRESSURE: 156 MMHG | HEART RATE: 74 BPM | RESPIRATION RATE: 20 BRPM | TEMPERATURE: 95.6 F | OXYGEN SATURATION: 100 %

## 2025-05-08 DIAGNOSIS — M54.12 RADICULOPATHY OF CERVICAL SPINE: ICD-10-CM

## 2025-05-08 PROCEDURE — 2550000001 HC RX 255 CONTRASTS: Performed by: PAIN MEDICINE

## 2025-05-08 PROCEDURE — 62321 NJX INTERLAMINAR CRV/THRC: CPT | Performed by: PAIN MEDICINE

## 2025-05-08 PROCEDURE — 99152 MOD SED SAME PHYS/QHP 5/>YRS: CPT | Performed by: PAIN MEDICINE

## 2025-05-08 PROCEDURE — 2500000004 HC RX 250 GENERAL PHARMACY W/ HCPCS (ALT 636 FOR OP/ED): Performed by: PAIN MEDICINE

## 2025-05-08 RX ORDER — METHYLPREDNISOLONE ACETATE 80 MG/ML
INJECTION, SUSPENSION INTRA-ARTICULAR; INTRALESIONAL; INTRAMUSCULAR; SOFT TISSUE AS NEEDED
Status: COMPLETED | OUTPATIENT
Start: 2025-05-08 | End: 2025-05-08

## 2025-05-08 RX ORDER — MIDAZOLAM HYDROCHLORIDE 1 MG/ML
INJECTION, SOLUTION INTRAMUSCULAR; INTRAVENOUS AS NEEDED
Status: COMPLETED | OUTPATIENT
Start: 2025-05-08 | End: 2025-05-08

## 2025-05-08 RX ORDER — LIDOCAINE HYDROCHLORIDE 10 MG/ML
INJECTION, SOLUTION EPIDURAL; INFILTRATION; INTRACAUDAL; PERINEURAL AS NEEDED
Status: COMPLETED | OUTPATIENT
Start: 2025-05-08 | End: 2025-05-08

## 2025-05-08 RX ADMIN — METHYLPREDNISOLONE ACETATE 80 MG: 80 INJECTION, SUSPENSION INTRA-ARTICULAR; INTRALESIONAL; INTRAMUSCULAR; SOFT TISSUE at 08:28

## 2025-05-08 RX ADMIN — MIDAZOLAM 2 MG: 1 INJECTION INTRAMUSCULAR; INTRAVENOUS at 08:26

## 2025-05-08 RX ADMIN — IOHEXOL 1 ML: 300 INJECTION, SOLUTION INTRAVENOUS at 08:27

## 2025-05-08 RX ADMIN — LIDOCAINE HYDROCHLORIDE 2 ML: 10 INJECTION, SOLUTION EPIDURAL; INFILTRATION; INTRACAUDAL; PERINEURAL at 08:27

## 2025-05-08 ASSESSMENT — COLUMBIA-SUICIDE SEVERITY RATING SCALE - C-SSRS
6. HAVE YOU EVER DONE ANYTHING, STARTED TO DO ANYTHING, OR PREPARED TO DO ANYTHING TO END YOUR LIFE?: NO
1. IN THE PAST MONTH, HAVE YOU WISHED YOU WERE DEAD OR WISHED YOU COULD GO TO SLEEP AND NOT WAKE UP?: NO
2. HAVE YOU ACTUALLY HAD ANY THOUGHTS OF KILLING YOURSELF?: NO

## 2025-05-08 ASSESSMENT — PATIENT HEALTH QUESTIONNAIRE - PHQ9
1. LITTLE INTEREST OR PLEASURE IN DOING THINGS: NOT AT ALL
2. FEELING DOWN, DEPRESSED OR HOPELESS: NOT AT ALL
SUM OF ALL RESPONSES TO PHQ9 QUESTIONS 1 AND 2: 0

## 2025-05-08 ASSESSMENT — PAIN - FUNCTIONAL ASSESSMENT
PAIN_FUNCTIONAL_ASSESSMENT: 0-10
PAIN_FUNCTIONAL_ASSESSMENT: 0-10

## 2025-05-08 ASSESSMENT — PAIN SCALES - GENERAL
PAINLEVEL_OUTOF10: 4
PAINLEVEL_OUTOF10: 0 - NO PAIN

## 2025-05-08 ASSESSMENT — PAIN DESCRIPTION - DESCRIPTORS: DESCRIPTORS: BURNING;ACHING

## 2025-05-08 NOTE — DISCHARGE INSTRUCTIONS
Post-injection instructions:    Your pain may not be gone immediately after the procedure--it usually takes the steroid 3-5 days to start working.   It may take several weeks for the medicine to reach its' full effect.   Pay attention to how much pain relief (what percentage compared to before the procedure) you get and for how long it lasts.     Activity: Avoid strenuous activity for 24 hours. After that return to your normal activity level.     Bandages: Remove after 24 hours     Showering/Bathing: You may shower after bandage is removed     Follow up: CALL OFFICE IN 7 DAYS [phone] LEAVE MESSAGE ABOUT THE RELIEF THAT WAS OBTAINED      Call the doctor immediately: if you notice:     Excessive bleeding from procedure site (brisk bright red bleeding from the site or bleeding that soaks the bandages or does not stop)   Severe headache  Inability to walk, leg or arm weakness or numbness that is worse after the procedure   Uncontrolled pain   New urinary or fecal incontinence   Signs of infection: Fever above 101.5F, redness, swelling, pus or drainage from the site    Epidural Injection    Why is this procedure done?  With an epidural injection, the doctor injects drugs deep into the area around your spinal cord. This is different than epidural anesthesia that is used for surgery or when a woman has a baby. Your spine is a group of bones in your back that protect the nerves in your spinal cord. Problems with your spine can cause swollen nerves in the spinal cord. This swelling leads to pain and can limit movement. In an epidural injection, the doctor may give you a drug to help with swelling and pain.  You may have an epidural injection in different parts of your back, based on where your pain is. For pain in your head or arms, you may have a cervical epidural injection. If your pain is in your upper or middle back, you may get a thoracic epidural injection. For pain in your lower back or legs, you may get a  lumbar epidural injection.    What will the results be?  The treatment may:  Lower pain  Reduce swelling in the nerves  Improve movement  What happens before the procedure?  Your doctor will take your history. Talk to the doctor about:  All the drugs you are taking. Be sure to include all prescription and over-the-counter (OTC) drugs, and herbal supplements. Tell the doctor about any drug allergy. Bring a list of drugs you take with you.  If you have high blood sugar or diabetes. Your drugs may need to be changed.  Any bleeding problems. Be sure to tell your doctor if you are taking any drugs that may cause bleeding. Some of these are warfarin, rivaroxaban, apixaban, ticagrelor, clopidogrel, ketorolac, ibuprofen, naproxen, or aspirin. Certain vitamins and herbs, such as garlic and fish oil, may also add to the risk for bleeding. You may need to stop these drugs as well. Talk to your doctor about them.  Tell the doctor if you are pregnant.  You will not be allowed to drive right away after the procedure. Ask a family member or a friend to drive you home.  What happens during the procedure?  To help the doctor make sure the drugs are being injected in the right place, your doctor may do an x-ray of your spine. Other times your doctor may do a continuous x-ray during the procedure. This is a fluoroscopy. The doctor may also use a colored dye or a contrast dye to check where to inject the drug.  You may be given a drug to help you relax. You may be given a drug to make the area of the injection numb.  The doctor will clean the skin on your back or neck. This helps prevent infection.  The doctor will put a needle through the skin toward your spine. The drug will be injected into a space near the spine.  The needle will be taken out and a bandage will be placed over the injection site.  What happens after the procedure?  Staff will check on you to make sure you are doing well. They will tell you when you can go  home.  What care is needed at home?  Relax on the day of the injection.  Do not drive or run machines for at least 12 hours afterwards.  Apply ice to the injection site. Place an ice pack or a bag of frozen peas wrapped in a towel over the painful part. Never put ice right on the skin. Do not leave the ice on more than 10 to 15 minutes at a time.  It may take a few days before you will feel the effects of the injection.  What follow-up care is needed?  Your doctor may ask you to make visits to the office to check on your progress. Be sure to keep these visits. You may also need to see a physical therapist (PT). The PT will teach you exercises to help you get back your strength and motion. Ask your doctor when you can exercise.  What problems could happen?  Bleeding  Infection (rare)  Headache  Nerve injury  If you have diabetes, your blood sugar can go up after the injection. Check with your doctor if you need more treatment for this.  Last Reviewed Date

## 2025-05-13 ENCOUNTER — LAB (OUTPATIENT)
Dept: LAB | Facility: CLINIC | Age: 70
End: 2025-05-13
Payer: MEDICARE

## 2025-05-13 DIAGNOSIS — C34.12 MALIGNANT NEOPLASM OF UPPER LOBE OF LEFT LUNG (MULTI): ICD-10-CM

## 2025-05-13 LAB
ALBUMIN SERPL BCP-MCNC: 4.8 G/DL (ref 3.4–5)
ALP SERPL-CCNC: 90 U/L (ref 33–136)
ALT SERPL W P-5'-P-CCNC: 18 U/L (ref 7–45)
ANION GAP SERPL CALC-SCNC: 11 MMOL/L (ref 10–20)
AST SERPL W P-5'-P-CCNC: 15 U/L (ref 9–39)
BASOPHILS # BLD AUTO: 0.05 X10*3/UL (ref 0–0.1)
BASOPHILS NFR BLD AUTO: 0.5 %
BILIRUB SERPL-MCNC: 0.4 MG/DL (ref 0–1.2)
BUN SERPL-MCNC: 33 MG/DL (ref 6–23)
CALCIUM SERPL-MCNC: 10.9 MG/DL (ref 8.6–10.3)
CHLORIDE SERPL-SCNC: 100 MMOL/L (ref 98–107)
CO2 SERPL-SCNC: 30 MMOL/L (ref 21–32)
CREAT SERPL-MCNC: 1.16 MG/DL (ref 0.5–1.05)
EGFRCR SERPLBLD CKD-EPI 2021: 51 ML/MIN/1.73M*2
EOSINOPHIL # BLD AUTO: 0.24 X10*3/UL (ref 0–0.4)
EOSINOPHIL NFR BLD AUTO: 2.5 %
ERYTHROCYTE [DISTWIDTH] IN BLOOD BY AUTOMATED COUNT: 15.4 % (ref 11.5–14.5)
GLUCOSE SERPL-MCNC: 308 MG/DL (ref 74–99)
HCT VFR BLD AUTO: 38.3 % (ref 36–46)
HGB BLD-MCNC: 12.3 G/DL (ref 12–16)
IMM GRANULOCYTES # BLD AUTO: 0.07 X10*3/UL (ref 0–0.5)
IMM GRANULOCYTES NFR BLD AUTO: 0.7 % (ref 0–0.9)
LYMPHOCYTES # BLD AUTO: 1.93 X10*3/UL (ref 0.8–3)
LYMPHOCYTES NFR BLD AUTO: 20.3 %
MCH RBC QN AUTO: 24.6 PG (ref 26–34)
MCHC RBC AUTO-ENTMCNC: 32.1 G/DL (ref 32–36)
MCV RBC AUTO: 76 FL (ref 80–100)
MONOCYTES # BLD AUTO: 0.99 X10*3/UL (ref 0.05–0.8)
MONOCYTES NFR BLD AUTO: 10.4 %
NEUTROPHILS # BLD AUTO: 6.24 X10*3/UL (ref 1.6–5.5)
NEUTROPHILS NFR BLD AUTO: 65.6 %
PLATELET # BLD AUTO: 287 X10*3/UL (ref 150–450)
POTASSIUM SERPL-SCNC: 3.8 MMOL/L (ref 3.5–5.3)
PROT SERPL-MCNC: 7.5 G/DL (ref 6.4–8.2)
RBC # BLD AUTO: 5.01 X10*6/UL (ref 4–5.2)
SODIUM SERPL-SCNC: 137 MMOL/L (ref 136–145)
WBC # BLD AUTO: 9.5 X10*3/UL (ref 4.4–11.3)

## 2025-05-13 PROCEDURE — 80053 COMPREHEN METABOLIC PANEL: CPT

## 2025-05-13 PROCEDURE — 82378 CARCINOEMBRYONIC ANTIGEN: CPT

## 2025-05-13 PROCEDURE — 36415 COLL VENOUS BLD VENIPUNCTURE: CPT

## 2025-05-13 PROCEDURE — 85025 COMPLETE CBC W/AUTO DIFF WBC: CPT

## 2025-05-14 ENCOUNTER — OFFICE VISIT (OUTPATIENT)
Dept: HEMATOLOGY/ONCOLOGY | Facility: CLINIC | Age: 70
End: 2025-05-14
Payer: MEDICARE

## 2025-05-14 VITALS
RESPIRATION RATE: 16 BRPM | BODY MASS INDEX: 28.99 KG/M2 | WEIGHT: 143.52 LBS | DIASTOLIC BLOOD PRESSURE: 62 MMHG | TEMPERATURE: 98.6 F | SYSTOLIC BLOOD PRESSURE: 120 MMHG | HEART RATE: 71 BPM | OXYGEN SATURATION: 92 %

## 2025-05-14 DIAGNOSIS — F33.41 RECURRENT MAJOR DEPRESSIVE DISORDER, IN PARTIAL REMISSION: ICD-10-CM

## 2025-05-14 DIAGNOSIS — I27.20 PULMONARY HYPERTENSION, UNSPECIFIED (MULTI): ICD-10-CM

## 2025-05-14 DIAGNOSIS — I10 PRIMARY HYPERTENSION: ICD-10-CM

## 2025-05-14 DIAGNOSIS — I48.0 PAF (PAROXYSMAL ATRIAL FIBRILLATION) (MULTI): ICD-10-CM

## 2025-05-14 DIAGNOSIS — I50.32 CHRONIC DIASTOLIC (CONGESTIVE) HEART FAILURE: ICD-10-CM

## 2025-05-14 DIAGNOSIS — E11.9 TYPE 2 DIABETES MELLITUS WITHOUT COMPLICATION, WITH LONG-TERM CURRENT USE OF INSULIN: ICD-10-CM

## 2025-05-14 DIAGNOSIS — Z79.4 TYPE 2 DIABETES MELLITUS WITHOUT COMPLICATION, WITH LONG-TERM CURRENT USE OF INSULIN: ICD-10-CM

## 2025-05-14 DIAGNOSIS — J44.9 CHRONIC OBSTRUCTIVE PULMONARY DISEASE, UNSPECIFIED COPD TYPE (MULTI): ICD-10-CM

## 2025-05-14 DIAGNOSIS — C34.12 MALIGNANT NEOPLASM OF UPPER LOBE OF LEFT LUNG (MULTI): Primary | ICD-10-CM

## 2025-05-14 DIAGNOSIS — E78.2 MIXED HYPERLIPIDEMIA: ICD-10-CM

## 2025-05-14 LAB — CEA SERPL-MCNC: 2.3 UG/L

## 2025-05-14 PROCEDURE — 1126F AMNT PAIN NOTED NONE PRSNT: CPT | Performed by: INTERNAL MEDICINE

## 2025-05-14 PROCEDURE — 3074F SYST BP LT 130 MM HG: CPT | Performed by: INTERNAL MEDICINE

## 2025-05-14 PROCEDURE — 3078F DIAST BP <80 MM HG: CPT | Performed by: INTERNAL MEDICINE

## 2025-05-14 PROCEDURE — 1160F RVW MEDS BY RX/DR IN RCRD: CPT | Performed by: INTERNAL MEDICINE

## 2025-05-14 PROCEDURE — 1159F MED LIST DOCD IN RCRD: CPT | Performed by: INTERNAL MEDICINE

## 2025-05-14 PROCEDURE — G2211 COMPLEX E/M VISIT ADD ON: HCPCS | Performed by: INTERNAL MEDICINE

## 2025-05-14 PROCEDURE — 99214 OFFICE O/P EST MOD 30 MIN: CPT | Performed by: INTERNAL MEDICINE

## 2025-05-14 ASSESSMENT — PAIN SCALES - GENERAL: PAINLEVEL_OUTOF10: 0-NO PAIN

## 2025-05-14 NOTE — PATIENT INSTRUCTIONS
You can stop the iron supplement    Continue taking the adagrasib    See you again in 6 weeks after next CT is done   ambulatory

## 2025-05-14 NOTE — PROGRESS NOTES
Patient ID: Thu Ortega is a 70 y.o. female.  Referring Physician: Álvaro Galeano MD  [id number]  [place] [address]  Primary Care Provider: DEANNA Rodriguez  Visit Type: Follow Up      Subjective    HPI I am doing okay    Review of Systems   Constitutional: Negative.    HENT:  Negative.     Eyes: Negative.    Respiratory: Negative.     Cardiovascular:  Positive for leg swelling.   Gastrointestinal: Negative.    Endocrine: Negative.    Genitourinary: Negative.     Musculoskeletal: Negative.    Skin: Negative.    Neurological: Negative.    Hematological: Negative.    Psychiatric/Behavioral: Negative.          Objective   BSA: 1.65 meters squared  /62 (BP Location: Right arm)   Pulse 71   Temp 37 °C (98.6 °F) (Temporal)   Resp 16   Wt 65.1 kg (143 lb 8.3 oz)   SpO2 92%   BMI 28.99 kg/m²      has a past medical history of Adenocarcinoma of lung, left (Multi), Atrial fibrillation (Multi), COPD (chronic obstructive pulmonary disease) (Multi), Depression, DJD (degenerative joint disease), DM (diabetes mellitus) (Multi), Fibromyalgia, primary, GERD (gastroesophageal reflux disease), Hearing aid worn, History of blood transfusion, History of meningioma of the brain, HLD (hyperlipidemia), HTN (hypertension), antineoplastic chemo (january2023), Irregular heart beat, Irritable bowel syndrome, Malignant neoplasm of upper lobe of left lung (Multi), Nephrolithiasis, Panlobular emphysema (Multi), Shortness of breath, Spinal stenosis, and Urinary tract infection.   has a past surgical history that includes US guided needle liver biopsy (02/07/2020); Lung lobectomy; CT guided percutaneous biopsy lung (12/13/2023); CT guided percutaneous biopsy lung (12/15/2023); Brain surgery; Foot surgery; Knee surgery; Cataract extraction; Tubal ligation; and Breast biopsy (january2023).  Family History[1]  Oncology History   Malignant neoplasm of upper lobe of left lung (Multi)   12/3/2023 Initial  Diagnosis    Malignant neoplasm of upper lobe of left lung (CMS/HCC)     2/28/2024 - 7/3/2024 Chemotherapy    Pembrolizumab, 21 Day Cycles     8/12/2024 -  Chemotherapy    Adagrasib, 28 Day Cycles     Local recurrence of left lung cancer (Multi)   1/4/2024 Initial Diagnosis    Local recurrence of left lung cancer (CMS/HCC)     2/28/2024 - 7/3/2024 Chemotherapy    Pembrolizumab, 21 Day Cycles     8/12/2024 -  Chemotherapy    Adagrasib, 28 Day Cycles         Thu Ortega  reports that she quit smoking about 19 years ago. Her smoking use included cigarettes. She has never used smokeless tobacco.  She  reports that she does not currently use alcohol.  She  reports no history of drug use.    Physical Exam  Vitals reviewed.   Constitutional:       Appearance: Normal appearance.   HENT:      Head: Normocephalic.      Mouth/Throat:      Mouth: Mucous membranes are moist.   Eyes:      Extraocular Movements: Extraocular movements intact.      Pupils: Pupils are equal, round, and reactive to light.   Cardiovascular:      Rate and Rhythm: Normal rate and regular rhythm.      Pulses: Normal pulses.      Heart sounds: Normal heart sounds.   Pulmonary:      Effort: Pulmonary effort is normal.      Breath sounds: Normal breath sounds.   Abdominal:      General: Bowel sounds are normal.      Palpations: Abdomen is soft.   Musculoskeletal:         General: Normal range of motion.      Cervical back: Normal range of motion and neck supple.      Right lower leg: Edema present.      Left lower leg: Edema present.   Skin:     General: Skin is warm.   Neurological:      General: No focal deficit present.      Mental Status: She is alert and oriented to person, place, and time.   Psychiatric:         Mood and Affect: Mood normal.         Behavior: Behavior normal.         WBC   Date/Time Value Ref Range Status   05/13/2025 01:25 PM 9.5 4.4 - 11.3 x10*3/uL Final   03/17/2025 04:16 PM 7.6 4.4 - 11.3 x10*3/uL Final   02/27/2025 12:48 PM 7.1  "4.4 - 11.3 x10*3/uL Final     nRBC   Date Value Ref Range Status   02/27/2025 0.0 0.0 - 0.0 /100 WBCs Final   12/18/2024 0.0 0.0 - 0.0 /100 WBCs Final   07/30/2024 0.0 0.0 - 0.0 /100 WBCs Final     RBC   Date Value Ref Range Status   05/13/2025 5.01 4.00 - 5.20 x10*6/uL Final   03/17/2025 4.42 4.00 - 5.20 x10*6/uL Final   02/27/2025 5.15 4.00 - 5.20 x10*6/uL Final     Hemoglobin   Date Value Ref Range Status   05/13/2025 12.3 12.0 - 16.0 g/dL Final   03/17/2025 10.9 (L) 12.0 - 16.0 g/dL Final   02/27/2025 12.3 12.0 - 16.0 g/dL Final     Hematocrit   Date Value Ref Range Status   05/13/2025 38.3 36.0 - 46.0 % Final   03/17/2025 34.0 (L) 36.0 - 46.0 % Final   02/27/2025 39.6 36.0 - 46.0 % Final     MCV   Date/Time Value Ref Range Status   05/13/2025 01:25 PM 76 (L) 80 - 100 fL Final   03/17/2025 04:16 PM 77 (L) 80 - 100 fL Final   02/27/2025 12:48 PM 77 (L) 80 - 100 fL Final     MCH   Date/Time Value Ref Range Status   05/13/2025 01:25 PM 24.6 (L) 26.0 - 34.0 pg Final   03/17/2025 04:16 PM 24.7 (L) 26.0 - 34.0 pg Final   02/27/2025 12:48 PM 23.9 (L) 26.0 - 34.0 pg Final     MCHC   Date/Time Value Ref Range Status   05/13/2025 01:25 PM 32.1 32.0 - 36.0 g/dL Final   03/17/2025 04:16 PM 32.1 32.0 - 36.0 g/dL Final   02/27/2025 12:48 PM 31.1 (L) 32.0 - 36.0 g/dL Final     RDW   Date/Time Value Ref Range Status   05/13/2025 01:25 PM 15.4 (H) 11.5 - 14.5 % Final   03/17/2025 04:16 PM 15.7 (H) 11.5 - 14.5 % Final   02/27/2025 12:48 PM 16.1 (H) 11.5 - 14.5 % Final     Platelets   Date/Time Value Ref Range Status   05/13/2025 01:25  150 - 450 x10*3/uL Final   03/17/2025 04:16  150 - 450 x10*3/uL Final   02/27/2025 12:48  150 - 450 x10*3/uL Final     No results found for: \"MPV\"  Neutrophils %   Date/Time Value Ref Range Status   05/13/2025 01:25 PM 65.6 40.0 - 80.0 % Final   03/17/2025 04:16 PM 54.2 40.0 - 80.0 % Final   02/27/2025 12:48 PM 57.4 40.0 - 80.0 % Final     Immature Granulocytes %, Automated "   Date/Time Value Ref Range Status   05/13/2025 01:25 PM 0.7 0.0 - 0.9 % Final     Comment:     Immature Granulocyte Count (IG) includes promyelocytes, myelocytes and metamyelocytes but does not include bands. Percent differential counts (%) should be interpreted in the context of the absolute cell counts (cells/UL).   03/17/2025 04:16 PM 0.5 0.0 - 0.9 % Final     Comment:     Immature Granulocyte Count (IG) includes promyelocytes, myelocytes and metamyelocytes but does not include bands. Percent differential counts (%) should be interpreted in the context of the absolute cell counts (cells/UL).   02/27/2025 12:48 PM 0.4 0.0 - 0.9 % Final     Comment:     Immature Granulocyte Count (IG) includes promyelocytes, myelocytes and metamyelocytes but does not include bands. Percent differential counts (%) should be interpreted in the context of the absolute cell counts (cells/UL).     Lymphocytes %, Manual   Date/Time Value Ref Range Status   07/30/2024 06:29 PM 9.0 13.0 - 44.0 % Final     Lymphocytes %   Date/Time Value Ref Range Status   05/13/2025 01:25 PM 20.3 13.0 - 44.0 % Final   03/17/2025 04:16 PM 22.6 13.0 - 44.0 % Final   02/27/2025 12:48 PM 24.2 13.0 - 44.0 % Final     Monocytes %, Manual   Date/Time Value Ref Range Status   07/30/2024 06:29 PM 5.0 2.0 - 10.0 % Final     Monocytes %   Date/Time Value Ref Range Status   05/13/2025 01:25 PM 10.4 2.0 - 10.0 % Final   03/17/2025 04:16 PM 13.5 2.0 - 10.0 % Final   02/27/2025 12:48 PM 10.1 2.0 - 10.0 % Final     Eosinophils %, Manual   Date/Time Value Ref Range Status   07/30/2024 06:29 PM 2.0 0.0 - 6.0 % Final     Eosinophils %   Date/Time Value Ref Range Status   05/13/2025 01:25 PM 2.5 0.0 - 6.0 % Final   03/17/2025 04:16 PM 8.3 0.0 - 6.0 % Final   02/27/2025 12:48 PM 6.9 0.0 - 6.0 % Final     Basophils %, Manual   Date/Time Value Ref Range Status   07/30/2024 06:29 PM 0.0 0.0 - 2.0 % Final     Basophils %   Date/Time Value Ref Range Status   05/13/2025 01:25 PM  0.5 0.0 - 2.0 % Final   03/17/2025 04:16 PM 0.9 0.0 - 2.0 % Final   02/27/2025 12:48 PM 1.0 0.0 - 2.0 % Final     Neutrophils Absolute   Date/Time Value Ref Range Status   05/13/2025 01:25 PM 6.24 (H) 1.60 - 5.50 x10*3/uL Final     Comment:     Percent differential counts (%) should be interpreted in the context of the absolute cell counts (cells/uL).   03/17/2025 04:16 PM 4.12 1.20 - 7.70 x10*3/uL Final     Comment:     Percent differential counts (%) should be interpreted in the context of the absolute cell counts (cells/uL).   02/27/2025 12:48 PM 4.07 1.20 - 7.70 x10*3/uL Final     Comment:     Percent differential counts (%) should be interpreted in the context of the absolute cell counts (cells/uL).     Immature Granulocytes Absolute, Automated   Date/Time Value Ref Range Status   05/13/2025 01:25 PM 0.07 0.00 - 0.50 x10*3/uL Final   03/17/2025 04:16 PM 0.04 0.00 - 0.70 x10*3/uL Final   02/27/2025 12:48 PM 0.03 0.00 - 0.70 x10*3/uL Final     Lymphocytes Absolute   Date/Time Value Ref Range Status   05/13/2025 01:25 PM 1.93 0.80 - 3.00 x10*3/uL Final   03/17/2025 04:16 PM 1.72 1.20 - 4.80 x10*3/uL Final   02/27/2025 12:48 PM 1.72 1.20 - 4.80 x10*3/uL Final     Monocytes Absolute   Date/Time Value Ref Range Status   05/13/2025 01:25 PM 0.99 (H) 0.05 - 0.80 x10*3/uL Final   03/17/2025 04:16 PM 1.03 (H) 0.10 - 1.00 x10*3/uL Final   02/27/2025 12:48 PM 0.72 0.10 - 1.00 x10*3/uL Final     Eosinophils Absolute   Date/Time Value Ref Range Status   05/13/2025 01:25 PM 0.24 0.00 - 0.40 x10*3/uL Final   03/17/2025 04:16 PM 0.63 0.00 - 0.70 x10*3/uL Final   02/27/2025 12:48 PM 0.49 0.00 - 0.70 x10*3/uL Final     Eosinophils Absolute, Manual   Date/Time Value Ref Range Status   07/30/2024 06:29 PM 0.20 0.00 - 0.70 x10*3/uL Final     Basophils Absolute   Date/Time Value Ref Range Status   05/13/2025 01:25 PM 0.05 0.00 - 0.10 x10*3/uL Final   03/17/2025 04:16 PM 0.07 0.00 - 0.10 x10*3/uL Final   02/27/2025 12:48 PM 0.07 0.00  "- 0.10 x10*3/uL Final     Basophils Absolute, Manual   Date/Time Value Ref Range Status   07/30/2024 06:29 PM 0.00 0.00 - 0.10 x10*3/uL Final       No components found for: \"PT\"  aPTT   Date/Time Value Ref Range Status   01/12/2024 01:46 PM 31 27 - 38 seconds Final     Medication Documentation Review Audit       Reviewed by Marisa Khalil MA (Medical Assistant) on 05/14/25 at 1108      Medication Order Taking? Sig Documenting Provider Last Dose Status   acetaminophen (Tylenol) 325 mg tablet [id number] Yes Take 2 tablets (650 mg) by mouth every 4 hours if needed for mild pain (1 - 3) or fever (temp greater than 38.0 C). Sunshine Hankins PA-C  Active   adagrasib (Krazati) 200 mg tablet [id number] Yes Take 2 tablets (400 mg) by mouth in the morning and take 1 tablet (200 mg) by mouth in the evening. Álvaro Galeano MD  Active   albuterol 90 mcg/actuation inhaler [id number] Yes Inhale 2 puffs every 6 hours if needed for wheezing. Historical MD Eulalia  Active   amLODIPine (Norvasc) 10 mg tablet [id number] Yes Take 1 tablet (10 mg) by mouth once daily in the morning. Take before meals. Historical MD Eullaia  Active   aspirin 81 mg chewable tablet [id number] Yes Chew 1 tablet (81 mg) once daily. Corinna Provider, MD  Active   atorvastatin (Lipitor) 10 mg tablet [id number] Yes Take 1 tablet (10 mg) by mouth once daily.   Patient taking differently: Take 4 tablets (40 mg) by mouth once daily.    Corinna Esteban MD  Active   biotin 10 mg tablet [id number]  Take 1 tablet (10 mg) by mouth once daily.   Patient not taking: Reported on 2/26/2025    Historical MD Eulalia  Active   celecoxib (CeleBREX) 100 mg capsule [id number] Yes Take 1 capsule (100 mg) by mouth 2 times a day. Corinna Esteban MD  Active   cholecalciferol (Vitamin D3) 5,000 Units tablet [id number]  Take 1 tablet (5,000 Units) by mouth once daily.   Patient not taking: Reported on 2/26/2025    Corinna Esteban MD  Active   DULoxetine (Cymbalta) 60 " mg DR capsule [id number] Yes Take 1 capsule (60 mg) by mouth once daily. Do not crush or chew.   Patient taking differently: Take 30 mg by mouth once daily. Do not crush or chew.    Historical Provider, MD  Active   glipiZIDE (Glucotrol) 5 mg tablet [id number]  Take 1 tablet (5 mg) by mouth 1 time for 1 dose.   Patient not taking: Reported on 2025    Aman Guthrie DO   24 235   hydroCHLOROthiazide (HYDRODiuril) 25 mg tablet [id number] Yes Take 1 tablet (25 mg) by mouth once daily. Álvaro Galeano MD  Active   insulin aspart (NovoLOG PenFill U-100 Insulin) 100 unit/mL pen cartridge [id number]  Inject 15 Units under the skin 3 times daily (morning, midday, late afternoon). Take as directed per insulin instructions. Mild insulin sliding scale Yanni Smalls DO   25 235   levothyroxine (Synthroid, Levoxyl) 25 mcg tablet [id number] Yes Take 1 tablet (25 mcg) by mouth early in the morning..   Patient taking differently: Take 7 tablets (175 mcg) by mouth early in the morning..    Historical Provider, MD  Active   metFORMIN (Glucophage) 1,000 mg tablet [id number] Yes Take 1 tablet (1,000 mg) by mouth 2 times a day. Historical Provider, MD  Active   methocarbamol (Robaxin) 500 mg tablet [id number]  Take 1 tablet (500 mg) by mouth 3 times a day as needed for muscle spasms for up to 10 days. DEANNA Kelsey   25 [id number]   ondansetron (Zofran) 4 mg tablet [id number]  Take 1 tablet (4 mg) by mouth every 8 hours if needed for nausea or vomiting.   Patient not taking: Reported on 2025    DEANNA Weber  Active   oxygen (O2) gas therapy [id number] Yes Inhale 1 each once every 24 hours. Yanni Smalls DO  Active   pantoprazole (ProtoNix) 40 mg EC tablet [id number] Yes Take 1 tablet (40 mg) by mouth once daily in the morning. Take before meals. Historical Provider, MD  Active   pregabalin (Lyrica) 25 mg capsule [id number] Yes Take 1 capsule (25 mg) by mouth 2 times a  day. Historical Provider, MD  Active   prochlorperazine (Compazine) 10 mg tablet [id number] Yes Take 1 tablet (10 mg) by mouth every 6 hours if needed for nausea or vomiting. Álvaro Galeano MD  Active   tiotropium (Spiriva) 18 mcg inhalation capsule [id number] Yes Place 1 capsule (18 mcg) into inhaler and inhale once daily. Historical Provider, MD  Active   traMADol ER (Ultram-ER) 100 mg 24 hr tablet [id number]  Take by mouth. Do not crush, chew, or split.   Patient not taking: Reported on 5/14/2025    Historical Provider, MD  Active   traZODone (Desyrel) 50 mg tablet [id number]  Take 1 tablet (50 mg) by mouth once daily at bedtime.   Patient not taking: Reported on 1/6/2025    Historical Provider, MD  Active   Wixela Inhub 250-50 mcg/dose diskus inhaler [id number]  Inhale 1 puff 2 times a day.   Patient not taking: Reported on 1/6/2025    Historical Provider, MD  Active   zolpidem CR (Ambien CR) 6.25 mg ER tablet [id number] Yes Take 10 mg by mouth as needed at bedtime for sleep. Do not crush, chew, or split.   Patient taking differently: Take 5 mg by mouth as needed at bedtime for sleep. Do not crush, chew, or split.    Historical Provider, MD  Active                   Assessment/Plan    1) lung cancer  -12/8/2022 chest CT: NICHOL anterior segment 1.6 x 2.2 cm nodule, partial pleural attachment, nonspecific low volume paratracheal mediastinal LN largest near AP window 1.0 x 1.4 cm, right subcarinal space 8 x 14 mm  -12/12/2022 PET: NICHOL 1.8 x 2.3 cm mass with SUV 5.8, lateral margin abuts pleura; no significant mediastinal or hilar hypermetabolic lymphadenopathy  -12/28/2022 chest CT: 2.5 cm cavitary nodule in NICHOL  -1/7/2023 PET scan  -had surgery for stage I NSCLC, s/p lobectomy (Dr Fagan, 1/31/2023)  -2/1/2023 CT chest: no PE, postop changes in left chest, small left PTX in left upper anterior chest  -2/26/2023 chest CT no PE, continued mildly enlarged mediastinal lymph nodes  -saw Dr Solis at [facility] on 3/13/2023 - she had a  [id number] (stage Ib) lung adenocarcinoma (poor NCCN risk factors - G3, + visceral involvement), s/p left robotic assisted anatomic upper lobectomy, PD-L1 90%, +[id number]  -per his charting, he recommended either adjuvant cisplatin + pemetrexed x 4 cycles vs surveillance  -according to  Thu, Dr Solis never told her about her high risk features nor any adjuvant option, and that the only thing he recommended was observation  -7/11/2023 CT chest : stable postsurgical changes of prior left thoracotomy and left upper lobectomy with interval resolution of bilateral pleural effusions; interval progression of lymphadenopathy in the chest concerning for progressing ghazala metastatic disease     7/25/2023 underwent EBUS: A - EBUS TRANSBRONCHIAL FINE NEEDLE ASPIRATE, LYMPH NODE  - 4L             Negative for malignant cells.             Benign lymphoid sample (see comment).   B - EBUS TRANSBRONCHIAL FINE NEEDLE ASPIRATE, LYMPH NODE  - 10L             Negative for malignant cells.                   Benign lymphoid sample.  C - EBUS TRANSBRONCHIAL FINE NEEDLE ASPIRATE, LYMPH NODE  - STATION 7             Negative for malignant cells.                 Benign lymphoid sample.   -8/9/2023 PET scan: 3.0 x 2.1 cm left prevascular node with SUV 7.9; few additional prominent mediastinal nodes with low level uptake; left lower paratracheal node 0.8 cm with SUV 2.2; mild FDG uptake in left hilum SUV 3.1  -11/14/2023 chest CT: enlarged 2.2 cm prevascular lymph node not significantly changed  -she was told by her pulmonologist Dr Kang that she has cancer  -discussed her original path with her--while it was a small tumor and stage Ib, she did have a couple high risk features that would have warranted adjuvant chemotherapy followed by atezolizumab; also if she does have a recurrence that isn't amenable to surgery, she would also be a candidate for immunotherapy then KRAS inhibitor (FDA approved only in 2nd line setting)  -will discuss with  thoracic surgeon--will import all [facility] films to review; may need CT guided bx by IR of this prevascular node   -she had biopsy done on 12/13/2023--path confirmed poorly differentiated lung carcinoma, PD-L1 90%, KRAS G12C mutation  -she saw Dr Calixto on 12/28/2023--he advised surgery, provided that she can pass her PFTs  - on 1/30/2024 Dr Calixto took her to the OR for robotic assisted redo left mediastinal exploration, left anterior mediastinal mass resection, diaphragmatic pacer placement  -path showed poorly differentiated carcinoma with pleomorphic/spindle cell and clear cell features; 4 lymph nodes with no evidence of malignancy; sections show a poorly differentiated carcinoma with pleomorphic/spindle cell and clear cell features involving fibroadipose tissue with a large area of central necrosis; tumor cells are positive for AE1/AE3, CAM 5.2, CK7 and negative for TTF-1, p40.  -pt is most interested in doing whatever is necessary to reduce her risk for recurrence  -she and  state again that the [facility] oncologist told them that adjuvant therapy was not necessary after her first surgery, even though his note documented that he recommended it  -as her tumor has high PD-L1 expression, she is a candidate for pembrolizumab; if she relapses, she is also a candidate for KRAS G12C inhibitor  -has completed 6 doses of adjuvant pembrolizumab (out of 17)  -CT scan done on 6/19/2024 showed  mediastinum with subcarinal lymphadenopathy 10 mm in short axis, there is component of AP window lymphadenopathy 17 x 15 mm; mesentery demonstrates soft tissue lesion within midline lower abdominal mesentery 1.6 x 1.2 cm; pleural based nodular lesion in RLL posteriorly 8 mm with surrounding ground glass airspace infiltrate  -she was admitted recently for pneumonia--CT angio done on 7/23 showed no evidence of pulmonary embolus, multifocal airspace disease in the right lung suggesting pneumonia  -right after she was discharged  "from [facility], she then went back to the ER for hyperglycemia ()  -Dr Calixto has scheduled her next chest CT for 8/2024--pt is wondering if that is still necessary  -on 7/14/2024 she underwent an EBUS--lymph node level 4L was sampled--showed NSCLC, favor squamous cell carcinoma  -given that she is now confirmed to have new and/or persistent disease in her mediastinum, I have advised switching to new therapy; as her tumor has the KRAS G12C mutation, I have recommended switching to KRAS G12C inhibitor, namely adagrasib  -benefits, risks, potential morbidity related to adagrasib were reviewed with Thu and she signed informed consent to proceed  -she will take adagrasib (Krazati) 600 mg PO BID  -she also has severe body pains secondary to fibromyalgia--she  says in the hospital she was prescribed percocet PRN and asked if I could prescribe it; I did inform her that I will treat pain only secondary to cancer; she therefore was willing to be referred to pain management (Dr Melendrez)  -here for interval followup  -has been taking adagrasib 400 mg BID as adagrasib can be associated with leg edema/fluid retention, however dropping the dose down did not seem to help with reducing the leg edema; she also has been taking hydrochlorothiazide 12.5 mg daily without any effect; also reported \"not urinating enough\"--will check leg doppler to rule out DVT and also advised her to increase hydrochlorothiazide to 25 mg daily  -wants to vacation in [place] again--and will be going there for at least 2 weeks in October  -saw Dr Melendrez--she received a diagnostic medial nerve branch block targeting L3-L4--she said pain relief lasted only 4 days  -advised Thu to continue with  adagrasib to 400 mg BID  -will now schedule PET scan to be done in next couple weeks as restaging of her lung cancer  -here for interval followup via telephone  -leaving for [place] on Monday  -9/25/2024 doppler of legs showed no DVT in either " leg  -edema improved with doubling of hydrochlorothiazide  -PET scan done on 10/3/2024 reviewed--postsurgical changes within the right parietal bone compatible with craniectomy; postsurgical changes compatible with left upper lobectomy without focal hypermetabolic activity to suggest disease recurrence; interval resolution of multifocal ground glass and nodular opacities within right upper lobe; no hypermetabolic pulmonary lesions; no hypermetabolic mediastinal lymphadenopathy; prominent bilateral axillary lymph nodes with mild hypermetabolic activity up to 1.3 on right and 1.1 on left likely reactive; no focal hypermetabolic lesion to suggest osseous metastasis  -here for interval followup  -I did advise Thu to hold off on taking adagrasib for the 2 weeks she was in [place]--her leg edema completely decompressed and resolved  -as soon as she returned home to [place], she restarted the adagrasib 400 mg BID along with hydrochlorothiazide  -however now taking 400 mg in AM, 200 mg in PM  -has developed some light pitting edema in her legs (trace to 1+)  -here for interval followup via telephone  -labs done on 12/18/2024 included CBC + COMP, CEA  results reviewed--wbc 7.2, hgb 10.3, plt 259,000, potassium 3.6 creatinine 1.21, calcium 10.2, AST 20, ALT 13, CEA 2.9  -CT chest done on 12/18/2024 reviewed--there is no hilar or mediastinal lymphadenopathy; minimal left pleural effusion; 5 mm RLL nodule laterally; there is nearly complete clearing of the ground glass infiltrates within the right hemithorax; there are few small patchy foci of residual ground glass infiltrate within the superior segment right lower lobe and posterior segment right lower lobe  -Thu will continue to take adagrasib 400 mg in AM, 200 mg in PM  -will see her again in 2 months  -here for interval followup  -does not get out of the house-- does all the grocery shopping  -feels more depressed, cymbalta not helping--she will discuss with  "PCP  -having bilateral neck and shoulder pain--has been seeing pain management and started seeing neurologist--was told she had \"3 pinched nerves\"  -leg edema under control--takes the hydrochlorothiazide 2 days at a time as needed  -continues on adagrasib 400 mg in AM, 200 mg in PM  -also noted to be iron deficient in November--advised for her to start OTC iron supplementation every other day (as already constipated at baseline)  --she will have a new CT chest done just before next visit  -here for interval followup  -went to the ED on 2/27/2025--CT angio chest was done--no evidence of pulmonary embolism; no mediastinal or hilar lymphadenopathy; nonspecific small bilateral axillary lymph nodes similar to prior; changes of previous left upper lobectomy; right lower lobe basilar aspect round 5 mm pulmonary nodule is unchanged  -has had chronic back pain--sees pain management (Dr Melendrez)  -here for interval followup  -has no new complaints  -continues to take adagrasib 400 mg in AM, 200 mg in PM  -labs done on 5/13/2025  included CBC + COMP + CEA   -results reviewed--wbc 9.5, hgb 12.3, MCV 76, plt 287,000, creatinine 1.16, calcium 10.9, alk phos 90, AST 15, total bili 0.4, ALT 18, CEA 2.3  -advised her to continue taking the adagrasib  -will see her again in just over a month after next chest CT scan is done        2) COPD  -on albuterol  -on incruse ellipta     3) atrial fibrillation  -on amiodarone  -on warfarin     4) hypertension  -on norvasc  -on chlorthalidone  -on lasix  -on lisinopril  -on metoprolol     5) hyperlipidemia  -on atorvastatin     6) major depression  -on cymbalta     7) diabetes  -on novolog insulin  -on metformin        Problem List Items Addressed This Visit           ICD-10-CM    Malignant neoplasm of upper lobe of left lung (Multi) C34.12    Relevant Orders    Clinic Appointment Request Follow Up; [facility]; [id number]    CBC and Auto Differential    Comprehensive metabolic " panel    CEA    CT chest w IV contrast            Álvaro Galeano MD                              [1]   Family History  Problem Relation Name Age of Onset    Stroke Mother      Other (aortic valve disease) Mother      Heart disease Mother      Cancer Sister      Stroke Sister      Diabetes Sister      Diabetes Brother      Other (heart transplant) Brother

## 2025-05-18 PROBLEM — I50.32 CHRONIC DIASTOLIC (CONGESTIVE) HEART FAILURE: Status: ACTIVE | Noted: 2025-05-18

## 2025-05-18 PROBLEM — J96.21 ACUTE ON CHRONIC HYPOXIC RESPIRATORY FAILURE: Status: RESOLVED | Noted: 2024-07-30 | Resolved: 2025-05-18

## 2025-05-18 PROBLEM — I27.20 PULMONARY HYPERTENSION, UNSPECIFIED (MULTI): Status: ACTIVE | Noted: 2025-05-18

## 2025-05-18 ASSESSMENT — ENCOUNTER SYMPTOMS
RESPIRATORY NEGATIVE: 1
HEMATOLOGIC/LYMPHATIC NEGATIVE: 1
EYES NEGATIVE: 1
PSYCHIATRIC NEGATIVE: 1
NEUROLOGICAL NEGATIVE: 1
LEG SWELLING: 1
MUSCULOSKELETAL NEGATIVE: 1
ENDOCRINE NEGATIVE: 1
GASTROINTESTINAL NEGATIVE: 1
CONSTITUTIONAL NEGATIVE: 1

## 2025-05-19 ENCOUNTER — TELEPHONE (OUTPATIENT)
Dept: PAIN MEDICINE | Facility: CLINIC | Age: 70
End: 2025-05-19
Payer: MEDICARE

## 2025-05-19 NOTE — TELEPHONE ENCOUNTER
The pt called in to say she is feeling very good and states she has 80% relief in her neck. She is able to do more with much more comfort. Will call back if she needs another injection.

## 2025-05-20 ENCOUNTER — SPECIALTY PHARMACY (OUTPATIENT)
Dept: PHARMACY | Facility: CLINIC | Age: 70
End: 2025-05-20

## 2025-05-20 PROCEDURE — RXMED WILLOW AMBULATORY MEDICATION CHARGE

## 2025-05-27 ENCOUNTER — PHARMACY VISIT (OUTPATIENT)
Dept: PHARMACY | Facility: CLINIC | Age: 70
End: 2025-05-27
Payer: COMMERCIAL

## 2025-05-27 ENCOUNTER — APPOINTMENT (OUTPATIENT)
Dept: PAIN MEDICINE | Facility: CLINIC | Age: 70
End: 2025-05-27
Payer: MEDICARE

## 2025-06-18 ENCOUNTER — HOSPITAL ENCOUNTER (OUTPATIENT)
Dept: RADIOLOGY | Facility: HOSPITAL | Age: 70
Discharge: HOME | End: 2025-06-18
Payer: MEDICARE

## 2025-06-18 ENCOUNTER — LAB (OUTPATIENT)
Dept: LAB | Facility: CLINIC | Age: 70
End: 2025-06-18
Payer: MEDICARE

## 2025-06-18 DIAGNOSIS — E03.9 ACQUIRED HYPOTHYROIDISM: ICD-10-CM

## 2025-06-18 DIAGNOSIS — C34.12 MALIGNANT NEOPLASM OF UPPER LOBE OF LEFT LUNG (MULTI): ICD-10-CM

## 2025-06-18 LAB
ALBUMIN SERPL BCP-MCNC: 4.3 G/DL (ref 3.4–5)
ALP SERPL-CCNC: 100 U/L (ref 33–136)
ALT SERPL W P-5'-P-CCNC: 21 U/L (ref 7–45)
ANION GAP SERPL CALC-SCNC: 12 MMOL/L (ref 10–20)
AST SERPL W P-5'-P-CCNC: 21 U/L (ref 9–39)
BASOPHILS # BLD AUTO: 0.07 X10*3/UL (ref 0–0.1)
BASOPHILS NFR BLD AUTO: 1 %
BILIRUB SERPL-MCNC: 0.5 MG/DL (ref 0–1.2)
BUN SERPL-MCNC: 11 MG/DL (ref 6–23)
CALCIUM SERPL-MCNC: 9.7 MG/DL (ref 8.6–10.3)
CHLORIDE SERPL-SCNC: 103 MMOL/L (ref 98–107)
CO2 SERPL-SCNC: 30 MMOL/L (ref 21–32)
CREAT SERPL-MCNC: 0.89 MG/DL (ref 0.5–1.05)
EGFRCR SERPLBLD CKD-EPI 2021: 69 ML/MIN/1.73M*2
EOSINOPHIL # BLD AUTO: 0.53 X10*3/UL (ref 0–0.4)
EOSINOPHIL NFR BLD AUTO: 7.6 %
ERYTHROCYTE [DISTWIDTH] IN BLOOD BY AUTOMATED COUNT: 14.9 % (ref 11.5–14.5)
GLUCOSE SERPL-MCNC: 174 MG/DL (ref 74–99)
HCT VFR BLD AUTO: 39.3 % (ref 36–46)
HGB BLD-MCNC: 12.6 G/DL (ref 12–16)
IMM GRANULOCYTES # BLD AUTO: 0.01 X10*3/UL (ref 0–0.5)
IMM GRANULOCYTES NFR BLD AUTO: 0.1 % (ref 0–0.9)
LYMPHOCYTES # BLD AUTO: 1.58 X10*3/UL (ref 0.8–3)
LYMPHOCYTES NFR BLD AUTO: 22.6 %
MCH RBC QN AUTO: 24.7 PG (ref 26–34)
MCHC RBC AUTO-ENTMCNC: 32.1 G/DL (ref 32–36)
MCV RBC AUTO: 77 FL (ref 80–100)
MONOCYTES # BLD AUTO: 0.86 X10*3/UL (ref 0.05–0.8)
MONOCYTES NFR BLD AUTO: 12.3 %
NEUTROPHILS # BLD AUTO: 3.93 X10*3/UL (ref 1.6–5.5)
NEUTROPHILS NFR BLD AUTO: 56.4 %
PLATELET # BLD AUTO: 290 X10*3/UL (ref 150–450)
POTASSIUM SERPL-SCNC: 4.1 MMOL/L (ref 3.5–5.3)
PROT SERPL-MCNC: 7.2 G/DL (ref 6.4–8.2)
RBC # BLD AUTO: 5.11 X10*6/UL (ref 4–5.2)
SODIUM SERPL-SCNC: 141 MMOL/L (ref 136–145)
T4 FREE SERPL-MCNC: 1.4 NG/DL (ref 0.61–1.12)
TSH SERPL-ACNC: 0.22 MIU/L (ref 0.44–3.98)
WBC # BLD AUTO: 7 X10*3/UL (ref 4.4–11.3)

## 2025-06-18 PROCEDURE — 71260 CT THORAX DX C+: CPT

## 2025-06-18 PROCEDURE — 80053 COMPREHEN METABOLIC PANEL: CPT

## 2025-06-18 PROCEDURE — 2550000001 HC RX 255 CONTRASTS: Performed by: INTERNAL MEDICINE

## 2025-06-18 PROCEDURE — 85025 COMPLETE CBC W/AUTO DIFF WBC: CPT

## 2025-06-18 PROCEDURE — 82378 CARCINOEMBRYONIC ANTIGEN: CPT

## 2025-06-18 PROCEDURE — 36415 COLL VENOUS BLD VENIPUNCTURE: CPT

## 2025-06-18 PROCEDURE — 84443 ASSAY THYROID STIM HORMONE: CPT

## 2025-06-18 PROCEDURE — 84439 ASSAY OF FREE THYROXINE: CPT

## 2025-06-18 RX ADMIN — IOHEXOL 50 ML: 350 INJECTION, SOLUTION INTRAVENOUS at 08:06

## 2025-06-19 LAB — CEA SERPL-MCNC: 2.2 UG/L

## 2025-06-20 ENCOUNTER — OFFICE VISIT (OUTPATIENT)
Dept: HEMATOLOGY/ONCOLOGY | Facility: CLINIC | Age: 70
End: 2025-06-20
Payer: MEDICARE

## 2025-06-20 VITALS
SYSTOLIC BLOOD PRESSURE: 120 MMHG | RESPIRATION RATE: 16 BRPM | WEIGHT: 146.16 LBS | BODY MASS INDEX: 29.52 KG/M2 | DIASTOLIC BLOOD PRESSURE: 67 MMHG | TEMPERATURE: 97.2 F | OXYGEN SATURATION: 93 % | HEART RATE: 80 BPM

## 2025-06-20 DIAGNOSIS — E11.9 TYPE 2 DIABETES MELLITUS WITHOUT COMPLICATION, WITH LONG-TERM CURRENT USE OF INSULIN: ICD-10-CM

## 2025-06-20 DIAGNOSIS — J43.1 PANLOBULAR EMPHYSEMA (MULTI): ICD-10-CM

## 2025-06-20 DIAGNOSIS — C34.12 MALIGNANT NEOPLASM OF UPPER LOBE OF LEFT LUNG (MULTI): Primary | ICD-10-CM

## 2025-06-20 DIAGNOSIS — I10 PRIMARY HYPERTENSION: ICD-10-CM

## 2025-06-20 DIAGNOSIS — F33.41 RECURRENT MAJOR DEPRESSIVE DISORDER, IN PARTIAL REMISSION: ICD-10-CM

## 2025-06-20 DIAGNOSIS — E78.2 MIXED HYPERLIPIDEMIA: ICD-10-CM

## 2025-06-20 DIAGNOSIS — Z79.4 TYPE 2 DIABETES MELLITUS WITHOUT COMPLICATION, WITH LONG-TERM CURRENT USE OF INSULIN: ICD-10-CM

## 2025-06-20 DIAGNOSIS — I48.0 PAF (PAROXYSMAL ATRIAL FIBRILLATION) (MULTI): ICD-10-CM

## 2025-06-20 DIAGNOSIS — R60.0 BILATERAL LEG EDEMA: ICD-10-CM

## 2025-06-20 PROCEDURE — 3078F DIAST BP <80 MM HG: CPT | Performed by: INTERNAL MEDICINE

## 2025-06-20 PROCEDURE — 1160F RVW MEDS BY RX/DR IN RCRD: CPT | Performed by: INTERNAL MEDICINE

## 2025-06-20 PROCEDURE — G2211 COMPLEX E/M VISIT ADD ON: HCPCS | Performed by: INTERNAL MEDICINE

## 2025-06-20 PROCEDURE — 99214 OFFICE O/P EST MOD 30 MIN: CPT | Performed by: INTERNAL MEDICINE

## 2025-06-20 PROCEDURE — 1126F AMNT PAIN NOTED NONE PRSNT: CPT | Performed by: INTERNAL MEDICINE

## 2025-06-20 PROCEDURE — 1159F MED LIST DOCD IN RCRD: CPT | Performed by: INTERNAL MEDICINE

## 2025-06-20 PROCEDURE — 3074F SYST BP LT 130 MM HG: CPT | Performed by: INTERNAL MEDICINE

## 2025-06-20 RX ORDER — FUROSEMIDE 20 MG/1
20 TABLET ORAL DAILY
Qty: 30 TABLET | Refills: 2 | Status: SHIPPED | OUTPATIENT
Start: 2025-06-20

## 2025-06-20 ASSESSMENT — PAIN SCALES - GENERAL: PAINLEVEL_OUTOF10: 0-NO PAIN

## 2025-06-20 NOTE — PROGRESS NOTES
Patient ID: Thu Ortega is a 70 y.o. female.  Referring Physician: Álvaro Galeano MD  [address]  [place] [address]  Primary Care Provider: DEANNA Rodriguez  Visit Type: {Visit Type:71572}      Subjective    HPI    Review of Systems - Oncology     Objective   BSA: 1.66 meters squared  /67 (BP Location: Right arm, Patient Position: Sitting, BP Cuff Size: Adult)   Pulse 80   Temp 36.2 °C (97.2 °F) (Temporal)   Resp 16   Wt 66.3 kg (146 lb 2.6 oz)   SpO2 93%   BMI 29.52 kg/m²      has a past medical history of Adenocarcinoma of lung, left (Multi), Atrial fibrillation (Multi), COPD (chronic obstructive pulmonary disease) (Multi), Depression, DJD (degenerative joint disease), DM (diabetes mellitus) (Multi), Fibromyalgia, primary, GERD (gastroesophageal reflux disease), Hearing aid worn, History of blood transfusion, History of meningioma of the brain, HLD (hyperlipidemia), HTN (hypertension), antineoplastic chemo (january2023), Irregular heart beat, Irritable bowel syndrome, Malignant neoplasm of upper lobe of left lung (Multi), Nephrolithiasis, Panlobular emphysema (Multi), Shortness of breath, Spinal stenosis, and Urinary tract infection.   has a past surgical history that includes US guided needle liver biopsy (02/07/2020); Lung lobectomy; CT guided percutaneous biopsy lung (12/13/2023); CT guided percutaneous biopsy lung (12/15/2023); Brain surgery; Foot surgery; Knee surgery; Cataract extraction; Tubal ligation; and Breast biopsy (january2023).  Family History[1]  Oncology History   Malignant neoplasm of upper lobe of left lung (Multi)   12/3/2023 Initial Diagnosis    Malignant neoplasm of upper lobe of left lung (CMS/HCC)     2/28/2024 - 7/3/2024 Chemotherapy    Pembrolizumab, 21 Day Cycles     8/12/2024 -  Chemotherapy    Adagrasib, 28 Day Cycles     Local recurrence of left lung cancer (Multi)   1/4/2024 Initial Diagnosis    Local recurrence of left lung cancer  (CMS/HCC)     2/28/2024 - 7/3/2024 Chemotherapy    Pembrolizumab, 21 Day Cycles     8/12/2024 -  Chemotherapy    Adagrasib, 28 Day Cycles         Thu Ortega  reports that she quit smoking about 19 years ago. Her smoking use included cigarettes. She has never used smokeless tobacco.  She  reports that she does not currently use alcohol.  She  reports no history of drug use.    Physical Exam    WBC   Date/Time Value Ref Range Status   06/18/2025 08:32 AM 7.0 4.4 - 11.3 x10*3/uL Final   05/13/2025 01:25 PM 9.5 4.4 - 11.3 x10*3/uL Final   03/17/2025 04:16 PM 7.6 4.4 - 11.3 x10*3/uL Final     nRBC   Date Value Ref Range Status   02/27/2025 0.0 0.0 - 0.0 /100 WBCs Final   12/18/2024 0.0 0.0 - 0.0 /100 WBCs Final   07/30/2024 0.0 0.0 - 0.0 /100 WBCs Final     RBC   Date Value Ref Range Status   06/18/2025 5.11 4.00 - 5.20 x10*6/uL Final   05/13/2025 5.01 4.00 - 5.20 x10*6/uL Final   03/17/2025 4.42 4.00 - 5.20 x10*6/uL Final     Hemoglobin   Date Value Ref Range Status   06/18/2025 12.6 12.0 - 16.0 g/dL Final   05/13/2025 12.3 12.0 - 16.0 g/dL Final   03/17/2025 10.9 (L) 12.0 - 16.0 g/dL Final     Hematocrit   Date Value Ref Range Status   06/18/2025 39.3 36.0 - 46.0 % Final   05/13/2025 38.3 36.0 - 46.0 % Final   03/17/2025 34.0 (L) 36.0 - 46.0 % Final     MCV   Date/Time Value Ref Range Status   06/18/2025 08:32 AM 77 (L) 80 - 100 fL Final   05/13/2025 01:25 PM 76 (L) 80 - 100 fL Final   03/17/2025 04:16 PM 77 (L) 80 - 100 fL Final     MCH   Date/Time Value Ref Range Status   06/18/2025 08:32 AM 24.7 (L) 26.0 - 34.0 pg Final   05/13/2025 01:25 PM 24.6 (L) 26.0 - 34.0 pg Final   03/17/2025 04:16 PM 24.7 (L) 26.0 - 34.0 pg Final     MCHC   Date/Time Value Ref Range Status   06/18/2025 08:32 AM 32.1 32.0 - 36.0 g/dL Final   05/13/2025 01:25 PM 32.1 32.0 - 36.0 g/dL Final   03/17/2025 04:16 PM 32.1 32.0 - 36.0 g/dL Final     RDW   Date/Time Value Ref Range Status   06/18/2025 08:32 AM 14.9 (H) 11.5 - 14.5 % Final  "  05/13/2025 01:25 PM 15.4 (H) 11.5 - 14.5 % Final   03/17/2025 04:16 PM 15.7 (H) 11.5 - 14.5 % Final     Platelets   Date/Time Value Ref Range Status   06/18/2025 08:32  150 - 450 x10*3/uL Final   05/13/2025 01:25  150 - 450 x10*3/uL Final   03/17/2025 04:16  150 - 450 x10*3/uL Final     No results found for: \"MPV\"  Neutrophils %   Date/Time Value Ref Range Status   06/18/2025 08:32 AM 56.4 40.0 - 80.0 % Final   05/13/2025 01:25 PM 65.6 40.0 - 80.0 % Final   03/17/2025 04:16 PM 54.2 40.0 - 80.0 % Final     Immature Granulocytes %, Automated   Date/Time Value Ref Range Status   06/18/2025 08:32 AM 0.1 0.0 - 0.9 % Final     Comment:     Immature Granulocyte Count (IG) includes promyelocytes, myelocytes and metamyelocytes but does not include bands. Percent differential counts (%) should be interpreted in the context of the absolute cell counts (cells/UL).   05/13/2025 01:25 PM 0.7 0.0 - 0.9 % Final     Comment:     Immature Granulocyte Count (IG) includes promyelocytes, myelocytes and metamyelocytes but does not include bands. Percent differential counts (%) should be interpreted in the context of the absolute cell counts (cells/UL).   03/17/2025 04:16 PM 0.5 0.0 - 0.9 % Final     Comment:     Immature Granulocyte Count (IG) includes promyelocytes, myelocytes and metamyelocytes but does not include bands. Percent differential counts (%) should be interpreted in the context of the absolute cell counts (cells/UL).     Lymphocytes %, Manual   Date/Time Value Ref Range Status   07/30/2024 06:29 PM 9.0 13.0 - 44.0 % Final     Lymphocytes %   Date/Time Value Ref Range Status   06/18/2025 08:32 AM 22.6 13.0 - 44.0 % Final   05/13/2025 01:25 PM 20.3 13.0 - 44.0 % Final   03/17/2025 04:16 PM 22.6 13.0 - 44.0 % Final     Monocytes %, Manual   Date/Time Value Ref Range Status   07/30/2024 06:29 PM 5.0 2.0 - 10.0 % Final     Monocytes %   Date/Time Value Ref Range Status   06/18/2025 08:32 AM 12.3 2.0 - 10.0 % " Final   05/13/2025 01:25 PM 10.4 2.0 - 10.0 % Final   03/17/2025 04:16 PM 13.5 2.0 - 10.0 % Final     Eosinophils %, Manual   Date/Time Value Ref Range Status   07/30/2024 06:29 PM 2.0 0.0 - 6.0 % Final     Eosinophils %   Date/Time Value Ref Range Status   06/18/2025 08:32 AM 7.6 0.0 - 6.0 % Final   05/13/2025 01:25 PM 2.5 0.0 - 6.0 % Final   03/17/2025 04:16 PM 8.3 0.0 - 6.0 % Final     Basophils %, Manual   Date/Time Value Ref Range Status   07/30/2024 06:29 PM 0.0 0.0 - 2.0 % Final     Basophils %   Date/Time Value Ref Range Status   06/18/2025 08:32 AM 1.0 0.0 - 2.0 % Final   05/13/2025 01:25 PM 0.5 0.0 - 2.0 % Final   03/17/2025 04:16 PM 0.9 0.0 - 2.0 % Final     Neutrophils Absolute   Date/Time Value Ref Range Status   06/18/2025 08:32 AM 3.93 1.60 - 5.50 x10*3/uL Final     Comment:     Percent differential counts (%) should be interpreted in the context of the absolute cell counts (cells/uL).   05/13/2025 01:25 PM 6.24 (H) 1.60 - 5.50 x10*3/uL Final     Comment:     Percent differential counts (%) should be interpreted in the context of the absolute cell counts (cells/uL).   03/17/2025 04:16 PM 4.12 1.20 - 7.70 x10*3/uL Final     Comment:     Percent differential counts (%) should be interpreted in the context of the absolute cell counts (cells/uL).     Immature Granulocytes Absolute, Automated   Date/Time Value Ref Range Status   06/18/2025 08:32 AM 0.01 0.00 - 0.50 x10*3/uL Final   05/13/2025 01:25 PM 0.07 0.00 - 0.50 x10*3/uL Final   03/17/2025 04:16 PM 0.04 0.00 - 0.70 x10*3/uL Final     Lymphocytes Absolute   Date/Time Value Ref Range Status   06/18/2025 08:32 AM 1.58 0.80 - 3.00 x10*3/uL Final   05/13/2025 01:25 PM 1.93 0.80 - 3.00 x10*3/uL Final   03/17/2025 04:16 PM 1.72 1.20 - 4.80 x10*3/uL Final     Monocytes Absolute   Date/Time Value Ref Range Status   06/18/2025 08:32 AM 0.86 (H) 0.05 - 0.80 x10*3/uL Final   05/13/2025 01:25 PM 0.99 (H) 0.05 - 0.80 x10*3/uL Final   03/17/2025 04:16 PM 1.03 (H)  "0.10 - 1.00 x10*3/uL Final     Eosinophils Absolute   Date/Time Value Ref Range Status   06/18/2025 08:32 AM 0.53 (H) 0.00 - 0.40 x10*3/uL Final   05/13/2025 01:25 PM 0.24 0.00 - 0.40 x10*3/uL Final   03/17/2025 04:16 PM 0.63 0.00 - 0.70 x10*3/uL Final     Eosinophils Absolute, Manual   Date/Time Value Ref Range Status   07/30/2024 06:29 PM 0.20 0.00 - 0.70 x10*3/uL Final     Basophils Absolute   Date/Time Value Ref Range Status   06/18/2025 08:32 AM 0.07 0.00 - 0.10 x10*3/uL Final   05/13/2025 01:25 PM 0.05 0.00 - 0.10 x10*3/uL Final   03/17/2025 04:16 PM 0.07 0.00 - 0.10 x10*3/uL Final     Basophils Absolute, Manual   Date/Time Value Ref Range Status   07/30/2024 06:29 PM 0.00 0.00 - 0.10 x10*3/uL Final       No components found for: \"PT\"  aPTT   Date/Time Value Ref Range Status   01/12/2024 01:46 PM 31 27 - 38 seconds Final       Assessment/Plan         {Assess/PlanSmartLinks:72957}         Álvaro Galeano MD                              [1]   Family History  Problem Relation Name Age of Onset    Stroke Mother      Other (aortic valve disease) Mother      Heart disease Mother      Cancer Sister      Stroke Sister      Diabetes Sister      Diabetes Brother      Other (heart transplant) Brother       " "0.10 x10*3/uL Final   05/13/2025 01:25 PM 0.05 0.00 - 0.10 x10*3/uL Final   03/17/2025 04:16 PM 0.07 0.00 - 0.10 x10*3/uL Final     Basophils Absolute, Manual   Date/Time Value Ref Range Status   07/30/2024 06:29 PM 0.00 0.00 - 0.10 x10*3/uL Final       No components found for: \"PT\"  aPTT   Date/Time Value Ref Range Status   01/12/2024 01:46 PM 31 27 - 38 seconds Final     Medication Documentation Review Audit       Reviewed by Rayna Adams MA (Medical Assistant) on 06/20/25 at 0845      Medication Order Taking? Sig Documenting Provider Last Dose Status   acetaminophen (Tylenol) 325 mg tablet [id number] Yes Take 2 tablets (650 mg) by mouth every 4 hours if needed for mild pain (1 - 3) or fever (temp greater than 38.0 C). Sunshine Hankins PA-C  Active   adagrasib (Krazati) 200 mg tablet [id number] Yes Take 2 tablets (400 mg) by mouth in the morning and take 1 tablet (200 mg) by mouth in the evening. Álvaro Galeano MD  Active   albuterol 90 mcg/actuation inhaler [id number] Yes Inhale 2 puffs every 6 hours if needed for wheezing. Historical Provider, MD  Active   amLODIPine (Norvasc) 10 mg tablet [id number] Yes Take 1 tablet (10 mg) by mouth once daily in the morning. Take before meals. Historical Provider, MD  Active   aspirin 81 mg chewable tablet [id number] Yes Chew 1 tablet (81 mg) once daily. Historical Provider, MD  Active   atorvastatin (Lipitor) 10 mg tablet [id number] Yes Take 1 tablet (10 mg) by mouth once daily.   Patient taking differently: Take 4 tablets (40 mg) by mouth once daily.    Historical Provider, MD  Active   biotin 10 mg tablet [id number] Yes Take 1 tablet (10 mg) by mouth once daily. Historical Provider, MD  Active   celecoxib (CeleBREX) 100 mg capsule [id number] Yes Take 1 capsule (100 mg) by mouth 2 times a day. Historical Provider, MD  Active   cholecalciferol (Vitamin D3) 5,000 Units tablet [id number] Yes Take 1 tablet (5,000 Units) by mouth once daily. Historical Provider, MD  Active "   DULoxetine (Cymbalta) 60 mg DR capsule [id number] Yes Take 1 capsule (60 mg) by mouth once daily. Do not crush or chew.   Patient taking differently: Take 30 mg by mouth once daily. Do not crush or chew.    Historical Provider, MD  Active   glipiZIDE (Glucotrol) 5 mg tablet [id number]  Take 1 tablet (5 mg) by mouth 1 time for 1 dose.   Patient not taking: Reported on 2025    Aman Guthrie DO   24 2359   hydroCHLOROthiazide (HYDRODiuril) 25 mg tablet [id number] Yes Take 1 tablet (25 mg) by mouth once daily. Álvaro Galeano MD  Active   insulin aspart (NovoLOG PenFill U-100 Insulin) 100 unit/mL pen cartridge [id number]  Inject 15 Units under the skin 3 times daily (morning, midday, late afternoon). Take as directed per insulin instructions. Mild insulin sliding scale Yanni Smalls DO   25 235   levothyroxine (Synthroid, Levoxyl) 25 mcg tablet [id number] Yes Take 1 tablet (25 mcg) by mouth early in the morning..   Patient taking differently: Take 7 tablets (175 mcg) by mouth early in the morning..    Historical Provider, MD  Active   metFORMIN (Glucophage) 1,000 mg tablet [id number] Yes Take 1 tablet (1,000 mg) by mouth 2 times a day. Historical Provider, MD  Active   methocarbamol (Robaxin) 500 mg tablet [id number]  Take 1 tablet (500 mg) by mouth 3 times a day as needed for muscle spasms for up to 10 days. ANAYA Kelsey-CNP   25 2359   ondansetron (Zofran) 4 mg tablet [id number] Yes Take 1 tablet (4 mg) by mouth every 8 hours if needed for nausea or vomiting. DEANNA Weber  Active   oxygen (O2) gas therapy [id number] Yes Inhale 1 each once every 24 hours. Yanni Smalls DO  Active   pantoprazole (ProtoNix) 40 mg EC tablet [id number] Yes Take 1 tablet (40 mg) by mouth once daily in the morning. Take before meals. Historical Provider, MD  Active   pregabalin (Lyrica) 25 mg capsule [id number] Yes Take 1 capsule (25 mg) by mouth 2 times a day. Historical  Provider, MD  Active   prochlorperazine (Compazine) 10 mg tablet [id number] Yes Take 1 tablet (10 mg) by mouth every 6 hours if needed for nausea or vomiting. Álvaro Galeano MD  Active   tiotropium (Spiriva) 18 mcg inhalation capsule [id number] Yes Place 1 capsule (18 mcg) into inhaler and inhale once daily. Historical Provider, MD  Active   traMADol ER (Ultram-ER) 100 mg 24 hr tablet [id number] Yes Take by mouth. Do not crush, chew, or split. Historical Provider, MD  Active   traZODone (Desyrel) 50 mg tablet [id number] Yes Take 1 tablet (50 mg) by mouth once daily at bedtime. Historical Provider, MD  Active   Wixela Inhub 250-50 mcg/dose diskus inhaler [id number] Yes Inhale 1 puff 2 times a day. Historical Provider, MD  Active   zolpidem CR (Ambien CR) 6.25 mg ER tablet [id number] Yes Take 10 mg by mouth as needed at bedtime for sleep. Do not crush, chew, or split.   Patient taking differently: Take 5 mg by mouth as needed at bedtime for sleep. Do not crush, chew, or split.    Historical Provider, MD  Active                   Assessment/Plan    1) lung cancer  -12/8/2022 chest CT: NICHOL anterior segment 1.6 x 2.2 cm nodule, partial pleural attachment, nonspecific low volume paratracheal mediastinal LN largest near AP window 1.0 x 1.4 cm, right subcarinal space 8 x 14 mm  -12/12/2022 PET: NICHOL 1.8 x 2.3 cm mass with SUV 5.8, lateral margin abuts pleura; no significant mediastinal or hilar hypermetabolic lymphadenopathy  -12/28/2022 chest CT: 2.5 cm cavitary nodule in NICHOL  -1/7/2023 PET scan  -had surgery for stage I NSCLC, s/p lobectomy (Dr Fagan, 1/31/2023)  -2/1/2023 CT chest: no PE, postop changes in left chest, small left PTX in left upper anterior chest  -2/26/2023 chest CT no PE, continued mildly enlarged mediastinal lymph nodes  -saw Dr Solis at [facility] on 3/13/2023 - she had a [id number] (stage Ib) lung adenocarcinoma (poor NCCN risk factors - G3, + visceral involvement), s/p left robotic assisted anatomic upper lobectomy,  PD-L1 90%, +[id number]  -per his charting, he recommended either adjuvant cisplatin + pemetrexed x 4 cycles vs surveillance  -according to  Thu, Dr Solis never told her about her high risk features nor any adjuvant option, and that the only thing he recommended was observation  -7/11/2023 CT chest : stable postsurgical changes of prior left thoracotomy and left upper lobectomy with interval resolution of bilateral pleural effusions; interval progression of lymphadenopathy in the chest concerning for progressing ghazala metastatic disease     7/25/2023 underwent EBUS: A - EBUS TRANSBRONCHIAL FINE NEEDLE ASPIRATE, LYMPH NODE  - 4L             Negative for malignant cells.             Benign lymphoid sample (see comment).   B - EBUS TRANSBRONCHIAL FINE NEEDLE ASPIRATE, LYMPH NODE  - 10L             Negative for malignant cells.                   Benign lymphoid sample.  C - EBUS TRANSBRONCHIAL FINE NEEDLE ASPIRATE, LYMPH NODE  - STATION 7             Negative for malignant cells.                 Benign lymphoid sample.   -8/9/2023 PET scan: 3.0 x 2.1 cm left prevascular node with SUV 7.9; few additional prominent mediastinal nodes with low level uptake; left lower paratracheal node 0.8 cm with SUV 2.2; mild FDG uptake in left hilum SUV 3.1  -11/14/2023 chest CT: enlarged 2.2 cm prevascular lymph node not significantly changed  -she was told by her pulmonologist Dr Kang that she has cancer  -discussed her original path with her--while it was a small tumor and stage Ib, she did have a couple high risk features that would have warranted adjuvant chemotherapy followed by atezolizumab; also if she does have a recurrence that isn't amenable to surgery, she would also be a candidate for immunotherapy then KRAS inhibitor (FDA approved only in 2nd line setting)  -will discuss with thoracic surgeon--will import all [facility] films to review; may need CT guided bx by IR of this prevascular node   -she had biopsy done on 12/13/2023--path  confirmed poorly differentiated lung carcinoma, PD-L1 90%, KRAS G12C mutation  -she saw Dr Calixto on 12/28/2023--he advised surgery, provided that she can pass her PFTs  - on 1/30/2024 Dr Calixto took her to the OR for robotic assisted redo left mediastinal exploration, left anterior mediastinal mass resection, diaphragmatic pacer placement  -path showed poorly differentiated carcinoma with pleomorphic/spindle cell and clear cell features; 4 lymph nodes with no evidence of malignancy; sections show a poorly differentiated carcinoma with pleomorphic/spindle cell and clear cell features involving fibroadipose tissue with a large area of central necrosis; tumor cells are positive for AE1/AE3, CAM 5.2, CK7 and negative for TTF-1, p40.  -pt is most interested in doing whatever is necessary to reduce her risk for recurrence  -she and  state again that the [facility] oncologist told them that adjuvant therapy was not necessary after her first surgery, even though his note documented that he recommended it  -as her tumor has high PD-L1 expression, she is a candidate for pembrolizumab; if she relapses, she is also a candidate for KRAS G12C inhibitor  -has completed 6 doses of adjuvant pembrolizumab (out of 17)  -CT scan done on 6/19/2024 showed  mediastinum with subcarinal lymphadenopathy 10 mm in short axis, there is component of AP window lymphadenopathy 17 x 15 mm; mesentery demonstrates soft tissue lesion within midline lower abdominal mesentery 1.6 x 1.2 cm; pleural based nodular lesion in RLL posteriorly 8 mm with surrounding ground glass airspace infiltrate  -she was admitted recently for pneumonia--CT angio done on 7/23 showed no evidence of pulmonary embolus, multifocal airspace disease in the right lung suggesting pneumonia  -right after she was discharged from [place], she then went back to the ER for hyperglycemia ()  -Dr Calixto has scheduled her next chest CT for 8/2024--pt is wondering if that is  "still necessary  -on 7/14/2024 she underwent an EBUS--lymph node level 4L was sampled--showed NSCLC, favor squamous cell carcinoma  -given that she is now confirmed to have new and/or persistent disease in her mediastinum, I have advised switching to new therapy; as her tumor has the KRAS G12C mutation, I have recommended switching to KRAS G12C inhibitor, namely adagrasib  -benefits, risks, potential morbidity related to adagrasib were reviewed with Thu and she signed informed consent to proceed  -she will take adagrasib (Krazati) 600 mg PO BID  -she also has severe body pains secondary to fibromyalgia--she  says in the hospital she was prescribed percocet PRN and asked if I could prescribe it; I did inform her that I will treat pain only secondary to cancer; she therefore was willing to be referred to pain management (Dr Melendrez)  -here for interval followup  -has been taking adagrasib 400 mg BID as adagrasib can be associated with leg edema/fluid retention, however dropping the dose down did not seem to help with reducing the leg edema; she also has been taking hydrochlorothiazide 12.5 mg daily without any effect; also reported \"not urinating enough\"--will check leg doppler to rule out DVT and also advised her to increase hydrochlorothiazide to 25 mg daily  -wants to vacation in [place] again--and will be going there for at least 2 weeks in October  -saw Dr Melendrez--she received a diagnostic medial nerve branch block targeting L3-L4--she said pain relief lasted only 4 days  -advised Thu to continue with  adagrasib to 400 mg BID  -will now schedule PET scan to be done in next couple weeks as restaging of her lung cancer  -here for interval followup via telephone  -leaving for [place] on Monday  -9/25/2024 doppler of legs showed no DVT in either leg  -edema improved with doubling of hydrochlorothiazide  -PET scan done on 10/3/2024 reviewed--postsurgical changes within the right parietal bone " "compatible with craniectomy; postsurgical changes compatible with left upper lobectomy without focal hypermetabolic activity to suggest disease recurrence; interval resolution of multifocal ground glass and nodular opacities within right upper lobe; no hypermetabolic pulmonary lesions; no hypermetabolic mediastinal lymphadenopathy; prominent bilateral axillary lymph nodes with mild hypermetabolic activity up to 1.3 on right and 1.1 on left likely reactive; no focal hypermetabolic lesion to suggest osseous metastasis  -here for interval followup  -I did advise Thu to hold off on taking adagrasib for the 2 weeks she was in [place]--her leg edema completely decompressed and resolved  -as soon as she returned home to [place], she restarted the adagrasib 400 mg BID along with hydrochlorothiazide  -however now taking 400 mg in AM, 200 mg in PM  -has developed some light pitting edema in her legs (trace to 1+)  -here for interval followup via telephone  -labs done on 12/18/2024 included CBC + COMP, CEA  results reviewed--wbc 7.2, hgb 10.3, plt 259,000, potassium 3.6 creatinine 1.21, calcium 10.2, AST 20, ALT 13, CEA 2.9  -CT chest done on 12/18/2024 reviewed--there is no hilar or mediastinal lymphadenopathy; minimal left pleural effusion; 5 mm RLL nodule laterally; there is nearly complete clearing of the ground glass infiltrates within the right hemithorax; there are few small patchy foci of residual ground glass infiltrate within the superior segment right lower lobe and posterior segment right lower lobe  -Thu will continue to take adagrasib 400 mg in AM, 200 mg in PM  -will see her again in 2 months  -here for interval followup  -does not get out of the house-- does all the grocery shopping  -feels more depressed, cymbalta not helping--she will discuss with PCP  -having bilateral neck and shoulder pain--has been seeing pain management and started seeing neurologist--was told she had \"3 pinched " "nerves\"  -leg edema under control--takes the hydrochlorothiazide 2 days at a time as needed  -continues on adagrasib 400 mg in AM, 200 mg in PM  -also noted to be iron deficient in November--advised for her to start OTC iron supplementation every other day (as already constipated at baseline)  --she will have a new CT chest done just before next visit  -here for interval followup  -went to the ED on 2/27/2025--CT angio chest was done--no evidence of pulmonary embolism; no mediastinal or hilar lymphadenopathy; nonspecific small bilateral axillary lymph nodes similar to prior; changes of previous left upper lobectomy; right lower lobe basilar aspect round 5 mm pulmonary nodule is unchanged  -has had chronic back pain--sees pain management (Dr Melendrez)  -here for interval followup  -leg edema/fluid retention secondary to adagrasib not as responsive to hydrochlorothiazide, so Thu is asking for \"stronger\" diuretic--will try furosemide  -continues to take adagrasib 400 mg in AM, 200 mg in PM  -labs done on 6/18/2025  included CBC + COMP + CEA   -results reviewed--wbc 7.0, hgb 12.6, MCV 77, plt 290,000, creatinine 0.89, calcium 9.7, alk phos 100, AST 21, total bili 0.5, ALT 21, CEA 2.2  -CT chest done on 6/18/2025 reviewed--along right lateral aspect of distal trachea there is a mucosal based density 4. X 5 mm similar to prior; left upper lobe is surgically absent; 5 mm solid nodule posterolateral sulcal portion of right lower lobe; patchy areas of ground glass infiltrate are present in right lower lobe most likely related to pneumonia; 2 cm lymph node between esophagus and left pulmonary artery has enlarged  -advised her to continue taking the adagrasib  -will see her again in 2 months  -next chest CT to be done in 4 months        2) COPD  -on albuterol  -on incruse ellipta     3) atrial fibrillation  -on amiodarone  -on warfarin     4) hypertension  -on norvasc  -on chlorthalidone  -on lasix  -on lisinopril  -on " metoprolol     5) hyperlipidemia  -on atorvastatin     6) major depression  -on cymbalta     7) diabetes  -on novolog insulin  -on metformin        Problem List Items Addressed This Visit           ICD-10-CM    Malignant neoplasm of upper lobe of left lung (Multi) C34.12    Relevant Orders    Clinic Appointment Request Follow Up; ÁLVARO GALEANO; [facility]    CBC and Auto Differential    Comprehensive metabolic panel    [facility]            Álvaro Galeano MD                                [1]   Family History  Problem Relation Name Age of Onset    Stroke Mother      Other (aortic valve disease) Mother      Heart disease Mother      Cancer Sister      Stroke Sister      Diabetes Sister      Diabetes Brother      Other (heart transplant) Brother

## 2025-06-27 ENCOUNTER — SPECIALTY PHARMACY (OUTPATIENT)
Dept: PHARMACY | Facility: CLINIC | Age: 70
End: 2025-06-27

## 2025-06-27 PROCEDURE — RXMED WILLOW AMBULATORY MEDICATION CHARGE

## 2025-06-30 ENCOUNTER — PHARMACY VISIT (OUTPATIENT)
Dept: PHARMACY | Facility: CLINIC | Age: 70
End: 2025-06-30
Payer: COMMERCIAL

## 2025-07-02 ASSESSMENT — ENCOUNTER SYMPTOMS
MUSCULOSKELETAL NEGATIVE: 1
GASTROINTESTINAL NEGATIVE: 1
PSYCHIATRIC NEGATIVE: 1
ENDOCRINE NEGATIVE: 1
SHORTNESS OF BREATH: 1
NEUROLOGICAL NEGATIVE: 1
HEMATOLOGIC/LYMPHATIC NEGATIVE: 1
CONSTITUTIONAL NEGATIVE: 1
EYES NEGATIVE: 1
LEG SWELLING: 1

## 2025-07-03 DIAGNOSIS — E11.65 UNCONTROLLED TYPE 2 DIABETES MELLITUS WITH HYPERGLYCEMIA (HCC): ICD-10-CM

## 2025-07-14 ENCOUNTER — APPOINTMENT (OUTPATIENT)
Dept: CARDIOLOGY | Facility: CLINIC | Age: 70
End: 2025-07-14
Payer: MEDICARE

## 2025-07-17 ENCOUNTER — DOCUMENTATION (OUTPATIENT)
Dept: CARDIOLOGY | Facility: CLINIC | Age: 70
End: 2025-07-17

## 2025-07-17 ENCOUNTER — APPOINTMENT (OUTPATIENT)
Dept: CARDIOLOGY | Facility: CLINIC | Age: 70
End: 2025-07-17
Payer: MEDICARE

## 2025-07-17 ENCOUNTER — TELEPHONE (OUTPATIENT)
Dept: CARDIOLOGY | Facility: CLINIC | Age: 70
End: 2025-07-17

## 2025-07-17 VITALS
OXYGEN SATURATION: 96 % | HEART RATE: 84 BPM | SYSTOLIC BLOOD PRESSURE: 114 MMHG | DIASTOLIC BLOOD PRESSURE: 60 MMHG | BODY MASS INDEX: 29.61 KG/M2 | WEIGHT: 146.6 LBS

## 2025-07-17 DIAGNOSIS — I48.0 PAF (PAROXYSMAL ATRIAL FIBRILLATION) (MULTI): Primary | ICD-10-CM

## 2025-07-17 DIAGNOSIS — I10 PRIMARY HYPERTENSION: ICD-10-CM

## 2025-07-17 PROCEDURE — 93005 ELECTROCARDIOGRAM TRACING: CPT | Performed by: INTERNAL MEDICINE

## 2025-07-17 PROCEDURE — 99214 OFFICE O/P EST MOD 30 MIN: CPT | Performed by: INTERNAL MEDICINE

## 2025-07-17 PROCEDURE — 99212 OFFICE O/P EST SF 10 MIN: CPT | Mod: 25

## 2025-07-17 PROCEDURE — 93010 ELECTROCARDIOGRAM REPORT: CPT | Performed by: INTERNAL MEDICINE

## 2025-07-17 PROCEDURE — 3078F DIAST BP <80 MM HG: CPT | Performed by: INTERNAL MEDICINE

## 2025-07-17 PROCEDURE — 3074F SYST BP LT 130 MM HG: CPT | Performed by: INTERNAL MEDICINE

## 2025-07-17 PROCEDURE — 1159F MED LIST DOCD IN RCRD: CPT | Performed by: INTERNAL MEDICINE

## 2025-07-17 RX ORDER — DAPAGLIFLOZIN 10 MG/1
10 TABLET, FILM COATED ORAL DAILY
Qty: 30 TABLET | Refills: 11 | Status: SHIPPED | OUTPATIENT
Start: 2025-07-17 | End: 2026-07-17

## 2025-07-17 RX ORDER — FLUTICASONE FUROATE, UMECLIDINIUM BROMIDE AND VILANTEROL TRIFENATATE 200; 62.5; 25 UG/1; UG/1; UG/1
POWDER RESPIRATORY (INHALATION)
COMMUNITY
Start: 2025-07-15

## 2025-07-17 NOTE — PROGRESS NOTES
Name : Thu Ortega   : 1955   MRN : [id number]   ENC Date : 2025      Assessment and Plan:  Paroxysmal atrial fibrillation: Patient remains in normal sinus rhythm.  Okay to remain off anticoagulation.  Chronic HFpEF: Lower extremity edema may be a side effect of her Krazati.  Apparently this can occur in approximately 30% of patients.  She does have risk factors for chronic HFpEF including hypertension and diabetes.  I would recommend adding SGLT-2 inhibitor to her medical regimen.  This would give us better long-term control of her lower extremity edema and decrease likelihood of heart failure exacerbation/hospitalization.  Okay to continue furosemide for now.  Will start Farxiga 10 mg daily.  Hypertension: Blood pressure is well-controlled.  Recommend no changes.  Some of her lower extremity edema could be a side effect of the amlodipine but she has I was above other reasons for having lower extremity edema.  Disp: Phone follow-up with symptoms and response to Farxiga.  Otherwise can RTO in 1 year    HPI:  Patient returns today doing reasonably well.  Apparently she is gotten quite a bit of a good response to her oncology lung cancer treatment.  She continues to use Karzati and over the last year she has developed some lower extremity edema.  Apparently this is a fairly common side effect with this medication and in discussion with cardio oncology team can happen in 30 to 40% of patients.  Patient denies any orthopnea nor PND.  No chest pain.  No palpitations.  She otherwise feels well    Problem list overview: Problem List[1]    Meds: Medications Ordered Prior to Encounter[2]     VS:  /60 (BP Location: Left arm, Patient Position: Sitting)   Pulse 84   Wt 66.5 kg (146 lb 9.6 oz)   SpO2 96%   BMI 29.61 kg/m²     Vitals reviewed.   Neck:      Vascular: No JVD.   Pulmonary:      Effort: Pulmonary effort is normal.      Breath sounds: Normal breath sounds.   Cardiovascular:      Normal rate.  Regular rhythm.      Murmurs: There is no murmur.      No gallop.    Pulses:     Intact distal pulses.   Edema:     Peripheral edema present.     Pretibial: bilateral 1+ edema of the pretibial area.     Ankle: bilateral 1+ edema of the ankle.     Feet: bilateral 1+ edema of the feet.  Abdominal:      General: Abdomen is flat.      Palpations: Abdomen is soft.   Neurological:      General: No focal deficit present.      Mental Status: Alert.   Psychiatric:         Mood and Affect: Mood normal.       ECG: No results found for this or any previous visit.   ECHO:    CT Results:  CT angio chest for pulmonary embolism 07/23/2024    Narrative  STUDY:  CT Angiogram of the Chest; 07/23/2024 4:50 AM  INDICATION:  Hypoxia, shortness of breath.  Lung cancer with recent recurrence on  immunotherapy.  COMPARISON:  XR chest 07/23/2024.  CT chest 11/14/2023, 07/11/2023.  ACCESSION NUMBER(S):  [id number]  ORDERING CLINICIAN:  NEHA SIMONS  TECHNIQUE:  CTA of the chest was performed with intravenous contrast.  Images are reviewed and processed at a workstation according to the CT  angiogram protocol with 3-D and/or MIP post processing imaging  generated.  Omnipaque 350 75 mL was administered intravenously.  Automated mA/kV exposure control was utilized and patient examination  was performed in strict accordance with principles of ALARA.  FINDINGS:  Pulmonary arteries are adequately opacified without acute or chronic  filling defects.  The thoracic aorta is normal in course and caliber  without dissection or aneurysm.  Mild calcified plaque is seen in the  thoracic aorta.  Left vertebral artery originates directly from the  aortic arch, a normal variant.  The heart is normal in size with a small pericardial effusion.  Thoracic lymph nodes are not enlarged.  There is no pleural effusion, pleural thickening, or pneumothorax.  The airways are patent.  Lungs are adequately expanded with patchy areas of airspace disease  throughout the  right lung.  The left lung is relatively clear although  there is mild volume loss of the left lung compared to the right.  No  interstitial lung disease is identified.  No suspicious pulmonary  nodules are appreciated.  Upper abdomen demonstrates no acute pathology.  There are no acute fractures.  No suspicious bony lesions.  Mild to  moderate multilevel disc space narrowing is seen in the mid and lower  thoracic spine with osteophytes at multiple levels.  There is a  minimal dextroconvex curvature of the thoracic spine.  Healed fracture  deformity is noted of the posterolateral aspect of the left seventh  rib along with subtle healed fracture deformity of the posterolateral  aspect of the left eighth rib.    Impression  Multifocal airspace disease in the right lung suggesting developing  pneumonia in the appropriate clinical setting.  No definite pulmonary embolus identified.  Signed by Chirag Fox MD       [1]   Patient Active Problem List  Diagnosis    Malignant neoplasm of upper lobe of left lung (Multi)    Panlobular emphysema (Multi)    Primary hypertension    Mixed hyperlipidemia    Recurrent major depressive disorder, in partial remission    Type 2 diabetes mellitus without complication, with long-term current use of insulin    PAF (paroxysmal atrial fibrillation) (Multi)    Local recurrence of left lung cancer (Multi)    Non-small cell lung cancer without metastasis (Multi)    CHEY (obstructive sleep apnea)    Chronic obstructive pulmonary disease (Multi)    Non-small cell cancer of left lung (Multi)    History of meningioma of the brain    Mediastinal adenopathy    Dyspnea on exertion    History of home oxygen therapy    Pneumonia due to infectious organism    Fibromyalgia    Bilateral leg edema    Iron deficiency    Chronic upper back pain    Bilateral shoulder pain    Acquired hypothyroidism    Chronic diastolic (congestive) heart failure    Pulmonary hypertension, unspecified  (Multi)   [2]   Current Outpatient Medications on File Prior to Visit   Medication Sig Dispense Refill    acetaminophen (Tylenol) 325 mg tablet Take 2 tablets (650 mg) by mouth every 4 hours if needed for mild pain (1 - 3) or fever (temp greater than 38.0 C). 30 tablet 0    adagrasib (Krazati) 200 mg tablet Take 2 tablets (400 mg) by mouth in the morning and take 1 tablet (200 mg) by mouth in the evening. (Patient taking differently: Take 1 tablets (200 mg) by mouth in the morning and take 2 tablets (400 mg) by mouth in the evening.) 90 tablet 3    albuterol 90 mcg/actuation inhaler Inhale 2 puffs every 6 hours if needed for wheezing.      amLODIPine (Norvasc) 10 mg tablet Take 1 tablet (10 mg) by mouth once daily in the morning. Take before meals.      aspirin 81 mg chewable tablet Chew and swallow 1 tablet (81 mg) once daily.      atorvastatin (Lipitor) 10 mg tablet Take 1 tablet (10 mg) by mouth once daily.      DULoxetine (Cymbalta) 60 mg DR capsule Take 1 capsule (60 mg) by mouth once daily. Do not crush or chew. (Patient taking differently: Take 30 mg by mouth once daily. Do not crush or chew.)      furosemide (Lasix) 20 mg tablet Take 1 tablet (20 mg) by mouth once daily. 30 tablet 2    insulin NPH and regular human (HumuLIN 70-30, NovoLIN 70-30) 100 unit/mL (70-30) injection INJECT 70 UNITS AM 30 UNITS LUNCH AND 60-70 UNITS DINNER      levothyroxine (Synthroid, Levoxyl) 25 mcg tablet Take 1 tablet (25 mcg) by mouth early in the morning..      metFORMIN (Glucophage) 1,000 mg tablet Take 1 tablet (1,000 mg) by mouth 2 times a day.      ondansetron (Zofran) 4 mg tablet Take 1 tablet (4 mg) by mouth every 8 hours if needed for nausea or vomiting. 60 tablet 2    oxygen (O2) gas therapy Inhale 1 each once every 24 hours.      pantoprazole (ProtoNix) 40 mg EC tablet Take 1 tablet (40 mg) by mouth once daily in the morning. Take before meals.      prochlorperazine (Compazine) 10 mg tablet Take 1 tablet (10 mg) by  mouth every 6 hours if needed for nausea or vomiting. 30 tablet 5    Trelegy Ellipta 200-62.5-25 mcg blister with device       zolpidem CR (Ambien CR) 6.25 mg ER tablet Take 10 mg by mouth as needed at bedtime for sleep. Do not crush, chew, or split.      biotin 10 mg tablet Take 1 tablet (10 mg) by mouth once daily. (Patient not taking: Reported on 7/17/2025)      celecoxib (CeleBREX) 100 mg capsule Take 1 capsule (100 mg) by mouth 2 times a day. (Patient not taking: Reported on 7/17/2025)      cholecalciferol (Vitamin D3) 5,000 Units tablet Take 1 tablet (5,000 Units) by mouth once daily. (Patient not taking: Reported on 7/17/2025)      glipiZIDE (Glucotrol) 5 mg tablet Take 1 tablet (5 mg) by mouth 1 time for 1 dose. (Patient not taking: Reported on 7/17/2025) 30 tablet 0    insulin aspart (NovoLOG PenFill U-100 Insulin) 100 unit/mL pen cartridge Inject 15 Units under the skin 3 times daily (morning, midday, late afternoon). Take as directed per insulin instructions. Mild insulin sliding scale (Patient not taking: Reported on 7/17/2025) 15 mL 0    methocarbamol (Robaxin) 500 mg tablet Take 1 tablet (500 mg) by mouth 3 times a day as needed for muscle spasms for up to 10 days. (Patient not taking: Reported on 7/17/2025) 30 tablet 0    pregabalin (Lyrica) 25 mg capsule Take 1 capsule (25 mg) by mouth 2 times a day. (Patient not taking: Reported on 7/17/2025)      tiotropium (Spiriva) 18 mcg inhalation capsule Place 1 capsule (18 mcg) into inhaler and inhale once daily. (Patient not taking: Reported on 7/17/2025)      traMADol ER (Ultram-ER) 100 mg 24 hr tablet Take by mouth. Do not crush, chew, or split. (Patient not taking: Reported on 7/17/2025)      traZODone (Desyrel) 50 mg tablet Take 1 tablet (50 mg) by mouth once daily at bedtime. (Patient not taking: Reported on 7/17/2025)      Wixela Inhub 250-50 mcg/dose diskus inhaler Inhale 1 puff 2 times a day. (Patient not taking: Reported on 7/17/2025)       No  current facility-administered medications on file prior to visit.

## 2025-07-17 NOTE — TELEPHONE ENCOUNTER
Please call patient and let her know I prescribed Farxiga 10 mg daily for her lower extremity edema.  If this is cost prohibitive have her call us back and we can refer to clinical pharmacy.  Have her call us back in 4 to 6 weeks to let us know how she is doing, otherwise she can keep her follow-up visit in a year if her edema improves and/or does not get any worse.    SDH

## 2025-07-24 ENCOUNTER — SPECIALTY PHARMACY (OUTPATIENT)
Dept: PHARMACY | Facility: CLINIC | Age: 70
End: 2025-07-24

## 2025-07-24 PROCEDURE — RXMED WILLOW AMBULATORY MEDICATION CHARGE

## 2025-07-28 RX ORDER — HYDROCHLOROTHIAZIDE 12.5 MG/1
CAPSULE ORAL
Qty: 2 EACH | Refills: 3 | Status: SHIPPED | OUTPATIENT
Start: 2025-07-28

## 2025-07-28 NOTE — TELEPHONE ENCOUNTER
Requested Prescriptions     Pending Prescriptions Disp Refills    Continuous Glucose Sensor (FREESTYLE JOANNE 3 PLUS SENSOR) MISC 2 each 3     Sig: Change every 15 days. E11.65

## 2025-07-30 ENCOUNTER — OFFICE VISIT (OUTPATIENT)
Age: 70
End: 2025-07-30
Payer: MEDICARE

## 2025-07-30 VITALS
HEIGHT: 59 IN | HEART RATE: 83 BPM | SYSTOLIC BLOOD PRESSURE: 122 MMHG | BODY MASS INDEX: 29.43 KG/M2 | WEIGHT: 146 LBS | DIASTOLIC BLOOD PRESSURE: 68 MMHG

## 2025-07-30 DIAGNOSIS — E03.9 ACQUIRED HYPOTHYROIDISM: ICD-10-CM

## 2025-07-30 DIAGNOSIS — Z79.4 INSULIN-REQUIRING OR DEPENDENT TYPE II DIABETES MELLITUS (HCC): ICD-10-CM

## 2025-07-30 DIAGNOSIS — E11.9 INSULIN-REQUIRING OR DEPENDENT TYPE II DIABETES MELLITUS (HCC): ICD-10-CM

## 2025-07-30 DIAGNOSIS — E11.65 UNCONTROLLED TYPE 2 DIABETES MELLITUS WITH HYPERGLYCEMIA (HCC): ICD-10-CM

## 2025-07-30 DIAGNOSIS — E11.65 UNCONTROLLED TYPE 2 DIABETES MELLITUS WITH HYPERGLYCEMIA (HCC): Primary | ICD-10-CM

## 2025-07-30 LAB
ANION GAP SERPL CALCULATED.3IONS-SCNC: 12 MEQ/L (ref 9–15)
BUN SERPL-MCNC: 20 MG/DL (ref 8–23)
CALCIUM SERPL-MCNC: 10.1 MG/DL (ref 8.5–9.9)
CHLORIDE SERPL-SCNC: 102 MEQ/L (ref 95–107)
CO2 SERPL-SCNC: 27 MEQ/L (ref 20–31)
CREAT SERPL-MCNC: 1.03 MG/DL (ref 0.5–0.9)
GLUCOSE SERPL-MCNC: 208 MG/DL (ref 70–99)
POTASSIUM SERPL-SCNC: 3.8 MEQ/L (ref 3.4–4.9)
SODIUM SERPL-SCNC: 141 MEQ/L (ref 135–144)
T4 FREE SERPL-MCNC: 1.65 NG/DL (ref 0.84–1.68)
TSH REFLEX: 0.29 UIU/ML (ref 0.44–3.86)

## 2025-07-30 PROCEDURE — 3078F DIAST BP <80 MM HG: CPT | Performed by: INTERNAL MEDICINE

## 2025-07-30 PROCEDURE — 1126F AMNT PAIN NOTED NONE PRSNT: CPT | Performed by: INTERNAL MEDICINE

## 2025-07-30 PROCEDURE — G8427 DOCREV CUR MEDS BY ELIG CLIN: HCPCS | Performed by: INTERNAL MEDICINE

## 2025-07-30 PROCEDURE — G8417 CALC BMI ABV UP PARAM F/U: HCPCS | Performed by: INTERNAL MEDICINE

## 2025-07-30 PROCEDURE — 99213 OFFICE O/P EST LOW 20 MIN: CPT | Performed by: INTERNAL MEDICINE

## 2025-07-30 PROCEDURE — 3051F HG A1C>EQUAL 7.0%<8.0%: CPT | Performed by: INTERNAL MEDICINE

## 2025-07-30 PROCEDURE — 2022F DILAT RTA XM EVC RTNOPTHY: CPT | Performed by: INTERNAL MEDICINE

## 2025-07-30 PROCEDURE — 1123F ACP DISCUSS/DSCN MKR DOCD: CPT | Performed by: INTERNAL MEDICINE

## 2025-07-30 PROCEDURE — 3017F COLORECTAL CA SCREEN DOC REV: CPT | Performed by: INTERNAL MEDICINE

## 2025-07-30 PROCEDURE — 99214 OFFICE O/P EST MOD 30 MIN: CPT | Performed by: INTERNAL MEDICINE

## 2025-07-30 PROCEDURE — 3074F SYST BP LT 130 MM HG: CPT | Performed by: INTERNAL MEDICINE

## 2025-07-30 PROCEDURE — 1090F PRES/ABSN URINE INCON ASSESS: CPT | Performed by: INTERNAL MEDICINE

## 2025-07-30 PROCEDURE — G8400 PT W/DXA NO RESULTS DOC: HCPCS | Performed by: INTERNAL MEDICINE

## 2025-07-30 PROCEDURE — 1159F MED LIST DOCD IN RCRD: CPT | Performed by: INTERNAL MEDICINE

## 2025-07-30 PROCEDURE — 95251 CONT GLUC MNTR ANALYSIS I&R: CPT | Performed by: INTERNAL MEDICINE

## 2025-07-30 PROCEDURE — 1036F TOBACCO NON-USER: CPT | Performed by: INTERNAL MEDICINE

## 2025-07-30 RX ORDER — KETOROLAC TROMETHAMINE 30 MG/ML
INJECTION, SOLUTION INTRAMUSCULAR; INTRAVENOUS
Qty: 2 EACH | Refills: 3 | Status: SHIPPED | OUTPATIENT
Start: 2025-07-30

## 2025-07-30 RX ORDER — DAPAGLIFLOZIN 10 MG/1
10 TABLET, FILM COATED ORAL DAILY
COMMUNITY
Start: 2025-07-17 | End: 2026-07-17

## 2025-07-30 RX ORDER — FLUTICASONE FUROATE, UMECLIDINIUM BROMIDE AND VILANTEROL TRIFENATATE 200; 62.5; 25 UG/1; UG/1; UG/1
POWDER RESPIRATORY (INHALATION)
COMMUNITY
Start: 2025-07-15

## 2025-07-30 NOTE — PROGRESS NOTES
82.1 11/16/2018     11/16/2018     Lab Results   Component Value Date    LABA1C 7.5 (H) 04/25/2025    LABA1C 7.4 01/30/2025    LABA1C 7.2 (H) 06/10/2024     Lab Results   Component Value Date    CHOLFAST 216 (H) 01/29/2024    CHOLFAST 226 (H) 02/01/2022    TRIGLYCFAST 328 (H) 01/29/2024    TRIGLYCFAST 448 (H) 02/01/2022    HDL 47 04/25/2025    HDL 41 01/29/2024    HDL 47 07/19/2022    CHOL 238 (H) 04/25/2025    CHOL 243 (H) 07/19/2022    CHOL 304 (H) 09/08/2020    TRIG 182 (H) 04/25/2025    TRIG 324 (H) 07/19/2022    TRIG 304 (H) 09/08/2020     No results found for: \"TESTM\"  Lab Results   Component Value Date    TSH 35.790 (H) 04/25/2025    TSH 36.110 (H) 04/25/2025    TSH 0.786 12/01/2017    T4FREE 1.02 04/25/2025    T4FREE 1.01 04/25/2025    T4FREE 1.12 12/01/2017     Lab Results   Component Value Date    TPOABS 4.3 04/25/2025       Review of Systems   Cardiovascular:  Negative for palpitations.   Endocrine: Negative for cold intolerance and heat intolerance.   Neurological:  Negative for tremors.       Objective:   Physical Exam  Vitals reviewed.   Constitutional:       General: She is not in acute distress.     Appearance: Normal appearance. She is obese.   HENT:      Head: Normocephalic and atraumatic.      Right Ear: External ear normal.      Left Ear: External ear normal.      Nose: Nose normal.   Eyes:      General: No scleral icterus.        Right eye: No discharge.         Left eye: No discharge.      Extraocular Movements: Extraocular movements intact.      Conjunctiva/sclera: Conjunctivae normal.   Cardiovascular:      Rate and Rhythm: Normal rate.   Pulmonary:      Effort: Pulmonary effort is normal.   Musculoskeletal:         General: Normal range of motion.      Cervical back: Normal range of motion and neck supple.      Right lower leg: No edema.      Left lower leg: No edema.   Skin:     Findings: No lesion or rash.   Neurological:      General: No focal deficit present.      Mental Status:

## 2025-07-31 ENCOUNTER — PHARMACY VISIT (OUTPATIENT)
Dept: PHARMACY | Facility: CLINIC | Age: 70
End: 2025-07-31
Payer: COMMERCIAL

## 2025-07-31 LAB
ESTIMATED AVERAGE GLUCOSE: 183 MG/DL
HBA1C MFR BLD: 8 % (ref 4–6)

## 2025-08-03 DIAGNOSIS — E11.65 UNCONTROLLED TYPE 2 DIABETES MELLITUS WITH HYPERGLYCEMIA (HCC): ICD-10-CM

## 2025-08-04 RX ORDER — HUMAN INSULIN 100 [IU]/ML
INJECTION, SUSPENSION SUBCUTANEOUS
Qty: 15 ADJUSTABLE DOSE PRE-FILLED PEN SYRINGE | Refills: 4 | Status: SHIPPED | OUTPATIENT
Start: 2025-08-04

## 2025-08-21 ENCOUNTER — SPECIALTY PHARMACY (OUTPATIENT)
Dept: PHARMACY | Facility: CLINIC | Age: 70
End: 2025-08-21

## 2025-08-21 PROCEDURE — RXMED WILLOW AMBULATORY MEDICATION CHARGE

## 2025-08-22 ENCOUNTER — OFFICE VISIT (OUTPATIENT)
Dept: HEMATOLOGY/ONCOLOGY | Facility: CLINIC | Age: 70
End: 2025-08-22
Payer: MEDICARE

## 2025-08-22 ENCOUNTER — LAB (OUTPATIENT)
Dept: LAB | Facility: CLINIC | Age: 70
End: 2025-08-22
Payer: MEDICARE

## 2025-08-22 DIAGNOSIS — C34.12 MALIGNANT NEOPLASM OF UPPER LOBE OF LEFT LUNG (MULTI): ICD-10-CM

## 2025-08-22 LAB
ALBUMIN SERPL BCP-MCNC: 4.4 G/DL (ref 3.4–5)
ALP SERPL-CCNC: 157 U/L (ref 33–136)
ALT SERPL W P-5'-P-CCNC: 18 U/L (ref 7–45)
ANION GAP SERPL CALC-SCNC: 14 MMOL/L (ref 10–20)
AST SERPL W P-5'-P-CCNC: 19 U/L (ref 9–39)
BASOPHILS # BLD AUTO: 0.06 X10*3/UL (ref 0–0.1)
BASOPHILS NFR BLD AUTO: 0.8 %
BILIRUB SERPL-MCNC: 0.6 MG/DL (ref 0–1.2)
BUN SERPL-MCNC: 17 MG/DL (ref 6–23)
CALCIUM SERPL-MCNC: 9.8 MG/DL (ref 8.6–10.3)
CHLORIDE SERPL-SCNC: 106 MMOL/L (ref 98–107)
CO2 SERPL-SCNC: 27 MMOL/L (ref 21–32)
CREAT SERPL-MCNC: 0.87 MG/DL (ref 0.5–1.05)
EGFRCR SERPLBLD CKD-EPI 2021: 71 ML/MIN/1.73M*2
EOSINOPHIL # BLD AUTO: 0.49 X10*3/UL (ref 0–0.4)
EOSINOPHIL NFR BLD AUTO: 6.4 %
ERYTHROCYTE [DISTWIDTH] IN BLOOD BY AUTOMATED COUNT: 15.4 % (ref 11.5–14.5)
GLUCOSE SERPL-MCNC: 226 MG/DL (ref 74–99)
HCT VFR BLD AUTO: 38.2 % (ref 36–46)
HGB BLD-MCNC: 11.9 G/DL (ref 12–16)
IMM GRANULOCYTES # BLD AUTO: 0.02 X10*3/UL (ref 0–0.5)
IMM GRANULOCYTES NFR BLD AUTO: 0.3 % (ref 0–0.9)
LYMPHOCYTES # BLD AUTO: 1.24 X10*3/UL (ref 0.8–3)
LYMPHOCYTES NFR BLD AUTO: 16.3 %
MCH RBC QN AUTO: 23.7 PG (ref 26–34)
MCHC RBC AUTO-ENTMCNC: 31.2 G/DL (ref 32–36)
MCV RBC AUTO: 76 FL (ref 80–100)
MONOCYTES # BLD AUTO: 0.78 X10*3/UL (ref 0.05–0.8)
MONOCYTES NFR BLD AUTO: 10.2 %
NEUTROPHILS # BLD AUTO: 5.03 X10*3/UL (ref 1.6–5.5)
NEUTROPHILS NFR BLD AUTO: 66 %
NRBC BLD-RTO: ABNORMAL /100{WBCS}
PLATELET # BLD AUTO: 267 X10*3/UL (ref 150–450)
POTASSIUM SERPL-SCNC: 3.6 MMOL/L (ref 3.5–5.3)
PROT SERPL-MCNC: 7.3 G/DL (ref 6.4–8.2)
RBC # BLD AUTO: 5.02 X10*6/UL (ref 4–5.2)
SODIUM SERPL-SCNC: 143 MMOL/L (ref 136–145)
WBC # BLD AUTO: 7.6 X10*3/UL (ref 4.4–11.3)

## 2025-08-22 PROCEDURE — 36415 COLL VENOUS BLD VENIPUNCTURE: CPT

## 2025-08-22 PROCEDURE — 80053 COMPREHEN METABOLIC PANEL: CPT

## 2025-08-22 PROCEDURE — 85025 COMPLETE CBC W/AUTO DIFF WBC: CPT

## 2025-08-22 PROCEDURE — 82378 CARCINOEMBRYONIC ANTIGEN: CPT

## 2025-08-22 ASSESSMENT — PAIN SCALES - GENERAL: PAINLEVEL_OUTOF10: 0-NO PAIN

## 2025-08-23 LAB — CEA SERPL-MCNC: 2.6 UG/L

## 2025-08-25 ENCOUNTER — APPOINTMENT (OUTPATIENT)
Dept: RADIOLOGY | Facility: HOSPITAL | Age: 70
End: 2025-08-25
Payer: MEDICARE

## 2025-08-28 ENCOUNTER — PHARMACY VISIT (OUTPATIENT)
Dept: PHARMACY | Facility: CLINIC | Age: 70
End: 2025-08-28
Payer: COMMERCIAL

## 2025-09-01 ASSESSMENT — ENCOUNTER SYMPTOMS
HEMATOLOGIC/LYMPHATIC NEGATIVE: 1
LEG SWELLING: 1
NEUROLOGICAL NEGATIVE: 1
EYES NEGATIVE: 1
PSYCHIATRIC NEGATIVE: 1
RESPIRATORY NEGATIVE: 1
MUSCULOSKELETAL NEGATIVE: 1
GASTROINTESTINAL NEGATIVE: 1
CONSTITUTIONAL NEGATIVE: 1
ENDOCRINE NEGATIVE: 1

## 2025-09-03 ENCOUNTER — HOSPITAL ENCOUNTER (OUTPATIENT)
Dept: RADIOLOGY | Facility: HOSPITAL | Age: 70
Discharge: HOME | End: 2025-09-03
Payer: MEDICARE

## 2025-09-03 DIAGNOSIS — N63.0 UNSPECIFIED LUMP IN UNSPECIFIED BREAST: ICD-10-CM

## 2025-09-03 DIAGNOSIS — N64.4 MASTODYNIA: ICD-10-CM

## 2025-09-03 PROCEDURE — G0279 TOMOSYNTHESIS, MAMMO: HCPCS | Mod: LEFT SIDE | Performed by: RADIOLOGY

## 2025-09-03 PROCEDURE — 77065 DX MAMMO INCL CAD UNI: CPT | Mod: LEFT SIDE | Performed by: RADIOLOGY

## 2025-09-03 PROCEDURE — 76642 ULTRASOUND BREAST LIMITED: CPT | Mod: LEFT SIDE | Performed by: RADIOLOGY

## 2025-09-03 PROCEDURE — 76642 ULTRASOUND BREAST LIMITED: CPT | Mod: LT

## 2025-09-03 PROCEDURE — 76982 USE 1ST TARGET LESION: CPT | Mod: LT

## 2025-09-03 PROCEDURE — 77061 BREAST TOMOSYNTHESIS UNI: CPT | Mod: LT

## 2026-07-20 ENCOUNTER — APPOINTMENT (OUTPATIENT)
Dept: CARDIOLOGY | Facility: CLINIC | Age: 71
End: 2026-07-20
Payer: MEDICARE

## (undated) DEVICE — DRAPE, TIBURON, SPLIT SHEET, REINF ADH STRIP, 77X122

## (undated) DEVICE — DRESSING, ISLAND, TELFA, 4 X 5 IN

## (undated) DEVICE — DRESSING, ADHESIVE, ISLAND, TELFA, 2 X 3.75 IN, LF

## (undated) DEVICE — COVER, CART, 45 X 27 X 48 IN, CLEAR

## (undated) DEVICE — TOWEL, SURGICAL, NEURO, O/R, 16 X 26, BLUE, STERILE

## (undated) DEVICE — LOOP, VESSEL, MAXI, RED

## (undated) DEVICE — SUTURE, ETHIBOND, XTRA, 30 IN, 0, CT-1, GREEN

## (undated) DEVICE — GRASPER, LONG, BIPOLAR, DAVINCI XI

## (undated) DEVICE — ELECTRODE, ELECTROSURGICAL, BLADE, EXTENDED, 6.5 IN, STAINLESS STEEL

## (undated) DEVICE — MANIFOLD, 4 PORT NEPTUNE STANDARD

## (undated) DEVICE — DRAPE, ARM XI

## (undated) DEVICE — KIT, ROBOTIC, CUSTOM UHC

## (undated) DEVICE — GAUZE, KITTNER ROLL, STERILE

## (undated) DEVICE — ELECTRODE, ELECTROSURGICAL, BLADE, INSULATED, ENT/IMA, STERILE

## (undated) DEVICE — INSTRUMENT, DISSECTING, ENDOSCOPIC, PEANUT, 5 MM, STERILE

## (undated) DEVICE — SEAL, UNIVERSAL 5-8MM  XI

## (undated) DEVICE — CATHETER TRAY, SURESTEP, 16FR, URINE METER W/STATLOCK

## (undated) DEVICE — Device

## (undated) DEVICE — SHEET, SPLIT, CARDIOVASCULAR TIBURON

## (undated) DEVICE — DRAPE, COLUMN, DAVINCI XI

## (undated) DEVICE — FORCEPS, CADIERE, DAVINCI XI

## (undated) DEVICE — APPLIER, CLIP, SMALL XI

## (undated) DEVICE — SUTURE, MONOCRYL, 3-0, 18 IN, PS2, UNDYED

## (undated) DEVICE — SUTURE, VICRYL, 2-0, 36 IN, CT-1, UNDYED

## (undated) DEVICE — CATHETER, THORACIC, STRAIGHT, ADULT, 28 FR, PVC